# Patient Record
Sex: FEMALE | Race: WHITE | HISPANIC OR LATINO | Employment: OTHER | URBAN - METROPOLITAN AREA
[De-identification: names, ages, dates, MRNs, and addresses within clinical notes are randomized per-mention and may not be internally consistent; named-entity substitution may affect disease eponyms.]

---

## 2017-02-04 ENCOUNTER — HOSPITAL ENCOUNTER (INPATIENT)
Facility: HOSPITAL | Age: 62
LOS: 2 days | Discharge: HOME | DRG: 291 | End: 2017-02-06
Attending: EMERGENCY MEDICINE | Admitting: INTERNAL MEDICINE
Payer: MEDICAID

## 2017-02-04 DIAGNOSIS — N19 RENAL FAILURE: ICD-10-CM

## 2017-02-04 DIAGNOSIS — E43 SEVERE MALNUTRITION: ICD-10-CM

## 2017-02-04 DIAGNOSIS — N18.6 ESRD (END STAGE RENAL DISEASE): ICD-10-CM

## 2017-02-04 DIAGNOSIS — R09.02 HYPOXIA: ICD-10-CM

## 2017-02-04 DIAGNOSIS — E87.6 HYPOKALEMIA: ICD-10-CM

## 2017-02-04 DIAGNOSIS — E87.20 METABOLIC ACIDOSIS: ICD-10-CM

## 2017-02-04 DIAGNOSIS — I50.30 DIASTOLIC CONGESTIVE HEART FAILURE: ICD-10-CM

## 2017-02-04 DIAGNOSIS — R06.02 SHORTNESS OF BREATH: ICD-10-CM

## 2017-02-04 DIAGNOSIS — J96.01 ACUTE HYPOXEMIC RESPIRATORY FAILURE: ICD-10-CM

## 2017-02-04 DIAGNOSIS — I10 ESSENTIAL HYPERTENSION: ICD-10-CM

## 2017-02-04 DIAGNOSIS — I10 MALIGNANT HYPERTENSION: ICD-10-CM

## 2017-02-04 DIAGNOSIS — R73.9 HYPERGLYCEMIA: ICD-10-CM

## 2017-02-04 DIAGNOSIS — Z91.199 NON-COMPLIANCE: ICD-10-CM

## 2017-02-04 DIAGNOSIS — J81.0 ACUTE PULMONARY EDEMA: Primary | ICD-10-CM

## 2017-02-04 DIAGNOSIS — Z72.0 TOBACCO ABUSE: ICD-10-CM

## 2017-02-04 PROBLEM — J81.1 PULMONARY EDEMA: Status: ACTIVE | Noted: 2017-02-04

## 2017-02-04 LAB
ALBUMIN SERPL BCP-MCNC: 2.1 G/DL
ALP SERPL-CCNC: 179 U/L
ALT SERPL W/O P-5'-P-CCNC: 12 U/L
ANION GAP SERPL CALC-SCNC: 20 MMOL/L
AST SERPL-CCNC: 21 U/L
BASOPHILS # BLD AUTO: 0.02 K/UL
BASOPHILS NFR BLD: 0.2 %
BILIRUB SERPL-MCNC: 0.2 MG/DL
BNP SERPL-MCNC: 2762 PG/ML
BUN SERPL-MCNC: 29 MG/DL
CALCIUM SERPL-MCNC: 8.2 MG/DL
CHLORIDE SERPL-SCNC: 97 MMOL/L
CO2 SERPL-SCNC: 20 MMOL/L
CREAT SERPL-MCNC: 4.3 MG/DL
DIFFERENTIAL METHOD: ABNORMAL
EOSINOPHIL # BLD AUTO: 0.1 K/UL
EOSINOPHIL NFR BLD: 0.4 %
ERYTHROCYTE [DISTWIDTH] IN BLOOD BY AUTOMATED COUNT: 15.4 %
EST. GFR  (AFRICAN AMERICAN): 12 ML/MIN/1.73 M^2
EST. GFR  (NON AFRICAN AMERICAN): 11 ML/MIN/1.73 M^2
GLUCOSE SERPL-MCNC: 165 MG/DL
HCT VFR BLD AUTO: 43.7 %
HGB BLD-MCNC: 13.8 G/DL
LYMPHOCYTES # BLD AUTO: 2.2 K/UL
LYMPHOCYTES NFR BLD: 18 %
MCH RBC QN AUTO: 28.2 PG
MCHC RBC AUTO-ENTMCNC: 31.6 %
MCV RBC AUTO: 89 FL
MONOCYTES # BLD AUTO: 0.4 K/UL
MONOCYTES NFR BLD: 3.5 %
NEUTROPHILS # BLD AUTO: 9.3 K/UL
NEUTROPHILS NFR BLD: 77.9 %
PLATELET # BLD AUTO: 260 K/UL
PMV BLD AUTO: 11.6 FL
POTASSIUM SERPL-SCNC: 2.8 MMOL/L
PROT SERPL-MCNC: 6.5 G/DL
RBC # BLD AUTO: 4.89 M/UL
SODIUM SERPL-SCNC: 137 MMOL/L
WBC # BLD AUTO: 11.97 K/UL

## 2017-02-04 PROCEDURE — 96374 THER/PROPH/DIAG INJ IV PUSH: CPT

## 2017-02-04 PROCEDURE — 80053 COMPREHEN METABOLIC PANEL: CPT

## 2017-02-04 PROCEDURE — 87340 HEPATITIS B SURFACE AG IA: CPT

## 2017-02-04 PROCEDURE — 96375 TX/PRO/DX INJ NEW DRUG ADDON: CPT

## 2017-02-04 PROCEDURE — 94660 CPAP INITIATION&MGMT: CPT

## 2017-02-04 PROCEDURE — 20000000 HC ICU ROOM

## 2017-02-04 PROCEDURE — 25000003 PHARM REV CODE 250: Performed by: INTERNAL MEDICINE

## 2017-02-04 PROCEDURE — 27000190 HC CPAP FULL FACE MASK W/VALVE

## 2017-02-04 PROCEDURE — 25000003 PHARM REV CODE 250: Performed by: EMERGENCY MEDICINE

## 2017-02-04 PROCEDURE — 85025 COMPLETE CBC W/AUTO DIFF WBC: CPT

## 2017-02-04 PROCEDURE — 86706 HEP B SURFACE ANTIBODY: CPT

## 2017-02-04 PROCEDURE — 90935 HEMODIALYSIS ONE EVALUATION: CPT

## 2017-02-04 PROCEDURE — 99291 CRITICAL CARE FIRST HOUR: CPT | Mod: 25

## 2017-02-04 PROCEDURE — 83880 ASSAY OF NATRIURETIC PEPTIDE: CPT

## 2017-02-04 PROCEDURE — 63600175 PHARM REV CODE 636 W HCPCS: Performed by: EMERGENCY MEDICINE

## 2017-02-04 RX ORDER — SODIUM CHLORIDE 9 MG/ML
INJECTION, SOLUTION INTRAVENOUS
Status: DISCONTINUED | OUTPATIENT
Start: 2017-02-04 | End: 2017-02-06 | Stop reason: HOSPADM

## 2017-02-04 RX ORDER — ACETAMINOPHEN 325 MG/1
650 TABLET ORAL EVERY 8 HOURS PRN
Status: DISCONTINUED | OUTPATIENT
Start: 2017-02-04 | End: 2017-02-06 | Stop reason: HOSPADM

## 2017-02-04 RX ORDER — MORPHINE SULFATE 10 MG/ML
2 INJECTION INTRAMUSCULAR; INTRAVENOUS; SUBCUTANEOUS EVERY 4 HOURS PRN
Status: DISCONTINUED | OUTPATIENT
Start: 2017-02-04 | End: 2017-02-05

## 2017-02-04 RX ORDER — FUROSEMIDE 10 MG/ML
60 INJECTION INTRAMUSCULAR; INTRAVENOUS
Status: COMPLETED | OUTPATIENT
Start: 2017-02-04 | End: 2017-02-04

## 2017-02-04 RX ORDER — AMLODIPINE BESYLATE 5 MG/1
10 TABLET ORAL DAILY
Status: DISCONTINUED | OUTPATIENT
Start: 2017-02-04 | End: 2017-02-06 | Stop reason: HOSPADM

## 2017-02-04 RX ORDER — ONDANSETRON 2 MG/ML
4 INJECTION INTRAMUSCULAR; INTRAVENOUS EVERY 12 HOURS PRN
Status: DISCONTINUED | OUTPATIENT
Start: 2017-02-04 | End: 2017-02-06 | Stop reason: HOSPADM

## 2017-02-04 RX ORDER — ENALAPRILAT 1.25 MG/ML
1.25 INJECTION INTRAVENOUS ONCE
Status: COMPLETED | OUTPATIENT
Start: 2017-02-04 | End: 2017-02-04

## 2017-02-04 RX ORDER — SODIUM CHLORIDE 9 MG/ML
INJECTION, SOLUTION INTRAVENOUS ONCE
Status: DISCONTINUED | OUTPATIENT
Start: 2017-02-04 | End: 2017-02-06 | Stop reason: HOSPADM

## 2017-02-04 RX ORDER — METOPROLOL TARTRATE 50 MG/1
50 TABLET ORAL 2 TIMES DAILY
Status: DISCONTINUED | OUTPATIENT
Start: 2017-02-04 | End: 2017-02-06 | Stop reason: HOSPADM

## 2017-02-04 RX ORDER — HYDRALAZINE HYDROCHLORIDE 25 MG/1
50 TABLET, FILM COATED ORAL EVERY 8 HOURS
Status: DISCONTINUED | OUTPATIENT
Start: 2017-02-04 | End: 2017-02-06 | Stop reason: HOSPADM

## 2017-02-04 RX ORDER — HEPARIN SODIUM 5000 [USP'U]/ML
5000 INJECTION, SOLUTION INTRAVENOUS; SUBCUTANEOUS
Status: DISCONTINUED | OUTPATIENT
Start: 2017-02-04 | End: 2017-02-06 | Stop reason: HOSPADM

## 2017-02-04 RX ADMIN — AMLODIPINE BESYLATE 10 MG: 5 TABLET ORAL at 03:02

## 2017-02-04 RX ADMIN — HEPARIN SODIUM 5000 UNITS: 5000 INJECTION, SOLUTION INTRAVENOUS; SUBCUTANEOUS at 08:02

## 2017-02-04 RX ADMIN — HYDRALAZINE HYDROCHLORIDE 50 MG: 25 TABLET ORAL at 09:02

## 2017-02-04 RX ADMIN — FUROSEMIDE 60 MG: 10 INJECTION, SOLUTION INTRAMUSCULAR; INTRAVENOUS at 11:02

## 2017-02-04 RX ADMIN — ENALAPRILAT 1.25 MG: 1.25 INJECTION, SOLUTION INTRAVENOUS at 11:02

## 2017-02-04 RX ADMIN — HYDRALAZINE HYDROCHLORIDE 50 MG: 25 TABLET ORAL at 03:02

## 2017-02-04 RX ADMIN — METOPROLOL TARTRATE 50 MG: 50 TABLET ORAL at 08:02

## 2017-02-04 NOTE — H&P
[facility] Hospital Medicine  History & Physical    Patient Name: April Vasquez  MRN: [id number]  Admission Date: 2/4/2017  Attending Physician: Brandee Irene MD   Primary Care Provider: Nasra Iraheta MD         Patient information was obtained from patient and ER records.     Subjective:     Principal Problem:Acute pulmonary edema    Chief Complaint:   Chief Complaint   Patient presents with    Shortness of Breath     Pt reports increased shortness of breath since this morning with nonproductive cough. No dialysis this morning.        HPI: Mrs. Vasquez is a 62 yo F who has a history of hypertension with recent initiation of dialysis this past November on a T, TH, Sat schedule, presents to the ED with a CC of SOB. The patient notes that she ate boiled crawfish last night and woke up this am SOB.  EMS was called and started the patient on Bipap.  Because of the ER visit, the patient missed dialysis today. She was found on X-ray to have pulmonary edema with supporting BNp of 2762. She notes a cough but non-productive. No fever, no wbc count. No known sick contacts.  The patient is resting comfortably in the ED on Bi-pap without any increased work of breathing. She lives at home with her . She continues to smoke.     Past Medical History   Diagnosis Date    Diabetes mellitus     Gallstones     Hypertension     Renal disorder      dialysis       Past Surgical History   Procedure Laterality Date    Hysterectomy      Dialyis port       right subclavian       Review of patient's allergies indicates:  No Known Allergies    No current facility-administered medications on file prior to encounter.      Current Outpatient Prescriptions on File Prior to Encounter   Medication Sig    amlodipine (NORVASC) 10 MG tablet Take 1 tablet (10 mg total) by mouth once daily.    hydrALAZINE (APRESOLINE) 50 MG tablet Take 1 tablet (50 mg total) by mouth every 8 (eight) hours.    metoprolol tartrate  (LOPRESSOR) 50 MG tablet Take 1 tablet (50 mg total) by mouth 2 (two) times daily.    isosorbide mononitrate (IMDUR) 30 MG 24 hr tablet Take 1 tablet (30 mg total) by mouth once daily.     Family History     None        Social History Main Topics    Smoking status: Current Every Day Smoker     Packs/day: 0.50    Smokeless tobacco: Not on file    Alcohol use No    Drug use: No    Sexual activity: Yes     Partners: Male     Review of Systems   Constitutional: Negative for activity change, chills, diaphoresis, fatigue and fever.   HENT: Negative for congestion.    Respiratory: Positive for cough and shortness of breath. Negative for choking, chest tightness, wheezing and stridor.    Cardiovascular: Negative for chest pain, palpitations and leg swelling.   Gastrointestinal: Negative for abdominal distention and abdominal pain.   Genitourinary: Negative for dysuria.   Musculoskeletal: Negative for arthralgias.     Objective:     Vital Signs (Most Recent):  Pulse: 88 (02/04/17 1225)  Resp: (!) 32 (02/04/17 1116)  BP: (!) 168/88 (02/04/17 1225)  SpO2: 98 % (02/04/17 1225) Vital Signs (24h Range):  Pulse:  [] 88  Resp:  [32] 32  SpO2:  [89 %-99 %] 98 %  BP: (168-211)/() 168/88     Weight: 54.4 kg (120 lb)  Body mass index is 20.6 kg/(m^2).    Physical Exam   Constitutional: She is oriented to person, place, and time. She appears well-developed and well-nourished.   Cardiovascular: Normal rate.  Exam reveals no gallop and no friction rub.    No murmur heard.  Pulmonary/Chest: Effort normal. No respiratory distress. She has no wheezes. She has rales. She exhibits no tenderness.   Abdominal: Soft. Bowel sounds are normal. She exhibits no distension.   Neurological: She is alert and oriented to person, place, and time.   Skin: Skin is warm and dry.   Vitals reviewed.       Significant Labs:   BMP:   Recent Labs  Lab 02/04/17  1131   *      K 2.8*   CL 97   CO2 20*   BUN 29*   CREATININE 4.3*    CALCIUM 8.2*     CBC:   Recent Labs  Lab 02/04/17  1131   WBC 11.97   HGB 13.8   HCT 43.7          Significant Imaging: CXR with pulmonary edema.   BNP 2762    All labs reviewed and interpreted by myself.     Assessment/Plan:     * Acute pulmonary edema  Likely from dietary non-compliance.  Patient had crawfish yesterday. Now presenting with pulmonary edema. - will undergo dialysis shortly.  No history of heart failure. Will check echo.       Hypokalemia  Will replace.       Severe malnutrition  Will discuss with patient       Metabolic acidosis  From renal failure      Tobacco abuse  Smoking cessation education       Hyperglycemia  Has diabetes listed as problem- but she is not on any meds.  Will check an A1c.       Acute hypoxemic respiratory failure  Patient presenting with respiratory distress as well. Now comfortable on Bi-pap. Will undergo dialysis. Stable.       Non-compliance  Discussed with patient       ESRD (end stage renal disease)  Recent initiation of dialysis this past November. Nephrology consulted.  T, TH, Sat. Will undergo acute dialysis       Malignant hypertension  Patient presenting with a SBP of 200+.- likely cause of patient's ESRD.  Being treated. Improved. Dialysis will help further.        VTE Risk Mitigation     None      Will place on Heparin for DVT prophylaxis.     Critical care time spent  50 minutes     Nma Cuadra MD  Department of Hospital Medicine   [facility]

## 2017-02-04 NOTE — SUBJECTIVE & OBJECTIVE
Past Medical History   Diagnosis Date    Diabetes mellitus     Gallstones     Hypertension     Renal disorder      dialysis       Past Surgical History   Procedure Laterality Date    Hysterectomy      Dialyis port       right subclavian       Review of patient's allergies indicates:  No Known Allergies    No current facility-administered medications on file prior to encounter.      Current Outpatient Prescriptions on File Prior to Encounter   Medication Sig    amlodipine (NORVASC) 10 MG tablet Take 1 tablet (10 mg total) by mouth once daily.    hydrALAZINE (APRESOLINE) 50 MG tablet Take 1 tablet (50 mg total) by mouth every 8 (eight) hours.    metoprolol tartrate (LOPRESSOR) 50 MG tablet Take 1 tablet (50 mg total) by mouth 2 (two) times daily.    isosorbide mononitrate (IMDUR) 30 MG 24 hr tablet Take 1 tablet (30 mg total) by mouth once daily.     Family History     None        Social History Main Topics    Smoking status: Current Every Day Smoker     Packs/day: 0.50    Smokeless tobacco: Not on file    Alcohol use No    Drug use: No    Sexual activity: Yes     Partners: Male     Review of Systems   Constitutional: Negative for activity change, chills, diaphoresis, fatigue and fever.   HENT: Negative for congestion.    Respiratory: Positive for cough and shortness of breath. Negative for choking, chest tightness, wheezing and stridor.    Cardiovascular: Negative for chest pain, palpitations and leg swelling.   Gastrointestinal: Negative for abdominal distention and abdominal pain.   Genitourinary: Negative for dysuria.   Musculoskeletal: Negative for arthralgias.     Objective:     Vital Signs (Most Recent):  Pulse: 88 (02/04/17 1225)  Resp: (!) 32 (02/04/17 1116)  BP: (!) 168/88 (02/04/17 1225)  SpO2: 98 % (02/04/17 1225) Vital Signs (24h Range):  Pulse:  [] 88  Resp:  [32] 32  SpO2:  [89 %-99 %] 98 %  BP: (168-211)/() 168/88     Weight: 54.4 kg (120 lb)  Body mass index is 20.6  kg/(m^2).    Physical Exam   Constitutional: She is oriented to person, place, and time. She appears well-developed and well-nourished.   Cardiovascular: Normal rate.  Exam reveals no gallop and no friction rub.    No murmur heard.  Pulmonary/Chest: Effort normal. No respiratory distress. She has no wheezes. She has rales. She exhibits no tenderness.   Abdominal: Soft. Bowel sounds are normal. She exhibits no distension.   Neurological: She is alert and oriented to person, place, and time.   Skin: Skin is warm and dry.   Vitals reviewed.       Significant Labs:   BMP:   Recent Labs  Lab 02/04/17  1131   *      K 2.8*   CL 97   CO2 20*   BUN 29*   CREATININE 4.3*   CALCIUM 8.2*     CBC:   Recent Labs  Lab 02/04/17  1131   WBC 11.97   HGB 13.8   HCT 43.7          Significant Imaging: CXR with pulmonary edema.   BNP 2762    All labs reviewed and interpreted by myself.

## 2017-02-04 NOTE — IP AVS SNAPSHOT
[place] [address] [address]  Phone: [phone]           Patient Discharge Instructions     Our goal is to set you up for success. This packet includes information on your condition, medications, and your home care. It will help you to care for yourself so you don't get sicker and need to go back to the hospital.     Please ask your nurse if you have any questions.        There are many details to remember when preparing to leave the hospital. Here is what you will need to do:    1. Take your medicine. If you are prescribed medications, review your Medication List in the following pages. You may have new medications to  at the pharmacy and others that you'll need to stop taking. Review the instructions for how and when to take your medications. Talk with your doctor or nurses if you are unsure of what to do.     2. Go to your follow-up appointments. Specific follow-up information is listed in the following pages. Your may be contacted by a transition nurse or clinical provider about future appointments. Be sure we have all of the phone numbers to reach you, if needed. Please contact your provider's office if you are unable to make an appointment.     3. Watch for warning signs. Your doctor or nurse will give you detailed warning signs to watch for and when to call for assistance. These instructions may also include educational information about your condition. If you experience any of warning signs to your health, call your doctor.               ** Verify the list of medication(s) below is accurate and up to date. Carry this with you in case of emergency. If your medications have changed, please notify your healthcare provider.             Medication List      CHANGE how you take these medications        Additional Info                      hydrALAZINE 50 MG tablet   Commonly known as:  APRESOLINE   Quantity:  90 tablet   Refills:  0   Dose:  75 mg   What  changed:  how much to take    Last time this was given:  50 mg on 2/6/2017  6:11 AM   Instructions:  Take 1.5 tablets (75 mg total) by mouth every 8 (eight) hours.     Begin Date    AM    Noon    PM    Bedtime         CONTINUE taking these medications        Additional Info                      amlodipine 10 MG tablet   Commonly known as:  NORVASC   Quantity:  30 tablet   Refills:  0   Dose:  10 mg    Last time this was given:  10 mg on 2/6/2017  8:30 AM   Instructions:  Take 1 tablet (10 mg total) by mouth once daily.     Begin Date    AM    Noon    PM    Bedtime       isosorbide mononitrate 30 MG 24 hr tablet   Commonly known as:  IMDUR   Quantity:  30 tablet   Refills:  0   Dose:  30 mg    Instructions:  Take 1 tablet (30 mg total) by mouth once daily.     Begin Date    AM    Noon    PM    Bedtime       metoprolol tartrate 50 MG tablet   Commonly known as:  LOPRESSOR   Quantity:  60 tablet   Refills:  0   Dose:  50 mg    Last time this was given:  50 mg on 2/6/2017  8:30 AM   Instructions:  Take 1 tablet (50 mg total) by mouth 2 (two) times daily.     Begin Date    AM    Noon    PM    Bedtime            Where to Get Your Medications      You can get these medications from any pharmacy     Bring a paper prescription for each of these medications     hydrALAZINE 50 MG tablet                  Please bring to all follow up appointments:    1. A copy of your discharge instructions.  2. All medicines you are currently taking in their original bottles.  3. Identification and insurance card.    Please arrive 15 minutes ahead of scheduled appointment time.    Please call 24 hours in advance if you must reschedule your appointment and/or time.        Follow-up Information     Follow up with Nasra Iraheta MD In 1 week.    Specialty:  Family Medicine    Contact information:    [address]  [place] [address] [phone]          Discharge Instructions     Future Orders    Activity as tolerated     Diet general     Questions:     "Total calories:      Fat restriction, if any:      Protein restriction, if any:      Na restriction, if any:  2gNa    Fluid restriction:      Additional restrictions:        Discharge References/Attachments     BLOOD PRESSURE, DISCHARGE INSTRUCTIONS: TAKING YOUR (ENGLISH)    BLOOD PRESSURE, TAKING YOUR (ENGLISH)    CHRONIC KIDNEY DISEASE (CKD) (ENGLISH)    CHRONIC KIDNEY DISEASE, DIET FOR (ENGLISH)    EATING HEART-HEALTHY FOODS (ENGLISH)    HIGH BLOOD PRESSURE, CONTROLLING (ENGLISH)    HIGH BLOOD PRESSURE, WHAT IS?  (ENGLISH)    KIDNEY DISEASE, HIGH BLOOD PRESSURE AND (ENGLISH)    KIDNEY DISEASE: AVOIDING HIGH-SODIUM FOODS (ENGLISH)    KIDNEY DISEASE: EATING LESS SODIUM (ENGLISH)    SMOKING CESSATION (ENGLISH)        Primary Diagnosis     Your primary diagnosis was:  Acute Lung Edema      Admission Information     Date & Time Provider Department CSN    2/4/2017 11:14 AM Nam Ford MD [facility] [id number]      Care Providers     Provider Role Specialty Primary office phone    Nam Ford MD Attending Provider Hospitalist [phone]    Melania Ely MD Consulting Physician  Nephrology [phone]      Your Vitals Were     BP Pulse Temp Resp Height Weight    160/71 88 98.2 °F (36.8 °C) (Oral) 16 5' 4" (1.626 m) 55.2 kg (121 lb 11.1 oz)    SpO2 BMI             96% 20.89 kg/m2         Recent Lab Values        9/19/2016                           2:40 PM           A1C 7.1 (H)           Comment for A1C at  2:40 PM on 9/19/2016:  According to ADA guidelines, hemoglobin A1C <7.0% represents  optimal control in non-pregnant diabetic patients.  Different  metrics may apply to specific populations.   Standards of Medical Care in Diabetes - 2016.  For the purpose of screening for the presence of diabetes:  <5.7%     Consistent with the absence of diabetes  5.7-6.4%  Consistent with increasing risk for diabetes   (prediabetes)  >or=6.5%  Consistent with diabetes  Currently no consensus exists " for use of hemoglobin A1C  for diagnosis of diabetes for children.        Pending Labs     Order Current Status    Hemoglobin A1c In process    Hepatitis B Surface Antibody, Qual/Quant In process    Hepatitis B surface antigen In process      Allergies as of 2/6/2017     No Known Allergies      [facility]     [facility] Nurse Care Line - 24/7 Assistance  Unless otherwise directed by your provider, please contact [facility], our nurse care line that is available for 24/7 assistance.     Registered nurses in the [facility] provide clinical advisement, health education, appointment booking, and other advisory services.  Call for this free service at [phone].        Advance Directives     An advance directive is a document which, in the event you are no longer able to make decisions for yourself, tells your healthcare team what kind of treatment you do or do not want to receive, or who you would like to make those decisions for you.  If you do not currently have an advance directive, [facility] encourages you to create one.  For more information call:  [phone]), [phone] ([phone]),  or log on to [email or web address].        Smoking Cessation     If you would like to quit smoking:   You may be eligible for free services if you are a [place] resident and started smoking cigarettes before September 1, 1988.  Call the Smoking Cessation Trust (SCT) toll free at (893) 288-6096 or (234) 489-4954.   Call 5-600-QUIT-NOW if you do not meet the above criteria.            Language Assistance Services     ATTENTION: Language assistance services are available, free of charge. Please call [phone].      ATENCIÓN: Si habla español, tiene a del real disposición servicios gratuitos de asistencia lingüística. Llame al [phone].     CHÚ Ý: N?u b?n nói Ti?ng Vi?t, có các d?ch v? h? tr? ngôn ng? mi?n phí dành cho b?n. G?i s? [phone].        Chronic Kindey Disease  Education             Diabetes Discharge Instructions                                   [facility] Sign-Up     Activating your [facility] account is as easy as 1-2-3!     1) Visit [email or web address], select Sign Up Now, enter this activation code and your date of birth, then select Next.  [email or web address]  Expires: 3/23/2017  8:50 AM      2) Create a username and password to use when you visit [facility] in the future and select a security question in case you lose your password and select Next.    3) Enter your e-mail address and click Sign Up!    Additional Information  If you have questions, please e-mail [email or web address] or call [phone] to talk to our [facility] staff. Remember, [facility] is NOT to be used for urgent needs. For medical emergencies, dial 911.          [facility] complies with applicable Federal civil rights laws and does not discriminate on the basis of race, color, national origin, age, disability, or sex.

## 2017-02-04 NOTE — PLAN OF CARE
Problem: Patient Care Overview  Goal: Plan of Care Review  Outcome: Ongoing (interventions implemented as appropriate)  To hospital with respiratory distress.  Now comfortable on BIPAP with 50% FIO2.  Awaiting Hemodialysis treatment.

## 2017-02-04 NOTE — PLAN OF CARE
Problem: Hemodialysis (Adult)  Goal: Signs and Symptoms of Listed Potential Problems Will be Absent, Minimized or Managed (Hemodialysis)  Signs and symptoms of listed potential problems will be absent, minimized or managed by discharge/transition of care (reference Hemodialysis (Adult) CPG).   Outcome: Ongoing (interventions implemented as appropriate)    02/04/17 1629   Hemodialysis   Problems Assessed (Hemodialysis) all   Problems Present (Hemodialysis) cardiovascular complications;electrolyte imbalance;fluid imbalance;hematologic complications;infection;situational response;other (see comments)   4 hour hd tx in progress as ordered.

## 2017-02-04 NOTE — ASSESSMENT & PLAN NOTE
Patient presenting with respiratory distress as well. Now comfortable on Bi-pap. Will undergo dialysis. Stable.

## 2017-02-04 NOTE — ASSESSMENT & PLAN NOTE
Recent initiation of dialysis this past November. Nephrology consulted.  T, TH, Sat. Will undergo acute dialysis

## 2017-02-04 NOTE — ED TRIAGE NOTES
Pt comes in from home via EMS for c/o of sob. Pt is a dialysis pt, began dialysis on December. Pt has dialysis T/TH/Sat. Pt reports she woke up very short of breath and gasping for air. Pt comes in wearing cpap and saturation in the upper 80s //low 90s. Resp team is called to bedside. PT is also hypertensive and is wearing a nitro patch given by EMS. Pt denies any pain but states she cannot breath w/o oxygen. Room Air stats mid 80s.

## 2017-02-04 NOTE — ASSESSMENT & PLAN NOTE
Likely from dietary non-compliance.  Patient had crawfish yesterday. Now presenting with pulmonary edema. - will undergo dialysis shortly.  No history of heart failure. Will check echo.

## 2017-02-04 NOTE — ASSESSMENT & PLAN NOTE
Patient presenting with a SBP of 200+.- likely cause of patient's ESRD.  Being treated. Improved. Dialysis will help further.

## 2017-02-04 NOTE — ED PROVIDER NOTES
Encounter Date: 2/4/2017    SCRIBE #1 NOTE: I, Elsa Mcleod, am scribing for, and in the presence of,  Brandee Irene MD. I have scribed the following portions of the note - Other sections scribed: HPI/ROS/PE.       History     Chief Complaint   Patient presents with    Shortness of Breath     Pt reports increased shortness of breath since this morning with nonproductive cough. No dialysis this morning.     Review of patient's allergies indicates:  No Known Allergies  HPI Comments: CC: SOB    HPI: This 62 yo F smoker who has history of DM, HTN, and renal disorder presents to the ED via EMS for evaluation of severe SOB that began this morning. The pt reports associated nonproductive cough and rhinorrhea due to a cold. The pt burleson been receiving dialysis T-Th-Sat since December. She did not go to dialysis this morning. Per EMS the pt was hypertensive and was given a nitro patch in route. No at home BiPap machine. The pt denies fever, chills, N/V/D, chest pain, and any other associated symptoms.     The history is provided by the patient. No  was used.     Past Medical History   Diagnosis Date    Diabetes mellitus     Gallstones     Hypertension      No past medical history pertinent negatives.  Past Surgical History   Procedure Laterality Date    Hysterectomy       No family history on file.  Social History   Substance Use Topics    Smoking status: Current Every Day Smoker     Packs/day: 0.50    Smokeless tobacco: Not on file    Alcohol use No     Review of Systems   Constitutional: Negative for chills, diaphoresis and fever.   HENT: Positive for rhinorrhea. Negative for congestion.    Eyes: Negative for visual disturbance.   Respiratory: Positive for cough (nonproductive ) and shortness of breath.    Cardiovascular: Negative for chest pain and leg swelling.   Gastrointestinal: Negative for abdominal pain, diarrhea, nausea and vomiting.   Genitourinary: Negative for dysuria and  frequency.   Musculoskeletal: Negative for myalgias.   Skin: Negative for rash.   Neurological: Negative for headaches.       Physical Exam   Initial Vitals   BP Pulse Resp Temp SpO2   02/04/17 1116 02/04/17 1116 02/04/17 1116 -- 02/04/17 1116   180/100 107 32  90 %     Physical Exam    Nursing note and vitals reviewed.  Constitutional: She appears well-developed and well-nourished. She is cooperative.  Non-toxic appearance. She appears distressed.   HENT:   Head: Normocephalic and atraumatic.   Eyes: Conjunctivae and EOM are normal. Pupils are equal, round, and reactive to light.   Neck: Normal range of motion and full passive range of motion without pain. Neck supple. No thyromegaly present. No JVD present.   Cardiovascular: Regular rhythm, normal heart sounds and normal pulses. Tachycardia present.    Pulmonary/Chest: Tachypnea noted. She is in respiratory distress (severe). She has rales (diffusely).   Tripod breathing    Abdominal: Soft. Normal appearance and bowel sounds are normal. She exhibits no distension and no mass. There is no tenderness.   Musculoskeletal: Normal range of motion.   No lower extremity edema   Neurological: She is alert and oriented to person, place, and time. She has normal strength. No cranial nerve deficit or sensory deficit.   Skin: Skin is warm, dry and intact. No rash noted.   Psychiatric: She has a normal mood and affect. Her speech is normal and behavior is normal. Judgment and thought content normal.         ED Course   Critical Care  Date/Time: 2/4/2017 12:00 PM  Performed by: ADONIS KC  Authorized by: RODRIGO FERRO   Direct patient critical care time: 30 minutes  Ordering / reviewing critical care time: 5 minutes  Documentation critical care time: 5 minutes  Consulting other physicians critical care time: 5 minutes  Total critical care time (exclusive of procedural time) : 45 minutes  Critical care was necessary to treat or prevent imminent or life-threatening  deterioration of the following conditions: respiratory failure and renal failure.  Critical care was time spent personally by me on the following activities: evaluation of patient's response to treatment, examination of patient, obtaining history from patient or surrogate, ordering and performing treatments and interventions, pulse oximetry, re-evaluation of patient's condition, review of old charts and ordering and review of radiographic studies.        Labs Reviewed   CBC W/ AUTO DIFFERENTIAL   COMPREHENSIVE METABOLIC PANEL   B-TYPE NATRIURETIC PEPTIDE     EKG Readings: (Independently Interpreted)   Sinus tachycardia 101, normal axis, normal intervals, good R-wave progression       X-Rays:   Independently Interpreted Readings:   Other Readings:  Pulmonary edema, right subclavian port in place    Medical Decision Making:   Initial Assessment:   Urgent evaluation of 61-year-old female with history of hypertension, end-stage renal disease, on dialysis who is brought in by EMS with shortness of breath.  Patient has had 3 days of cough, and was anticipating going to dialysis today but given shortness of breath required EMS.  Patient was reportedly hypertensive and tachypnea, requiring CPAP and received one sublingual nitrogen, with 1 inch Nitropaste prior to arrival in the ER.  On exam patient was in respiratory distress, with increased work of breathing, crackles throughout all lung fields, with tachypnea, no obvious JVD, no lower extremity edema, right subclavian hemodialysis port in place and well-appearing.  I suspect flash pulmonary edema, fluid overload, less likely COPD, pneumonia, pneumothorax.  Clinical Tests:   Lab Tests: Ordered and Reviewed  Radiological Study: Ordered and Reviewed  Medical Tests: Ordered and Reviewed  ED Management:  Patient was given IV Lasix, IV enalapril, and placed on BiPAP.  Chest x-ray consistent with pulmonary edema.  I discussed the case with nephrologist Dr. Iraheta, and agrees to  prepare for emergent dialysis given her respiratory distress.  Will admit patient to the ICU, with Dr. Ford aware.            Scribe Attestation:   Scribe #1: I performed the above scribed service and the documentation accurately describes the services I performed. I attest to the accuracy of the note.    Attending Attestation:           Physician Attestation for Scribe:  Physician Attestation Statement for Scribe #1: I, Brandee Irene MD, reviewed documentation, as scribed by Elsa Mcleod in my presence, and it is both accurate and complete.                 ED Course     Clinical Impression:     1. Acute pulmonary edema    2. Shortness of breath    3. Renal failure    4. Essential hypertension    5. Hypoxia        Disposition:   Disposition: Admitted  Condition: Serious       Brandee Irene MD  02/04/17 1239       Brandee Irene MD  03/03/17 6483

## 2017-02-04 NOTE — PROGRESS NOTES
Pt presents as ER admit for acute SOB, per report. Hx of noncompliance also per report. Noted as hypokalemic, hypertensive and with a BNP >2700. Radiology films reveal mild cardiomegaly and a small right pleural effusion. Pt admitted to the ICU for R/O Pneumonia vs Atelectasis. Orders , labs/consents reveiwed and confirmed, 3.5 hour hd tx initiated, will continue to monitor for changes and trends. Pt currently is on Bipap  For positive pressure ventilation / setting noted @ 10/5,  FIO2 @50%. Showing SR on monitor. SaO2 remains wnl while bipap in use. Right subclavian permacath accessed according to P&P. 3.5 hour hd tx in progress.  .

## 2017-02-05 PROBLEM — J96.01 ACUTE HYPOXEMIC RESPIRATORY FAILURE: Status: RESOLVED | Noted: 2017-02-04 | Resolved: 2017-02-05

## 2017-02-05 PROBLEM — J81.0 ACUTE PULMONARY EDEMA: Status: ACTIVE | Noted: 2017-02-05

## 2017-02-05 PROBLEM — J81.0 ACUTE PULMONARY EDEMA: Status: RESOLVED | Noted: 2017-02-04 | Resolved: 2017-02-05

## 2017-02-05 LAB
ANION GAP SERPL CALC-SCNC: 11 MMOL/L
BUN SERPL-MCNC: 23 MG/DL
CALCIUM SERPL-MCNC: 7.8 MG/DL
CHLORIDE SERPL-SCNC: 104 MMOL/L
CO2 SERPL-SCNC: 20 MMOL/L
CREAT SERPL-MCNC: 3.5 MG/DL
EST. GFR  (AFRICAN AMERICAN): 16 ML/MIN/1.73 M^2
EST. GFR  (NON AFRICAN AMERICAN): 14 ML/MIN/1.73 M^2
GLUCOSE SERPL-MCNC: 84 MG/DL
POTASSIUM SERPL-SCNC: 3.4 MMOL/L
SODIUM SERPL-SCNC: 135 MMOL/L

## 2017-02-05 PROCEDURE — 36415 COLL VENOUS BLD VENIPUNCTURE: CPT

## 2017-02-05 PROCEDURE — 93306 TTE W/DOPPLER COMPLETE: CPT | Mod: 26,,, | Performed by: INTERNAL MEDICINE

## 2017-02-05 PROCEDURE — 80048 BASIC METABOLIC PNL TOTAL CA: CPT

## 2017-02-05 PROCEDURE — 25000003 PHARM REV CODE 250: Performed by: EMERGENCY MEDICINE

## 2017-02-05 PROCEDURE — 20000000 HC ICU ROOM

## 2017-02-05 PROCEDURE — 27000221 HC OXYGEN, UP TO 24 HOURS

## 2017-02-05 PROCEDURE — 93306 TTE W/DOPPLER COMPLETE: CPT

## 2017-02-05 PROCEDURE — 25000003 PHARM REV CODE 250: Performed by: INTERNAL MEDICINE

## 2017-02-05 RX ORDER — FUROSEMIDE 40 MG/1
80 TABLET ORAL DAILY
Status: DISCONTINUED | OUTPATIENT
Start: 2017-02-05 | End: 2017-02-06 | Stop reason: HOSPADM

## 2017-02-05 RX ORDER — OXYCODONE AND ACETAMINOPHEN 5; 325 MG/1; MG/1
1 TABLET ORAL EVERY 4 HOURS PRN
Status: DISCONTINUED | OUTPATIENT
Start: 2017-02-05 | End: 2017-02-06 | Stop reason: HOSPADM

## 2017-02-05 RX ADMIN — ACETAMINOPHEN 650 MG: 325 TABLET ORAL at 07:02

## 2017-02-05 RX ADMIN — FUROSEMIDE 80 MG: 40 TABLET ORAL at 08:02

## 2017-02-05 RX ADMIN — OXYCODONE HYDROCHLORIDE AND ACETAMINOPHEN 1 TABLET: 5; 325 TABLET ORAL at 03:02

## 2017-02-05 RX ADMIN — HYDRALAZINE HYDROCHLORIDE 50 MG: 25 TABLET ORAL at 06:02

## 2017-02-05 RX ADMIN — AMLODIPINE BESYLATE 10 MG: 5 TABLET ORAL at 08:02

## 2017-02-05 RX ADMIN — METOPROLOL TARTRATE 50 MG: 50 TABLET ORAL at 08:02

## 2017-02-05 RX ADMIN — HYDRALAZINE HYDROCHLORIDE 50 MG: 25 TABLET ORAL at 01:02

## 2017-02-05 RX ADMIN — METOPROLOL TARTRATE 50 MG: 50 TABLET ORAL at 09:02

## 2017-02-05 RX ADMIN — HYDRALAZINE HYDROCHLORIDE 50 MG: 25 TABLET ORAL at 09:02

## 2017-02-05 RX ADMIN — OXYCODONE HYDROCHLORIDE AND ACETAMINOPHEN 1 TABLET: 5; 325 TABLET ORAL at 09:02

## 2017-02-05 NOTE — SUBJECTIVE & OBJECTIVE
Interval History: Doing well. SOB has resolved.     Review of Systems   Constitutional: Negative for activity change.   Respiratory: Negative for chest tightness and shortness of breath.    Cardiovascular: Negative for chest pain.   Gastrointestinal: Negative for abdominal pain.     Objective:     Vital Signs (Most Recent):  Temp: 100.2 °F (37.9 °C) (02/05/17 0701)  Pulse: 76 (02/05/17 0900)  Resp: 18 (02/05/17 0900)  BP: (!) 143/68 (02/05/17 0850)  SpO2: 95 % (02/05/17 0900) Vital Signs (24h Range):  Temp:  [98.3 °F (36.8 °C)-100.2 °F (37.9 °C)] 100.2 °F (37.9 °C)  Pulse:  [] 76  Resp:  [14-35] 18  SpO2:  [89 %-100 %] 95 %  BP: ()/() 143/68     Weight: 59.3 kg (130 lb 11.7 oz)  Body mass index is 22.44 kg/(m^2).    Intake/Output Summary (Last 24 hours) at 02/05/17 0939  Last data filed at 02/05/17 0601   Gross per 24 hour   Intake              850 ml   Output             3500 ml   Net            -2650 ml      Physical Exam   Constitutional: She is oriented to person, place, and time. She appears well-developed and well-nourished.   Pulmonary/Chest: Effort normal and breath sounds normal. No respiratory distress. She has no wheezes.   Abdominal: Soft.   Neurological: She is alert and oriented to person, place, and time.   Vitals reviewed.      Significant Labs:   BMP:   Recent Labs  Lab 02/04/17  1131   *      K 2.8*   CL 97   CO2 20*   BUN 29*   CREATININE 4.3*   CALCIUM 8.2*     CBC:   Recent Labs  Lab 02/04/17  1131   WBC 11.97   HGB 13.8   HCT 43.7          Significant Imaging:

## 2017-02-05 NOTE — PLAN OF CARE
02/05/17 1552   Discharge Assessment   Assessment Type Discharge Planning Assessment   Confirmed/corrected address and phone number on facesheet? Yes   Assessment information obtained from? Patient   Expected Length of Stay (days) (undetermined at this time)   Communicated expected length of stay with patient/caregiver yes   If Healthcare Directive is received, is it scanned into Epic? no (comment)   Prior to hospitilization cognitive status: Alert/Oriented   Prior to hospitalization functional status: (Independent with ADL's unable to cook or clean house)   Current cognitive status: Alert/Oriented   Current Functional Status: Needs Assistance   Arrived From home or self-care   Lives With child(jaylyn), adult;spouse   Able to Return to Prior Arrangements yes   Is patient able to care for self after discharge? Yes   How many people do you have in your home that can help with your care after discharge? 2   Who are your caregiver(s) and their phone number(s)? Son and  [phone]   Patient's perception of discharge disposition home or selfcare   Readmission Within The Last 30 Days no previous admission in last 30 days   Patient currently being followed by outpatient case management? No   Patient currently receives home health services? No   Does the patient currently use HME? No   Patient currently receives private duty nursing? N/A   Patient currently receives any other outside agency services? No   Equipment Currently Used at Home none   Do you have any problems affording any of your prescribed medications? No   Is the patient taking medications as prescribed? yes   Do you have any financial concerns preventing you from receiving the healthcare you need? No   Does the patient have transportation to healthcare appointments? Yes   Transportation Available car   On Dialysis? Yes   If yes, what is the name of the dialysis unit? [facility]   Does the patient receive outpatient dialysis? Yes   Does the patient  receive services at the [facility]? No   Are there any open cases? No   Discharge Plan A Home   Discharge Plan B (tbd)   Patient/Family In Agreement With Plan yes     [facility] [place] - [address] [facility]  [address] [place] [address]  Phone: [phone] Fax: [phone]      TN/TREVER roll explained to pt.  TN explained that Flory PERERA will follow while in ICU.

## 2017-02-05 NOTE — PROGRESS NOTES
[facility] Hospital Medicine  Progress Note    Patient Name: April Vasquez  MRN: [id number]  Patient Class: IP- Inpatient   Admission Date: 2/4/2017  Length of Stay: 1 days  Attending Physician: Nam Ford MD  Primary Care Provider: Nasra Iraheta MD        Subjective:     Principal Problem:Acute pulmonary edema    HPI:  Mrs. Vasquez is a 62 yo F who has a history of hypertension with recent initiation of dialysis this past November on a T, TH, Sat schedule, presents to the ED with a CC of SOB. The patient notes that she ate boiled crawfish last night and woke up this am SOB.  EMS was called and started the patient on Bipap.  Because of the ER visit, the patient missed dialysis today. She was found on X-ray to have pulmonary edema with supporting BNp of 2762. She notes a cough but non-productive. No fever, no wbc count. No known sick contacts.  The patient is resting comfortably in the ED on Bi-pap without any increased work of breathing. She lives at home with her . She continues to smoke.     Hospital Course:  The patient was admitted for treatment of pulmonary edema from non-compliance with diet. She underwent emergent dialysis with near complete resolution of her symptoms. The patient is a T, TH, Sat dialysis patient. Nephrology was consulted.  The patient's respiratory distress/failure has resolved and she will be transferred out of the ICU on 2/5.  Nephrology noted that she may possibly undergo one more round of dialysis on 2/6 and started the patient on po lasix.  Mrs. Vasquez lives at home with her  and is fully independent.  The patient presented with hyperglycemia but notes she is not diabetic. An A1c was ordered on 2/4. The patient has an echo of heart from 2/4 that is pending. She will likely be discharged to home on 2/6.     Interval History: Doing well. SOB has resolved.     Review of Systems   Constitutional: Negative for activity change.   Respiratory: Negative  for chest tightness and shortness of breath.    Cardiovascular: Negative for chest pain.   Gastrointestinal: Negative for abdominal pain.     Objective:     Vital Signs (Most Recent):  Temp: 100.2 °F (37.9 °C) (02/05/17 0701)  Pulse: 76 (02/05/17 0900)  Resp: 18 (02/05/17 0900)  BP: (!) 143/68 (02/05/17 0850)  SpO2: 95 % (02/05/17 0900) Vital Signs (24h Range):  Temp:  [98.3 °F (36.8 °C)-100.2 °F (37.9 °C)] 100.2 °F (37.9 °C)  Pulse:  [] 76  Resp:  [14-35] 18  SpO2:  [89 %-100 %] 95 %  BP: ()/() 143/68     Weight: 59.3 kg (130 lb 11.7 oz)  Body mass index is 22.44 kg/(m^2).    Intake/Output Summary (Last 24 hours) at 02/05/17 0939  Last data filed at 02/05/17 0601   Gross per 24 hour   Intake              850 ml   Output             3500 ml   Net            -2650 ml      Physical Exam   Constitutional: She is oriented to person, place, and time. She appears well-developed and well-nourished.   Pulmonary/Chest: Effort normal and breath sounds normal. No respiratory distress. She has no wheezes.   Abdominal: Soft.   Neurological: She is alert and oriented to person, place, and time.   Vitals reviewed.      Significant Labs:   BMP:   Recent Labs  Lab 02/04/17  1131   *      K 2.8*   CL 97   CO2 20*   BUN 29*   CREATININE 4.3*   CALCIUM 8.2*     CBC:   Recent Labs  Lab 02/04/17  1131   WBC 11.97   HGB 13.8   HCT 43.7          Significant Imaging:    Assessment/Plan:      * Acute pulmonary edema  Likely from dietary non-compliance.  Patient had crawfish yesterday. Now presenting with pulmonary edema. -   No history of heart failure. Will check echo.  SOB has resolved. Underwent dialysis. Consideration for another round of dialysis on 2/6.       Hypokalemia  Will replace.  Will check BMP now.        Severe malnutrition  Will discuss with patient       Metabolic acidosis  From renal failure      Tobacco abuse  Smoking cessation education       Hyperglycemia  Has diabetes listed as  problem- but she is not on any meds.  Will check an A1c.       Acute hypoxemic respiratory failure  Patient presenting with respiratory distress as well.  Resolved after dialysis.     Non-compliance  Discussed with patient       ESRD (end stage renal disease)  Recent initiation of dialysis this past November. Nephrology consulted.  T, TH, Sat. Will undergo acute dialysis       Malignant hypertension  Patient presenting with a SBP of 200+.- likely cause of patient's ESRD.  Being treated. Improved. Dialysis will help further.        VTE Risk Mitigation         Ordered     heparin (porcine) injection 5,000 Units  As needed (PRN)     Route:  Intravenous        02/04/17 1627     Medium Risk of VTE  Once      02/04/17 1439     Place sequential compression device  Until discontinued      02/04/17 1439     Place KAM hose  Until discontinued      02/04/17 1439      Will send to the floor today.   Possible dialysis on 2/6.  Home likely on 2/6.     Critical care time spent- 40 minutes.      Nam Cuadra MD  Department of Hospital Medicine   [facility]

## 2017-02-05 NOTE — ASSESSMENT & PLAN NOTE
Likely from dietary non-compliance.  Patient had crawfish yesterday. Now presenting with pulmonary edema. -   No history of heart failure. Will check echo.  SOB has resolved. Underwent dialysis. Consideration for another round of dialysis on 2/6.

## 2017-02-05 NOTE — PROGRESS NOTES
Dr. Ford notified of back pain and lab results. New orders received. Pt instructed on new orders and verb understanding.

## 2017-02-05 NOTE — NURSING
Patient complained of SOB after returning from bedside commode. Attempted to place bipap on patient for low O2 sats. Patient refused bipap. Patient states the mask blows to much oxygen. Placed nasal cannula at 4L back on patient. Instructed patient to take slow deep breaths in thru her nose and out thru her mouth. Patient verbalizes ok. O2 Sats increased to 93%. Patient states feeling better.

## 2017-02-05 NOTE — CONSULTS
Consult Note    Consults for ESRD, dialysis    SUBJECTIVE:     History of Present Illness:  Patient is a 61 y.o. female with h/o HTN, DM type 2, ESRD on HD every TTS presented to the ER with c/o having worsening SOB, work up reveal pulmonary edema.  Patient was admitted to ICU and we was consulted for ESRD, acute dialysis.  Patient received urgent dialysis with fluid removal, her condition improving.  Patient reportedly ate crawfish the day prior admission.  At this time SOB has resolved.    Review of patient's allergies indicates:  No Known Allergies  Past Medical History   Diagnosis Date    Diabetes mellitus     Gallstones     Hypertension     Renal disorder      dialysis     Past Surgical History   Procedure Laterality Date    Hysterectomy      Dialyis port       right subclavian     Scheduled Meds:   sodium chloride 0.9%   Intravenous Once    amlodipine  10 mg Oral Daily    hydrALAZINE  50 mg Oral Q8H    metoprolol tartrate  50 mg Oral BID     Continuous Infusions:   PRN Meds:.sodium chloride 0.9%, acetaminophen, acetaminophen, heparin (porcine), flu vac [id number] 36mos up(PF), morphine, ondansetron, pneumoc 13-dorie conj-dip cr(PF)  History reviewed. No pertinent family history.  Social History   Substance Use Topics    Smoking status: Current Every Day Smoker     Packs/day: 0.50    Smokeless tobacco: None    Alcohol use No     Review of Systems   Constitutional: Negative for chills and fever.   HENT: Negative for congestion, nosebleeds and sore throat.    Eyes: Negative for blurred vision, double vision and pain.   Respiratory: Positive for cough and shortness of breath.    Cardiovascular: Negative for chest pain, palpitations and leg swelling.   Gastrointestinal: Negative for constipation, heartburn, nausea and vomiting.   Genitourinary: Negative for dysuria and frequency.   Skin: Negative for itching and rash.   Neurological: Negative for dizziness, tingling, tremors, focal weakness, weakness and  headaches.   Psychiatric/Behavioral: Negative for depression, substance abuse and suicidal ideas.     OBJECTIVE:     Vital Signs:  Temp:  [98.4 °F (36.9 °C)-99.6 °F (37.6 °C)]   Pulse:  []   Resp:  [15-35]   BP: ()/()   SpO2:  [92 %-100 %]     Physical Exam   Constitutional: She is oriented to person, place, and time. She appears well-developed and well-nourished.   HENT:   Head: Normocephalic and atraumatic.   Eyes: EOM are normal.   Neck: Neck supple.   Cardiovascular: Normal rate, regular rhythm and normal heart sounds.    Pulmonary/Chest: Effort normal and breath sounds normal. No respiratory distress.   Abdominal: Soft. Bowel sounds are normal.   Musculoskeletal: She exhibits no edema.   Neurological: She is alert and oriented to person, place, and time.   Skin: Skin is warm.   Psychiatric: She has a normal mood and affect.     Laboratory:  CBC:   Recent Labs  Lab 02/04/17  1131   WBC 11.97   RBC 4.89   HGB 13.8   HCT 43.7      MCV 89   MCH 28.2   MCHC 31.6*     CMP:   Recent Labs  Lab 02/04/17  1131   *   CALCIUM 8.2*   ALBUMIN 2.1*   PROT 6.5      K 2.8*   CO2 20*   CL 97   BUN 29*   CREATININE 4.3*   ALKPHOS 179*   ALT 12   AST 21   BILITOT 0.2       Diagnostic Results:  X-Ray: Reviewed    ASSESSMENT/PLAN:     ESRD - received urgent dialysis yesterday, stable  Acute diastolic CHF - improving with UF on dialysis  HTN  DM type 2  Hypokalemia - received HD on 4K bath    Plan:     Add Lasix   Continue present Rx  If needed, will dialyze again tomorrow morning, otherwise continue dialysis qTTS  We'll follow for dialysis    Thanks for the consultation

## 2017-02-05 NOTE — PLAN OF CARE
Problem: Patient Care Overview  Goal: Plan of Care Review  Outcome: Ongoing (interventions implemented as appropriate)  Patient AAOx3. No acute distress. VS remain wnl. 2.8L removed in dialysis. Patient weaned off of BiPap tp 3L nasal cannula. Plan of care is to monitor respiratory status. Plan of care discussed with patient. Patient verbalizes understanding.

## 2017-02-06 VITALS
DIASTOLIC BLOOD PRESSURE: 73 MMHG | HEIGHT: 64 IN | OXYGEN SATURATION: 93 % | HEART RATE: 66 BPM | BODY MASS INDEX: 20.78 KG/M2 | SYSTOLIC BLOOD PRESSURE: 153 MMHG | RESPIRATION RATE: 18 BRPM | TEMPERATURE: 98 F | WEIGHT: 121.69 LBS

## 2017-02-06 PROBLEM — J81.0 ACUTE PULMONARY EDEMA: Status: RESOLVED | Noted: 2017-02-05 | Resolved: 2017-02-06

## 2017-02-06 LAB
ANION GAP SERPL CALC-SCNC: 13 MMOL/L
BUN SERPL-MCNC: 31 MG/DL
CALCIUM SERPL-MCNC: 7.8 MG/DL
CHLORIDE SERPL-SCNC: 105 MMOL/L
CO2 SERPL-SCNC: 18 MMOL/L
CREAT SERPL-MCNC: 4.2 MG/DL
DIASTOLIC DYSFUNCTION: YES
EST. GFR  (AFRICAN AMERICAN): 13 ML/MIN/1.73 M^2
EST. GFR  (NON AFRICAN AMERICAN): 11 ML/MIN/1.73 M^2
ESTIMATED PA SYSTOLIC PRESSURE: 29.34
GLOBAL PERICARDIAL EFFUSION: ABNORMAL
GLUCOSE SERPL-MCNC: 69 MG/DL
HBV SURFACE AG SERPL QL IA: NEGATIVE
MITRAL VALVE MOBILITY: NORMAL
MITRAL VALVE REGURGITATION: ABNORMAL
POTASSIUM SERPL-SCNC: 3.3 MMOL/L
RETIRED EF AND QEF - SEE NOTES: 45 (ref 55–65)
SODIUM SERPL-SCNC: 136 MMOL/L
TRICUSPID VALVE REGURGITATION: ABNORMAL

## 2017-02-06 PROCEDURE — 25000003 PHARM REV CODE 250: Performed by: INTERNAL MEDICINE

## 2017-02-06 PROCEDURE — 25000003 PHARM REV CODE 250: Performed by: EMERGENCY MEDICINE

## 2017-02-06 PROCEDURE — 80048 BASIC METABOLIC PNL TOTAL CA: CPT

## 2017-02-06 PROCEDURE — 36415 COLL VENOUS BLD VENIPUNCTURE: CPT

## 2017-02-06 RX ORDER — HYDRALAZINE HYDROCHLORIDE 50 MG/1
75 TABLET, FILM COATED ORAL EVERY 8 HOURS
Qty: 90 TABLET | Refills: 0 | Status: SHIPPED | OUTPATIENT
Start: 2017-02-06 | End: 2017-06-07 | Stop reason: ALTCHOICE

## 2017-02-06 RX ADMIN — HYDRALAZINE HYDROCHLORIDE 50 MG: 25 TABLET ORAL at 06:02

## 2017-02-06 RX ADMIN — METOPROLOL TARTRATE 50 MG: 50 TABLET ORAL at 08:02

## 2017-02-06 RX ADMIN — OXYCODONE HYDROCHLORIDE AND ACETAMINOPHEN 1 TABLET: 5; 325 TABLET ORAL at 06:02

## 2017-02-06 RX ADMIN — AMLODIPINE BESYLATE 10 MG: 5 TABLET ORAL at 08:02

## 2017-02-06 RX ADMIN — FUROSEMIDE 80 MG: 40 TABLET ORAL at 08:02

## 2017-02-06 NOTE — PROGRESS NOTES
[facility] Hospital Medicine  Progress Note    Patient Name: April Vasquez  MRN: [id number]  Patient Class: IP- Inpatient   Admission Date: 2/4/2017  Length of Stay: 2 days  Attending Physician: Nam Ford MD  Primary Care Provider: Nasra Iraheta MD        Subjective:     Principal Problem:Acute pulmonary edema    HPI:  Mrs. Vasquez is a 60 yo F who has a history of hypertension with recent initiation of dialysis this past November on a T, TH, Sat schedule, presents to the ED with a CC of SOB. The patient notes that she ate boiled crawfish last night and woke up this am SOB.  EMS was called and started the patient on Bipap.  Because of the ER visit, the patient missed dialysis today. She was found on X-ray to have pulmonary edema with supporting BNp of 2762. She notes a cough but non-productive. No fever, no wbc count. No known sick contacts.  The patient is resting comfortably in the ED on Bi-pap without any increased work of breathing. She lives at home with her . She continues to smoke.     Hospital Course:  The patient was admitted for treatment of pulmonary edema from non-compliance with diet. She underwent emergent dialysis with near complete resolution of her symptoms. The patient is a T, TH, Sat dialysis patient. Nephrology was consulted.  The patient's respiratory distress/failure has resolved with dialysis.  The patient is 95% on RA today and states that her CC has completely resolved and she was requesting discharge to home.  The patient has dialysis on 2/7.     Interval History: No new issues. Would like to go home.     Review of Systems   Constitutional: Positive for activity change.   Respiratory: Negative for chest tightness and shortness of breath.    Cardiovascular: Negative for chest pain.   Gastrointestinal: Negative for abdominal pain.     Objective:     Vital Signs (Most Recent):  Temp: 98.3 °F (36.8 °C) (02/06/17 0301)  Pulse: 66 (02/06/17 0605)  Resp: (!) 59  (02/06/17 0605)  BP: (!) 141/69 (02/06/17 0602)  SpO2: 98 % (02/06/17 0605) Vital Signs (24h Range):  Temp:  [98.3 °F (36.8 °C)-98.6 °F (37 °C)] 98.3 °F (36.8 °C)  Pulse:  [] 66  Resp:  [13-59] 59  SpO2:  [91 %-98 %] 98 %  BP: (101-158)/(53-73) 141/69     Weight: 55.2 kg (121 lb 11.1 oz)  Body mass index is 20.89 kg/(m^2).    Intake/Output Summary (Last 24 hours) at 02/06/17 0822  Last data filed at 02/05/17 2115   Gross per 24 hour   Intake              290 ml   Output                0 ml   Net              290 ml      Physical Exam   Constitutional: She appears well-developed and well-nourished.   HENT:   Head: Normocephalic.   Cardiovascular: Normal rate.    Pulmonary/Chest: Effort normal.   Abdominal: Soft.   Neurological: She is alert.   Vitals reviewed.      Significant Labs:   BMP:   Recent Labs  Lab 02/06/17  0301   GLU 69*      K 3.3*      CO2 18*   BUN 31*   CREATININE 4.2*   CALCIUM 7.8*     CBC:   Recent Labs  Lab 02/04/17  1131   WBC 11.97   HGB 13.8   HCT 43.7          Significant Imaging:     Assessment/Plan:      Severe malnutrition  Will discuss with patient       Metabolic acidosis  From renal failure      Tobacco abuse  Smoking cessation education       Hyperglycemia  Has diabetes listed as problem- but she is not on any meds.  Will check an A1c.       Non-compliance  Discussed with patient       ESRD (end stage renal disease)  Recent initiation of dialysis this past November. Nephrology consulted.  T, TH, Sat. Will undergo acute dialysis-       Malignant hypertension  Patient presenting with a SBP of 200+.- likely cause of patient's ESRD.  Being treated. Improved. Dialysis will help further.   She states that her BP is high at dialysis- 180's systolic. Will send home on new BP med.       Acute pulmonary edema  Resolved. Likely secondary to non-compliance with diet.  Echo is pending- can follow up with PCP.       VTE Risk Mitigation         Ordered     heparin (porcine)  injection 5,000 Units  As needed (PRN)     Route:  Intravenous        02/04/17 1627     Medium Risk of VTE  Once      02/04/17 1439        Will d/c to home.  Critical care time spent- 45 minutes.   Nam Cuadra MD  Department of Hospital Medicine   [facility]

## 2017-02-06 NOTE — PLAN OF CARE
Problem: Patient Care Overview  Goal: Plan of Care Review  Outcome: Ongoing (interventions implemented as appropriate)  Patient AAOx3. No acute distress. VS remain wnl. Patient weaned off of 4L nasal cannula to 2L. Plan of care is to monitor respiratory status. Possible dialysis treatment today. Plan of care discussed with patient. Patient verbalizes understanding.

## 2017-02-06 NOTE — ASSESSMENT & PLAN NOTE
Patient presenting with a SBP of 200+.- likely cause of patient's ESRD.  Being treated. Improved. Dialysis will help further.   She states that her BP is high at dialysis- 180's systolic. Will send home on new BP med.

## 2017-02-06 NOTE — ASSESSMENT & PLAN NOTE
Recent initiation of dialysis this past November. Nephrology consulted.  T, TH, Sat. Will undergo acute dialysis-

## 2017-02-06 NOTE — SUBJECTIVE & OBJECTIVE
Interval History: No new issues. Would like to go home.     Review of Systems   Constitutional: Positive for activity change.   Respiratory: Negative for chest tightness and shortness of breath.    Cardiovascular: Negative for chest pain.   Gastrointestinal: Negative for abdominal pain.     Objective:     Vital Signs (Most Recent):  Temp: 98.3 °F (36.8 °C) (02/06/17 0301)  Pulse: 66 (02/06/17 0605)  Resp: (!) 59 (02/06/17 0605)  BP: (!) 141/69 (02/06/17 0602)  SpO2: 98 % (02/06/17 0605) Vital Signs (24h Range):  Temp:  [98.3 °F (36.8 °C)-98.6 °F (37 °C)] 98.3 °F (36.8 °C)  Pulse:  [] 66  Resp:  [13-59] 59  SpO2:  [91 %-98 %] 98 %  BP: (101-158)/(53-73) 141/69     Weight: 55.2 kg (121 lb 11.1 oz)  Body mass index is 20.89 kg/(m^2).    Intake/Output Summary (Last 24 hours) at 02/06/17 0822  Last data filed at 02/05/17 2115   Gross per 24 hour   Intake              290 ml   Output                0 ml   Net              290 ml      Physical Exam   Constitutional: She appears well-developed and well-nourished.   HENT:   Head: Normocephalic.   Cardiovascular: Normal rate.    Pulmonary/Chest: Effort normal.   Abdominal: Soft.   Neurological: She is alert.   Vitals reviewed.      Significant Labs:   BMP:   Recent Labs  Lab 02/06/17  0301   GLU 69*      K 3.3*      CO2 18*   BUN 31*   CREATININE 4.2*   CALCIUM 7.8*     CBC:   Recent Labs  Lab 02/04/17  1131   WBC 11.97   HGB 13.8   HCT 43.7          Significant Imaging:

## 2017-02-06 NOTE — NURSING
Reviewed discharge instructions with pt, verbalized understanding.  Prescription given.  Stable without complaints. Discharged home accompanied by .

## 2017-02-06 NOTE — PLAN OF CARE
Problem: Patient Care Overview  Goal: Plan of Care Review  Outcome: Ongoing (interventions implemented as appropriate)  Pt has remained safe and free of injury today. Pt remains with no c/o CP or SOB. Pt has been SR- SB at times. Pt is awaiting bed on Med-surg. Pt has slept most of day and has very poor appetite.

## 2017-02-06 NOTE — PLAN OF CARE
17 1012   Final Note   Assessment Type Final Discharge Note   Discharge Disposition Home   Discharge planning education complete? Yes   What phone number can be called within the next 1-3 days to see how you are doing after discharge? [phone]   Hospital Follow Up  Appt(s) scheduled? No   Discharge plans and expectations educations in teach back method with documentation complete? No   Offered [facility] Bedside [facility]? n/a   Discharge/Hospital Encounter Summary to (non-[facility]) PCP n/a   Referral to Outpatient Case Management complete? n/a   Referral to / orders for Home Health Complete? n/a   30 day supply of medicines given at discharge, if documented non-compliance / non-adherence? n/a   Any social issues identified prior to discharge? n/a   Right Care Referral Info   Post Acute Recommendation No Care   TREVER met very briefly with patient as she had already contacted her  to provide transportation home. She prefers afternoon medical appointments, non HD days. TREVER called Dr Iraheta number [phone], left voice mail regarding patient discharging the hospital today, provided patient name,  and phone number to contact patient for follow up appointment. Spoke with patient, reminded her she should also call to arrange follow up.Awaiting confirmation for fax number to send discharge summary.   TREVER contacted RUBIN Castillo [phone], obtained the fax number [phone]. Faxed the dialysis report and the discharge summary. Patient verbalized understanding to return to her regular schedule on 17.   No other needs identified at this time.

## 2017-02-06 NOTE — DISCHARGE SUMMARY
[facility] Hospital Medicine  Discharge Summary      Patient Name: April Vasquez  MRN: [id number]  Admission Date: 2/4/2017  Hospital Length of Stay: 2 days  Discharge Date and Time: 2/6/2017 8:28 AM  Attending Physician: Nam Ford MD   Discharging Provider: Nam Ford MD  Primary Care Provider: Nasra Iraheta MD      HPI:   Mrs. Vasquez is a 60 yo F who has a history of hypertension with recent initiation of dialysis this past November on a T, TH, Sat schedule, presents to the ED with a CC of SOB. The patient notes that she ate boiled crawfish last night and woke up this am SOB.  EMS was called and started the patient on Bipap.  Because of the ER visit, the patient missed dialysis today. She was found on X-ray to have pulmonary edema with supporting BNp of 2762. She notes a cough but non-productive. No fever, no wbc count. No known sick contacts.  The patient is resting comfortably in the ED on Bi-pap without any increased work of breathing. She lives at home with her . She continues to smoke.     * No surgery found *      Indwelling Lines/Drains at time of discharge:   Lines/Drains/Airways     Central Venous Catheter Line                 Hemodialysis Catheter right subclavian -- days         Hemodialysis Catheter 11/18/16 0933 right internal jugular 79 days              Hospital Course:   The patient was admitted for treatment of pulmonary edema from non-compliance with diet. She underwent emergent dialysis with near complete resolution of her symptoms. The patient is a T, TH, Sat dialysis patient. Nephrology was consulted.  The patient's respiratory distress/failure has resolved with dialysis.  The patient is 95% on RA today and states that her CC has completely resolved and she was requesting discharge to home.  The patient has dialysis on 2/7. She has an A1c pending as well as Echo results. She can follow up both with her PCP.  The rest of the hospital stay was  unremarkable. Hydralazine increased to 75 tid.  Activity as tolerated. Diet- low NA. Follow up with PCP in one week.      Consults:   Consults         Status Ordering Provider     Inpatient consult to Nephrology  Once     Provider:  MD Shruthi Jones DANA          Significant Diagnostic Studies: Labs:    Pending Diagnostic Studies:     Procedure Component Value Units Date/Time    Hemoglobin A1c [[id number]] Collected:  02/04/17 1131    Order Status:  Sent Lab Status:  In process Updated:  02/04/17 1257    Specimen:  Blood from Blood     Hepatitis B Surface Antibody, Qual/Quant [[id number]] Collected:  02/04/17 1420    Order Status:  Sent Lab Status:  In process Updated:  02/04/17 1436    Specimen:  Blood from Blood     Hepatitis B surface antigen [[id number]] Collected:  02/04/17 1420    Order Status:  Sent Lab Status:  In process Updated:  02/05/17 1319    Specimen:  Blood from Blood         Final Active Diagnoses:    Diagnosis Date Noted POA    Hyperglycemia [R73.9] 02/04/2017 Yes    Non-compliance [Z91.19] 02/04/2017 Not Applicable    ESRD (end stage renal disease) [N18.6] 02/04/2017 Yes    Malignant hypertension [I10] 02/04/2017 Yes    Metabolic acidosis [E87.2] 09/19/2016 Yes    Severe malnutrition [E43] 09/19/2016 Yes    Tobacco abuse [Z72.0] 09/19/2016 Yes      Problems Resolved During this Admission:    Diagnosis Date Noted Date Resolved POA    PRINCIPAL PROBLEM:  Acute pulmonary edema [J81.0] 02/04/2017 02/05/2017 Yes    Acute pulmonary edema [J81.0] 02/05/2017 02/06/2017 Yes    Acute hypoxemic respiratory failure [J96.01] 02/04/2017 02/05/2017 Yes    Hypokalemia [E87.6] 09/19/2016 02/05/2017 Yes      No new Assessment & Plan notes have been filed under this hospital service since the last note was generated.  Service: Hospital Medicine      Discharged Condition: good    Disposition: Home or Self Care    Follow Up:  Follow-up Information     Follow up with Nasra Iraheta MD In  1 week.    Specialty:  Family Medicine    Contact information:    [address]  [place] [address]  [phone]          Patient Instructions:     Diet general   Order Specific Question Answer Comments   Na restriction, if any: 2gNa      Activity as tolerated       Medications:  Reconciled Home Medications:   Current Discharge Medication List      CONTINUE these medications which have CHANGED    Details   hydrALAZINE (APRESOLINE) 50 MG tablet Take 1.5 tablets (75 mg total) by mouth every 8 (eight) hours.  Qty: 90 tablet, Refills: 0         CONTINUE these medications which have NOT CHANGED    Details   amlodipine (NORVASC) 10 MG tablet Take 1 tablet (10 mg total) by mouth once daily.  Qty: 30 tablet, Refills: 0      metoprolol tartrate (LOPRESSOR) 50 MG tablet Take 1 tablet (50 mg total) by mouth 2 (two) times daily.  Qty: 60 tablet, Refills: 0      isosorbide mononitrate (IMDUR) 30 MG 24 hr tablet Take 1 tablet (30 mg total) by mouth once daily.  Qty: 30 tablet, Refills: 0           Time spent on the discharge of patient: < 30 minutes    Nam Cuadra MD  Department of Hospital Medicine  [facility]

## 2017-02-06 NOTE — ASSESSMENT & PLAN NOTE
Resolved. Likely secondary to non-compliance with diet.  Echo is pending- can follow up with PCP.

## 2017-02-07 LAB
HEP. B SURF AB, QUAL: NEGATIVE
HEP. B SURF AB, QUANT.: <3 MIU/ML

## 2017-02-08 ENCOUNTER — PATIENT OUTREACH (OUTPATIENT)
Dept: ADMINISTRATIVE | Facility: CLINIC | Age: 62
End: 2017-02-08
Payer: MEDICAID

## 2017-02-08 NOTE — PATIENT INSTRUCTIONS
Pulmonary Edema  Your healthcare provider has told you that you have pulmonary edema. Read on to learn more about pulmonary edema and how it can be treated.  What Is Pulmonary Edema?  Pulmonary edema occurs when the air sacs (alveoli) in your lungs fill with fluid. The fluid buildup makes it hard for the lungs to do their job, including getting oxygen from the air you breathe. This can make it difficult to breathe. The most common cause of pulmonary edema is heart failure. When the heart doesnt work properly, it can cause pressure to rise in the veins (blood vessels) of the lungs. As pressure builds, fluid fills the alveoli. The extra fluid prevents oxygen from moving through the lungs properly. But heart failure isnt the only cause of pulmonary edema. Damage to the lungs or kidney failure can also cause fluid to fill the lungs. And in some cases, living or exercising at high altitudes can lead to fluid buildup in the lungs.  How Is Pulmonary Edema Diagnosed?  Your healthcare provider examines you and asks about your health history. You may also have one or more of the following:  Blood tests to take samples of blood.  Imaging tests to take detailed pictures of inside the body. These may include a chest x-ray and ultrasound.  Electrocardiography (ECG or EKG) to test how well the heart is functioning.  How Is Pulmonary Edema Treated?  Treatment usually depends on whats causing the edema. For instance, if its due to heart failure, treating the heart condition will treat the edema. Treatment can also ease symptoms. Therapy often includes the following:  Oxygen. This may be given through a mask that goes over the nose. It may be given through a small tube that sits under the nose. Or it may be given through a tube thats placed into the windpipe (trachea). A ventilator--often called a breathing machine--may also be used.  Medications. These may include diuretics (water pills) to help relieve the body of extra  fluid. The fluid passes out of the body as urine. Medications to treat the heart may also be given. These can help improve how the heart functions, which helps reduce fluid buildup in the lungs.  What Are the Long-term Concerns?  If treated right away, pulmonary edema can be improved. It may even be cured. But, in some cases, ongoing treatment is needed to help control the problem. This may require having procedures or taking medications for months or years. In some cases, long-term use of oxygen or breathing equipment is needed. This can lead to complications such as damage to lung tissue. Your healthcare provider can tell you more if needed.    Call the healthcare provider right away if you have any of the following:  Chest pain (call 911)  Severe trouble breathing (call 911)  Coughing up blood (call 911)  Skin turns blue (call 911)  Unusual or irregular heartbeat  Unable to speak full sentences before running out of breath  Sweating more than usual    © 0531-7177 [facility], [address] [place] [address], [place] [address]. All rights reserved. This information is not intended as a substitute for professional medical care. Always follow your healthcare professional's instructions.

## 2017-02-15 NOTE — PHYSICIAN QUERY
PT Name: April Vasquez  MR #: [id number]    Physician Query Form - Consultant Diagnosis Clarification     Reviewer  Ext  - Tosha [email or web address]    This form is a permanent document in the medical record.     Query Date: February 15, 2017    Dear Provider,   By submitting this query, we are merely seeking further clarification of documentation.  Please utilize your independent clinical judgment when addressing the question(s) below.      The Medical record reflects the following:   Diagnosis Supporting Clinical Information Location in Medical Record     Acute diastolic CHF   Acute diastolic CHF - improving with UF on dialysis      ecause of the ER visit, the patient missed dialysis today. She was found on X-ray to have pulmonary edema with supporting BNp of 2762      Acute Pulmonary Edema   Consults by Melania Ely MD  2/5/2017          Progress Notes by Nam Ford MD , H&P, Discharge Summary        ED             Do you agree with the Consultants diagnosis of   Acute diastolic CHF                    [  x ]   Yes               [   ]   Yes, but it resolved prior to my assessment of the patient               [   ]   No                [   ]   Clinically insignificant               [   ]   Clinically undetermined               [   ]   Other/Clarification of findings: ___________________________________________

## 2017-04-13 ENCOUNTER — TELEPHONE (OUTPATIENT)
Dept: TRANSPLANT | Facility: CLINIC | Age: 62
End: 2017-04-13

## 2017-05-08 DIAGNOSIS — Z76.82 ORGAN TRANSPLANT CANDIDATE: Primary | ICD-10-CM

## 2017-06-07 ENCOUNTER — HOSPITAL ENCOUNTER (OUTPATIENT)
Dept: RADIOLOGY | Facility: HOSPITAL | Age: 62
Discharge: HOME | End: 2017-06-07
Attending: NURSE PRACTITIONER
Payer: MEDICARE

## 2017-06-07 ENCOUNTER — OFFICE VISIT (OUTPATIENT)
Dept: TRANSPLANT | Facility: CLINIC | Age: 62
End: 2017-06-07
Payer: MEDICARE

## 2017-06-07 ENCOUNTER — HOSPITAL ENCOUNTER (OUTPATIENT)
Dept: CARDIOLOGY | Facility: CLINIC | Age: 62
Discharge: HOME | End: 2017-06-07
Payer: MEDICARE

## 2017-06-07 ENCOUNTER — TELEPHONE (OUTPATIENT)
Dept: TRANSPLANT | Facility: CLINIC | Age: 62
End: 2017-06-07

## 2017-06-07 VITALS
WEIGHT: 115.06 LBS | DIASTOLIC BLOOD PRESSURE: 66 MMHG | HEIGHT: 65 IN | HEART RATE: 63 BPM | SYSTOLIC BLOOD PRESSURE: 154 MMHG | RESPIRATION RATE: 20 BRPM | TEMPERATURE: 98 F | OXYGEN SATURATION: 98 % | BODY MASS INDEX: 19.17 KG/M2

## 2017-06-07 DIAGNOSIS — Z76.82 ORGAN TRANSPLANT CANDIDATE: Primary | ICD-10-CM

## 2017-06-07 DIAGNOSIS — I36.9 NONRHEUMATIC TRICUSPID VALVE DISORDER: ICD-10-CM

## 2017-06-07 DIAGNOSIS — Z72.0 TOBACCO ABUSE: ICD-10-CM

## 2017-06-07 DIAGNOSIS — Z01.818 PRE-TRANSPLANT EVALUATION FOR CHRONIC KIDNEY DISEASE: ICD-10-CM

## 2017-06-07 DIAGNOSIS — Z76.82 ORGAN TRANSPLANT CANDIDATE: ICD-10-CM

## 2017-06-07 DIAGNOSIS — E87.20 METABOLIC ACIDOSIS: ICD-10-CM

## 2017-06-07 DIAGNOSIS — N18.4 CKD (CHRONIC KIDNEY DISEASE), STAGE IV: ICD-10-CM

## 2017-06-07 DIAGNOSIS — I10 ESSENTIAL HYPERTENSION: ICD-10-CM

## 2017-06-07 DIAGNOSIS — N18.6 ESRD (END STAGE RENAL DISEASE): ICD-10-CM

## 2017-06-07 PROBLEM — J81.1 PULMONARY EDEMA: Status: RESOLVED | Noted: 2017-02-04 | Resolved: 2017-06-07

## 2017-06-07 PROBLEM — R73.9 HYPERGLYCEMIA: Status: RESOLVED | Noted: 2017-02-04 | Resolved: 2017-06-07

## 2017-06-07 LAB
ESTIMATED PA SYSTOLIC PRESSURE: 28.4
RETIRED EF AND QEF - SEE NOTES: 55 (ref 55–65)
TRICUSPID VALVE REGURGITATION: NORMAL

## 2017-06-07 PROCEDURE — 71020 XR CHEST PA AND LATERAL: CPT | Mod: TC,TXP

## 2017-06-07 PROCEDURE — 71020 XR CHEST PA AND LATERAL: CPT | Mod: 26,TXP,, | Performed by: RADIOLOGY

## 2017-06-07 PROCEDURE — 99204 OFFICE O/P NEW MOD 45 MIN: CPT | Mod: S$PBB,TXP,, | Performed by: PHYSICIAN ASSISTANT

## 2017-06-07 PROCEDURE — 76770 US EXAM ABDO BACK WALL COMP: CPT | Mod: 26,GC,TXP, | Performed by: RADIOLOGY

## 2017-06-07 PROCEDURE — 99205 OFFICE O/P NEW HI 60 MIN: CPT | Mod: S$PBB,TXP,, | Performed by: INTERNAL MEDICINE

## 2017-06-07 PROCEDURE — 99999 PR PBB SHADOW E&M-EST. PATIENT-LVL III: CPT | Mod: PBBFAC,TXP,, | Performed by: INTERNAL MEDICINE

## 2017-06-07 PROCEDURE — 76770 US EXAM ABDO BACK WALL COMP: CPT | Mod: TC,TXP

## 2017-06-07 PROCEDURE — 99204 OFFICE O/P NEW MOD 45 MIN: CPT | Mod: S$PBB,TXP,, | Performed by: TRANSPLANT SURGERY

## 2017-06-07 PROCEDURE — 93306 TTE W/DOPPLER COMPLETE: CPT | Mod: PBBFAC,TXP | Performed by: INTERNAL MEDICINE

## 2017-06-07 RX ORDER — HYDRALAZINE HYDROCHLORIDE 50 MG/1
75 TABLET, FILM COATED ORAL 3 TIMES DAILY
Status: ON HOLD | COMMUNITY
End: 2019-10-15 | Stop reason: SDUPTHER

## 2017-06-07 RX ORDER — AMLODIPINE BESYLATE 10 MG/1
10 TABLET ORAL DAILY
COMMUNITY
End: 2020-02-13

## 2017-06-07 RX ORDER — METOPROLOL TARTRATE 50 MG/1
50 TABLET ORAL 2 TIMES DAILY
COMMUNITY
End: 2017-10-24

## 2017-06-07 RX ORDER — ISOSORBIDE MONONITRATE 30 MG/1
30 TABLET, EXTENDED RELEASE ORAL DAILY
COMMUNITY
End: 2017-10-24

## 2017-06-07 NOTE — PROGRESS NOTES
INITIAL PATIENT EDUCATION NOTE    Ms. April Vasquez was seen in pre-kidney transplant clinic for evaluation for kidney, kidney/pancreas or pancreas only transplant.  The patient attended a group education session that discussed/reviewed the following aspects of transplantation: evaluation and selection committee process, UNOS waitlist management/multiple listings, types of organs offered (KDPI < 85%, KDPI > 85%, PHS increased risk, DCD), financial aspects, surgical procedures, dietary instruction pre- and post-transplant, health maintenance pre- and post-transplant, post-transplant hospitalization and outpatient follow-up, potential to participate in a research protocol, and medication management and side effects.  A question and answer session was provided after the presentation.    The patient was seen by all members of the multi-disciplinary team to include: Nephrologist/PA, Surgeon, , Transplant Coordinator, , Pharmacist and Dietician (if applicable).    The patient reviewed and signed all consents for evaluation which were witnessed and sent to scanning into the EPIC chart.    The patient was given an education book and plan for further evaluation based on her individual assessment.      The patient was encouraged to call with any questions or concerns.

## 2017-06-07 NOTE — PROGRESS NOTES
PHARM.D. PRE-TRANSPLANT NOTE:    This patient's medication therapy was evaluated as part of her pre-transplant evaluation.    The following pharmacologic concerns were noted: none      Current Outpatient Prescriptions   Medication Sig Dispense Refill    amlodipine (NORVASC) 10 MG tablet Take 10 mg by mouth once daily.      hydrALAZINE (APRESOLINE) 50 MG tablet Take 75 mg by mouth 3 (three) times daily.      isosorbide mononitrate (IMDUR) 30 MG 24 hr tablet Take 30 mg by mouth once daily.      metoprolol tartrate (LOPRESSOR) 50 MG tablet Take 50 mg by mouth 2 (two) times daily.       No current facility-administered medications for this visit.          Currently she is responsible for preparing / administering this patient's medications on a daily basis.  I am available for consultation and can be contacted, as needed by the other members of the Kidney Transplant team.

## 2017-06-07 NOTE — PROGRESS NOTES
Pre Transplant Infectious Diseases Consult  Kidney Transplant Recipient Evaluation    Reason for Visit:  Kidney transplant evaluation    History of Present Illness  April Vasquez is a 61 y.o. year old White female with advanced Kidney disease currently being evaluated for Kidney transplant.  Patient has been on HD since January. She is currently using a right chest catheter; she had a LUE AV fistula placed but not yet using. Patient denies any recent fever, chills, or infective illnesses.      1) Do you have a history of:   YES NO   Diabetes      [] [x]     Diabetic Foot Infection/Bone Infection  []        [x]     Surgical Removal of Spleen   []  [x]                  2) Have you had recurrent infections involving:             YES NO  Sinus infections  []         [x]   Sore Throat   []         [x]                 Prostate Infections  []         [x]              Bladder Infections  []         [x]                     Kidney Infections  []         [x]                               Intestinal Infections  []         [x]      Skin Infections   []         [x]       Reproductive Infections          []  [x]   Periodontal Disease  []         [x]        3)Have you ever had: YES     NO UNKNOWN      Chicken Pox   [x]         []  []   Shingles   []         [x]  []   Orolabial Herpes             []  [x]  []   Genital Herpes  []         [x]  []   Cytomegalovirus  []         [x]  []   Derick-Barr Virus  []         [x]  []   Hepatitis A   []         [x]  []   Hepatitis B   []         [x]  []   Hepatitis C   []         [x]  []   Syphilis   []         [x]  []   Intestinal parasites   or worms   []         [x]  []   Fungal Infections  []         [x]  []   Blood Infections  []         [x]  []         4) Have you ever been exposed   YES NO  To someone with tuberculosis?  []   [x]   If yes, what treatment did you receive:     5) What states have you lived in? [place], [place], [place]    6) What countries have you visited for more than 2 weeks?  [place]                        YES NO  7) Did you have any associated infections?  []  [x]       8) Are you planning to travel outside the    []  [x]   [place] after your transplant?    9) Household                   YES NO  Do you have pets living in your house?    [x]         []   If yes, describe: dog    Do you spend time or live on a farm or     []         [x]   have livestock or other farm animals?  If yes, which ones:    Do you have a fish tank?          []  [x]       Do you have a litter box?      []         [x]     Do you fish or hunt?       []         [x]     Do you clean or skin fish or animals?    []         [x]     Do you consume raw or undercooked    []         [x]   meat, fish, or shellfish?      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          12)Do you garden or otherwise  YES NO   work in the soil?    []         [x]   13)Do you hike, camp, or spend     time in wooded areas?   []         [x]        14) The patient's immunization history was reviewed.    Have you ever received:  YES NO UNKNOWN DATES   Routine Childhood vaccines  [x]         []  []      Influenza vaccine   [x]  []  []    Pneumovax    [x]  []  []     Tetanus-diptheria   []         [x]  []    Hepatitis A vaccine series       []  [x]  []    Hepatitis B vaccine series         [x]  []  []    Meningitis vaccine   []         [x]  []    Varicella vaccine   []         [x]  []        Based on the patients immunization history and serologies, immunizations were ordered:         Ordered  Not Ordered  Influenza Vaccine     []    [x]   Hepatitis A series at 0,  6 months   [x]    []   Hepatitis B seriesat 0, 1, and 6 months  []    [x]   Hepatitis B High Dose 0,1, and 6 months  []    [x]   Prevnar      [x]    []   Pneumovax      []    [x]    TDap       [x]    []    Zoster       []    [x]   Menactra      []    [x]            The patient was encouraged to contact us about any problems that may  develop after immunization and possible side effects were reviewed.      Previous Transplant: no    Etiology of Kidney Disease: HTN    Allergies: Review of patient's allergies indicates no known allergies.  Immunization History   Administered Date(s) Administered    PPD Test 11/22/2016     Past Medical History:   Diagnosis Date    Diabetes mellitus     Gallstones     Hypertension     Renal disorder     dialysis     Past Surgical History:   Procedure Laterality Date    dialyis port      right subclavian    HYSTERECTOMY        Social History     Social History    Marital status:      Spouse name: N/A    Number of children: N/A    Years of education: N/A     Occupational History    Not on file.     Social History Main Topics    Smoking status: Current Every Day Smoker     Packs/day: 0.50    Smokeless tobacco: Not on file    Alcohol use No    Drug use: No    Sexual activity: Yes     Partners: Male     Other Topics Concern    Not on file     Social History Narrative    No narrative on file       Review of Systems   Constitution: Positive for decreased appetite and malaise/fatigue. Negative for chills, fever, weakness, night sweats, weight gain and weight loss.   HENT: Negative for congestion, ear pain, headaches, hearing loss, hoarse voice, sore throat and tinnitus.    Eyes: Negative for blurred vision, redness and visual disturbance.   Cardiovascular: Negative for chest pain, leg swelling and palpitations.   Respiratory: Positive for cough (chronic). Negative for hemoptysis, shortness of breath, sputum production and wheezing.    Endocrine: Negative for cold intolerance and heat intolerance.   Hematologic/Lymphatic: Negative for adenopathy. Does not bruise/bleed easily.   Skin: Negative for dry skin, itching, rash and suspicious lesions.   Musculoskeletal: Positive for back pain. Negative for joint pain, myalgias and neck pain.   Gastrointestinal: Negative for abdominal pain, constipation,  diarrhea, heartburn, nausea and vomiting.   Genitourinary: Negative for dysuria, flank pain, frequency, hematuria, hesitancy and urgency.   Neurological: Negative for dizziness, numbness and paresthesias.   Psychiatric/Behavioral: Negative for depression and memory loss. The patient does not have insomnia and is not nervous/anxious.    Allergic/Immunologic: Negative for environmental allergies, HIV exposure, hives and persistent infections.     Physical Exam   Constitutional: She is oriented to person, place, and time. She appears well-developed and well-nourished. No distress.   HENT:   Head: Normocephalic and atraumatic.   Mouth/Throat: Uvula is midline, oropharynx is clear and moist and mucous membranes are normal. She has dentures (upper dentures). No oral lesions.   Missing lower teeth   Eyes: EOM are normal. Pupils are equal, round, and reactive to light. No scleral icterus.   Cardiovascular: Normal rate, regular rhythm and normal heart sounds.  Exam reveals no gallop and no friction rub.    No murmur heard.  Pulmonary/Chest: Effort normal. No respiratory distress. She has wheezes (inspiratory). She has no rales.   Abdominal: Soft. Bowel sounds are normal. She exhibits no distension and no mass. There is no hepatosplenomegaly. There is no tenderness. There is no rebound and no guarding.   Musculoskeletal: She exhibits no edema.   Neurological: She is alert and oriented to person, place, and time.   Skin: Skin is warm, dry and intact. No rash noted.   Psychiatric: She has a normal mood and affect. Her behavior is normal.     Diagnostics: No results found for: RPR  No results found for: CMVANTIBODIE  No results found for: HIV1X2  No results found for: HTLVIIIANTIB  Hepatitis B Surface Ag   Date Value Ref Range Status   02/04/2017 Negative  Final     Hep B Core Total Ab   Date Value Ref Range Status   11/19/2016 Negative  Final     Hepatitis C Ab   Date Value Ref Range Status   09/19/2016 Negative  Final     No  results found for: TOXOIGG  No components found for: TOXOIGGINTER  No results found for: DSZ1FSO  No results found for: ZHN3EED  No results found for: VARICELLAZOS  No results found for: VARICELLAINT  No results found for: STRONGANTIGG  No results found for: EPSTEINBARRV  Hep B S Ab   Date Value Ref Range Status   11/16/2016 Negative  Final     No results found for: QUANTIFERON  Hep A IgM   Date Value Ref Range Status   09/19/2016 Negative  Final     No results found for: PPD  No results found for this or any previous visit.         Transplant Infectious Diseases - Candidacy    Assessment/Plan:     Transplant Candidacy: Based on available information, there are no identified significant barriers to transplantation from an infectious disease standpoint pending acceptable serologies.      Quantiferon gold, HIV, strongyloides and RPR pending. If positive, please refer to ID clinic.    Vaccines:  Hepatitis A series  Prevnar  TDAP    Final determination of transplant candidacy will be made once evaluation is complete and reviewed by the Transplant Selection Committee.    APRIL Ritter         Counseling:I discussed with April the risk for increased susceptibility to infections following transplantation including increased risk for infection right after transplant and if rejection should occur.  The patients has been counseled on the importance of vaccinations including but not limited to a yearly flu vaccine.  Specific guidance has been provided to the patient regarding the patients occupation, hobbies and activities to avoid future infectious complications including but not limited to avoiding undercooked meats and seafood, proper hygiene, and contact with animals.

## 2017-06-07 NOTE — PROGRESS NOTES
TRANSPLANT RECIPIENT EVALUATION    Requesting Physician: Dr. Jose RANGEL       CC:   Initial evaluation of kidney transplant candidacy.    HPI:    Ms. Vasquez is a 61 y.o. year old White female who has presented to be evaluated as a potential kidney transplant recipient.  She has ESRD secondary to HTN (not biopsy proven). She was diagnosed with kidney disease in 2016. Patient is currently on hemodialysis started on 1/10/17. Patient is dialyzing on TTS schedule.  Patient reports that she is tolerating dialysis well.. She has a dialysis catheter for dialysis access. She dialyzes for 3 hours and 30 minutes. She urinates ~5-6 times a day. Patient denies any history of coronary artery disease, stroke, seizure disorder, chronic obstructive pulmonary disease, liver disease, kidney stones, deep venous thrombosis, pulmonary embolism, recurrent urinary tract infections or malignancies.  she denies any CP, SOB, nausea, vomiting, diarrhea, abdominal pain, melena, cough, fever, chills, weight loss or night sweats. Urinates regularly and denies any frequency, urgency or hematuria.    Functional Status: She does exercise for 15 minutes daily. Walks for 3-4 blocks without any SOB, claudication  Previous Transplant: no  Previous Blood Transfusion: no  Previous Pregnancy: 3 pregnencies  Anticoagulation therapy: no  Potential Donor: yes      Past Medical History:   Diagnosis Date    ESRD (end stage renal disease) on dialysis 2017    Gallstones     Hypertension          Past Surgical History:   Procedure Laterality Date    AVF  2017    CHOLECYSTECTOMY  2016    Permacath Right 2017    TUBAL LIGATION       Family History   Problem Relation Age of Onset    Kidney disease Mother     Hypertension Mother     Diabetes Father     Drug abuse Paternal Grandmother      Social History     Social History    Marital status:      Spouse name: N/A    Number of children: 4    Years of education: N/A     Occupational History     disable      Social History Main Topics    Smoking status: Current Every Day Smoker     Packs/day: 0.50     Years: 20.00    Smokeless tobacco: Never Used    Alcohol use No    Drug use: No    Sexual activity: Yes     Partners: Male     Other Topics Concern    Not on file     Social History Narrative    No narrative on file           Current Medication  Current Outpatient Prescriptions   Medication Sig Dispense Refill    amlodipine (NORVASC) 10 MG tablet Take 10 mg by mouth once daily.      hydrALAZINE (APRESOLINE) 50 MG tablet Take 75 mg by mouth 3 (three) times daily.      isosorbide mononitrate (IMDUR) 30 MG 24 hr tablet Take 30 mg by mouth once daily.      metoprolol tartrate (LOPRESSOR) 50 MG tablet Take 50 mg by mouth 2 (two) times daily.       No current facility-administered medications for this visit.        Review of Systems    Constitution: Positive for decreased appetite, weight loss and malaise/fatigue. Negative for chills, fever, weakness, night sweats, weight gain and weight loss.   HENT: Negative for congestion, ear pain, headaches, hearing loss, hoarse voice, sore throat and tinnitus.    Eyes: Negative for blurred vision, redness and visual disturbance.   Cardiovascular: Negative for chest pain, leg swelling and palpitations.   Respiratory: Positive for chronic cough. Negative for hemoptysis, shortness of breath, sputum production and wheezing.    Endocrine: Negative for cold intolerance and heat intolerance.   Hematologic/Lymphatic: Negative for adenopathy. Does not bruise/bleed easily.   Skin: Negative for dry skin, itching, rash and suspicious lesions.   Musculoskeletal: Positive for back pain. Negative for joint pain, myalgias and neck pain.   Gastrointestinal: Negative for abdominal pain, constipation, diarrhea, heartburn, nausea and vomiting.   Genitourinary: Negative for dysuria, flank pain, frequency, hematuria, hesitancy and urgency.   Neurological: Negative for dizziness,  numbness and paresthesias.   Psychiatric/Behavioral: Negative for depression and memory loss. The patient does not have insomnia and is not nervous/anxious.    Allergic/Immunologic: Negative for environmental allergies, HIV exposure, hives and persistent infections.       OBJECTIVE       Body mass index is 19.29 kg/m².    Vitals:    06/07/17 0724   BP: (!) 154/66   Pulse: 63   Resp: 20   Temp: 98.3 °F (36.8 °C)       Physical Exam    Constitutional: She is oriented to person, place, and time. She appears well-developed and well-nourished. No distress.   HENT:   Head: Normocephalic and atraumatic.   Mouth/Throat: Uvula is midline, oropharynx is clear and moist and mucous membranes are normal. + dentures (upper dentures). Missing lower teeth   Eyes: EOM are normal. Pupils are equal, round, and reactive to light. No scleral icterus.   Cardiovascular: Normal rate, regular rhythm and normal heart sounds.  Exam reveals no gallop and no friction rub.  No murmur heard.  Pulmonary/Chest: Effort normal. No respiratory distress. She has wheezes. She has no rales.   Abdominal: Soft. Bowel sounds are normal. She exhibits no distension and no mass. There is no hepatosplenomegaly.   Musculoskeletal: She exhibits no edema.   Neurological: She is alert and oriented to person, place, and time.   Skin: Skin is warm, dry and intact. No rash noted.   Psychiatric: She has a normal mood and affect. Her behavior is normal.         Labs: Reviewed with the patient    ASSESSMENT     1. Organ transplant candidate  Hepatitis A vaccine adult IM    Pneumococcal Conjugate Vaccine (13 Valent) (IM)    Tdap Vaccine   2. ESRD (end stage renal disease)     3. Metabolic acidosis     4. Essential hypertension     5. Tobacco abuse           PLAN     Transplant Candidacy:   After obtaining history and performing physical exam as well as reviewing available diagnostic studies, Ms. Vasquez is an acceptable  kidney transplant candidate.  Final determination of  transplant candidacy will be made once workup is complete and reviewed by the selection committee.    Prior to Listing, will need the following items to be completed:  1. Standard serologies, cardiac and imaging studies  2. Smoking cessation  3. CT of Chest ( history of smoking and abnormal finding in the previous CT of abdomen:  bilateral pleural effusions and a mosaic perfusion pattern of the pulmonary parenchyma at the lung bases)

## 2017-06-07 NOTE — TELEPHONE ENCOUNTER
Reviewed pt transplant labs.  Notified dialysis unit dietitian of the following abnormal labs via fax.       Albumin     3.3g/dl         Brandee Valencia MS RD LDN

## 2017-06-07 NOTE — LETTER
June 8, 2017        Quinn Glasgow  [address] [place] [address] [address]  [place] [address]  Phone: [phone]  Fax: [phone]             [facility]  [address]  [place] [address]  Phone: [phone]   Patient: April Vasquez   MR Number: [id number]   YOB: 1955   Date of Visit: 6/7/2017       Dear Dr. Quinn Glasgow    Thank you for referring April Vasquez to me for evaluation. Attached you will find relevant portions of my assessment and plan of care.    If you have questions, please do not hesitate to call me. I look forward to following April Vasquez along with you.    Sincerely,    Regina Dooley MD    Enclosure    If you would like to receive this communication electronically, please contact [email or web address] or [phone] to request [employer] access.    [employer] is a tool which provides read-only access to select patient information with whom you have a relationship. Its easy to use and provides real time access to review your patients record including encounter summaries, notes, results, and demographic information.    If you feel you have received this communication in error or would no longer like to receive these types of communications, please e-mail [email or web address]

## 2017-06-07 NOTE — PROGRESS NOTES
Transplant Surgery  Kidney Transplant Recipient Evaluation    Referring Physician: Quinn Glasgow  Current Nephrologist: Quinn Glasgow    Subjective:     Reason for Visit: evaluate transplant candidacy    History of Present Illness: April Vasquez is a 61 y.o. year old female undergoing transplant evaluation.    Dialysis History: April is on hemodialysis.      Transplant History: N/A    Etiology of Renal Disease: Diabetes Mellitus - Type II (based on medical records from referral).    Review of Systems   Constitutional: Negative for activity change, appetite change, chills and fever.   Respiratory: Negative for cough and shortness of breath.    Gastrointestinal: Negative for abdominal distention, constipation, diarrhea, nausea and vomiting.   Genitourinary: Negative for difficulty urinating and dyspareunia.   Musculoskeletal: Negative.    Skin: Negative for color change and rash.   Allergic/Immunologic: Negative for immunocompromised state.   Neurological: Negative for dizziness and headaches.   Psychiatric/Behavioral: Negative.        Objective:     Physical Exam:  Constitutional:   Vitals reviewed: yes   Well-nourished and well-groomed: yes  Eyes:   Sclerae icteric: no   Extraocular movements intact: yes  GI:    Bowel sounds normal: yes   Tenderness: no    If yes, quadrant/location: not applicable   Palpable masses: no    If yes, quadrant/location: not applicable   Hepatosplenomegaly: no   Ascites: no   Hernia: no    If yes, type/location: not applicable   Surgical scars: yes    If yes, type/location: laparoscopic port sites from cholecystectomy - subcostal and periumbilical                               [place]  not applicable  Resp:   Effort normal: yes   Breath sounds normal: yes    CV:   Regular rate and rhythm: yes   Heart sounds normal: yes   Femoral pulses normal: yes   Extremities edematous: no  Skin:   Rashes or lesions present: no    If yes, describe:not applicable   Jaundice::  no    Musculoskeletal:   Gait normal: yes   Strength normal: yes  Psych:   Oriented to person, place, and time: yes   Affect and mood normal: yes    Additional comments: not applicable    Counseling: We provided April Vasquez with a group education session today.  We discussed kidney transplantation at length with her, including risks, potential complications, and alternatives in the management of her renal failure.  The discussion included complications related to anesthesia, bleeding, infection, primary nonfunction, and ATN.  I discussed the typical postoperative course, length of hospitalization, the need for long-term immunosuppression, and the need for long-term routine follow-up.  I discussed living-donor and -donor transplantation and the relative advantages and disadvantages of each.  I also discussed average waiting times for both living donation and  donation.  I discussed national and center-specific survival rates.  I also mentioned the potential benefit of multicenter listing to candidates listed with centers within more than one organ procurement organization.  All questions were answered.    Final determination of transplant candidacy will be made once evaluation is complete and reviewed by the Kidney & Kidney/Pancreas Selection Committee.         Transplant Surgery - Candidacy   Assessment/Plan:   April Vasquez has end stage renal disease (ESRD) on dialysis. I see no surgical contraindication to placing a kidney transplant. Based on available information, April Vasquez is a suitable kidney transplant candidate.     Poncho Rg MD

## 2017-06-14 ENCOUNTER — TELEPHONE (OUTPATIENT)
Dept: TRANSPLANT | Facility: CLINIC | Age: 62
End: 2017-06-14

## 2017-06-14 NOTE — TELEPHONE ENCOUNTER
Nutritional assessment from dialysis unit received and reviewed--no nutritional changes to plan needed at this time.  Pt to continue to follow-up with renal dietitians recommendations.      Brandee Valencia MS RD LDN

## 2017-06-16 ENCOUNTER — TELEPHONE (OUTPATIENT)
Dept: RADIOLOGY | Facility: HOSPITAL | Age: 62
End: 2017-06-16

## 2017-06-26 NOTE — PROGRESS NOTES
"Transplant Recipient Adult Psychosocial Assessment    April Christina  [address] [place] [address]    Telephone Information:   Mobile [phone]   Home  [phone] (home)  Work  There is no work phone number on file.  E-mail  [email or web address]    Sex: female  YOB: 1955  Age: 61 y.o.    Encounter Date: 6/7/2017  U.S. Citizen: no Pt reports that she has applied for citizenship, is waiting for papers, and has been here since 1978  Primary Language: English   Needed: no    Emergency Contact:  Name: Livier Vasquez  Relationship: daughter  Address: [address] [place] [address] [place], [place] [address]  Phone Numbers:  [phone] (home), [phone] (mobile)    Family/Social Support:   Number of dependents/: Pt reports no dependents.  Marital history: Pt reports 2 marriages with the first marriage ending in divorce. Pt reports current marriage of 34 years to Shelton Vasquez Miles.  Other family dynamics: Pt reports 3 adult children: Michele, Livier, and Shelton .; 5: Mala, Christel, Anne, Gini, and Meghana    Household Composition:  Name: April Vasquez  Age: 61  Relationship: patient  Does person drive? yes    Name: Shelton Salazariz  Age: 60  Relationship:   Does person drive? yes but hates to drive    Name: Shelton Vasquez Miles  Age: 34  Relationship: son  Does person drive? yes     Name: Michele Christina  Age: 29  Relationship: son  Does person drive? yes    Do you and your caregivers have access to reliable transportation? yes  PRIMARY CAREGIVER: Shelton Salazariz Miles () will be primary caregiver, phone number [phone]. Pt reports that  is trying to receive disability for having a "mental breakdown". Pt reports that he is under the care of a psychiatrist and "stable". Pt reports that he will still be able to assist as  Caregiver.    Caregiver was not present during the initial assessment, however pt gave permission to contact pt's caregiver via phone. SW contacted pt's caregiver " to provide education and information. Pt's caregiver expressed no questions or concerns at this time, reports understanding of the caregiver role, and reports knowing how to contact the transplant team. SW remains available at [phone].       provided in-depth information to patient and caregiver regarding pre- and post-transplant caregiver role.   strongly encourages patient and caregiver to have concrete plan regarding post-transplant care giving, including back-up caregiver(s) to ensure care giving needs are met as needed.    Patient and Caregiver states understanding all aspects of caregiver role/commitment and is able/willing/committed to being caregiver to the fullest extent necessary.    Patient and Caregiver verbalizes understanding of the education provided today and caregiver responsibilities.         remains available. Patient and Caregiver agree to contact  in a timely manner if concerns arise.      Able to take time off work without financial concerns: yes.     Additional Significant Others who will Assist with Transplant:  Name: Livier Vasquez ([phone])  Age: 29  City: [place] State: [place]  Relationship: daughter  Does person drive? yes    Name: Michele Vasquez  ([phone])  Age: 29  City: [place] State: [place]  Relationship: son  Does person drive? yes     Living Will: no Education and information provided to pt.  Healthcare Power of : no Education and information provided to pt.  Advance Directives on file: <<no information> per medical record.  Verbally reviewed LW/HCPA information.   provided patient with copy of LW/HCPA documents and provided education on completion of forms.    Living Donors: Yes.  Name: Michele Vasquez (son). Education and resource information given to patient. SW explained to Patient and Caregiver that both recipients and donors require separate caregivers. Patient and Caregiver verbalized understanding  "and agreement.     Highest Education Level: High School (9-12) or GED  Reading Ability: college  Reports difficulty with: Pt reports no difficulties  Learns Best By:  Pt reports pt learns best by a combination of verbal, written, and tactile instructions.     Status: no  VA Benefits: no     Working for Income: No  If no, reason not working: Disability    Patient is disabled.  Prior to disability, patient  was employed as a  for an insurance company..    Spouse/Significant Other Employment: Pt reports that  is not working and trying to obtain disability for having a mental breakdown. Pt reports that he is stable.     Disabled: no. Pt reports that she has applied for disability and is not currently working    Monthly Income:  Other: $1000 (inheritance from 's brother)     Able to afford all costs now and if transplanted, including medications: no. Pt reports that it will be "a while" before the inheritance runs out. Pt reports being denied from receiving disability, however reports that she will be working with a  to reapply. Pt reports that pt's  is also trying to receive disability, apply for SSI at turning 64 this year, and reports that  may be able to draw on his pension this year. Pt further reports that 's brother left  property that they are in the process of trying to sell. SW explained that pt will need a suitable financial plan in place prior to listing. Pt reports that she will need a few months to see how her financial plan progresses. SW explained that it will be pt's responsibility to contact the kidney social workers to update her financial plan. Patient verbalized understanding and agreement.     Patient and Caregiver verbalizes understanding of personal responsibilities related to transplant costs and the importance of having a financial plan to ensure that patients transplant costs are fully covered.       provided fundraising " "information/education. Patient and Caregiver verbalizes understanding.   remains available.    Insurance:   Payor/Plan Subscr  Sex Relation Sub. Ins. ID Effective Group Num   1. MEDICARE - [place]* YANI LIEBERMAN* 1955 Female  [id number]                                    [address]   2. [place] Y[place] LIEBERMAN* 1955 Female  [id number]* 17                                    [address] [address]     Primary Insurance (for UNOS reporting): Public Insurance - Medicare FFS (Fee For Service)  Secondary Insurance (for UNOS reporting): Public Insurance - Medicaid  Patient and Caregiver verbalizes clear understanding that patient may experience difficulty obtaining and/or be denied insurance coverage post-surgery. This includes and is not limited to disability insurance, life insurance, health insurance, burial insurance, long term care insurance, and other insurances.      Patient and Caregiver also reports understanding that future health concerns related to or unrelated to transplantation may not be covered by patient's insurance.  Resources and information provided and reviewed.     Patient and Caregiver provides verbal permission to release any necessary information to outside resources for patient care and discharge planning.  Resources and information provided are reviewed.      Dialysis Adherence: Patient reports being on hemodialysis in center. Before SW could address pt's history of non-adherence, pt reports that she is doing much better with her adherence. Pt reports that she was in the hospital  In February or march because she could not breathe. Pt reports that she was full of fluid and needed dialysis immediately. Pt reports that she had eaten "a bunch of crawfish" and was fluid overloaded. Pt reports that they took off a lot of fluid and pt dropped down to 98 pounds. Pt reports that she has learned her lesson about adherence to dialysis and her diet.    TREVER Hdez at pt's " dialysis center reports over last three months that the patient has had 1 AMA of 30 minutes on 6/3/17, has not had any no-shows, and reports that pt has not had any issues with labs, fluid gains, or other psychosocial issues.    Infusion Service: patient utilizing? no  Home Health: patient utilizing? yes Pt reports having Home Health through [facility] after her March admit  DME: no Pt reports that she needs to get a blood pressure cuff  Pulmonary/Cardiac Rehab: Pt denies   ADLS:  Pt reports no difficulties with driving, walking, bathing, cooking, housekeeping, eating, shopping, and taking medication.    Adherence:   Pt now reports good adherence with medications, dialysis, and health regimen.  Adherence education and counseling provided.     Per History Section:Past Medical History:   Diagnosis Date    ESRD (end stage renal disease) on dialysis 2017    Gallstones     Hypertension     Renal disorder     dialysis     Social History   Substance Use Topics    Smoking status: Current Every Day Smoker     Packs/day: 0.50     Years: 20.00    Smokeless tobacco: Never Used    Alcohol use No     History   Drug Use No     History   Sexual Activity    Sexual activity: Yes    Partners: Male       Per Today's Psychosocial:  Tobacco: Pt reports cutting down to 6-7 cigarettes per day after her admission. Pt reports prior to that smoking about 1ppd since age [age]. Pt reports son: Michele smokes about 1ppd. Pt and son report they both plan to quit. Smoking cessation resources were offered, but were declined..  Alcohol: none, patient denies any use.  Illicit Drugs/Non-prescribed Medications: none, patient denies any use.    Patient and Caregiver states clear understanding of the potential impact of substance use as it relates to transplant candidacy and is aware of possible random substance screening.  Substance abstinence/cessation counseling, education and resources provided and reviewed.     Arrests/DWI/Treatment/Rehab:  patient denies    Psychiatric History:    Mental Health: Pt reports no history of or current mental health issues or concerns.   Psychiatrist/Counselor: Pt denies seeing a mental health professional and reports being open to seeing the psych department for talk therapy if necessary.  Medications:  Pt denies taking medications for mental health reasons.  Suicide/Homicide Issues: Pt denies any history of or current suicidal or homicidal ideations.    Safety at home: Pt reports no current or history of safety concerns in household; including mental, physical, verbal, or sexual abuse.    Knowledge: Patient and Caregiver states having clear understanding and realistic expectations regarding the potential risks and potential benefits of organ transplantation and organ donation and agrees to discuss with health care team members and support system members, as well as to utilize available resources and express questions and/or concerns in order to further facilitate the pt informed decision-making.  Resources and information provided and reviewed.    Patient and Caregiver is aware of [facility] affiliation and/or partnership with agencies in home health care, LTAC, SNF, [facility], and other hospitals and clinics.    Understanding: Patient and Caregiver reports having a clear understanding of the many lifetime commitments involved with being a transplant recipient, including costs, compliance, medications, lab work, procedures, appointments, concrete and financial planning, preparedness, timely and appropriate communication of concerns, abstinence (ETOH, tobacco, illicit non-prescribed drugs), adherence to all health care team recommendations, support system and caregiver involvement, appropriate and timely resource utilization and follow-through, mental health counseling as needed/recommended, and patient and caregiver responsibilities.  Social Service Handbook, resources and detailed educational information provided and reviewed.  " Educational information provided.    Patient and Caregiver also reports current and expected compliance with health care regime and states having a clear understanding of the importance of compliance.      Patient and Caregiver reports a clear understanding that risks and benefits may be involved with organ transplantation and with organ donation.       Patient and Caregiver also reports clear understanding that psychosocial risk factors may affect patient, and include but are not limited to feelings of depression, generalized anxiety, anxiety regarding dependence on others, post traumatic stress disorder, feelings of guilt and other emotional and/or mental concerns, and/or exacerbation of existing mental health concerns.  Detailed resources provided and discussed.      Patient and Caregiver agrees to access appropriate resources in a timely manner as needed and/or as recommended, and to communicate concerns appropriately.  Patient and Caregiver also reports a clear understanding of treatment options available.     Patient and Caregiver received education in a group setting.   reviewed education, provided additional information, and answered questions.    Feelings or Concerns: Patient and Caregiver did not express any concerns at this time. SW had concerns about pt's low income. Pt reports that she will need a few months to comprise pt's financial plan.    Coping: Pt reports coping well with the transplant process at this time and reports going to the [employer], watching television, and talking to sisters and family as ways to cope. Pt [facility] [place].     Goals: Pt reports "survive" and "live a full life like I used to" as goals for after transplant.  Patient referred to Vocational Rehabilitation.    Interview Behavior: Patient and Caregiver present as alert and oriented x 4, pleasant, good eye contact, well groomed, recall good, concentration/judgement " good, average intelligence, calm, communicative, cooperative and asking and answering questions appropriately. Pt presents with son: Michele Vasquez at pt's request.          Transplant Social Work - Candidacy  Assessment/Plan:     Psychosocial Suitability: Patient presents as a marginal candidate for kidney transplant at this time. Based on psychosocial risk factors, patient presents as high risk, due to low income and unstable finnacial plan at this present time. Pt reports that she will need a few months to establish her financial plan with obtaining her own disability,  possible receiving disability or social security longterm, selling 's brother's property, and  drawing on his pension. Pt does have a caregiver plan in place and adequate dialysis adherence.    Recommendations/Additional Comments: SW recommends that pt reapply for disability and contact the transplant team with ANY changes in pt's financial situation. Patient and Caregiver verbalized understanding and agreement. SW also recommends that pt conduct fundraising to assist pt with pay for cost of medications, food, gas, and other transplant related needs. SW recommends that pt remain aware of potential mental health concerns and contact the team if any concerns arise. SW recommends that pt remain abstinent from tobacco, ETOH, and drug use. SW supports pt's continued dialysis adherence. SW remains available to answer any questions or concerns that arise as the pt moves through the transplant process.     OF NOTE: PT REPORTS THAT HER MOTHER HAD A TRANSPLANT AT [facility] AND  OF KIDNEY FAILURE BECAUSE THE TRANSPLANT DID NOT TAKE.    Flora Delacruz LCSW

## 2017-06-26 NOTE — ADDENDUM NOTE
Encounter addended by: Flora Delacruz LCSW on: 6/26/2017  2:12 PM<BR>    Actions taken: Pend clinical note

## 2017-06-30 ENCOUNTER — TELEPHONE (OUTPATIENT)
Dept: TRANSPLANT | Facility: CLINIC | Age: 62
End: 2017-06-30

## 2017-06-30 DIAGNOSIS — Z76.82 ORGAN TRANSPLANT CANDIDATE: Primary | ICD-10-CM

## 2017-06-30 DIAGNOSIS — N18.6 ESRD (END STAGE RENAL DISEASE): Primary | ICD-10-CM

## 2017-06-30 DIAGNOSIS — Z76.82 ORGAN TRANSPLANT CANDIDATE: ICD-10-CM

## 2017-06-30 NOTE — TELEPHONE ENCOUNTER
----- Message from David Barksdale RN sent at 6/30/2017  9:32 AM CDT -----  Brandee,     Can you put in order in for the patient to have a colonoscopy done here at [facility]?    Thanks,   David

## 2017-07-03 DIAGNOSIS — Z76.82 ORGAN TRANSPLANT CANDIDATE: Primary | ICD-10-CM

## 2017-08-03 ENCOUNTER — CLINICAL SUPPORT (OUTPATIENT)
Dept: CARDIOLOGY | Facility: CLINIC | Age: 62
End: 2017-08-03
Payer: MEDICARE

## 2017-08-03 DIAGNOSIS — Z76.82 ORGAN TRANSPLANT CANDIDATE: ICD-10-CM

## 2017-08-03 PROCEDURE — A9502 TC99M TETROFOSMIN: HCPCS | Mod: PBBFAC,TXP | Performed by: INTERNAL MEDICINE

## 2017-08-03 PROCEDURE — 93016 CV STRESS TEST SUPVJ ONLY: CPT | Mod: S$PBB,TXP,, | Performed by: INTERNAL MEDICINE

## 2017-08-03 PROCEDURE — 78452 HT MUSCLE IMAGE SPECT MULT: CPT | Mod: 26,S$PBB,TXP, | Performed by: INTERNAL MEDICINE

## 2017-08-03 PROCEDURE — 93018 CV STRESS TEST I&R ONLY: CPT | Mod: S$PBB,TXP,, | Performed by: INTERNAL MEDICINE

## 2017-10-24 ENCOUNTER — OFFICE VISIT (OUTPATIENT)
Dept: OBSTETRICS AND GYNECOLOGY | Facility: CLINIC | Age: 62
End: 2017-10-24
Payer: MEDICARE

## 2017-10-24 ENCOUNTER — HOSPITAL ENCOUNTER (OUTPATIENT)
Dept: RADIOLOGY | Facility: HOSPITAL | Age: 62
Discharge: HOME | End: 2017-10-24
Attending: NURSE PRACTITIONER
Payer: MEDICARE

## 2017-10-24 VITALS
HEIGHT: 65 IN | SYSTOLIC BLOOD PRESSURE: 118 MMHG | BODY MASS INDEX: 20.99 KG/M2 | WEIGHT: 126 LBS | DIASTOLIC BLOOD PRESSURE: 78 MMHG

## 2017-10-24 DIAGNOSIS — Z01.419 VISIT FOR GYNECOLOGIC EXAMINATION: Primary | ICD-10-CM

## 2017-10-24 DIAGNOSIS — Z76.82 ORGAN TRANSPLANT CANDIDATE: ICD-10-CM

## 2017-10-24 DIAGNOSIS — Z78.0 POSTMENOPAUSE: ICD-10-CM

## 2017-10-24 PROCEDURE — 71250 CT THORAX DX C-: CPT | Mod: 26,GC,TXP, | Performed by: RADIOLOGY

## 2017-10-24 PROCEDURE — 99213 OFFICE O/P EST LOW 20 MIN: CPT | Mod: PBBFAC,25,TXP | Performed by: NURSE PRACTITIONER

## 2017-10-24 PROCEDURE — 99999 PR PBB SHADOW E&M-EST. PATIENT-LVL III: CPT | Mod: PBBFAC,,, | Performed by: NURSE PRACTITIONER

## 2017-10-24 PROCEDURE — G0101 CA SCREEN;PELVIC/BREAST EXAM: HCPCS | Mod: PBBFAC,TXP | Performed by: NURSE PRACTITIONER

## 2017-10-24 PROCEDURE — 71250 CT THORAX DX C-: CPT | Mod: TC,TXP

## 2017-10-24 PROCEDURE — G0101 CA SCREEN;PELVIC/BREAST EXAM: HCPCS | Mod: S$PBB,,, | Performed by: NURSE PRACTITIONER

## 2017-10-24 PROCEDURE — 88175 CYTOPATH C/V AUTO FLUID REDO: CPT

## 2017-10-24 NOTE — PROGRESS NOTES
"HISTORY OF PRESENT ILLNESS:    April Vasquez is a 62 y.o. female (twins), presents for a pre-kidney transplant routine exam and has no gyn complaints.    -Missed her mammogram appointment and would like this rescheduled.    Past Medical History:   Diagnosis Date    ESRD (end stage renal disease) on dialysis 2017    Gallstones     Hypertension     Renal disorder     dialysis    Tobacco abuse        Past Surgical History:   Procedure Laterality Date    AVF  2017    CHOLECYSTECTOMY  2016    Permacath Right 2017    TUBAL LIGATION          MEDICATIONS AND ALLERGIES:      Current Outpatient Prescriptions:     amlodipine (NORVASC) 10 MG tablet, Take 10 mg by mouth once daily., Disp: , Rfl:     hydrALAZINE (APRESOLINE) 50 MG tablet, Take 75 mg by mouth 3 (three) times daily., Disp: , Rfl:     Review of patient's allergies indicates:  No Known Allergies    Family History   Problem Relation Age of Onset    Kidney disease Mother     Hypertension Mother     Cervical cancer Mother     Diabetes Father     Drug abuse Paternal Grandmother     Breast cancer Neg Hx     Colon cancer Neg Hx     Ovarian cancer Neg Hx        Social History     Social History    Marital status:      Spouse name: N/A    Number of children: 4    Years of education: N/A     Occupational History    disable      Social History Main Topics    Smoking status: Current Every Day Smoker     Packs/day: 0.50     Years: 20.00    Smokeless tobacco: Never Used    Alcohol use No    Drug use: No    Sexual activity: Yes     Partners: Male     Birth control/ protection: Post-menopausal     Other Topics Concern    Not on file     Social History Narrative    No narrative on file       OB HISTORY: Number of C/S:2 Number of vaginal deliveries:1 Number of twins: one set.    COMPREHENSIVE GYN HISTORY:  PAP History:  Denies abnormal Paps. UNKNOWN DATE OF LAST PAP "NEG".  Infection History: Denies STDs. Denies PID.  Benign History: " "Denies uterine fibroids. Denies ovarian cysts. Denies endometriosis. Denies other conditions.  Cancer History: Denies cervical cancer. Denies uterine cancer or hyperplasia. Denies ovarian cancer. Denies vulvar cancer or pre-cancer. Denies vaginal cancer or pre-cancer. Denies breast cancer. Denies colon cancer.  Sexual Activity History: Reports currently being sexually active  Menstrual History: Denies menses. Pt is  not on HRT.     ROS:  GENERAL: No weight changes. No swelling. No fatigue. No fever.  CARDIOVASCULAR: No chest pain. No shortness of breath. No leg cramps.   NEUROLOGICAL: No headaches. No vision changes.  BREASTS: No pain. No lumps. No discharge.  ABDOMEN: No pain. No nausea. No vomiting. No diarrhea. No constipation.  REPRODUCTIVE: No abnormal bleeding.   VULVA: No pain. No lesions. No itching.  VAGINA: No relaxation. No itching. No odor. No discharge. No lesions.  URINARY: No incontinence. No nocturia. No frequency. No dysuria.    /78   Ht 5' 5" (1.651 m)   Wt 57.2 kg (126 lb)   BMI 20.97 kg/m²     PE:  APPEARANCE: Well nourished, well developed, in no acute distress.  AFFECT: WNL, alert and oriented x 3.  SKIN: No hirsutism or acne.  NECK: Neck symmetric without masses or thyromegaly.  NODES: No inguinal, cervical, axillary or femoral lymph node enlargement.  CHEST: Good respiratory effort.   ABDOMEN: Soft. No tenderness or masses.   BREASTS: Symmetrical, no skin changes or visible lesions. No palpable masses, nipple discharge bilaterally.  PELVIC: ATROPHIC EXTERNAL FEMALE GENITALIA without lesions. Normal hair distribution. Adequate perineal body, normal urethral meatus. VAGINA DRY / ATROPHIC without lesions or discharge. CERVIX STENOTIC without lesions, discharge or tenderness. No significant cystocele or rectocele. Bimanual exam shows uterus to be normal size, regular, mobile and nontender. Adnexa without masses or tenderness.  RECTAL: Rectovaginal exam confirms above with normal " sphincter tone, no masses.  EXTREMITIES: No edema.    DIAGNOSIS:  1. Visit for gynecologic examination    2. Postmenopause        PLAN:    Orders Placed This Encounter    Liquid-based pap smear, screening   Has mammogram scheduled  States has had nl colon screening and BMD test outside [facility].     COUNSELING:  The patient was counseled today on:  -osteoporosis prevention, (to ask her transplant team about calcium supplementation), regular weight bearing exercise;  -A.C.S. Pap and pelvic exam guidelines (pap every 3 years, no pap after age 65) and recommendations for yearly mammogram;  -to see her PCP for other health maintenance.    FOLLOW-UP with me in two years.

## 2017-10-24 NOTE — LETTER
October 24, 2017      Brandee Morton, GLENN  [address] [address]           [facility] - OB/GYN 5th Floor  [address]  [place] [address]  Phone: [phone]          Patient: April Vasquez   MR Number: [id number]   YOB: 1955   Date of Visit: 10/24/2017       Dear Brandee Morton:    Thank you for referring April Vasquez to me for evaluation. Attached you will find relevant portions of my assessment and plan of care.    If you have questions, please do not hesitate to call me. I look forward to following April Vasquez along with you.    Sincerely,    TRACIE Chakraborty NP    Enclosure  CC:  No Recipients    If you would like to receive this communication electronically, please contact [email or web address] or [phone] to request more information on [employer] Link access.    For providers and/or their staff who would like to refer a patient to [facility], please contact us through our one-stop-shop provider referral line, [facility], at [phone].    If you feel you have received this communication in error or would no longer like to receive these types of communications, please e-mail [email or web address]

## 2017-11-14 PROBLEM — Z01.810 PRE-OPERATIVE CARDIOVASCULAR EXAMINATION: Status: ACTIVE | Noted: 2017-11-14

## 2019-08-04 ENCOUNTER — HOSPITAL ENCOUNTER (INPATIENT)
Facility: HOSPITAL | Age: 64
LOS: 2 days | Discharge: HOME | DRG: 193 | End: 2019-08-06
Attending: EMERGENCY MEDICINE | Admitting: EMERGENCY MEDICINE
Payer: MEDICARE

## 2019-08-04 DIAGNOSIS — R06.02 SHORTNESS OF BREATH: ICD-10-CM

## 2019-08-04 DIAGNOSIS — R00.1 BRADYCARDIA: ICD-10-CM

## 2019-08-04 DIAGNOSIS — R09.02 HYPOXIA: ICD-10-CM

## 2019-08-04 DIAGNOSIS — Z91.199 NON-COMPLIANCE: ICD-10-CM

## 2019-08-04 DIAGNOSIS — Z72.0 TOBACCO ABUSE: ICD-10-CM

## 2019-08-04 DIAGNOSIS — I50.9 CHF (CONGESTIVE HEART FAILURE): ICD-10-CM

## 2019-08-04 DIAGNOSIS — I10 ESSENTIAL HYPERTENSION: ICD-10-CM

## 2019-08-04 DIAGNOSIS — I16.0 HYPERTENSIVE URGENCY: ICD-10-CM

## 2019-08-04 DIAGNOSIS — J18.9 PNEUMONIA OF BOTH LUNGS DUE TO INFECTIOUS ORGANISM, UNSPECIFIED PART OF LUNG: Primary | ICD-10-CM

## 2019-08-04 DIAGNOSIS — N18.6 ESRD (END STAGE RENAL DISEASE) ON DIALYSIS: ICD-10-CM

## 2019-08-04 DIAGNOSIS — Z99.2 ESRD (END STAGE RENAL DISEASE) ON DIALYSIS: ICD-10-CM

## 2019-08-04 DIAGNOSIS — N18.6 ESRD (END STAGE RENAL DISEASE): ICD-10-CM

## 2019-08-04 DIAGNOSIS — I16.1 HYPERTENSIVE EMERGENCY: ICD-10-CM

## 2019-08-04 DIAGNOSIS — J18.9 HEALTHCARE-ASSOCIATED PNEUMONIA: ICD-10-CM

## 2019-08-04 LAB
ALBUMIN SERPL BCP-MCNC: 3.7 G/DL (ref 3.5–5.2)
ALLENS TEST: NORMAL
ALP SERPL-CCNC: 134 U/L (ref 55–135)
ALT SERPL W/O P-5'-P-CCNC: 16 U/L (ref 10–44)
ANION GAP SERPL CALC-SCNC: 17 MMOL/L (ref 8–16)
AST SERPL-CCNC: 16 U/L (ref 10–40)
BASOPHILS # BLD AUTO: 0.04 K/UL (ref 0–0.2)
BASOPHILS NFR BLD: 0.2 % (ref 0–1.9)
BILIRUB SERPL-MCNC: 0.4 MG/DL (ref 0.1–1)
BUN SERPL-MCNC: 48 MG/DL (ref 8–23)
CALCIUM SERPL-MCNC: 9.7 MG/DL (ref 8.7–10.5)
CHLORIDE SERPL-SCNC: 97 MMOL/L (ref 95–110)
CO2 SERPL-SCNC: 25 MMOL/L (ref 23–29)
CREAT SERPL-MCNC: 10.2 MG/DL (ref 0.5–1.4)
DELSYS: NORMAL
DIFFERENTIAL METHOD: ABNORMAL
EOSINOPHIL # BLD AUTO: 0.4 K/UL (ref 0–0.5)
EOSINOPHIL NFR BLD: 2.1 % (ref 0–8)
ERYTHROCYTE [DISTWIDTH] IN BLOOD BY AUTOMATED COUNT: 16.1 % (ref 11.5–14.5)
EST. GFR  (AFRICAN AMERICAN): 4 ML/MIN/1.73 M^2
EST. GFR  (NON AFRICAN AMERICAN): 4 ML/MIN/1.73 M^2
FLOW: 15
GLUCOSE SERPL-MCNC: 114 MG/DL (ref 70–110)
HCO3 UR-SCNC: 25.5 MMOL/L (ref 24–28)
HCT VFR BLD AUTO: 30.1 % (ref 37–48.5)
HGB BLD-MCNC: 9.5 G/DL (ref 12–16)
LACTATE SERPL-SCNC: 0.7 MMOL/L (ref 0.5–2.2)
LYMPHOCYTES # BLD AUTO: 2.1 K/UL (ref 1–4.8)
LYMPHOCYTES NFR BLD: 12.8 % (ref 18–48)
MCH RBC QN AUTO: 28.7 PG (ref 27–31)
MCHC RBC AUTO-ENTMCNC: 31.6 G/DL (ref 32–36)
MCV RBC AUTO: 91 FL (ref 82–98)
MODE: NORMAL
MONOCYTES # BLD AUTO: 0.6 K/UL (ref 0.3–1)
MONOCYTES NFR BLD: 3.4 % (ref 4–15)
NEUTROPHILS # BLD AUTO: 13.3 K/UL (ref 1.8–7.7)
NEUTROPHILS NFR BLD: 81.9 % (ref 38–73)
PCO2 BLDA: 39.8 MMHG (ref 35–45)
PH SMN: 7.41 [PH] (ref 7.35–7.45)
PLATELET # BLD AUTO: 287 K/UL (ref 150–350)
PMV BLD AUTO: 11.1 FL (ref 9.2–12.9)
PO2 BLDA: 87 MMHG (ref 80–100)
POC BE: 1 MMOL/L
POC SATURATED O2: 97 % (ref 95–100)
POC TCO2: 27 MMOL/L (ref 23–27)
POTASSIUM SERPL-SCNC: 4.4 MMOL/L (ref 3.5–5.1)
PROT SERPL-MCNC: 7.6 G/DL (ref 6–8.4)
RBC # BLD AUTO: 3.31 M/UL (ref 4–5.4)
SAMPLE: NORMAL
SITE: NORMAL
SODIUM SERPL-SCNC: 139 MMOL/L (ref 136–145)
SP02: 98
TROPONIN I SERPL DL<=0.01 NG/ML-MCNC: 0.01 NG/ML (ref 0–0.03)
WBC # BLD AUTO: 16.35 K/UL (ref 3.9–12.7)

## 2019-08-04 PROCEDURE — 20000000 HC ICU ROOM

## 2019-08-04 PROCEDURE — 63600175 PHARM REV CODE 636 W HCPCS: Performed by: EMERGENCY MEDICINE

## 2019-08-04 PROCEDURE — 85025 COMPLETE CBC W/AUTO DIFF WBC: CPT

## 2019-08-04 PROCEDURE — 96374 THER/PROPH/DIAG INJ IV PUSH: CPT

## 2019-08-04 PROCEDURE — 93005 ELECTROCARDIOGRAM TRACING: CPT

## 2019-08-04 PROCEDURE — 84484 ASSAY OF TROPONIN QUANT: CPT

## 2019-08-04 PROCEDURE — 36600 WITHDRAWAL OF ARTERIAL BLOOD: CPT

## 2019-08-04 PROCEDURE — 87040 BLOOD CULTURE FOR BACTERIA: CPT

## 2019-08-04 PROCEDURE — 80053 COMPREHEN METABOLIC PANEL: CPT

## 2019-08-04 PROCEDURE — 12000002 HC ACUTE/MED SURGE SEMI-PRIVATE ROOM

## 2019-08-04 PROCEDURE — 93010 EKG 12-LEAD: ICD-10-PCS | Mod: ,,, | Performed by: INTERNAL MEDICINE

## 2019-08-04 PROCEDURE — 83605 ASSAY OF LACTIC ACID: CPT

## 2019-08-04 PROCEDURE — 99291 CRITICAL CARE FIRST HOUR: CPT | Mod: 25

## 2019-08-04 PROCEDURE — 93010 ELECTROCARDIOGRAM REPORT: CPT | Mod: ,,, | Performed by: INTERNAL MEDICINE

## 2019-08-04 PROCEDURE — 82803 BLOOD GASES ANY COMBINATION: CPT

## 2019-08-04 PROCEDURE — 99900035 HC TECH TIME PER 15 MIN (STAT)

## 2019-08-04 RX ORDER — METOPROLOL SUCCINATE 100 MG/1
100 TABLET, EXTENDED RELEASE ORAL
COMMUNITY
Start: 2019-07-09 | End: 2020-02-13

## 2019-08-04 RX ORDER — SODIUM CHLORIDE 9 MG/ML
INJECTION, SOLUTION INTRAVENOUS
Status: DISCONTINUED | OUTPATIENT
Start: 2019-08-04 | End: 2019-08-06 | Stop reason: HOSPADM

## 2019-08-04 RX ORDER — NITROGLYCERIN 0.4 MG/1
0.4 TABLET SUBLINGUAL
Status: COMPLETED | OUTPATIENT
Start: 2019-08-04 | End: 2019-08-04

## 2019-08-04 RX ADMIN — PIPERACILLIN AND TAZOBACTAM 4.5 G: 4; .5 INJECTION, POWDER, LYOPHILIZED, FOR SOLUTION INTRAVENOUS; PARENTERAL at 10:08

## 2019-08-04 RX ADMIN — NITROGLYCERIN 0.4 MG: 0.4 TABLET SUBLINGUAL at 09:08

## 2019-08-04 RX ADMIN — VANCOMYCIN HYDROCHLORIDE 1250 MG: 1.25 INJECTION, POWDER, LYOPHILIZED, FOR SOLUTION INTRAVENOUS at 10:08

## 2019-08-05 PROBLEM — I10 ESSENTIAL HYPERTENSION: Status: ACTIVE | Noted: 2019-08-05

## 2019-08-05 PROBLEM — I16.0 HYPERTENSIVE URGENCY: Status: ACTIVE | Noted: 2019-08-05

## 2019-08-05 PROBLEM — J18.9 BILATERAL PNEUMONIA: Status: ACTIVE | Noted: 2019-08-05

## 2019-08-05 PROBLEM — Z99.2 ESRD (END STAGE RENAL DISEASE) ON DIALYSIS: Status: ACTIVE | Noted: 2019-08-05

## 2019-08-05 PROBLEM — N18.6 ESRD (END STAGE RENAL DISEASE) ON DIALYSIS: Status: ACTIVE | Noted: 2019-08-05

## 2019-08-05 PROBLEM — R00.1 BRADYCARDIA: Status: ACTIVE | Noted: 2019-08-05

## 2019-08-05 LAB
ALBUMIN SERPL BCP-MCNC: 3.6 G/DL (ref 3.5–5.2)
ALP SERPL-CCNC: 131 U/L (ref 55–135)
ALT SERPL W/O P-5'-P-CCNC: 12 U/L (ref 10–44)
ANION GAP SERPL CALC-SCNC: 13 MMOL/L (ref 8–16)
AORTIC ROOT ANNULUS: 2.85 CM
AORTIC VALVE CUSP SEPERATION: 1.87 CM
ASCENDING AORTA: 2.85 CM
AST SERPL-CCNC: 16 U/L (ref 10–40)
AV INDEX (PROSTH): 0.5
AV MEAN GRADIENT: 9 MMHG
AV PEAK GRADIENT: 22 MMHG
AV VALVE AREA: 1.78 CM2
AV VELOCITY RATIO: 0.52
BASOPHILS # BLD AUTO: 0.04 K/UL (ref 0–0.2)
BASOPHILS NFR BLD: 0.3 % (ref 0–1.9)
BILIRUB SERPL-MCNC: 0.5 MG/DL (ref 0.1–1)
BSA FOR ECHO PROCEDURE: 1.61 M2
BUN SERPL-MCNC: 23 MG/DL (ref 8–23)
CALCIUM SERPL-MCNC: 8.9 MG/DL (ref 8.7–10.5)
CHLORIDE SERPL-SCNC: 98 MMOL/L (ref 95–110)
CO2 SERPL-SCNC: 31 MMOL/L (ref 23–29)
CREAT SERPL-MCNC: 5 MG/DL (ref 0.5–1.4)
CV ECHO LV RWT: 0.44 CM
DIFFERENTIAL METHOD: ABNORMAL
DOP CALC AO PEAK VEL: 2.32 M/S
DOP CALC AO VTI: 55.57 CM
DOP CALC LVOT AREA: 3.5 CM2
DOP CALC LVOT DIAMETER: 2.12 CM
DOP CALC LVOT PEAK VEL: 1.21 M/S
DOP CALC LVOT STROKE VOLUME: 98.79 CM3
DOP CALCLVOT PEAK VEL VTI: 28 CM
E WAVE DECELERATION TIME: 170.49 MSEC
E/A RATIO: 1.85
E/E' RATIO: 31.4 M/S
ECHO LV POSTERIOR WALL: 1.19 CM (ref 0.6–1.1)
EOSINOPHIL # BLD AUTO: 0.1 K/UL (ref 0–0.5)
EOSINOPHIL NFR BLD: 1 % (ref 0–8)
ERYTHROCYTE [DISTWIDTH] IN BLOOD BY AUTOMATED COUNT: 15.9 % (ref 11.5–14.5)
EST. GFR  (AFRICAN AMERICAN): 10 ML/MIN/1.73 M^2
EST. GFR  (NON AFRICAN AMERICAN): 9 ML/MIN/1.73 M^2
ESTIMATED AVG GLUCOSE: 85 MG/DL (ref 68–131)
FRACTIONAL SHORTENING: 56 % (ref 28–44)
GLUCOSE SERPL-MCNC: 84 MG/DL (ref 70–110)
HAV IGM SERPL QL IA: NEGATIVE
HBA1C MFR BLD HPLC: 4.6 % (ref 4–5.6)
HBV CORE IGM SERPL QL IA: NEGATIVE
HBV SURFACE AG SERPL QL IA: NEGATIVE
HCT VFR BLD AUTO: 28.3 % (ref 37–48.5)
HCV AB SERPL QL IA: NEGATIVE
HGB BLD-MCNC: 8.9 G/DL (ref 12–16)
INTERVENTRICULAR SEPTUM: 1.19 CM (ref 0.6–1.1)
IVRT: 0.1 MSEC
LA MAJOR: 5.84 CM
LA MINOR: 5.67 CM
LA WIDTH: 4.54 CM
LEFT ATRIUM SIZE: 4.53 CM
LEFT ATRIUM VOLUME INDEX: 62.5 ML/M2
LEFT ATRIUM VOLUME: 100.58 CM3
LEFT INTERNAL DIMENSION IN SYSTOLE: 2.37 CM (ref 2.1–4)
LEFT VENTRICLE DIASTOLIC VOLUME INDEX: 87.88 ML/M2
LEFT VENTRICLE DIASTOLIC VOLUME: 141.4 ML
LEFT VENTRICLE MASS INDEX: 162 G/M2
LEFT VENTRICLE SYSTOLIC VOLUME INDEX: 12.2 ML/M2
LEFT VENTRICLE SYSTOLIC VOLUME: 19.63 ML
LEFT VENTRICULAR INTERNAL DIMENSION IN DIASTOLE: 5.4 CM (ref 3.5–6)
LEFT VENTRICULAR MASS: 261.39 G
LV LATERAL E/E' RATIO: 22.43 M/S
LV SEPTAL E/E' RATIO: 52.33 M/S
LYMPHOCYTES # BLD AUTO: 1.7 K/UL (ref 1–4.8)
LYMPHOCYTES NFR BLD: 13.6 % (ref 18–48)
MCH RBC QN AUTO: 28.8 PG (ref 27–31)
MCHC RBC AUTO-ENTMCNC: 31.4 G/DL (ref 32–36)
MCV RBC AUTO: 92 FL (ref 82–98)
MONOCYTES # BLD AUTO: 0.6 K/UL (ref 0.3–1)
MONOCYTES NFR BLD: 4.6 % (ref 4–15)
MV PEAK A VEL: 0.85 M/S
MV PEAK E VEL: 1.57 M/S
NEUTROPHILS # BLD AUTO: 10 K/UL (ref 1.8–7.7)
NEUTROPHILS NFR BLD: 80.5 % (ref 38–73)
PISA TR MAX VEL: 2.28 M/S
PLATELET # BLD AUTO: 237 K/UL (ref 150–350)
PMV BLD AUTO: 10.9 FL (ref 9.2–12.9)
POTASSIUM SERPL-SCNC: 4 MMOL/L (ref 3.5–5.1)
PROCALCITONIN SERPL IA-MCNC: 0.48 NG/ML
PROT SERPL-MCNC: 6.7 G/DL (ref 6–8.4)
PULM VEIN S/D RATIO: 1.36
PV PEAK D VEL: 0.39 M/S
PV PEAK S VEL: 0.53 M/S
PV PEAK VELOCITY: 1.2 CM/S
RA MAJOR: 5.25 CM
RA PRESSURE: 8 MMHG
RA WIDTH: 3.61 CM
RBC # BLD AUTO: 3.09 M/UL (ref 4–5.4)
RIGHT VENTRICULAR END-DIASTOLIC DIMENSION: 3.37 CM
RV TISSUE DOPPLER FREE WALL SYSTOLIC VELOCITY 1 (APICAL 4 CHAMBER VIEW): 10.98 CM/S
SINUS: 2.84 CM
SODIUM SERPL-SCNC: 142 MMOL/L (ref 136–145)
STJ: 2.19 CM
T3FREE SERPL-MCNC: 2.2 PG/ML (ref 2.3–4.2)
T4 FREE SERPL-MCNC: 0.94 NG/DL (ref 0.71–1.51)
TDI LATERAL: 0.07 M/S
TDI SEPTAL: 0.03 M/S
TDI: 0.05 M/S
TR MAX PG: 21 MMHG
TRICUSPID ANNULAR PLANE SYSTOLIC EXCURSION: 2.88 CM
TROPONIN I SERPL DL<=0.01 NG/ML-MCNC: 0.01 NG/ML (ref 0–0.03)
TROPONIN I SERPL DL<=0.01 NG/ML-MCNC: 0.01 NG/ML (ref 0–0.03)
TSH SERPL DL<=0.005 MIU/L-ACNC: 1.84 UIU/ML (ref 0.4–4)
TV REST PULMONARY ARTERY PRESSURE: 29 MMHG
VANCOMYCIN SERPL-MCNC: 17.4 UG/ML
WBC # BLD AUTO: 12.42 K/UL (ref 3.9–12.7)

## 2019-08-05 PROCEDURE — 25000003 PHARM REV CODE 250: Performed by: INTERNAL MEDICINE

## 2019-08-05 PROCEDURE — 25000003 PHARM REV CODE 250: Performed by: HOSPITALIST

## 2019-08-05 PROCEDURE — 94640 AIRWAY INHALATION TREATMENT: CPT

## 2019-08-05 PROCEDURE — 63600175 PHARM REV CODE 636 W HCPCS: Performed by: INTERNAL MEDICINE

## 2019-08-05 PROCEDURE — 99900035 HC TECH TIME PER 15 MIN (STAT)

## 2019-08-05 PROCEDURE — 25000242 PHARM REV CODE 250 ALT 637 W/ HCPCS: Performed by: INTERNAL MEDICINE

## 2019-08-05 PROCEDURE — 84484 ASSAY OF TROPONIN QUANT: CPT

## 2019-08-05 PROCEDURE — 80053 COMPREHEN METABOLIC PANEL: CPT

## 2019-08-05 PROCEDURE — 85025 COMPLETE CBC W/AUTO DIFF WBC: CPT

## 2019-08-05 PROCEDURE — 99222 1ST HOSP IP/OBS MODERATE 55: CPT | Mod: 25,,, | Performed by: INTERNAL MEDICINE

## 2019-08-05 PROCEDURE — 80100014 HC HEMODIALYSIS 1:1

## 2019-08-05 PROCEDURE — 25000003 PHARM REV CODE 250: Performed by: EMERGENCY MEDICINE

## 2019-08-05 PROCEDURE — 84443 ASSAY THYROID STIM HORMONE: CPT

## 2019-08-05 PROCEDURE — 84481 FREE ASSAY (FT-3): CPT

## 2019-08-05 PROCEDURE — 94799 UNLISTED PULMONARY SVC/PX: CPT

## 2019-08-05 PROCEDURE — 86706 HEP B SURFACE ANTIBODY: CPT

## 2019-08-05 PROCEDURE — 80202 ASSAY OF VANCOMYCIN: CPT

## 2019-08-05 PROCEDURE — 25000242 PHARM REV CODE 250 ALT 637 W/ HCPCS: Performed by: HOSPITALIST

## 2019-08-05 PROCEDURE — 27000221 HC OXYGEN, UP TO 24 HOURS

## 2019-08-05 PROCEDURE — 63600175 PHARM REV CODE 636 W HCPCS: Performed by: HOSPITALIST

## 2019-08-05 PROCEDURE — 94761 N-INVAS EAR/PLS OXIMETRY MLT: CPT

## 2019-08-05 PROCEDURE — 84439 ASSAY OF FREE THYROXINE: CPT

## 2019-08-05 PROCEDURE — 36415 COLL VENOUS BLD VENIPUNCTURE: CPT

## 2019-08-05 PROCEDURE — 84145 PROCALCITONIN (PCT): CPT

## 2019-08-05 PROCEDURE — 80074 ACUTE HEPATITIS PANEL: CPT

## 2019-08-05 PROCEDURE — S4991 NICOTINE PATCH NONLEGEND: HCPCS | Performed by: HOSPITALIST

## 2019-08-05 PROCEDURE — 21400001 HC TELEMETRY ROOM

## 2019-08-05 PROCEDURE — 83036 HEMOGLOBIN GLYCOSYLATED A1C: CPT

## 2019-08-05 PROCEDURE — 99222 PR INITIAL HOSPITAL CARE,LEVL II: ICD-10-PCS | Mod: 25,,, | Performed by: INTERNAL MEDICINE

## 2019-08-05 RX ORDER — ACETAMINOPHEN 325 MG/1
650 TABLET ORAL EVERY 8 HOURS PRN
Status: DISCONTINUED | OUTPATIENT
Start: 2019-08-05 | End: 2019-08-06 | Stop reason: HOSPADM

## 2019-08-05 RX ORDER — HYDRALAZINE HYDROCHLORIDE 20 MG/ML
10 INJECTION INTRAMUSCULAR; INTRAVENOUS EVERY 8 HOURS PRN
Status: DISCONTINUED | OUTPATIENT
Start: 2019-08-05 | End: 2019-08-06 | Stop reason: HOSPADM

## 2019-08-05 RX ORDER — METOPROLOL SUCCINATE 50 MG/1
100 TABLET, EXTENDED RELEASE ORAL DAILY
Status: DISCONTINUED | OUTPATIENT
Start: 2019-08-05 | End: 2019-08-05

## 2019-08-05 RX ORDER — FAMOTIDINE 20 MG/1
20 TABLET, FILM COATED ORAL EVERY OTHER DAY
Status: DISCONTINUED | OUTPATIENT
Start: 2019-08-05 | End: 2019-08-06 | Stop reason: HOSPADM

## 2019-08-05 RX ORDER — BENZONATATE 100 MG/1
200 CAPSULE ORAL 3 TIMES DAILY
Status: DISCONTINUED | OUTPATIENT
Start: 2019-08-05 | End: 2019-08-06 | Stop reason: HOSPADM

## 2019-08-05 RX ORDER — HYDRALAZINE HYDROCHLORIDE 25 MG/1
75 TABLET, FILM COATED ORAL 3 TIMES DAILY
Status: DISCONTINUED | OUTPATIENT
Start: 2019-08-05 | End: 2019-08-06 | Stop reason: HOSPADM

## 2019-08-05 RX ORDER — GUAIFENESIN/DEXTROMETHORPHAN 100-10MG/5
10 SYRUP ORAL EVERY 6 HOURS
Status: DISCONTINUED | OUTPATIENT
Start: 2019-08-05 | End: 2019-08-06 | Stop reason: HOSPADM

## 2019-08-05 RX ORDER — FAMOTIDINE 20 MG/1
20 TABLET, FILM COATED ORAL 2 TIMES DAILY
Status: DISCONTINUED | OUTPATIENT
Start: 2019-08-05 | End: 2019-08-05 | Stop reason: DRUGHIGH

## 2019-08-05 RX ORDER — AMLODIPINE BESYLATE 5 MG/1
10 TABLET ORAL DAILY
Status: DISCONTINUED | OUTPATIENT
Start: 2019-08-05 | End: 2019-08-05

## 2019-08-05 RX ORDER — IBUPROFEN 200 MG
1 TABLET ORAL DAILY
Status: DISCONTINUED | OUTPATIENT
Start: 2019-08-05 | End: 2019-08-06 | Stop reason: HOSPADM

## 2019-08-05 RX ORDER — METOPROLOL SUCCINATE 50 MG/1
100 TABLET, EXTENDED RELEASE ORAL DAILY
Status: DISCONTINUED | OUTPATIENT
Start: 2019-08-05 | End: 2019-08-06 | Stop reason: HOSPADM

## 2019-08-05 RX ORDER — SODIUM CHLORIDE 0.9 % (FLUSH) 0.9 %
10 SYRINGE (ML) INJECTION
Status: DISCONTINUED | OUTPATIENT
Start: 2019-08-05 | End: 2019-08-06 | Stop reason: HOSPADM

## 2019-08-05 RX ORDER — AMOXICILLIN 250 MG
1 CAPSULE ORAL 2 TIMES DAILY
Status: DISCONTINUED | OUTPATIENT
Start: 2019-08-05 | End: 2019-08-06 | Stop reason: HOSPADM

## 2019-08-05 RX ORDER — ONDANSETRON 2 MG/ML
4 INJECTION INTRAMUSCULAR; INTRAVENOUS EVERY 8 HOURS PRN
Status: DISCONTINUED | OUTPATIENT
Start: 2019-08-05 | End: 2019-08-05

## 2019-08-05 RX ORDER — HEPARIN SODIUM 5000 [USP'U]/ML
5000 INJECTION, SOLUTION INTRAVENOUS; SUBCUTANEOUS EVERY 8 HOURS
Status: DISCONTINUED | OUTPATIENT
Start: 2019-08-05 | End: 2019-08-06 | Stop reason: HOSPADM

## 2019-08-05 RX ORDER — HYDROXYZINE HYDROCHLORIDE 25 MG/1
25 TABLET, FILM COATED ORAL ONCE
Status: COMPLETED | OUTPATIENT
Start: 2019-08-05 | End: 2019-08-05

## 2019-08-05 RX ORDER — ONDANSETRON 2 MG/ML
8 INJECTION INTRAMUSCULAR; INTRAVENOUS EVERY 8 HOURS PRN
Status: DISCONTINUED | OUTPATIENT
Start: 2019-08-05 | End: 2019-08-06 | Stop reason: HOSPADM

## 2019-08-05 RX ORDER — IPRATROPIUM BROMIDE AND ALBUTEROL SULFATE 2.5; .5 MG/3ML; MG/3ML
3 SOLUTION RESPIRATORY (INHALATION) EVERY 6 HOURS
Status: DISCONTINUED | OUTPATIENT
Start: 2019-08-05 | End: 2019-08-06 | Stop reason: HOSPADM

## 2019-08-05 RX ORDER — HYDROXYZINE PAMOATE 25 MG/1
25 CAPSULE ORAL EVERY 6 HOURS PRN
Status: DISCONTINUED | OUTPATIENT
Start: 2019-08-05 | End: 2019-08-06 | Stop reason: HOSPADM

## 2019-08-05 RX ORDER — AMLODIPINE BESYLATE 5 MG/1
10 TABLET ORAL DAILY
Status: DISCONTINUED | OUTPATIENT
Start: 2019-08-05 | End: 2019-08-06 | Stop reason: HOSPADM

## 2019-08-05 RX ORDER — METOPROLOL TARTRATE 1 MG/ML
5 INJECTION, SOLUTION INTRAVENOUS EVERY 5 MIN PRN
Status: DISCONTINUED | OUTPATIENT
Start: 2019-08-05 | End: 2019-08-06 | Stop reason: HOSPADM

## 2019-08-05 RX ADMIN — GUAIFENESIN AND DEXTROMETHORPHAN 10 ML: 100; 10 SYRUP ORAL at 06:08

## 2019-08-05 RX ADMIN — IPRATROPIUM BROMIDE AND ALBUTEROL SULFATE 3 ML: .5; 3 SOLUTION RESPIRATORY (INHALATION) at 08:08

## 2019-08-05 RX ADMIN — HYDROXYZINE HYDROCHLORIDE 25 MG: 25 TABLET, FILM COATED ORAL at 11:08

## 2019-08-05 RX ADMIN — FAMOTIDINE 20 MG: 20 TABLET ORAL at 08:08

## 2019-08-05 RX ADMIN — HYDRALAZINE HYDROCHLORIDE 75 MG: 25 TABLET ORAL at 08:08

## 2019-08-05 RX ADMIN — HYDRALAZINE HYDROCHLORIDE 10 MG: 20 INJECTION INTRAMUSCULAR; INTRAVENOUS at 10:08

## 2019-08-05 RX ADMIN — HEPARIN SODIUM 5000 UNITS: 5000 INJECTION, SOLUTION INTRAVENOUS; SUBCUTANEOUS at 08:08

## 2019-08-05 RX ADMIN — BENZONATATE 200 MG: 100 CAPSULE ORAL at 08:08

## 2019-08-05 RX ADMIN — GUAIFENESIN AND DEXTROMETHORPHAN 10 ML: 100; 10 SYRUP ORAL at 11:08

## 2019-08-05 RX ADMIN — BENZONATATE 200 MG: 100 CAPSULE ORAL at 06:08

## 2019-08-05 RX ADMIN — PIPERACILLIN AND TAZOBACTAM 4.5 G: 4; .5 INJECTION, POWDER, LYOPHILIZED, FOR SOLUTION INTRAVENOUS; PARENTERAL at 08:08

## 2019-08-05 RX ADMIN — SENNOSIDES, DOCUSATE SODIUM 1 TABLET: 50; 8.6 TABLET, FILM COATED ORAL at 12:08

## 2019-08-05 RX ADMIN — METOPROLOL TARTRATE 5 MG: 5 INJECTION INTRAVENOUS at 06:08

## 2019-08-05 RX ADMIN — GUAIFENESIN AND DEXTROMETHORPHAN 10 ML: 100; 10 SYRUP ORAL at 05:08

## 2019-08-05 RX ADMIN — PIPERACILLIN AND TAZOBACTAM 4.5 G: 4; .5 INJECTION, POWDER, LYOPHILIZED, FOR SOLUTION INTRAVENOUS; PARENTERAL at 09:08

## 2019-08-05 RX ADMIN — HYDRALAZINE HYDROCHLORIDE 10 MG: 20 INJECTION INTRAMUSCULAR; INTRAVENOUS at 11:08

## 2019-08-05 RX ADMIN — SENNOSIDES, DOCUSATE SODIUM 1 TABLET: 50; 8.6 TABLET, FILM COATED ORAL at 08:08

## 2019-08-05 RX ADMIN — ACETAMINOPHEN 650 MG: 325 TABLET, FILM COATED ORAL at 11:08

## 2019-08-05 RX ADMIN — HEPARIN SODIUM 5000 UNITS: 5000 INJECTION, SOLUTION INTRAVENOUS; SUBCUTANEOUS at 03:08

## 2019-08-05 RX ADMIN — HYDROXYZINE PAMOATE 25 MG: 25 CAPSULE ORAL at 05:08

## 2019-08-05 RX ADMIN — BENZONATATE 200 MG: 100 CAPSULE ORAL at 03:08

## 2019-08-05 RX ADMIN — METOPROLOL SUCCINATE 100 MG: 50 TABLET, EXTENDED RELEASE ORAL at 08:08

## 2019-08-05 RX ADMIN — HYDRALAZINE HYDROCHLORIDE 75 MG: 25 TABLET ORAL at 03:08

## 2019-08-05 RX ADMIN — NICOTINE 1 PATCH: 21 PATCH, EXTENDED RELEASE TRANSDERMAL at 08:08

## 2019-08-05 RX ADMIN — AMLODIPINE BESYLATE 10 MG: 5 TABLET ORAL at 08:08

## 2019-08-05 RX ADMIN — HEPARIN SODIUM 5000 UNITS: 5000 INJECTION, SOLUTION INTRAVENOUS; SUBCUTANEOUS at 06:08

## 2019-08-05 RX ADMIN — EPOETIN ALFA-EPBX 10000 UNITS: 10000 INJECTION, SOLUTION INTRAVENOUS; SUBCUTANEOUS at 03:08

## 2019-08-05 NOTE — HPI
April Vasquez is a 63 y.o. female that (in part)  has a past medical history of ESRD (end stage renal disease) on dialysis, Gallstones, Hypertension, Renal disorder, and Tobacco abuse.  has a past surgical history that includes Cholecystectomy (2016); AVF (2017); Permacath (Right, 2017); and Tubal ligation. Presents to [facility] Emergency Department complaining of shortness of breath head worsened with exertion and ambulation.  Denies hemoptysis.  Associated fever, chills, cough, and chest tightness.  Minimal relief given with supplemental oxygen in the ED.  Patient noted to be markedly hypertensive with systolic blood pressure greater than 200.  She missed dialysis.  Denies recent travel or sick contacts.    In the emergency department chest x-ray and routine laboratory studies obtain.  Chest x-ray demonstrated evidence of pneumonia versus pulmonary edema.  CT of the chest performed which demonstrated diffuse ground-glass attenuation and intralobar septal thickening seen throughout the lungs suggestive for pulmonary edema with small bilateral pleural effusions.  Also multifocal areas of patchy consolidation or atelectasis seen within the lung bases as well as the right middle lobe and lingula.  Cultures were obtained broad-spectrum antibiotics were initiated.  Nephrology was consulted for urgent dialysis given the volume overload and pulmonary edema in the setting of hypertensive urgency.    Hospital medicine has been asked to admit for further evaluation and treatment.

## 2019-08-05 NOTE — H&P
[facility] Hospital Medicine  History & Physical    Patient Name: April Vasquez  MRN: [id number]  Admission Date: 08/05/2019  Attending Physician: Jamie Deleon MD, MPH      PCP:     Nasra Iraheta MD    CC:     Chief Complaint   Patient presents with    Shortness of Breath     Pt reports she is a dialysis pt and has had increasing WOB today. Pt last had dialysis on Friday.        HISTORY OF PRESENT ILLNESS:     April Vasquez is a 63 y.o. female that (in part)  has a past medical history of ESRD (end stage renal disease) on dialysis, Gallstones, Hypertension, Renal disorder, and Tobacco abuse.  has a past surgical history that includes Cholecystectomy (2016); AVF (2017); Permacath (Right, 2017); and Tubal ligation. Presents to [facility] Emergency Department complaining of shortness of breath head worsened with exertion and ambulation.  Denies hemoptysis.  Associated fever, chills, cough, and chest tightness.  Minimal relief given with supplemental oxygen in the ED.  Patient noted to be markedly hypertensive with systolic blood pressure greater than 200.  She missed dialysis.  Denies recent travel or sick contacts.    In the emergency department chest x-ray and routine laboratory studies obtain.  Chest x-ray demonstrated evidence of pneumonia versus pulmonary edema.  CT of the chest performed which demonstrated diffuse ground-glass attenuation and intralobar septal thickening seen throughout the lungs suggestive for pulmonary edema with small bilateral pleural effusions.  Also multifocal areas of patchy consolidation or atelectasis seen within the lung bases as well as the right middle lobe and lingula.  Cultures were obtained broad-spectrum antibiotics were initiated.  Nephrology was consulted for urgent dialysis given the volume overload and pulmonary edema in the setting of hypertensive urgency.    Hospital medicine has been asked to admit for further  evaluation and treatment.       REVIEW OF SYSTEMS:     -- Constitutional: No fever or chills.  -- Eyes: No visual changes, diplopia, pain, tearing, blind spots, or discharge.   -- Ears, nose, mouth, throat, and face: No congestion, sore throat, epistaxis, d/c, bleeding gums, neck stiffness masses, or dental issues.  -- Respiratory:  As above in HPI.  -- Cardiovascular:  As above in HPI.   -- Gastrointestinal: No vomiting, abdominal pain, hematemesis, melena, dyspepsia, or change in bowel habits.  -- Genitourinary:  No vaginal discharge. ESRD patient on dialysis.  -- Integument/breast: No rash, pruritis, pigmentation changes, dryness, or changes in hair  -- Hematologic/lymphatic: No easy bruising or lymphadenopathy.   -- Musculoskeletal:  Chronic arthralgias.  No acute arthralgias, acute myalgias, joint swelling, acute limitations of ROM, or acute muscular weakness.  -- Neurological: No seizures, headaches, incoordination, paraesthesias, ataxia, vertigo, or tremors.  -- Behavioral/Psych: No auditory or visual hallucinations, depression, or suicidal/homicidal ideations.  -- Endocrine: No heat or cold intolerance, polydipsia, or unintentional weight gain / loss.  -- Allergy/Immunologic: No recurrent infections or adverse reaction to food, insects, or difficulty breathing.      PAST MEDICAL / SURGICAL HISTORY:     Past Medical History:   Diagnosis Date    ESRD (end stage renal disease) on dialysis 2017    Gallstones     Hypertension     Renal disorder     dialysis    Tobacco abuse      Past Surgical History:   Procedure Laterality Date    AVF  2017    CHOLECYSTECTOMY  2016    CHOLECYSTECTOMY-LAPAROSCOPIC poss open N/A 11/22/2016    [facility] OR    ERCP N/A 11/11/2016    Per[facility] Vic Dahl MD at [facility] ENDO    Permacath Right 2017    TUBAL LIGATION           FAMILY HISTORY:     Family History   Problem Relation Age of Onset    Kidney disease Mother     Hypertension  Mother     Cervical cancer Mother     Diabetes Father     Drug abuse Paternal Grandmother     Breast cancer Neg Hx     Colon cancer Neg Hx     Ovarian cancer Neg Hx          SOCIAL HISTORY:     Social History     Socioeconomic History    Marital status:      Spouse name: Not on file    Number of children: 4    Years of education: Not on file    Highest education level: Not on file   Occupational History    Occupation: disable   Social Needs    Financial resource strain: Not on file    Food insecurity:     Worry: Not on file     Inability: Not on file    Transportation needs:     Medical: Not on file     Non-medical: Not on file   Tobacco Use    Smoking status: Current Every Day Smoker     Packs/day: 0.50     Years: 20.00     Pack years: 10.00    Smokeless tobacco: Never Used   Substance and Sexual Activity    Alcohol use: No    Drug use: No    Sexual activity: Yes     Partners: Male     Birth control/protection: Post-menopausal   Lifestyle    Physical activity:     Days per week: Not on file     Minutes per session: Not on file    Stress: Not on file   Relationships    Social connections:     Talks on phone: Not on file     Gets together: Not on file     Attends Gnosticist service: Not on file     Active member of club or organization: Not on file     Attends meetings of clubs or organizations: Not on file     Relationship status: Not on file   Other Topics Concern    Not on file   Social History Narrative    Not on file         ALLERGIES:       Review of patient's allergies indicates:  No Known Allergies        HOME MEDICATIONS:     Prior to Admission medications    Medication Sig Start Date End Date Taking? Authorizing Provider   amlodipine (NORVASC) 10 MG tablet Take 10 mg by mouth once daily.   Yes Historical Provider, MD   hydrALAZINE (APRESOLINE) 50 MG tablet Take 75 mg by mouth 3 (three) times daily.   Yes Historical Provider, MD   metoprolol succinate (TOPROL-XL) 100 MG 24 hr  tablet Take 100 mg by mouth. 7/9/19  Yes Historical Provider, MD          [facility] MEDICATIONS:     Scheduled Meds:    albuterol-ipratropium  3 mL Nebulization Q6H    amLODIPine  10 mg Oral Daily    benzonatate  200 mg Oral TID    dextromethorphan-guaifenesin  mg/5 ml  10 mL Oral Q6H    famotidine  20 mg Oral Every other day    heparin (porcine)  5,000 Units Subcutaneous Q8H    hydrALAZINE  75 mg Oral TID    metoprolol succinate  100 mg Oral Daily    nicotine  1 patch Transdermal Daily    piperacillin-tazobactam (ZOSYN) IVPB  4.5 g Intravenous Q12H    senna-docusate 8.6-50 mg  1 tablet Oral BID     Continuous Infusions:   PRN Meds: sodium chloride 0.9%, acetaminophen, acetaminophen, hydrALAZINE, metoprolol, ondansetron, sodium chloride 0.9%      PHYSICAL EXAM:     Wt Readings from Last 1 Encounters:   08/05/19 0130 59.2 kg (130 lb 8.2 oz)   08/05/19 0000 57.6 kg (126 lb 15.8 oz)   08/04/19 2032 58.5 kg (129 lb)     Body mass index is 22.4 kg/m².  Vitals:    08/05/19 0430 08/05/19 0445 08/05/19 0500 08/05/19 0515   BP: (!) 137/56 136/65 133/69 (!) 150/77   BP Location:       Patient Position:       Pulse: 61 64 (!) 57 (!) 56   Resp: 17 19 12 12   Temp:       TempSrc:       SpO2: 100% 100% 100% 100%   Weight:       Height:              -- General appearance: well developed. appears stated age   -- Head: normocephalic, atraumatic   -- Eyes: conjunctivae clear. Extraocular muscles intact  -- Nose: Nares normal. Septum midline.   -- Mouth/Throat: lips, mucosa, and tongue normal. no throat erythema.   -- Neck: supple, symmetrical, trachea midline, no JVD and thyroid not grossly enlarged, appears symmetric  -- Lungs:  Rales, rhonchi, and crackles to auscultation bilaterally. normal respiratory effort. No use of accessory muscles.   -- Chest wall: no tenderness. equal bilateral chest rise   -- Heart: regular rate and regular rhythm. S1, S2 normal.  no click, rub or gallop   -- Abdomen: soft, non-tender,  non-distended, non-tympanic; bowel sounds normal; no masses  -- Extremities: no cyanosis, clubbing or edema.   -- Pulses:  Bounding 2+ and symmetric   -- Skin: color normal, texture normal, turgor normal. No rashes or lesions.   -- Neurologic: Normal strength and tone. No focal numbness or weakness. CNII-XII intact. [place] coma scale: eyes open spontaneously-4, oriented & converses-5, obeys commands-6.      LABORATORY STUDIES:     Recent Results (from the past 36 hour(s))   CBC auto differential    Collection Time: 08/04/19  9:05 PM   Result Value Ref Range    WBC 16.35 (H) 3.90 - 12.70 K/uL    RBC 3.31 (L) 4.00 - 5.40 M/uL    Hemoglobin 9.5 (L) 12.0 - 16.0 g/dL    Hematocrit 30.1 (L) 37.0 - 48.5 %    Mean Corpuscular Volume 91 82 - 98 fL    Mean Corpuscular Hemoglobin 28.7 27.0 - 31.0 pg    Mean Corpuscular Hemoglobin Conc 31.6 (L) 32.0 - 36.0 g/dL    RDW 16.1 (H) 11.5 - 14.5 %    Platelets 287 150 - 350 K/uL    MPV 11.1 9.2 - 12.9 fL    Gran # (ANC) 13.3 (H) 1.8 - 7.7 K/uL    Lymph # 2.1 1.0 - 4.8 K/uL    Mono # 0.6 0.3 - 1.0 K/uL    Eos # 0.4 0.0 - 0.5 K/uL    Baso # 0.04 0.00 - 0.20 K/uL    Gran% 81.9 (H) 38.0 - 73.0 %    Lymph% 12.8 (L) 18.0 - 48.0 %    Mono% 3.4 (L) 4.0 - 15.0 %    Eosinophil% 2.1 0.0 - 8.0 %    Basophil% 0.2 0.0 - 1.9 %    Differential Method Automated    Comprehensive metabolic panel    Collection Time: 08/04/19  9:05 PM   Result Value Ref Range    Sodium 139 136 - 145 mmol/L    Potassium 4.4 3.5 - 5.1 mmol/L    Chloride 97 95 - 110 mmol/L    CO2 25 23 - 29 mmol/L    Glucose 114 (H) 70 - 110 mg/dL    BUN, Bld 48 (H) 8 - 23 mg/dL    Creatinine 10.2 (H) 0.5 - 1.4 mg/dL    Calcium 9.7 8.7 - 10.5 mg/dL    Total Protein 7.6 6.0 - 8.4 g/dL    Albumin 3.7 3.5 - 5.2 g/dL    Total Bilirubin 0.4 0.1 - 1.0 mg/dL    Alkaline Phosphatase 134 55 - 135 U/L    AST 16 10 - 40 U/L    ALT 16 10 - 44 U/L    Anion Gap 17 (H) 8 - 16 mmol/L    eGFR if African American 4 (A) >60 mL/min/1.73 m^2    eGFR if non   4 (A) >60 mL/min/1.73 m^2   Troponin I    Collection Time: 08/04/19  9:05 PM   Result Value Ref Range    Troponin I 0.015 0.000 - 0.026 ng/mL   ISTAT PROCEDURE    Collection Time: 08/04/19  9:23 PM   Result Value Ref Range    POC PH 7.414 7.35 - 7.45    POC PCO2 39.8 35 - 45 mmHg    POC PO2 87 80 - 100 mmHg    POC HCO3 25.5 24 - 28 mmol/L    POC BE 1 -2 to 2 mmol/L    POC SATURATED O2 97 95 - 100 %    POC TCO2 27 23 - 27 mmol/L    Sample ARTERIAL     Site RR     Allens Test Pass     DelSys NRB     Mode SPONT     Flow 15     Sp02 98    Lactic acid, plasma    Collection Time: 08/04/19  9:52 PM   Result Value Ref Range    Lactate (Lactic Acid) 0.7 0.5 - 2.2 mmol/L   Blood Culture #1 **CANNOT BE ORDERED STAT**    Collection Time: 08/04/19  9:52 PM   Result Value Ref Range    Blood Culture, Routine No Growth to date    Blood culture    Collection Time: 08/04/19 10:05 PM   Result Value Ref Range    Blood Culture, Routine No Growth to date    Troponin I    Collection Time: 08/05/19  3:15 AM   Result Value Ref Range    Troponin I 0.008 0.000 - 0.026 ng/mL   CBC auto differential    Collection Time: 08/05/19  3:15 AM   Result Value Ref Range    WBC 12.42 3.90 - 12.70 K/uL    RBC 3.09 (L) 4.00 - 5.40 M/uL    Hemoglobin 8.9 (L) 12.0 - 16.0 g/dL    Hematocrit 28.3 (L) 37.0 - 48.5 %    Mean Corpuscular Volume 92 82 - 98 fL    Mean Corpuscular Hemoglobin 28.8 27.0 - 31.0 pg    Mean Corpuscular Hemoglobin Conc 31.4 (L) 32.0 - 36.0 g/dL    RDW 15.9 (H) 11.5 - 14.5 %    Platelets 237 150 - 350 K/uL    MPV 10.9 9.2 - 12.9 fL    Gran # (ANC) 10.0 (H) 1.8 - 7.7 K/uL    Lymph # 1.7 1.0 - 4.8 K/uL    Mono # 0.6 0.3 - 1.0 K/uL    Eos # 0.1 0.0 - 0.5 K/uL    Baso # 0.04 0.00 - 0.20 K/uL    Gran% 80.5 (H) 38.0 - 73.0 %    Lymph% 13.6 (L) 18.0 - 48.0 %    Mono% 4.6 4.0 - 15.0 %    Eosinophil% 1.0 0.0 - 8.0 %    Basophil% 0.3 0.0 - 1.9 %    Differential Method Automated    Comprehensive metabolic panel    Collection Time:  08/05/19  3:15 AM   Result Value Ref Range    Sodium 142 136 - 145 mmol/L    Potassium 4.0 3.5 - 5.1 mmol/L    Chloride 98 95 - 110 mmol/L    CO2 31 (H) 23 - 29 mmol/L    Glucose 84 70 - 110 mg/dL    BUN, Bld 23 8 - 23 mg/dL    Creatinine 5.0 (H) 0.5 - 1.4 mg/dL    Calcium 8.9 8.7 - 10.5 mg/dL    Total Protein 6.7 6.0 - 8.4 g/dL    Albumin 3.6 3.5 - 5.2 g/dL    Total Bilirubin 0.5 0.1 - 1.0 mg/dL    Alkaline Phosphatase 131 55 - 135 U/L    AST 16 10 - 40 U/L    ALT 12 10 - 44 U/L    Anion Gap 13 8 - 16 mmol/L    eGFR if African American 10 (A) >60 mL/min/1.73 m^2    eGFR if non African American 9 (A) >60 mL/min/1.73 m^2       Lab Results   Component Value Date    INR 1.0 06/07/2017    INR 1.2 11/17/2016    INR 0.9 11/09/2016     Lab Results   Component Value Date    HGBA1C 5.2 06/07/2017     No results for input(s): POCTGLUCOSE in the last 72 hours.        MICROBIOLOGY DATA:     Urine Culture, Routine   Date Value Ref Range Status   11/10/2016 MARIAM GLABRATA  50,000 - 99,999 cfu/ml    Final   09/18/2016 MARIAM GLABRATA  > 100,000 cfu/ml    Final       Microbiology x 7d:   Microbiology Results (last 7 days)     Procedure Component Value Units Date/Time    Blood Culture #1 **CANNOT BE ORDERED STAT** [[id number]] Collected:  08/04/19 2152    Order Status:  Completed Specimen:  Blood from Peripheral, Antecubital, Right Updated:  08/05/19 0512     Blood Culture, Routine No Growth to date    Blood culture [[id number]] Collected:  08/04/19 2205    Order Status:  Completed Specimen:  Blood from Peripheral, Hand, Right Updated:  08/05/19 0512     Blood Culture, Routine No Growth to date    Culture, Respiratory with Gram Stain [[id number]]     Order Status:  No result Specimen:  Respiratory from Sputum, Expectorated             IMAGING:     Imaging Results          CT Chest Without Contrast (Final result)  Result time 08/04/19 22:43:48    Final result by Jimmy Shabazz MD (08/04/19 22:43:48)                 Impression:       1. Diffuse ground-glass attenuation and interlobular septal thickening seen throughout the lungs suggestive for pulmonary edema with small bilateral pleural effusions.  2. Multifocal areas of patchy consolidation or atelectasis seen within the lung bases as well as the right middle lobe and lingula.  3. Stable left perifissural 7 mm nodule likely reflecting benign etiology.  4. Mild mediastinal lymphadenopathy of uncertain etiology or significance, possibly reactive in nature.      Electronically signed by: Jimmy Shabazz MD  Date:    08/04/2019  Time:    22:43             Narrative:    EXAMINATION:  CT CHEST WITHOUT CONTRAST    CLINICAL HISTORY:  Shortness of breath;    TECHNIQUE:  Low dose axial images, sagittal and coronal reformations were obtained from the thoracic inlet to the lung bases. Contrast was not administered.    COMPARISON:  CT chest from October 2017.    FINDINGS:  Structures at the base of the neck are unremarkable.  Aorta is non-aneurysmal.  The heart is normal in size without pericardial effusion.  Multiple prominent mediastinal lymph nodes are seen within large pretracheal lymph node visualized measuring 1.6 cm.  The esophagus is unremarkable along its course.    The trachea and bronchi are patent.  The lungs are symmetrically expanded.  There is diffuse ground-glass attenuation and interlobular septal thickening seen throughout the lungs.  Small bilateral pleural effusions are seen.  There is a stable 7 mm nodule seen along left major fissure.  Mild compressive atelectasis seen within the lung bases.  Additional small focal area of suspected atelectasis is seen within the lingula and right middle lobe.    The visualized abdominal structures are unremarkable.  Gallbladder has been removed.  Osseous structures demonstrate mild age-appropriate degenerative change.  Extrathoracic soft tissues are unremarkable.                               X-Ray Chest AP Portable (Final result)  Result time  08/04/19 21:03:29    Final result by Jimmy Shabazz MD (08/04/19 21:03:29)                 Impression:      Diffuse increased interstitial attenuation which may represent interstitial edema.    Focal opacity at the lateral aspect of the left mid lung zone.  Finding could represent aspiration or developing pneumonia in the right clinical setting.  Future radiographic follow-up is recommended to ensure resolution.      Electronically signed by: Jimmy Shabazz MD  Date:    08/04/2019  Time:    21:03             Narrative:    EXAMINATION:  XR CHEST AP PORTABLE    CLINICAL HISTORY:  CHF;    TECHNIQUE:  Single frontal view of the chest was performed.    COMPARISON:  None    FINDINGS:  Cardiac silhouette is normal in size.  Lungs are symmetrically expanded.  Diffuse increased interstitial attenuation is seen within the lungs.  Focal opacity is seen at the lateral aspect of the left mid lung zone.  No evidence of focal consolidative process, pneumothorax, or significant effusion.  No acute osseous abnormality identified.                                  CONSULTS:     IP CONSULT TO NEPHROLOGY  PHARMACY TO DOSE VANCOMYCIN CONSULT       ASSESSMENT & PLAN:     Primary Diagnosis:  Bilateral pneumonia    Active Hospital Problems    Diagnosis  POA    *Bilateral pneumonia [J18.9]  Yes     Priority: 1 - High     1. Diffuse ground-glass attenuation and interlobular septal thickening seen throughout the lungs suggestive for pulmonary edema with small bilateral pleural effusions.  2. Multifocal areas of patchy consolidation or atelectasis seen within the lung bases as well as the right middle lobe and lingula.  3. Stable left perifissural 7 mm nodule likely reflecting benign etiology.  4. Mild mediastinal lymphadenopathy of uncertain etiology or significance, possibly reactive in nature.      Essential hypertension [I10]  Yes    ESRD (end stage renal disease) on dialysis [N18.6, Z99.2]  Not Applicable    Hypertensive urgency  [I16.0]  Yes    Hypoxia [R09.02]  Yes    Tobacco abuse [Z72.0]  Yes      Resolved Hospital Problems   No resolved problems to display.       Respiratory distress secondary to bilateral pneumonia versus CHF with pulmonary edema  As evidence by history, physical exam, and CT imaging  Treating empirically for pneumonia as well as urgent dialysis  Blood cultures pending  Supplemental oxygen p.r.n.    End-stage renal disease on dialysis  · acute issues with significant volume overload and hypertensive urgency.  No electrolyte abnormality or acid-base abnormality at this time  · Will need urgent dialysis while inpatient  · Nephrology consulted    Hypertensive urgency with history of difficult to control hypertension   · Presents with a systolic blood pressure of approximately 225 - 240 mmHg.    · In order to decrease the risk of acute stroke, we will initiate blood pressure medications with a goal of a decrease of 30% in the next 24 hours.  · Thereafter, the goal while inpatient is a systolic blood pressure less than 160mmHg  · BP not in acceptable range at this time  · Initiate oral regimen as well as providing PRN IV medications  · Urgent dialysis needed as noted above  · May need to escalate to Cardene drip for tighter blood pressure control in the interim  · Continue current regimen    Tobacco abuse   · Chronic tobacco use  · Tobacco cessation counseling  · Nicotine patch offered; consider Wellbutrin or Chantix through PCP as an outpatient (will require closer monitoring)  · I discussed with the patient regarding the hazardous effects of smoking on increasing risk of heart attack and stroke, worsening lung functions, and increasing cancer risk.   Patient was urged to stop smoking now.  I also offered nicotine taper (such as nicotine patch and gum) to help ease the craving to smoke.            VTE Risk Mitigation (From admission, onward)        Ordered     heparin (porcine) injection 5,000 Units  Every 8 hours       08/05/19 0339     IP VTE LOW RISK PATIENT  Once      08/05/19 0013     Place KAM hose  Until discontinued      08/05/19 0013     Place sequential compression device  Until discontinued      08/05/19 0013            Adult PRN medications available   DVT prophylaxis given       DISPOSITION:     Will admit to the Hospital Medicine service for further evaluation and treatment.    Chart reviewed and updated where applicable.    High Risk Conditions:  Patient has a condition that poses threat to life and bodily function:  Acute Respiratory distress secondary to volume overload versus pneumonia      ===============================================================    Jamie Deleon MD, MPH  Department of Hospital Medicine   [facility]  [phone]  (7pm - 6am)          This note is dictated using [employer] voice recognition software.  There are word recognition mistakes that are occasionally missed on review.

## 2019-08-05 NOTE — NURSING
C/o severe LE cramping, , BFR decreased to 250, minimal UF, 500cc of NACL given as fluid challenge then after pain abolished , UFR adjusted. And BFR returned to prescription rate. Continue with plan, will monitor for changes and trends.

## 2019-08-05 NOTE — NURSING
Call out for STAT dialysis, Per report, pt has hx of ESRD , dialysis dependent, MWF with fresenius Castillo, States her EDW is 58kg, and that she is compliant with her dialysis treatments. ER Admit to ICU for hypertensive emergency and SOB, Left UE AVF easily cannulated with 15 ga dialysis angios x 2. Good blood return and easily flushed. 3.5 hour hd tx, for Dr Bush, Planned UF =3L net.

## 2019-08-05 NOTE — NURSING
Report called to [facility] TABARES, prior to transport patient was hypertensive and stated she was feeling very anxious.  Dr. Sergio Delacruz notified and patient given PRN hydralazine and a one time dose of hydroxyzine.  Patient states she is feeling much better now and request for transport put in.

## 2019-08-05 NOTE — PROGRESS NOTES
eICU Brief Admission Note       Briefly, 63 y.o. female with history of ESRD on HD, HTN, laparoscopic cholecystectomy, who presents with complaint of shortness of breath since this morning.  Associated signs and symptoms include intermittent fever and chills, cough, chest tightness/pressure that worsens with inhalation. Patient denies abdominal pain.  She denies history of cardiac issues.  Patient states having a similar episode about 3 weeks when she went [facility], but is unsure of what they told her.        Video assessment :    Vitals reviewed   Afebrile, stable vitals     LABs reviewed       Radiology reviewed   CT chest w/o contrast   1. Diffuse ground-glass attenuation and interlobular septal thickening seen throughout the lungs suggestive for pulmonary edema with small bilateral pleural effusions.  2. Multifocal areas of patchy consolidation or atelectasis seen within the lung bases as well as the right middle lobe and lingula.  3. Stable left perifissural 7 mm nodule likely reflecting benign etiology.  4. Mild mediastinal lymphadenopathy of uncertain etiology or significance, possibly reactive in nature.    Assessment / Plan :  # Acute hypoxic respiratory distress -- Fluid overload/CHF vs Pneumonia  # HTN urgency/emergency  # anemia   # ESRD on HD   - ABX : Zosyn + Vanco ; f/u cultures   - getting HD   - BP control         DVT Px : Heparin SQ   GI Px : PPI       Patient seen over video : Yes  Chart reviewed :Yes  Spoke with RN :Yes

## 2019-08-05 NOTE — ED PROVIDER NOTES
Encounter Date: 8/4/2019    SCRIBE #1 NOTE: I, Rolly Munoz, am scribing for, and in the presence of,  Jaren Berumen MD. I have scribed the following portions of the note - Other sections scribed: HPI, ROS, PE.       History     Chief Complaint   Patient presents with    Shortness of Breath     Pt reports she is a dialysis pt and has had increasing WOB today. Pt last had dialysis on Friday.      Time seen by provider: 9:00 PM    This is a 63 y.o. female with history of ESRD on HD, HTN, laparoscopic cholecystectomy, who presents with complaint of shortness of breath since this morning.  Associated signs and symptoms include intermittent fever and chills, cough, chest tightness/pressure that worsens with inhalation. Patient denies abdominal pain.  She denies history of cardiac issues.  Patient states having a similar episode about 3 weeks when she went [facility].        Review of patient's allergies indicates:  No Known Allergies  Past Medical History:   Diagnosis Date    ESRD (end stage renal disease) on dialysis 2017    Gallstones     Hypertension     Renal disorder     dialysis    Tobacco abuse      Past Surgical History:   Procedure Laterality Date    AVF  2017    CHOLECYSTECTOMY  2016    CHOLECYSTECTOMY-LAPAROSCOPIC poss open N/A 11/22/2016    [facility]formed [facility] at [facility] OR    ERCP N/A 11/11/2016    Performed by Vic Dahl MD at [facility] ENDO    Permacath Right 2017    TUBAL LIGATION       Family History   Problem Relation Age of Onset    Kidney disease Mother     Hypertension Mother     Cervical cancer Mother     Diabetes Father     Drug abuse Paternal Grandmother     Breast cancer Neg Hx     Colon cancer Neg Hx     Ovarian cancer Neg Hx      Social History     Tobacco Use    Smoking status: Current Every Day Smoker     Packs/day: 0.50     Years: 20.00     Pack years: 10.00    Smokeless tobacco: Never Used   Substance Use Topics    Alcohol use: No    Drug use: No      Review of Systems   Constitutional: Positive for chills and fever.   HENT: Negative for congestion and sore throat.    Eyes: Negative for photophobia.   Respiratory: Positive for cough, chest tightness and shortness of breath.    Cardiovascular: Negative for chest pain.   Gastrointestinal: Negative for abdominal pain and nausea.   Endocrine: Negative for polyuria.   Genitourinary: Negative for dysuria.   Musculoskeletal: Negative for back pain.   Skin: Negative for rash.   Allergic/Immunologic: Negative for food allergies.   Neurological: Negative for weakness.   Hematological: Does not bruise/bleed easily.   Psychiatric/Behavioral: Negative for confusion.       Physical Exam     Initial Vitals   BP Pulse Resp Temp SpO2   08/04/19 2032 08/04/19 2032 08/04/19 2032 08/04/19 2050 08/04/19 2032   (!) 221/94 94 20 98.1 °F (36.7 °C) (!) 86 %      MAP       --                Physical Exam    Nursing note and vitals reviewed.  Constitutional: She appears well-developed and well-nourished. She is not diaphoretic. She appears distressed.   Sitting upright.    HENT:   Head: Normocephalic and atraumatic.   Mouth/Throat: Oropharynx is clear and moist.   Eyes: EOM are normal. Pupils are equal, round, and reactive to light. No scleral icterus.   Neck: Normal range of motion. Neck supple.   Cardiovascular: Normal rate, regular rhythm, normal heart sounds and intact distal pulses. Exam reveals no friction rub.    No murmur heard.  Pulmonary/Chest: No stridor. She is in respiratory distress (mild respiratory distress).   Crackles present to bilateral bases, higher up on the left compared to the right.    Abdominal: Soft. Bowel sounds are normal. She exhibits no distension. There is no tenderness. There is no rebound and no guarding.   Musculoskeletal: Normal range of motion. She exhibits no edema or tenderness.   Dialysis catheter to the LUE. Left arm has a good palpable thrill.     No pitting edema to bilateral lower  extremities.    Neurological: She is alert and oriented to person, place, and time. She has normal strength. No cranial nerve deficit. GCS score is 15. GCS eye subscore is 4. GCS verbal subscore is 5. GCS motor subscore is 6.   Skin: Skin is warm and dry. No rash noted.   Psychiatric: She has a normal mood and affect. Her behavior is normal.         ED Course   Critical Care  Date/Time: 8/5/2019 3:27 AM  Performed by: Jaren Berumen MD  Authorized by: Angie Copeland MD   Direct patient critical care time: 30 minutes  Total critical care time (exclusive of procedural time) : 30 minutes  Critical care was necessary to treat or prevent imminent or life-threatening deterioration of the following conditions: respiratory failure.        Labs Reviewed   CBC W/ AUTO DIFFERENTIAL - Abnormal; Notable for the following components:       Result Value    WBC 16.35 (*)     RBC 3.31 (*)     Hemoglobin 9.5 (*)     Hematocrit 30.1 (*)     Mean Corpuscular Hemoglobin Conc 31.6 (*)     RDW 16.1 (*)     Gran # (ANC) 13.3 (*)     Gran% 81.9 (*)     Lymph% 12.8 (*)     Mono% 3.4 (*)     All other components within normal limits   COMPREHENSIVE METABOLIC PANEL - Abnormal; Notable for the following components:    Glucose 114 (*)     BUN, Bld 48 (*)     Creatinine 10.2 (*)     Anion Gap 17 (*)     eGFR if  4 (*)     eGFR if non  4 (*)     All other components within normal limits   CULTURE, BLOOD   CULTURE, BLOOD   TROPONIN I   LACTIC ACID, PLASMA   URINALYSIS, REFLEX TO URINE CULTURE   ISTAT PROCEDURE     EKG Readings: (Independently Interpreted)   Initial Reading: No STEMI. Rhythm: Normal Sinus Rhythm. Heart Rate: 85.       Imaging Results          CT Chest Without Contrast (Final result)  Result time 08/04/19 22:43:48    Final result by Jimmy Shabazz MD (08/04/19 22:43:48)                 Impression:      1. Diffuse ground-glass attenuation and interlobular septal thickening seen throughout  the lungs suggestive for pulmonary edema with small bilateral pleural effusions.  2. Multifocal areas of patchy consolidation or atelectasis seen within the lung bases as well as the right middle lobe and lingula.  3. Stable left perifissural 7 mm nodule likely reflecting benign etiology.  4. Mild mediastinal lymphadenopathy of uncertain etiology or significance, possibly reactive in nature.      Electronically signed by: Jimmy Shabazz MD  Date:    08/04/2019  Time:    22:43             Narrative:    EXAMINATION:  CT CHEST WITHOUT CONTRAST    CLINICAL HISTORY:  Shortness of breath;    TECHNIQUE:  Low dose axial images, sagittal and coronal reformations were obtained from the thoracic inlet to the lung bases. Contrast was not administered.    COMPARISON:  CT chest from October 2017.    FINDINGS:  Structures at the base of the neck are unremarkable.  Aorta is non-aneurysmal.  The heart is normal in size without pericardial effusion.  Multiple prominent mediastinal lymph nodes are seen within large pretracheal lymph node visualized measuring 1.6 cm.  The esophagus is unremarkable along its course.    The trachea and bronchi are patent.  The lungs are symmetrically expanded.  There is diffuse ground-glass attenuation and interlobular septal thickening seen throughout the lungs.  Small bilateral pleural effusions are seen.  There is a stable 7 mm nodule seen along left major fissure.  Mild compressive atelectasis seen within the lung bases.  Additional small focal area of suspected atelectasis is seen within the lingula and right middle lobe.    The visualized abdominal structures are unremarkable.  Gallbladder has been removed.  Osseous structures demonstrate mild age-appropriate degenerative change.  Extrathoracic soft tissues are unremarkable.                               X-Ray Chest AP Portable (Final result)  Result time 08/04/19 21:03:29    Final result by Jimmy Shabazz MD (08/04/19 21:03:29)                  Impression:      Diffuse increased interstitial attenuation which may represent interstitial edema.    Focal opacity at the lateral aspect of the left mid lung zone.  Finding could represent aspiration or developing pneumonia in the right clinical setting.  Future radiographic follow-up is recommended to ensure resolution.      Electronically signed by: Jimmy Shabazz MD  Date:    08/04/2019  Time:    21:03             Narrative:    EXAMINATION:  XR CHEST AP PORTABLE    CLINICAL HISTORY:  CHF;    TECHNIQUE:  Single frontal view of the chest was performed.    COMPARISON:  None    FINDINGS:  Cardiac silhouette is normal in size.  Lungs are symmetrically expanded.  Diffuse increased interstitial attenuation is seen within the lungs.  Focal opacity is seen at the lateral aspect of the left mid lung zone.  No evidence of focal consolidative process, pneumothorax, or significant effusion.  No acute osseous abnormality identified.                                 Medical Decision Making:   History:   Old Medical Records: I decided to obtain old medical records.  Initial Assessment:   62 yo female p/w CP, SOB, cough, chills. On exam, patient tachypnic. Satting 80% on room air. Speaking in full sentences. BP elevated, 220/100s. Lungs with bilateral crackles. DDx includes hypertensive emergency, volume overload, pneumonia. CXR with new consolidation as well as edema. Could be combination of PNA with volume overload. Symptoms improved with nitroglycerine. Discussed case with nephrology. Due for dilaysis tomorrow. Will emergently dialize tonight to help with respiratory status. Started on broad spectrum ABx, cultures obtained to treat PNA. Improved after medication, sleeping comfortably on admission to ICU.  Independently Interpreted Test(s):   I have ordered and independently interpreted EKG Reading(s) - see prior notes  Clinical Tests:   Lab Tests: Ordered and Reviewed  Radiological Study: Ordered and Reviewed  Medical Tests:  Ordered and Reviewed            Scribe Attestation:   Scribe #1: I performed the above scribed service and the documentation accurately describes the services I performed. I attest to the accuracy of the note.               Clinical Impression:       ICD-10-CM ICD-9-CM   1. Hypoxia R09.02 799.02   2. Shortness of breath R06.02 786.05   3. Healthcare-associated pneumonia J18.9 486   4. Hypertensive emergency I16.1 401.9         Disposition:   Disposition: Admitted  Condition: Fair          I, Jaren Berumen  , personally performed the services described in this documentation. All medical record entries made by the scribe were at my direction and in my presence.  I have reviewed the chart and agree that the record reflects my personal performance and is accurate and complete.                 Jaren Berumen MD  08/05/19 1304

## 2019-08-05 NOTE — NURSING
Patient arrived to floor in stable condition. Placed patient on the monitor. Visualized patient and assessed patient's overall condition and appearance. No complaints. NAD noted. Will continue to monitor.

## 2019-08-05 NOTE — CONSULTS
[facility]  Cardiology  Consult Note    Patient Name: April Vasquez  MRN: [id number]  Admission Date: 8/4/2019  Hospital Length of Stay: 1 days  Code Status: Full Code   Attending Provider: Angie Copeland MD   Consulting Provider: Jamie Hughse MD  Primary Care Physician: Nasra Iraheta MD  Principal Problem:Bilateral pneumonia    Patient information was obtained from patient and ER records.     Inpatient consult to Cardiology  Consult performed by: Jamie Hughes MD  Consult ordered by: Angie Copeland MD  Reason for consult: bradycardia        Subjective:     Chief Complaint:  SOB     HPI:   63 y.o. female that (in part)  has a past medical history of ESRD (end stage renal disease) on dialysis, Gallstones, Hypertension, Renal disorder, and Tobacco abuse.  has a past surgical history that includes Cholecystectomy (2016); AVF (2017); Permacath (Right, 2017); and Tubal ligation. Presents to [facility] Emergency Department complaining of shortness of breath head worsened with exertion and ambulation.  Denies hemoptysis.  Associated fever, chills, cough, and chest tightness.  Minimal relief given with supplemental oxygen in the ED.  Patient noted to be markedly hypertensive with systolic blood pressure greater than 200.  She missed dialysis.  Denies recent travel or sick contacts.  In the emergency department chest x-ray and routine laboratory studies obtain.  Chest x-ray demonstrated evidence of pneumonia versus pulmonary edema.  CT of the chest performed which demonstrated diffuse ground-glass attenuation and intralobar septal thickening seen throughout the lungs suggestive for pulmonary edema with small bilateral pleural effusions.  Also multifocal areas of patchy consolidation or atelectasis seen within the lung bases as well as the right middle lobe and lingula.  Cultures were obtained broad-spectrum antibiotics were initiated.  Nephrology was consulted  for urgent dialysis given the volume overload and pulmonary edema in the setting of hypertensive urgency.    Cardiology is being consulted for pauses noted on telemetry.  Available telemetry on the monitor was reviewed noting no evidence of any pauses and only sinus bradycardia.  There is no high-degree AV block.  Available telemetry strips were also reviewed also noting sinus bradycardia and sinus arrhythmia again without any evidence of high-degree AV block.  The patient denies any lightheadedness, dizziness, or syncope.  She was admitted with hypertensive emergency and chest pressure which has since resolved after dialysis.  She did have an echocardiogram this admission noting normal LV function.  Her troponins are negative and her EKG is essentially normal.    Past Medical History:   Diagnosis Date    ESRD (end stage renal disease) on dialysis 2017    Gallstones     Hypertension     Renal disorder     dialysis    Tobacco abuse        Past Surgical History:   Procedure Laterality Date    AVF  2017    CHOLECYSTECTOMY  2016    CHOLECYSTECTOMY-LAPAROSCOPIC poss open N/A 11/22/2016    [facility]    ERCP N/A 11/11/2016    Performed by Vic Dahl MD at [facility] ENDO    Permacath Right 2017    TUBAL LIGATION         Review of patient's allergies indicates:  No Known Allergies    No current facility-administered medications on file prior to encounter.      Current Outpatient Medications on File Prior to Encounter   Medication Sig    amlodipine (NORVASC) 10 MG tablet Take 10 mg by mouth once daily.    hydrALAZINE (APRESOLINE) 50 MG tablet Take 75 mg by mouth 3 (three) times daily.    metoprolol succinate (TOPROL-XL) 100 MG 24 hr tablet Take 100 mg by mouth.     Family History     Problem Relation (Age of Onset)    Cervical cancer Mother    Diabetes Father    Drug abuse Paternal Grandmother    Hypertension Mother    Kidney disease Mother        Tobacco Use    Smoking  status: Current Every Day Smoker     Packs/day: 0.50     Years: 20.00     Pack years: 10.00    Smokeless tobacco: Never Used   Substance and Sexual Activity    Alcohol use: No    Drug use: No    Sexual activity: Yes     Partners: Male     Birth control/protection: Post-menopausal     Review of Systems   Constitution: Negative for chills, diaphoresis, fever and malaise/fatigue.   HENT: Negative for nosebleeds.    Eyes: Negative for blurred vision and double vision.   Cardiovascular: Negative for chest pain, claudication, cyanosis, dyspnea on exertion, leg swelling, orthopnea, palpitations, paroxysmal nocturnal dyspnea and syncope.   Respiratory: Negative for cough, shortness of breath and wheezing.    Skin: Negative for dry skin and poor wound healing.   Musculoskeletal: Negative for back pain, joint swelling and myalgias.   Gastrointestinal: Negative for abdominal pain, nausea and vomiting.   Genitourinary: Negative for hematuria.   Neurological: Negative for dizziness, headaches, numbness, seizures and weakness.   Psychiatric/Behavioral: Negative for altered mental status and depression.     Objective:     Vital Signs (Most Recent):  Temp: 98.8 °F (37.1 °C) (08/05/19 1530)  Pulse: (!) 46 (08/05/19 1530)  Resp: 18 (08/05/19 1530)  BP: (!) 172/62 (08/05/19 1534)  SpO2: 97 % (08/05/19 1530) Vital Signs (24h Range):  Temp:  [97.6 °F (36.4 °C)-98.8 °F (37.1 °C)] 98.8 °F (37.1 °C)  Pulse:  [] 46  Resp:  [11-58] 18  SpO2:  [81 %-100 %] 97 %  BP: (103-239)/() 172/62     Weight: 57.3 kg (126 lb 4.9 oz)(post hd tx.)  Body mass index is 21.68 kg/m².    SpO2: 97 %  O2 Device (Oxygen Therapy): nasal cannula      Intake/Output Summary (Last 24 hours) at 8/5/2019 1706  Last data filed at 8/5/2019 1250  Gross per 24 hour   Intake 520 ml   Output --   Net 520 ml       Lines/Drains/Airways     Drain                 Hemodialysis AV Fistula 08/04/19 2249 Left upper arm less than 1 day          Peripheral Intravenous  Line                 Peripheral IV - Single Lumen 08/04/19 2108 18 G Right Forearm less than 1 day         Peripheral IV - Single Lumen 08/04/19 2210 20 G Right Hand less than 1 day                Physical Exam   Constitutional: She is oriented to person, place, and time. She appears well-developed and well-nourished. No distress.   HENT:   Head: Normocephalic and atraumatic.   Mouth/Throat: No oropharyngeal exudate.   Eyes: Pupils are equal, round, and reactive to light. Conjunctivae and EOM are normal. No scleral icterus.   Neck: Normal range of motion. Neck supple. No JVD present. No tracheal deviation present. No thyromegaly present.   Cardiovascular: Normal rate, regular rhythm, S1 normal and S2 normal. Exam reveals no gallop and no friction rub.   No murmur heard.  Pulmonary/Chest: Effort normal and breath sounds normal. No respiratory distress. She has no wheezes. She has no rales. She exhibits no tenderness.   Abdominal: Soft. She exhibits no distension.   Musculoskeletal: Normal range of motion. She exhibits no edema.   Neurological: She is alert and oriented to person, place, and time. No cranial nerve deficit.   Skin: Skin is warm and dry. She is not diaphoretic.   Psychiatric: She has a normal mood and affect. Her behavior is normal. Judgment normal.       Current Medications:   albuterol-ipratropium  3 mL Nebulization Q6H    amLODIPine  10 mg Oral Daily    benzonatate  200 mg Oral TID    dextromethorphan-guaifenesin  mg/5 ml  10 mL Oral Q6H    epoetin deepthi-ebpx (RETACRIT) injection  10,000 Units Subcutaneous Every Mon, Wed, Fri    famotidine  20 mg Oral Every other day    heparin (porcine)  5,000 Units Subcutaneous Q8H    hydrALAZINE  75 mg Oral TID    metoprolol succinate  100 mg Oral Daily    nicotine  1 patch Transdermal Daily    piperacillin-tazobactam (ZOSYN) IVPB  4.5 g Intravenous Q12H    senna-docusate 8.6-50 mg  1 tablet Oral BID       sodium chloride 0.9%, acetaminophen,  acetaminophen, hydrALAZINE, hydrOXYzine pamoate, metoprolol, ondansetron, sodium chloride 0.9%    Laboratory (all labs reviewed):  CBC:  Recent Labs   Lab 11/28/16  0432 02/04/17  1131 06/07/17  0710 08/04/19  2105 08/05/19  0315   WBC 13.51 H 11.97 13.28 H 16.35 H 12.42   Hemoglobin 7.7 L 13.8 12.2 9.5 L 8.9 L   Hematocrit 23.2 L 43.7 38.9 30.1 L 28.3 L   Platelets 349 260 367 H 287 237       CHEMISTRIES:  Recent Labs   Lab 11/11/16  0446  11/14/16  0514  11/26/16  0413 11/27/16  0541 11/28/16  0432  02/05/17  1054 02/06/17  0301 06/07/17  0710 08/04/19  2105 08/05/19  0315   Glucose 72   < > 86   < > 97 88 86   < > 84 69 L 80 114 H 84   Sodium 142   < > 136   < > 141 139 140   < > 135 L 136 139 139 142   Potassium 2.9 L   < > 3.0 L   < > 3.1 L 3.5 3.4 L   < > 3.4 L 3.3 L 3.8 4.4 4.0   BUN, Bld 54 H   < > 63 H   < > 12 17 23 H   < > 23 H 31 H 43 H 48 H 23   Creatinine 7.2 H   < > 7.6 H   < > 3.4 H 4.4 H 5.2 H   < > 3.5 H 4.2 H 5.8 H 10.2 H 5.0 H   eGFR if  7 A   < > 6 A   < > 16 A 12 A 10 A   < > 16 A 13 A 8.5 A 4 A 10 A   eGFR if non African American 6 A   < > 5 A   < > 14 A 10 A 8 A   < > 14 A 11 A 7.4 A 4 A 9 A   Calcium 8.4 L   < > 7.7 L   < > 8.3 L 7.9 L 7.5 L   < > 7.8 L 7.8 L 9.8 9.7 8.9   Magnesium 1.7  --  1.9  --  1.4 L 1.8  1.8 1.7  --   --   --   --   --   --     < > = values in this interval not displayed.       CARDIAC BIOMARKERS:  Recent Labs   Lab 09/18/16  1939 11/09/16  2149 08/04/19  2105 08/05/19  0315 08/05/19  0859   Troponin I <0.006 0.020 0.015 0.008 0.011       COAGS:  Recent Labs   Lab 09/20/16  1118 11/09/16  2149 11/17/16  0534 06/07/17  0710   INR 1.1 0.9 1.2 1.0       LIPIDS/LFTS:  Recent Labs   Lab 11/28/16  0432 02/04/17  1131 06/07/17  0710 08/04/19  2105 08/05/19  0315   Cholesterol  --   --  142  --   --    Triglycerides  --   --  128  --   --    HDL  --   --  42  --   --    LDL Cholesterol  --   --  74.4  --   --    Non-HDL Cholesterol  --   --  100  --   --     AST 32 21 21 16 16   ALT 27 12 21 16 12       BNP:  Recent Labs   Lab 11/09/16  2149 02/04/17  1131   BNP 11 2,762 H       TSH:  Recent Labs   Lab 08/05/19  0315   TSH 1.838       Free T4:  Recent Labs   Lab 08/05/19  0315   Free T4 0.94       Diagnostic Results:  ECG (personally reviewed and interpreted tracing(s)):  8/4/19 2105 SR 85    Chest X-Ray (personally reviewed and interpreted image(s)): 8/4/19 ?ILD vs CHF    Echo: 8/5/19 (images personally reviewed and interpreted)  · Normal left ventricular systolic function. The estimated ejection fraction is 55%  · No wall motion abnormalities.  · Mild concentric left ventricular hypertrophy.  · Grade II (moderate) left ventricular diastolic dysfunction consistent with pseudonormalization.  · Normal right ventricular systolic function.  · Moderate mitral regurgitation.  · The estimated PA systolic pressure is 29 mm Hg    Stress Test: 8/3/17 L MPI  Nuclear Quantitative Functional Analysis:   LVEF: >= 70 % (normal is >= 51%)  LVED Volume: 89 ml (normal is <=171)  LVES Volume: 21 ml (normal is <=70)  Impression: NORMAL MYOCARDIAL PERFUSION  1. The perfusion scan is free of evidence for myocardial ischemia or injury.   2. Resting wall motion is physiologic.   3. Resting LV function is normal.  (normal is >= 51%)  4. The ventricular volumes are normal at rest and stress.   5. The extracardiac distribution of radioactivity is normal.   6. There was no previous study available to compare.        Assessment and Plan:     * Bilateral pneumonia  Mgmt per IM    Hypertensive urgency  Mgmt per IM/Neph  Cont med rx HTN and titrate as BP will allow    ESRD (end stage renal disease) on dialysis  Per IM/neph    Bradycardia  No evidence of pauses or high deg AVB on available tele/strips.  Seems to be likely sinus arrhythmia and sinus bradycardia.  No PPM indicated.  No further inpat cardiac testing planned.        VTE Risk Mitigation (From admission, onward)        Ordered     heparin  (porcine) injection 5,000 Units  Every 8 hours      08/05/19 0339     IP VTE LOW RISK PATIENT  Once      08/05/19 0013     Place KAM hose  Until discontinued      08/05/19 0013     Place sequential compression device  Until discontinued      08/05/19 0013          Thank you for your consult. I will sign off. Please contact us if you have any additional questions.    Jamie Hughes MD  Cardiology   [facility]

## 2019-08-05 NOTE — PLAN OF CARE
Problem: Adult Inpatient Plan of Care  Goal: Patient-Specific Goal (Individualization)  Outcome: Ongoing (interventions implemented as appropriate)  Deep breathing

## 2019-08-05 NOTE — CARE UPDATE
[facility]  ICU Multidisciplinary Bedside Rounds     UPDATE     Date: 8/5/2019      Plan of care reviewed with the following, Nurse, Charge Nurse, Physician, Resp. Therapist and .       Needs/ Goals for the day: Transfer to floor, BP control, anxiety control      Level of Care: OK to Transfer

## 2019-08-05 NOTE — CONSULTS
Renal Consult    Date of Admission:  8/4/2019  8:34 PM        Chief Complaint:   Chief Complaint   Patient presents with    Shortness of Breath     Pt reports she is a dialysis pt and has had increasing WOB today. Pt last had dialysis on Friday.        HPI: 63 y.o. female known to me and with a a past medical history significant for:  ESRD on dialysis (MWF) last HD 8/2/19 Gallstones, Hypertension, and Tobacco abuse.    Pte. presented to [facility] Emergency Department last evening complaining of shortness of breath worsened with exertion and ambulation; pte. Denied associated fever, chills, cough or chest tightness.    In the ED Pte. noted to be markedly hypertensive with systolic blood pressure greater than 200; since there was minimal relief in symptoms with supplemental oxygen I was Consulted for urgent Dialysis.         PMH:  Past Medical History:   Diagnosis Date    ESRD (end stage renal disease) on dialysis 2017    Gallstones     Hypertension     Renal disorder     dialysis    Tobacco abuse        PSH:  Past Surgical History:   Procedure Laterality Date    AVF  2017    CHOLECYSTECTOMY  2016    CHOLECYSTECTOMY-LAPAROSCOPIC poss open N/A 11/22/2016    [facility] by [facility] at [facility] OR    ERCP N/A 11/11/2016    Performed by Vic Dahl MD at [facility] ENDO    Permacath Right 2017    TUBAL LIGATION         Allergies:  Review of patient's allergies indicates:  No Known Allergies    No current facility-administered medications on file prior to encounter.      Current Outpatient Medications on File Prior to Encounter   Medication Sig Dispense Refill    amlodipine (NORVASC) 10 MG tablet Take 10 mg by mouth once daily.      hydrALAZINE (APRESOLINE) 50 MG tablet Take 75 mg by mouth 3 (three) times daily.      metoprolol succinate (TOPROL-XL) 100 MG 24 hr tablet Take 100 mg by mouth.         Medications:  Current Facility-Administered  Medications   Medication Dose Route Frequency Provider Last Rate Last Dose    0.9%  NaCl infusion   Intravenous PRN Jamie Deleon MD        acetaminophen tablet 650 mg  650 mg Oral Q8H PRN Jamie Deleon MD   650 mg at 08/05/19 1113    acetaminophen tablet 650 mg  650 mg Oral Q8H PRN Jamie Deleon MD        albuterol-ipratropium 2.5 mg-0.5 mg/3 mL nebulizer solution 3 mL  3 mL Nebulization Q6H Jamie Deleon MD   3 mL at 08/05/19 0811    amLODIPine tablet 10 mg  10 mg Oral Daily Jamie Deleon MD   10 mg at 08/05/19 0857    benzonatate capsule 200 mg  200 mg Oral TID Jamie Deleon MD   200 mg at 08/05/19 0858    dextromethorphan-guaifenesin  mg/5 ml liquid 10 mL  10 mL Oral Q6H Jamie Deleon MD   10 mL at 08/05/19 1113    epoetin deepthi-epbx injection 10,000 Units  10,000 Units Subcutaneous Every Mon, Wed, Fri Tristan Oliverio Bush MD        famotidine tablet 20 mg  20 mg Oral Every other day Jamie Deleon MD   20 mg at 08/05/19 0858    heparin (porcine) injection 5,000 Units  5,000 Units Subcutaneous Q8H Jamie Deleon MD   5,000 Units at 08/05/19 0640    hydrALAZINE injection 10 mg  10 mg Intravenous Q8H PRN Jamie Deleon MD   10 mg at 08/05/19 1059    hydrALAZINE tablet 75 mg  75 mg Oral TID Jamie Deleon MD   75 mg at 08/05/19 0857    metoprolol injection 5 mg  5 mg Intravenous Q5 Min PRN Jamie Deleon MD   5 mg at 08/05/19 0638    metoprolol succinate (TOPROL-XL) 24 hr tablet 100 mg  100 mg Oral Daily Jamie Deleon MD   100 mg at 08/05/19 0858    nicotine 21 mg/24 hr 1 patch  1 patch Transdermal Daily Jamie Deleon MD   1 patch at 08/05/19 0857    ondansetron injection 8 mg  8 mg Intravenous Q8H PRN Jamie Deleon MD        piperacillin-tazobactam 4.5 g in sodium chloride 0.9% 100 mL IVPB (ready to mix system)  4.5 g Intravenous Q12H Jamie Deleon MD 25 mL/hr at 08/05/19 0904 4.5 g at 08/05/19 0904     senna-docusate 8.6-50 mg per tablet 1 tablet  1 tablet Oral BID Jamie Deleon MD   1 tablet at 08/05/19 0858    sodium chloride 0.9% flush 10 mL  10 mL Intravenous PRN Jamie Deleon MD           FamHx:  Family History   Problem Relation Age of Onset    Kidney disease Mother     Hypertension Mother     Cervical cancer Mother     Diabetes Father     Drug abuse Paternal Grandmother     Breast cancer Neg Hx     Colon cancer Neg Hx     Ovarian cancer Neg Hx        SocHx:  Social History     Socioeconomic History    Marital status:      Spouse name: Not on file    Number of children: 4    Years of education: Not on file    Highest education level: Not on file   Occupational History    Occupation: disable   Social Needs    Financial resource strain: Not on file    Food insecurity:     Worry: Not on file     Inability: Not on file    Transportation needs:     Medical: Not on file     Non-medical: Not on file   Tobacco Use    Smoking status: Current Every Day Smoker     Packs/day: 0.50     Years: 20.00     Pack years: 10.00    Smokeless tobacco: Never Used   Substance and Sexual Activity    Alcohol use: No    Drug use: No    Sexual activity: Yes     Partners: Male     Birth control/protection: Post-menopausal   Lifestyle    Physical activity:     Days per week: Not on file     Minutes per session: Not on file    Stress: Not on file   Relationships    Social connections:     Talks on phone: Not on file     Gets together: Not on file     Attends Judaism service: Not on file     Active member of club or organization: Not on file     Attends meetings of clubs or organizations: Not on file     Relationship status: Not on file   Other Topics Concern    Not on file   Social History Narrative    Not on file           Review of Systems:   No further dyspnea        Physical Exam:  Vitals:   Vitals:    08/05/19 1215   BP:    Pulse: 64   Resp: (!) 21   Temp:        I/O last 3 completed  shifts:  In: 100 [IV Piggyback:100]  Out: -   I/O this shift:  In: 300 [P.O.:200; IV Piggyback:100]  Out: -     General: A&Ox3. No apparent distress.   Neck: supple no JVD  Lungs: CTA bilateral  Heart: RRR, S1-S2  Abdomen: Soft. NT.  : n/a  Ext: No edema  Skin: The skin is warm and dry. No jaundice.  Neurologic: Awake and alert, oriented x 3, no focal deficits      Laboratories:    Recent Labs   Lab 08/05/19  0315   WBC 12.42   RBC 3.09*   HGB 8.9*   HCT 28.3*      MCV 92   MCH 28.8   MCHC 31.4*       Recent Labs   Lab 08/05/19  0315   CALCIUM 8.9   PROT 6.7      K 4.0   CO2 31*   CL 98   BUN 23   CREATININE 5.0*   ALKPHOS 131   ALT 12   AST 16   BILITOT 0.5       No results for input(s): COLORU, CLARITYU, SPECGRAV, PHUR, PROTEINUA, GLUCOSEU, BLOODU, WBCU, RBCU, BACTERIA, MUCUS in the last 24 hours.    Invalid input(s):  BILIRUBINCON    Microbiology Results (last 7 days)     Procedure Component Value Units Date/Time    Blood Culture #1 **CANNOT BE ORDERED STAT** [[id number]] Collected:  08/04/19 2152    Order Status:  Completed Specimen:  Blood from Peripheral, Antecubital, Right Updated:  08/05/19 0512     Blood Culture, Routine No Growth to date    Blood culture [[id number]] Collected:  08/04/19 2205    Order Status:  Completed Specimen:  Blood from Peripheral, Hand, Right Updated:  08/05/19 0512     Blood Culture, Routine No Growth to date    Culture, Respiratory with Gram Stain [[id number]]     Order Status:  No result Specimen:  Respiratory from Sputum, Expectorated             Diagnostic Tests:  EXAMINATION:  XR CHEST AP PORTABLE    FINDINGS:  Cardiac silhouette is normal in size.  Lungs are symmetrically expanded.  Diffuse increased interstitial attenuation is seen within the lungs.  Focal opacity is seen at the lateral aspect of the left mid lung zone.  No evidence of focal consolidative process, pneumothorax, or significant effusion.  No acute osseous abnormality identified.   Impression:        Diffuse increased interstitial attenuation which may represent interstitial edema.    Focal opacity at the lateral aspect of the left mid lung zone.  Finding could represent aspiration or developing pneumonia in the right clinical setting.  Future radiographic follow-up is recommended to ensure resolution.      Electronically signed by: Jimmy Shabazz MD  Date: 08/04/2019             Assessment:  64 y/o female with ESRD on HD admitted with:    - s/p HTN urgency  - Fluid overload with hypoxia improved following HD  - Abnormal CXR r/o PNA  - Tobacco use        Plan:    - Pte. Dialyzed last p.m. In ICU  - Resume dialysis on Wed.  - Empiric antibiotics started in E.R.  - Epogen SQ 3 x week once BP controlled  - BP control  - Renal diet  - Smoking cessation a must  - Other problems per admitting

## 2019-08-05 NOTE — ASSESSMENT & PLAN NOTE
No evidence of pauses or high deg AVB on available tele/strips.  Seems to be likely sinus arrhythmia and sinus bradycardia.  No PPM indicated.  No further inpat cardiac testing planned.

## 2019-08-05 NOTE — PLAN OF CARE
"TN met with patient at bedside to complete discharge needs assessment. TN explained duties of case management to patient.  TN reviewed "[facility] Packet", "Discharge Planning Begins on Admission"  and discussed "Help at Home". Patient stated she lives with her sons who will assist her at home as needed. Patient will discharge home when ready for discharge. TN also discussed her responsibilities to manage her health at home. Patient was informed to leave folder at bedside during hospital stay. Contact information added to white board.    Dialysis on MWF at 6:00am- [facility]    Patient Preferred Pharmacy-     [facility] - [place] - [address]  [address]  [place] [address]  Phone: [phone] Fax: [phone]        Appointment Time Preference: afternoon        08/05/19 1529   Discharge Assessment   Assessment Type Discharge Planning Assessment   Confirmed/corrected address and phone number on facesheet? Yes   Assessment information obtained from? Patient   Prior to hospitilization cognitive status: Alert/Oriented   Prior to hospitalization functional status: Independent   Current cognitive status: Alert/Oriented   Current Functional Status: Independent   Lives With child(jaylyn), adult   Able to Return to Prior Arrangements yes   Is patient able to care for self after discharge? Yes   Who are your caregiver(s) and their phone number(s)? Karl phan @ [phone]   Patient's perception of discharge disposition home or selfcare   Readmission Within the Last 30 Days previous discharge plan unsuccessful   Patient currently being followed by outpatient case management? No   Patient currently receives any other outside agency services? No   Equipment Currently Used at Home none   Do you have any problems affording any of your prescribed medications? No   Is the patient taking medications as prescribed? yes   Does the patient have transportation home? Yes "   Transportation Anticipated family or friend will provide   Dialysis Name and Scheduled days   ([facility] - MWF  @ 6:00am )   Does the patient receive services at the [facility]? No   Discharge Plan A Home;Home with family   Discharge Plan B Home;Home with family   DME Needed Upon Discharge  none   Patient/Family in Agreement with Plan yes   Readmission Questionnaire   At the time of your discharge, did someone talk to you about what your health problems were? Yes   At the time of discharge, did someone talk to you about what to watch out for regarding worsening of your health problem? Yes   At the time of discharge, did someone talk to you about what to do if you experienced worsening of your health problem? Yes   At the time of discharge, did someone talk to you about which medication to take when you left the hospital and which ones to stop taking? Yes   At the time of discharge, did someone talk to you about when and where to follow up with a doctor after you left the hospital? Yes

## 2019-08-05 NOTE — NURSING
Notified MD Sergio Delacruz that pt's HR drops to 30-40s, with a pause on telemetry. Pt asymptomatic. Orders noted to consult cardiology.

## 2019-08-05 NOTE — HPI
63 y.o. female that (in part)  has a past medical history of ESRD (end stage renal disease) on dialysis, Gallstones, Hypertension, Renal disorder, and Tobacco abuse.  has a past surgical history that includes Cholecystectomy (2016); AVF (2017); Permacath (Right, 2017); and Tubal ligation. Presents to [facility] Emergency Department complaining of shortness of breath head worsened with exertion and ambulation.  Denies hemoptysis.  Associated fever, chills, cough, and chest tightness.  Minimal relief given with supplemental oxygen in the ED.  Patient noted to be markedly hypertensive with systolic blood pressure greater than 200.  She missed dialysis.  Denies recent travel or sick contacts.  In the emergency department chest x-ray and routine laboratory studies obtain.  Chest x-ray demonstrated evidence of pneumonia versus pulmonary edema.  CT of the chest performed which demonstrated diffuse ground-glass attenuation and intralobar septal thickening seen throughout the lungs suggestive for pulmonary edema with small bilateral pleural effusions.  Also multifocal areas of patchy consolidation or atelectasis seen within the lung bases as well as the right middle lobe and lingula.  Cultures were obtained broad-spectrum antibiotics were initiated.  Nephrology was consulted for urgent dialysis given the volume overload and pulmonary edema in the setting of hypertensive urgency.    Cardiology is being consulted for pauses noted on telemetry.  Available telemetry on the monitor was reviewed noting no evidence of any pauses and only sinus bradycardia.  There is no high-degree AV block.  Available telemetry strips were also reviewed also noting sinus bradycardia and sinus arrhythmia again without any evidence of high-degree AV block.  The patient denies any lightheadedness, dizziness, or syncope.  She was admitted with hypertensive emergency and chest pressure which has since resolved after dialysis.   She did have an echocardiogram this admission noting normal LV function.  Her troponins are negative and her EKG is essentially normal.

## 2019-08-05 NOTE — SUBJECTIVE & OBJECTIVE
Past Medical History:   Diagnosis Date    ESRD (end stage renal disease) on dialysis 2017    Gallstones     Hypertension     Renal disorder     dialysis    Tobacco abuse        Past Surgical History:   Procedure Laterality Date    AVF  2017    CHOLECYSTECTOMY  2016    CHOLECYSTECTOMY-LAPAROSCOPIC poss open N/A 11/22/2016    Performed by [facility]son [facility] MD at [facility] OR    ERCP N/A 11/11/2016    Performed by Vic Dahl MD at [facility] ENDO    Permacath Right 2017    TUBAL LIGATION         Review of patient's allergies indicates:  No Known Allergies    No current facility-administered medications on file prior to encounter.      Current Outpatient Medications on File Prior to Encounter   Medication Sig    amlodipine (NORVASC) 10 MG tablet Take 10 mg by mouth once daily.    hydrALAZINE (APRESOLINE) 50 MG tablet Take 75 mg by mouth 3 (three) times daily.    metoprolol succinate (TOPROL-XL) 100 MG 24 hr tablet Take 100 mg by mouth.     Family History     Problem Relation (Age of Onset)    Cervical cancer Mother    Diabetes Father    Drug abuse Paternal Grandmother    Hypertension Mother    Kidney disease Mother        Tobacco Use    Smoking status: Current Every Day Smoker     Packs/day: 0.50     Years: 20.00     Pack years: 10.00    Smokeless tobacco: Never Used   Substance and Sexual Activity    Alcohol use: No    Drug use: No    Sexual activity: Yes     Partners: Male     Birth control/protection: Post-menopausal     Review of Systems   Constitution: Negative for chills, diaphoresis, fever and malaise/fatigue.   HENT: Negative for nosebleeds.    Eyes: Negative for blurred vision and double vision.   Cardiovascular: Negative for chest pain, claudication, cyanosis, dyspnea on exertion, leg swelling, orthopnea, palpitations, paroxysmal nocturnal dyspnea and syncope.   Respiratory: Negative for cough, shortness of breath and wheezing.    Skin: Negative for dry skin and poor wound healing.    Musculoskeletal: Negative for back pain, joint swelling and myalgias.   Gastrointestinal: Negative for abdominal pain, nausea and vomiting.   Genitourinary: Negative for hematuria.   Neurological: Negative for dizziness, headaches, numbness, seizures and weakness.   Psychiatric/Behavioral: Negative for altered mental status and depression.     Objective:     Vital Signs (Most Recent):  Temp: 98.8 °F (37.1 °C) (08/05/19 1530)  Pulse: (!) 46 (08/05/19 1530)  Resp: 18 (08/05/19 1530)  BP: (!) 172/62 (08/05/19 1534)  SpO2: 97 % (08/05/19 1530) Vital Signs (24h Range):  Temp:  [97.6 °F (36.4 °C)-98.8 °F (37.1 °C)] 98.8 °F (37.1 °C)  Pulse:  [] 46  Resp:  [11-58] 18  SpO2:  [81 %-100 %] 97 %  BP: (103-239)/() 172/62     Weight: 57.3 kg (126 lb 4.9 oz)(post hd tx.)  Body mass index is 21.68 kg/m².    SpO2: 97 %  O2 Device (Oxygen Therapy): nasal cannula      Intake/Output Summary (Last 24 hours) at 8/5/2019 1706  Last data filed at 8/5/2019 1250  Gross per 24 hour   Intake 520 ml   Output --   Net 520 ml       Lines/Drains/Airways     Drain                 Hemodialysis AV Fistula 08/04/19 2249 Left upper arm less than 1 day          Peripheral Intravenous Line                 Peripheral IV - Single Lumen 08/04/19 2108 18 G Right Forearm less than 1 day         Peripheral IV - Single Lumen 08/04/19 2210 20 G Right Hand less than 1 day                Physical Exam   Constitutional: She is oriented to person, place, and time. She appears well-developed and well-nourished. No distress.   HENT:   Head: Normocephalic and atraumatic.   Mouth/Throat: No oropharyngeal exudate.   Eyes: Pupils are equal, round, and reactive to light. Conjunctivae and EOM are normal. No scleral icterus.   Neck: Normal range of motion. Neck supple. No JVD present. No tracheal deviation present. No thyromegaly present.   Cardiovascular: Normal rate, regular rhythm, S1 normal and S2 normal. Exam reveals no gallop and no friction rub.   No  murmur heard.  Pulmonary/Chest: Effort normal and breath sounds normal. No respiratory distress. She has no wheezes. She has no rales. She exhibits no tenderness.   Abdominal: Soft. She exhibits no distension.   Musculoskeletal: Normal range of motion. She exhibits no edema.   Neurological: She is alert and oriented to person, place, and time. No cranial nerve deficit.   Skin: Skin is warm and dry. She is not diaphoretic.   Psychiatric: She has a normal mood and affect. Her behavior is normal. Judgment normal.       Current Medications:   albuterol-ipratropium  3 mL Nebulization Q6H    amLODIPine  10 mg Oral Daily    benzonatate  200 mg Oral TID    dextromethorphan-guaifenesin  mg/5 ml  10 mL Oral Q6H    epoetin deepthi-ebpx (RETACRIT) injection  10,000 Units Subcutaneous Every Mon, Wed, Fri    famotidine  20 mg Oral Every other day    heparin (porcine)  5,000 Units Subcutaneous Q8H    hydrALAZINE  75 mg Oral TID    metoprolol succinate  100 mg Oral Daily    nicotine  1 patch Transdermal Daily    piperacillin-tazobactam (ZOSYN) IVPB  4.5 g Intravenous Q12H    senna-docusate 8.6-50 mg  1 tablet Oral BID       sodium chloride 0.9%, acetaminophen, acetaminophen, hydrALAZINE, hydrOXYzine pamoate, metoprolol, ondansetron, sodium chloride 0.9%    Laboratory (all labs reviewed):  CBC:  Recent Labs   Lab 11/28/16  0432 02/04/17  1131 06/07/17  0710 08/04/19  2105 08/05/19  0315   WBC 13.51 H 11.97 13.28 H 16.35 H 12.42   Hemoglobin 7.7 L 13.8 12.2 9.5 L 8.9 L   Hematocrit 23.2 L 43.7 38.9 30.1 L 28.3 L   Platelets 349 260 367 H 287 237       CHEMISTRIES:  Recent Labs   Lab 11/11/16  0446  11/14/16  0514  11/26/16  0413 11/27/16  0541 11/28/16  0432  02/05/17  1054 02/06/17  0301 06/07/17  0710 08/04/19  2105 08/05/19  0315   Glucose 72   < > 86   < > 97 88 86   < > 84 69 L 80 114 H 84   Sodium 142   < > 136   < > 141 139 140   < > 135 L 136 139 139 142   Potassium 2.9 L   < > 3.0 L   < > 3.1 L 3.5 3.4 L   < >  3.4 L 3.3 L 3.8 4.4 4.0   BUN, Bld 54 H   < > 63 H   < > 12 17 23 H   < > 23 H 31 H 43 H 48 H 23   Creatinine 7.2 H   < > 7.6 H   < > 3.4 H 4.4 H 5.2 H   < > 3.5 H 4.2 H 5.8 H 10.2 H 5.0 H   eGFR if  7 A   < > 6 A   < > 16 A 12 A 10 A   < > 16 A 13 A 8.5 A 4 A 10 A   eGFR if non African American 6 A   < > 5 A   < > 14 A 10 A 8 A   < > 14 A 11 A 7.4 A 4 A 9 A   Calcium 8.4 L   < > 7.7 L   < > 8.3 L 7.9 L 7.5 L   < > 7.8 L 7.8 L 9.8 9.7 8.9   Magnesium 1.7  --  1.9  --  1.4 L 1.8  1.8 1.7  --   --   --   --   --   --     < > = values in this interval not displayed.       CARDIAC BIOMARKERS:  Recent Labs   Lab 09/18/16  1939 11/09/16 2149 08/04/19 2105 08/05/19  0315 08/05/19  0859   Troponin I <0.006 0.020 0.015 0.008 0.011       COAGS:  Recent Labs   Lab 09/20/16  1118 11/09/16 2149 11/17/16  0534 06/07/17  0710   INR 1.1 0.9 1.2 1.0       LIPIDS/LFTS:  Recent Labs   Lab 11/28/16  0432 02/04/17  1131 06/07/17  0710 08/04/19  2105 08/05/19  0315   Cholesterol  --   --  142  --   --    Triglycerides  --   --  128  --   --    HDL  --   --  42  --   --    LDL Cholesterol  --   --  74.4  --   --    Non-HDL Cholesterol  --   --  100  --   --    AST 32 21 21 16 16   ALT 27 12 21 16 12       BNP:  Recent Labs   Lab 11/09/16  2149 02/04/17  1131   BNP 11 2,762 H       TSH:  Recent Labs   Lab 08/05/19  0315   TSH 1.838       Free T4:  Recent Labs   Lab 08/05/19  0315   Free T4 0.94       Diagnostic Results:  ECG (personally reviewed and interpreted tracing(s)):  8/4/19 2105 SR 85    Chest X-Ray (personally reviewed and interpreted image(s)): 8/4/19 ?ILD vs CHF    Echo: 8/5/19 (images personally reviewed and interpreted)  · Normal left ventricular systolic function. The estimated ejection fraction is 55%  · No wall motion abnormalities.  · Mild concentric left ventricular hypertrophy.  · Grade II (moderate) left ventricular diastolic dysfunction consistent with pseudonormalization.  · Normal right  ventricular systolic function.  · Moderate mitral regurgitation.  · The estimated PA systolic pressure is 29 mm Hg    Stress Test: 8/3/17 L MPI  Nuclear Quantitative Functional Analysis:   LVEF: >= 70 % (normal is >= 51%)  LVED Volume: 89 ml (normal is <=171)  LVES Volume: 21 ml (normal is <=70)  Impression: NORMAL MYOCARDIAL PERFUSION  1. The perfusion scan is free of evidence for myocardial ischemia or injury.   2. Resting wall motion is physiologic.   3. Resting LV function is normal.  (normal is >= 51%)  4. The ventricular volumes are normal at rest and stress.   5. The extracardiac distribution of radioactivity is normal.   6. There was no previous study available to compare.

## 2019-08-05 NOTE — CARE UPDATE
Ms. Vasquez is a 64 yo woman with ESRD, HTN, and tobacco abuse who presented for shortness of breath. In the ER CXR showed pneumonia +/- pulmonary edema. She was started on empiric antibiotics after cultures obtained (no sputum production for sputum sample). Nephrology was consulted for HD to remove fluid given hypoxia and volume overload. She improved on NC. She was admitted to the ICU initially but was doing well the next morning and stable for the floor.

## 2019-08-05 NOTE — PLAN OF CARE
Problem: Fall Injury Risk  Goal: Absence of Fall and Fall-Related Injury    Intervention: Promote Injury-Free Environment     08/05/19 1840   Optimize Balance and Safe Activity   Safety Promotion/Fall Prevention bed alarm set;commode/urinal/bedpan at bedside;Fall Risk reviewed with patient/family;high risk medications identified;lighting adjusted;medications reviewed;nonskid shoes/socks when out of bed         Problem: Adult Inpatient Plan of Care  Goal: Plan of Care Review  Outcome: Ongoing (interventions implemented as appropriate)     08/05/19 1845   Plan of Care Review   Plan of Care Reviewed With patient     Goal: Absence of Hospital-Acquired Illness or Injury    Intervention: Prevent VTE (venous thromboembolism)     08/05/19 1845   Prevent or Manage Embolism   VTE Prevention/Management bleeding precautions maintained;bleeding risk assessed;ROM (active) performed

## 2019-08-05 NOTE — ED TRIAGE NOTES
Pt presents to ED for shortness of breath and weakness that began this morning. Dr Berumen at bedside

## 2019-08-05 NOTE — PLAN OF CARE
Problem: Device-Related Complication Risk (Hemodialysis)  Goal: Safe, Effective Therapy Delivery  Outcome: Ongoing (interventions implemented as appropriate)  Intervention: Optimize Device Care and Function     08/05/19 0236   Optimize Blood Flow   Circuit Management air detection alarms on;circuit line warming device in use;tubing repositioned;tubing/circuit/filter adjusted   Manage Acute Allergic Reaction   Medication Review/Management medications reviewed;high risk medications identified         Comments: 4 hour Emergent hdtx now in progress as ordered. Pt is an in patient in the ICU room 265, refused application of Bipap earlier, remains on a NRB mask @ 15 LPM, SaO2 remains wnl. Planned UF= 3 L net.

## 2019-08-06 VITALS
TEMPERATURE: 98 F | HEIGHT: 64 IN | DIASTOLIC BLOOD PRESSURE: 93 MMHG | SYSTOLIC BLOOD PRESSURE: 188 MMHG | BODY MASS INDEX: 22.1 KG/M2 | HEART RATE: 76 BPM | RESPIRATION RATE: 18 BRPM | OXYGEN SATURATION: 97 % | WEIGHT: 129.44 LBS

## 2019-08-06 PROBLEM — R09.02 HYPOXIA: Status: RESOLVED | Noted: 2019-08-04 | Resolved: 2019-08-06

## 2019-08-06 PROBLEM — R00.1 BRADYCARDIA: Status: RESOLVED | Noted: 2019-08-05 | Resolved: 2019-08-06

## 2019-08-06 PROBLEM — I16.0 HYPERTENSIVE URGENCY: Status: RESOLVED | Noted: 2019-08-05 | Resolved: 2019-08-06

## 2019-08-06 LAB
ALBUMIN SERPL BCP-MCNC: 3.3 G/DL (ref 3.5–5.2)
ALBUMIN SERPL BCP-MCNC: 3.3 G/DL (ref 3.5–5.2)
ALP SERPL-CCNC: 111 U/L (ref 55–135)
ALP SERPL-CCNC: 111 U/L (ref 55–135)
ALT SERPL W/O P-5'-P-CCNC: 9 U/L (ref 10–44)
ALT SERPL W/O P-5'-P-CCNC: 9 U/L (ref 10–44)
ANION GAP SERPL CALC-SCNC: 13 MMOL/L (ref 8–16)
ANION GAP SERPL CALC-SCNC: 13 MMOL/L (ref 8–16)
AST SERPL-CCNC: 13 U/L (ref 10–40)
AST SERPL-CCNC: 13 U/L (ref 10–40)
BASOPHILS # BLD AUTO: 0.04 K/UL (ref 0–0.2)
BASOPHILS NFR BLD: 0.4 % (ref 0–1.9)
BILIRUB SERPL-MCNC: 0.4 MG/DL (ref 0.1–1)
BILIRUB SERPL-MCNC: 0.4 MG/DL (ref 0.1–1)
BUN SERPL-MCNC: 29 MG/DL (ref 8–23)
BUN SERPL-MCNC: 29 MG/DL (ref 8–23)
CALCIUM SERPL-MCNC: 9.7 MG/DL (ref 8.7–10.5)
CALCIUM SERPL-MCNC: 9.7 MG/DL (ref 8.7–10.5)
CHLORIDE SERPL-SCNC: 100 MMOL/L (ref 95–110)
CHLORIDE SERPL-SCNC: 100 MMOL/L (ref 95–110)
CO2 SERPL-SCNC: 28 MMOL/L (ref 23–29)
CO2 SERPL-SCNC: 28 MMOL/L (ref 23–29)
CREAT SERPL-MCNC: 6.7 MG/DL (ref 0.5–1.4)
CREAT SERPL-MCNC: 6.7 MG/DL (ref 0.5–1.4)
DIFFERENTIAL METHOD: ABNORMAL
EOSINOPHIL # BLD AUTO: 0.2 K/UL (ref 0–0.5)
EOSINOPHIL NFR BLD: 1.9 % (ref 0–8)
ERYTHROCYTE [DISTWIDTH] IN BLOOD BY AUTOMATED COUNT: 16.1 % (ref 11.5–14.5)
EST. GFR  (AFRICAN AMERICAN): 7 ML/MIN/1.73 M^2
EST. GFR  (AFRICAN AMERICAN): 7 ML/MIN/1.73 M^2
EST. GFR  (NON AFRICAN AMERICAN): 6 ML/MIN/1.73 M^2
EST. GFR  (NON AFRICAN AMERICAN): 6 ML/MIN/1.73 M^2
GLUCOSE SERPL-MCNC: 97 MG/DL (ref 70–110)
GLUCOSE SERPL-MCNC: 97 MG/DL (ref 70–110)
HBV SURFACE AB SER QL IA: POSITIVE
HBV SURFACE AB SERPL IA-ACNC: 12 MIU/ML
HCT VFR BLD AUTO: 28.5 % (ref 37–48.5)
HGB BLD-MCNC: 8.7 G/DL (ref 12–16)
LYMPHOCYTES # BLD AUTO: 1.9 K/UL (ref 1–4.8)
LYMPHOCYTES NFR BLD: 17.8 % (ref 18–48)
MAGNESIUM SERPL-MCNC: 2.3 MG/DL (ref 1.6–2.6)
MCH RBC QN AUTO: 28.6 PG (ref 27–31)
MCHC RBC AUTO-ENTMCNC: 30.5 G/DL (ref 32–36)
MCV RBC AUTO: 94 FL (ref 82–98)
MONOCYTES # BLD AUTO: 0.7 K/UL (ref 0.3–1)
MONOCYTES NFR BLD: 6.2 % (ref 4–15)
NEUTROPHILS # BLD AUTO: 7.9 K/UL (ref 1.8–7.7)
NEUTROPHILS NFR BLD: 73.7 % (ref 38–73)
PHOSPHATE SERPL-MCNC: 3.7 MG/DL (ref 2.7–4.5)
PLATELET # BLD AUTO: 225 K/UL (ref 150–350)
PMV BLD AUTO: 11.1 FL (ref 9.2–12.9)
POCT GLUCOSE: 96 MG/DL (ref 70–110)
POTASSIUM SERPL-SCNC: 4 MMOL/L (ref 3.5–5.1)
POTASSIUM SERPL-SCNC: 4 MMOL/L (ref 3.5–5.1)
PROT SERPL-MCNC: 6.8 G/DL (ref 6–8.4)
PROT SERPL-MCNC: 6.8 G/DL (ref 6–8.4)
RBC # BLD AUTO: 3.04 M/UL (ref 4–5.4)
SODIUM SERPL-SCNC: 141 MMOL/L (ref 136–145)
SODIUM SERPL-SCNC: 141 MMOL/L (ref 136–145)
VANCOMYCIN SERPL-MCNC: 16 UG/ML
WBC # BLD AUTO: 10.7 K/UL (ref 3.9–12.7)

## 2019-08-06 PROCEDURE — 85025 COMPLETE CBC W/AUTO DIFF WBC: CPT

## 2019-08-06 PROCEDURE — 94761 N-INVAS EAR/PLS OXIMETRY MLT: CPT

## 2019-08-06 PROCEDURE — 63600175 PHARM REV CODE 636 W HCPCS: Performed by: INTERNAL MEDICINE

## 2019-08-06 PROCEDURE — 84100 ASSAY OF PHOSPHORUS: CPT

## 2019-08-06 PROCEDURE — 80202 ASSAY OF VANCOMYCIN: CPT

## 2019-08-06 PROCEDURE — 80053 COMPREHEN METABOLIC PANEL: CPT

## 2019-08-06 PROCEDURE — 83735 ASSAY OF MAGNESIUM: CPT

## 2019-08-06 PROCEDURE — 25000003 PHARM REV CODE 250: Performed by: INTERNAL MEDICINE

## 2019-08-06 PROCEDURE — 25000003 PHARM REV CODE 250: Performed by: HOSPITALIST

## 2019-08-06 PROCEDURE — 25000242 PHARM REV CODE 250 ALT 637 W/ HCPCS: Performed by: INTERNAL MEDICINE

## 2019-08-06 PROCEDURE — 99900035 HC TECH TIME PER 15 MIN (STAT)

## 2019-08-06 PROCEDURE — 94640 AIRWAY INHALATION TREATMENT: CPT

## 2019-08-06 PROCEDURE — 94799 UNLISTED PULMONARY SVC/PX: CPT

## 2019-08-06 RX ORDER — LEVOFLOXACIN 500 MG/1
500 TABLET, FILM COATED ORAL EVERY OTHER DAY
Status: DISCONTINUED | OUTPATIENT
Start: 2019-08-07 | End: 2019-08-06

## 2019-08-06 RX ORDER — LEVOFLOXACIN 500 MG/1
500 TABLET, FILM COATED ORAL ONCE
Status: COMPLETED | OUTPATIENT
Start: 2019-08-06 | End: 2019-08-06

## 2019-08-06 RX ORDER — LEVOFLOXACIN 250 MG/1
250 TABLET ORAL DAILY
Qty: 7 TABLET | Refills: 0 | Status: SHIPPED | OUTPATIENT
Start: 2019-08-06 | End: 2019-08-13

## 2019-08-06 RX ADMIN — SENNOSIDES, DOCUSATE SODIUM 1 TABLET: 50; 8.6 TABLET, FILM COATED ORAL at 09:08

## 2019-08-06 RX ADMIN — ACETAMINOPHEN 650 MG: 325 TABLET, FILM COATED ORAL at 01:08

## 2019-08-06 RX ADMIN — HYDRALAZINE HYDROCHLORIDE 75 MG: 25 TABLET ORAL at 09:08

## 2019-08-06 RX ADMIN — BENZONATATE 200 MG: 100 CAPSULE ORAL at 09:08

## 2019-08-06 RX ADMIN — HEPARIN SODIUM 5000 UNITS: 5000 INJECTION, SOLUTION INTRAVENOUS; SUBCUTANEOUS at 05:08

## 2019-08-06 RX ADMIN — HYDROXYZINE PAMOATE 25 MG: 25 CAPSULE ORAL at 01:08

## 2019-08-06 RX ADMIN — METOPROLOL TARTRATE 5 MG: 5 INJECTION INTRAVENOUS at 05:08

## 2019-08-06 RX ADMIN — IPRATROPIUM BROMIDE AND ALBUTEROL SULFATE 3 ML: .5; 3 SOLUTION RESPIRATORY (INHALATION) at 12:08

## 2019-08-06 RX ADMIN — AMLODIPINE BESYLATE 10 MG: 5 TABLET ORAL at 09:08

## 2019-08-06 RX ADMIN — LEVOFLOXACIN 500 MG: 500 TABLET, FILM COATED ORAL at 10:08

## 2019-08-06 RX ADMIN — METOPROLOL SUCCINATE 100 MG: 50 TABLET, EXTENDED RELEASE ORAL at 09:08

## 2019-08-06 RX ADMIN — GUAIFENESIN AND DEXTROMETHORPHAN 10 ML: 100; 10 SYRUP ORAL at 05:08

## 2019-08-06 NOTE — PROGRESS NOTES
9884 TN contacted PCP, Dr. Nasra Iraheta's office at [phone] to schedule a f/u appt; unable to schedule an appt. TN will convey to pt to schedule an appt in 1 week.    [place] contacted [facility] at [phone]; spoke with Yanet; informed pt is discharged and will fax clinicals to [phone].    6190 TN faxed clinicals to [facility] at [phone].

## 2019-08-06 NOTE — PLAN OF CARE
"   08/06/19 1123   Final Note   Assessment Type Final Discharge Note   Anticipated Discharge Disposition Home   What phone number can be called within the next 1-3 days to see how you are doing after discharge?   (Listed in chart)   Hospital Follow Up  Appt(s) scheduled? Yes  (Pt will schedule due to could not remember name of PCP. PCP listed was incorrect.)   Discharge plans and expectations educations in teach back method with documentation complete? Yes   Right Care Referral Info   Post Acute Recommendation No Care     EDUCATION:  TN provided with educational information on Pneumonia.  Information reviewed and placed in :My Healthcare Packet" to be brought home for pt to use as resource after discharge.  Information included:  signs and symptoms to look for and call the doctor if experiencing, and symptoms that may indicate a medical emergency: CALL 911.  All questions answered.  Teach back method used. Patient stated, "I will contact my PCP and schedule an appt in 1 week".    TN informed floor nurse, Bhavana, care management is complete and can proceed with discharge teaching.        "

## 2019-08-06 NOTE — PLAN OF CARE
Problem: Adult Inpatient Plan of Care  Goal: Plan of Care Review  Patient received on room air. sp02 96% aerosol treatment given via mouthpiece. BBS EQUAL. Slight SOB noted.states she has panic attacks. Patient refuse INCENTIVE SPIROMETER at this time. Will like to be instructed in AM or later.

## 2019-08-06 NOTE — PROGRESS NOTES
Follow-up Information     Nasra Iraheta MD In 1 week.    Specialty:  Family Medicine  Why:  Outpatient Services PCP Follow-Up patient will contact and schedule an appointment in 1 week.  Contact information:  [place] [address] [phone]             [facility].    Why:  Outpatient Services Resume Ddialysis MWF  Contact information:  [address]  [facility] [address]             PLEASE BRING TO ALL FOLLOW UP APPOINTMENTS:   1) A COPY YOUR DISCHARGE INSTRUCTIONS   2) ALL MEDICINES YOU ARE CURRENTLY TAKING IN THEIR ORIGINAL BOTTLES   3) IDENTIFICATION CARD   4) INSURANCE CARD    **PLEASE ARRIVE 15 MINUTES AHEAD OF SCHEDULED APPOINTMENT TIME   ++PLEASE CALL 24 HOURS IN ADVANCE IF YOU MUST RESCHEDULE YOUR APPOINTMENT DAY AND/OR TIME     [facility] CARE MANAGEMENT WRITTEN DISCHARGE INFORMATION    APPOINTMENTS AND RESOURCES TO HELP YOU MANAGE YOUR CARE AT HOME BASED ON YOUR PREFERENCES:  (If an appointment is not scheduled for you when you leave the hospital, call your doctor to schedule a follow up visit within a week)        Healthy Living Instructions to HELP MANAGE YOUR CARE AT HOME:  Things You are responsible for:  1.    Getting your prescriptions filled   2.    Taking your medications as directed, DO NOT MISS ANY DOSES!  3.    Following the diet and exercise recommended by your doctor  4.    Going to your follow-up doctor appointment. This is important because it allows the doctor to monitor your progress and determine if any changes need to made to your treatment plan.  5. If you have any questions about MANAGING YOUR CARE AT HOME Call the Nurse Care Line for 24/7 Assistance [phone]       Please answer any calls you may receive from [facility]. We want to continue to support you as you manage your healthcare needs. [facility] is happy to have the opportunity to serve you.      Thank you for choosing [facility] for your healthcare  needs!  Your [facility] Case Management Team,    Annamaria Bedoya, RN [place]  Registered Nurse Transition Navigator  [phone]

## 2019-08-06 NOTE — PROGRESS NOTES
AAOX3  Denies any needs and no distress noted.  VSS.  IV and tele dc'd.  Dc and Rx instructions reviewed written and oral.  Pt verbalized understanding.  Waiting for family to arrive to Dc pt home.

## 2019-08-06 NOTE — PLAN OF CARE
08/06/19 1123   Post-Acute Status   Post-Acute Authorization Placement  (Home)   Post-Acute Placement Status Set-up Complete

## 2019-08-07 NOTE — PHYSICIAN QUERY
"PT Name: April Vasquez  MR #: [id number]    Physician Query Form - Heart  Condition Clarification     CDS/: John Bill RN               Contact information:   [email or web address]      This form is a permanent document in the medical record.     Query Date: August 7, 2019    By submitting this query, we are merely seeking further clarification of documentation. Please utilize your independent clinical judgment when addressing the question(s) below.    The medical record contains the following   Indicators     Supporting Clinical Findings Location in Medical Record    BNP      EF     x Radiology findings FINDINGS:   Cardiac silhouette is normal in size    Diffuse ground-glass attenuation and interlobular septal thickening seen throughout the lungs suggestive for pulmonary edema with small bilateral pleural effusions.   8/4  CXR    8/4 CT chest    x Echo Results · Normal left ventricular systolic function. The estimated ejection fraction is 55%  · Grade II (moderate) left ventricular diastolic dysfunction consistent with pseudonormalization. 8/5 TTE    "Ascites" documented     x "SOB" or "NIELSON" documented Shortness of Breath------- Pt reports she is a dialysis pt and has had increasing WOB today. Pt last had dialysis on Friday.      8/5  H&P    "Hypoxia" documented     x Heart Failure documented CLINICAL HISTORY:   CHF; 8/5  H&P- CXR with the note    "Edema" documented     x Diuretics/Meds metoprolol , IV: given on 8/5@0638, 8/6@0510  Metoprolol, oral; given on 8/5-6    Home meds: apresoline, metoprolol succinate   MAR      8/5 H&P      Treatment:     x Other:  Dialysis:     Planned UF =3L net.    8/5 Nursing- [facility]SHAWN Acosta     Heart failure (HF) can be acute, chronic or both. It is generally further specificed as systolic, diastolic, or combined. Lastly, it is important to identify an underlying etiology if known or suspected.     Common clues to acute exacerbation:  Rapidly progressive symptoms " (w/in 2 weeks of presentation), using IV diuretics to treat, using supplemental O2, pulmonary edema on Xray, MI w/in 4 weeks, and/or BNP >500    Systolic Heart Failure: is defined as chart documentation of a left ventricular ejection fraction (LVEF) less than 40%     Diastolic Heart Failure: is defined as a left ventricular ejection fraction (LVEF) greater than 40%   +      Evidence of diastolic dysfunction on echocardiography OR    Right heart catheterization wedge pressure above 12 mm Hg OR    Left heart catheterization left ventricular end diastolic pressure 18 mm Hg or above.    References: *American Heart Association    The clinical guidelines noted below are only system guidelines, and do not replace the providers clinical judgment.     Provider, please specify the diagnosis CHF associated with above clinical findings    [ x  ] Acute Diastolic Heart Failure - New diagnosis.  EF > 40%  and acute HF symptoms documented  [   ] Other Type of Heart Failure (please specify type): _________________________  [   ] Heart Failure Ruled Out  [   ] Other (please specify): ___________________________________  [  ] Clinically Undetermined                          Please document in your progress notes daily for the duration of treatment until resolved and include in your discharge summary.

## 2019-08-07 NOTE — DISCHARGE SUMMARY
[facility] Hospital Medicine  Discharge Summary      Patient Name: April Vasquez  MRN: [id number]  Admission Date: 8/4/2019  Hospital Length of Stay: 2 days  Discharge Date and Time: 8/6/2019  1:02 PM  Attending Physician:  Betsy Liz MD  Discharging Provider: Betsy Liz MD  Primary Care Provider: Nasra Iraheta MD      HPI:   April Vasquez is a 63 y.o. female that (in part)  has a past medical history of ESRD (end stage renal disease) on dialysis, Gallstones, Hypertension, Renal disorder, and Tobacco abuse.  has a past surgical history that includes Cholecystectomy (2016); AVF (2017); Permacath (Right, 2017); and Tubal ligation. Presents to [facility] Emergency Department complaining of shortness of breath head worsened with exertion and ambulation.  Denies hemoptysis.  Associated fever, chills, cough, and chest tightness.  Minimal relief given with supplemental oxygen in the ED.  Patient noted to be markedly hypertensive with systolic blood pressure greater than 200.  She missed dialysis.  Denies recent travel or sick contacts.    In the emergency department chest x-ray and routine laboratory studies obtain.  Chest x-ray demonstrated evidence of pneumonia versus pulmonary edema.  CT of the chest performed which demonstrated diffuse ground-glass attenuation and intralobar septal thickening seen throughout the lungs suggestive for pulmonary edema with small bilateral pleural effusions.  Also multifocal areas of patchy consolidation or atelectasis seen within the lung bases as well as the right middle lobe and lingula.  Cultures were obtained broad-spectrum antibiotics were initiated.  Nephrology was consulted for urgent dialysis given the volume overload and pulmonary edema in the setting of hypertensive urgency.    Hospital medicine has been asked to admit for further evaluation and treatment.       * No surgery found *      Hospital Course:   Ms. Fisher  presented with shortness of breath and was noted to have hypertensive urgency.  She was admitted to ICU for close monitoring.  Also of note, she was bradycardic which was evaluated by Cardiology which was resolved after emergent dialysis and treatment of her underlying condition.  She was able to be stepped down the next morning to regular floor.  Workup with chest x-ray demonstrated evidence of pneumonia versus pulmonary edema.  CT of the chest demonstrated diffuse ground-glass attenuation and intralobar septal thickening seen throughout the lungs suggestive for pulmonary edema with small bilateral pleural effusions.  Also multifocal areas of patchy consolidation or atelectasis seen within the lung bases as well as the right middle lobe and lingula. Cultures were obtained and broad-spectrum antibiotics were initiated with the Zosyn and vancomycin.  Nephrology consulted and she underwent emergent dialysis to treat her pulmonary edema as discusssed. She had elevated white cell count of 16.35 which normalized by the next hospital day.  Blood cultures were no growth to date.  Her respiratory status improved with treatment of pneumonia and with correction of volume status.  She initially required 15 L of oxygen to maintain adequate oxygen saturation, but was able to be weaned to room air prior to discharge. She was afebrile and was back to her normal state of health.  Patient was transitioned to p.o. Levaquin to finish full course of antibiotic treatment as outpatient, and to resume on her scheduled dialysis of Monday Wednesday and Fridays.     Consults:   Consults (From admission, onward)        Status Ordering Provider     Inpatient consult to Cardiology  Once     Provider:  Jamie Hughes MD    Completed MOISÉS MATT        Service: Hospital Medicine    Final Active Diagnoses:    Diagnosis Date Noted POA    PRINCIPAL PROBLEM:  Bilateral pneumonia [J18.9] 08/05/2019 Yes    Essential hypertension [I10]  08/05/2019 Yes    ESRD (end stage renal disease) on dialysis [N18.6, Z99.2] 08/05/2019 Not Applicable    Tobacco abuse [Z72.0] 09/19/2016 Yes      Problems Resolved During this Admission:    Diagnosis Date Noted Date Resolved POA    Hypertensive urgency [I16.0] 08/05/2019 08/06/2019 Yes    Bradycardia [R00.1] 08/05/2019 08/06/2019 No    Hypoxia [R09.02] 08/04/2019 08/06/2019 Yes       Discharged Condition: good    Disposition: Home or Self Care    Follow Up:  Follow-up Information     Nasra Iraheta MD In 1 week.    Specialty:  Family Medicine  Why:  Outpatient Services PCP Follow-Up patient will contact and schedule an appointment in 1 week.  Contact information:  [address]  [place] [address] [phone]             [facility].    Why:  Outpatient Services Resume Ddialysis MWF  Contact information:  [address]  [facility] [address]               Patient Instructions:      Diet renal     Activity as tolerated       Significant Diagnostic Studies:     Pending Diagnostic Studies:     None         Medications:  Reconciled Home Medications:      Medication List      START taking these medications    levoFLOXacin 250 MG tablet  Commonly known as:  LEVAQUIN  Take 1 tablet (250 mg total) by mouth once daily. for 7 days        CONTINUE taking these medications    amLODIPine 10 MG tablet  Commonly known as:  NORVASC  Take 10 mg by mouth once daily.     hydrALAZINE 50 MG tablet  Commonly known as:  APRESOLINE  Take 75 mg by mouth 3 (three) times daily.     metoprolol succinate 100 MG 24 hr tablet  Commonly known as:  TOPROL-XL  Take 100 mg by mouth.            Indwelling Lines/Drains at time of discharge:   Lines/Drains/Airways     Drain                 Hemodialysis AV Fistula 08/04/19 7800 Left upper arm 2 days                Time spent on the discharge of patient: 35 minutes  Patient was seen and examined on the date of discharge and determined to be suitable for  discharge.         Betsy Liz MD  Department of Hospital Medicine  [facility]

## 2019-08-07 NOTE — HOSPITAL COURSE
Ms. Fisher presented with shortness of breath and was noted to have hypertensive urgency.  She was admitted to ICU for close monitoring.  Also of note, she was bradycardic which was evaluated by Cardiology which was resolved after emergent dialysis and treatment of her underlying condition.  She was able to be stepped down the next morning to regular floor.  Workup with chest x-ray demonstrated evidence of pneumonia versus pulmonary edema.  CT of the chest demonstrated diffuse ground-glass attenuation and intralobar septal thickening seen throughout the lungs suggestive for pulmonary edema with small bilateral pleural effusions.  Also multifocal areas of patchy consolidation or atelectasis seen within the lung bases as well as the right middle lobe and lingula. Cultures were obtained and broad-spectrum antibiotics were initiated with the Zosyn and vancomycin.  Nephrology consulted and she underwent emergent dialysis to treat her pulmonary edema as discusssed. She had elevated white cell count of 16.35 which normalized by the next hospital day.  Blood cultures were no growth to date.  Her respiratory status improved with treatment of pneumonia and with correction of volume status.  She initially required 15 L of oxygen to maintain adequate oxygen saturation, but was able to be weaned to room air prior to discharge. She was afebrile and was back to her normal state of health.  Patient was transitioned to .o. Levaquin to finish full course of antibiotic treatment as outpatient, and to resume on her scheduled dialysis of Monday Wednesday and Fridays.

## 2019-08-09 LAB
BACTERIA BLD CULT: NORMAL
BACTERIA BLD CULT: NORMAL

## 2019-08-19 ENCOUNTER — HOSPITAL ENCOUNTER (EMERGENCY)
Facility: HOSPITAL | Age: 64
Discharge: HOME | End: 2019-08-19
Attending: EMERGENCY MEDICINE
Payer: MEDICARE

## 2019-08-19 VITALS
SYSTOLIC BLOOD PRESSURE: 183 MMHG | OXYGEN SATURATION: 98 % | HEART RATE: 64 BPM | TEMPERATURE: 98 F | HEIGHT: 64 IN | RESPIRATION RATE: 14 BRPM | DIASTOLIC BLOOD PRESSURE: 84 MMHG | BODY MASS INDEX: 21.85 KG/M2 | WEIGHT: 128 LBS

## 2019-08-19 DIAGNOSIS — M25.511 ACUTE PAIN OF RIGHT SHOULDER DUE TO TRAUMA: ICD-10-CM
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
## 2019-08-19 DIAGNOSIS — S16.1XXA CERVICAL STRAIN, ACUTE, INITIAL ENCOUNTER: ICD-10-CM

## 2019-08-19 DIAGNOSIS — G89.11 ACUTE PAIN OF RIGHT SHOULDER DUE TO TRAUMA: ICD-10-CM

## 2019-08-19 DIAGNOSIS — I10 ACCELERATED HYPERTENSION: ICD-10-CM

## 2019-08-19 DIAGNOSIS — S40.011A CONTUSION OF RIGHT SHOULDER, INITIAL ENCOUNTER: ICD-10-CM

## 2019-08-19 LAB
ALBUMIN SERPL BCP-MCNC: 3.5 G/DL (ref 3.5–5.2)
ALP SERPL-CCNC: 116 U/L (ref 55–135)
ALT SERPL W/O P-5'-P-CCNC: 11 U/L (ref 10–44)
ANION GAP SERPL CALC-SCNC: 17 MMOL/L (ref 8–16)
AST SERPL-CCNC: 11 U/L (ref 10–40)
BASOPHILS # BLD AUTO: 0.03 K/UL (ref 0–0.2)
BASOPHILS NFR BLD: 0.4 % (ref 0–1.9)
BILIRUB SERPL-MCNC: 0.3 MG/DL (ref 0.1–1)
BUN SERPL-MCNC: 58 MG/DL (ref 8–23)
CALCIUM SERPL-MCNC: 9.5 MG/DL (ref 8.7–10.5)
CHLORIDE SERPL-SCNC: 94 MMOL/L (ref 95–110)
CO2 SERPL-SCNC: 27 MMOL/L (ref 23–29)
CREAT SERPL-MCNC: 12.5 MG/DL (ref 0.5–1.4)
DIFFERENTIAL METHOD: ABNORMAL
EOSINOPHIL # BLD AUTO: 0.4 K/UL (ref 0–0.5)
EOSINOPHIL NFR BLD: 4.4 % (ref 0–8)
ERYTHROCYTE [DISTWIDTH] IN BLOOD BY AUTOMATED COUNT: 17.3 % (ref 11.5–14.5)
EST. GFR  (AFRICAN AMERICAN): 3 ML/MIN/1.73 M^2
EST. GFR  (NON AFRICAN AMERICAN): 3 ML/MIN/1.73 M^2
GLUCOSE SERPL-MCNC: 98 MG/DL (ref 70–110)
HCT VFR BLD AUTO: 30.1 % (ref 37–48.5)
HGB BLD-MCNC: 9.7 G/DL (ref 12–16)
LYMPHOCYTES # BLD AUTO: 1.7 K/UL (ref 1–4.8)
LYMPHOCYTES NFR BLD: 20.7 % (ref 18–48)
MAGNESIUM SERPL-MCNC: 2.9 MG/DL (ref 1.6–2.6)
MCH RBC QN AUTO: 29.7 PG (ref 27–31)
MCHC RBC AUTO-ENTMCNC: 32.2 G/DL (ref 32–36)
MCV RBC AUTO: 92 FL (ref 82–98)
MONOCYTES # BLD AUTO: 0.7 K/UL (ref 0.3–1)
MONOCYTES NFR BLD: 8.7 % (ref 4–15)
NEUTROPHILS # BLD AUTO: 5.2 K/UL (ref 1.8–7.7)
NEUTROPHILS NFR BLD: 66.1 % (ref 38–73)
PHOSPHATE SERPL-MCNC: 6.8 MG/DL (ref 2.7–4.5)
PLATELET # BLD AUTO: 286 K/UL (ref 150–350)
PMV BLD AUTO: 10.1 FL (ref 9.2–12.9)
POTASSIUM SERPL-SCNC: 4.5 MMOL/L (ref 3.5–5.1)
PROT SERPL-MCNC: 7 G/DL (ref 6–8.4)
RBC # BLD AUTO: 3.27 M/UL (ref 4–5.4)
SODIUM SERPL-SCNC: 138 MMOL/L (ref 136–145)
WBC # BLD AUTO: 7.96 K/UL (ref 3.9–12.7)

## 2019-08-19 PROCEDURE — 80053 COMPREHEN METABOLIC PANEL: CPT

## 2019-08-19 PROCEDURE — 25000003 PHARM REV CODE 250: Performed by: EMERGENCY MEDICINE

## 2019-08-19 PROCEDURE — 93010 EKG 12-LEAD: ICD-10-PCS | Mod: ,,, | Performed by: INTERNAL MEDICINE

## 2019-08-19 PROCEDURE — 93010 ELECTROCARDIOGRAM REPORT: CPT | Mod: ,,, | Performed by: INTERNAL MEDICINE

## 2019-08-19 PROCEDURE — 84100 ASSAY OF PHOSPHORUS: CPT

## 2019-08-19 PROCEDURE — 83735 ASSAY OF MAGNESIUM: CPT

## 2019-08-19 PROCEDURE — 85025 COMPLETE CBC W/AUTO DIFF WBC: CPT

## 2019-08-19 PROCEDURE — 99285 EMERGENCY DEPT VISIT HI MDM: CPT | Mod: 25

## 2019-08-19 PROCEDURE — 93005 ELECTROCARDIOGRAM TRACING: CPT

## 2019-08-19 RX ORDER — METOPROLOL SUCCINATE 50 MG/1
100 TABLET, EXTENDED RELEASE ORAL
Status: COMPLETED | OUTPATIENT
Start: 2019-08-19 | End: 2019-08-19

## 2019-08-19 RX ORDER — HYDROCODONE BITARTRATE AND ACETAMINOPHEN 7.5; 325 MG/1; MG/1
1 TABLET ORAL EVERY 4 HOURS PRN
Qty: 18 TABLET | Refills: 0 | Status: SHIPPED | OUTPATIENT
Start: 2019-08-19 | End: 2019-08-22

## 2019-08-19 RX ORDER — AMLODIPINE BESYLATE 5 MG/1
10 TABLET ORAL
Status: COMPLETED | OUTPATIENT
Start: 2019-08-19 | End: 2019-08-19

## 2019-08-19 RX ORDER — HYDROCODONE BITARTRATE AND ACETAMINOPHEN 5; 325 MG/1; MG/1
2 TABLET ORAL
Status: COMPLETED | OUTPATIENT
Start: 2019-08-19 | End: 2019-08-19

## 2019-08-19 RX ADMIN — METOPROLOL SUCCINATE 100 MG: 50 TABLET, FILM COATED, EXTENDED RELEASE ORAL at 02:08

## 2019-08-19 RX ADMIN — HYDROCODONE BITARTRATE AND ACETAMINOPHEN 2 TABLET: 5; 325 TABLET ORAL at 02:08

## 2019-08-19 RX ADMIN — AMLODIPINE BESYLATE 10 MG: 5 TABLET ORAL at 02:08
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
## 2019-08-19 NOTE — DISCHARGE INSTRUCTIONS
You will be called today for a chair time for tomorrow for dialysis.  Ice your right shoulder for 20 minutes every 2 hours while awake for one to two days.     Although you can take the pain medication every 4 hr I recommend you start taking the medication every 8 hr as needed and only take more frequently if absolutely necessary and there is no evidence of sedation.
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
## 2019-08-19 NOTE — CONSULTS
TREVER contacted Velia at [facility] [phone], she stated the pt can come tomorrow if she can't come on today before 7:00pm. TREVER will follow up with Velia.

## 2019-10-12 ENCOUNTER — HOSPITAL ENCOUNTER (INPATIENT)
Facility: HOSPITAL | Age: 64
LOS: 3 days | Discharge: HOME | DRG: 189 | End: 2019-10-15
Attending: EMERGENCY MEDICINE | Admitting: EMERGENCY MEDICINE
Payer: MEDICARE

## 2019-10-12 DIAGNOSIS — R06.02 SOB (SHORTNESS OF BREATH): ICD-10-CM

## 2019-10-12 DIAGNOSIS — J81.0 ACUTE PULMONARY EDEMA: Primary | ICD-10-CM

## 2019-10-12 DIAGNOSIS — J96.01 ACUTE HYPOXEMIC RESPIRATORY FAILURE: ICD-10-CM

## 2019-10-12 DIAGNOSIS — R09.02 HYPOXIA: ICD-10-CM

## 2019-10-12 LAB
ALBUMIN SERPL BCP-MCNC: 3.8 G/DL (ref 3.5–5.2)
ALP SERPL-CCNC: 108 U/L (ref 55–135)
ALT SERPL W/O P-5'-P-CCNC: 11 U/L (ref 10–44)
ANION GAP SERPL CALC-SCNC: 16 MMOL/L (ref 8–16)
APTT BLDCRRT: 29.6 SEC (ref 21–32)
AST SERPL-CCNC: 13 U/L (ref 10–40)
BASOPHILS # BLD AUTO: 0.05 K/UL (ref 0–0.2)
BASOPHILS NFR BLD: 0.5 % (ref 0–1.9)
BILIRUB SERPL-MCNC: 0.5 MG/DL (ref 0.1–1)
BNP SERPL-MCNC: 2438 PG/ML (ref 0–99)
BUN SERPL-MCNC: 30 MG/DL (ref 8–23)
CALCIUM SERPL-MCNC: 10.1 MG/DL (ref 8.7–10.5)
CHLORIDE SERPL-SCNC: 96 MMOL/L (ref 95–110)
CO2 SERPL-SCNC: 30 MMOL/L (ref 23–29)
CREAT SERPL-MCNC: 6.8 MG/DL (ref 0.5–1.4)
DIFFERENTIAL METHOD: ABNORMAL
EOSINOPHIL # BLD AUTO: 0.3 K/UL (ref 0–0.5)
EOSINOPHIL NFR BLD: 2.6 % (ref 0–8)
ERYTHROCYTE [DISTWIDTH] IN BLOOD BY AUTOMATED COUNT: 16 % (ref 11.5–14.5)
EST. GFR  (AFRICAN AMERICAN): 7 ML/MIN/1.73 M^2
EST. GFR  (NON AFRICAN AMERICAN): 6 ML/MIN/1.73 M^2
GLUCOSE SERPL-MCNC: 105 MG/DL (ref 70–110)
HCT VFR BLD AUTO: 34.6 % (ref 37–48.5)
HGB BLD-MCNC: 11 G/DL (ref 12–16)
IMM GRANULOCYTES # BLD AUTO: 0.03 K/UL (ref 0–0.04)
IMM GRANULOCYTES NFR BLD AUTO: 0.3 % (ref 0–0.5)
INR PPP: 1 (ref 0.8–1.2)
LACTATE SERPL-SCNC: 0.9 MMOL/L (ref 0.5–2.2)
LYMPHOCYTES # BLD AUTO: 1.5 K/UL (ref 1–4.8)
LYMPHOCYTES NFR BLD: 13.6 % (ref 18–48)
MAGNESIUM SERPL-MCNC: 2.4 MG/DL (ref 1.6–2.6)
MCH RBC QN AUTO: 28.5 PG (ref 27–31)
MCHC RBC AUTO-ENTMCNC: 31.8 G/DL (ref 32–36)
MCV RBC AUTO: 90 FL (ref 82–98)
MONOCYTES # BLD AUTO: 0.6 K/UL (ref 0.3–1)
MONOCYTES NFR BLD: 6 % (ref 4–15)
NEUTROPHILS # BLD AUTO: 8.3 K/UL (ref 1.8–7.7)
NEUTROPHILS NFR BLD: 77 % (ref 38–73)
NRBC BLD-RTO: 0 /100 WBC
PLATELET # BLD AUTO: 219 K/UL (ref 150–350)
PMV BLD AUTO: 11.3 FL (ref 9.2–12.9)
POTASSIUM SERPL-SCNC: 4 MMOL/L (ref 3.5–5.1)
PROT SERPL-MCNC: 7.5 G/DL (ref 6–8.4)
PROTHROMBIN TIME: 10.5 SEC (ref 9–12.5)
RBC # BLD AUTO: 3.86 M/UL (ref 4–5.4)
SODIUM SERPL-SCNC: 142 MMOL/L (ref 136–145)
TROPONIN I SERPL DL<=0.01 NG/ML-MCNC: 0.01 NG/ML (ref 0–0.03)
TROPONIN I SERPL DL<=0.01 NG/ML-MCNC: 0.01 NG/ML (ref 0–0.03)
WBC # BLD AUTO: 10.71 K/UL (ref 3.9–12.7)

## 2019-10-12 PROCEDURE — 80100014 HC HEMODIALYSIS 1:1: Mod: NTX

## 2019-10-12 PROCEDURE — 63600175 PHARM REV CODE 636 W HCPCS: Mod: NTX | Performed by: EMERGENCY MEDICINE

## 2019-10-12 PROCEDURE — 85610 PROTHROMBIN TIME: CPT | Mod: NTX

## 2019-10-12 PROCEDURE — 83605 ASSAY OF LACTIC ACID: CPT | Mod: NTX

## 2019-10-12 PROCEDURE — 25000242 PHARM REV CODE 250 ALT 637 W/ HCPCS: Mod: NTX | Performed by: EMERGENCY MEDICINE

## 2019-10-12 PROCEDURE — 85730 THROMBOPLASTIN TIME PARTIAL: CPT | Mod: NTX

## 2019-10-12 PROCEDURE — 99285 EMERGENCY DEPT VISIT HI MDM: CPT | Mod: 25,NTX

## 2019-10-12 PROCEDURE — 85025 COMPLETE CBC W/AUTO DIFF WBC: CPT | Mod: NTX

## 2019-10-12 PROCEDURE — 63600175 PHARM REV CODE 636 W HCPCS: Mod: NTX | Performed by: HOSPITALIST

## 2019-10-12 PROCEDURE — 25000003 PHARM REV CODE 250: Mod: NTX | Performed by: HOSPITALIST

## 2019-10-12 PROCEDURE — 94640 AIRWAY INHALATION TREATMENT: CPT | Mod: NTX

## 2019-10-12 PROCEDURE — 83880 ASSAY OF NATRIURETIC PEPTIDE: CPT | Mod: NTX

## 2019-10-12 PROCEDURE — 36415 COLL VENOUS BLD VENIPUNCTURE: CPT | Mod: NTX

## 2019-10-12 PROCEDURE — 20000000 HC ICU ROOM: Mod: NTX

## 2019-10-12 PROCEDURE — 96374 THER/PROPH/DIAG INJ IV PUSH: CPT | Mod: NTX

## 2019-10-12 PROCEDURE — 93005 ELECTROCARDIOGRAM TRACING: CPT | Mod: NTX

## 2019-10-12 PROCEDURE — 83735 ASSAY OF MAGNESIUM: CPT | Mod: NTX

## 2019-10-12 PROCEDURE — 87040 BLOOD CULTURE FOR BACTERIA: CPT | Mod: 59,NTX

## 2019-10-12 PROCEDURE — 27100171 HC OXYGEN HIGH FLOW UP TO 24 HOURS: Mod: NTX

## 2019-10-12 PROCEDURE — 84484 ASSAY OF TROPONIN QUANT: CPT | Mod: 91,NTX

## 2019-10-12 PROCEDURE — 25000003 PHARM REV CODE 250: Mod: NTX | Performed by: EMERGENCY MEDICINE

## 2019-10-12 PROCEDURE — 94644 CONT INHLJ TX 1ST HOUR: CPT | Mod: NTX

## 2019-10-12 PROCEDURE — 93010 EKG 12-LEAD: ICD-10-PCS | Mod: NTX,,, | Performed by: INTERNAL MEDICINE

## 2019-10-12 PROCEDURE — 84484 ASSAY OF TROPONIN QUANT: CPT | Mod: NTX

## 2019-10-12 PROCEDURE — 93010 ELECTROCARDIOGRAM REPORT: CPT | Mod: NTX,,, | Performed by: INTERNAL MEDICINE

## 2019-10-12 PROCEDURE — 80053 COMPREHEN METABOLIC PANEL: CPT | Mod: NTX

## 2019-10-12 RX ORDER — SODIUM CHLORIDE 0.9 % (FLUSH) 0.9 %
10 SYRINGE (ML) INJECTION
Status: DISCONTINUED | OUTPATIENT
Start: 2019-10-12 | End: 2019-10-15 | Stop reason: HOSPADM

## 2019-10-12 RX ORDER — METOPROLOL SUCCINATE 50 MG/1
100 TABLET, EXTENDED RELEASE ORAL
Status: ACTIVE | OUTPATIENT
Start: 2019-10-12 | End: 2019-10-13

## 2019-10-12 RX ORDER — METOPROLOL SUCCINATE 50 MG/1
100 TABLET, EXTENDED RELEASE ORAL DAILY
Status: DISCONTINUED | OUTPATIENT
Start: 2019-10-13 | End: 2019-10-15 | Stop reason: HOSPADM

## 2019-10-12 RX ORDER — HYDRALAZINE HYDROCHLORIDE 25 MG/1
50 TABLET, FILM COATED ORAL
Status: COMPLETED | OUTPATIENT
Start: 2019-10-12 | End: 2019-10-12

## 2019-10-12 RX ORDER — HYDRALAZINE HYDROCHLORIDE 20 MG/ML
10 INJECTION INTRAMUSCULAR; INTRAVENOUS
Status: ACTIVE | OUTPATIENT
Start: 2019-10-12 | End: 2019-10-13

## 2019-10-12 RX ORDER — AMLODIPINE BESYLATE 5 MG/1
10 TABLET ORAL NIGHTLY
Status: DISCONTINUED | OUTPATIENT
Start: 2019-10-12 | End: 2019-10-15 | Stop reason: HOSPADM

## 2019-10-12 RX ORDER — IPRATROPIUM BROMIDE 0.5 MG/2.5ML
1 SOLUTION RESPIRATORY (INHALATION)
Status: COMPLETED | OUTPATIENT
Start: 2019-10-12 | End: 2019-10-12

## 2019-10-12 RX ORDER — ALBUTEROL SULFATE 2.5 MG/.5ML
10 SOLUTION RESPIRATORY (INHALATION)
Status: COMPLETED | OUTPATIENT
Start: 2019-10-12 | End: 2019-10-12

## 2019-10-12 RX ORDER — HYDRALAZINE HYDROCHLORIDE 20 MG/ML
10 INJECTION INTRAMUSCULAR; INTRAVENOUS EVERY 8 HOURS PRN
Status: DISCONTINUED | OUTPATIENT
Start: 2019-10-12 | End: 2019-10-15 | Stop reason: HOSPADM

## 2019-10-12 RX ORDER — CEFEPIME HYDROCHLORIDE 1 G/50ML
2 INJECTION, SOLUTION INTRAVENOUS
Status: COMPLETED | OUTPATIENT
Start: 2019-10-12 | End: 2019-10-12

## 2019-10-12 RX ORDER — SEVELAMER CARBONATE 800 MG/1
1600 TABLET, FILM COATED ORAL
Status: DISCONTINUED | OUTPATIENT
Start: 2019-10-13 | End: 2019-10-15 | Stop reason: HOSPADM

## 2019-10-12 RX ORDER — HYDRALAZINE HYDROCHLORIDE 25 MG/1
50 TABLET, FILM COATED ORAL EVERY 8 HOURS
Status: DISCONTINUED | OUTPATIENT
Start: 2019-10-12 | End: 2019-10-13

## 2019-10-12 RX ORDER — HEPARIN SODIUM 5000 [USP'U]/ML
5000 INJECTION, SOLUTION INTRAVENOUS; SUBCUTANEOUS EVERY 12 HOURS
Status: DISCONTINUED | OUTPATIENT
Start: 2019-10-12 | End: 2019-10-15 | Stop reason: HOSPADM

## 2019-10-12 RX ORDER — AMLODIPINE BESYLATE 5 MG/1
10 TABLET ORAL
Status: ACTIVE | OUTPATIENT
Start: 2019-10-12 | End: 2019-10-13

## 2019-10-12 RX ORDER — ACETAMINOPHEN 325 MG/1
650 TABLET ORAL EVERY 6 HOURS PRN
Status: DISCONTINUED | OUTPATIENT
Start: 2019-10-12 | End: 2019-10-15 | Stop reason: HOSPADM

## 2019-10-12 RX ORDER — SEVELAMER HYDROCHLORIDE 400 MG/1
1600 TABLET, FILM COATED ORAL
Status: ON HOLD | COMMUNITY
End: 2019-10-15 | Stop reason: SDUPTHER

## 2019-10-12 RX ADMIN — HEPARIN SODIUM 5000 UNITS: 5000 INJECTION INTRAVENOUS; SUBCUTANEOUS at 09:10

## 2019-10-12 RX ADMIN — CEFEPIME HYDROCHLORIDE 2 G: 2 INJECTION, SOLUTION INTRAVENOUS at 03:10

## 2019-10-12 RX ADMIN — VANCOMYCIN HYDROCHLORIDE 2000 MG: 1 INJECTION, POWDER, LYOPHILIZED, FOR SOLUTION INTRAVENOUS at 04:10

## 2019-10-12 RX ADMIN — AMLODIPINE BESYLATE 10 MG: 5 TABLET ORAL at 09:10

## 2019-10-12 RX ADMIN — ALBUTEROL SULFATE 10 MG: 2.5 SOLUTION RESPIRATORY (INHALATION) at 02:10

## 2019-10-12 RX ADMIN — HYDRALAZINE HYDROCHLORIDE 50 MG: 25 TABLET, FILM COATED ORAL at 02:10

## 2019-10-12 RX ADMIN — HYDRALAZINE HYDROCHLORIDE 50 MG: 25 TABLET ORAL at 10:10

## 2019-10-12 RX ADMIN — HYDRALAZINE HYDROCHLORIDE 10 MG: 20 INJECTION INTRAMUSCULAR; INTRAVENOUS at 06:10

## 2019-10-12 RX ADMIN — IPRATROPIUM BROMIDE 1 MG: 0.5 SOLUTION RESPIRATORY (INHALATION) at 02:10

## 2019-10-12 NOTE — HPI
63 y.o. Female with a PMHx of Gallstones, HTN, and Tobacco abuse presents to the ED with a cc of shortness of breath since last night and worsened today. She also c/o weakness with exertion, chills, and productive cough with green sputum. She did not take bp medication this morning and states she is not on O2 at home. She reports a full dialysis session yesterday. She is a smoker. She denies recent travel or IV drug-use. She denies leg pain, leg swelling, abdominal pain, rhinorrhea or NVD. No prior tx.       Patient's O2 sat on room air was 83%.  Patient states that she is not on home oxygen.  Patient went to HD yesterday and symptoms progressed.  Admit to ICU, resume home meds and emergent HD. May need cardene.

## 2019-10-12 NOTE — H&P
[facility] Hospital Medicine  History & Physical    Patient Name: April Vasquez  MRN: [id number]  Admission Date: 10/12/2019  Attending Physician: Tanya Bassett MD   Primary Care Provider: Nasra Iraheta MD         Patient information was obtained from patient and ER records.     Subjective:     Principal Problem:Acute pulmonary edema    Chief Complaint:   Chief Complaint   Patient presents with    Shortness of Breath     PT reports feels SOB since last night worsened throughout the day. RA O2 sat: 83%. Pt reports had dialysis yesterday.     Chest Pain     Pt reports mid sternal Chest tightness        HPI: 63 y.o. Female with a PMHx of Gallstones, HTN, and Tobacco abuse presents to the ED with a cc of shortness of breath since last night and worsened today. She also c/o weakness with exertion, chills, and productive cough with green sputum. She did not take bp medication this morning and states she is not on O2 at home. She reports a full dialysis session yesterday. She is a smoker. She denies recent travel or IV drug-use. She denies leg pain, leg swelling, abdominal pain, rhinorrhea or NVD. No prior tx.       Patient's O2 sat on room air was 83%.  Patient states that she is not on home oxygen.  Patient went to HD yesterday and symptoms progressed.  Admit to ICU, resume home meds and emergent HD. May need cardene.      Past Medical History:   Diagnosis Date    ESRD (end stage renal disease) on dialysis 2017    Gallstones     Hypertension     Renal disorder     dialysis MIRANDA fistula    Tobacco abuse        Past Surgical History:   Procedure Laterality Date    AVF  2017    CHOLECYSTECTOMY  2016    Permacath Right 2017    TUBAL LIGATION         Review of patient's allergies indicates:  No Known Allergies    No current facility-administered medications on file prior to encounter.      Current Outpatient Medications on File Prior to Encounter   Medication Sig    amlodipine (NORVASC) 10  MG tablet Take 10 mg by mouth once daily.    hydrALAZINE (APRESOLINE) 50 MG tablet Take 75 mg by mouth 3 (three) times daily.    metoprolol succinate (TOPROL-XL) 100 MG 24 hr tablet Take 100 mg by mouth.    sevelamer (RENAGEL) 400 MG Tab Take 1,600 mg by mouth.     Family History     Problem Relation (Age of Onset)    Cervical cancer Mother    Diabetes Father    Drug abuse Paternal Grandmother    Hypertension Mother    Kidney disease Mother        Tobacco Use    Smoking status: Current Every Day Smoker     Packs/day: 0.50     Years: 20.00     Pack years: 10.00    Smokeless tobacco: Never Used   Substance and Sexual Activity    Alcohol use: No    Drug use: No    Sexual activity: Yes     Partners: Male     Birth control/protection: Post-menopausal     Review of Systems   Constitutional: Positive for chills and fatigue.   HENT: Positive for congestion.    Eyes: Negative.    Respiratory: Positive for cough and shortness of breath.    Cardiovascular: Negative.    Gastrointestinal: Negative.    Endocrine: Positive for cold intolerance.   Genitourinary: Negative.    Musculoskeletal: Positive for arthralgias, back pain and gait problem.   Skin: Positive for pallor.   Neurological: Positive for weakness.   Psychiatric/Behavioral: Positive for dysphoric mood.     Objective:     Vital Signs (Most Recent):  Temp: 98.5 °F (36.9 °C) (10/12/19 1701)  Pulse: 82 (10/12/19 1715)  Resp: (!) 31 (10/12/19 1715)  BP: (!) 211/99 (10/12/19 1715)  SpO2: 100 % (10/12/19 1715) Vital Signs (24h Range):  Temp:  [98.3 °F (36.8 °C)-98.7 °F (37.1 °C)] 98.5 °F (36.9 °C)  Pulse:  [79-90] 82  Resp:  [17-31] 31  SpO2:  [83 %-100 %] 100 %  BP: (211-220)/() 211/99     Weight: 58.2 kg (128 lb 4.9 oz)  Body mass index is 22.02 kg/m².    Physical Exam   Constitutional: She is oriented to person, place, and time.   Thin , ill appearing female    HENT:   Head: Normocephalic and atraumatic.   Mouth/Throat: Oropharynx is clear and moist.   Eyes:  Pupils are equal, round, and reactive to light. EOM are normal. No scleral icterus.   Neck: Normal range of motion. Neck supple.   Cardiovascular: Normal rate, regular rhythm, normal heart sounds and intact distal pulses.   Pulmonary/Chest: Effort normal. She has no wheezes. She has rales.   Abdominal: Soft. Bowel sounds are normal. She exhibits no distension.   + bruit   Musculoskeletal: Normal range of motion. She exhibits no edema.   Neurological: She is alert and oriented to person, place, and time.   Skin: Skin is warm and dry. Capillary refill takes 2 to 3 seconds. There is pallor.   Psychiatric: Her behavior is normal.   Flat affect          CRANIAL NERVES     CN III, IV, VI   Pupils are equal, round, and reactive to light.  Extraocular motions are normal.        Significant Labs:   CBC:   Recent Labs   Lab 10/12/19  1407   WBC 10.71   HGB 11.0*   HCT 34.6*        CMP:   Recent Labs   Lab 10/12/19  1407      K 4.0   CL 96   CO2 30*      BUN 30*   CREATININE 6.8*   CALCIUM 10.1   PROT 7.5   ALBUMIN 3.8   BILITOT 0.5   ALKPHOS 108   AST 13   ALT 11   ANIONGAP 16   EGFRNONAA 6*     Cardiac Markers:   Recent Labs   Lab 10/12/19  1407   BNP 2,438*     Coagulation:   Recent Labs   Lab 10/12/19  1407   INR 1.0   APTT 29.6     Lipid Panel: No results for input(s): CHOL, HDL, LDLCALC, TRIG, CHOLHDL in the last 48 hours.  Magnesium:   Recent Labs   Lab 10/12/19  1407   MG 2.4     POCT Glucose: No results for input(s): POCTGLUCOSE in the last 48 hours.  Troponin:   Recent Labs   Lab 10/12/19  1407   TROPONINI 0.008     Urine Studies: No results for input(s): COLORU, APPEARANCEUA, PHUR, SPECGRAV, PROTEINUA, GLUCUA, KETONESU, BILIRUBINUA, OCCULTUA, NITRITE, UROBILINOGEN, LEUKOCYTESUR, RBCUA, WBCUA, BACTERIA, SQUAMEPITHEL, HYALINECASTS in the last 48 hours.    Invalid input(s): WRIGHTSUR    Significant Imaging: CXR:  Impression       Findings suggesting worsening pulmonary edema or interstitial  pneumonia.  No focal consolidation.         Assessment/Plan:     * Acute pulmonary edema  Emergent HD for volume control  Bp control       Malignant hypertension  See above      Hypertension  Uncontrolled with persistent SBP>200  Resume home meds  IV hydralazine  Cardene if not improved with oral meds and HD      ESRD (end stage renal disease)  Nephrology consulted       Non-compliance  Appears depressed and likely contributing   PT is full code      Tobacco abuse  Nicotine patch       Severe malnutrition  Pt reports poor appetite         VTE Risk Mitigation (From admission, onward)         Ordered     IP VTE HIGH RISK PATIENT  Once      10/12/19 1659     Place sequential compression device  Until discontinued      10/12/19 1659     Place KAM hose  Until discontinued      10/12/19 1659              Critical care time spent on the evaluation and treatment of severe organ dysfunction, review of pertinent labs and imaging studies, discussions with consulting providers and discussions with patient/family: 40 minutes.     Tanya Bassett MD  Department of Hospital Medicine   [facility]

## 2019-10-12 NOTE — SUBJECTIVE & OBJECTIVE
Past Medical History:   Diagnosis Date    ESRD (end stage renal disease) on dialysis 2017    Gallstones     Hypertension     Renal disorder     dialysis MIRANDA fistula    Tobacco abuse        Past Surgical History:   Procedure Laterality Date    AVF  2017    CHOLECYSTECTOMY  2016    Permacath Right 2017    TUBAL LIGATION         Review of patient's allergies indicates:  No Known Allergies    No current facility-administered medications on file prior to encounter.      Current Outpatient Medications on File Prior to Encounter   Medication Sig    amlodipine (NORVASC) 10 MG tablet Take 10 mg by mouth once daily.    hydrALAZINE (APRESOLINE) 50 MG tablet Take 75 mg by mouth 3 (three) times daily.    metoprolol succinate (TOPROL-XL) 100 MG 24 hr tablet Take 100 mg by mouth.    sevelamer (RENAGEL) 400 MG Tab Take 1,600 mg by mouth.     Family History     Problem Relation (Age of Onset)    Cervical cancer Mother    Diabetes Father    Drug abuse Paternal Grandmother    Hypertension Mother    Kidney disease Mother        Tobacco Use    Smoking status: Current Every Day Smoker     Packs/day: 0.50     Years: 20.00     Pack years: 10.00    Smokeless tobacco: Never Used   Substance and Sexual Activity    Alcohol use: No    Drug use: No    Sexual activity: Yes     Partners: Male     Birth control/protection: Post-menopausal     Review of Systems   Constitutional: Positive for chills and fatigue.   HENT: Positive for congestion.    Eyes: Negative.    Respiratory: Positive for cough and shortness of breath.    Cardiovascular: Negative.    Gastrointestinal: Negative.    Endocrine: Positive for cold intolerance.   Genitourinary: Negative.    Musculoskeletal: Positive for arthralgias, back pain and gait problem.   Skin: Positive for pallor.   Neurological: Positive for weakness.   Psychiatric/Behavioral: Positive for dysphoric mood.     Objective:     Vital Signs (Most Recent):  Temp: 98.5 °F (36.9 °C) (10/12/19  1701)  Pulse: 82 (10/12/19 1715)  Resp: (!) 31 (10/12/19 1715)  BP: (!) 211/99 (10/12/19 1715)  SpO2: 100 % (10/12/19 1715) Vital Signs (24h Range):  Temp:  [98.3 °F (36.8 °C)-98.7 °F (37.1 °C)] 98.5 °F (36.9 °C)  Pulse:  [79-90] 82  Resp:  [17-31] 31  SpO2:  [83 %-100 %] 100 %  BP: (211-220)/() 211/99     Weight: 58.2 kg (128 lb 4.9 oz)  Body mass index is 22.02 kg/m².    Physical Exam   Constitutional: She is oriented to person, place, and time.   Thin , ill appearing female    HENT:   Head: Normocephalic and atraumatic.   Mouth/Throat: Oropharynx is clear and moist.   Eyes: Pupils are equal, round, and reactive to light. EOM are normal. No scleral icterus.   Neck: Normal range of motion. Neck supple.   Cardiovascular: Normal rate, regular rhythm, normal heart sounds and intact distal pulses.   Pulmonary/Chest: Effort normal. She has no wheezes. She has rales.   Abdominal: Soft. Bowel sounds are normal. She exhibits no distension.   + bruit   Musculoskeletal: Normal range of motion. She exhibits no edema.   Neurological: She is alert and oriented to person, place, and time.   Skin: Skin is warm and dry. Capillary refill takes 2 to 3 seconds. There is pallor.   Psychiatric: Her behavior is normal.   Flat affect          CRANIAL NERVES     CN III, IV, VI   Pupils are equal, round, and reactive to light.  Extraocular motions are normal.        Significant Labs:   CBC:   Recent Labs   Lab 10/12/19  1407   WBC 10.71   HGB 11.0*   HCT 34.6*        CMP:   Recent Labs   Lab 10/12/19  1407      K 4.0   CL 96   CO2 30*      BUN 30*   CREATININE 6.8*   CALCIUM 10.1   PROT 7.5   ALBUMIN 3.8   BILITOT 0.5   ALKPHOS 108   AST 13   ALT 11   ANIONGAP 16   EGFRNONAA 6*     Cardiac Markers:   Recent Labs   Lab 10/12/19  1407   BNP 2,438*     Coagulation:   Recent Labs   Lab 10/12/19  1407   INR 1.0   APTT 29.6     Lipid Panel: No results for input(s): CHOL, HDL, LDLCALC, TRIG, CHOLHDL in the last 48  hours.  Magnesium:   Recent Labs   Lab 10/12/19  1407   MG 2.4     POCT Glucose: No results for input(s): POCTGLUCOSE in the last 48 hours.  Troponin:   Recent Labs   Lab 10/12/19  1407   TROPONINI 0.008     Urine Studies: No results for input(s): COLORU, APPEARANCEUA, PHUR, SPECGRAV, PROTEINUA, GLUCUA, KETONESU, BILIRUBINUA, OCCULTUA, NITRITE, UROBILINOGEN, LEUKOCYTESUR, RBCUA, WBCUA, BACTERIA, SQUAMEPITHEL, HYALINECASTS in the last 48 hours.    Invalid input(s): WRIGHTSUR    Significant Imaging: CXR:  Impression       Findings suggesting worsening pulmonary edema or interstitial pneumonia.  No focal consolidation.

## 2019-10-12 NOTE — ED PROVIDER NOTES
Encounter Date: 10/12/2019    SCRIBE #1 NOTE: I, Betzaida Barnes, am scribing for, and in the presence of,  Sid Ruiz MD. I have scribed the following portions of the note - Other sections scribed: HPI, ROS, PE.       History     Chief Complaint   Patient presents with    Shortness of Breath     PT reports feels SOB since last night worsened throughout the day. RA O2 sat: 83%. Pt reports had dialysis yesterday.     Chest Pain     Pt reports mid sternal Chest tightness     CC: Shortness of Breath    HPI: This 63 y.o. Female with a PMHx of Gallstones, HTN, and Tobacco abuse presents to the ED with a cc of shortness of breath since last night and worsened today. She also c/o weakness with exertion, chills, and productive cough with green sputum. She did not take bp medication this morning and states she is not on O2 at home. She reports a full dialysis session yesterday. She is a smoker. She denies recent travel or IV drug-use. She denies leg pain, leg swelling, abdominal pain, rhinorrhea or NVD. No prior tx.      Patient's O2 sat on room air was 83%.  Patient states that she is not on home oxygen.    The history is provided by the patient. No  was used.     Review of patient's allergies indicates:  No Known Allergies  Past Medical History:   Diagnosis Date    ESRD (end stage renal disease) on dialysis 2017    Gallstones     Hypertension     Renal disorder     dialysis MIRANDA fistula    Tobacco abuse      Past Surgical History:   Procedure Laterality Date    AVF  2017    CHOLECYSTECTOMY  2016    Permacath Right 2017    TUBAL LIGATION       Family History   Problem Relation Age of Onset    Kidney disease Mother     Hypertension Mother     Cervical cancer Mother     Diabetes Father     Drug abuse Paternal Grandmother     Breast cancer Neg Hx     Colon cancer Neg Hx     Ovarian cancer Neg Hx      Social History     Tobacco Use    Smoking status: Current Every Day Smoker      Packs/day: 0.50     Years: 20.00     Pack years: 10.00    Smokeless tobacco: Never Used   Substance Use Topics    Alcohol use: No    Drug use: No     Review of Systems   Constitutional: Positive for chills. Negative for fever.   HENT: Negative for congestion and sore throat.    Eyes: Negative for pain and visual disturbance.   Respiratory: Positive for cough (productive) and shortness of breath. Negative for chest tightness.    Cardiovascular: Negative for chest pain.   Gastrointestinal: Negative for nausea.   Endocrine: Negative for polydipsia and polyuria.   Genitourinary: Negative for dysuria and flank pain.   Musculoskeletal: Negative for back pain, neck pain and neck stiffness.   Skin: Negative for rash.   Allergic/Immunologic: Negative for immunocompromised state.   Neurological: Positive for weakness (w extertion).       Physical Exam     Initial Vitals [10/12/19 1348]   BP Pulse Resp Temp SpO2   (!) 215/97 81 (!) 30 98.3 °F (36.8 °C) (!) 83 %      MAP       --         Physical Exam    Nursing note and vitals reviewed.  Constitutional: She appears well-developed and well-nourished.   HENT:   Head: Normocephalic.   Right Ear: External ear normal.   Left Ear: External ear normal.   Eyes: EOM are normal. Pupils are equal, round, and reactive to light.   Neck: Normal range of motion. Neck supple.   Cardiovascular: Normal rate, regular rhythm and normal heart sounds.   There is a thrill to L AV fistula.    Pulmonary/Chest: Effort normal. No respiratory distress. She has decreased breath sounds.   She is a weak elderly female in bed with diminished breath sounds and prolonged exhalatory phases. There is minimal air movement.    Abdominal: Soft. Normal appearance. There is no tenderness.   Musculoskeletal: Normal range of motion. She exhibits no edema.   Neurological: She is alert and oriented to person, place, and time.   Skin: Skin is warm and dry.   Psychiatric: She has a normal mood and affect. Her behavior  is normal.         ED Course   Procedures  Labs Reviewed   B-TYPE NATRIURETIC PEPTIDE - Abnormal; Notable for the following components:       Result Value    BNP 2,438 (*)     All other components within normal limits   CBC W/ AUTO DIFFERENTIAL - Abnormal; Notable for the following components:    RBC 3.86 (*)     Hemoglobin 11.0 (*)     Hematocrit 34.6 (*)     Mean Corpuscular Hemoglobin Conc 31.8 (*)     RDW 16.0 (*)     Gran # (ANC) 8.3 (*)     Gran% 77.0 (*)     Lymph% 13.6 (*)     All other components within normal limits   COMPREHENSIVE METABOLIC PANEL - Abnormal; Notable for the following components:    CO2 30 (*)     BUN, Bld 30 (*)     Creatinine 6.8 (*)     eGFR if  7 (*)     eGFR if non  6 (*)     All other components within normal limits   CULTURE, BLOOD   CULTURE, BLOOD   MAGNESIUM   TROPONIN I   APTT   PROTIME-INR   LACTIC ACID, PLASMA     EKG Readings: (Independently Interpreted)   EKG done 1351 showed normal sinus rhythm rate of 78.  No ST elevation major T-wave abnormality.  Normal axis QRS.  Compared to previous EKG and no major changes.  Nonspecific EKG.       Imaging Results           X-Ray Chest AP Portable (Final result)  Result time 10/12/19 14:22:39    Final result by Theo Mondragon MD (10/12/19 14:22:39)                 Impression:      Findings suggesting worsening pulmonary edema or interstitial pneumonia.  No focal consolidation.    This report was flagged in Epic as abnormal.      Electronically signed by: Theo Mondragon MD  Date:    10/12/2019  Time:    14:22             Narrative:    EXAMINATION:  XR CHEST AP PORTABLE    CLINICAL HISTORY:  sob;    TECHNIQUE:  Single frontal view of the chest was performed.    COMPARISON:  Chest radiograph 08/04/2019    FINDINGS:  Monitoring leads overlie the chest.  Cardiomediastinal silhouette is midline and similar to prior.  Bilateral diffuse nonspecific interstitial coarsening increased from prior with continued  central peribronchial cuffing suggesting worsening pulmonary interstitial pneumonia.  Left mid to lower lung zone platelike scarring versus atelectasis similar to prior.  The lungs are otherwise symmetrically well expanded without large consolidation, pleural effusion or pneumothorax.  Calcific atherosclerosis of the arch.  No acute osseous process seen.  PA and lateral views can be obtained.                                 Medical Decision Making:   Initial Assessment:   63-year-old female presenting secondary to shortness of breath.  COPD versus CHF versus pulmonary edema versus pneumonia considered the highest on the differential.  Less likely pneumothorax or esophageal rupture or dissection.  No unilateral leg swelling and lungs less consistent with PE or DVT.  No signs of cellulitis or abscess or trauma.  Breathing treatments are for the patient along with oxygen.  Patient is not on oxygen at home.  O2 sat was 83% on room air.  Lactic acid ordered on the patient.    Chest x-ray leading more towards pulmonary edema but also interstitial pneumonia is considered.  Patient states that this feels like when she had her pneumonia.  Patient started on broad-spectrum antibiotics.  Patient did get a 30 milliliter/kilogram bolus secondary to end-stage renal disease.    Labs are notable for are reassuring lactic acid.  No white count.  BNP is elevated.    Chemistry showed labs consistent with end-stage renal disease.  I spoke with Dr. Mcclure and then nephrology.  They will try to dialyze patient this afternoon.  Patient agreeable with admission.  Home medications given to patient.  IV hydralazine.  Discussed with Dr. Mcclure nicardipine drip will hold off to see a p.o. medications and p.r.n. hydralazine is will help.    Please put in 35 minutes of critical care due to patient having a high risk of respiratory failure.   Separate from teaching and exclusive of procedure and ekg time  Includes:  Time at bedside  Time  reviewing test results  Time discussing case with staff  Time documenting the medical record  Time spent with family members  Time spent with consults  Management            Clinical Tests:   Lab Tests: Ordered and Reviewed  Radiological Study: Reviewed and Ordered  Medical Tests: Ordered and Reviewed                      Clinical Impression:       ICD-10-CM ICD-9-CM   1. Acute pulmonary edema J81.0 518.4   2. SOB (shortness of breath) R06.02 786.05   3. Hypoxia R09.02 799.02     I, Sid Ruiz, personally performed the services described in this documentation. All medical record entries made by the scribe were at my direction and in my presence.  I have reviewed the chart and agree that the record reflects my personal performance and is accurate and complete.                           Sid Ruiz MD  10/12/19 6542       Sid Ruiz MD  10/12/19 1615

## 2019-10-12 NOTE — ED NOTES
64y/o F to ED for increasing SOB over the last 24 hrs. Pt is on dialysis MWF. Pt report compliance with meds and dialysis. Fistula noted. + thrill and bruit noted.   Patient identifiers verified and correct for April Vasquez.    LOC: The patient is awake, alert and aware of environment with an appropriate affect, the patient is oriented x 3 and speaking appropriately.  APPEARANCE: Patient resting comfortably and in no acute distress, patient is clean and well groomed, patient's clothing is properly fastened.  SKIN: The skin is warm and dry, color consistent with ethnicity, patient has normal skin turgor and moist mucus membranes, skin intact, no breakdown or bruising noted.  MUSCULOSKELETAL: Patient moving all extremities spontaneously, no obvious swelling or deformities noted.  RESPIRATORY: Airway is open and patent, respirations are spontaneous, patient has a increased effort and rate, no accessory muscle use noted, bilateral breath sounds diminished and tight.  CARDIAC: Patient has a normal rate and regular rhythm, no periphreal edema noted, capillary refill < 3 seconds.  ABDOMEN: Soft and non tender to palpation, no distention noted, normoactive bowel sounds present in all four quadrants.  NEUROLOGIC:eyes open spontaneously, behavior appropriate to situation, follows commands, facial expression symmetrical, bilateral hand grasp equal and even, purposeful motor response noted, normal sensation in all extremities when touched with a finger.   safety measures in place. Plan of care discussed.

## 2019-10-12 NOTE — ASSESSMENT & PLAN NOTE
Uncontrolled with persistent SBP>200  Resume home meds  IV hydralazine  Cardene if not improved with oral meds and HD

## 2019-10-12 NOTE — PLAN OF CARE
Admitted to hospital with c/o shortness of breath.  Found to be hypertensive and is to be ultrafiltrated this PM by hemodialysis RN.  Pulse ox 100% on 4L nasal cannula

## 2019-10-13 LAB
ALBUMIN SERPL BCP-MCNC: 3.3 G/DL (ref 3.5–5.2)
ALBUMIN SERPL BCP-MCNC: 3.8 G/DL (ref 3.5–5.2)
ALP SERPL-CCNC: 54 U/L (ref 55–135)
ALP SERPL-CCNC: 93 U/L (ref 55–135)
ALT SERPL W/O P-5'-P-CCNC: 10 U/L (ref 10–44)
ALT SERPL W/O P-5'-P-CCNC: 16 U/L (ref 10–44)
ANION GAP SERPL CALC-SCNC: 18 MMOL/L (ref 8–16)
ANION GAP SERPL CALC-SCNC: 8 MMOL/L (ref 8–16)
AST SERPL-CCNC: 13 U/L (ref 10–40)
AST SERPL-CCNC: 14 U/L (ref 10–40)
BASOPHILS # BLD AUTO: 0.05 K/UL (ref 0–0.2)
BASOPHILS NFR BLD: 0.5 % (ref 0–1.9)
BILIRUB SERPL-MCNC: 0.6 MG/DL (ref 0.1–1)
BILIRUB SERPL-MCNC: 0.6 MG/DL (ref 0.1–1)
BUN SERPL-MCNC: 41 MG/DL (ref 8–23)
BUN SERPL-MCNC: 7 MG/DL (ref 8–23)
CALCIUM SERPL-MCNC: 10.8 MG/DL (ref 8.7–10.5)
CALCIUM SERPL-MCNC: 8.8 MG/DL (ref 8.7–10.5)
CHLORIDE SERPL-SCNC: 108 MMOL/L (ref 95–110)
CHLORIDE SERPL-SCNC: 94 MMOL/L (ref 95–110)
CO2 SERPL-SCNC: 23 MMOL/L (ref 23–29)
CO2 SERPL-SCNC: 25 MMOL/L (ref 23–29)
CREAT SERPL-MCNC: 0.7 MG/DL (ref 0.5–1.4)
CREAT SERPL-MCNC: 8.6 MG/DL (ref 0.5–1.4)
DIFFERENTIAL METHOD: ABNORMAL
EOSINOPHIL # BLD AUTO: 0 K/UL (ref 0–0.5)
EOSINOPHIL NFR BLD: 0.4 % (ref 0–8)
ERYTHROCYTE [DISTWIDTH] IN BLOOD BY AUTOMATED COUNT: 15.9 % (ref 11.5–14.5)
EST. GFR  (AFRICAN AMERICAN): 5 ML/MIN/1.73 M^2
EST. GFR  (AFRICAN AMERICAN): >60 ML/MIN/1.73 M^2
EST. GFR  (NON AFRICAN AMERICAN): 5 ML/MIN/1.73 M^2
EST. GFR  (NON AFRICAN AMERICAN): >60 ML/MIN/1.73 M^2
GLUCOSE SERPL-MCNC: 102 MG/DL (ref 70–110)
GLUCOSE SERPL-MCNC: 149 MG/DL (ref 70–110)
HCT VFR BLD AUTO: 32 % (ref 37–48.5)
HGB BLD-MCNC: 10.3 G/DL (ref 12–16)
IMM GRANULOCYTES # BLD AUTO: 0.02 K/UL (ref 0–0.04)
IMM GRANULOCYTES NFR BLD AUTO: 0.2 % (ref 0–0.5)
LYMPHOCYTES # BLD AUTO: 1.4 K/UL (ref 1–4.8)
LYMPHOCYTES NFR BLD: 13.2 % (ref 18–48)
MCH RBC QN AUTO: 28.2 PG (ref 27–31)
MCHC RBC AUTO-ENTMCNC: 32.2 G/DL (ref 32–36)
MCV RBC AUTO: 88 FL (ref 82–98)
MONOCYTES # BLD AUTO: 0.7 K/UL (ref 0.3–1)
MONOCYTES NFR BLD: 6.7 % (ref 4–15)
NEUTROPHILS # BLD AUTO: 8.7 K/UL (ref 1.8–7.7)
NEUTROPHILS NFR BLD: 79 % (ref 38–73)
NRBC BLD-RTO: 0 /100 WBC
PLATELET # BLD AUTO: 204 K/UL (ref 150–350)
PMV BLD AUTO: 11.7 FL (ref 9.2–12.9)
POTASSIUM SERPL-SCNC: 4 MMOL/L (ref 3.5–5.1)
POTASSIUM SERPL-SCNC: 4 MMOL/L (ref 3.5–5.1)
PROT SERPL-MCNC: 6.6 G/DL (ref 6–8.4)
PROT SERPL-MCNC: 7.8 G/DL (ref 6–8.4)
RBC # BLD AUTO: 3.65 M/UL (ref 4–5.4)
SODIUM SERPL-SCNC: 137 MMOL/L (ref 136–145)
SODIUM SERPL-SCNC: 139 MMOL/L (ref 136–145)
TROPONIN I SERPL DL<=0.01 NG/ML-MCNC: 0.01 NG/ML (ref 0–0.03)
WBC # BLD AUTO: 10.94 K/UL (ref 3.9–12.7)

## 2019-10-13 PROCEDURE — 99222 PR INITIAL HOSPITAL CARE,LEVL II: ICD-10-PCS | Mod: NTX,,, | Performed by: INTERNAL MEDICINE

## 2019-10-13 PROCEDURE — 94761 N-INVAS EAR/PLS OXIMETRY MLT: CPT | Mod: NTX

## 2019-10-13 PROCEDURE — 84484 ASSAY OF TROPONIN QUANT: CPT | Mod: NTX

## 2019-10-13 PROCEDURE — 80053 COMPREHEN METABOLIC PANEL: CPT | Mod: 91,NTX

## 2019-10-13 PROCEDURE — 63600175 PHARM REV CODE 636 W HCPCS: Mod: NTX | Performed by: HOSPITALIST

## 2019-10-13 PROCEDURE — 36415 COLL VENOUS BLD VENIPUNCTURE: CPT | Mod: NTX

## 2019-10-13 PROCEDURE — 25000003 PHARM REV CODE 250: Mod: NTX | Performed by: INTERNAL MEDICINE

## 2019-10-13 PROCEDURE — 85025 COMPLETE CBC W/AUTO DIFF WBC: CPT | Mod: NTX

## 2019-10-13 PROCEDURE — 99222 1ST HOSP IP/OBS MODERATE 55: CPT | Mod: NTX,,, | Performed by: INTERNAL MEDICINE

## 2019-10-13 PROCEDURE — 25000003 PHARM REV CODE 250: Mod: NTX | Performed by: HOSPITALIST

## 2019-10-13 PROCEDURE — 27000221 HC OXYGEN, UP TO 24 HOURS: Mod: NTX

## 2019-10-13 PROCEDURE — 21400001 HC TELEMETRY ROOM: Mod: NTX

## 2019-10-13 RX ORDER — HYDRALAZINE HYDROCHLORIDE 25 MG/1
100 TABLET, FILM COATED ORAL EVERY 8 HOURS
Status: DISCONTINUED | OUTPATIENT
Start: 2019-10-13 | End: 2019-10-15 | Stop reason: HOSPADM

## 2019-10-13 RX ORDER — LISINOPRIL 5 MG/1
5 TABLET ORAL DAILY
Status: DISCONTINUED | OUTPATIENT
Start: 2019-10-13 | End: 2019-10-13

## 2019-10-13 RX ORDER — OLMESARTAN MEDOXOMIL 20 MG/1
20 TABLET ORAL DAILY
Status: DISCONTINUED | OUTPATIENT
Start: 2019-10-13 | End: 2019-10-14

## 2019-10-13 RX ORDER — HYDRALAZINE HYDROCHLORIDE 25 MG/1
75 TABLET, FILM COATED ORAL 3 TIMES DAILY
Status: DISCONTINUED | OUTPATIENT
Start: 2019-10-13 | End: 2019-10-13

## 2019-10-13 RX ADMIN — ACETAMINOPHEN 650 MG: 325 TABLET, FILM COATED ORAL at 09:10

## 2019-10-13 RX ADMIN — LISINOPRIL 5 MG: 5 TABLET ORAL at 11:10

## 2019-10-13 RX ADMIN — ACETAMINOPHEN 650 MG: 325 TABLET, FILM COATED ORAL at 01:10

## 2019-10-13 RX ADMIN — HEPARIN SODIUM 5000 UNITS: 5000 INJECTION INTRAVENOUS; SUBCUTANEOUS at 09:10

## 2019-10-13 RX ADMIN — OLMESARTAN MEDOXOMIL 20 MG: 20 TABLET, FILM COATED ORAL at 01:10

## 2019-10-13 RX ADMIN — HYDRALAZINE HYDROCHLORIDE 10 MG: 20 INJECTION INTRAMUSCULAR; INTRAVENOUS at 12:10

## 2019-10-13 RX ADMIN — SEVELAMER CARBONATE 1600 MG: 800 TABLET, FILM COATED ORAL at 05:10

## 2019-10-13 RX ADMIN — SEVELAMER CARBONATE 1600 MG: 800 TABLET, FILM COATED ORAL at 11:10

## 2019-10-13 RX ADMIN — HYDRALAZINE HYDROCHLORIDE 50 MG: 25 TABLET ORAL at 05:10

## 2019-10-13 RX ADMIN — HYDRALAZINE HYDROCHLORIDE 100 MG: 25 TABLET ORAL at 01:10

## 2019-10-13 RX ADMIN — HEPARIN SODIUM 5000 UNITS: 5000 INJECTION INTRAVENOUS; SUBCUTANEOUS at 08:10

## 2019-10-13 RX ADMIN — HYDRALAZINE HYDROCHLORIDE 100 MG: 25 TABLET ORAL at 09:10

## 2019-10-13 RX ADMIN — SEVELAMER CARBONATE 1600 MG: 800 TABLET, FILM COATED ORAL at 08:10

## 2019-10-13 RX ADMIN — HYDRALAZINE HYDROCHLORIDE 10 MG: 20 INJECTION INTRAMUSCULAR; INTRAVENOUS at 04:10

## 2019-10-13 RX ADMIN — AMLODIPINE BESYLATE 10 MG: 5 TABLET ORAL at 09:10

## 2019-10-13 RX ADMIN — ACETAMINOPHEN 650 MG: 325 TABLET, FILM COATED ORAL at 04:10

## 2019-10-13 RX ADMIN — METOPROLOL SUCCINATE 100 MG: 50 TABLET, EXTENDED RELEASE ORAL at 08:10

## 2019-10-13 NOTE — PROGRESS NOTES
[facility]  Progress Note    Patient Name: April Vasquez  MRN: [id number]  Patient Class: IP- Inpatient   Admission Date: 10/12/2019  Length of Stay: 1 days  Attending Physician: Tanya Bassett MD  Primary Care Provider: Nsara Iraheta MD        Subjective:     Principal Problem:Acute pulmonary edema        HPI:  63 y.o. Female with a PMHx of Gallstones, HTN, and Tobacco abuse presents to the ED with a cc of shortness of breath since last night and worsened today. She also c/o weakness with exertion, chills, and productive cough with green sputum. She did not take bp medication this morning and states she is not on O2 at home. She reports a full dialysis session yesterday. She is a smoker. She denies recent travel or IV drug-use. She denies leg pain, leg swelling, abdominal pain, rhinorrhea or NVD. No prior tx.       Patient's O2 sat on room air was 83%.  Patient states that she is not on home oxygen.  Patient went to HD yesterday and symptoms progressed.  Admit to ICU, resume home meds and emergent HD. May need cardene.      Overview/Hospital Course:  Pt admitted with HTN emergency and pulmonary edema. Required emergent HD yesterday. Resumed home meds for BP control and still requiring titration of meds. Ok for transfer to floor and if stable can dc tomorrow. Pt reports compliancy but previous documentation states otherwise.  Dc'd antibiotics - does not apppear to be infection.     Interval History: pt reports SOB improved but still feels weak    Review of Systems   Constitutional: Positive for chills and fatigue.   HENT: Positive for congestion.    Eyes: Negative.    Respiratory: Positive for cough and shortness of breath.    Cardiovascular: Negative.    Gastrointestinal: Negative.    Endocrine: Positive for cold intolerance.   Genitourinary: Negative.    Musculoskeletal: Positive for arthralgias, back pain and gait problem.   Skin: Positive for pallor.   Neurological:  Positive for weakness.   Psychiatric/Behavioral: Positive for dysphoric mood.     Objective:     Vital Signs (Most Recent):  Temp: 98.3 °F (36.8 °C) (10/13/19 1327)  Pulse: 80 (10/13/19 1330)  Resp: (!) 40 (10/13/19 1330)  BP: (!) 193/77 (10/13/19 1315)  SpO2: 99 % (10/13/19 1330) Vital Signs (24h Range):  Temp:  [98 °F (36.7 °C)-98.6 °F (37 °C)] 98.3 °F (36.8 °C)  Pulse:  [] 80  Resp:  [11-49] 40  SpO2:  [93 %-100 %] 99 %  BP: (113-212)/() 193/77     Weight: 58.2 kg (128 lb 4.9 oz)  Body mass index is 22.02 kg/m².    Intake/Output Summary (Last 24 hours) at 10/13/2019 1703  Last data filed at 10/12/2019 2008  Gross per 24 hour   Intake 500 ml   Output 3200 ml   Net -2700 ml      Physical Exam   Constitutional: She is oriented to person, place, and time.   Thin , ill appearing female    HENT:   Head: Normocephalic and atraumatic.   Mouth/Throat: Oropharynx is clear and moist.   Eyes: Pupils are equal, round, and reactive to light. EOM are normal. No scleral icterus.   Neck: Normal range of motion. Neck supple.   Cardiovascular: Normal rate, regular rhythm, normal heart sounds and intact distal pulses.   Pulmonary/Chest: Effort normal. She has no wheezes. She has rales.   Abdominal: Soft. Bowel sounds are normal. She exhibits no distension.   + bruit   Musculoskeletal: Normal range of motion. She exhibits no edema.   Neurological: She is alert and oriented to person, place, and time.   Skin: Skin is warm and dry. Capillary refill takes 2 to 3 seconds. There is pallor.   Psychiatric: Her behavior is normal.   Flat affect        Significant Labs: All pertinent labs within the past 24 hours have been reviewed.    Significant Imaging: I have reviewed and interpreted all pertinent imaging results/findings within the past 24 hours.      Assessment/Plan:      * Acute pulmonary edema  Emergent HD for volume control 10/12  Bp control       Malignant hypertension  See above      Hypertension  Uncontrolled with  persistent SBP>200  Resume home meds  IV hydralazine  Add lisinopril to regimen     Step down to floor and monitor - hope for improvement in 24 hours with addition and increase dosage   Pt symptomatic      ESRD (end stage renal disease)  Nephrology consulted       Non-compliance  Appears depressed and likely contributing   PT is full code      Tobacco abuse  Nicotine patch       Severe malnutrition  Pt reports poor appetite         VTE Risk Mitigation (From admission, onward)         Ordered     heparin (porcine) injection 5,000 Units  Every 12 hours      10/12/19 1840     IP VTE HIGH RISK PATIENT  Once      10/12/19 1659     Place sequential compression device  Until discontinued      10/12/19 1659     Place KAM hose  Until discontinued      10/12/19 1659                      Tanya Bassett MD  Department of Hospital Medicine   [facility]

## 2019-10-13 NOTE — HOSPITAL COURSE
Pt admitted with HTN emergency and pulmonary edema requiring emergent HD. Resumed home meds for BP control (hydralazine, amlodipine, metoprolol). BP still uncontrolled. Increased hydralazine to 100mg TID and added olmesartan and clonidine for BP control. BP has been labile- low during dialysis and high outside of dialysis. Patient has a BP cuff at home which she will use. She is stable on room air. She will follow up with PCP and continue to work on BP control. Discharged to home in stable condition.

## 2019-10-13 NOTE — PLAN OF CARE
Pt overnight in ICU emergent dialysis completed at beginning of shift. PT weaned to room air sats 97%. BP medication given at beginning of shift. PRN hydralazine and tylenol given once. NS on monitor. MIRANDA Fistula Limb alert band in place. No falls, injuries, or skin breakdown. Pt updated on plan of care.

## 2019-10-13 NOTE — NURSING
Report received and patient arrived to unit.  Pt alert and oriented x4.  NAD. Denies pain at this time.  Ambulated to restroom with steady gait.  Pt oriented to room and call light.  Bed locked and low.  Call light in reach.  Room near nurses station.

## 2019-10-13 NOTE — CONSULTS
Renal ICU Consult    Date of Admission:  10/12/2019  1:46 PM        Chief Complaint:   Chief Complaint   Patient presents with    Shortness of Breath     PT reports feels SOB since last night worsened throughout the day. RA O2 sat: 83%. Pt reports had dialysis yesterday.     Chest Pain     Pt reports mid sternal Chest tightness       HPI: 63 y.o. Female with a PMHx of ESRD on chronic Dialysis (MWF) Gallstones, HTN, and prior Tobacco abuse who presented  to the ED with a c.c.  of worsening shortness of breath since last night and also c/o dyspnea  with exertion, chills, and productive cough with green sputum. She did not take bp medication the morning of admission and presented to ED with high BP and clinical evidence of fluid overload (O2 sat on room air was 83%)   I was consulted by the ED MD and after discussion with him it was decided to proceed with emergent Ultrafiltration which resulted in symptom improvement. Pte. Admitted to ICU for BP control.      PMH:  Past Medical History:   Diagnosis Date    ESRD (end stage renal disease) on dialysis 2017    Gallstones     Hypertension     Renal disorder     dialysis MIRANDA fistula    Tobacco abuse        PSH:  Past Surgical History:   Procedure Laterality Date    AVF  2017    CHOLECYSTECTOMY  2016    Permacath Right 2017    TUBAL LIGATION         Allergies:  Review of patient's allergies indicates:  No Known Allergies    No current facility-administered medications on file prior to encounter.      Current Outpatient Medications on File Prior to Encounter   Medication Sig Dispense Refill    amlodipine (NORVASC) 10 MG tablet Take 10 mg by mouth once daily.      hydrALAZINE (APRESOLINE) 50 MG tablet Take 75 mg by mouth 3 (three) times daily.      metoprolol succinate (TOPROL-XL) 100 MG 24 hr tablet Take 100 mg by mouth.      sevelamer (RENAGEL) 400 MG Tab Take 1,600 mg by mouth.         Medications:  Current  Facility-Administered Medications   Medication Dose Route Frequency Provider Last Rate Last Dose    acetaminophen tablet 650 mg  650 mg Oral Q6H PRN Tanya Bassett MD   650 mg at 10/13/19 0410    amLODIPine tablet 10 mg  10 mg Oral QHS Tanya Bassett MD   10 mg at 10/12/19 2151    heparin (porcine) injection 5,000 Units  5,000 Units Subcutaneous Q12H Tanya Bassett MD   5,000 Units at 10/13/19 0834    hydrALAZINE injection 10 mg  10 mg Intravenous Q8H PRN Tanya Bassett MD   10 mg at 10/13/19 1228    hydrALAZINE tablet 50 mg  50 mg Oral Q8H Tanya Bassett MD   50 mg at 10/13/19 0534    lisinopril tablet 5 mg  5 mg Oral Daily Tanya Bassett MD   5 mg at 10/13/19 1154    metoprolol succinate (TOPROL-XL) 24 hr tablet 100 mg  100 mg Oral Daily Tanya Bassett MD   100 mg at 10/13/19 0834    sevelamer carbonate tablet 1,600 mg  1,600 mg Oral TID WM Tanya Bassett MD   1,600 mg at 10/13/19 1154    sodium chloride 0.9% flush 10 mL  10 mL Intravenous PRN Tanya Bassett MD        sodium chloride 0.9% flush 10 mL  10 mL Intravenous PRN Tanya Bassett MD           FamHx:  Family History   Problem Relation Age of Onset    Kidney disease Mother     Hypertension Mother     Cervical cancer Mother     Diabetes Father     Drug abuse Paternal Grandmother     Breast cancer Neg Hx     Colon cancer Neg Hx     Ovarian cancer Neg Hx        SocHx:  Social History     Socioeconomic History    Marital status:      Spouse name: Not on file    Number of children: 4    Years of education: Not on file    Highest education level: Not on file   Occupational History    Occupation: disable   Social Needs    Financial resource strain: Not on file    Food insecurity:     Worry: Not on file     Inability: Not on file    Transportation needs:     Medical: Not on file     Non-medical: Not on file   Tobacco Use    Smoking status: Current Every Day Smoker     Packs/day: 0.50     Years: 20.00      Pack years: 10.00    Smokeless tobacco: Never Used   Substance and Sexual Activity    Alcohol use: No    Drug use: No    Sexual activity: Yes     Partners: Male     Birth control/protection: Post-menopausal   Lifestyle    Physical activity:     Days per week: Not on file     Minutes per session: Not on file    Stress: Not on file   Relationships    Social connections:     Talks on phone: Not on file     Gets together: Not on file     Attends Confucianist service: Not on file     Active member of club or organization: Not on file     Attends meetings of clubs or organizations: Not on file     Relationship status: Not on file   Other Topics Concern    Not on file   Social History Narrative    Not on file           Review of Systems: see H&P       Physical Exam:  Vitals:   Vitals:    10/13/19 1230   BP: (!) 202/95   Pulse: 72   Resp: (!) 43   Temp:        I/O last 3 completed shifts:  In: 1000 [Other:500; IV Piggyback:500]  Out: 3200 [Other:3200]  No intake/output data recorded.    General: A&Ox3. No apparent distress.   Neck: supple no JVD  Lungs: CTA bilateral  Heart: RRR, S1-S2  Abdomen: Soft. NT. + bruit  : n/a  Ext: No edema  Skin: The skin is warm and dry. No jaundice.  Neurologic: Awake and alert, oriented x 3, no focal deficits      Laboratories:    Recent Labs   Lab 10/13/19  0155   WBC 10.94   RBC 3.65*   HGB 10.3*   HCT 32.0*      MCV 88   MCH 28.2   MCHC 32.2       Recent Labs   Lab 10/13/19  0821   CALCIUM 10.8*   PROT 7.8      K 4.0   CO2 25   CL 94*   BUN 41*   CREATININE 8.6*   ALKPHOS 93   ALT 10   AST 13   BILITOT 0.6       No results for input(s): COLORU, CLARITYU, SPECGRAV, PHUR, PROTEINUA, GLUCOSEU, BLOODU, WBCU, RBCU, BACTERIA, MUCUS in the last 24 hours.    Invalid input(s):  BILIRUBINCON    Microbiology Results (last 7 days)     Procedure Component Value Units Date/Time    Blood culture #1 **CANNOT BE ORDERED STAT** [[id number]] Collected:  10/12/19 1503    Order Status:   "Completed Specimen:  Blood from Peripheral, Hand, Right Updated:  10/13/19 0312     Blood Culture, Routine No Growth to date    Blood culture #2 **CANNOT BE ORDERED STAT** [[id number]] Collected:  10/12/19 1508    Order Status:  Completed Specimen:  Blood from Peripheral, Forearm, Right Updated:  10/13/19 0312     Blood Culture, Routine No Growth to date            Diagnostic Tests:    XR CHEST AP PORTABLE    CLINICAL HISTORY:  sob;    TECHNIQUE:  Single frontal view of the chest was performed.    COMPARISON:  Chest radiograph 08/04/2019    FINDINGS:  Monitoring leads overlie the chest.  Cardiomediastinal silhouette is midline and similar to prior.  Bilateral diffuse nonspecific interstitial coarsening increased from prior with continued central peribronchial cuffing suggesting worsening pulmonary interstitial pneumonia.  Left mid to lower lung zone platelike scarring versus atelectasis similar to prior.  The lungs are otherwise symmetrically well expanded without large consolidation, pleural effusion or pneumothorax.  Calcific atherosclerosis of the arch.  No acute osseous process seen.  PA and lateral views can be obtained.   Impression:       Findings suggesting worsening pulmonary edema or interstitial pneumonia.  No focal consolidation.         Assessment:  62 y/o female known to me and with Hx. ESRD on HD admitted with:    - HTN urgency  - Fluid overload-pulmonary edema  - Hx. Of tobacco abuse in past (smokes at least 1 cigarette a day)        Plan:    - Dialysis in a.m.  - "challenge " estimated dry weight  - BP control: add Olmesartan 20mg/day and increase hydralazine to 100mg TID  - Cont. toprol and amlodipine  - Pte. May benefit from cardiac w/u  - Renal diet  - Total smoking cessation discussed w/ Pte.  - Check abdominal aorta US    "

## 2019-10-13 NOTE — PLAN OF CARE
10/13/19 1229   Discharge Assessment   Assessment Type Discharge Planning Assessment   Confirmed/corrected address and phone number on facesheet? Yes   Assessment information obtained from? Patient   Communicated expected length of stay with patient/caregiver no   Prior to hospitilization cognitive status: Alert/Oriented   Prior to hospitalization functional status: Independent   Current cognitive status: Alert/Oriented   Current Functional Status: Independent   Facility Arrived From: Home   Lives With child(jaylyn), adult   Able to Return to Prior Arrangements yes   Is patient able to care for self after discharge? Yes   Who are your caregiver(s) and their phone number(s)? Shelton-son: [phone]   Patient's perception of discharge disposition home or selfcare   Readmission Within the Last 30 Days no previous admission in last 30 days   Patient currently being followed by outpatient case management? No   Patient currently receives any other outside agency services? No   Equipment Currently Used at Home none   Do you have any problems affording any of your prescribed medications? No   Is the patient taking medications as prescribed? yes   Does the patient have transportation home? Yes   Transportation Anticipated car, drives self;family or friend will provide   Does the patient receive services at the [facility]? No   Discharge Plan A Home with family   Discharge Plan B Other  (TBD)   DME Needed Upon Discharge  other (see comments)  (TBD)   Patient/Family in Agreement with Plan yes   SW Role explained to patient; two patient identifiers recognized; SW contact information placed on Communication board. Discussed patient managing health care at home; determined who would be helping patient at home with recovery: Shelton and other son will help at home    PCP: Nasra Iraheta MD Prefers afternoon appointments    Extended Emergency Contact Information  Primary Emergency Contact: shelton zavala  Mobile Phone:  [phone]  Relation: Son     [facility] [place] - [address]  [address]  [place] [address]  Phone: [phone] Fax: [phone]    Payor: MEDICARE / Plan: MEDICARE PART A & B / Product Type: Government /

## 2019-10-13 NOTE — ASSESSMENT & PLAN NOTE
Uncontrolled with persistent SBP>200  Resume home meds  IV hydralazine  Add lisinopril to regimen     Step down to floor and monitor - hope for improvement in 24 hours with addition and increase dosage   Pt symptomatic

## 2019-10-13 NOTE — NURSING TRANSFER
Nursing Transfer Note      10/13/2019     Transfer ICU to [address]    Transfer via wheelchair    Transfer with cardiac monitoring    Transported by transport personnel  and nurse    Medicines sent: none    Chart send with patient: yes    Notified: patient will notified son on her own of her new room assignment    Patient reassessed at: 1300    Upon arrival to floor:

## 2019-10-13 NOTE — NURSING
Dr. Hughes at bedside educated patient on controlling blood pressure with medication and starting a new medication (ace inhibitor) to regimen. No other test needed at this time. Patient verbalized understanding. Patient blood pressure currently 176/81 heart rate 82 and Sats 100% RA. Patient denies any pain or discomfort.

## 2019-10-13 NOTE — SUBJECTIVE & OBJECTIVE
Interval History: pt reports SOB improved but still feels weak    Review of Systems   Constitutional: Positive for chills and fatigue.   HENT: Positive for congestion.    Eyes: Negative.    Respiratory: Positive for cough and shortness of breath.    Cardiovascular: Negative.    Gastrointestinal: Negative.    Endocrine: Positive for cold intolerance.   Genitourinary: Negative.    Musculoskeletal: Positive for arthralgias, back pain and gait problem.   Skin: Positive for pallor.   Neurological: Positive for weakness.   Psychiatric/Behavioral: Positive for dysphoric mood.     Objective:     Vital Signs (Most Recent):  Temp: 98.3 °F (36.8 °C) (10/13/19 1327)  Pulse: 80 (10/13/19 1330)  Resp: (!) 40 (10/13/19 1330)  BP: (!) 193/77 (10/13/19 1315)  SpO2: 99 % (10/13/19 1330) Vital Signs (24h Range):  Temp:  [98 °F (36.7 °C)-98.6 °F (37 °C)] 98.3 °F (36.8 °C)  Pulse:  [] 80  Resp:  [11-49] 40  SpO2:  [93 %-100 %] 99 %  BP: (113-212)/() 193/77     Weight: 58.2 kg (128 lb 4.9 oz)  Body mass index is 22.02 kg/m².    Intake/Output Summary (Last 24 hours) at 10/13/2019 1703  Last data filed at 10/12/2019 2008  Gross per 24 hour   Intake 500 ml   Output 3200 ml   Net -2700 ml      Physical Exam   Constitutional: She is oriented to person, place, and time.   Thin , ill appearing female    HENT:   Head: Normocephalic and atraumatic.   Mouth/Throat: Oropharynx is clear and moist.   Eyes: Pupils are equal, round, and reactive to light. EOM are normal. No scleral icterus.   Neck: Normal range of motion. Neck supple.   Cardiovascular: Normal rate, regular rhythm, normal heart sounds and intact distal pulses.   Pulmonary/Chest: Effort normal. She has no wheezes. She has rales.   Abdominal: Soft. Bowel sounds are normal. She exhibits no distension.   + bruit   Musculoskeletal: Normal range of motion. She exhibits no edema.   Neurological: She is alert and oriented to person, place, and time.   Skin: Skin is warm and dry.  Capillary refill takes 2 to 3 seconds. There is pallor.   Psychiatric: Her behavior is normal.   Flat affect        Significant Labs: All pertinent labs within the past 24 hours have been reviewed.    Significant Imaging: I have reviewed and interpreted all pertinent imaging results/findings within the past 24 hours.

## 2019-10-13 NOTE — ASSESSMENT & PLAN NOTE
Resolved after better control of BP and UF.  Recent echo normal.  No evdience of ACS  Agree with initiation of ACEi  Next drug: imdur

## 2019-10-13 NOTE — SUBJECTIVE & OBJECTIVE
Past Medical History:   Diagnosis Date    ESRD (end stage renal disease) on dialysis 2017    Gallstones     Hypertension     Renal disorder     dialysis MIRANDA fistula    Tobacco abuse        Past Surgical History:   Procedure Laterality Date    AVF  2017    CHOLECYSTECTOMY  2016    Permacath Right 2017    TUBAL LIGATION         Review of patient's allergies indicates:  No Known Allergies    No current facility-administered medications on file prior to encounter.      Current Outpatient Medications on File Prior to Encounter   Medication Sig    amlodipine (NORVASC) 10 MG tablet Take 10 mg by mouth once daily.    hydrALAZINE (APRESOLINE) 50 MG tablet Take 75 mg by mouth 3 (three) times daily.    metoprolol succinate (TOPROL-XL) 100 MG 24 hr tablet Take 100 mg by mouth.    sevelamer (RENAGEL) 400 MG Tab Take 1,600 mg by mouth.     Family History     Problem Relation (Age of Onset)    Cervical cancer Mother    Diabetes Father    Drug abuse Paternal Grandmother    Hypertension Mother    Kidney disease Mother        Tobacco Use    Smoking status: Current Every Day Smoker     Packs/day: 0.50     Years: 20.00     Pack years: 10.00    Smokeless tobacco: Never Used   Substance and Sexual Activity    Alcohol use: No    Drug use: No    Sexual activity: Yes     Partners: Male     Birth control/protection: Post-menopausal     Review of Systems   Constitution: Negative for chills, diaphoresis, fever and malaise/fatigue.   HENT: Negative for nosebleeds.    Eyes: Negative for blurred vision and double vision.   Cardiovascular: Negative for chest pain, claudication, cyanosis, dyspnea on exertion, leg swelling, orthopnea, palpitations, paroxysmal nocturnal dyspnea and syncope.   Respiratory: Positive for shortness of breath. Negative for cough and wheezing.    Skin: Negative for dry skin and poor wound healing.   Musculoskeletal: Negative for back pain, joint swelling and myalgias.   Gastrointestinal: Negative for  abdominal pain, nausea and vomiting.   Genitourinary: Negative for hematuria.   Neurological: Negative for dizziness, headaches, numbness, seizures and weakness.   Psychiatric/Behavioral: Negative for altered mental status and depression.     Objective:     Vital Signs (Most Recent):  Temp: 98.2 °F (36.8 °C) (10/13/19 0305)  Pulse: 72 (10/13/19 1230)  Resp: (!) 43 (10/13/19 1230)  BP: (!) 202/95 (10/13/19 1230)  SpO2: 98 % (10/13/19 1230) Vital Signs (24h Range):  Temp:  [98 °F (36.7 °C)-98.7 °F (37.1 °C)] 98.2 °F (36.8 °C)  Pulse:  [] 72  Resp:  [11-49] 43  SpO2:  [83 %-100 %] 98 %  BP: (113-220)/() 202/95     Weight: 58.2 kg (128 lb 4.9 oz)  Body mass index is 22.02 kg/m².    SpO2: 98 %  O2 Device (Oxygen Therapy): nasal cannula      Intake/Output Summary (Last 24 hours) at 10/13/2019 1301  Last data filed at 10/12/2019 2008  Gross per 24 hour   Intake 1000 ml   Output 3200 ml   Net -2200 ml       Lines/Drains/Airways     Drain                 Hemodialysis AV Fistula 08/04/19 2249 Left upper arm 69 days          Peripheral Intravenous Line                 Peripheral IV - Single Lumen 10/12/19 1410 20 G Anterior;Right Antecubital less than 1 day                Physical Exam   Constitutional: She is oriented to person, place, and time. She appears well-developed and well-nourished. No distress.   HENT:   Head: Normocephalic and atraumatic.   Mouth/Throat: No oropharyngeal exudate.   Eyes: Pupils are equal, round, and reactive to light. Conjunctivae and EOM are normal. No scleral icterus.   Neck: Normal range of motion. Neck supple. No JVD present. No tracheal deviation present. No thyromegaly present.   Cardiovascular: Normal rate, regular rhythm, S1 normal and S2 normal. Exam reveals no gallop and no friction rub.   No murmur heard.  Pulmonary/Chest: Effort normal and breath sounds normal. No respiratory distress. She has no wheezes. She has no rales. She exhibits no tenderness.   Abdominal: Soft. She  exhibits no distension.   Musculoskeletal: Normal range of motion. She exhibits no edema.   Neurological: She is alert and oriented to person, place, and time. No cranial nerve deficit.   Skin: Skin is warm and dry. She is not diaphoretic.   Psychiatric: She has a normal mood and affect. Her behavior is normal. Judgment normal.       Current Medications:   amLODIPine  10 mg Oral QHS    heparin (porcine)  5,000 Units Subcutaneous Q12H    hydrALAZINE  100 mg Oral Q8H    metoprolol succinate  100 mg Oral Daily    olmesartan  20 mg Oral Daily    sevelamer carbonate  1,600 mg Oral TID WM       acetaminophen, hydrALAZINE, sodium chloride 0.9%, sodium chloride 0.9%    Laboratory (all labs reviewed):  CBC:  Recent Labs   Lab 08/05/19  0315 08/06/19  0350 08/19/19  1500 10/12/19  1407 10/13/19  0155   WBC 12.42 10.70 7.96 10.71 10.94   Hemoglobin 8.9 L 8.7 L 9.7 L 11.0 L 10.3 L   Hematocrit 28.3 L 28.5 L 30.1 L 34.6 L 32.0 L   Platelets 237 225 286 219 204       CHEMISTRIES:  Recent Labs   Lab 11/27/16  0541 11/28/16  0432  08/06/19  0350 08/19/19  1500 10/12/19  1407 10/13/19  0155 10/13/19  0821   Glucose 88 86   < > 97  97 98 105 102 149 H   Sodium 139 140   < > 141  141 138 142 139 137   Potassium 3.5 3.4 L   < > 4.0  4.0 4.5 4.0 4.0 4.0   BUN, Bld 17 23 H   < > 29 H  29 H 58 H 30 H 7 L 41 H   Creatinine 4.4 H 5.2 H   < > 6.7 H  6.7 H 12.5 H 6.8 H 0.7 8.6 H   eGFR if  12 A 10 A   < > 7 A  7 A 3 A 7 A >60 5 A   eGFR if non  10 A 8 A   < > 6 A  6 A 3 A 6 A >60 5 A   Calcium 7.9 L 7.5 L   < > 9.7  9.7 9.5 10.1 8.8 10.8 H   Magnesium 1.8  1.8 1.7  --  2.3 2.9 H 2.4  --   --     < > = values in this interval not displayed.       CARDIAC BIOMARKERS:  Recent Labs   Lab 08/05/19  0315 08/05/19  0859 10/12/19  1407 10/12/19  2002 10/13/19  0155   Troponin I 0.008 0.011 0.008 0.010 0.011       COAGS:  Recent Labs   Lab 11/09/16  2149 11/17/16  0534 06/07/17  0710 10/12/19  1407   INR  0.9 1.2 1.0 1.0       LIPIDS/LFTS:  Recent Labs   Lab 06/07/17  0710  08/06/19  0350 08/19/19  1500 10/12/19  1407 10/13/19  0155 10/13/19  0821   Cholesterol 142  --   --   --   --   --   --    Triglycerides 128  --   --   --   --   --   --    HDL 42  --   --   --   --   --   --    LDL Cholesterol 74.4  --   --   --   --   --   --    Non-HDL Cholesterol 100  --   --   --   --   --   --    AST 21   < > 13  13 11 13 14 13   ALT 21   < > 9 L  9 L 11 11 16 10    < > = values in this interval not displayed.       BNP:  Recent Labs   Lab 11/09/16  2149 02/04/17  1131 10/12/19  1407   BNP 11 2,762 H 2,438 H       TSH:  Recent Labs   Lab 08/05/19  0315   TSH 1.838       Free T4:  Recent Labs   Lab 08/05/19  0315   Free T4 0.94       Diagnostic Results:  ECG (personally reviewed and interpreted tracing(s)):  10/12/19 SR 78    Chest X-Ray (personally reviewed and interpreted image(s)): 10/12/19 CHF    Echo: 8/5/19  · Normal left ventricular systolic function. The estimated ejection fraction is 55%  · No wall motion abnormalities.  · Mild concentric left ventricular hypertrophy.  · Grade II (moderate) left ventricular diastolic dysfunction consistent with pseudonormalization.  · Normal right ventricular systolic function.  · Moderate mitral regurgitation.  · The estimated PA systolic pressure is 29 mm Hg     Stress Test: 8/3/17 L MPI  Nuclear Quantitative Functional Analysis:   LVEF: >= 70 % (normal is >= 51%)  LVED Volume: 89 ml (normal is <=171)  LVES Volume: 21 ml (normal is <=70)  Impression: NORMAL MYOCARDIAL PERFUSION  1. The perfusion scan is free of evidence for myocardial ischemia or injury.   2. Resting wall motion is physiologic.   3. Resting LV function is normal.  (normal is >= 51%)  4. The ventricular volumes are normal at rest and stress.   5. The extracardiac distribution of radioactivity is normal.   6. There was no previous study available to compare.

## 2019-10-13 NOTE — CARE UPDATE
[facility]  ICU Multidisciplinary Bedside Rounds   SUMMARY     Date: 10/13/2019    Prehospitalization: Home  Admit Date / LOS : 10/12/2019/ 1 days    Diagnosis: Acute pulmonary edema    Consults:        Active: Nephro       Needed: N/A     Code Status: Full Code   Advanced Directive: <no information>    LDA: PIV       Central Lines/Site/Justification:Patient Does Not Have Central Line       Urinary Cath/Order/Justification:Patient Does Not Have Urinary Catheter    Vasopressors/Infusions:        GOALS: Volume/ Hemodynamic: N/A                     RASS: 0  alert and calm    CAM ICU: Negative  Pain Management: none       Pain Controlled: yes     Rhythm: NSR    Respiratory Device: Room Air                  Most Recent SBT/ SAT: N/A       MOVE Screen: PASS    VTE Prophylaxis: Pharm  Mobility: Bedrest  Stress Ulcer Prophylaxis: No    Dietary: PO  Tolerance: yes  /  Advancement: no    Isolation: No active isolations    Restraints: No    Significant Dates:  Post Op Date: N/A  Rescue Date: N/A  Imaging/ Diagnostics: 10/12 Chest X-ray     Noteworthy Labs:  none    CBC/Anemia Labs: Coags:    Recent Labs   Lab 10/12/19  1407 10/13/19  0155   WBC 10.71 10.94   HGB 11.0* 10.3*   HCT 34.6* 32.0*    204   MCV 90 88   RDW 16.0* 15.9*    Recent Labs   Lab 10/12/19  1407   INR 1.0   APTT 29.6        Chemistries:   Recent Labs   Lab 10/12/19  1407 10/13/19  0155    139   K 4.0 4.0   CL 96 108   CO2 30* 23   BUN 30* 7*   CREATININE 6.8* 0.7   CALCIUM 10.1 8.8   PROT 7.5 6.6   BILITOT 0.5 0.6   ALKPHOS 108 54*   ALT 11 16   AST 13 14   MG 2.4  --         Cardiac Enzymes: Ejection Fractions:    Recent Labs     10/12/19  1407 10/12/19  2002 10/13/19  0155   TROPONINI 0.008 0.010 0.011    No results found for: EF     POCT Glucose: HbA1c:    No results for input(s): POCTGLUCOSE in the last 168 hours. Hemoglobin A1C   Date Value Ref Range Status   08/05/2019 4.6 4.0 - 5.6 % Final     Comment:     ADA Screening  Guidelines:  5.7-6.4%  Consistent with prediabetes  >or=6.5%  Consistent with diabetes  High levels of fetal hemoglobin interfere with the HbA1C  assay. Heterozygous hemoglobin variants (HbS, HgC, etc)do  not significantly interfere with this assay.   However, presence of multiple variants may affect accuracy.     06/07/2017 5.2 4.5 - 6.2 % Final     Comment:     According to ADA guidelines, hemoglobin A1C <7.0% represents  optimal control in non-pregnant diabetic patients.  Different  metrics may apply to specific populations.   Standards of Medical Care in Diabetes - 2016.  For the purpose of screening for the presence of diabetes:  <5.7%     Consistent with the absence of diabetes  5.7-6.4%  Consistent with increasing risk for diabetes   (prediabetes)  >or=6.5%  Consistent with diabetes  Currently no consensus exists for use of hemoglobin A1C  for diagnosis of diabetes for children.     09/19/2016 7.1 (H) 4.5 - 6.2 % Final     Comment:     According to ADA guidelines, hemoglobin A1C <7.0% represents  optimal control in non-pregnant diabetic patients.  Different  metrics may apply to specific populations.   Standards of Medical Care in Diabetes - 2016.  For the purpose of screening for the presence of diabetes:  <5.7%     Consistent with the absence of diabetes  5.7-6.4%  Consistent with increasing risk for diabetes   (prediabetes)  >or=6.5%  Consistent with diabetes  Currently no consensus exists for use of hemoglobin A1C  for diagnosis of diabetes for children.          Needs from Care Team: none     ICU LOS 12h  Level of Care: OK to Transfer

## 2019-10-13 NOTE — CONSULTS
[facility]  Cardiology  Consult Note    Patient Name: April Vasquez  MRN: [id number]  Admission Date: 10/12/2019  Hospital Length of Stay: 1 days  Code Status: Full Code   Attending Provider: Tanya Bassett MD   Consulting Provider: Jamie Hughes MD  Primary Care Physician: Nasra Iraheta MD  Principal Problem:Acute pulmonary edema    Patient information was obtained from patient and ER records.     Inpatient consult to Cardiology  Consult performed by: Jamie Hughes MD  Consult ordered by: Tristan Bush MD  Reason for consult: HTN        Subjective:     Chief Complaint:  SOB     HPI:   63 y.o. Female with a PMHx of ESRD on chronic Dialysis (MWF) Gallstones, HTN, and prior Tobacco abuse who presented  to the ED with a c.c.  of worsening shortness of breath since last night and also c/o dyspnea  with exertion, chills, and productive cough with green sputum. She did not take bp medication the morning of admission and presented to ED with high BP and clinical evidence of fluid overload (O2 sat on room air was 83%)   I was consulted by the ED MD and after discussion with him it was decided to proceed with emergent Ultrafiltration which resulted in symptom improvement. Pt. Admitted to ICU for BP control.    Seen by me as inpat 8/5/19 for asymptomatic bradycardia.  Was admitted for htn emerg at that time as well.    The patient is admitted with shortness of breath and hypertensive emergency.  She underwent ultrafiltration with resolution of her symptoms.  She denies any missed medications or dialysis.  She does report uncontrolled hypertension at home.  She has had no angina, palpitations, or syncope.  Her EKG is essentially normal and her cardiac enzymes are negative x3.  At this point, I suggested we move forward with the initiation of it and ACE-inhibitor as nephrology has ordered.  Next drug to add would be imdur.    Past Medical History:   Diagnosis Date    ESRD (end stage  renal disease) on dialysis 2017    Gallstones     Hypertension     Renal disorder     dialysis MIRANDA fistula    Tobacco abuse        Past Surgical History:   Procedure Laterality Date    AVF  2017    CHOLECYSTECTOMY  2016    Permacath Right 2017    TUBAL LIGATION         Review of patient's allergies indicates:  No Known Allergies    No current facility-administered medications on file prior to encounter.      Current Outpatient Medications on File Prior to Encounter   Medication Sig    amlodipine (NORVASC) 10 MG tablet Take 10 mg by mouth once daily.    hydrALAZINE (APRESOLINE) 50 MG tablet Take 75 mg by mouth 3 (three) times daily.    metoprolol succinate (TOPROL-XL) 100 MG 24 hr tablet Take 100 mg by mouth.    sevelamer (RENAGEL) 400 MG Tab Take 1,600 mg by mouth.     Family History     Problem Relation (Age of Onset)    Cervical cancer Mother    Diabetes Father    Drug abuse Paternal Grandmother    Hypertension Mother    Kidney disease Mother        Tobacco Use    Smoking status: Current Every Day Smoker     Packs/day: 0.50     Years: 20.00     Pack years: 10.00    Smokeless tobacco: Never Used   Substance and Sexual Activity    Alcohol use: No    Drug use: No    Sexual activity: Yes     Partners: Male     Birth control/protection: Post-menopausal     Review of Systems   Constitution: Negative for chills, diaphoresis, fever and malaise/fatigue.   HENT: Negative for nosebleeds.    Eyes: Negative for blurred vision and double vision.   Cardiovascular: Negative for chest pain, claudication, cyanosis, dyspnea on exertion, leg swelling, orthopnea, palpitations, paroxysmal nocturnal dyspnea and syncope.   Respiratory: Positive for shortness of breath. Negative for cough and wheezing.    Skin: Negative for dry skin and poor wound healing.   Musculoskeletal: Negative for back pain, joint swelling and myalgias.   Gastrointestinal: Negative for abdominal pain, nausea and vomiting.   Genitourinary: Negative  for hematuria.   Neurological: Negative for dizziness, headaches, numbness, seizures and weakness.   Psychiatric/Behavioral: Negative for altered mental status and depression.     Objective:     Vital Signs (Most Recent):  Temp: 98.2 °F (36.8 °C) (10/13/19 0305)  Pulse: 72 (10/13/19 1230)  Resp: (!) 43 (10/13/19 1230)  BP: (!) 202/95 (10/13/19 1230)  SpO2: 98 % (10/13/19 1230) Vital Signs (24h Range):  Temp:  [98 °F (36.7 °C)-98.7 °F (37.1 °C)] 98.2 °F (36.8 °C)  Pulse:  [] 72  Resp:  [11-49] 43  SpO2:  [83 %-100 %] 98 %  BP: (113-220)/() 202/95     Weight: 58.2 kg (128 lb 4.9 oz)  Body mass index is 22.02 kg/m².    SpO2: 98 %  O2 Device (Oxygen Therapy): nasal cannula      Intake/Output Summary (Last 24 hours) at 10/13/2019 1301  Last data filed at 10/12/2019 2008  Gross per 24 hour   Intake 1000 ml   Output 3200 ml   Net -2200 ml       Lines/Drains/Airways     Drain                 Hemodialysis AV Fistula 08/04/19 2249 Left upper arm 69 days          Peripheral Intravenous Line                 Peripheral IV - Single Lumen 10/12/19 1410 20 G Anterior;Right Antecubital less than 1 day                Physical Exam   Constitutional: She is oriented to person, place, and time. She appears well-developed and well-nourished. No distress.   HENT:   Head: Normocephalic and atraumatic.   Mouth/Throat: No oropharyngeal exudate.   Eyes: Pupils are equal, round, and reactive to light. Conjunctivae and EOM are normal. No scleral icterus.   Neck: Normal range of motion. Neck supple. No JVD present. No tracheal deviation present. No thyromegaly present.   Cardiovascular: Normal rate, regular rhythm, S1 normal and S2 normal. Exam reveals no gallop and no friction rub.   No murmur heard.  Pulmonary/Chest: Effort normal and breath sounds normal. No respiratory distress. She has no wheezes. She has no rales. She exhibits no tenderness.   Abdominal: Soft. She exhibits no distension.   Musculoskeletal: Normal range of  motion. She exhibits no edema.   Neurological: She is alert and oriented to person, place, and time. No cranial nerve deficit.   Skin: Skin is warm and dry. She is not diaphoretic.   Psychiatric: She has a normal mood and affect. Her behavior is normal. Judgment normal.       Current Medications:   amLODIPine  10 mg Oral QHS    heparin (porcine)  5,000 Units Subcutaneous Q12H    hydrALAZINE  100 mg Oral Q8H    metoprolol succinate  100 mg Oral Daily    olmesartan  20 mg Oral Daily    sevelamer carbonate  1,600 mg Oral TID WM       acetaminophen, hydrALAZINE, sodium chloride 0.9%, sodium chloride 0.9%    Laboratory (all labs reviewed):  CBC:  Recent Labs   Lab 08/05/19  0315 08/06/19  0350 08/19/19  1500 10/12/19  1407 10/13/19  0155   WBC 12.42 10.70 7.96 10.71 10.94   Hemoglobin 8.9 L 8.7 L 9.7 L 11.0 L 10.3 L   Hematocrit 28.3 L 28.5 L 30.1 L 34.6 L 32.0 L   Platelets 237 225 286 219 204       CHEMISTRIES:  Recent Labs   Lab 11/27/16  0541 11/28/16  0432  08/06/19  0350 08/19/19  1500 10/12/19  1407 10/13/19  0155 10/13/19  0821   Glucose 88 86   < > 97  97 98 105 102 149 H   Sodium 139 140   < > 141  141 138 142 139 137   Potassium 3.5 3.4 L   < > 4.0  4.0 4.5 4.0 4.0 4.0   BUN, Bld 17 23 H   < > 29 H  29 H 58 H 30 H 7 L 41 H   Creatinine 4.4 H 5.2 H   < > 6.7 H  6.7 H 12.5 H 6.8 H 0.7 8.6 H   eGFR if  12 A 10 A   < > 7 A  7 A 3 A 7 A >60 5 A   eGFR if non  10 A 8 A   < > 6 A  6 A 3 A 6 A >60 5 A   Calcium 7.9 L 7.5 L   < > 9.7  9.7 9.5 10.1 8.8 10.8 H   Magnesium 1.8  1.8 1.7  --  2.3 2.9 H 2.4  --   --     < > = values in this interval not displayed.       CARDIAC BIOMARKERS:  Recent Labs   Lab 08/05/19  0315 08/05/19  0859 10/12/19  1407 10/12/19  2002 10/13/19  0155   Troponin I 0.008 0.011 0.008 0.010 0.011       COAGS:  Recent Labs   Lab 11/09/16  2149 11/17/16  0534 06/07/17  0710 10/12/19  1407   INR 0.9 1.2 1.0 1.0       LIPIDS/LFTS:  Recent Labs   Lab  06/07/17  0710  08/06/19  0350 08/19/19  1500 10/12/19  1407 10/13/19  0155 10/13/19  0821   Cholesterol 142  --   --   --   --   --   --    Triglycerides 128  --   --   --   --   --   --    HDL 42  --   --   --   --   --   --    LDL Cholesterol 74.4  --   --   --   --   --   --    Non-HDL Cholesterol 100  --   --   --   --   --   --    AST 21   < > 13  13 11 13 14 13   ALT 21   < > 9 L  9 L 11 11 16 10    < > = values in this interval not displayed.       BNP:  Recent Labs   Lab 11/09/16  2149 02/04/17  1131 10/12/19  1407   BNP 11 2,762 H 2,438 H       TSH:  Recent Labs   Lab 08/05/19  0315   TSH 1.838       Free T4:  Recent Labs   Lab 08/05/19  0315   Free T4 0.94       Diagnostic Results:  ECG (personally reviewed and interpreted tracing(s)):  10/12/19 SR 78    Chest X-Ray (personally reviewed and interpreted image(s)): 10/12/19 CHF    Echo: 8/5/19  · Normal left ventricular systolic function. The estimated ejection fraction is 55%  · No wall motion abnormalities.  · Mild concentric left ventricular hypertrophy.  · Grade II (moderate) left ventricular diastolic dysfunction consistent with pseudonormalization.  · Normal right ventricular systolic function.  · Moderate mitral regurgitation.  · The estimated PA systolic pressure is 29 mm Hg     Stress Test: 8/3/17 L MPI  Nuclear Quantitative Functional Analysis:   LVEF: >= 70 % (normal is >= 51%)  LVED Volume: 89 ml (normal is <=171)  LVES Volume: 21 ml (normal is <=70)  Impression: NORMAL MYOCARDIAL PERFUSION  1. The perfusion scan is free of evidence for myocardial ischemia or injury.   2. Resting wall motion is physiologic.   3. Resting LV function is normal.  (normal is >= 51%)  4. The ventricular volumes are normal at rest and stress.   5. The extracardiac distribution of radioactivity is normal.   6. There was no previous study available to compare.    Assessment and Plan:     * Acute pulmonary edema  Resolved after better control of BP and UF.  Recent echo  normal.  No evdience of ACS  Agree with initiation of ACEi  Next drug: imdur    Malignant hypertension  As above    ESRD (end stage renal disease)  Per Neph        VTE Risk Mitigation (From admission, onward)         Ordered     heparin (porcine) injection 5,000 Units  Every 12 hours      10/12/19 1840     IP VTE HIGH RISK PATIENT  Once      10/12/19 1659     Place sequential compression device  Until discontinued      10/12/19 1659     Place KAM hose  Until discontinued      10/12/19 1659              Pt seen in ICU, case d/w RN.    Thank you for your consult. I will sign off. Please contact us if you have any additional questions.    Jamie Hughes MD  Cardiology   [facility]

## 2019-10-13 NOTE — PROGRESS NOTES
Ultrafiltration x 2 hours and 15 minutes. Tx. Terminated when pt c/o stomach cramping. 2700 ml volume removal.

## 2019-10-13 NOTE — HPI
63 y.o. Female with a PMHx of ESRD on chronic Dialysis (MWF) Gallstones, HTN, and prior Tobacco abuse who presented  to the ED with a c.c.  of worsening shortness of breath since last night and also c/o dyspnea  with exertion, chills, and productive cough with green sputum. She did not take bp medication the morning of admission and presented to ED with high BP and clinical evidence of fluid overload (O2 sat on room air was 83%)   I was consulted by the ED MD and after discussion with him it was decided to proceed with emergent Ultrafiltration which resulted in symptom improvement. Pt. Admitted to ICU for BP control.    Seen by me as inpat 8/5/19 for asymptomatic bradycardia.  Was admitted for htn emerg at that time as well.    The patient is admitted with shortness of breath and hypertensive emergency.  She underwent ultrafiltration with resolution of her symptoms.  She denies any missed medications or dialysis.  She does report uncontrolled hypertension at home.  She has had no angina, palpitations, or syncope.  Her EKG is essentially normal and her cardiac enzymes are negative x3.  At this point, I suggested we move forward with the initiation of it and ACE-inhibitor as nephrology has ordered.  Next drug to add would be imdur.

## 2019-10-14 LAB
ALBUMIN SERPL BCP-MCNC: 3.5 G/DL (ref 3.5–5.2)
ALP SERPL-CCNC: 81 U/L (ref 55–135)
ALT SERPL W/O P-5'-P-CCNC: 9 U/L (ref 10–44)
ANION GAP SERPL CALC-SCNC: 19 MMOL/L (ref 8–16)
AST SERPL-CCNC: 12 U/L (ref 10–40)
BASOPHILS # BLD AUTO: 0.05 K/UL (ref 0–0.2)
BASOPHILS NFR BLD: 0.7 % (ref 0–1.9)
BILIRUB SERPL-MCNC: 0.5 MG/DL (ref 0.1–1)
BUN SERPL-MCNC: 55 MG/DL (ref 8–23)
CALCIUM SERPL-MCNC: 10.5 MG/DL (ref 8.7–10.5)
CHLORIDE SERPL-SCNC: 94 MMOL/L (ref 95–110)
CO2 SERPL-SCNC: 24 MMOL/L (ref 23–29)
CREAT SERPL-MCNC: 10.9 MG/DL (ref 0.5–1.4)
DIFFERENTIAL METHOD: ABNORMAL
EOSINOPHIL # BLD AUTO: 0.3 K/UL (ref 0–0.5)
EOSINOPHIL NFR BLD: 4.3 % (ref 0–8)
ERYTHROCYTE [DISTWIDTH] IN BLOOD BY AUTOMATED COUNT: 16.1 % (ref 11.5–14.5)
EST. GFR  (AFRICAN AMERICAN): 4 ML/MIN/1.73 M^2
EST. GFR  (NON AFRICAN AMERICAN): 3 ML/MIN/1.73 M^2
GLUCOSE SERPL-MCNC: 84 MG/DL (ref 70–110)
HCT VFR BLD AUTO: 29 % (ref 37–48.5)
HGB BLD-MCNC: 9.4 G/DL (ref 12–16)
IMM GRANULOCYTES # BLD AUTO: 0.03 K/UL (ref 0–0.04)
IMM GRANULOCYTES NFR BLD AUTO: 0.4 % (ref 0–0.5)
LYMPHOCYTES # BLD AUTO: 1.5 K/UL (ref 1–4.8)
LYMPHOCYTES NFR BLD: 20.5 % (ref 18–48)
MCH RBC QN AUTO: 28.4 PG (ref 27–31)
MCHC RBC AUTO-ENTMCNC: 32.4 G/DL (ref 32–36)
MCV RBC AUTO: 88 FL (ref 82–98)
MONOCYTES # BLD AUTO: 0.7 K/UL (ref 0.3–1)
MONOCYTES NFR BLD: 9.8 % (ref 4–15)
NEUTROPHILS # BLD AUTO: 4.7 K/UL (ref 1.8–7.7)
NEUTROPHILS NFR BLD: 64.3 % (ref 38–73)
NRBC BLD-RTO: 0 /100 WBC
PLATELET # BLD AUTO: 183 K/UL (ref 150–350)
PMV BLD AUTO: 11.4 FL (ref 9.2–12.9)
POTASSIUM SERPL-SCNC: 4.2 MMOL/L (ref 3.5–5.1)
PROT SERPL-MCNC: 7 G/DL (ref 6–8.4)
RBC # BLD AUTO: 3.31 M/UL (ref 4–5.4)
SODIUM SERPL-SCNC: 137 MMOL/L (ref 136–145)
WBC # BLD AUTO: 7.23 K/UL (ref 3.9–12.7)

## 2019-10-14 PROCEDURE — 94761 N-INVAS EAR/PLS OXIMETRY MLT: CPT | Mod: NTX

## 2019-10-14 PROCEDURE — 63600175 PHARM REV CODE 636 W HCPCS: Mod: NTX | Performed by: HOSPITALIST

## 2019-10-14 PROCEDURE — 90935 HEMODIALYSIS ONE EVALUATION: CPT | Mod: NTX

## 2019-10-14 PROCEDURE — 25000003 PHARM REV CODE 250: Mod: NTX | Performed by: INTERNAL MEDICINE

## 2019-10-14 PROCEDURE — 85025 COMPLETE CBC W/AUTO DIFF WBC: CPT | Mod: NTX

## 2019-10-14 PROCEDURE — 80053 COMPREHEN METABOLIC PANEL: CPT | Mod: NTX

## 2019-10-14 PROCEDURE — 21400001 HC TELEMETRY ROOM: Mod: NTX

## 2019-10-14 PROCEDURE — 25000003 PHARM REV CODE 250: Mod: NTX | Performed by: HOSPITALIST

## 2019-10-14 PROCEDURE — 36415 COLL VENOUS BLD VENIPUNCTURE: CPT | Mod: NTX

## 2019-10-14 RX ORDER — OLMESARTAN MEDOXOMIL 20 MG/1
40 TABLET ORAL DAILY
Status: DISCONTINUED | OUTPATIENT
Start: 2019-10-15 | End: 2019-10-15 | Stop reason: HOSPADM

## 2019-10-14 RX ORDER — CLONIDINE HYDROCHLORIDE 0.1 MG/1
0.3 TABLET ORAL DAILY
Status: DISCONTINUED | OUTPATIENT
Start: 2019-10-14 | End: 2019-10-15 | Stop reason: HOSPADM

## 2019-10-14 RX ADMIN — METOPROLOL SUCCINATE 100 MG: 50 TABLET, EXTENDED RELEASE ORAL at 01:10

## 2019-10-14 RX ADMIN — ACETAMINOPHEN 650 MG: 325 TABLET, FILM COATED ORAL at 10:10

## 2019-10-14 RX ADMIN — SEVELAMER CARBONATE 1600 MG: 800 TABLET, FILM COATED ORAL at 04:10

## 2019-10-14 RX ADMIN — SEVELAMER CARBONATE 1600 MG: 800 TABLET, FILM COATED ORAL at 01:10

## 2019-10-14 RX ADMIN — CLONIDINE HYDROCHLORIDE 0.3 MG: 0.1 TABLET ORAL at 01:10

## 2019-10-14 RX ADMIN — HEPARIN SODIUM 5000 UNITS: 5000 INJECTION INTRAVENOUS; SUBCUTANEOUS at 01:10

## 2019-10-14 RX ADMIN — HEPARIN SODIUM 5000 UNITS: 5000 INJECTION INTRAVENOUS; SUBCUTANEOUS at 10:10

## 2019-10-14 RX ADMIN — ACETAMINOPHEN 650 MG: 325 TABLET, FILM COATED ORAL at 03:10

## 2019-10-14 RX ADMIN — HYDRALAZINE HYDROCHLORIDE 100 MG: 25 TABLET ORAL at 01:10

## 2019-10-14 RX ADMIN — HYDRALAZINE HYDROCHLORIDE 100 MG: 25 TABLET ORAL at 06:10

## 2019-10-14 NOTE — PLAN OF CARE
Problem: Adult Inpatient Plan of Care  Goal: Plan of Care Review  Outcome: Ongoing, Progressing  Flowsheets (Taken 10/14/2019 1824)  Plan of Care Reviewed With: patient     Problem: Fall Injury Risk  Goal: Absence of Fall and Fall-Related Injury  Outcome: Ongoing, Progressing  Intervention: Identify and Manage Contributors to Fall Injury Risk  Flowsheets (Taken 10/14/2019 1824)  Self-Care Promotion: independence encouraged  Medication Review/Management: medications reviewed  Intervention: Promote Injury-Free Environment  Flowsheets (Taken 10/14/2019 1824)  Safety Promotion/Fall Prevention: assistive device/personal item within reach; bed alarm set; side rails raised x 2; diversional activities provided; medications reviewed     Problem: Skin Injury Risk Increased  Goal: Skin Health and Integrity  Outcome: Ongoing, Progressing  Intervention: Optimize Skin Protection  Flowsheets (Taken 10/14/2019 1824)  Pressure Reduction Techniques: frequent weight shift encouraged  Skin Protection: adhesive use limited  Head of Bed (HOB): HOB at 30-45 degrees  Intervention: Promote and Optimize Oral Intake  Flowsheets (Taken 10/14/2019 1824)  Oral Nutrition Promotion: calorie dense foods provided    No falls or any new areas of skin breakdown noted during shift. Patient tolerated dialysis treatment well today and has no further complaints of shortness of breath.

## 2019-10-14 NOTE — PROGRESS NOTES
[facility]  Progress Note    Patient Name: April Vasquez  MRN: [id number]  Patient Class: IP- Inpatient   Admission Date: 10/12/2019  Length of Stay: 2 days  Attending Physician: Nam Ford MD  Primary Care Provider: aNsra rIaheta MD        Subjective:     Principal Problem:Acute pulmonary edema        HPI:  63 y.o. Female with a PMHx of Gallstones, HTN, and Tobacco abuse presents to the ED with a cc of shortness of breath since last night and worsened today. She also c/o weakness with exertion, chills, and productive cough with green sputum. She did not take bp medication this morning and states she is not on O2 at home. She reports a full dialysis session yesterday. She is a smoker. She denies recent travel or IV drug-use. She denies leg pain, leg swelling, abdominal pain, rhinorrhea or NVD. No prior tx.       Patient's O2 sat on room air was 83%.  Patient states that she is not on home oxygen.  Patient went to HD yesterday and symptoms progressed.  Admit to ICU, resume home meds and emergent HD. May need cardene.      Overview/Hospital Course:  Pt admitted with HTN emergency and pulmonary edema. Required emergent HD yesterday. Resumed home meds for BP control and still requiring titration of meds. Ok for transfer to floor and if stable can dc tomorrow. Pt reports compliancy but previous documentation states otherwise.  Dc'd antibiotics - does not apppear to be infection. Patient transferred to the floor on 10/13. Patient underwent dialysis again on 10/14.  Blood pressure meds were added on 10/14.     Interval History: No new issues. Looks great.     Review of Systems   Constitutional: Negative for activity change.   HENT: Negative for congestion.    Respiratory: Negative for chest tightness and shortness of breath.    Cardiovascular: Negative for chest pain.   Gastrointestinal: Negative for abdominal pain.   Genitourinary: Negative for difficulty urinating.      Objective:     Vital Signs (Most Recent):  Temp: 98.3 °F (36.8 °C) (10/14/19 0728)  Pulse: 71 (10/14/19 0728)  Resp: 19 (10/14/19 0728)  BP: (!) 194/79 (10/14/19 0728)  SpO2: 97 % (10/14/19 0800) Vital Signs (24h Range):  Temp:  [98 °F (36.7 °C)-98.6 °F (37 °C)] 98.3 °F (36.8 °C)  Pulse:  [60-89] 71  Resp:  [17-43] 19  SpO2:  [94 %-100 %] 97 %  BP: (174-205)/(72-95) 194/79     Weight: 54.2 kg (119 lb 7.8 oz)  Body mass index is 20.51 kg/m².  No intake or output data in the 24 hours ending 10/14/19 1003   Physical Exam   Constitutional: She is oriented to person, place, and time. She appears well-developed and well-nourished.   Neurological: She is alert and oriented to person, place, and time.   Skin: Skin is warm and dry.   Psychiatric: She has a normal mood and affect. Her behavior is normal.   Nursing note and vitals reviewed.      Significant Labs:   BMP:   Recent Labs   Lab 10/12/19  1407  10/14/19  0411      < > 84      < > 137   K 4.0   < > 4.2   CL 96   < > 94*   CO2 30*   < > 24   BUN 30*   < > 55*   CREATININE 6.8*   < > 10.9*   CALCIUM 10.1   < > 10.5   MG 2.4  --   --     < > = values in this interval not displayed.     CBC:   Recent Labs   Lab 10/12/19  1407 10/13/19  0155 10/14/19  0411   WBC 10.71 10.94 7.23   HGB 11.0* 10.3* 9.4*   HCT 34.6* 32.0* 29.0*    204 183       Significant Imaging:      Assessment/Plan:      * Acute pulmonary edema  Emergent HD for volume control 10/12    Dialysis again on 10/14.  Added BP meds on same day.      Hypertension  Uncontrolled with persistent SBP>200  Resume home meds  IV hydralazine  Add lisinopril to regimen     Step down to floor and monitor - hope for improvement in 24 hours with addition and increase dosage   Pt symptomatic      Malignant hypertension  Added BP meds on 10/14.  SBP today 190+    Monitor BP over next 24 hours. If improved- home on 10/15.       ESRD (end stage renal disease)  Nephrology consulted        Non-compliance  Appears depressed and likely contributing   PT is full code      Tobacco abuse  Nicotine patch       Severe malnutrition  Pt reports poor appetite         VTE Risk Mitigation (From admission, onward)         Ordered     heparin (porcine) injection 5,000 Units  Every 12 hours      10/12/19 1840     IP VTE HIGH RISK PATIENT  Once      10/12/19 1659     Place sequential compression device  Until discontinued      10/12/19 1659     Place KAM hose  Until discontinued      10/12/19 1659              Adjusted BP meds. Dialysis. Monitor BP. IF improved- home in am.           Nam Cuadra MD  Department of Hospital Medicine   [facility]

## 2019-10-14 NOTE — ASSESSMENT & PLAN NOTE
Added BP meds on 10/14.  SBP today 190+    Monitor BP over next 24 hours. If improved- home on 10/15.

## 2019-10-14 NOTE — PROGRESS NOTES
Awake alert oriented NAD    Denies CNS ENT CP GI  RHEUM OR DERM SX  Past Medical History:   Diagnosis Date    ESRD (end stage renal disease) on dialysis 2017    Gallstones     Hypertension     Renal disorder     dialysis MIRANDA fistula    Tobacco abuse      Review of patient's allergies indicates:  No Known Allergies    Current Facility-Administered Medications   Medication    acetaminophen tablet 650 mg    amLODIPine tablet 10 mg    cloNIDine tablet 0.3 mg    heparin (porcine) injection 5,000 Units    hydrALAZINE injection 10 mg    hydrALAZINE tablet 100 mg    metoprolol succinate (TOPROL-XL) 24 hr tablet 100 mg    [START ON 10/15/2019] olmesartan tablet 40 mg    sevelamer carbonate tablet 1,600 mg    sodium chloride 0.9% flush 10 mL    sodium chloride 0.9% flush 10 mL       LABS    Recent Results (from the past 24 hour(s))   Comprehensive metabolic panel    Collection Time: 10/14/19  4:11 AM   Result Value Ref Range    Sodium 137 136 - 145 mmol/L    Potassium 4.2 3.5 - 5.1 mmol/L    Chloride 94 (L) 95 - 110 mmol/L    CO2 24 23 - 29 mmol/L    Glucose 84 70 - 110 mg/dL    BUN, Bld 55 (H) 8 - 23 mg/dL    Creatinine 10.9 (H) 0.5 - 1.4 mg/dL    Calcium 10.5 8.7 - 10.5 mg/dL    Total Protein 7.0 6.0 - 8.4 g/dL    Albumin 3.5 3.5 - 5.2 g/dL    Total Bilirubin 0.5 0.1 - 1.0 mg/dL    Alkaline Phosphatase 81 55 - 135 U/L    AST 12 10 - 40 U/L    ALT 9 (L) 10 - 44 U/L    Anion Gap 19 (H) 8 - 16 mmol/L    eGFR if African American 4 (A) >60 mL/min/1.73 m^2    eGFR if non African American 3 (A) >60 mL/min/1.73 m^2   CBC auto differential    Collection Time: 10/14/19  4:11 AM   Result Value Ref Range    WBC 7.23 3.90 - 12.70 K/uL    RBC 3.31 (L) 4.00 - 5.40 M/uL    Hemoglobin 9.4 (L) 12.0 - 16.0 g/dL    Hematocrit 29.0 (L) 37.0 - 48.5 %    Mean Corpuscular Volume 88 82 - 98 fL    Mean Corpuscular Hemoglobin 28.4 27.0 - 31.0 pg    Mean Corpuscular Hemoglobin Conc 32.4 32.0 - 36.0 g/dL    RDW 16.1 (H) 11.5 - 14.5 %     Platelets 183 150 - 350 K/uL    MPV 11.4 9.2 - 12.9 fL    Immature Granulocytes 0.4 0.0 - 0.5 %    Gran # (ANC) 4.7 1.8 - 7.7 K/uL    Immature Grans (Abs) 0.03 0.00 - 0.04 K/uL    Lymph # 1.5 1.0 - 4.8 K/uL    Mono # 0.7 0.3 - 1.0 K/uL    Eos # 0.3 0.0 - 0.5 K/uL    Baso # 0.05 0.00 - 0.20 K/uL    nRBC 0 0 /100 WBC    Gran% 64.3 38.0 - 73.0 %    Lymph% 20.5 18.0 - 48.0 %    Mono% 9.8 4.0 - 15.0 %    Eosinophil% 4.3 0.0 - 8.0 %    Basophil% 0.7 0.0 - 1.9 %    Differential Method Automated    ]    I/O last 3 completed shifts:  In: 500 [Other:500]  Out: 3200 [Other:3200]    Vitals:    10/14/19 1130 10/14/19 1200 10/14/19 1245 10/14/19 1328   BP: (!) 143/71 (!) 161/67 (!) 164/88 (!) 165/79   Pulse: 62 60 60 61   Resp:   18 18   Temp:   98 °F (36.7 °C) 98 °F (36.7 °C)   TempSrc:   Oral Oral   SpO2:       Weight:       Height:           No Jvd, Thyromegaly or Lymphadenopathy  Lungs: Fairly clear anteriorly and laterally  Cor: RRR no G or rubs  Abd: Soft benign good bowel sounds non tender  Ext: No E C C    A)  HTN urgency  ESRD  Pulm edema 2nd to #1  Anemia of ckd  2nd hyperpth  Smoker    P)    Renal Diet  Home meds  Protect access  HD  EPO   Binders  Adjust all meds to the degree of renal fx  Close follow up I/O and weights  Maintain Hydration

## 2019-10-14 NOTE — PROGRESS NOTES
Patient returned to room from scheduled dialysis on room air. Patient awake, alert, oriented without c/o discomfort. Tele monitoring in progress. No apparent distress noted at this time.

## 2019-10-14 NOTE — PROGRESS NOTES
Patient to scheduled dialysis as ordered on room air. Patient awake, alert, oriented without c/o discomfort at this time. No apparent distress noted.

## 2019-10-14 NOTE — SUBJECTIVE & OBJECTIVE
Interval History: No new issues. Looks great.     Review of Systems   Constitutional: Negative for activity change.   HENT: Negative for congestion.    Respiratory: Negative for chest tightness and shortness of breath.    Cardiovascular: Negative for chest pain.   Gastrointestinal: Negative for abdominal pain.   Genitourinary: Negative for difficulty urinating.     Objective:     Vital Signs (Most Recent):  Temp: 98.3 °F (36.8 °C) (10/14/19 0728)  Pulse: 71 (10/14/19 0728)  Resp: 19 (10/14/19 0728)  BP: (!) 194/79 (10/14/19 0728)  SpO2: 97 % (10/14/19 0800) Vital Signs (24h Range):  Temp:  [98 °F (36.7 °C)-98.6 °F (37 °C)] 98.3 °F (36.8 °C)  Pulse:  [60-89] 71  Resp:  [17-43] 19  SpO2:  [94 %-100 %] 97 %  BP: (174-205)/(72-95) 194/79     Weight: 54.2 kg (119 lb 7.8 oz)  Body mass index is 20.51 kg/m².  No intake or output data in the 24 hours ending 10/14/19 1003   Physical Exam   Constitutional: She is oriented to person, place, and time. She appears well-developed and well-nourished.   Neurological: She is alert and oriented to person, place, and time.   Skin: Skin is warm and dry.   Psychiatric: She has a normal mood and affect. Her behavior is normal.   Nursing note and vitals reviewed.      Significant Labs:   BMP:   Recent Labs   Lab 10/12/19  1407  10/14/19  0411      < > 84      < > 137   K 4.0   < > 4.2   CL 96   < > 94*   CO2 30*   < > 24   BUN 30*   < > 55*   CREATININE 6.8*   < > 10.9*   CALCIUM 10.1   < > 10.5   MG 2.4  --   --     < > = values in this interval not displayed.     CBC:   Recent Labs   Lab 10/12/19  1407 10/13/19  0155 10/14/19  0411   WBC 10.71 10.94 7.23   HGB 11.0* 10.3* 9.4*   HCT 34.6* 32.0* 29.0*    204 183       Significant Imaging:

## 2019-10-15 VITALS
HEART RATE: 54 BPM | HEIGHT: 64 IN | DIASTOLIC BLOOD PRESSURE: 71 MMHG | OXYGEN SATURATION: 96 % | SYSTOLIC BLOOD PRESSURE: 160 MMHG | BODY MASS INDEX: 19.54 KG/M2 | TEMPERATURE: 98 F | WEIGHT: 114.44 LBS | RESPIRATION RATE: 18 BRPM

## 2019-10-15 PROBLEM — J18.9 BILATERAL PNEUMONIA: Status: RESOLVED | Noted: 2019-08-05 | Resolved: 2019-10-15

## 2019-10-15 LAB
ALBUMIN SERPL BCP-MCNC: 3.5 G/DL (ref 3.5–5.2)
ALP SERPL-CCNC: 91 U/L (ref 55–135)
ALT SERPL W/O P-5'-P-CCNC: 14 U/L (ref 10–44)
ANION GAP SERPL CALC-SCNC: 13 MMOL/L (ref 8–16)
AST SERPL-CCNC: 17 U/L (ref 10–40)
BASOPHILS # BLD AUTO: 0.08 K/UL (ref 0–0.2)
BASOPHILS NFR BLD: 1.5 % (ref 0–1.9)
BILIRUB SERPL-MCNC: 0.4 MG/DL (ref 0.1–1)
BUN SERPL-MCNC: 25 MG/DL (ref 8–23)
CALCIUM SERPL-MCNC: 10.4 MG/DL (ref 8.7–10.5)
CHLORIDE SERPL-SCNC: 103 MMOL/L (ref 95–110)
CO2 SERPL-SCNC: 23 MMOL/L (ref 23–29)
CREAT SERPL-MCNC: 6.3 MG/DL (ref 0.5–1.4)
DIFFERENTIAL METHOD: ABNORMAL
EOSINOPHIL # BLD AUTO: 0.2 K/UL (ref 0–0.5)
EOSINOPHIL NFR BLD: 3.7 % (ref 0–8)
ERYTHROCYTE [DISTWIDTH] IN BLOOD BY AUTOMATED COUNT: 15.9 % (ref 11.5–14.5)
EST. GFR  (AFRICAN AMERICAN): 8 ML/MIN/1.73 M^2
EST. GFR  (NON AFRICAN AMERICAN): 7 ML/MIN/1.73 M^2
GLUCOSE SERPL-MCNC: 81 MG/DL (ref 70–110)
HCT VFR BLD AUTO: 31.3 % (ref 37–48.5)
HGB BLD-MCNC: 9.8 G/DL (ref 12–16)
IMM GRANULOCYTES # BLD AUTO: 0.02 K/UL (ref 0–0.04)
IMM GRANULOCYTES NFR BLD AUTO: 0.4 % (ref 0–0.5)
LYMPHOCYTES # BLD AUTO: 1.9 K/UL (ref 1–4.8)
LYMPHOCYTES NFR BLD: 34.6 % (ref 18–48)
MCH RBC QN AUTO: 28.2 PG (ref 27–31)
MCHC RBC AUTO-ENTMCNC: 31.3 G/DL (ref 32–36)
MCV RBC AUTO: 90 FL (ref 82–98)
MONOCYTES # BLD AUTO: 0.7 K/UL (ref 0.3–1)
MONOCYTES NFR BLD: 12.2 % (ref 4–15)
NEUTROPHILS # BLD AUTO: 2.6 K/UL (ref 1.8–7.7)
NEUTROPHILS NFR BLD: 47.6 % (ref 38–73)
NRBC BLD-RTO: 0 /100 WBC
PLATELET # BLD AUTO: 198 K/UL (ref 150–350)
PMV BLD AUTO: 11 FL (ref 9.2–12.9)
POTASSIUM SERPL-SCNC: 4.7 MMOL/L (ref 3.5–5.1)
PROT SERPL-MCNC: 7.3 G/DL (ref 6–8.4)
RBC # BLD AUTO: 3.48 M/UL (ref 4–5.4)
SODIUM SERPL-SCNC: 139 MMOL/L (ref 136–145)
WBC # BLD AUTO: 5.43 K/UL (ref 3.9–12.7)

## 2019-10-15 PROCEDURE — 25000003 PHARM REV CODE 250: Mod: NTX | Performed by: HOSPITALIST

## 2019-10-15 PROCEDURE — 85025 COMPLETE CBC W/AUTO DIFF WBC: CPT | Mod: NTX

## 2019-10-15 PROCEDURE — 80053 COMPREHEN METABOLIC PANEL: CPT | Mod: NTX

## 2019-10-15 PROCEDURE — 63600175 PHARM REV CODE 636 W HCPCS: Mod: NTX | Performed by: HOSPITALIST

## 2019-10-15 PROCEDURE — 25000003 PHARM REV CODE 250: Mod: NTX | Performed by: INTERNAL MEDICINE

## 2019-10-15 PROCEDURE — 36415 COLL VENOUS BLD VENIPUNCTURE: CPT | Mod: NTX

## 2019-10-15 RX ORDER — OLMESARTAN MEDOXOMIL 40 MG/1
40 TABLET ORAL DAILY
Qty: 90 TABLET | Refills: 3 | Status: SHIPPED | OUTPATIENT
Start: 2019-10-16 | End: 2021-05-07 | Stop reason: SDUPTHER

## 2019-10-15 RX ORDER — SEVELAMER HYDROCHLORIDE 400 MG/1
1600 TABLET, FILM COATED ORAL
Qty: 360 TABLET | Refills: 11 | Status: SHIPPED | OUTPATIENT
Start: 2019-10-15 | End: 2020-02-13

## 2019-10-15 RX ORDER — CLONIDINE HYDROCHLORIDE 0.3 MG/1
0.3 TABLET ORAL DAILY
Qty: 30 TABLET | Refills: 11 | Status: SHIPPED | OUTPATIENT
Start: 2019-10-16 | End: 2021-04-16 | Stop reason: SDUPTHER

## 2019-10-15 RX ORDER — HYDRALAZINE HYDROCHLORIDE 100 MG/1
100 TABLET, FILM COATED ORAL 3 TIMES DAILY
Qty: 90 TABLET | Refills: 11 | Status: SHIPPED | OUTPATIENT
Start: 2019-10-15 | End: 2021-04-16 | Stop reason: SDUPTHER

## 2019-10-15 RX ADMIN — CLONIDINE HYDROCHLORIDE 0.3 MG: 0.1 TABLET ORAL at 08:10

## 2019-10-15 RX ADMIN — HEPARIN SODIUM 5000 UNITS: 5000 INJECTION INTRAVENOUS; SUBCUTANEOUS at 08:10

## 2019-10-15 RX ADMIN — HYDRALAZINE HYDROCHLORIDE 100 MG: 25 TABLET ORAL at 06:10

## 2019-10-15 RX ADMIN — OLMESARTAN MEDOXOMIL 40 MG: 20 TABLET, FILM COATED ORAL at 08:10

## 2019-10-15 RX ADMIN — SEVELAMER CARBONATE 1600 MG: 800 TABLET, FILM COATED ORAL at 08:10

## 2019-10-15 NOTE — PROGRESS NOTES
WRITTEN HEALTHCARE DISCHARGE INFORMATION     Things that YOU are RESPONSIBLE for to Manage Your Care At Home:    1. Getting your prescriptions filled.  2. Taking you medications as directed. DO NOT MISS ANY DOSES!  3. Going to your follow-up doctor appointments. This is important because it allows the doctor to monitor your progress and to determine if any changes need to be made to your treatment plan.    If you are unable to make your follow up appointments, please call the number listed and reschedule this appointment.     ____________HELP AT HOME____________________    Experiencing any SIGNS or SYMPTOMS: YOU CAN    Schedule a same day appopintment with your Primary Care Doctor or  you can call [facility] On Call Nurse Care Line for 24/7 assistance at [phone]    If you are experience any signs or symptoms that have become severe, Call 911 and come to your nearest Emergency Room.    Thank you for choosing [facility] and allowing us to care for you.   From your care management team: Shirley HODGES MSRAMONE [phone]    You should receive a call from [facility] Discharge Department within 48-72 hours to help manage your care after discharge. Please try to make sure that you answer your phone for this important phone call.  Follow-up Information     Brandan Salas MD On 10/28/2019.    Specialty:  Internal Medicine  Why:  Outpatient Services, PCP, follow-up appointment @ 10:20AM.   Contact information:  [place] [id number]  [phone]

## 2019-10-15 NOTE — PLAN OF CARE
10/15/19 1058   Final Note   Assessment Type Final Discharge Note   Anticipated Discharge Disposition Home   What phone number can be called within the next 1-3 days to see how you are doing after discharge? [phone]   Hospital Follow Up  Appt(s) scheduled? Yes   Discharge plans and expectations educations in teach back method with documentation complete? Yes   Right Care Referral Info   Post Acute Recommendation No Care

## 2019-10-15 NOTE — PLAN OF CARE
10/15/19 1053   Medicare Message   Important Message from Medicare regarding Discharge Appeal Rights Given to patient/caregiver;Explained to patient/caregiver;Signed/date by patient/caregiver   Date IMM was signed 10/15/19   Time IMM was signed 1047

## 2019-10-15 NOTE — PLAN OF CARE
10/15/19 1029   Post-Acute Status   Post-Acute Authorization Other  (Home )   Other Status No Post-Acute Service Needs   Discharge Delays None known at this time     TREVER contacted pt's PCP listed in chart to schedule follow-up appointment. According to Dr. Iraheta, she no longer taking pt's at this time. SW spoke with pt, she is prefers a [facility] doctor and does have a preference in male or female. However, pt prefers afternoon appointments. Appointment scheduled and added to follow-up tab.     EDUCATION:  Completed by pt's nurse, Britney.  Reminded pt things she will be responsible for to manage her healthcare at home: getting Rx filled, attending follow up appointments, and taking medication as prescribed were discussed.   Teach back method used.  All questions answered.  Patient verbalized understanding of all information.      TREVER provided pt with a copy of follow-up appointments. TREVER explained/highlighted date, time, and location of each appointment. TREVER provided pt with a blue folder and instructed pt to place all medical documents in blue folder. TREVER explained to pt the nurse will provide an AVS with diagnosis and instructed pt to place in blue folder and bring to follow-up appointment. TREVER notified pt's nurse, all CM needs have been met.

## 2019-10-15 NOTE — NURSING
Report received from RAYSHAWN Ruelas. Patient awake and alert. NAD noted. Safety maintained. Will continue to monitor.

## 2019-10-15 NOTE — PLAN OF CARE
Problem: Fall Injury Risk  Goal: Absence of Fall and Fall-Related Injury  Outcome: Ongoing, Progressing  Intervention: Identify and Manage Contributors to Fall Injury Risk  Flowsheets (Taken 10/15/2019 0454)  Self-Care Promotion: independence encouraged; BADL personal objects within reach; BADL personal routines maintained  Medication Review/Management: medications reviewed  Intervention: Promote Injury-Free Environment  Flowsheets (Taken 10/15/2019 0454)  Safety Promotion/Fall Prevention: assistive device/personal item within reach; bed alarm set; instructed to call staff for mobility; side rails raised x 2; medications reviewed; nonskid shoes/socks when out of bed  Environmental Safety Modification: assistive device/personal items within reach; clutter free environment maintained; room near unit station; room organization consistent

## 2019-10-15 NOTE — DISCHARGE SUMMARY
[facility] Hospital Medicine  Discharge Summary      Patient Name: April Vasquez  MRN: [id number]  Admission Date: 10/12/2019  Hospital Length of Stay: 3 days  Discharge Date and Time:  10/15/2019 10:10 AM  Attending Physician: Karley Armas MD   Discharging Provider: Karley Armas MD  Primary Care Provider: Nasra Iraheta MD      HPI:   63 y.o. Female with a PMHx of Gallstones, HTN, and Tobacco abuse presents to the ED with a cc of shortness of breath since last night and worsened today. She also c/o weakness with exertion, chills, and productive cough with green sputum. She did not take bp medication this morning and states she is not on O2 at home. She reports a full dialysis session yesterday. She is a smoker. She denies recent travel or IV drug-use. She denies leg pain, leg swelling, abdominal pain, rhinorrhea or NVD. No prior tx.       Patient's O2 sat on room air was 83%.  Patient states that she is not on home oxygen.  Patient went to HD yesterday and symptoms progressed.  Admit to ICU, resume home meds and emergent HD. May need cardene.      * No surgery found *      Hospital Course:   Pt admitted with HTN emergency and pulmonary edema requiring emergent HD. Resumed home meds for BP control (hydralazine, amlodipine, metoprolol). BP still uncontrolled. Increased hydralazine to 100mg TID and added olmesartan and clonidine for BP control. BP has been labile- low during dialysis and high outside of dialysis. Patient has a BP cuff at home which she will use. She is stable on room air. She will follow up with PCP and continue to work on BP control. Discharged to home in stable condition.      Consults:   Consults (From admission, onward)        Status Ordering Provider     Inpatient consult to Cardiology  Once     Provider:  Jaime Garcia MD    Completed TRISTAN BUSH     Inpatient consult to Nephrology  Once     Provider:  Tristan Bush MD    Acknowledged  JM LOPEZ          No new Assessment & Plan notes have been filed under this hospital service since the last note was generated.  Service: Hospital Medicine    Final Active Diagnoses:    Diagnosis Date Noted POA    PRINCIPAL PROBLEM:  Acute pulmonary edema [J81.0] 10/12/2019 Yes    Hypertension [I10] 06/07/2017 Yes    ESRD (end stage renal disease) [N18.6] 02/04/2017 Yes    Malignant hypertension [I10] 02/04/2017 Yes    Non-compliance [Z91.19] 02/04/2017 Not Applicable    Severe malnutrition [E43] 09/19/2016 Yes    Tobacco abuse [Z72.0] 09/19/2016 Yes      Problems Resolved During this Admission:    Diagnosis Date Noted Date Resolved POA    Acute hypoxemic respiratory failure [J96.01] 02/04/2017 02/05/2017 Yes    Acute pulmonary edema [J81.0] 02/04/2017 02/05/2017 Yes       Discharged Condition: good    Disposition: Home or Self Care    Follow Up: with PCP and with dialysis    Patient Instructions:      Diet renal     Notify your health care provider if you experience any of the following:  temperature >100.4     Notify your health care provider if you experience any of the following:  persistent nausea and vomiting or diarrhea     Notify your health care provider if you experience any of the following:  severe uncontrolled pain     Notify your health care provider if you experience any of the following:  redness, tenderness, or signs of infection (pain, swelling, redness, odor or green/yellow discharge around incision site)     Notify your health care provider if you experience any of the following:  difficulty breathing or increased cough     Notify your health care provider if you experience any of the following:  severe persistent headache     Notify your health care provider if you experience any of the following:  worsening rash     Notify your health care provider if you experience any of the following:  persistent dizziness, light-headedness, or visual disturbances     Notify your health  care provider if you experience any of the following:  increased confusion or weakness     Activity as tolerated       Significant Diagnostic Studies: Labs: All labs within the past 24 hours have been reviewed    Pending Diagnostic Studies:     None         Medications:  Reconciled Home Medications:      Medication List      START taking these medications    cloNIDine 0.3 MG tablet  Commonly known as:  CATAPRES  Take 1 tablet (0.3 mg total) by mouth once daily.  Start taking on:  October 16, 2019     olmesartan 40 MG tablet  Commonly known as:  BENICAR  Take 1 tablet (40 mg total) by mouth once daily.  Start taking on:  October 16, 2019        CHANGE how you take these medications    hydrALAZINE 100 MG tablet  Commonly known as:  APRESOLINE  Take 1 tablet (100 mg total) by mouth 3 (three) times daily.  What changed:    · medication strength  · how much to take     sevelamer 400 MG Tab  Commonly known as:  RENAGEL  Take 4 tablets (1,600 mg total) by mouth 3 (three) times daily with meals.  What changed:  when to take this        CONTINUE taking these medications    amLODIPine 10 MG tablet  Commonly known as:  NORVASC  Take 10 mg by mouth once daily.     metoprolol succinate 100 MG 24 hr tablet  Commonly known as:  TOPROL-XL  Take 100 mg by mouth.            Indwelling Lines/Drains at time of discharge:   Lines/Drains/Airways     Drain                 Hemodialysis AV Fistula 08/04/19 2249 Left upper arm 71 days                Time spent on the discharge of patient: 35 minutes  Patient was seen and examined on the date of discharge and determined to be suitable for discharge.         Karley Armas MD  Department of Hospital Medicine  [facility]

## 2019-10-17 LAB
BACTERIA BLD CULT: NORMAL
BACTERIA BLD CULT: NORMAL

## 2019-11-07 DIAGNOSIS — Z76.82 ORGAN TRANSPLANT CANDIDATE: Primary | ICD-10-CM

## 2019-11-12 ENCOUNTER — TELEPHONE (OUTPATIENT)
Dept: TRANSPLANT | Facility: CLINIC | Age: 64
End: 2019-11-12

## 2019-11-22 ENCOUNTER — OFFICE VISIT (OUTPATIENT)
Dept: FAMILY MEDICINE | Facility: CLINIC | Age: 64
End: 2019-11-22
Payer: MEDICARE

## 2019-11-22 VITALS
TEMPERATURE: 98 F | SYSTOLIC BLOOD PRESSURE: 152 MMHG | HEIGHT: 64 IN | BODY MASS INDEX: 20.79 KG/M2 | OXYGEN SATURATION: 96 % | HEART RATE: 75 BPM | WEIGHT: 121.81 LBS | DIASTOLIC BLOOD PRESSURE: 66 MMHG

## 2019-11-22 DIAGNOSIS — K11.20 PAROTIDITIS: Primary | ICD-10-CM

## 2019-11-22 PROCEDURE — 99999 PR PBB SHADOW E&M-EST. PATIENT-LVL III: ICD-10-PCS | Mod: PBBFAC,,, | Performed by: FAMILY MEDICINE

## 2019-11-22 PROCEDURE — 99213 OFFICE O/P EST LOW 20 MIN: CPT | Mod: PBBFAC,PO | Performed by: FAMILY MEDICINE

## 2019-11-22 PROCEDURE — 99202 PR OFFICE/OUTPT VISIT, NEW, LEVL II, 15-29 MIN: ICD-10-PCS | Mod: S$PBB,,, | Performed by: FAMILY MEDICINE

## 2019-11-22 PROCEDURE — 99999 PR PBB SHADOW E&M-EST. PATIENT-LVL III: CPT | Mod: PBBFAC,,, | Performed by: FAMILY MEDICINE

## 2019-11-22 PROCEDURE — 99202 OFFICE O/P NEW SF 15 MIN: CPT | Mod: S$PBB,,, | Performed by: FAMILY MEDICINE

## 2019-11-22 NOTE — PROGRESS NOTES
[facility]  Progress Note    SUBJECTIVE:     Chief Complaint   Patient presents with    Adenopathy       HPI   April Vasquez  is a 64 y.o. female here for c/o left cheek swelling on/off for the past couple days. Onset was sudden. Whenever she eats, it gets even more swollen. Then it gets better as time goes on. No fevers, chills. Right side is normal. No recent sickness, illness. Hasn't tried anything for it. Slightly tender to touch whenever it gets big.     Review of patient's allergies indicates:  No Known Allergies    Past Medical History:   Diagnosis Date    ESRD (end stage renal disease) on dialysis 2017    Gallstones     Hypertension     Renal disorder     dialysis MIRANDA fistula    Tobacco abuse      Past Surgical History:   Procedure Laterality Date    AVF  2017    CHOLECYSTECTOMY  2016    Permacath Right 2017    TUBAL LIGATION       Family History   Problem Relation Age of Onset    Kidney disease Mother     Hypertension Mother     Cervical cancer Mother     Diabetes Father     Drug abuse Paternal Grandmother     Breast cancer Neg Hx     Colon cancer Neg Hx     Ovarian cancer Neg Hx      Social History     Tobacco Use    Smoking status: Current Every Day Smoker     Packs/day: 0.50     Years: 20.00     Pack years: 10.00    Smokeless tobacco: Never Used   Substance Use Topics    Alcohol use: No    Drug use: No        Review of Systems   Constitutional: Negative for chills, fever and malaise/fatigue.   HENT:        + L cheek swelling   Respiratory: Negative.  Negative for cough and shortness of breath.    Cardiovascular: Negative.  Negative for chest pain.   Gastrointestinal: Negative.  Negative for abdominal pain, nausea and vomiting.   Genitourinary: Negative.    Neurological: Negative for weakness and headaches.   All other systems reviewed and are negative.    OBJECTIVE:     Vitals:    11/22/19 1316   BP: (!) 152/66   Pulse:    Temp:      Body mass index is 20.91  kg/m².    Physical Exam   HENT:   Right Ear: External ear normal. Tympanic membrane is not perforated, not erythematous, not retracted and not bulging. No hemotympanum.   Left Ear: External ear normal. Tympanic membrane is not perforated, not erythematous, not retracted and not bulging. No hemotympanum.   Mouth/Throat: Oropharynx is clear and moist. No oropharyngeal exudate.   + mild swollen L parotid gland. No LAD. Non-tender. No erythema. No other swollen glands.        Old records were reviewed. Labs and/or images were independently reviewed.    ASSESSMENT     1. Parotiditis        PLAN:     Parotiditis   -      Discussed conservative measures. Warm compresses with warm salt water rinses. If it doesn't improve in 2-3 weeks, will order imaging. No further testing necessary at this time.     RTC PRN  More than 50% of the encounter was spent counseling patient about parotitis, in an outpatient setting. Total time spent counseling patient: 20 .      Luis A Mckeon MD  11/22/2019 1:23 PM

## 2019-12-27 DIAGNOSIS — Z76.82 ORGAN TRANSPLANT CANDIDATE: Primary | ICD-10-CM

## 2020-01-13 PROBLEM — J81.0 ACUTE PULMONARY EDEMA: Status: RESOLVED | Noted: 2019-10-12 | Resolved: 2020-01-13

## 2020-01-26 ENCOUNTER — HOSPITAL ENCOUNTER (INPATIENT)
Facility: HOSPITAL | Age: 65
LOS: 1 days | Discharge: LEFT BEFORE DISCHARGE | DRG: 682 | End: 2020-01-28
Attending: EMERGENCY MEDICINE | Admitting: FAMILY MEDICINE
Payer: MEDICARE

## 2020-01-26 DIAGNOSIS — R09.02 HYPOXIA: Primary | ICD-10-CM

## 2020-01-26 DIAGNOSIS — E87.5 HYPERKALEMIA: ICD-10-CM

## 2020-01-26 DIAGNOSIS — Z99.2 ESRD (END STAGE RENAL DISEASE) ON DIALYSIS: ICD-10-CM

## 2020-01-26 DIAGNOSIS — E87.70 HYPERVOLEMIA, UNSPECIFIED HYPERVOLEMIA TYPE: ICD-10-CM

## 2020-01-26 DIAGNOSIS — E87.70 FLUID OVERLOAD: ICD-10-CM

## 2020-01-26 DIAGNOSIS — R06.02 SHORTNESS OF BREATH: ICD-10-CM

## 2020-01-26 DIAGNOSIS — N18.6 END STAGE CHRONIC KIDNEY DISEASE: ICD-10-CM

## 2020-01-26 DIAGNOSIS — N18.6 ESRD (END STAGE RENAL DISEASE) ON DIALYSIS: ICD-10-CM

## 2020-01-26 LAB
ALBUMIN SERPL BCP-MCNC: 4 G/DL (ref 3.5–5.2)
ALP SERPL-CCNC: 108 U/L (ref 55–135)
ALT SERPL W/O P-5'-P-CCNC: 9 U/L (ref 10–44)
ANION GAP SERPL CALC-SCNC: 19 MMOL/L (ref 8–16)
AST SERPL-CCNC: 15 U/L (ref 10–40)
BASOPHILS # BLD AUTO: 0.06 K/UL (ref 0–0.2)
BASOPHILS NFR BLD: 0.4 % (ref 0–1.9)
BILIRUB SERPL-MCNC: 0.5 MG/DL (ref 0.1–1)
BNP SERPL-MCNC: 1527 PG/ML (ref 0–99)
BUN SERPL-MCNC: 54 MG/DL (ref 8–23)
CALCIUM SERPL-MCNC: 10.4 MG/DL (ref 8.7–10.5)
CHLORIDE SERPL-SCNC: 94 MMOL/L (ref 95–110)
CO2 SERPL-SCNC: 25 MMOL/L (ref 23–29)
CREAT SERPL-MCNC: 9.7 MG/DL (ref 0.5–1.4)
DIFFERENTIAL METHOD: ABNORMAL
EOSINOPHIL # BLD AUTO: 0.3 K/UL (ref 0–0.5)
EOSINOPHIL NFR BLD: 2.3 % (ref 0–8)
ERYTHROCYTE [DISTWIDTH] IN BLOOD BY AUTOMATED COUNT: 18.2 % (ref 11.5–14.5)
EST. GFR  (AFRICAN AMERICAN): 4 ML/MIN/1.73 M^2
EST. GFR  (NON AFRICAN AMERICAN): 4 ML/MIN/1.73 M^2
GLUCOSE SERPL-MCNC: 114 MG/DL (ref 70–110)
HCT VFR BLD AUTO: 36.7 % (ref 37–48.5)
HGB BLD-MCNC: 12 G/DL (ref 12–16)
IMM GRANULOCYTES # BLD AUTO: 0.04 K/UL (ref 0–0.04)
IMM GRANULOCYTES NFR BLD AUTO: 0.3 % (ref 0–0.5)
LYMPHOCYTES # BLD AUTO: 1.6 K/UL (ref 1–4.8)
LYMPHOCYTES NFR BLD: 11.5 % (ref 18–48)
MAGNESIUM SERPL-MCNC: 2.7 MG/DL (ref 1.6–2.6)
MCH RBC QN AUTO: 29.3 PG (ref 27–31)
MCHC RBC AUTO-ENTMCNC: 32.7 G/DL (ref 32–36)
MCV RBC AUTO: 90 FL (ref 82–98)
MONOCYTES # BLD AUTO: 0.8 K/UL (ref 0.3–1)
MONOCYTES NFR BLD: 5.9 % (ref 4–15)
NEUTROPHILS # BLD AUTO: 11.3 K/UL (ref 1.8–7.7)
NEUTROPHILS NFR BLD: 79.6 % (ref 38–73)
NRBC BLD-RTO: 0 /100 WBC
PLATELET # BLD AUTO: 228 K/UL (ref 150–350)
PMV BLD AUTO: 11.7 FL (ref 9.2–12.9)
POTASSIUM SERPL-SCNC: 5 MMOL/L (ref 3.5–5.1)
PROT SERPL-MCNC: 7.4 G/DL (ref 6–8.4)
RBC # BLD AUTO: 4.1 M/UL (ref 4–5.4)
SODIUM SERPL-SCNC: 138 MMOL/L (ref 136–145)
TROPONIN I SERPL DL<=0.01 NG/ML-MCNC: 0.09 NG/ML (ref 0–0.03)
WBC # BLD AUTO: 14.21 K/UL (ref 3.9–12.7)

## 2020-01-26 PROCEDURE — 93005 ELECTROCARDIOGRAM TRACING: CPT | Mod: NTX

## 2020-01-26 PROCEDURE — 99285 EMERGENCY DEPT VISIT HI MDM: CPT | Mod: 25,NTX

## 2020-01-26 PROCEDURE — 83880 ASSAY OF NATRIURETIC PEPTIDE: CPT | Mod: NTX

## 2020-01-26 PROCEDURE — 82803 BLOOD GASES ANY COMBINATION: CPT | Mod: NTX

## 2020-01-26 PROCEDURE — 93010 ELECTROCARDIOGRAM REPORT: CPT | Mod: NTX,,, | Performed by: INTERNAL MEDICINE

## 2020-01-26 PROCEDURE — 80053 COMPREHEN METABOLIC PANEL: CPT | Mod: NTX

## 2020-01-26 PROCEDURE — 93010 EKG 12-LEAD: ICD-10-PCS | Mod: NTX,,, | Performed by: INTERNAL MEDICINE

## 2020-01-26 PROCEDURE — 85025 COMPLETE CBC W/AUTO DIFF WBC: CPT | Mod: NTX

## 2020-01-26 PROCEDURE — 84484 ASSAY OF TROPONIN QUANT: CPT | Mod: NTX

## 2020-01-26 PROCEDURE — 99900035 HC TECH TIME PER 15 MIN (STAT): Mod: NTX

## 2020-01-26 PROCEDURE — 25000003 PHARM REV CODE 250: Mod: NTX | Performed by: EMERGENCY MEDICINE

## 2020-01-26 PROCEDURE — 36600 WITHDRAWAL OF ARTERIAL BLOOD: CPT | Mod: NTX

## 2020-01-26 PROCEDURE — 83735 ASSAY OF MAGNESIUM: CPT | Mod: NTX

## 2020-01-26 RX ORDER — CLONIDINE HYDROCHLORIDE 0.1 MG/1
0.3 TABLET ORAL
Status: COMPLETED | OUTPATIENT
Start: 2020-01-26 | End: 2020-01-26

## 2020-01-26 RX ORDER — NITROGLYCERIN 20 MG/100ML
20 INJECTION INTRAVENOUS CONTINUOUS
Status: DISCONTINUED | OUTPATIENT
Start: 2020-01-27 | End: 2020-01-27

## 2020-01-26 RX ADMIN — CLONIDINE HYDROCHLORIDE 0.3 MG: 0.1 TABLET ORAL at 11:01

## 2020-01-27 PROBLEM — J81.1 PULMONARY EDEMA: Status: ACTIVE | Noted: 2020-01-27

## 2020-01-27 PROBLEM — E87.70 VOLUME OVERLOAD: Status: ACTIVE | Noted: 2020-01-27

## 2020-01-27 PROBLEM — I16.0 HYPERTENSIVE URGENCY: Status: ACTIVE | Noted: 2020-01-27

## 2020-01-27 PROBLEM — E87.70 FLUID OVERLOAD: Status: ACTIVE | Noted: 2020-01-27

## 2020-01-27 PROBLEM — R06.00 DYSPNEA: Status: ACTIVE | Noted: 2020-01-27

## 2020-01-27 PROBLEM — D72.829 LEUKOCYTOSIS: Status: ACTIVE | Noted: 2020-01-27

## 2020-01-27 LAB
ALLENS TEST: ABNORMAL
ANION GAP SERPL CALC-SCNC: 16 MMOL/L (ref 8–16)
BASOPHILS # BLD AUTO: 0.05 K/UL (ref 0–0.2)
BASOPHILS NFR BLD: 0.4 % (ref 0–1.9)
BUN SERPL-MCNC: 58 MG/DL (ref 8–23)
CALCIUM SERPL-MCNC: 9.7 MG/DL (ref 8.7–10.5)
CHLORIDE SERPL-SCNC: 94 MMOL/L (ref 95–110)
CO2 SERPL-SCNC: 26 MMOL/L (ref 23–29)
CREAT SERPL-MCNC: 10.8 MG/DL (ref 0.5–1.4)
CRP SERPL-MCNC: 10.4 MG/L (ref 0–8.2)
DELSYS: ABNORMAL
DIFFERENTIAL METHOD: ABNORMAL
EOSINOPHIL # BLD AUTO: 0.2 K/UL (ref 0–0.5)
EOSINOPHIL NFR BLD: 1.4 % (ref 0–8)
ERYTHROCYTE [DISTWIDTH] IN BLOOD BY AUTOMATED COUNT: 17.7 % (ref 11.5–14.5)
ERYTHROCYTE [SEDIMENTATION RATE] IN BLOOD BY WESTERGREN METHOD: 25 MM/H
EST. GFR  (AFRICAN AMERICAN): 4 ML/MIN/1.73 M^2
EST. GFR  (NON AFRICAN AMERICAN): 3 ML/MIN/1.73 M^2
FLOW: 4
GLUCOSE SERPL-MCNC: 90 MG/DL (ref 70–110)
HCO3 UR-SCNC: 27.5 MMOL/L (ref 24–28)
HCT VFR BLD AUTO: 31.7 % (ref 37–48.5)
HGB BLD-MCNC: 10.4 G/DL (ref 12–16)
IMM GRANULOCYTES # BLD AUTO: 0.03 K/UL (ref 0–0.04)
IMM GRANULOCYTES NFR BLD AUTO: 0.3 % (ref 0–0.5)
LYMPHOCYTES # BLD AUTO: 1.7 K/UL (ref 1–4.8)
LYMPHOCYTES NFR BLD: 14 % (ref 18–48)
MCH RBC QN AUTO: 29.1 PG (ref 27–31)
MCHC RBC AUTO-ENTMCNC: 32.8 G/DL (ref 32–36)
MCV RBC AUTO: 89 FL (ref 82–98)
MODE: ABNORMAL
MONOCYTES # BLD AUTO: 0.8 K/UL (ref 0.3–1)
MONOCYTES NFR BLD: 7 % (ref 4–15)
NEUTROPHILS # BLD AUTO: 9.1 K/UL (ref 1.8–7.7)
NEUTROPHILS NFR BLD: 76.9 % (ref 38–73)
NRBC BLD-RTO: 0 /100 WBC
PCO2 BLDA: 41.8 MMHG (ref 35–45)
PH SMN: 7.43 [PH] (ref 7.35–7.45)
PLATELET # BLD AUTO: 195 K/UL (ref 150–350)
PMV BLD AUTO: 12.2 FL (ref 9.2–12.9)
PO2 BLDA: 68 MMHG (ref 80–100)
POC BE: 3 MMOL/L
POC SATURATED O2: 94 % (ref 95–100)
POC TCO2: 29 MMOL/L (ref 23–27)
POTASSIUM SERPL-SCNC: 6.3 MMOL/L (ref 3.5–5.1)
PROCALCITONIN SERPL IA-MCNC: 0.28 NG/ML
RBC # BLD AUTO: 3.57 M/UL (ref 4–5.4)
SAMPLE: ABNORMAL
SITE: ABNORMAL
SODIUM SERPL-SCNC: 136 MMOL/L (ref 136–145)
SP02: 97
WBC # BLD AUTO: 11.88 K/UL (ref 3.9–12.7)

## 2020-01-27 PROCEDURE — 63600175 PHARM REV CODE 636 W HCPCS: Mod: NTX | Performed by: FAMILY MEDICINE

## 2020-01-27 PROCEDURE — 25000003 PHARM REV CODE 250: Mod: NTX | Performed by: INTERNAL MEDICINE

## 2020-01-27 PROCEDURE — 84145 PROCALCITONIN (PCT): CPT | Mod: NTX

## 2020-01-27 PROCEDURE — 21400001 HC TELEMETRY ROOM: Mod: NTX

## 2020-01-27 PROCEDURE — 86140 C-REACTIVE PROTEIN: CPT | Mod: NTX

## 2020-01-27 PROCEDURE — 80048 BASIC METABOLIC PNL TOTAL CA: CPT | Mod: NTX

## 2020-01-27 PROCEDURE — 36415 COLL VENOUS BLD VENIPUNCTURE: CPT | Mod: NTX

## 2020-01-27 PROCEDURE — 25000003 PHARM REV CODE 250: Mod: NTX | Performed by: FAMILY MEDICINE

## 2020-01-27 PROCEDURE — 85025 COMPLETE CBC W/AUTO DIFF WBC: CPT | Mod: NTX

## 2020-01-27 RX ORDER — ACETAMINOPHEN 325 MG/1
650 TABLET ORAL EVERY 6 HOURS PRN
Status: DISCONTINUED | OUTPATIENT
Start: 2020-01-27 | End: 2020-01-28 | Stop reason: HOSPADM

## 2020-01-27 RX ORDER — POLYETHYLENE GLYCOL 3350 17 G/17G
17 POWDER, FOR SOLUTION ORAL 2 TIMES DAILY PRN
Status: DISCONTINUED | OUTPATIENT
Start: 2020-01-27 | End: 2020-01-28 | Stop reason: HOSPADM

## 2020-01-27 RX ORDER — OLMESARTAN MEDOXOMIL 20 MG/1
20 TABLET ORAL DAILY
Status: DISCONTINUED | OUTPATIENT
Start: 2020-01-27 | End: 2020-01-28 | Stop reason: HOSPADM

## 2020-01-27 RX ORDER — HYDRALAZINE HYDROCHLORIDE 25 MG/1
100 TABLET, FILM COATED ORAL 3 TIMES DAILY
Status: DISCONTINUED | OUTPATIENT
Start: 2020-01-27 | End: 2020-01-28 | Stop reason: HOSPADM

## 2020-01-27 RX ORDER — METOPROLOL SUCCINATE 50 MG/1
100 TABLET, EXTENDED RELEASE ORAL DAILY
Status: DISCONTINUED | OUTPATIENT
Start: 2020-01-27 | End: 2020-01-28 | Stop reason: HOSPADM

## 2020-01-27 RX ORDER — LABETALOL HYDROCHLORIDE 5 MG/ML
10 INJECTION, SOLUTION INTRAVENOUS EVERY 4 HOURS PRN
Status: DISCONTINUED | OUTPATIENT
Start: 2020-01-27 | End: 2020-01-28 | Stop reason: HOSPADM

## 2020-01-27 RX ORDER — ONDANSETRON 2 MG/ML
4 INJECTION INTRAMUSCULAR; INTRAVENOUS EVERY 8 HOURS PRN
Status: DISCONTINUED | OUTPATIENT
Start: 2020-01-27 | End: 2020-01-28 | Stop reason: HOSPADM

## 2020-01-27 RX ORDER — FUROSEMIDE 10 MG/ML
40 INJECTION INTRAMUSCULAR; INTRAVENOUS
Status: COMPLETED | OUTPATIENT
Start: 2020-01-27 | End: 2020-01-27

## 2020-01-27 RX ORDER — SODIUM CHLORIDE 0.9 % (FLUSH) 0.9 %
10 SYRINGE (ML) INJECTION
Status: DISCONTINUED | OUTPATIENT
Start: 2020-01-27 | End: 2020-01-28 | Stop reason: HOSPADM

## 2020-01-27 RX ORDER — SODIUM CHLORIDE 9 MG/ML
INJECTION, SOLUTION INTRAVENOUS ONCE
Status: DISCONTINUED | OUTPATIENT
Start: 2020-01-27 | End: 2020-01-28 | Stop reason: HOSPADM

## 2020-01-27 RX ORDER — HEPARIN SODIUM 5000 [USP'U]/ML
5000 INJECTION, SOLUTION INTRAVENOUS; SUBCUTANEOUS EVERY 8 HOURS
Status: DISCONTINUED | OUTPATIENT
Start: 2020-01-27 | End: 2020-01-28 | Stop reason: HOSPADM

## 2020-01-27 RX ORDER — MUPIROCIN 20 MG/G
OINTMENT TOPICAL 2 TIMES DAILY
Status: DISCONTINUED | OUTPATIENT
Start: 2020-01-27 | End: 2020-01-28 | Stop reason: HOSPADM

## 2020-01-27 RX ORDER — CLONIDINE HYDROCHLORIDE 0.1 MG/1
0.3 TABLET ORAL DAILY
Status: DISCONTINUED | OUTPATIENT
Start: 2020-01-27 | End: 2020-01-28 | Stop reason: HOSPADM

## 2020-01-27 RX ORDER — SODIUM CHLORIDE 9 MG/ML
INJECTION, SOLUTION INTRAVENOUS
Status: DISCONTINUED | OUTPATIENT
Start: 2020-01-27 | End: 2020-01-28 | Stop reason: HOSPADM

## 2020-01-27 RX ORDER — AMLODIPINE BESYLATE 5 MG/1
10 TABLET ORAL DAILY
Status: DISCONTINUED | OUTPATIENT
Start: 2020-01-27 | End: 2020-01-28 | Stop reason: HOSPADM

## 2020-01-27 RX ADMIN — HEPARIN SODIUM 5000 UNITS: 5000 INJECTION, SOLUTION INTRAVENOUS; SUBCUTANEOUS at 05:01

## 2020-01-27 RX ADMIN — OLMESARTAN MEDOXOMIL 20 MG: 20 TABLET, FILM COATED ORAL at 08:01

## 2020-01-27 RX ADMIN — HYDRALAZINE HYDROCHLORIDE 100 MG: 25 TABLET, FILM COATED ORAL at 08:01

## 2020-01-27 RX ADMIN — FUROSEMIDE 40 MG: 10 INJECTION, SOLUTION INTRAVENOUS at 01:01

## 2020-01-27 RX ADMIN — HYDRALAZINE HYDROCHLORIDE 100 MG: 25 TABLET, FILM COATED ORAL at 04:01

## 2020-01-27 RX ADMIN — AMLODIPINE BESYLATE 10 MG: 5 TABLET ORAL at 08:01

## 2020-01-27 RX ADMIN — ACETAMINOPHEN 650 MG: 325 TABLET ORAL at 09:01

## 2020-01-27 RX ADMIN — MUPIROCIN: 20 OINTMENT TOPICAL at 09:01

## 2020-01-27 RX ADMIN — CLONIDINE HYDROCHLORIDE 0.3 MG: 0.1 TABLET ORAL at 08:01

## 2020-01-27 RX ADMIN — ACETAMINOPHEN 650 MG: 325 TABLET ORAL at 02:01

## 2020-01-27 RX ADMIN — HEPARIN SODIUM 5000 UNITS: 5000 INJECTION, SOLUTION INTRAVENOUS; SUBCUTANEOUS at 04:01

## 2020-01-27 RX ADMIN — METOPROLOL SUCCINATE 100 MG: 50 TABLET, FILM COATED, EXTENDED RELEASE ORAL at 08:01

## 2020-01-27 RX ADMIN — HYDRALAZINE HYDROCHLORIDE 100 MG: 25 TABLET, FILM COATED ORAL at 09:01

## 2020-01-27 RX ADMIN — HEPARIN SODIUM 5000 UNITS: 5000 INJECTION, SOLUTION INTRAVENOUS; SUBCUTANEOUS at 09:01

## 2020-01-27 NOTE — NURSING
Patient  Returned from HD in bed awake and alert no complaints of discomfort.. Bed low call light in reach no changes from AM assessment.

## 2020-01-27 NOTE — PROGRESS NOTES
April Vasquez is a 64 y.o. female patient.    Follow for ESRD, dialysis    Patient seen while on dialysis  No new c/o, comfortable    Scheduled Meds:   sodium chloride 0.9%   Intravenous Once    amLODIPine  10 mg Oral Daily    cloNIDine  0.3 mg Oral Daily    heparin (porcine)  5,000 Units Subcutaneous Q8H    hydrALAZINE  100 mg Oral TID    metoprolol succinate  100 mg Oral Daily    mupirocin   Nasal BID    olmesartan  20 mg Oral Daily       Review of patient's allergies indicates:  No Known Allergies      Vital Signs Range (Last 24H):  Temp:  [97.6 °F (36.4 °C)-98.7 °F (37.1 °C)]   Pulse:  []   Resp:  [14-26]   BP: (131-199)/(60-81)   SpO2:  [89 %-100 %]     I & O (Last 24H):No intake or output data in the 24 hours ending 01/27/20 1058        Physical Exam:  General appearance: well developed, well nourished, mild distress  Lungs:  rales bilaterally  Heart: regular rate and rhythm  Abdomen: soft, non-tender non-distented; bowel sounds normal; no masses,  no organomegaly  Extremities: edema (+)    Laboratory:  CBC:   Recent Labs   Lab 01/27/20  0524   WBC 11.88   RBC 3.57*   HGB 10.4*   HCT 31.7*      MCV 89   MCH 29.1   MCHC 32.8     CMP:   Recent Labs   Lab 01/26/20  2315 01/27/20  0524   * 90   CALCIUM 10.4 9.7   ALBUMIN 4.0  --    PROT 7.4  --     136   K 5.0 6.3*   CO2 25 26   CL 94* 94*   BUN 54* 58*   CREATININE 9.7* 10.8*   ALKPHOS 108  --    ALT 9*  --    AST 15  --    BILITOT 0.5  --      Imp/Plan    ESRD - on dialysis, stable, tolerated well  Fluid overload - UF as tolerated  Hyperkalemia - being dialyzed  HTN  Anemia of CKD    Continue present Rx  HD q MWF  If necessary will dialyze patient again in am    Trac T Le  1/27/2020

## 2020-01-27 NOTE — PLAN OF CARE
01/27/20 0926   Discharge Assessment   Assessment Type Discharge Planning Assessment   Assessment information obtained from? Medical Record   Prior to hospitilization cognitive status: Alert/Oriented   Prior to hospitalization functional status: Independent   Current cognitive status: Alert/Oriented   Current Functional Status: Independent   Facility Arrived From: home   Lives With child(jaylyn), adult   Able to Return to Prior Arrangements yes   Is patient able to care for self after discharge? Yes   Who are your caregiver(s) and their phone number(s)? robert- [phone]   Patient's perception of discharge disposition home or selfcare   Readmission Within the Last 30 Days no previous admission in last 30 days   Patient currently being followed by outpatient case management? No   Patient currently receives any other outside agency services? No   Equipment Currently Used at Home none   Do you have any problems affording any of your prescribed medications? No   Is the patient taking medications as prescribed? yes   Does the patient have transportation home? Yes   Transportation Anticipated family or friend will provide;car, drives self   Does the patient receive services at the [facility]? No   Discharge Plan A Home with family   DME Needed Upon Discharge  none   Patient/Family in Agreement with Plan yes     [facility] - [place] - [address]  [address]  [place] [address]  Phone: [phone] Fax: [phone]

## 2020-01-27 NOTE — H&P
HISTORY & PHYSICAL      SUBJECTIVE:       History of Present Illness:  64 y.o. Female with a PMHx of ESRD, Gallstones, HTN, and Tobacco abuse presents to the ED for SOB.  Pt states she is certain it is due to her fluids in the lugns as she has had this problem before and usually it gets better with HD.  She gets HD on MWF and did get HD this past Friday.  In the ER she was noted to have sats in the 80% on RA, and was tachypneic.  She improved after lasix and oxygen support. She does endorse cough with phlegm production.  Of note her BP was 240 systolic and it improved after getting her home clonidine dose.  Nephrology was consulted to setup HD.        ROS  Positives per HPI, otherwise unremarkable  HISTORY:     Past Medical History:   Diagnosis Date    ESRD (end stage renal disease) on dialysis 2017    Gallstones     Hypertension     Renal disorder     dialysis MIRANDA fistula    Tobacco abuse        Past Surgical History:   Procedure Laterality Date    AVF  2017    CHOLECYSTECTOMY  2016    Permacath Right 2017    TUBAL LIGATION         Family History   Problem Relation Age of Onset    Kidney disease Mother     Hypertension Mother     Cervical cancer Mother     Diabetes Father     Drug abuse Paternal Grandmother     Breast cancer Neg Hx     Colon cancer Neg Hx     Ovarian cancer Neg Hx        Social History     Socioeconomic History    Marital status:      Spouse name: Not on file    Number of children: 4    Years of education: Not on file    Highest education level: Not on file   Occupational History    Occupation: disable   Social Needs    Financial resource strain: Not on file    Food insecurity:     Worry: Not on file     Inability: Not on file    Transportation needs:     Medical: Not on file     Non-medical: Not on file   Tobacco Use    Smoking status: Current Every Day Smoker     Packs/day: 0.50     Years: 20.00     Pack years: 10.00    Smokeless tobacco: Never Used   Substance  and Sexual Activity    Alcohol use: No    Drug use: No    Sexual activity: Yes     Partners: Male     Birth control/protection: Post-menopausal   Lifestyle    Physical activity:     Days per week: Not on file     Minutes per session: Not on file    Stress: Not on file   Relationships    Social connections:     Talks on phone: Not on file     Gets together: Not on file     Attends Buddhist service: Not on file     Active member of club or organization: Not on file     Attends meetings of clubs or organizations: Not on file     Relationship status: Not on file   Other Topics Concern    Not on file   Social History Narrative    Not on file       MEDICATIONS & ALLERGIES:       (Not in a hospital admission)    Review of patient's allergies indicates:  No Known Allergies    OBJECTIVE:     Vital Signs:  Temp:  [98.7 °F (37.1 °C)]   Pulse:  []   Resp:  [14-26]   BP: (146)/(64)   SpO2:  [89 %-98 %] Body mass index is 20.6 kg/m².    EXAM:  General: Alert, calm, well-developed. No acute distress.  HEENT: Pupils equal, round, reactive to light and accommodation. Extraocular movements intact. Moist mucous membranes in oropharynx.  Neck: Supple, without lymphadenopathy or thyromegaly. No carotid bruits.  Lymph: No lymphadenopathy  Cardiovascular: Regular rate and rhythm, with normal S1 and S2.   Lungs: Symmetric air movement, BL crackles  Abdomen:Normoactive bowel sounds. Soft, flat, non-tender, and non-distended.  Skin: Warm, dry, well-perfused.   Extremities: No lower extremity pain or edema; legs are symmetric in appearance.  Neuro: Alert and oriented to person, place, and time. Able to communicate well. Cranial nerves 2-12 grossly intact. 5/5 strength in all extremities bilaterally. Sensation intact in all extremities.   Psych: Appropriate affect.     Recent Labs   Lab 01/26/20  2315   WBC 14.21*   HGB 12.0   HCT 36.7*      MONO 5.9  0.8     Recent Labs   Lab 01/26/20  2315      K 5.0   CL 94*    CO2 25   BUN 54*   CREATININE 9.7*   CALCIUM 10.4   PROT 7.4   BILITOT 0.5   ALKPHOS 108   ALT 9*   AST 15       ASSESSMENT -- PLAN:     Active Hospital Problems    Diagnosis  POA    Fluid overload [E87.70]  Yes    Pulmonary edema [J81.1]  Unknown    Dyspnea [R06.00]  Unknown    Volume overload [E87.70]  Unknown    Hypertensive urgency [I16.0]  Unknown    Leukocytosis [D72.829]  Unknown      Resolved Hospital Problems   No resolved problems to display.       Assessment/Plan:    1. Dyspnea / pulmonary edema  -resume HD  -IV lasix given in ED x 1, pt improved    2. Volume overload  -2/2 ESRD  -nephrology consulted for HD    3. Hypertensive urgency  -takes norvasc, clonidine, hydralazine, metoprolol, benicar as outpatient  -resume meds    4. Leukocytosis:   -no source.  Pct/crp, monitor for fevers.  Avoid abx as it coult be viral as pt has cough with phlegm  -mucinex  -albuterol PRN if has wheezing    5. Global  -Heparin SC  -renal diet  -Dispo home after HD and nephro clearance

## 2020-01-27 NOTE — CONSULTS
Dictation #1  MRN:[id number]  CSN:[id number]    Consult dictated [id number]    HD today and q MWF  We'll follow for dialysis  Thanks

## 2020-01-27 NOTE — NURSING
Patient off unit to dialysis in bed awake and alert.  No complaints of pain.  Oxygen in place at 2 liters.  No distress.

## 2020-01-27 NOTE — CONSULTS
REASON FOR CONSULTATION:  End-stage renal disease, fluid overload and   hyperkalemia, needs hemodialysis.    HISTORY OF PRESENT ILLNESS:  The patient is a 64-year-old lady with past medical   history significant for hypertension, end-stage renal disease, currently on   maintenance hemodialysis who presented to the hospital with complaint of having   shortness of breath and the patient was evaluated in the Emergency Room and the   patient was admitted to the hospital for fluid overload and the patient was also   found with elevation of serum potassium and we were consulted to see the   patient for end-stage renal disease, fluid overload and hyperkalemia, requiring   urgent hemodialysis.  At this time, the patient reported feeling better with   nasal cannula oxygen.  Denied any chest pain, fever, chills, nausea or vomiting.    PAST MEDICAL HISTORY:  As above.    MEDICATIONS:  The patient is currently on amlodipine 10 mg p.o. daily, clonidine   0.3 mg p.o. daily, heparin 5000 units subQ every 8 hours, hydralazine 100 mg   p.o. t.i.d., Toprol- mg p.o. daily and Benicar 20 mg p.o. daily.    FAMILY HISTORY:  Noncontributory.    SOCIAL HISTORY:  The patient has a history of tobacco use.  Denied any history   of alcohol abuse or IV drug use.    PHYSICAL EXAMINATION:  GENERAL:  The patient is awake, alert and in mild respiratory distress.  VITAL SIGNS:  Temperature is 97.6, blood pressure is 160/80 with a pulse of 60   and respirations of 18.  HEENT:  Pupils are equally round and reactive to light.  EOMI.  Oral mucosa is   moist.  Oropharynx is clear.  HEART:  Regular rhythm and rate.  LUNGS:  Decreased breath sounds bilaterally with basilar crackle.  ABDOMEN:  Soft, positive bowel sounds, nondistended and nontender.  EXTREMITIES:  Positive for edema.    LABORATORY DATA:  WBC is 11.88, hemoglobin is 10.4, hematocrit is 31.7 and   platelet count is 195.  Sodium is 136, potassium 6.3, chloride 94, CO2 26, BUN   58,  creatinine is 10.8 and glucose is 90.    IMPRESSION:  1.  End-stage renal disease.  2.  Fluid overload.  3.  Hyperkalemia.  4.  Hypertension.  5.  Anemia of chronic kidney disease.    DISCUSSION AND RECOMMENDATIONS:  We will dialyze the patient today and we will   ultrafiltrate as tolerated and the patient's blood pressure will be   improving with ultrafiltration on dialysis, we will continue to dialyze the   patient every Monday, Wednesday and Friday at her outpatient dialysis schedule   and we will follow the patient for maintenance hemodialysis.    Thank you for the courtesy of the consultation and allowing me to participate in   the patient's care.      TTL/IN  dd: 01/27/2020 08:41:07 (CST)  td: 01/27/2020 08:54:49 (CST)  Doc ID   #[id number]  Job ID #[id number]    CC:

## 2020-01-27 NOTE — PLAN OF CARE
Problem: Fall Injury Risk  Goal: Absence of Fall and Fall-Related Injury  Outcome: Ongoing, Progressing  Intervention: Identify and Manage Contributors to Fall Injury Risk  Flowsheets (Taken 1/27/2020 0448)  Self-Care Promotion: BADL personal objects within reach; BADL personal routines maintained  Medication Review/Management: medications reviewed  Intervention: Promote Injury-Free Environment  Flowsheets (Taken 1/27/2020 0448)  Safety Promotion/Fall Prevention: assistive device/personal item within reach; bed alarm set; instructed to call staff for mobility; side rails raised x 2; nonskid shoes/socks when out of bed  Environmental Safety Modification: assistive device/personal items within reach; clutter free environment maintained; lighting adjusted; mobility aid in reach; room near unit station; room organization consistent

## 2020-01-27 NOTE — ED PROVIDER NOTES
Encounter Date: 1/26/2020    SCRIBE #1 NOTE: I, Lili Anne, am scribing for, and in the presence of,  Rk Celestin MD. I have scribed the entire note.       History     Chief Complaint   Patient presents with    Shortness of Breath     pt reports SOB starting earlier today; pt goes to dialysis Mon, Wed, Fri and last went Friday; pt reports that when she feels like this she needs dialysis; pt denies home oxygen use; pt moaning     CC: Shortness of breath    HPI: This is a 64 y.o.female patient, with a PMHx of Hypertension and Renal disorder, presenting to the ED with a complaint of constant, worsening shortness of breath, that began 3 hours ago. Patient reports she is scheduled for dialysis tomorrow, last went 2 days ago. She states this feels like a fluid buildup. She reports similar symptoms in the past. Denies oxygen use at home. Patient states the shortness of breath is worse with laying down or ambulation. Patient reports an associated productive cough, chest pain, lightheadedness, and generalized weakness. She denies eating anything today. She states her blood pressure is elevated a lot. Patient reports she felt fine yesterday. Patient states being compliant with her medication today. Patient denies any fever, abdominal pain, numbness, tingling, or any other associated symptoms. No prior Tx. No known drug allergies.      The history is provided by the patient.     Review of patient's allergies indicates:  No Known Allergies  Past Medical History:   Diagnosis Date    ESRD (end stage renal disease) on dialysis 2017    Gallstones     Hypertension     Renal disorder     dialysis MIRANDA fistula    Tobacco abuse      Past Surgical History:   Procedure Laterality Date    AVF  2017    CHOLECYSTECTOMY  2016    Permacath Right 2017    TUBAL LIGATION       Family History   Problem Relation Age of Onset    Kidney disease Mother     Hypertension Mother     Cervical cancer Mother     Diabetes Father      Drug abuse Paternal Grandmother     Breast cancer Neg Hx     Colon cancer Neg Hx     Ovarian cancer Neg Hx      Social History     Tobacco Use    Smoking status: Current Every Day Smoker     Packs/day: 0.50     Years: 20.00     Pack years: 10.00    Smokeless tobacco: Never Used   Substance Use Topics    Alcohol use: No    Drug use: No     Review of Systems   Constitutional: Positive for fatigue (generalized weakness). Negative for chills, diaphoresis and fever.   HENT: Negative for congestion, sinus pain and sore throat.    Respiratory: Positive for chest tightness and shortness of breath. Negative for cough, wheezing and stridor.    Cardiovascular: Positive for chest pain. Negative for palpitations and leg swelling.   Gastrointestinal: Negative for abdominal pain, diarrhea, nausea and vomiting.   Genitourinary: Negative for dysuria, flank pain and frequency.   Musculoskeletal: Negative for back pain and joint swelling.   Neurological: Positive for light-headedness. Negative for dizziness, syncope, facial asymmetry, speech difficulty, weakness, numbness and headaches.   Psychiatric/Behavioral: Negative for confusion.       Physical Exam     Initial Vitals   BP Pulse Resp Temp SpO2   01/27/20 0017 01/26/20 2239 01/26/20 2239 01/26/20 2239 01/26/20 2239   (!) 146/64 106 (!) 26 98.7 °F (37.1 °C) (!) 89 %      MAP       --                Physical Exam    Nursing note and vitals reviewed.  Constitutional: She appears well-developed and well-nourished. She is not diaphoretic. No distress.   Patient appears uncomfortable.   HENT:   Head: Normocephalic and atraumatic.   Nose: Nose normal.   Mouth/Throat: Oropharynx is clear and moist.   Eyes: Conjunctivae and EOM are normal. Pupils are equal, round, and reactive to light. Right eye exhibits no discharge. Left eye exhibits no discharge. No scleral icterus.   Neck: Normal range of motion. Neck supple. No thyromegaly present. No tracheal deviation present.    Cardiovascular: Regular rhythm and normal heart sounds. Tachycardia present.    No murmur heard.  Pulmonary/Chest: No stridor. She is in respiratory distress. She has no wheezes. She has no rhonchi. She has rales.   Hoarse crackles bilaterally.    Abdominal: Soft. Bowel sounds are normal. She exhibits no distension. There is no tenderness. There is no rebound and no guarding.   Musculoskeletal: Normal range of motion. She exhibits no edema or tenderness.   Neurological: She is alert and oriented to person, place, and time. She has normal strength. No cranial nerve deficit. GCS score is 15. GCS eye subscore is 4. GCS verbal subscore is 5. GCS motor subscore is 6.   Skin: Skin is warm and dry. No rash noted. No erythema.   Psychiatric: Her behavior is normal. Judgment and thought content normal. Her mood appears anxious.         ED Course   Procedures  Labs Reviewed   CBC W/ AUTO DIFFERENTIAL - Abnormal; Notable for the following components:       Result Value    WBC 14.21 (*)     Hematocrit 36.7 (*)     RDW 18.2 (*)     Gran # (ANC) 11.3 (*)     Gran% 79.6 (*)     Lymph% 11.5 (*)     All other components within normal limits   COMPREHENSIVE METABOLIC PANEL - Abnormal; Notable for the following components:    Chloride 94 (*)     Glucose 114 (*)     BUN, Bld 54 (*)     Creatinine 9.7 (*)     ALT 9 (*)     Anion Gap 19 (*)     eGFR if  4 (*)     eGFR if non  4 (*)     All other components within normal limits   B-TYPE NATRIURETIC PEPTIDE - Abnormal; Notable for the following components:    BNP 1,527 (*)     All other components within normal limits   TROPONIN I - Abnormal; Notable for the following components:    Troponin I 0.085 (*)     All other components within normal limits   MAGNESIUM - Abnormal; Notable for the following components:    Magnesium 2.7 (*)     All other components within normal limits   ISTAT PROCEDURE - Abnormal; Notable for the following components:    POC PO2 68  (*)     POC SATURATED O2 94 (*)     POC TCO2 29 (*)     All other components within normal limits     EKG Readings: (Independently Interpreted)   Initial Reading: No STEMI. Previous EKG: Compared with most recent EKG Rhythm: Sinus Tachycardia. Heart Rate: 117. Ectopy: No Ectopy. Conduction: Normal. T Waves: Normal. Clinical Impression: Sinus Tachycardia   LVH. Possible left atrial enlargement. Nonspecific ST wave abnormality.        Imaging Results          X-Ray Chest AP Portable (Final result)  Result time 01/26/20 23:36:10    Final result by Natalia Hansen MD (01/26/20 23:36:10)                 Impression:      Prominent diffuse interstitial lung markings.  Findings may suggest interstitial edema or interstitial infiltrates.  Recommend clinical correlation.      Electronically signed by: Natalia Hansen  Date:    01/26/2020  Time:    23:36             Narrative:    EXAMINATION:  AP PORTABLE CHEST    CLINICAL HISTORY:  sob;    TECHNIQUE:  AP portable chest radiograph was submitted.    COMPARISON:  10/12/2019    FINDINGS:  AP portable chest radiograph demonstrates a cardiac silhouette within normal limits.  There are prominent diffuse interstitial lung markings present.  There is no focal consolidation, pneumothorax, or pleural effusion. The bones are diffusely osteopenic.  There is a nonacute fracture involving the right humeral head.                                 Medical Decision Making:   History:   Old Medical Records: I decided to obtain old medical records.  Initial Assessment:   April Vasquez is a 64 y.o. female presenting for an emergent evaluation of shortness of breath. Exam revealed mild Respiratory distress. Hoarse crackles bilaterally. Tachycardic. Anxious.  Suspect fluid overload.  Patient is due for dialysis tomorrow.  Will obtain labs and imaging. I will treat the pt's symptoms appropriately and reassess. Patient refuses BiPAP.  Differential Diagnosis:   Differential Diagnosis includes,  but is not limited to:  PE, MI/ACS, pneumothorax, pericardial effusion/tamonade, pneumonia, lung abscess, pericarditis/myocarditis, pleural effusion, lung mass, CHF exacerbation, asthma exacerbation, COPD exacerbation, aspirated/ingested foreign body, airway obstruction, CO poisoning, anemia, metabolic derangement, allergy/atopy, influenza, viral URI, viral syndrome.    Independently Interpreted Test(s):   I have ordered and independently interpreted X-rays - see prior notes.  I have ordered and independently interpreted EKG Reading(s) - see prior notes  Clinical Tests:   Lab Tests: Ordered and Reviewed  Radiological Study: Ordered and Reviewed  Medical Tests: Ordered and Reviewed            Scribe Attestation:   Scribe #1: I performed the above scribed service and the documentation accurately describes the services I performed. I attest to the accuracy of the note.    Attending Attestation:         Attending Critical Care:   Critical Care Times:   Direct Patient Care (initial evaluation, reassessments, and time considering the case)................................................................10 minutes.   Additional History from reviewing old medical records or taking additional history from the family, EMS, PCP, etc.......................10 minutes.   Ordering, Reviewing, and Interpreting Diagnostic Studies...............................................................................................................10 minutes.   Documentation..................................................................................................................................................................................10 minutes.   Consultation with other Physicians. .................................................................................................................................................5 minutes.   ==============================================================  · Total Critical Care Time -  exclusive of procedural time: 45 minutes.  ==============================================================  Critical care was necessary to treat or prevent imminent or life-threatening deterioration of the following conditions: congestive heart failure and renal failure.   The following critical care procedures were done by me (see procedure notes): pulse oximetry.   Critical care was time spent personally by me on the following activities: obtaining history from patient or relative, examination of patient, review of x-rays / CT sent with the patient, review of old charts, ordering lab, x-rays, and/or EKG, evaluation of patient's response to treatment, re-evaluation of patient's conition and discussion with consultants.   Critical Care Condition: potentially life-threatening                             Clinical Impression:       ICD-10-CM ICD-9-CM   1. Hypoxia R09.02 799.02   2. Shortness of breath R06.02 786.05   3. Hypervolemia, unspecified hypervolemia type E87.70 276.69   4. End stage chronic kidney disease N18.6 585.6     V45.11         Disposition:   Disposition: Admitted  Condition: Fair    Scribe attestation: I, Rk Celestin MD, personally performed the services described in this documentation. All medical record entries made by the scribe were at my direction and in my presence.  I have reviewed the chart and agree that the record reflects my personal performance and is accurate and complete.                   Rk Celestin MD  01/27/20 0036     No Residual Tumor Seen Histology Text: There were no malignant cells seen in the sections examined.

## 2020-01-27 NOTE — NURSING
1:47 Received report from RAYSHAWN Peña. Patient in ED.      2:20 Patient arrived in the unit via stretcher - slided from stretcher to bed independently. Complaints of leg pain 8/10 - patient stated in only started when she woke up. PRN tylenol given. No sign of distress. Cardiac monitor started. On oxygen via venturi mask at 40% - chaged to nasal cannula per patient request - O2 sat maintaining 96 -97 % with RR 18. Call bell given on her reach. Instructed to call for assistance all the time. Bed on lowest position. Bed alarm on.  Safety maintained.

## 2020-01-28 VITALS
BODY MASS INDEX: 20.51 KG/M2 | WEIGHT: 120.13 LBS | HEART RATE: 60 BPM | HEIGHT: 64 IN | OXYGEN SATURATION: 95 % | DIASTOLIC BLOOD PRESSURE: 70 MMHG | TEMPERATURE: 98 F | RESPIRATION RATE: 18 BRPM | SYSTOLIC BLOOD PRESSURE: 176 MMHG

## 2020-01-28 PROBLEM — E87.5 HYPERKALEMIA: Status: ACTIVE | Noted: 2020-01-28

## 2020-01-28 LAB
ANION GAP SERPL CALC-SCNC: 11 MMOL/L (ref 8–16)
BUN SERPL-MCNC: 48 MG/DL (ref 8–23)
CALCIUM SERPL-MCNC: 9.7 MG/DL (ref 8.7–10.5)
CHLORIDE SERPL-SCNC: 96 MMOL/L (ref 95–110)
CO2 SERPL-SCNC: 25 MMOL/L (ref 23–29)
CREAT SERPL-MCNC: 7.9 MG/DL (ref 0.5–1.4)
EST. GFR  (AFRICAN AMERICAN): 6 ML/MIN/1.73 M^2
EST. GFR  (NON AFRICAN AMERICAN): 5 ML/MIN/1.73 M^2
GLUCOSE SERPL-MCNC: 91 MG/DL (ref 70–110)
POTASSIUM SERPL-SCNC: 5.4 MMOL/L (ref 3.5–5.1)
POTASSIUM SERPL-SCNC: 5.5 MMOL/L (ref 3.5–5.1)
SODIUM SERPL-SCNC: 132 MMOL/L (ref 136–145)

## 2020-01-28 PROCEDURE — 80048 BASIC METABOLIC PNL TOTAL CA: CPT | Mod: NTX

## 2020-01-28 PROCEDURE — 36415 COLL VENOUS BLD VENIPUNCTURE: CPT | Mod: NTX

## 2020-01-28 PROCEDURE — 25000003 PHARM REV CODE 250: Mod: NTX | Performed by: INTERNAL MEDICINE

## 2020-01-28 PROCEDURE — 25000003 PHARM REV CODE 250: Mod: NTX | Performed by: FAMILY MEDICINE

## 2020-01-28 PROCEDURE — 63600175 PHARM REV CODE 636 W HCPCS: Mod: NTX | Performed by: FAMILY MEDICINE

## 2020-01-28 PROCEDURE — 84132 ASSAY OF SERUM POTASSIUM: CPT | Mod: NTX

## 2020-01-28 PROCEDURE — 80100016 HC MAINTENANCE HEMODIALYSIS: Mod: NTX

## 2020-01-28 RX ORDER — ZOLPIDEM TARTRATE 5 MG/1
5 TABLET ORAL NIGHTLY PRN
Status: DISCONTINUED | OUTPATIENT
Start: 2020-01-28 | End: 2020-01-28 | Stop reason: HOSPADM

## 2020-01-28 RX ADMIN — HEPARIN SODIUM 5000 UNITS: 5000 INJECTION, SOLUTION INTRAVENOUS; SUBCUTANEOUS at 02:01

## 2020-01-28 RX ADMIN — OLMESARTAN MEDOXOMIL 20 MG: 20 TABLET, FILM COATED ORAL at 08:01

## 2020-01-28 RX ADMIN — LABETALOL HYDROCHLORIDE 10 MG: 5 INJECTION INTRAVENOUS at 04:01

## 2020-01-28 RX ADMIN — CLONIDINE HYDROCHLORIDE 0.3 MG: 0.1 TABLET ORAL at 08:01

## 2020-01-28 RX ADMIN — METOPROLOL SUCCINATE 100 MG: 50 TABLET, FILM COATED, EXTENDED RELEASE ORAL at 08:01

## 2020-01-28 RX ADMIN — HYDRALAZINE HYDROCHLORIDE 100 MG: 25 TABLET, FILM COATED ORAL at 02:01

## 2020-01-28 RX ADMIN — AMLODIPINE BESYLATE 10 MG: 5 TABLET ORAL at 08:01

## 2020-01-28 RX ADMIN — HEPARIN SODIUM 5000 UNITS: 5000 INJECTION, SOLUTION INTRAVENOUS; SUBCUTANEOUS at 05:01

## 2020-01-28 RX ADMIN — HYDRALAZINE HYDROCHLORIDE 100 MG: 25 TABLET, FILM COATED ORAL at 08:01

## 2020-01-28 RX ADMIN — MUPIROCIN: 20 OINTMENT TOPICAL at 08:01

## 2020-01-28 RX ADMIN — ZOLPIDEM TARTRATE 5 MG: 5 TABLET ORAL at 02:01

## 2020-01-28 RX ADMIN — SODIUM POLYSTYRENE SULFONATE 30 G: 15 SUSPENSION ORAL; RECTAL at 03:01

## 2020-01-28 NOTE — PROGRESS NOTES
April Vasquez is a 64 y.o. female patient.    Follow for ESRD, dialysis    Received HD yesterday  Tolerated well  No new /o, comfortable    Scheduled Meds:   sodium chloride 0.9%   Intravenous Once    amLODIPine  10 mg Oral Daily    cloNIDine  0.3 mg Oral Daily    heparin (porcine)  5,000 Units Subcutaneous Q8H    hydrALAZINE  100 mg Oral TID    metoprolol succinate  100 mg Oral Daily    mupirocin   Nasal BID    olmesartan  20 mg Oral Daily       Review of patient's allergies indicates:  No Known Allergies      Vital Signs Range (Last 24H):  Temp:  [97.6 °F (36.4 °C)-98.5 °F (36.9 °C)]   Pulse:  [51-60]   Resp:  [16-18]   BP: (100-179)/(52-74)   SpO2:  [96 %-97 %]     I & O (Last 24H):    Intake/Output Summary (Last 24 hours) at 1/28/2020 1033  Last data filed at 1/27/2020 2100  Gross per 24 hour   Intake 740 ml   Output 2760 ml   Net -2020 ml           Physical Exam:  General appearance: well developed, no distress  Lungs:  clear to auscultation bilaterally and normal respiratory effort  Heart: regular rate and rhythm  Abdomen: soft, non-tender non-distented; bowel sounds normal; no masses,  no organomegaly  Extremities: no cyanosis or edema, or clubbing    Laboratory:  CBC:   Recent Labs   Lab 01/27/20  0524   WBC 11.88   RBC 3.57*   HGB 10.4*   HCT 31.7*      MCV 89   MCH 29.1   MCHC 32.8     CMP:   Recent Labs   Lab 01/26/20  2315 01/27/20  0524   * 90   CALCIUM 10.4 9.7   ALBUMIN 4.0  --    PROT 7.4  --     136   K 5.0 6.3*   CO2 25 26   CL 94* 94*   BUN 54* 58*   CREATININE 9.7* 10.8*   ALKPHOS 108  --    ALT 9*  --    AST 15  --    BILITOT 0.5  --        Imp/Plan    ESRD - continue HD q MWF  Fluid overload - resolved  Hyperkalemia - received HD yesterday  HTN  Anemia of CKD    BMP today  If K Ok, patient can be d/c on renal standpoint      Addendum     K 5.5  Kayexalate 30 g po x 1  Ok for d/c on renal standpoint  Continue outpatient dialysis q MWF    Trac T  Le  1/28/2020

## 2020-01-28 NOTE — PLAN OF CARE
I have seen and evaluated patient today.  She is much improved from admit.  Hopeful to go home on tomorrow if Nephrology clears her for discharge.  She denies shortness of breath or chest pain.

## 2020-01-28 NOTE — NURSING
Report received from RAYSHAWN Sanchez. Pt awake and alert. Safety maintained NAD noted. Will continue to monitor.

## 2020-01-28 NOTE — NURSING
Patient requesting to leave AMA. Patient educated on the risk associated with leaving with against medical advice  Patient and son verbalized understanding of all instructions.Tele removed sl removed patient ambulated off unit  Gait steady no distress.

## 2020-01-29 NOTE — DISCHARGE SUMMARY
[facility] Hospital Medicine  Discharge Summary      Patient Name: April Vasquez  MRN: [id number]  Admission Date: 1/26/2020  Hospital Length of Stay: 1 days  Discharge Date and Time: 1/28/2020  5:44 PM  Attending Physician: No att. providers found   Discharging Provider: Carlos Hicks MD  Primary Care Provider: Nasra Iraheta MD        HPI: 64 y.o. Female with a PMHx of ESRD, Gallstones, HTN, and Tobacco abuse presents to the ED for SOB.  Pt stated she was certain it is due to her fluids in the lugns as she had this problem before and usually it gets better with HD.  She gets HD on MWF and did get HD this past Friday.  In the ER she was noted to have sats in the 80% on RA, and was tachypneic.  She improved after lasix and oxygen support. She does endorse cough with phlegm production.  Of note her BP was 240 systolic and it improved after getting her home clonidine dose.  Nephrology was consulted to setup HD.         * No surgery found *      Hospital Course:The patient had hyperkalemia and respiratory failure, suspected due to volume overload and ESRD. The pt underwent HD session with improvement in potassium and other clinical parameters. However, the potassium still remained elevated. The plan was to undergo another session tomorrow and recheck the potassium. However the patient left against medical advice. The patient was asked by the staff to wait for the physician to arrive at bed side to educate about the clinical status. However the patient and their family preferred to leave against medical advice.     Consults:   Consults (From admission, onward)        Status Ordering Provider     Inpatient consult to Nephrology  Once     Provider:  Melania Ely MD    Completed AMINA LUO          Final Active Diagnoses:    Diagnosis Date Noted POA    PRINCIPAL PROBLEM:  Hyperkalemia [E87.5] 01/28/2020 Unknown    Fluid overload [E87.70] 01/27/2020 Yes    Pulmonary edema [J81.1] 01/27/2020  Unknown    Dyspnea [R06.00] 01/27/2020 Unknown    Volume overload [E87.70] 01/27/2020 Unknown    Hypertensive urgency [I16.0] 01/27/2020 Unknown    Leukocytosis [D72.829] 01/27/2020 Unknown    ESRD (end stage renal disease) on dialysis [N18.6, Z99.2] 08/05/2019 Not Applicable      Problems Resolved During this Admission:      Discharged Condition: Fair with poor labs     Disposition: Home or Self Care    Follow Up:  Follow-up Information     Nasra Iraheta MD.    Specialty:  Family Medicine  Why:  please call at time of discharge to schedule follow up appointment as needed  Contact information:  [address]  [place] [address]  [phone]             Nam Gonzalez MD On 2/14/2020.    Specialty:  Cardiology  Why:  Appointment scheduled for February 14th at 2:30pm  Contact information:  [address]  [facility] [address]  [phone]                 Patient Instructions:   No discharge procedures on file.  Medications:   Pt left against medical advice without wait for medication reconciliation   Significant Diagnostic Studies: Labs:   CMP   Recent Labs   Lab 01/26/20 2315 01/27/20  0524 01/28/20  1050 01/28/20  1452    136 132*  --    K 5.0 6.3* 5.5* 5.4*   CL 94* 94* 96  --    CO2 25 26 25  --    * 90 91  --    BUN 54* 58* 48*  --    CREATININE 9.7* 10.8* 7.9*  --    CALCIUM 10.4 9.7 9.7  --    PROT 7.4  --   --   --    ALBUMIN 4.0  --   --   --    BILITOT 0.5  --   --   --    ALKPHOS 108  --   --   --    AST 15  --   --   --    ALT 9*  --   --   --    ANIONGAP 19* 16 11  --    ESTGFRAFRICA 4* 4* 6*  --    EGFRNONAA 4* 3* 5*  --     and CBC   Recent Labs   Lab 01/26/20 2315 01/27/20  0524   WBC 14.21* 11.88   HGB 12.0 10.4*   HCT 36.7* 31.7*    195       Pending Diagnostic Studies:     None        Indwelling Lines/Drains at time of discharge:   Lines/Drains/Airways     Drain                 Hemodialysis AV Fistula 08/04/19 2429 Left upper arm 176 days                Time spent on the  discharge of patient: 20 minutes  Patient was seen and examined on the date of discharge and determined to be suitable for discharge.         Carlos Hicks MD  Department of Hospital Medicine  [facility]

## 2020-01-30 NOTE — PHYSICIAN QUERY
PT Name: April Vasquez  MR #: [id number]    Physician Query Form - Respiratory Condition Clarification      CDS/: Armida Storm               Contact information: [email or web address]      This form is a permanent document in the medical record.    Query Date: January 30, 2020    By submitting this query, we are merely seeking further clarification of documentation. Please utilize your independent clinical judgment when addressing the question(s) below.    The Medical record contains the following   Indicators   Supporting Clinical Findings Location in Medical Record   x   SOB, NIELSON, Wheezing, Productive Cough, Use of Accessory Muscles, etc. Pulmonary/Chest: No stridor. She is in respiratory distress. She has no wheezes. She has no rhonchi. She has rales.   Hoarse crackles bilaterally ED provider note       Acute/Chronic Illness        Radiology Findings     x   Respiratory Distress or Failure April Vasquez is a 64 y.o. female presenting for an emergent evaluation of shortness of breath. Exam revealed mild Respiratory distress. Hoarse crackles bilaterally. Tachycardic. Anxious.  Suspect fluid overload.  Patient is due for dialysis tomorrow.  Will obtain labs and imaging. I will treat the pt's symptoms appropriately and reassess. Patient refuses BiPAP    The patient had hyperkalemia and respiratory failure, suspected due to volume overload and ESRD ED provider note             Discharge summary       Hypoxia or Hypercapnia     x   RR         ABGs         O2 sat Initial Vitals  BP Pulse Resp Temp SpO2  01/27/20 0017 01/26/20 2239 01/26/20 2239 01/26/20 2239 01/26/20 2239  (!) 146/64 106 (!) 26 98.7 °F (37.1 °C) (!) 89 %      POC PO2 68 (*)       POC SATURATED O2 94 (*)       POC TCO2 29 (*)           ED provider note           ED provider note       BiPAP/Intubation        Supplemental O2        Home O2, Oxygen Dependence        Treatment        Other       Respiratory failure can be acute, chronic or  both. It is generally further specified as hypoxic, hypercapnic or both. Lastly, it is important to identify an etiology, if known or suspected.   References::  https://www.acphospitalist.org/archives/2013/10/coding.htm http://[employer]/acute-respiratory-failure-know     The clinical guidelines noted below are only system guidelines, and do not replace the providers clinical judgment.    Provider, please specify diagnosis or diagnoses associated with above clinical findings.   [   ] Acute Respiratory Failure with Hypoxia - ABG pO2 < 60 mmHg or O2 sat of 88% on RA and respiratory symptoms documented   [   ] Acute Respiratory Failure with Hypercapnia - pCO2 > 50 mmHg with pH < 7.35 and respiratory symptoms documented   [   ] Acute Respiratory Failure with Hypoxia and Hypercapnia - Hypoxia: ABG pO2 < 60 mmHg or O2 sat of 88% on RA and Hypercapnia: pCO2 > 50 mmHg with pH < 7.35 and respiratory symptoms documented   [ x  ] Other Acute Respiratory Failure     [   ] Acute Respiratory Distress - Generally describes less severe respiratory symptoms (tachypnea, in respiratory distress, increased work of breathing, unable to speak in complete sentences, labored breathing, use of accessory muscles, RR> 24, cyanosis, dyspnea, wheezing, stridor, lethargy) without sufficient measurements (pO2, SpO2, pH, and pCO2) to meet criteria for respiratory failure    [   ] Other Respiratory Diagnosis (please specify): _________________________________   [   ]  Clinically Undetermined       Please document in your progress notes daily for the duration of treatment until resolved and include in your discharge summary.

## 2020-02-13 ENCOUNTER — HOSPITAL ENCOUNTER (OUTPATIENT)
Dept: RADIOLOGY | Facility: HOSPITAL | Age: 65
Discharge: HOME | End: 2020-02-13
Attending: NURSE PRACTITIONER
Payer: MEDICARE

## 2020-02-13 ENCOUNTER — OFFICE VISIT (OUTPATIENT)
Dept: TRANSPLANT | Facility: CLINIC | Age: 65
End: 2020-02-13
Payer: MEDICARE

## 2020-02-13 ENCOUNTER — TELEPHONE (OUTPATIENT)
Dept: TRANSPLANT | Facility: CLINIC | Age: 65
End: 2020-02-13

## 2020-02-13 VITALS
HEART RATE: 76 BPM | OXYGEN SATURATION: 98 % | DIASTOLIC BLOOD PRESSURE: 87 MMHG | TEMPERATURE: 96 F | HEIGHT: 64 IN | WEIGHT: 118.19 LBS | RESPIRATION RATE: 16 BRPM | BODY MASS INDEX: 20.18 KG/M2 | SYSTOLIC BLOOD PRESSURE: 200 MMHG

## 2020-02-13 DIAGNOSIS — J81.0 ACUTE PULMONARY EDEMA: ICD-10-CM

## 2020-02-13 DIAGNOSIS — Z76.82 ORGAN TRANSPLANT CANDIDATE: ICD-10-CM

## 2020-02-13 DIAGNOSIS — Z86.39 HISTORY OF TYPE 2 DIABETES MELLITUS: Chronic | ICD-10-CM

## 2020-02-13 DIAGNOSIS — R79.89 ELEVATED BRAIN NATRIURETIC PEPTIDE (BNP) LEVEL: ICD-10-CM

## 2020-02-13 DIAGNOSIS — Z76.82 ORGAN TRANSPLANT CANDIDATE: Primary | ICD-10-CM

## 2020-02-13 DIAGNOSIS — Z72.0 TOBACCO ABUSE: ICD-10-CM

## 2020-02-13 DIAGNOSIS — I15.0 RENOVASCULAR HYPERTENSION: ICD-10-CM

## 2020-02-13 DIAGNOSIS — N18.6 ESRD (END STAGE RENAL DISEASE) ON DIALYSIS: ICD-10-CM

## 2020-02-13 DIAGNOSIS — Z99.2 ESRD (END STAGE RENAL DISEASE) ON DIALYSIS: ICD-10-CM

## 2020-02-13 PROBLEM — I10 ESSENTIAL HYPERTENSION: Status: RESOLVED | Noted: 2019-08-05 | Resolved: 2020-02-13

## 2020-02-13 PROCEDURE — 72170 X-RAY EXAM OF PELVIS: CPT | Mod: 26,TXP,, | Performed by: RADIOLOGY

## 2020-02-13 PROCEDURE — 99205 PR OFFICE/OUTPT VISIT, NEW, LEVL V, 60-74 MIN: ICD-10-PCS | Mod: S$PBB,TXP,, | Performed by: NURSE PRACTITIONER

## 2020-02-13 PROCEDURE — 99214 OFFICE O/P EST MOD 30 MIN: CPT | Mod: PBBFAC,25,TXP | Performed by: NURSE PRACTITIONER

## 2020-02-13 PROCEDURE — 99999 PR PBB SHADOW E&M-EST. PATIENT-LVL IV: ICD-10-PCS | Mod: PBBFAC,TXP,, | Performed by: NURSE PRACTITIONER

## 2020-02-13 PROCEDURE — 99244 PR OFFICE CONSULTATION,LEVEL IV: ICD-10-PCS | Mod: S$PBB,TXP,, | Performed by: SURGERY

## 2020-02-13 PROCEDURE — 71046 XR CHEST PA AND LATERAL: ICD-10-PCS | Mod: 26,TXP,, | Performed by: RADIOLOGY

## 2020-02-13 PROCEDURE — 99999 PR PBB SHADOW E&M-EST. PATIENT-LVL IV: CPT | Mod: PBBFAC,TXP,, | Performed by: NURSE PRACTITIONER

## 2020-02-13 PROCEDURE — 72170 XR PELVIS ROUTINE AP: ICD-10-PCS | Mod: 26,TXP,, | Performed by: RADIOLOGY

## 2020-02-13 PROCEDURE — 71046 X-RAY EXAM CHEST 2 VIEWS: CPT | Mod: 26,TXP,, | Performed by: RADIOLOGY

## 2020-02-13 PROCEDURE — 76770 US EXAM ABDO BACK WALL COMP: CPT | Mod: TC,TXP

## 2020-02-13 PROCEDURE — 99205 OFFICE O/P NEW HI 60 MIN: CPT | Mod: S$PBB,TXP,, | Performed by: NURSE PRACTITIONER

## 2020-02-13 PROCEDURE — 76770 US EXAM ABDO BACK WALL COMP: CPT | Mod: 26,TXP,, | Performed by: RADIOLOGY

## 2020-02-13 PROCEDURE — 71046 X-RAY EXAM CHEST 2 VIEWS: CPT | Mod: TC,TXP

## 2020-02-13 PROCEDURE — 76770 US RETROPERITONEAL COMPLETE: ICD-10-PCS | Mod: 26,TXP,, | Performed by: RADIOLOGY

## 2020-02-13 PROCEDURE — 99244 OFF/OP CNSLTJ NEW/EST MOD 40: CPT | Mod: S$PBB,TXP,, | Performed by: SURGERY

## 2020-02-13 PROCEDURE — 72170 X-RAY EXAM OF PELVIS: CPT | Mod: TC,TXP

## 2020-02-13 RX ORDER — CHOLECALCIFEROL (VITAMIN D3) 25 MCG
1000 TABLET ORAL DAILY
COMMUNITY
End: 2023-10-04 | Stop reason: ALTCHOICE

## 2020-02-13 RX ORDER — SEVELAMER CARBONATE 800 MG/1
1600 TABLET, FILM COATED ORAL
COMMUNITY
Start: 2019-11-23

## 2020-02-13 NOTE — PROGRESS NOTES
Transplant Recipient Adult Psychosocial Assessment    April Vasquez  [address] [place] [address]  Telephone Information:   Mobile [phone]   Home  [phone] (home)  Work  There is no work phone number on file.  E-mail  [email or web address]    Sex: female  YOB: 1955  Age: 64 y.o.    Encounter Date: 2/13/2020  [place] Citizen: yes Patient reports she previously had issues with her citizenship. Pt reports that there was a clerical error that has now been resolved. She is a [place] citizen.   Primary Language: English or Sammarinese   Needed: no    Emergency Contact:  Name: Shelton Vasquez  Relationship: son  Address: same as patient  Phone Numbers:  [phone] (mobile)    Family/Social Support:   Number of dependents/: Pt reports no dependents.  Marital history: Pt reports 2 marriages. Her first marriage ended in divorce and her second  passed away in 2018. She reports they were  for almost 40 years.   Other family dynamics: Pt reports 4 adult children: Michele, Livier, Shelton Gagnon, and Yeimi Vasquez. Michele is here with patient today.     Household Composition:  Name: April Vasquez  Age: 61  Relationship: patient  Does person drive? yes    Name: Shelton Vasquez Jr.-[phone]  Age: 37  Relationship: son  Does person drive? yes     Name: Michele Vasquez-[phone]  Age: 30  Relationship: son  Does person drive? yes    Do you and your caregivers have access to reliable transportation? yes  PRIMARY CAREGIVER: Shelton Vasquez Jr will be primary caregiver, phone number [phone]       provided in-depth information to patient and caregiver regarding pre- and post-transplant caregiver role.   strongly encourages patient and caregiver to have concrete plan regarding post-transplant care giving, including back-up caregiver(s) to ensure care giving needs are met as needed.    Patient and Caregiver states understanding all aspects of caregiver  role/commitment and is able/willing/committed to being caregiver to the fullest extent necessary.    Patient and Caregiver verbalizes understanding of the education provided today and caregiver responsibilities.         remains available. Patient and Caregiver agree to contact  in a timely manner if concerns arise.      Able to take time off work without financial concerns: yes.     Additional Significant Others who will Assist with Transplant:  Name: Livier Vasquez ([phone])  Age: 30  City: [place] State: [place]  Relationship: daughter  Does person drive? yes    Name: Michele Vasquez(him and Erika are twins)  ([phone])  Age: 30  City: [place] State: [place]  Relationship: son  Does person drive? yes     Living Will: no Education and information provided to pt.  Healthcare Power of : no Education and information provided to pt.  Advance Directives on file: <<no information> per medical record.  Verbally reviewed LW/HCPA information.   provided patient with copy of LW/HCPA documents and provided education on completion of forms.    Living Donors: Yes.  Name: Michele Vasquez (son). Education and resource information given to patient. SW explained to Patient and Caregiver that both recipients and donors require separate caregivers. Patient and Caregiver verbalized understanding and agreement. Michele is here today and has a lot of questions for SW. Patient and mother were fighting through this conversation. Mom is very resistant to Michele wanting to donate, but is okay with him getting tested. SW stressed to both that it has to be a group decision. If the patient refusing to receive her son's kidney that is her right as the patient.     Highest Education Level: High School (9-12) or GED  Reading Ability: college  Reports difficulty with: Pt reports no difficulties  Learns Best By:  Pt reports pt learns best by a combination of verbal, written, and tactile  instructions.     Status: no  VA Benefits: no     Working for Income: No  If no, reason not working: Disability    Patient is disabled.  Prior to disability, patient  was employed as a  for an insurance company..    Spouse/Significant Other Employment: Pt reports that  is not working and trying to obtain disability for having a mental breakdown. Pt reports that he is stable.     Disabled: no. Pt reports that she has applied for disability and is not currently working    Monthly Income:  Other: $widoes benefits $920     Able to afford all costs now and if transplanted, including medications: yes.  Patient reports now that she has Medicare and Medicaid she can afford all of her medications. If needed Shelton Gagnon and all of her other children would assist her.     Patient and Caregiver verbalizes understanding of personal responsibilities related to transplant costs and the importance of having a financial plan to ensure that patients transplant costs are fully covered.       provided fundraising information/education. Patient and Caregiver verbalizes understanding.   remains available.    Insurance:   Payor/Plan Subscr  Sex Relation Sub. Ins. ID Effective Group Num   1. [place]ARE - [place]* YANI LIEBERMAN* 1955 Female  [id number]                                    [address]   2. [place] YANI LIEBERMAN THERESA* 1955 Female  [id number]* 17                                    [address] [address]     Primary Insurance (for UNOS reporting): Public Insurance - Medicare FFS (Fee For Service)  Secondary Insurance (for UNOS reporting): Public Insurance - Medicaid  Patient and Caregiver verbalizes clear understanding that patient may experience difficulty obtaining and/or be denied insurance coverage post-surgery. This includes and is not limited to disability insurance, life insurance, health insurance, burial insurance, long term care insurance, and other  "insurances.      Patient and Caregiver also reports understanding that future health concerns related to or unrelated to transplantation may not be covered by patient's insurance.  Resources and information provided and reviewed.     Patient and Caregiver provides verbal permission to release any necessary information to outside resources for patient care and discharge planning.  Resources and information provided are reviewed.      Dialysis Adherence: Patient reports being on hemodialysis in center. SW asked patient directly about history of non-compliance and her most [facility]. Pt reports "I started to feel better and I really felt like I didn't need to stay another night". SW stressed the importance of remaining compliant, especially after transplant, and informed her if she is non-compliant at dialysis that is a major concerns. SW educated patient on always being compliant with all aspects of care and that if she continue to leave AMA or is non-compliant with dialysis it will put her at risk for not receiving a kidney transplant. Patient and son confirmed understanding.     Infusion Service: patient utilizing? no  Home Health: patient utilizing? no  DME: no Pt reports that she needs to get a blood pressure cuff  Pulmonary/Cardiac Rehab: Pt denies   ADLS:  Pt reports no difficulties with driving, walking, bathing, cooking, housekeeping, eating, shopping, and taking medication.    Adherence:   Pt now reports good adherence with medications, dialysis, and health regimen.  Adherence education and counseling provided.     Per History Section:  Past Medical History:   Diagnosis Date    Anemia     Diabetes mellitus, type 2     ESRD (end stage renal disease) on dialysis 2017    Gallstones     Hypertension     Pulmonary edema     Renal disorder     dialysis MIRANDA fistula    Tobacco abuse      Social History     Tobacco Use    Smoking status: Current Every Day Smoker     " "Packs/day: 0.50     Years: 20.00     Pack years: 10.00     Types: Cigarettes    Smokeless tobacco: Never Used   Substance Use Topics    Alcohol use: No     Social History     Substance and Sexual Activity   Drug Use No     Social History     Substance and Sexual Activity   Sexual Activity Yes    Partners: Male    Birth control/protection: Post-menopausal       Per Today's Psychosocial:  Tobacco: Pt reports cutting down to 6-7 cigarettes per day after her admission. Pt reports prior to that smoking about 1ppd since age [age]. Pt reports son: Michele smokes about 1ppd. Pt and son report they both plan to quit. .  Alcohol: none, patient denies any use.  Illicit Drugs/Non-prescribed Medications: none, patient denies any use.    Patient and Caregiver states clear understanding of the potential impact of substance use as it relates to transplant candidacy and is aware of possible random substance screening.  Substance abstinence/cessation counseling, education and resources provided and reviewed.     Arrests/DWI/Treatment/Rehab: patient denies    Psychiatric History:    Mental Health: Pt reports she has been going through "normal" grief. She feels that even though she lost her  she remains positive, but still gets sad sometimes. SW does not feel patient is experiencing any abnormal reactions to the passing of her . Patient is nervous about receiving a kidney transplant because her mother had one at [facility] in  and  shortly after due "infection". Patient does not exactly remember what happened, but she is okay with having one herself, but she is anxious.    Psychiatrist/Counselor: Pt denies seeing a mental health professional and reports being open to seeing the psych department for talk therapy if necessary.  Medications:  Pt denies taking medications for mental health reasons.  Suicide/Homicide Issues: Pt denies any history of or current suicidal or homicidal ideations.    Safety at home: Pt " reports no current or history of safety concerns in household; including mental, physical, verbal, or sexual abuse.    Knowledge: Patient and Caregiver states having clear understanding and realistic expectations regarding the potential risks and potential benefits of organ transplantation and organ donation and agrees to discuss with health care team members and support system members, as well as to utilize available resources and express questions and/or concerns in order to further facilitate the pt informed decision-making.  Resources and information provided and reviewed.    Patient and Caregiver is aware of [facility]'s affiliation and/or partnership with agencies in home health care, LTAC, SNF, [facility], and other hospitals and clinics.    Understanding: Patient and Caregiver reports having a clear understanding of the many lifetime commitments involved with being a transplant recipient, including costs, compliance, medications, lab work, procedures, appointments, concrete and financial planning, preparedness, timely and appropriate communication of concerns, abstinence (ETOH, tobacco, illicit non-prescribed drugs), adherence to all health care team recommendations, support system and caregiver involvement, appropriate and timely resource utilization and follow-through, mental health counseling as needed/recommended, and patient and caregiver responsibilities.  Social Service Handbook, resources and detailed educational information provided and reviewed.  Educational information provided.    Patient and Caregiver also reports current and expected compliance with health care regime and states having a clear understanding of the importance of compliance.      Patient and Caregiver reports a clear understanding that risks and benefits may be involved with organ transplantation and with organ donation.       Patient and Caregiver also reports clear understanding that psychosocial risk factors may affect patient, and  "include but are not limited to feelings of depression, generalized anxiety, anxiety regarding dependence on others, post traumatic stress disorder, feelings of guilt and other emotional and/or mental concerns, and/or exacerbation of existing mental health concerns.  Detailed resources provided and discussed.      Patient and Caregiver agrees to access appropriate resources in a timely manner as needed and/or as recommended, and to communicate concerns appropriately.  Patient and Caregiver also reports a clear understanding of treatment options available.     Patient and Caregiver received education in a group setting.   reviewed education, provided additional information, and answered questions.    Feelings or Concerns: Patient and Caregiver did not express any concerns at this time. SW had concerns about pt's low income. Pt reports that she will need a few months to comprise pt's financial plan.    Coping: Pt reports coping well with the transplant process at this time and reports going to the casino, watching television, and talking to sisters and family as ways to cope. Pt reports [facility] as [facility] in [place], [place].     Goals: Pt reports "survive" and "live a full life like I used to" as goals for after transplant.  Patient referred to Vocational Rehabilitation.    Interview Behavior: Patient and Caregiver present as alert and oriented x 4, pleasant, good eye contact, well groomed, recall good, concentration/judgement good, average intelligence, calm, communicative, cooperative and asking and answering questions appropriately. Pt presents with son: Michele Vasquez at pt's request.          Transplant Social Work - Candidacy  Assessment/Plan:     Psychosocial Suitability: Patient presents as a suitable candidate for kidney transplant at this time. Based on psychosocial risk factors, patient presents as medium risk, due to history of non compliance and recent AMA. If patient is " compliant with dialysis, patient will remains suitable, medium risk. Patient does have a caregiver plan, stable financial plan and lack of psychosocial concerns at this time.     Recommendations/Additional Comments: SW needs to complete adherence check. Unless other wise detailed in a separate note, patient is suitable medium risk. Patient had a recent AMA on 1/28/2020. Please see documentation.  Pt is still pending a compliance check, but until more information is gathered it does not change the current suitabile. TREVER recommends that pt conduct fundraising to assist pt with pay for cost of medication, food,gas, and other transplant related expenses. TREVER recommends that pt remain free of all tobacco,ETOH, and substance use. SW remains available to assist with any concerns that may arise as pt navigates through the transplant process.      Marilee Mcpherson LMSW

## 2020-02-13 NOTE — PROGRESS NOTES
PHARM.D. PRE-TRANSPLANT NOTE:    This patient's medication therapy was evaluated as part of her pre-transplant evaluation.      The following general pharmacologic concerns were noted: N/A    The following pharmacologic concerns related to HCV therapy were noted: N/A      This patient's medication profile was reviewed for considerations for DAA Hepatitis C therapy:    [x]  No current inducers of CYP 3A4 or PGP  [x]  No amiodarone on this patient's EMR profile in the last 24 months  [x]  No past or current atrial fibrillation on this patient's EMR profile       Current Outpatient Medications   Medication Sig Dispense Refill    cloNIDine (CATAPRES) 0.3 MG tablet Take 1 tablet (0.3 mg total) by mouth once daily. 30 tablet 11    hydrALAZINE (APRESOLINE) 100 MG tablet Take 1 tablet (100 mg total) by mouth 3 (three) times daily. 90 tablet 11    olmesartan (BENICAR) 40 MG tablet Take 1 tablet (40 mg total) by mouth once daily. 90 tablet 3    sevelamer carbonate (RENVELA) 800 mg Tab Take 1,600 mg by mouth 3 (three) times daily with meals.      vitamin D (VITAMIN D3) 1000 units Tab Take 1,000 Units by mouth once daily.       No current facility-administered medications for this visit.          Currently Ms. Vasquez is responsible for preparing / administering this patient's medications on a daily basis.  I am available for consultation and can be contacted, as needed by the other members of the Kidney Transplant team.

## 2020-02-13 NOTE — PROGRESS NOTES
Transplant Surgery  Kidney Transplant Recipient Evaluation    Referring Physician: Quinn Glasgow  Current Nephrologist: Quinn Glasgow    Subjective:     Reason for Visit: evaluate transplant candidacy    History of Present Illness: April Vasquez is a 64 y.o. year old female undergoing transplant evaluation.    Dialysis History: April is on hemodialysis.      Transplant History: N/A    Etiology of Renal Disease: Diabetes Mellitus - Type II (based on medical records from referral).    Review of Systems   Constitutional: Positive for fatigue.   HENT: Negative for drooling, postnasal drip and sore throat.    Eyes: Negative for discharge and itching.   Respiratory: Negative for choking and stridor.    Gastrointestinal: Negative for rectal pain.   Endocrine: Negative for polydipsia.   Genitourinary: Negative for enuresis and genital sores.   Musculoskeletal: Negative for back pain, neck pain and neck stiffness.   Allergic/Immunologic: Negative for immunocompromised state.   Neurological: Negative for facial asymmetry and numbness.   Hematological: Negative for adenopathy.   Psychiatric/Behavioral: Negative for behavioral problems, self-injury and suicidal ideas.       Objective:     Physical Exam:  Constitutional:   Vitals reviewed: yes   Well-nourished and well-groomed: yes  Eyes:   Sclerae icteric: no   Extraocular movements intact: yes  GI:    Bowel sounds normal: yes   Tenderness: no    If yes, quadrant/location: not applicable   Palpable masses: no    If yes, quadrant/location: not applicable   Hepatosplenomegaly: no   Ascites: no   Hernia: no    If yes, type/location: not applicable   Surgical scars: yes    If yes, type/location: Pfannenstiel  laparoscopic port sites  Resp:   Effort normal: yes   Breath sounds normal: yes    CV:   Regular rate and rhythm: yes   Heart sounds normal: yes   Femoral pulses normal: yes   Extremities edematous: no  Skin:   Rashes or lesions present: no    If yes,  describe:not applicable   Jaundice:: no    Musculoskeletal:   Gait normal: yes   Strength normal: yes  Psych:   Oriented to person, place, and time: yes   Affect and mood normal: yes    Additional comments: not applicable    Counseling: We provided April Vasquez with a group education session today.  We discussed kidney transplantation at length with her, including risks, potential complications, and alternatives in the management of her renal failure.  The discussion included complications related to anesthesia, bleeding, infection, primary nonfunction, and ATN.  I discussed the typical postoperative course, length of hospitalization, the need for long-term immunosuppression, and the need for long-term routine follow-up.  I discussed living-donor and -donor transplantation and the relative advantages and disadvantages of each.  I also discussed average waiting times for both living donation and  donation.  I discussed national and center-specific survival rates.  I also mentioned the potential benefit of multicenter listing to candidates listed with centers within more than one organ procurement organization.  All questions were answered.    Final determination of transplant candidacy will be made once evaluation is complete and reviewed by the Kidney & Kidney/Pancreas Selection Committee.         Transplant Surgery - Candidacy   Assessment/Plan:   April Vasquez has end stage renal disease (ESRD) on dialysis. I see no surgical contraindication to placing a kidney transplant. Based on available information, April Vasquez is a suitable kidney transplant candidate.     Rocael Vázquez MD

## 2020-02-13 NOTE — TELEPHONE ENCOUNTER
Reviewed pt transplant labs.  Notified dialysis unit dietitian of the following abnormal labs via fax and requested their most recent nutrition note on this pt.  Once this note is received it will be scanned into pt's chart.    Phos 5.LessThan3
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
## 2020-02-13 NOTE — LETTER
February 13, 2020        Quinn Glasgow  [address] [place] [address] [address]  [place] [address]  Phone: [phone]  Fax: [phone]             [address]  [address]  [facility] [address]  Phone: [phone]   Patient: April Vasquez   MR Number: [id number]   YOB: 1955   Date of Visit: 2/13/2020       Dear Dr. Quinn Glasgow    Thank you for referring April Vasquez to me for evaluation. Attached you will find relevant portions of my assessment and plan of care.    If you have questions, please do not hesitate to call me. I look forward to following April Vasquez along with you.    Sincerely,    Brandee Morton, NP    Enclosure    If you would like to receive this communication electronically, please contact [email or web address] or [phone] to request [employer] access.    [employer] is a tool which provides read-only access to select patient information with whom you have a relationship. Its easy to use and provides real time access to review your patients record including encounter summaries, notes, results, and demographic information.    If you feel you have received this communication in error or would no longer like to receive these types of communications, please e-mail [email or web address]

## 2020-02-14 NOTE — PROGRESS NOTES
INITIAL PATIENT EDUCATION NOTE    Ms. April Vasquez was seen in pre-kidney transplant clinic for evaluation for kidney, kidney/pancreas or pancreas only transplant.  The patient attended a group education session that discussed/reviewed the following aspects of transplantation: evaluation and selection committee process, [facility] waitlist management/multiple listings, types of organs offered (KDPI < 85%, KDPI > 85%, PHS increased risk, DCD, HCV+, HIV+ for HIV+ recipients and enbloc/dual), financial aspects, surgical procedures, dietary instruction pre- and post-transplant, health maintenance pre- and post-transplant, post-transplant hospitalization and outpatient follow-up, potential to participate in a research protocol, and medication management and side effects.  A question and answer session was provided after the presentation.    The patient was seen by all members of the multi-disciplinary team to include: Nephrologist/PA, Surgeon, , Transplant Coordinator, , Pharmacist and Dietician (if applicable).    The patient reviewed and signed all consents for evaluation which were witnessed and sent to scanning into the [facility] chart.    The patient was given an education book and plan for further evaluation based on her individual assessment.      The patient was encouraged to call with any questions or concerns.

## 2020-02-18 DIAGNOSIS — Z76.82 ORGAN TRANSPLANT CANDIDATE: Primary | ICD-10-CM

## 2020-02-28 ENCOUNTER — TELEPHONE (OUTPATIENT)
Dept: CARDIOLOGY | Facility: CLINIC | Age: 65
End: 2020-02-28

## 2020-03-02 ENCOUNTER — TELEPHONE (OUTPATIENT)
Dept: SMOKING CESSATION | Facility: CLINIC | Age: 65
End: 2020-03-02

## 2020-03-02 NOTE — TELEPHONE ENCOUNTER
Called patient in regards to missed appointment for smoking cessation. Patient stated she called  to cancel, informed patient appointment was not cancelled. She would like to reschedule for next week, reports she had dialysis today and was tired. Rescheduled appointment for 3/12/2020 at 1pm.

## 2020-03-03 ENCOUNTER — TELEPHONE (OUTPATIENT)
Dept: TRANSPLANT | Facility: CLINIC | Age: 65
End: 2020-03-03

## 2020-03-03 ENCOUNTER — CLINICAL SUPPORT (OUTPATIENT)
Dept: CARDIOLOGY | Facility: CLINIC | Age: 65
End: 2020-03-03
Attending: NURSE PRACTITIONER
Payer: MEDICARE

## 2020-03-03 VITALS — HEIGHT: 64 IN | BODY MASS INDEX: 20.14 KG/M2 | WEIGHT: 118 LBS

## 2020-03-03 DIAGNOSIS — Z76.82 ORGAN TRANSPLANT CANDIDATE: ICD-10-CM

## 2020-03-03 LAB
CV PHARM DOSE: 0.4 MG
CV STRESS BASE HR: 83 BPM
DIASTOLIC BLOOD PRESSURE: 90 MMHG
END DIASTOLIC INDEX-HIGH: 170 ML/M2
END SYSTOLIC INDEX-HIGH: 70 ML/M2
NUC REST DIASTOLIC VOLUME INDEX: 147
NUC REST EJECTION FRACTION: 48
NUC REST SYSTOLIC VOLUME INDEX: 76
NUC STRESS DIASTOLIC VOLUME INDEX: 167
NUC STRESS EJECTION FRACTION: 52 %
NUC STRESS SYSTOLIC VOLUME INDEX: 81
OHS CV CPX 85 PERCENT MAX PREDICTED HEART RATE MALE: 129
OHS CV CPX MAX PREDICTED HEART RATE: 151
OHS CV CPX PATIENT IS FEMALE: 1
OHS CV CPX PATIENT IS MALE: 0
OHS CV CPX PEAK DIASTOLIC BLOOD PRESSURE: 90 MMHG
OHS CV CPX PEAK HEAR RATE: 92 BPM
OHS CV CPX PEAK RATE PRESSURE PRODUCT: NORMAL
OHS CV CPX PEAK SYSTOLIC BLOOD PRESSURE: 181 MMHG
OHS CV CPX PERCENT MAX PREDICTED HEART RATE ACHIEVED: 61
OHS CV CPX RATE PRESSURE PRODUCT PRESENTING: NORMAL
RETIRED EF AND QEF - SEE NOTES: 51 %
STRESS ECHO TARGET HR: 134 BPM
SYSTOLIC BLOOD PRESSURE: 181 MMHG

## 2020-03-03 PROCEDURE — 93018 CV STRESS TEST I&R ONLY: CPT | Mod: S$PBB,TXP,, | Performed by: INTERNAL MEDICINE

## 2020-03-03 PROCEDURE — 99999 PR PBB SHADOW E&M-EST. PATIENT-LVL I: ICD-10-PCS | Mod: PBBFAC,TXP,,

## 2020-03-03 PROCEDURE — 99999 PR PBB SHADOW E&M-EST. PATIENT-LVL I: CPT | Mod: PBBFAC,TXP,,

## 2020-03-03 PROCEDURE — 99211 OFF/OP EST MAY X REQ PHY/QHP: CPT | Mod: PBBFAC,TXP,25

## 2020-03-03 PROCEDURE — 93016 CV STRESS TEST SUPVJ ONLY: CPT | Mod: S$PBB,TXP,, | Performed by: INTERNAL MEDICINE

## 2020-03-03 PROCEDURE — 93016 STRESS TEST WITH MYOCARDIAL PERFUSION (CUPID ONLY): ICD-10-PCS | Mod: S$PBB,TXP,, | Performed by: INTERNAL MEDICINE

## 2020-03-03 PROCEDURE — 93018 STRESS TEST WITH MYOCARDIAL PERFUSION (CUPID ONLY): ICD-10-PCS | Mod: S$PBB,TXP,, | Performed by: INTERNAL MEDICINE

## 2020-03-03 PROCEDURE — 78452 HT MUSCLE IMAGE SPECT MULT: CPT | Mod: PBBFAC,TXP | Performed by: INTERNAL MEDICINE

## 2020-03-03 PROCEDURE — 78452 STRESS TEST WITH MYOCARDIAL PERFUSION (CUPID ONLY): ICD-10-PCS | Mod: 26,S$PBB,TXP, | Performed by: INTERNAL MEDICINE

## 2020-03-03 RX ORDER — AMINOPHYLLINE 25 MG/ML
75 INJECTION, SOLUTION INTRAVENOUS
Status: COMPLETED | OUTPATIENT
Start: 2020-03-03 | End: 2020-03-03

## 2020-03-03 RX ORDER — REGADENOSON 0.08 MG/ML
0.4 INJECTION, SOLUTION INTRAVENOUS
Status: COMPLETED | OUTPATIENT
Start: 2020-03-03 | End: 2020-03-03

## 2020-03-03 RX ADMIN — REGADENOSON 0.4 MG: 0.08 INJECTION, SOLUTION INTRAVENOUS at 11:03

## 2020-03-03 RX ADMIN — AMINOPHYLLINE 75 MG: 25 INJECTION, SOLUTION INTRAVENOUS at 11:03

## 2020-03-09 PROBLEM — I51.89 DIASTOLIC DYSFUNCTION: Status: ACTIVE | Noted: 2020-03-09

## 2020-03-09 PROBLEM — I34.0 NONRHEUMATIC MITRAL VALVE REGURGITATION: Status: ACTIVE | Noted: 2020-03-09

## 2020-03-12 ENCOUNTER — CLINICAL SUPPORT (OUTPATIENT)
Dept: SMOKING CESSATION | Facility: CLINIC | Age: 65
End: 2020-03-12
Payer: COMMERCIAL

## 2020-03-12 DIAGNOSIS — F17.200 NICOTINE DEPENDENCE: Primary | ICD-10-CM

## 2020-03-12 PROCEDURE — 99404 PR PREVENT COUNSEL,INDIV,60 MIN: ICD-10-PCS | Mod: S$GLB,TXP,,

## 2020-03-12 PROCEDURE — 99999 PR PBB SHADOW E&M-EST. PATIENT-LVL I: ICD-10-PCS | Mod: PBBFAC,TXP,,

## 2020-03-12 PROCEDURE — 99404 PREV MED CNSL INDIV APPRX 60: CPT | Mod: S$GLB,TXP,,

## 2020-03-12 PROCEDURE — 99999 PR PBB SHADOW E&M-EST. PATIENT-LVL I: CPT | Mod: PBBFAC,TXP,,

## 2020-03-12 RX ORDER — IBUPROFEN 200 MG
1 TABLET ORAL DAILY
Qty: 14 PATCH | Refills: 0 | Status: SHIPPED | OUTPATIENT
Start: 2020-03-12 | End: 2020-03-15 | Stop reason: ALTCHOICE

## 2020-03-12 NOTE — Clinical Note
Patient presents for intake #1 smoking 7-15 cigarettes per day. After assessment and discussion recommend the 21mg nicotine patches daily,  recommend an abrupt quit, discussed strategies to help cut back and to help break the routine and habit associated with smoking, intake handout provided to the patient and discussed, all prescribed NRT discussed in depth. Patient is to follow up in 2 weeks.

## 2020-03-15 ENCOUNTER — HOSPITAL ENCOUNTER (INPATIENT)
Facility: HOSPITAL | Age: 65
LOS: 1 days | Discharge: HOME | DRG: 291 | End: 2020-03-16
Attending: EMERGENCY MEDICINE | Admitting: HOSPITALIST
Payer: MEDICARE

## 2020-03-15 DIAGNOSIS — Z99.2 ESRD (END STAGE RENAL DISEASE) ON DIALYSIS: ICD-10-CM

## 2020-03-15 DIAGNOSIS — D72.829 LEUKOCYTOSIS: ICD-10-CM

## 2020-03-15 DIAGNOSIS — I50.43 ACUTE ON CHRONIC COMBINED SYSTOLIC AND DIASTOLIC HEART FAILURE: ICD-10-CM

## 2020-03-15 DIAGNOSIS — N18.6 ESRD (END STAGE RENAL DISEASE) ON DIALYSIS: ICD-10-CM

## 2020-03-15 DIAGNOSIS — I10 MALIGNANT HYPERTENSION: ICD-10-CM

## 2020-03-15 DIAGNOSIS — I10 HYPERTENSION: ICD-10-CM

## 2020-03-15 DIAGNOSIS — E87.5 HYPERKALEMIA: ICD-10-CM

## 2020-03-15 DIAGNOSIS — Z72.0 TOBACCO ABUSE: ICD-10-CM

## 2020-03-15 DIAGNOSIS — R07.9 ACUTE CHEST PAIN: ICD-10-CM

## 2020-03-15 DIAGNOSIS — R79.89 ELEVATED TROPONIN: ICD-10-CM

## 2020-03-15 DIAGNOSIS — I34.0 NONRHEUMATIC MITRAL VALVE REGURGITATION: ICD-10-CM

## 2020-03-15 DIAGNOSIS — D72.829 LEUKOCYTOSIS, UNSPECIFIED TYPE: ICD-10-CM

## 2020-03-15 DIAGNOSIS — I16.0 HYPERTENSIVE URGENCY: ICD-10-CM

## 2020-03-15 DIAGNOSIS — J81.0 ACUTE PULMONARY EDEMA: Primary | ICD-10-CM

## 2020-03-15 LAB
ALBUMIN SERPL BCP-MCNC: 3.9 G/DL (ref 3.5–5.2)
ALP SERPL-CCNC: 95 U/L (ref 55–135)
ALT SERPL W/O P-5'-P-CCNC: 13 U/L (ref 10–44)
ANION GAP SERPL CALC-SCNC: 23 MMOL/L (ref 8–16)
APTT BLDCRRT: 26.8 SEC (ref 21–32)
AST SERPL-CCNC: 29 U/L (ref 10–40)
BASOPHILS # BLD AUTO: 0.11 K/UL (ref 0–0.2)
BASOPHILS NFR BLD: 0.6 % (ref 0–1.9)
BILIRUB SERPL-MCNC: 0.4 MG/DL (ref 0.1–1)
BNP SERPL-MCNC: 1332 PG/ML (ref 0–99)
BUN SERPL-MCNC: 44 MG/DL (ref 8–23)
CALCIUM SERPL-MCNC: 10.2 MG/DL (ref 8.7–10.5)
CHLORIDE SERPL-SCNC: 99 MMOL/L (ref 95–110)
CO2 SERPL-SCNC: 22 MMOL/L (ref 23–29)
CREAT SERPL-MCNC: 8.1 MG/DL (ref 0.5–1.4)
CRP SERPL-MCNC: 14.7 MG/L (ref 0–8.2)
DIFFERENTIAL METHOD: ABNORMAL
EOSINOPHIL # BLD AUTO: 0.2 K/UL (ref 0–0.5)
EOSINOPHIL NFR BLD: 0.9 % (ref 0–8)
ERYTHROCYTE [DISTWIDTH] IN BLOOD BY AUTOMATED COUNT: 18.9 % (ref 11.5–14.5)
EST. GFR  (AFRICAN AMERICAN): 6 ML/MIN/1.73 M^2
EST. GFR  (NON AFRICAN AMERICAN): 5 ML/MIN/1.73 M^2
GLUCOSE SERPL-MCNC: 106 MG/DL (ref 70–110)
HCT VFR BLD AUTO: 37.5 % (ref 37–48.5)
HGB BLD-MCNC: 12.1 G/DL (ref 12–16)
IMM GRANULOCYTES # BLD AUTO: 0.11 K/UL (ref 0–0.04)
IMM GRANULOCYTES NFR BLD AUTO: 0.6 % (ref 0–0.5)
INR PPP: 1 (ref 0.8–1.2)
LYMPHOCYTES # BLD AUTO: 1.6 K/UL (ref 1–4.8)
LYMPHOCYTES NFR BLD: 8.2 % (ref 18–48)
MAGNESIUM SERPL-MCNC: 3 MG/DL (ref 1.6–2.6)
MCH RBC QN AUTO: 29.2 PG (ref 27–31)
MCHC RBC AUTO-ENTMCNC: 32.3 G/DL (ref 32–36)
MCV RBC AUTO: 91 FL (ref 82–98)
MONOCYTES # BLD AUTO: 1 K/UL (ref 0.3–1)
MONOCYTES NFR BLD: 5.3 % (ref 4–15)
NEUTROPHILS # BLD AUTO: 16.1 K/UL (ref 1.8–7.7)
NEUTROPHILS NFR BLD: 84.4 % (ref 38–73)
NRBC BLD-RTO: 0 /100 WBC
PLATELET # BLD AUTO: 305 K/UL (ref 150–350)
PMV BLD AUTO: 11.7 FL (ref 9.2–12.9)
POTASSIUM SERPL-SCNC: 6.1 MMOL/L (ref 3.5–5.1)
PROCALCITONIN SERPL IA-MCNC: 0.25 NG/ML
PROT SERPL-MCNC: 7.6 G/DL (ref 6–8.4)
PROTHROMBIN TIME: 10.9 SEC (ref 9–12.5)
RBC # BLD AUTO: 4.14 M/UL (ref 4–5.4)
SODIUM SERPL-SCNC: 144 MMOL/L (ref 136–145)
TROPONIN I SERPL DL<=0.01 NG/ML-MCNC: 0.1 NG/ML (ref 0–0.03)
TROPONIN I SERPL DL<=0.01 NG/ML-MCNC: 0.1 NG/ML (ref 0–0.03)
WBC # BLD AUTO: 18.99 K/UL (ref 3.9–12.7)

## 2020-03-15 PROCEDURE — 86140 C-REACTIVE PROTEIN: CPT | Mod: NTX

## 2020-03-15 PROCEDURE — 93010 ELECTROCARDIOGRAM REPORT: CPT | Mod: NTX,,, | Performed by: INTERNAL MEDICINE

## 2020-03-15 PROCEDURE — 96365 THER/PROPH/DIAG IV INF INIT: CPT | Mod: NTX

## 2020-03-15 PROCEDURE — 84145 PROCALCITONIN (PCT): CPT | Mod: NTX

## 2020-03-15 PROCEDURE — 21400001 HC TELEMETRY ROOM: Mod: NTX

## 2020-03-15 PROCEDURE — 93005 ELECTROCARDIOGRAM TRACING: CPT | Mod: NTX

## 2020-03-15 PROCEDURE — 63600175 PHARM REV CODE 636 W HCPCS: Mod: NTX | Performed by: HOSPITALIST

## 2020-03-15 PROCEDURE — 96375 TX/PRO/DX INJ NEW DRUG ADDON: CPT | Mod: NTX

## 2020-03-15 PROCEDURE — 99285 EMERGENCY DEPT VISIT HI MDM: CPT | Mod: 25,NTX

## 2020-03-15 PROCEDURE — 83880 ASSAY OF NATRIURETIC PEPTIDE: CPT | Mod: NTX

## 2020-03-15 PROCEDURE — 87040 BLOOD CULTURE FOR BACTERIA: CPT | Mod: 59,NTX

## 2020-03-15 PROCEDURE — 83735 ASSAY OF MAGNESIUM: CPT | Mod: NTX

## 2020-03-15 PROCEDURE — 25000003 PHARM REV CODE 250: Mod: NTX | Performed by: EMERGENCY MEDICINE

## 2020-03-15 PROCEDURE — 85610 PROTHROMBIN TIME: CPT | Mod: NTX

## 2020-03-15 PROCEDURE — 25000003 PHARM REV CODE 250: Mod: NTX | Performed by: INTERNAL MEDICINE

## 2020-03-15 PROCEDURE — 85025 COMPLETE CBC W/AUTO DIFF WBC: CPT | Mod: NTX

## 2020-03-15 PROCEDURE — 96374 THER/PROPH/DIAG INJ IV PUSH: CPT | Mod: NTX

## 2020-03-15 PROCEDURE — 85730 THROMBOPLASTIN TIME PARTIAL: CPT | Mod: NTX

## 2020-03-15 PROCEDURE — 96366 THER/PROPH/DIAG IV INF ADDON: CPT | Mod: NTX

## 2020-03-15 PROCEDURE — 84484 ASSAY OF TROPONIN QUANT: CPT | Mod: 91,NTX

## 2020-03-15 PROCEDURE — 93010 EKG 12-LEAD: ICD-10-PCS | Mod: NTX,,, | Performed by: INTERNAL MEDICINE

## 2020-03-15 PROCEDURE — 90935 HEMODIALYSIS ONE EVALUATION: CPT | Mod: NTX

## 2020-03-15 PROCEDURE — 63600175 PHARM REV CODE 636 W HCPCS: Mod: NTX | Performed by: EMERGENCY MEDICINE

## 2020-03-15 PROCEDURE — 80053 COMPREHEN METABOLIC PANEL: CPT | Mod: NTX

## 2020-03-15 RX ORDER — HYDRALAZINE HYDROCHLORIDE 20 MG/ML
10 INJECTION INTRAMUSCULAR; INTRAVENOUS
Status: COMPLETED | OUTPATIENT
Start: 2020-03-15 | End: 2020-03-15

## 2020-03-15 RX ORDER — OLMESARTAN MEDOXOMIL 20 MG/1
40 TABLET ORAL DAILY
Status: DISCONTINUED | OUTPATIENT
Start: 2020-03-15 | End: 2020-03-16 | Stop reason: HOSPADM

## 2020-03-15 RX ORDER — LORAZEPAM 2 MG/ML
1 INJECTION INTRAMUSCULAR
Status: COMPLETED | OUTPATIENT
Start: 2020-03-15 | End: 2020-03-15

## 2020-03-15 RX ORDER — SODIUM CHLORIDE 0.9 % (FLUSH) 0.9 %
10 SYRINGE (ML) INJECTION
Status: DISCONTINUED | OUTPATIENT
Start: 2020-03-15 | End: 2020-03-16 | Stop reason: HOSPADM

## 2020-03-15 RX ORDER — SEVELAMER CARBONATE 800 MG/1
1600 TABLET, FILM COATED ORAL
Status: DISCONTINUED | OUTPATIENT
Start: 2020-03-15 | End: 2020-03-16 | Stop reason: HOSPADM

## 2020-03-15 RX ORDER — CLONIDINE HYDROCHLORIDE 0.1 MG/1
0.3 TABLET ORAL DAILY
Status: DISCONTINUED | OUTPATIENT
Start: 2020-03-15 | End: 2020-03-16 | Stop reason: HOSPADM

## 2020-03-15 RX ORDER — MUPIROCIN 20 MG/G
OINTMENT TOPICAL 2 TIMES DAILY
Status: DISCONTINUED | OUTPATIENT
Start: 2020-03-15 | End: 2020-03-16 | Stop reason: HOSPADM

## 2020-03-15 RX ORDER — VANCOMYCIN HCL IN 5 % DEXTROSE 1G/250ML
20 PLASTIC BAG, INJECTION (ML) INTRAVENOUS
Status: COMPLETED | OUTPATIENT
Start: 2020-03-15 | End: 2020-03-15

## 2020-03-15 RX ORDER — ONDANSETRON 2 MG/ML
4 INJECTION INTRAMUSCULAR; INTRAVENOUS EVERY 6 HOURS PRN
Status: DISCONTINUED | OUTPATIENT
Start: 2020-03-15 | End: 2020-03-16 | Stop reason: HOSPADM

## 2020-03-15 RX ORDER — SODIUM CHLORIDE 9 MG/ML
INJECTION, SOLUTION INTRAVENOUS
Status: DISCONTINUED | OUTPATIENT
Start: 2020-03-15 | End: 2020-03-16 | Stop reason: HOSPADM

## 2020-03-15 RX ORDER — HYDRALAZINE HYDROCHLORIDE 25 MG/1
100 TABLET, FILM COATED ORAL 3 TIMES DAILY
Status: DISCONTINUED | OUTPATIENT
Start: 2020-03-15 | End: 2020-03-16 | Stop reason: HOSPADM

## 2020-03-15 RX ADMIN — HYDRALAZINE HYDROCHLORIDE 10 MG: 20 INJECTION INTRAMUSCULAR; INTRAVENOUS at 09:03

## 2020-03-15 RX ADMIN — LORAZEPAM 1 MG: 2 INJECTION INTRAMUSCULAR; INTRAVENOUS at 09:03

## 2020-03-15 RX ADMIN — PIPERACILLIN AND TAZOBACTAM 4.5 G: 4; .5 INJECTION, POWDER, LYOPHILIZED, FOR SOLUTION INTRAVENOUS; PARENTERAL at 02:03

## 2020-03-15 RX ADMIN — NITROGLYCERIN 3 INCH: 20 OINTMENT TOPICAL at 09:03

## 2020-03-15 RX ADMIN — ONDANSETRON HYDROCHLORIDE 4 MG: 2 SOLUTION INTRAMUSCULAR; INTRAVENOUS at 03:03

## 2020-03-15 RX ADMIN — MUPIROCIN: 20 OINTMENT TOPICAL at 02:03

## 2020-03-15 RX ADMIN — VANCOMYCIN HYDROCHLORIDE 1000 MG: 1 INJECTION, POWDER, LYOPHILIZED, FOR SOLUTION INTRAVENOUS at 11:03

## 2020-03-15 NOTE — PLAN OF CARE
Problem: Adult Inpatient Plan of Care  Goal: Plan of Care Review  Outcome: Ongoing, Progressing     Problem: Adult Inpatient Plan of Care  Goal: Optimal Comfort and Wellbeing  Outcome: Ongoing, Progressing  Intervention: Provide Person-Centered Care  Flowsheets (Taken 3/15/2020 1634)  Trust Relationship/Rapport: care explained; choices provided; emotional support provided; empathic listening provided; questions answered; questions encouraged; reassurance provided     Problem: Adult Inpatient Plan of Care  Goal: Optimal Comfort and Wellbeing  Intervention: Provide Person-Centered Care  Flowsheets (Taken 3/15/2020 1634)  Trust Relationship/Rapport: care explained; choices provided; emotional support provided; empathic listening provided; questions answered; questions encouraged; reassurance provided

## 2020-03-15 NOTE — ED NOTES
Placed call to give report unit nurse states give her 10 mins before getting report will call back in 10 min

## 2020-03-15 NOTE — NURSING
Admit to Telemetry from ED:  Note patient awake, alert and fully oriented c/o nausea;  On 2L NC with use of accessory muscles when breathing;  BP elevated at 196/92;   Reports that she dialyzes on M,W,F at [facility];   Reports last dialysis Friday 03/13/2020;      Charge Priscilla contacted Sylvia on-call HD RN;  Applied Telemetry monitor 8571;  Limb Alert bracelet applied to left AVF arm;  Given PRN Zofran;   Antihypertensives on hold pending HD per Dr. Liz;     Dr. Liz/GLENN Hewitt at bedside;     Awaiting contact from HD RN;

## 2020-03-15 NOTE — ASSESSMENT & PLAN NOTE
No fever or current evidence of infectious process, CRP only mildly elevated and leukocytosis noted in the past without infection, procalcitonin pending.  Cultures pending, continue abx, CBC in am.

## 2020-03-15 NOTE — ASSESSMENT & PLAN NOTE
Acute pulmonary edema with elevated BNP, markedly hypertensive, HD for fluid control and afterload reduction as tolerated, I/O's, daily weights.  Echo Aug 2019 showed preserved EF and grade II diastolic dysfunction.  Repeat echo.

## 2020-03-15 NOTE — HPI
64 y.o. female with ESRD on HD (MWF), diastolic dysfunction, tobacco abuse, HTN, and MVR presents with a complaint of SOB.  States feels like a panic attack, she describes a feeling of air hunger which induces anxiety.  Reports adherence to HD and watching her fluid intake.  Associated with cough, nausea, and difficulty sleeping.  Duration roughly 3 days.  Denies fever, chills, chest pain, dizziness, syncope, recent travel or sick contacts, emesis, diarrhea, abdominal pain, bloody or black stools.  Last HD was Friday.  In the ED, BNP 3332 with evidence of pulmonary edema and small bilat pleural effusions on chest xray.  Troponin mildly elevated at 0.105.  Hyperkalemia noted, K+ 6.1.  Leukocytosis and mildly elevated CRP.  She was markedly hypertensive.  Received antihypertensive medications and anxiolytic.  Blood cultures obtained and started on IV abx.  Admitted to Hospital Medicine for further evaluation and treatment.  Nephrology consulted and plan for urgent hemodialysis.

## 2020-03-15 NOTE — CONSULTS
"                                         Renal ED Consult    Date of Admission:  3/15/2020  7:37 AM        Chief Complaint:   Chief Complaint   Patient presents with    Shortness of Breath     "I feel SOB and like I'm having a panic attack". Symptoms started lastnight and progressively worsened. Hx of ESRD with HD on MWF. HD for 3yrs. [facility]DA HD fistula.     Anxiety    Hypertension       HPI: 64 y.o. female well known to me with a PMHx of ESRD on HD Q MWF, Anemia, DM2 and HTN who presents to the Emergency Department with a cc of constant SOB x1 day. The pt believes that she is having a panic attack since she has experienced these symptoms in the past. Pt reports associated constant CP, weakness, nausea, anxiety and leg pain (bilateral restless legs) denies fever, chills, has non-productive cough.    PMH:  Past Medical History:   Diagnosis Date    Anemia     Diabetes mellitus, type 2     ESRD (end stage renal disease) on dialysis 2017    Gallstones     Hypertension     Pulmonary edema     Renal disorder     dialysis [facility]DA fistula    Tobacco abuse        PSH:  Past Surgical History:   Procedure Laterality Date    AVF  2017    CHOLECYSTECTOMY  2016    Permacath Right 2017    TUBAL LIGATION         Allergies:  Review of patient's allergies indicates:  No Known Allergies    No current facility-administered medications on file prior to encounter.      Current Outpatient Medications on File Prior to Encounter   Medication Sig Dispense Refill    cloNIDine (CATAPRES) 0.3 MG tablet Take 1 tablet (0.3 mg total) by mouth once daily. 30 tablet 11    hydrALAZINE (APRESOLINE) 100 MG tablet Take 1 tablet (100 mg total) by mouth 3 (three) times daily. 90 tablet 11    olmesartan (BENICAR) 40 MG tablet Take 1 tablet (40 mg total) by mouth once daily. 90 tablet 3    sevelamer carbonate (RENVELA) 800 mg Tab Take 1,600 mg by mouth 3 (three) times daily with meals.      vitamin D (VITAMIN D3) 1000 units Tab Take 1,000 Units " by mouth once daily.      [DISCONTINUED] nicotine (NICODERM CQ) 21 mg/24 hr Place 1 patch onto the skin once daily. 14 patch 0       Medications:  Current Facility-Administered Medications   Medication Dose Route Frequency Provider Last Rate Last Dose    0.9%  NaCl infusion   Intravenous PRN Tristan Bush MD        mupirocin 2 % ointment   Nasal BID Tristan Bush MD        piperacillin-tazobactam 4.5 g in sodium chloride 0.9% 100 mL IVPB (ready to mix system)  4.5 g Intravenous Once Donovan Bose MD        vancomycin in dextrose 5 % 1 gram/250 mL IVPB 1,000 mg  20 mg/kg Intravenous ED 1 Time Donovan Bose MD         Current Outpatient Medications   Medication Sig Dispense Refill    cloNIDine (CATAPRES) 0.3 MG tablet Take 1 tablet (0.3 mg total) by mouth once daily. 30 tablet 11    hydrALAZINE (APRESOLINE) 100 MG tablet Take 1 tablet (100 mg total) by mouth 3 (three) times daily. 90 tablet 11    olmesartan (BENICAR) 40 MG tablet Take 1 tablet (40 mg total) by mouth once daily. 90 tablet 3    sevelamer carbonate (RENVELA) 800 mg Tab Take 1,600 mg by mouth 3 (three) times daily with meals.      vitamin D (VITAMIN D3) 1000 units Tab Take 1,000 Units by mouth once daily.         FamHx:  Family History   Problem Relation Age of Onset    Kidney disease Mother     Hypertension Mother     Cervical cancer Mother     Cancer Mother     Ovarian cancer Mother     Diabetes Father     Drug abuse Paternal Grandmother     Diabetes Sister     Breast cancer Neg Hx     Colon cancer Neg Hx        SocHx:  Social History     Socioeconomic History    Marital status:      Spouse name: Not on file    Number of children: 4    Years of education: Not on file    Highest education level: Not on file   Occupational History    Occupation: disable   Social Needs    Financial resource strain: Not on file    Food insecurity:     Worry: Not on file     Inability: Not on file     Transportation needs:     Medical: Not on file     Non-medical: Not on file   Tobacco Use    Smoking status: Current Every Day Smoker     Packs/day: 0.50     Years: 20.00     Pack years: 10.00     Types: Cigarettes    Smokeless tobacco: Never Used   Substance and Sexual Activity    Alcohol use: No    Drug use: No    Sexual activity: Yes     Partners: Male     Birth control/protection: Post-menopausal   Lifestyle    Physical activity:     Days per week: Not on file     Minutes per session: Not on file    Stress: Not on file   Relationships    Social connections:     Talks on phone: Not on file     Gets together: Not on file     Attends Mandaen service: Not on file     Active member of club or organization: Not on file     Attends meetings of clubs or organizations: Not on file     Relationship status: Not on file   Other Topics Concern    Not on file   Social History Narrative    Not on file           Review of Systems: see H&P       Physical Exam:  Vitals:   Vitals:    03/15/20 1030   BP: (!) 158/74   Pulse: 83   Resp: 15   Temp:        No intake/output data recorded.  No intake/output data recorded.    General: A&Ox3. No apparent distress.   Neck: supple no JVD  Lungs: CTA bilateral  Heart: RRR, S1-S2  Abdomen: Soft. NT.  : n/a  Ext: No edema  Skin: The skin is warm and dry. No jaundice.  Neurologic: Awake and alert, oriented x 3, no focal deficits      Laboratories:    Recent Labs   Lab 03/15/20  0849   WBC 18.99*   RBC 4.14   HGB 12.1   HCT 37.5      MCV 91   MCH 29.2   MCHC 32.3       Recent Labs   Lab 03/15/20  0849   CALCIUM 10.2   PROT 7.6   K 6.1*   CO2 22*   CL 99   BUN 44*   CREATININE 8.1*   ALKPHOS 95   ALT 13   AST 29   BILITOT 0.4       No results for input(s): COLORU, CLARITYU, SPECGRAV, PHUR, PROTEINUA, GLUCOSEU, BLOODU, WBCU, RBCU, BACTERIA, MUCUS in the last 24 hours.    Invalid input(s):  BILIRUBINCON    Microbiology Results (last 7 days)     Procedure Component Value Units  Date/Time    Blood culture [[id number]] Collected:  03/15/20 1043    Order Status:  Sent Specimen:  Blood from Peripheral, Antecubital, Right Updated:  03/15/20 1055    Blood culture [[id number]] Collected:  03/15/20 1047    Order Status:  Sent Specimen:  Blood from Peripheral, Forearm, Right Updated:  03/15/20 1055            Diagnostic Tests:    XR CHEST AP PORTABLE    CLINICAL HISTORY:  chest pain;    TECHNIQUE:  Single frontal view of the chest was performed.    COMPARISON:  02/13/2020    FINDINGS:  There are diffuse mixed opacities throughout both lungs associated with congestion of the pulmonary vasculature.  The heart is mildly enlarged.  Small bilateral pleural effusions may be present.   Impression:       Interval development of mixed opacities throughout both lungs with probable small bilateral pleural effusions..  While these findings are most likely related to pulmonary edema, underlying inflammatory/infectious process can not be excluded.         Assessment:  63 y/o female with ESRD on HD admitted with:    - HTN urgency  - Fluid overload  - HyperK+  - Anxiety  - Leukocytosis et?      Plan:    - Dialysis ASAP w/ low K+ dialyzate  - Empiric antibiotics  - BC obtained  - Empiric antibiotic loading doses  - No need for Epogen at present  - Renal diet  - Other problems per admitting

## 2020-03-15 NOTE — SUBJECTIVE & OBJECTIVE
Past Medical History:   Diagnosis Date    Anemia     Diabetes mellitus, type 2     ESRD (end stage renal disease) on dialysis 2017    Gallstones     Hypertension     Pulmonary edema     Renal disorder     dialysis [facility]DA fistula    Tobacco abuse        Past Surgical History:   Procedure Laterality Date    AVF  2017    CHOLECYSTECTOMY  2016    Permacath Right 2017    TUBAL LIGATION         Review of patient's allergies indicates:  No Known Allergies    No current facility-administered medications on file prior to encounter.      Current Outpatient Medications on File Prior to Encounter   Medication Sig    cloNIDine (CATAPRES) 0.3 MG tablet Take 1 tablet (0.3 mg total) by mouth once daily.    hydrALAZINE (APRESOLINE) 100 MG tablet Take 1 tablet (100 mg total) by mouth 3 (three) times daily.    olmesartan (BENICAR) 40 MG tablet Take 1 tablet (40 mg total) by mouth once daily.    sevelamer carbonate (RENVELA) 800 mg Tab Take 1,600 mg by mouth 3 (three) times daily with meals.    vitamin D (VITAMIN D3) 1000 units Tab Take 1,000 Units by mouth once daily.    [DISCONTINUED] nicotine (NICODERM CQ) 21 mg/24 hr Place 1 patch onto the skin once daily.     Family History     Problem Relation (Age of Onset)    Cancer Mother    Cervical cancer Mother    Diabetes Father, Sister    Drug abuse Paternal Grandmother    Hypertension Mother    Kidney disease Mother    Ovarian cancer Mother        Tobacco Use    Smoking status: Current Every Day Smoker     Packs/day: 0.50     Years: 20.00     Pack years: 10.00     Types: Cigarettes    Smokeless tobacco: Never Used   Substance and Sexual Activity    Alcohol use: No    Drug use: No    Sexual activity: Yes     Partners: Male     Birth control/protection: Post-menopausal     Review of Systems   Constitutional: Negative for chills, fatigue and fever.   Eyes: Negative for photophobia and visual disturbance.   Respiratory: Positive for cough and shortness of breath.     Cardiovascular: Negative for chest pain, palpitations and leg swelling.   Gastrointestinal: Positive for nausea. Negative for abdominal pain, diarrhea and vomiting.   Genitourinary: Negative for dysuria, frequency and urgency.   Skin: Negative for pallor, rash and wound.   Neurological: Negative for light-headedness and headaches.   Psychiatric/Behavioral: Positive for sleep disturbance. Negative for confusion and decreased concentration. The patient is nervous/anxious.      Objective:     Vital Signs (Most Recent):  Temp: 99.6 °F (37.6 °C) (03/15/20 1504)  Pulse: 87 (03/15/20 1504)  Resp: 18 (03/15/20 1504)  BP: (!) 196/92 (03/15/20 1504)  SpO2: 98 % (03/15/20 1504) Vital Signs (24h Range):  Temp:  [98.2 °F (36.8 °C)-99.6 °F (37.6 °C)] 99.6 °F (37.6 °C)  Pulse:  [] 87  Resp:  [13-28] 18  SpO2:  [89 %-98 %] 98 %  BP: (158-224)/() 196/92     Weight: 52.3 kg (115 lb 4.8 oz)  Body mass index is 19.79 kg/m².    Physical Exam   Constitutional: She is oriented to person, place, and time. She appears well-developed and well-nourished. She appears lethargic. She appears ill. No distress.   HENT:   Head: Normocephalic and atraumatic.   Right Ear: External ear normal.   Left Ear: External ear normal.   Nose: Nose normal.   Mouth/Throat: Oropharynx is clear and moist.   Eyes: Pupils are equal, round, and reactive to light. Conjunctivae and EOM are normal.   Neck: Normal range of motion. Neck supple.   Cardiovascular: Normal rate, regular rhythm and intact distal pulses.   Pulmonary/Chest: Effort normal. No respiratory distress. She has decreased breath sounds in the right lower field and the left lower field. She has no wheezes.   Abdominal: Soft. Bowel sounds are normal. She exhibits no distension. There is no tenderness.   No palpable hepatomegaly or splenomegaly    Musculoskeletal: Normal range of motion. She exhibits no edema or tenderness.   Neurological: She is oriented to person, place, and time. She  appears lethargic.   Skin: Skin is warm and dry.   Psychiatric: She has a normal mood and affect. Thought content normal.   Nursing note and vitals reviewed.        CRANIAL NERVES     CN III, IV, VI   Pupils are equal, round, and reactive to light.  Extraocular motions are normal.        Significant Labs: All pertinent labs within the past 24 hours have been reviewed.    Significant Imaging: I have reviewed all pertinent imaging results/findings within the past 24 hours.

## 2020-03-15 NOTE — ED PROVIDER NOTES
"Encounter Date: 3/15/2020    SCRIBE #1 NOTE: I, Nelda Jones, am scribing for, and in the presence of,  Donovan Bose MD. I have scribed the following portions of the note - Other sections scribed: HPI, ROS.       History     Chief Complaint   Patient presents with    Shortness of Breath     "I feel SOB and like I'm having a panic attack". Symptoms started lastnight and progressively worsened. Hx of ESRD with HD on MWF. HD for 3yrs. [facility] HD fistula.     Anxiety    Hypertension     This is a 64 y.o. female with a PMHx of ESRD, Anemia, DM, HTN, and Pulmonary Edema who presents to the Emergency Department with a cc of constant SOB x1 day. The pt believes that she is having a panic attack. She states that she has experienced these symptoms in the past. Pt reports associated constant CP, weakness, nausea, anxiety, and leg pain (bilateral restless legs). Pt denies any cough, sore throat, or ear pain. The pt is currently on dialysis and attends her treatment every Monday, Wednesday, and Friday.        The history is provided by the patient. No  was used.     Review of patient's allergies indicates:  No Known Allergies  Past Medical History:   Diagnosis Date    Anemia     Diabetes mellitus, type 2     ESRD (end stage renal disease) on dialysis 2017    Gallstones     Hypertension     Pulmonary edema     Renal disorder     dialysis M[facility]ANDA fistula    Tobacco abuse      Past Surgical History:   Procedure Laterality Date    AVF  2017    CHOLECYSTECTOMY  2016    Permacath Right 2017    TUBAL LIGATION       Family History   Problem Relation Age of Onset    Kidney disease Mother     Hypertension Mother     Cervical cancer Mother     Cancer Mother     Ovarian cancer Mother     Diabetes Father     Drug abuse Paternal Grandmother     Diabetes Sister     Breast cancer Neg Hx     Colon cancer Neg Hx      Social History     Tobacco Use    Smoking status: Current Every Day Smoker     Packs/day: " 0.50     Years: 20.00     Pack years: 10.00     Types: Cigarettes    Smokeless tobacco: Never Used   Substance Use Topics    Alcohol use: No    Drug use: No     Review of Systems   Constitutional: Negative for chills, diaphoresis and fever.   HENT: Negative for ear pain and sore throat.    Eyes: Negative for photophobia, pain, discharge, redness and itching.   Respiratory: Positive for shortness of breath. Negative for cough.    Cardiovascular: Positive for chest pain.   Gastrointestinal: Positive for nausea. Negative for diarrhea and vomiting.   Genitourinary: Negative for dysuria.   Musculoskeletal: Positive for myalgias (leg pain). Negative for arthralgias.   Skin: Negative for rash.   Neurological: Positive for weakness. Negative for headaches.   Psychiatric/Behavioral: The patient is nervous/anxious.        Physical Exam     Initial Vitals [03/15/20 0701]   BP Pulse Resp Temp SpO2   (!) 223/107 97 (!) 28 98.2 °F (36.8 °C) (!) 89 %      MAP       --         Physical Exam  The patient was examined specifically for the following:   General:No significant distress, Good color, Warm and dry. Head and neck:Scalp atraumatic, Neck supple. Neurological:Appropriate conversation, Gross motor deficits. Eyes:Conjugate gaze, Clear corneas. ENT: No epistaxis. Cardiac: Regular rate and rhythm, Grossly normal heart tones. Pulmonary: Wheezing, Rales. Gastrointestinal: Abdominal tenderness, Abdominal distention. Musculoskeletal: Extremity deformity, Apparent pain with range of motion of the joints. Skin: Rash.   The findings on examination were normal except for the following:  The patient is hypertensive tachypneic.  Her respiratory rate is 28 she looks anxious.  Patient is tachycardic with a heart rate of 120 during the physical examination.  The patient is afebrile.  Oxygen saturations are 89% on arrival.  ED Course   Procedures  Labs Reviewed   COMPREHENSIVE METABOLIC PANEL - Abnormal; Notable for the following components:        Result Value    Potassium 6.1 (*)     CO2 22 (*)     BUN, Bld 44 (*)     Creatinine 8.1 (*)     Anion Gap 23 (*)     eGFR if  6 (*)     eGFR if non  5 (*)     All other components within normal limits   CBC W/ AUTO DIFFERENTIAL - Abnormal; Notable for the following components:    WBC 18.99 (*)     RDW 18.9 (*)     Immature Granulocytes 0.6 (*)     Gran # (ANC) 16.1 (*)     Immature Grans (Abs) 0.11 (*)     Gran% 84.4 (*)     Lymph% 8.2 (*)     All other components within normal limits   MAGNESIUM - Abnormal; Notable for the following components:    Magnesium 3.0 (*)     All other components within normal limits   B-TYPE NATRIURETIC PEPTIDE - Abnormal; Notable for the following components:    BNP 1,332 (*)     All other components within normal limits   TROPONIN I - Abnormal; Notable for the following components:    Troponin I 0.105 (*)     All other components within normal limits   CULTURE, BLOOD   CULTURE, BLOOD   APTT   PROTIME-INR   TROPONIN I     EKG Readings: (Independently Interpreted)   This patient is in a normal sinus rhythm with a sinus arrhythmia.  The heart rate is 87.  There are no significant ST segment or T-wave changes.  There is poor R-wave progression across precordium.  There is no definite evidence of acute myocardial infarction or malignant arrhythmia.  This patient may have left ventricular hypertrophy.     ECG Results          EKG 12-lead (Final result)  Result time 03/15/20 11:45:51    Final result by Interface, Lab In [facility] (03/15/20 11:45:51)                 Narrative:    Test Reason : R07.9,    Vent. Rate : 087 BPM     Atrial Rate : 087 BPM     P-R Int : 150 ms          QRS Dur : 070 ms      QT Int : 368 ms       P-R-T Axes : 066 072 075 degrees     QTc Int : 442 ms    Normal sinus rhythm with sinus arrhythmia  Minimal voltage criteria for LVH, may be normal variant  Borderline Abnormal ECG  When compared with ECG of 03-MAR-2020 11:19,  No  significant change was found  Confirmed by Yadiel Lee MD ([id number]) on 3/15/2020 11:45:40 AM    Referred By: [email or web address]   SELF           Confirmed By:Ydaiel Lee MD                            Imaging Results          X-Ray Chest AP Portable (Final result)  Result time 03/15/20 09:39:24    Final result by Shabana Novoa MD (03/15/20 09:39:24)                 Impression:      Interval development of mixed opacities throughout both lungs with probable small bilateral pleural effusions..  While these findings are most likely related to pulmonary edema, underlying inflammatory/infectious process can not be excluded.      Electronically signed by: Shabana Novoa MD  Date:    03/15/2020  Time:    09:39             Narrative:    EXAMINATION:  XR CHEST AP PORTABLE    CLINICAL HISTORY:  chest pain;    TECHNIQUE:  Single frontal view of the chest was performed.    COMPARISON:  02/13/2020    FINDINGS:  There are diffuse mixed opacities throughout both lungs associated with congestion of the pulmonary vasculature.  The heart is mildly enlarged.  Small bilateral pleural effusions may be present.                             Medical decision making:  Given the above it seems obvious that this patient has uncontrolled hypertension and pulmonary edema.  There is some concern about the patient's elevated [place] [address] with a chest x-ray picture that could be pneumonia.  The patient has no fever.  The leukocytosis might be demargination.  With an abundance of caution, we will send blood cultures and treat with IV antibiotics until it is clear that this patient does not have a bacterial pneumonia.  The patient has uncontrolled hypertension was treated.  She will be dialyzed.  I discussed this case with Dr. Bush and  Dr. Liz.                 Scribe Attestation:   Scribe #1: I performed the above scribed service and the documentation accurately describes the services I performed. I attest to the accuracy of the  note.                          Clinical Impression:       ICD-10-CM ICD-9-CM   1. Acute pulmonary edema J81.0 518.4   2. Acute chest pain R07.9 786.50   3. Elevated troponin R79.89 790.6   4. Leukocytosis, unspecified type D72.829 288.60             ED Disposition Condition    Admit        I personally performed the services described in this documentation.  All medical record  entries made by the scribe are at my direction and in my presence.  Signed, Dr. Juice Bose MD  03/15/20 0860

## 2020-03-15 NOTE — ASSESSMENT & PLAN NOTE
SOB with elevated BNP and pulmonary edema bilat pleural effusions on chest xray, reports adherence to HD and fluid intake, plan is for urgent HD today.

## 2020-03-15 NOTE — ASSESSMENT & PLAN NOTE
5 minutes spent counseling the patient on smoking cessation and she is not currently ready to stop smoking. She will be offereded a nicotine transdermal patch while hospitalized and monitored for withdrawal.  Will provide additional smoking cessation counseling prior to discharge.

## 2020-03-15 NOTE — NURSING
Spoke with RAYSHAWN Denny at dialysis and reported order written for dialysis at 1108 for today. RAYSHAWN Denny reports she will be in to administer treatment.

## 2020-03-15 NOTE — H&P
"[facility] Hospital Medicine  History & Physical    Patient Name: April Vasquez  MRN: [id number]  Admission Date: 3/15/2020  Attending Physician: Betsy Liz MD   Primary Care Provider: Nasra Iraheta MD         Patient information was obtained from patient, past medical records and ER records.     Subjective:     Principal Problem:Acute pulmonary edema    Chief Complaint:   Chief Complaint   Patient presents with    Shortness of Breath     "I feel SOB and like I'm having a panic attack". Symptoms started lastnight and progressively worsened. Hx of ESRD with HD on MWF. HD for 3yrs. MIRANDA HD fistula.     Anxiety    Hypertension        HPI: 64 y.o. female with ESRD on HD (MWF), diastolic dysfunction, tobacco abuse, HTN, and MVR presents with a complaint of SOB.  States feels like a panic attack, she describes a feeling of air hunger which induces anxiety.  Reports adherence to HD and watching her fluid intake.  Associated with cough, nausea, and difficulty sleeping.  Duration roughly 3 days.  Denies fever, chills, chest pain, dizziness, syncope, recent travel or sick contacts, emesis, diarrhea, abdominal pain, bloody or black stools.  Last HD was Friday.  In the ED, BNP 3332 with evidence of pulmonary edema and small bilat pleural effusions on chest xray.  Troponin mildly elevated at 0.105.  Hyperkalemia noted, K+ 6.1.  Leukocytosis and mildly elevated CRP.  She was markedly hypertensive.  Received antihypertensive medications and anxiolytic.  Blood cultures obtained and started on IV abx.  Admitted to Hospital Medicine for further evaluation and treatment.  Nephrology consulted and plan for urgent hemodialysis.    Past Medical History:   Diagnosis Date    Anemia     Diabetes mellitus, type 2     ESRD (end stage renal disease) on dialysis 2017    Gallstones     Hypertension     Pulmonary edema     Renal disorder     dialysis MIRANDA fistula    Tobacco abuse        Past Surgical History: "   Procedure Laterality Date    AVF  2017    CHOLECYSTECTOMY  2016    Permacath Right 2017    TUBAL LIGATION         Review of patient's allergies indicates:  No Known Allergies    No current facility-administered medications on file prior to encounter.      Current Outpatient Medications on File Prior to Encounter   Medication Sig    cloNIDine (CATAPRES) 0.3 MG tablet Take 1 tablet (0.3 mg total) by mouth once daily.    hydrALAZINE (APRESOLINE) 100 MG tablet Take 1 tablet (100 mg total) by mouth 3 (three) times daily.    olmesartan (BENICAR) 40 MG tablet Take 1 tablet (40 mg total) by mouth once daily.    sevelamer carbonate (RENVELA) 800 mg Tab Take 1,600 mg by mouth 3 (three) times daily with meals.    vitamin D (VITAMIN D3) 1000 units Tab Take 1,000 Units by mouth once daily.    [DISCONTINUED] nicotine (NICODERM CQ) 21 mg/24 hr Place 1 patch onto the skin once daily.     Family History     Problem Relation (Age of Onset)    Cancer Mother    Cervical cancer Mother    Diabetes Father, Sister    Drug abuse Paternal Grandmother    Hypertension Mother    Kidney disease Mother    Ovarian cancer Mother        Tobacco Use    Smoking status: Current Every Day Smoker     Packs/day: 0.50     Years: 20.00     Pack years: 10.00     Types: Cigarettes    Smokeless tobacco: Never Used   Substance and Sexual Activity    Alcohol use: No    Drug use: No    Sexual activity: Yes     Partners: Male     Birth control/protection: Post-menopausal     Review of Systems   Constitutional: Negative for chills, fatigue and fever.   Eyes: Negative for photophobia and visual disturbance.   Respiratory: Positive for cough and shortness of breath.    Cardiovascular: Negative for chest pain, palpitations and leg swelling.   Gastrointestinal: Positive for nausea. Negative for abdominal pain, diarrhea and vomiting.   Genitourinary: Negative for dysuria, frequency and urgency.   Skin: Negative for pallor, rash and wound.    Neurological: Negative for light-headedness and headaches.   Psychiatric/Behavioral: Positive for sleep disturbance. Negative for confusion and decreased concentration. The patient is nervous/anxious.      Objective:     Vital Signs (Most Recent):  Temp: 99.6 °F (37.6 °C) (03/15/20 1504)  Pulse: 87 (03/15/20 1504)  Resp: 18 (03/15/20 1504)  BP: (!) 196/92 (03/15/20 1504)  SpO2: 98 % (03/15/20 1504) Vital Signs (24h Range):  Temp:  [98.2 °F (36.8 °C)-99.6 °F (37.6 °C)] 99.6 °F (37.6 °C)  Pulse:  [] 87  Resp:  [13-28] 18  SpO2:  [89 %-98 %] 98 %  BP: (158-224)/() 196/92     Weight: 52.3 kg (115 lb 4.8 oz)  Body mass index is 19.79 kg/m².    Physical Exam   Constitutional: She is oriented to person, place, and time. She appears well-developed and well-nourished. She appears lethargic. She appears ill. No distress.   HENT:   Head: Normocephalic and atraumatic.   Right Ear: External ear normal.   Left Ear: External ear normal.   Nose: Nose normal.   Mouth/Throat: Oropharynx is clear and moist.   Eyes: Pupils are equal, round, and reactive to light. Conjunctivae and EOM are normal.   Neck: Normal range of motion. Neck supple.   Cardiovascular: Normal rate, regular rhythm and intact distal pulses.   Pulmonary/Chest: Effort normal. No respiratory distress. She has decreased breath sounds in the right lower field and the left lower field. She has no wheezes.   Abdominal: Soft. Bowel sounds are normal. She exhibits no distension. There is no tenderness.   No palpable hepatomegaly or splenomegaly    Musculoskeletal: Normal range of motion. She exhibits no edema or tenderness.   Neurological: She is oriented to person, place, and time. She appears lethargic.   Skin: Skin is warm and dry.   Psychiatric: She has a normal mood and affect. Thought content normal.   Nursing note and vitals reviewed.        CRANIAL NERVES     CN III, IV, VI   Pupils are equal, round, and reactive to light.  Extraocular motions are  normal.        Significant Labs: All pertinent labs within the past 24 hours have been reviewed.    Significant Imaging: I have reviewed all pertinent imaging results/findings within the past 24 hours.    Assessment/Plan:     * Acute pulmonary edema  SOB with elevated BNP and pulmonary edema bilat pleural effusions on chest xray, reports adherence to HD and fluid intake, plan is for urgent HD today.    Acute on chronic combined systolic and diastolic heart failure  Acute pulmonary edema with elevated BNP, markedly hypertensive, HD for fluid control and afterload reduction as tolerated, I/O's, daily weights.  Echo Aug 2019 showed preserved EF and grade II diastolic dysfunction.  Repeat echo.    ESRD (end stage renal disease) on dialysis  Nephrology consult, plan for HD today.    Hyperkalemia  No EKG change, awaiting urgent HD, monitor on tele, BMP in am    Leukocytosis  No fever or current evidence of infectious process, CRP only mildly elevated and leukocytosis noted in the past without infection, procalcitonin pending.  Cultures pending, continue abx, CBC in am.    Nonrheumatic mitral valve regurgitation  Check echo.    Hypertension  Poorly controlled, continue home medications and monitor blood pressure, adjust as needed.     Tobacco abuse  5 minutes spent counseling the patient on smoking cessation and she is not currently ready to stop smoking. She will be offereded a nicotine transdermal patch while hospitalized and monitored for withdrawal.  Will provide additional smoking cessation counseling prior to discharge.       VTE Risk Mitigation (From admission, onward)         Ordered     IP VTE LOW RISK PATIENT  Once      03/15/20 1503     Place KAM hose  Until discontinued      03/15/20 1503              Robert Hewitt Jr., APRN, AGACNP-BC  Hospitalist - Department of Hospital Medicine  [facility] [address] [place] [address]  Office #: [phone]; Pager #: [phone]

## 2020-03-15 NOTE — ED TRIAGE NOTES
"Pt presents with c/c of SOB , chest pain and reports   " panic attack" onset last night , unknown trigger. Pt presents with increase work of breathing  on assessmesnt and appear anxious.   "

## 2020-03-15 NOTE — PROGRESS NOTES
Pharmacokinetic Initial Assessment: IV Vancomycin    Assessment/Plan:    Initiate intravenous vancomycin with loading dose of 1000 mg once with subsequent doses when random concentrations are less than 20 mcg/mL, the dossage based on Random Vanco level  Desired empiric serum trough concentration is 10 to 20 mcg/mL  Draw vancomycin random level on 3/16/ at 0400.  Pharmacy will continue to follow and monitor vancomycin.      Please contact pharmacy at extension [phone] with any questions regarding this assessment.     Thank you for the consult,   Griffin Interiano       Patient brief summary:  April Vasquez is a 64 y.o. female initiated on antimicrobial therapy with IV Vancomycin for treatment of suspected Pneumonia     Drug Allergies:   Review of patient's allergies indicates:  No Known Allergies    Actual Body Weight:   52.3kg    Renal Function:   Estimated Creatinine Clearance: 5.9 mL/min (A) (based on SCr of 8.1 mg/dL (H)).,     Dialysis Method (if applicable):  intermittent HD    CBC (last 72 hours):  Recent Labs   Lab Result Units 03/15/20  0849   WBC K/uL 18.99*   Hemoglobin g/dL 12.1   Hematocrit % 37.5   Platelets K/uL 305   Gran% % 84.4*   Lymph% % 8.2*   Mono% % 5.3   Eosinophil% % 0.9   Basophil% % 0.6   Differential Method  Automated       Metabolic Panel (last 72 hours):  Recent Labs   Lab Result Units 03/15/20  0849   Sodium mmol/L 144   Potassium mmol/L 6.1*   Chloride mmol/L 99   CO2 mmol/L 22*   Glucose mg/dL 106   BUN, Bld mg/dL 44*   Creatinine mg/dL 8.1*   Albumin g/dL 3.9   Total Bilirubin mg/dL 0.4   Alkaline Phosphatase U/L 95   AST U/L 29   ALT U/L 13   Magnesium mg/dL 3.0*       Drug levels (last 3 results):  No results for input(s): VANCOMYCINRA, VANCOMYCINPE, VANCOMYCINTR in the last 72 hours.    Microbiologic Results:  Microbiology Results (last 7 days)       Procedure Component Value Units Date/Time    Blood culture [[id number]] Collected:  03/15/20 1043    Order Status:  Sent Specimen:   Blood from Peripheral, Antecubital, Right Updated:  03/15/20 1053    Blood culture [[id number]] Collected:  03/15/20 1047    Order Status:  Sent Specimen:  Blood from Peripheral, Forearm, Right Updated:  03/15/20 1053

## 2020-03-16 LAB
ANION GAP SERPL CALC-SCNC: 14 MMOL/L (ref 8–16)
BASOPHILS # BLD AUTO: 0.08 K/UL (ref 0–0.2)
BASOPHILS NFR BLD: 0.5 % (ref 0–1.9)
BUN SERPL-MCNC: 21 MG/DL (ref 8–23)
CALCIUM SERPL-MCNC: 9.2 MG/DL (ref 8.7–10.5)
CHLORIDE SERPL-SCNC: 94 MMOL/L (ref 95–110)
CO2 SERPL-SCNC: 34 MMOL/L (ref 23–29)
CREAT SERPL-MCNC: 4.6 MG/DL (ref 0.5–1.4)
DIFFERENTIAL METHOD: ABNORMAL
EOSINOPHIL # BLD AUTO: 0.3 K/UL (ref 0–0.5)
EOSINOPHIL NFR BLD: 1.8 % (ref 0–8)
ERYTHROCYTE [DISTWIDTH] IN BLOOD BY AUTOMATED COUNT: 18.6 % (ref 11.5–14.5)
EST. GFR  (AFRICAN AMERICAN): 11 ML/MIN/1.73 M^2
EST. GFR  (NON AFRICAN AMERICAN): 10 ML/MIN/1.73 M^2
GLUCOSE SERPL-MCNC: 73 MG/DL (ref 70–110)
HCT VFR BLD AUTO: 34.7 % (ref 37–48.5)
HGB BLD-MCNC: 11 G/DL (ref 12–16)
IMM GRANULOCYTES # BLD AUTO: 0.06 K/UL (ref 0–0.04)
IMM GRANULOCYTES NFR BLD AUTO: 0.4 % (ref 0–0.5)
LYMPHOCYTES # BLD AUTO: 1.5 K/UL (ref 1–4.8)
LYMPHOCYTES NFR BLD: 9.9 % (ref 18–48)
MCH RBC QN AUTO: 29.2 PG (ref 27–31)
MCHC RBC AUTO-ENTMCNC: 31.7 G/DL (ref 32–36)
MCV RBC AUTO: 92 FL (ref 82–98)
MONOCYTES # BLD AUTO: 0.8 K/UL (ref 0.3–1)
MONOCYTES NFR BLD: 5 % (ref 4–15)
NEUTROPHILS # BLD AUTO: 12.8 K/UL (ref 1.8–7.7)
NEUTROPHILS NFR BLD: 82.4 % (ref 38–73)
NRBC BLD-RTO: 0 /100 WBC
PLATELET # BLD AUTO: 280 K/UL (ref 150–350)
PMV BLD AUTO: 11 FL (ref 9.2–12.9)
POTASSIUM SERPL-SCNC: 4.5 MMOL/L (ref 3.5–5.1)
RBC # BLD AUTO: 3.77 M/UL (ref 4–5.4)
SODIUM SERPL-SCNC: 142 MMOL/L (ref 136–145)
VANCOMYCIN SERPL-MCNC: 13.4 UG/ML
WBC # BLD AUTO: 15.5 K/UL (ref 3.9–12.7)

## 2020-03-16 PROCEDURE — 36415 COLL VENOUS BLD VENIPUNCTURE: CPT | Mod: NTX

## 2020-03-16 PROCEDURE — 80202 ASSAY OF VANCOMYCIN: CPT | Mod: NTX

## 2020-03-16 PROCEDURE — 63600175 PHARM REV CODE 636 W HCPCS: Mod: NTX | Performed by: EMERGENCY MEDICINE

## 2020-03-16 PROCEDURE — 25000003 PHARM REV CODE 250: Mod: NTX | Performed by: EMERGENCY MEDICINE

## 2020-03-16 PROCEDURE — 85025 COMPLETE CBC W/AUTO DIFF WBC: CPT | Mod: NTX

## 2020-03-16 PROCEDURE — 80100016 HC MAINTENANCE HEMODIALYSIS: Mod: NTX

## 2020-03-16 PROCEDURE — 80048 BASIC METABOLIC PNL TOTAL CA: CPT | Mod: NTX

## 2020-03-16 PROCEDURE — 25000003 PHARM REV CODE 250: Mod: NTX | Performed by: INTERNAL MEDICINE

## 2020-03-16 RX ORDER — SODIUM CHLORIDE 9 MG/ML
INJECTION, SOLUTION INTRAVENOUS ONCE
Status: DISCONTINUED | OUTPATIENT
Start: 2020-03-16 | End: 2020-03-16 | Stop reason: HOSPADM

## 2020-03-16 RX ORDER — SODIUM CHLORIDE 9 MG/ML
INJECTION, SOLUTION INTRAVENOUS
Status: DISCONTINUED | OUTPATIENT
Start: 2020-03-16 | End: 2020-03-16 | Stop reason: HOSPADM

## 2020-03-16 RX ADMIN — PIPERACILLIN AND TAZOBACTAM 4.5 G: 4; .5 INJECTION, POWDER, FOR SOLUTION INTRAVENOUS at 01:03

## 2020-03-16 RX ADMIN — SEVELAMER CARBONATE 1600 MG: 800 TABLET, FILM COATED ORAL at 12:03

## 2020-03-16 RX ADMIN — HYDRALAZINE HYDROCHLORIDE 100 MG: 25 TABLET, FILM COATED ORAL at 01:03

## 2020-03-16 RX ADMIN — MUPIROCIN: 20 OINTMENT TOPICAL at 01:03

## 2020-03-16 RX ADMIN — OLMESARTAN MEDOXOMIL 40 MG: 20 TABLET, FILM COATED ORAL at 09:03

## 2020-03-16 RX ADMIN — MUPIROCIN: 20 OINTMENT TOPICAL at 09:03

## 2020-03-16 RX ADMIN — HYDRALAZINE HYDROCHLORIDE 100 MG: 25 TABLET, FILM COATED ORAL at 09:03

## 2020-03-16 RX ADMIN — SEVELAMER CARBONATE 1600 MG: 800 TABLET, FILM COATED ORAL at 09:03

## 2020-03-16 RX ADMIN — CLONIDINE HYDROCHLORIDE 0.3 MG: 0.1 TABLET ORAL at 09:03

## 2020-03-16 NOTE — NURSING
Received report from RAYSHAWN Solorio. Patient lying in bed resting, NAD noted. Safety precautions maintained, will monitor.

## 2020-03-16 NOTE — PROGRESS NOTES
Pharmacokinetic Assessment Follow Up: IV Vancomycin    Vancomycin serum concentration assessment(s):    The random level was drawn correctly and can be used to guide therapy at this time. The measurement is within the desired definitive target range of 10 to 20 mcg/mL.    Vancomycin Regimen Plan:    Give 500 mg today after dialysis if dialysis is ordered.  No dose needed if dialysis not ordered today.  Re-dose when the random level is less than 20 mcg/mL, next level to be drawn at 0600 on 3/17/2020     Drug levels (last 3 results):  Recent Labs   Lab Result Units 03/16/20  0650   Vancomycin, Random ug/mL 13.4       Pharmacy will continue to follow and monitor vancomycin.    Please contact pharmacy at extension [phone] for questions regarding this assessment.    Thank you for the consult,   Lupillo Carias Jr       Patient brief summary:  April Vasquez is a 64 y.o. female initiated on antimicrobial therapy with IV Vancomycin for treatment of lower respiratory infection    Drug Allergies:   Review of patient's allergies indicates:  No Known Allergies    Actual Body Weight:   52.3 kg    Renal Function:   Estimated Creatinine Clearance: 10.5 mL/min (A) (based on SCr of 4.6 mg/dL (H)).,     Dialysis Method (if applicable):  intermittent HD    CBC (last 72 hours):  Recent Labs   Lab Result Units 03/15/20  0849 03/16/20  0650   WBC K/uL 18.99* 15.50*   Hemoglobin g/dL 12.1 11.0*   Hematocrit % 37.5 34.7*   Platelets K/uL 305 280   Gran% % 84.4* 82.4*   Lymph% % 8.2* 9.9*   Mono% % 5.3 5.0   Eosinophil% % 0.9 1.8   Basophil% % 0.6 0.5   Differential Method  Automated Automated       Metabolic Panel (last 72 hours):  Recent Labs   Lab Result Units 03/15/20  0849 03/16/20  0650   Sodium mmol/L 144 142   Potassium mmol/L 6.1* 4.5   Chloride mmol/L 99 94*   CO2 mmol/L 22* 34*   Glucose mg/dL 106 73   BUN, Bld mg/dL 44* 21   Creatinine mg/dL 8.1* 4.6*   Albumin g/dL 3.9  --    Total Bilirubin mg/dL 0.4  --    Alkaline  Phosphatase U/L 95  --    AST U/L 29  --    ALT U/L 13  --    Magnesium mg/dL 3.0*  --        Vancomycin Administrations:  vancomycin given in the last 96 hours                     vancomycin in dextrose 5 % 1 gram/250 mL IVPB 1,000 mg (mg) 1,000 mg New Bag 03/15/20 1110                      Microbiologic Results:  Microbiology Results (last 7 days)       Procedure Component Value Units Date/Time    Blood culture [[id number]] Collected:  03/15/20 1043    Order Status:  Completed Specimen:  Blood from Peripheral, Antecubital, Right Updated:  03/15/20 1912     Blood Culture, Routine No Growth to date    Blood culture [[id number]] Collected:  03/15/20 1047    Order Status:  Completed Specimen:  Blood from Peripheral, Forearm, Right Updated:  03/15/20 1912     Blood Culture, Routine No Growth to date

## 2020-03-16 NOTE — PROGRESS NOTES
Renal Progress Note    Date of Admission:  3/15/2020  7:37 AM    Length of Stay: 1  Days    Subjective: no SOB    Objective:    Current Facility-Administered Medications   Medication    0.9%  NaCl infusion    cloNIDine tablet 0.3 mg    hydrALAZINE tablet 100 mg    mupirocin 2 % ointment    olmesartan tablet 40 mg    ondansetron injection 4 mg    piperacillin-tazobactam 4.5 g in sodium chloride 0.9% 100 mL IVPB (ready to mix system)    promethazine (PHENERGAN) 6.25 mg in dextrose 5 % 50 mL IVPB    sevelamer carbonate tablet 1,600 mg    sodium chloride 0.9% flush 10 mL    vancomycin - pharmacy to dose       Vitals:    03/15/20 2201 03/16/20 0049 03/16/20 0540 03/16/20 0739   BP:  (!) 161/87 (!) 150/69 (!) 142/67   BP Location:       Patient Position:  Lying Lying    Pulse:  104 96 104   Resp:  18 19 18   Temp: 98.1 °F (36.7 °C) 99.4 °F (37.4 °C) 99 °F (37.2 °C) 98.4 °F (36.9 °C)   TempSrc: Oral Oral Oral    SpO2:  97% 96% (!) 92%   Weight:       Height:           I/O last 3 completed shifts:  In: 740 [P.O.:240; Other:500]  Out: 1500 [Other:1500]  I/O this shift:  In: 240 [P.O.:240]  Out: -       Physical Exam:    NAD  Neck: supple  Heart: RRR   Lungs: Unlabored breathing  Abdomen: n/a  : n/a  Extremities:  n/a  Neurologic: Awake, alert, oriented x 3      Laboratories:    Recent Labs   Lab 03/16/20  0650   WBC 15.50*   RBC 3.77*   HGB 11.0*   HCT 34.7*      MCV 92   MCH 29.2   MCHC 31.7*       Recent Labs   Lab 03/16/20  0650   CALCIUM 9.2      K 4.5   CO2 34*   CL 94*   BUN 21   CREATININE 4.6*       No results for input(s): COLORU, CLARITYU, SPECGRAV, PHUR, PROTEINUA, GLUCOSEU, BLOODU, WBCU, RBCU, BACTERIA, MUCUS in the last 24 hours.    Invalid input(s):  BILIRUBINCON    Microbiology Results (last 7 days)     Procedure Component Value Units Date/Time    Blood culture [[id number]] Collected:  03/15/20 1043    Order Status:  Completed Specimen:  Blood from Peripheral,  Antecubital, Right Updated:  03/15/20 1912     Blood Culture, Routine No Growth to date    Blood culture [[id number]] Collected:  03/15/20 1047    Order Status:  Completed Specimen:  Blood from Peripheral, Forearm, Right Updated:  03/15/20 1912     Blood Culture, Routine No Growth to date            Diagnostic Tests: n/a      Assessment:    63 y/o female with ESRD on HD admitted with:     - s/p HTN urgency  - Fluid overload  - s/p HyperK+  - Anxiety  - Leukocytosis et?        Plan:     - Dialysis Q MWF  - Empiric antibiotics  - BC pending  - No need for Epogen at present  - Renal diet  - o.k. To discharge home from Renal standpoint

## 2020-03-16 NOTE — PLAN OF CARE
SW supervisor spoke with pt. To pt. To discuss Help at Home, Preferences, and Maintaining Your Health.  SW supervisor verified that pt. Was AAOx3, independent in function without aides, and she drives to dialysis.  Pt. Stated she resides with her sons: Michele ([phone]) and Shelton ([phone]), and she has two other children who are available to assist with her care.  Pt. stated she attends [facility] Mon/Wed/Fri, and she is compliant with her care there.  Pt. is .  Pt. Has been evaluated on 2/13/20 by [facility], and two pf her children are being considered as transplant donors.  Pt. Prefers her medical appts. In the afternoon hours, and she obtains her home meds from [facility] [address] [place] [address] ([place], [place] - [address]  [address]  [place] ([place] [address]  Phone: [phone] Fax: [phone]       03/16/20 1126   Discharge Assessment   Assessment Type Discharge Planning Assessment   Confirmed/corrected address and phone number on facesheet? Yes   Assessment information obtained from? Patient   Prior to hospitilization cognitive status: Alert/Oriented   Prior to hospitalization functional status: Independent   Current cognitive status: Alert/Oriented   Current Functional Status: Independent   Lives With child(jaylyn), adult   Able to Return to Prior Arrangements yes   Is patient able to care for self after discharge? Yes   Who are your caregiver(s) and their phone number(s)? Son: Shelton ([phone]), Has 4 adult children who provide support.   Patient's perception of discharge disposition home or selfcare   Readmission Within the Last 30 Days no previous admission in last 30 days   Patient currently being followed by outpatient case management? No   Patient currently receives any other outside agency services? No   Equipment Currently Used at Home none   Do you have any problems affording any of your prescribed medications? No   Is the patient taking  medications as prescribed? yes   Does the patient have transportation home? Yes   Transportation Anticipated family or friend will provide   Dialysis Name and Scheduled days Mon/Wed/Fri - [facility]   Does the patient receive services at the [facility]mad[facility] Clinic? No   Discharge Plan A Home with family   Discharge Plan B Home with family   DME Needed Upon Discharge  none   Patient/Family in Agreement with Plan yes

## 2020-03-16 NOTE — PROGRESS NOTES
TN informed telemetry nurse Madelin and charge Danny. That pt is cleared for [facility]..Mariana Bal RN, BSN, [facility]  3/16/2020

## 2020-03-16 NOTE — PROGRESS NOTES
[facility] WRITTEN HEALTHCARE AND DISCHARGE INFORMATION.  Follow-up Information     Nasra Iraheta MD. Schedule an appointment as soon as possible for a visit in 1 week.    Specialty:  Family Medicine  Why:  out patient services: TN called Dr. Iraheta's number, no answer however a message was left requesting an appointment within a week or less as follow from the hospital  Contact information:  [address]  [place] [address]  [phone]             [facility] On 3/18/2020.    Specialty:  Dialysis Center  Why:  out patient services: [facility]  Contact information:  [address] [place] [address]  [place] [address]  [phone]                   Help at Home           [phone]  After discharge for assistance [facility] 24/7 Assistance.   Things You are responsible For To Manage Your Care At Home:  1.    Getting your prescriptions filled   2.    Taking your medications as directed, DO NOT MISS ANY DOSES!  3.    Going to your follow-up doctor appointment. This is important because it  allow the doctor to monitor your progress and determine if  any changes need to made to your treatment plan.   Thank you for choosing [facility] for your care. [facility] happy [facility] have the opportunity to serve you.    Sincerely,  Your [facility],  Mariana Bal RN, BSN, [facility]  [phone]  3/16/2020

## 2020-03-16 NOTE — PLAN OF CARE
Problem: Adult Inpatient Plan of Care  Goal: Plan of Care Review  Outcome: Ongoing, Progressing  Goal: Absence of Hospital-Acquired Illness or Injury  Outcome: Ongoing, Progressing  Goal: Optimal Comfort and Wellbeing  Outcome: Ongoing, Progressing     Problem: Fall Injury Risk  Goal: Absence of Fall and Fall-Related Injury  Outcome: Ongoing, Progressing   Plan of care reviewed with pt. Pt voiced understanding. Pt AAO X 4. Pt slept most of the shift since returning from dialysis. Pt states she hasn't really slept much due to her being SOB. Pt states she is feeling much better and able to breathe. Pt's O2 96% on RA this am. Pt denies any c/o during the shift. No apparent distress noted. Fall precautions maintained. Pt remains free of fall or injury. Bed in lowest position, locked, call light within reach, and bed alarm on. Side rails up x's 2 with slip resistant socks on.

## 2020-03-17 VITALS
SYSTOLIC BLOOD PRESSURE: 131 MMHG | DIASTOLIC BLOOD PRESSURE: 76 MMHG | OXYGEN SATURATION: 96 % | HEART RATE: 87 BPM | HEIGHT: 64 IN | TEMPERATURE: 99 F | WEIGHT: 115.31 LBS | BODY MASS INDEX: 19.68 KG/M2 | RESPIRATION RATE: 18 BRPM

## 2020-03-18 NOTE — DISCHARGE SUMMARY
[facility] Hospital Medicine  Discharge Summary      Patient Name: April Vasquez  MRN: [id number]  Admission Date: 3/15/2020  Hospital Length of Stay: 1 days  Discharge Date and Time: 3/16/2020  6:34 PM  Attending Physician: Betsy Liz MD  Discharging Provider: Betsy Liz MD  Primary Care Provider: Nasra Iraheta MD      HPI:   64 y.o. female with ESRD on HD (MWF), diastolic dysfunction, tobacco abuse, HTN, and MVR presents with a complaint of SOB.  States feels like a panic attack, she describes a feeling of air hunger which induces anxiety.  Reports adherence to HD and watching her fluid intake.  Associated with cough, nausea, and difficulty sleeping.  Duration roughly 3 days.  Denies fever, chills, chest pain, dizziness, syncope, recent travel or sick contacts, emesis, diarrhea, abdominal pain, bloody or black stools.  Last HD was Friday.  In the ED, BNP 3332 with evidence of pulmonary edema and small bilat pleural effusions on chest xray.  Troponin mildly elevated at 0.105.  Hyperkalemia noted, K+ 6.1.  Leukocytosis and mildly elevated CRP.  She was markedly hypertensive.  Received antihypertensive medications and anxiolytic.  Blood cultures obtained and started on IV abx.  Admitted to Hospital Medicine for further evaluation and treatment.  Nephrology consulted and plan for urgent hemodialysis.    * No surgery found *      Hospital Course:   Ms. Vasquez presented with shortness of breath and anxiety.  She was admitted for acute pulmonary edema with hypertensive urgency along with hyperkalemia.  Chest x-ray was consistent with a volume overload state.  Nephrology consulted and patient underwent emergent dialysis with immediate improvement in her symptoms.  By the next day, she had dialysis again and had full resolution of her shortness of breath and was not hypoxic consistent with pulmonary edema.  This is also consistent with her previous multiple admissions in the past with similar  presentation, but this time with more of an anxiety component given current COVID 19 pandemic. Patient was calmed and reassured.  Patient improved much faster than anticipated, with full resolution of shortness of breath after 2nd session of dialysis and her blood pressure was controlled. She was stable for discharge with follow up arranged with PCP and continued outpatient HD.     Consults: Nephrology    Service: Hospital Medicine    Final Active Diagnoses:    Diagnosis Date Noted POA    PRINCIPAL PROBLEM:  Acute pulmonary edema [J81.0] 02/05/2017 Yes    Acute on chronic combined systolic and diastolic heart failure [I50.43] 03/15/2020 Yes    Nonrheumatic mitral valve regurgitation [I34.0] 03/09/2020 Yes    Hyperkalemia [E87.5] 01/28/2020 Yes    Leukocytosis [D72.829] 01/27/2020 Yes    ESRD (end stage renal disease) on dialysis [N18.6, Z99.2] 08/05/2019 Not Applicable    Hypertension [I10] 06/07/2017 Yes    Tobacco abuse [Z72.0] 09/19/2016 Yes      Problems Resolved During this Admission:       Discharged Condition: good    Disposition: Home or Self Care    Follow Up:  Follow-up Information     Nasra Iraheta MD. Schedule an appointment as soon as possible for a visit in 1 week.    Specialty:  Family Medicine  Why:  out patient services: [place] called Dr. Iraheta's number, no answer however a message was left requesting an appointment within a week or less as follow from the hospital  Contact information:   [address]  [place] [address]  [phone]             [facility] Mary Castillo On 3/18/2020.    Specialty:  Dialysis Center  Why:  out patient services: [facility]  Contact information:  [address] [place] [address]  Jonathan DE SANTIAGO [id number] [phone]                 Patient Instructions:      Diet renal     Diet diabetic     Activity as tolerated       Significant Diagnostic Studies:     Pending Diagnostic Studies:     None         Medications:  Reconciled Home Medications:      Medication List      CONTINUE taking these medications     cloNIDine 0.3 MG tablet  Commonly known as:  CATAPRES  Take 1 tablet (0.3 mg total) by mouth once daily.     hydrALAZINE 100 MG tablet  Commonly known as:  APRESOLINE  Take 1 tablet (100 mg total) by mouth 3 (three) times daily.     olmesartan 40 MG tablet  Commonly known as:  BENICAR  Take 1 tablet (40 mg total) by mouth once daily.     sevelamer carbonate 800 mg Tab  Commonly known as:  RENVELA  Take 1,600 mg by mouth 3 (three) times daily with meals.     vitamin D 1000 units Tab  Commonly known as:  VITAMIN D3  Take 1,000 Units by mouth once daily.            Indwelling Lines/Drains at time of discharge:   Lines/Drains/Airways     Drain                 Hemodialysis AV Fistula 08/04/19 2249 Left upper arm 226 days                Time spent on the discharge of patient: 35 minutes  Patient was seen and examined on the date of discharge and determined to be suitable for discharge.         Betsy Liz MD  Department of Hospital Medicine  [facility]

## 2020-03-18 NOTE — HOSPITAL COURSE
Ms. Vasquez presented with shortness of breath and anxiety.  She was admitted for acute pulmonary edema with hypertensive urgency along with hyperkalemia.  Chest x-ray was consistent with a volume overload state.  Nephrology consulted and patient underwent emergent dialysis with immediate improvement in her symptoms.  By the next day, she had dialysis again and had full resolution of her shortness of breath and was not hypoxic consistent with pulmonary edema.  This is also consistent with her previous multiple admissions in the past with similar presentation, but this time with more of an anxiety component given current COVID 19 pandemic. Patient was calmed and reassured.  Patient improved much faster than anticipated, with full resolution of shortness of breath after 2nd session of dialysis and her blood pressure was controlled. She was stable for discharge with follow up arranged with PCP and continued outpatient HD.

## 2020-03-20 LAB
BACTERIA BLD CULT: NORMAL
BACTERIA BLD CULT: NORMAL

## 2020-04-01 ENCOUNTER — TELEPHONE (OUTPATIENT)
Dept: RADIOLOGY | Facility: HOSPITAL | Age: 65
End: 2020-04-01

## 2020-04-05 ENCOUNTER — HOSPITAL ENCOUNTER (INPATIENT)
Facility: HOSPITAL | Age: 65
LOS: 1 days | Discharge: HOME | DRG: 640 | End: 2020-04-06
Attending: EMERGENCY MEDICINE | Admitting: FAMILY MEDICINE
Payer: MEDICARE

## 2020-04-05 DIAGNOSIS — Z99.2 ESRD (END STAGE RENAL DISEASE) ON DIALYSIS: ICD-10-CM

## 2020-04-05 DIAGNOSIS — R79.89 ELEVATED TROPONIN: ICD-10-CM

## 2020-04-05 DIAGNOSIS — E87.5 HYPERKALEMIA: ICD-10-CM

## 2020-04-05 DIAGNOSIS — Z20.822 SUSPECTED COVID-19 VIRUS INFECTION: ICD-10-CM

## 2020-04-05 DIAGNOSIS — R06.02 SOB (SHORTNESS OF BREATH): ICD-10-CM

## 2020-04-05 DIAGNOSIS — R07.9 CHEST PAIN: ICD-10-CM

## 2020-04-05 DIAGNOSIS — N18.6 ESRD (END STAGE RENAL DISEASE) ON DIALYSIS: ICD-10-CM

## 2020-04-05 DIAGNOSIS — I16.1 HYPERTENSIVE EMERGENCY: Primary | ICD-10-CM

## 2020-04-05 DIAGNOSIS — I50.9 CONGESTIVE HEART FAILURE, UNSPECIFIED HF CHRONICITY, UNSPECIFIED HEART FAILURE TYPE: ICD-10-CM

## 2020-04-05 LAB
ALBUMIN SERPL BCP-MCNC: 3.9 G/DL (ref 3.5–5.2)
ALLENS TEST: ABNORMAL
ALP SERPL-CCNC: 105 U/L (ref 55–135)
ALT SERPL W/O P-5'-P-CCNC: 20 U/L (ref 10–44)
ANION GAP SERPL CALC-SCNC: 19 MMOL/L (ref 8–16)
AST SERPL-CCNC: 20 U/L (ref 10–40)
BASOPHILS # BLD AUTO: 0.08 K/UL (ref 0–0.2)
BASOPHILS NFR BLD: 0.5 % (ref 0–1.9)
BILIRUB SERPL-MCNC: 0.4 MG/DL (ref 0.1–1)
BNP SERPL-MCNC: 1582 PG/ML (ref 0–99)
BUN SERPL-MCNC: 56 MG/DL (ref 8–23)
CALCIUM SERPL-MCNC: 9.6 MG/DL (ref 8.7–10.5)
CHLORIDE SERPL-SCNC: 94 MMOL/L (ref 95–110)
CK SERPL-CCNC: 37 U/L (ref 20–180)
CO2 SERPL-SCNC: 26 MMOL/L (ref 23–29)
CREAT SERPL-MCNC: 8.9 MG/DL (ref 0.5–1.4)
CRP SERPL-MCNC: 5.9 MG/L (ref 0–8.2)
DELSYS: ABNORMAL
DIFFERENTIAL METHOD: ABNORMAL
EOSINOPHIL # BLD AUTO: 0.4 K/UL (ref 0–0.5)
EOSINOPHIL NFR BLD: 2.4 % (ref 0–8)
ERYTHROCYTE [DISTWIDTH] IN BLOOD BY AUTOMATED COUNT: 16.1 % (ref 11.5–14.5)
EST. GFR  (AFRICAN AMERICAN): 5 ML/MIN/1.73 M^2
EST. GFR  (NON AFRICAN AMERICAN): 4 ML/MIN/1.73 M^2
FERRITIN SERPL-MCNC: 1468 NG/ML (ref 20–300)
FLOW: 2
GLUCOSE SERPL-MCNC: 112 MG/DL (ref 70–110)
GLUCOSE SERPL-MCNC: 207 MG/DL (ref 70–110)
HCO3 UR-SCNC: 29.4 MMOL/L (ref 24–28)
HCT VFR BLD AUTO: 33.1 % (ref 37–48.5)
HGB BLD-MCNC: 10.7 G/DL (ref 12–16)
IMM GRANULOCYTES # BLD AUTO: 0.06 K/UL (ref 0–0.04)
IMM GRANULOCYTES NFR BLD AUTO: 0.4 % (ref 0–0.5)
LACTATE SERPL-SCNC: 0.9 MMOL/L (ref 0.5–2.2)
LDH SERPL L TO P-CCNC: 225 U/L (ref 110–260)
LYMPHOCYTES # BLD AUTO: 2 K/UL (ref 1–4.8)
LYMPHOCYTES NFR BLD: 11.9 % (ref 18–48)
MCH RBC QN AUTO: 29.1 PG (ref 27–31)
MCHC RBC AUTO-ENTMCNC: 32.3 G/DL (ref 32–36)
MCV RBC AUTO: 90 FL (ref 82–98)
MODE: ABNORMAL
MONOCYTES # BLD AUTO: 0.9 K/UL (ref 0.3–1)
MONOCYTES NFR BLD: 5.3 % (ref 4–15)
NEUTROPHILS # BLD AUTO: 13.4 K/UL (ref 1.8–7.7)
NEUTROPHILS NFR BLD: 79.5 % (ref 38–73)
NRBC BLD-RTO: 0 /100 WBC
PCO2 BLDA: 40.4 MMHG (ref 35–45)
PH SMN: 7.47 [PH] (ref 7.35–7.45)
PLATELET # BLD AUTO: 311 K/UL (ref 150–350)
PMV BLD AUTO: 11.9 FL (ref 9.2–12.9)
PO2 BLDA: 91 MMHG (ref 80–100)
POC BE: 5 MMOL/L
POC SATURATED O2: 98 % (ref 95–100)
POC TCO2: 31 MMOL/L (ref 23–27)
POCT GLUCOSE: 124 MG/DL (ref 70–110)
POCT GLUCOSE: 207 MG/DL (ref 70–110)
POTASSIUM SERPL-SCNC: 5.7 MMOL/L (ref 3.5–5.1)
PROCALCITONIN SERPL IA-MCNC: 0.21 NG/ML
PROT SERPL-MCNC: 8 G/DL (ref 6–8.4)
RBC # BLD AUTO: 3.68 M/UL (ref 4–5.4)
SAMPLE: ABNORMAL
SARS-COV-2 RNA AMPLIFICATION, QUAL: NEGATIVE
SITE: ABNORMAL
SODIUM SERPL-SCNC: 139 MMOL/L (ref 136–145)
SP02: 98
TROPONIN I SERPL DL<=0.01 NG/ML-MCNC: 0.04 NG/ML (ref 0–0.03)
WBC # BLD AUTO: 16.85 K/UL (ref 3.9–12.7)

## 2020-04-05 PROCEDURE — 82728 ASSAY OF FERRITIN: CPT | Mod: NTX

## 2020-04-05 PROCEDURE — 84484 ASSAY OF TROPONIN QUANT: CPT | Mod: NTX

## 2020-04-05 PROCEDURE — 83880 ASSAY OF NATRIURETIC PEPTIDE: CPT | Mod: NTX

## 2020-04-05 PROCEDURE — 80053 COMPREHEN METABOLIC PANEL: CPT | Mod: NTX

## 2020-04-05 PROCEDURE — 83615 LACTATE (LD) (LDH) ENZYME: CPT | Mod: NTX

## 2020-04-05 PROCEDURE — 86140 C-REACTIVE PROTEIN: CPT | Mod: NTX

## 2020-04-05 PROCEDURE — 85025 COMPLETE CBC W/AUTO DIFF WBC: CPT | Mod: NTX

## 2020-04-05 PROCEDURE — 93010 ELECTROCARDIOGRAM REPORT: CPT | Mod: NTX,,, | Performed by: INTERNAL MEDICINE

## 2020-04-05 PROCEDURE — U0002 COVID-19 LAB TEST NON-CDC: HCPCS | Mod: NTX

## 2020-04-05 PROCEDURE — 84145 PROCALCITONIN (PCT): CPT | Mod: NTX

## 2020-04-05 PROCEDURE — 82550 ASSAY OF CK (CPK): CPT | Mod: NTX

## 2020-04-05 PROCEDURE — 93005 ELECTROCARDIOGRAM TRACING: CPT | Mod: NTX

## 2020-04-05 PROCEDURE — 82803 BLOOD GASES ANY COMBINATION: CPT | Mod: NTX

## 2020-04-05 PROCEDURE — 83605 ASSAY OF LACTIC ACID: CPT | Mod: NTX

## 2020-04-05 PROCEDURE — 99291 CRITICAL CARE FIRST HOUR: CPT | Mod: 25,NTX

## 2020-04-05 PROCEDURE — 63600175 PHARM REV CODE 636 W HCPCS: Mod: NTX | Performed by: EMERGENCY MEDICINE

## 2020-04-05 PROCEDURE — 25000003 PHARM REV CODE 250: Mod: NTX | Performed by: EMERGENCY MEDICINE

## 2020-04-05 PROCEDURE — 36600 WITHDRAWAL OF ARTERIAL BLOOD: CPT | Mod: NTX

## 2020-04-05 PROCEDURE — 96374 THER/PROPH/DIAG INJ IV PUSH: CPT | Mod: NTX

## 2020-04-05 PROCEDURE — 12000002 HC ACUTE/MED SURGE SEMI-PRIVATE ROOM: Mod: NTX

## 2020-04-05 PROCEDURE — 93010 EKG 12-LEAD: ICD-10-PCS | Mod: NTX,,, | Performed by: INTERNAL MEDICINE

## 2020-04-05 PROCEDURE — 99900035 HC TECH TIME PER 15 MIN (STAT): Mod: NTX

## 2020-04-05 RX ORDER — ASPIRIN 325 MG
325 TABLET ORAL
Status: COMPLETED | OUTPATIENT
Start: 2020-04-05 | End: 2020-04-05

## 2020-04-05 RX ORDER — CLONIDINE HYDROCHLORIDE 0.1 MG/1
0.3 TABLET ORAL
Status: COMPLETED | OUTPATIENT
Start: 2020-04-05 | End: 2020-04-05

## 2020-04-05 RX ORDER — DEXTROSE 50 % IN WATER (D50W) INTRAVENOUS SYRINGE
25
Status: COMPLETED | OUTPATIENT
Start: 2020-04-05 | End: 2020-04-05

## 2020-04-05 RX ORDER — NITROGLYCERIN 20 MG/100ML
10 INJECTION INTRAVENOUS CONTINUOUS
Status: DISCONTINUED | OUTPATIENT
Start: 2020-04-05 | End: 2020-04-05 | Stop reason: DRUGHIGH

## 2020-04-05 RX ORDER — SEVELAMER CARBONATE 800 MG/1
1600 TABLET, FILM COATED ORAL
Status: DISCONTINUED | OUTPATIENT
Start: 2020-04-06 | End: 2020-04-06 | Stop reason: HOSPADM

## 2020-04-05 RX ORDER — HYDRALAZINE HYDROCHLORIDE 25 MG/1
100 TABLET, FILM COATED ORAL
Status: COMPLETED | OUTPATIENT
Start: 2020-04-05 | End: 2020-04-05

## 2020-04-05 RX ORDER — NITROGLYCERIN 20 MG/100ML
10 INJECTION INTRAVENOUS CONTINUOUS
Status: DISCONTINUED | OUTPATIENT
Start: 2020-04-05 | End: 2020-04-06

## 2020-04-05 RX ORDER — CLONIDINE HYDROCHLORIDE 0.1 MG/1
0.3 TABLET ORAL DAILY
Status: DISCONTINUED | OUTPATIENT
Start: 2020-04-06 | End: 2020-04-06 | Stop reason: HOSPADM

## 2020-04-05 RX ORDER — FUROSEMIDE 10 MG/ML
80 INJECTION INTRAMUSCULAR; INTRAVENOUS
Status: COMPLETED | OUTPATIENT
Start: 2020-04-05 | End: 2020-04-05

## 2020-04-05 RX ORDER — OLMESARTAN MEDOXOMIL 20 MG/1
40 TABLET ORAL
Status: COMPLETED | OUTPATIENT
Start: 2020-04-05 | End: 2020-04-05

## 2020-04-05 RX ORDER — LABETALOL HYDROCHLORIDE 5 MG/ML
10 INJECTION, SOLUTION INTRAVENOUS
Status: COMPLETED | OUTPATIENT
Start: 2020-04-05 | End: 2020-04-05

## 2020-04-05 RX ORDER — SODIUM CHLORIDE 0.9 % (FLUSH) 0.9 %
10 SYRINGE (ML) INJECTION
Status: DISCONTINUED | OUTPATIENT
Start: 2020-04-05 | End: 2020-04-06 | Stop reason: HOSPADM

## 2020-04-05 RX ORDER — OLMESARTAN MEDOXOMIL 20 MG/1
40 TABLET ORAL DAILY
Status: DISCONTINUED | OUTPATIENT
Start: 2020-04-06 | End: 2020-04-06

## 2020-04-05 RX ORDER — HYDRALAZINE HYDROCHLORIDE 25 MG/1
100 TABLET, FILM COATED ORAL 3 TIMES DAILY
Status: DISCONTINUED | OUTPATIENT
Start: 2020-04-06 | End: 2020-04-06 | Stop reason: HOSPADM

## 2020-04-05 RX ORDER — LABETALOL HYDROCHLORIDE 5 MG/ML
10 INJECTION, SOLUTION INTRAVENOUS
Status: DISCONTINUED | OUTPATIENT
Start: 2020-04-05 | End: 2020-04-05

## 2020-04-05 RX ADMIN — CALCIUM GLUCONATE 1 G: 98 INJECTION, SOLUTION INTRAVENOUS at 09:04

## 2020-04-05 RX ADMIN — FUROSEMIDE 80 MG: 10 INJECTION, SOLUTION INTRAMUSCULAR; INTRAVENOUS at 08:04

## 2020-04-05 RX ADMIN — NITROGLYCERIN 1 INCH: 20 OINTMENT TOPICAL at 08:04

## 2020-04-05 RX ADMIN — LABETALOL HYDROCHLORIDE 10 MG: 5 INJECTION INTRAVENOUS at 09:04

## 2020-04-05 RX ADMIN — ASPIRIN 325 MG ORAL TABLET 325 MG: 325 PILL ORAL at 07:04

## 2020-04-05 RX ADMIN — OLMESARTAN MEDOXOMIL 40 MG: 20 TABLET, FILM COATED ORAL at 08:04

## 2020-04-05 RX ADMIN — CLONIDINE HYDROCHLORIDE 0.3 MG: 0.1 TABLET ORAL at 07:04

## 2020-04-05 RX ADMIN — HYDRALAZINE HYDROCHLORIDE 100 MG: 25 TABLET ORAL at 07:04

## 2020-04-05 RX ADMIN — Medication 25 G: at 09:04

## 2020-04-05 RX ADMIN — INSULIN HUMAN 5 UNITS: 100 INJECTION, SOLUTION PARENTERAL at 09:04

## 2020-04-05 NOTE — ED PROVIDER NOTES
"Encounter Date: 4/5/2020    SCRIBE #1 NOTE: I, Kee Mensah, am scribing for, and in the presence of, Mala Rosario MD.       History     Chief Complaint   Patient presents with    Shortness of Breath     x 1 day. pt states "I know I need to be dialyzed, I am over loaded" Pt schedule MWF, Fistula to [facility].      .  Time seen by provider: 6:59 PM on 04/05/2020    April Vasquez is a 64 y.o. female who presents to the ED with an onset of shortness of breath and feeling of fluid overload since this morning. She has a history of end-stage renal disease and goes to dialysis at [facility] on Monday, Wednesday, and Friday with Dr. Iraheta. The patient received a full dialysis treatment two days ago. She reports an episode extreme dyspnea on exertion and orthopnea occurring 19 hours ago. The patient also reports chest heaviness and cough. She endorses only taking her Hydralazine this morning, although she typically also takes clomnidine and olemsartam. The patient denies any fever, sore throat, nausea, vomiting, or diarrhea.Past medical history also includes hypertension. Denies alcohol, tobacco, or other drug use. Denies recent sick contact. No known drug allergies. She states "I feel like when I get fluid overloaded"    The history is provided by the patient.     Review of patient's allergies indicates:  No Known Allergies  Past Medical History:   Diagnosis Date    Anemia     Diabetes mellitus, type 2     ESRD (end stage renal disease) on dialysis 2017    Gallstones     Hypertension     Pulmonary edema     Renal disorder     dialysis [facility] fistula    Tobacco abuse      Past Surgical History:   Procedure Laterality Date    AVF  2017    CHOLECYSTECTOMY  2016    Permacath Right 2017    TUBAL LIGATION       Family History   Problem Relation Age of Onset    Kidney disease Mother     Hypertension Mother     Cervical cancer Mother     Cancer Mother     Ovarian cancer Mother     Diabetes Father     Drug abuse " Paternal Grandmother     Diabetes Sister     Breast cancer Neg Hx     Colon cancer Neg Hx      Social History     Tobacco Use    Smoking status: Current Every Day Smoker     Packs/day: 0.50     Years: 20.00     Pack years: 10.00     Types: Cigarettes    Smokeless tobacco: Never Used   Substance Use Topics    Alcohol use: No    Drug use: No     Review of Systems   Constitutional: Negative for chills, diaphoresis and fever.   Eyes: Negative for photophobia and visual disturbance.   Respiratory: Positive for cough, chest tightness and shortness of breath.    Cardiovascular: Positive for leg swelling. Negative for chest pain.   Gastrointestinal: Negative for abdominal pain, blood in stool, constipation, diarrhea, nausea and vomiting.   Genitourinary: Negative for dysuria, flank pain, frequency, hematuria and urgency.   Musculoskeletal: Negative for neck pain and neck stiffness.   Skin: Negative for rash and wound.   Neurological: Negative for weakness, light-headedness, numbness and headaches.   Psychiatric/Behavioral: Negative for confusion and suicidal ideas.   All other systems reviewed and are negative.      Physical Exam     Initial Vitals [04/05/20 1806]   BP Pulse Resp Temp SpO2   (S) (!) 230/100 89 (!) 36 98.3 °F (36.8 °C) (!) 91 %      MAP       --         Physical Exam    Nursing note and vitals reviewed.  Constitutional: She appears well-developed and well-nourished. She is not diaphoretic. No distress.   HENT:   Head: Normocephalic and atraumatic.   Mouth/Throat: Uvula is midline, oropharynx is clear and moist and mucous membranes are normal. No oral lesions. No dental abscesses. No oropharyngeal exudate, posterior oropharyngeal edema or posterior oropharyngeal erythema.   Eyes: Conjunctivae and EOM are normal. Pupils are equal, round, and reactive to light. Right eye exhibits no discharge. Left eye exhibits no discharge.   Neck: Normal range of motion. Neck supple. JVD present.   Mild JVD.    Cardiovascular: Normal rate, regular rhythm, normal heart sounds and intact distal pulses. Exam reveals no gallop and no friction rub.    No murmur heard.  Regular rate and rhythm. Heart sounds are normal with no rubs, gallops, or murmurs. Fistula to the left upper extremity with good thrill.   Pulmonary/Chest: Breath sounds normal. No respiratory distress. She has no wheezes. She has no rhonchi. She has no rales.   Tachypneic and using accessory muscles. Oxygen saturation at 94-95% on 2 LPM via nasal canula (91% on RA). Mild rales present to auscultation of the left lower lobe.   Abdominal: Soft. Bowel sounds are normal. She exhibits no distension. There is no tenderness. There is no rebound and no guarding.   Musculoskeletal: Normal range of motion. She exhibits no edema or tenderness.   Legs symmetric and non-tender with no pitting edema.   Lymphadenopathy:     She has no cervical adenopathy.   Neurological: She is alert and oriented to person, place, and time. She has normal strength. No cranial nerve deficit or sensory deficit. GCS score is 15. GCS eye subscore is 4. GCS verbal subscore is 5. GCS motor subscore is 6.   Skin: Skin is warm and dry. Capillary refill takes less than 2 seconds.   Psychiatric: She has a normal mood and affect. Her behavior is normal. Judgment and thought content normal.         ED Course   Critical Care  Date/Time: 4/5/2020 9:03 PM  Performed by: Mala Rosario MD  Authorized by: Mala Rosario MD   Direct patient critical care time: 7 minutes  Additional history critical care time: 9 minutes  Ordering / reviewing critical care time: 6 minutes  Documentation critical care time: 7 minutes  Consulting other physicians critical care time: 5 minutes  Other critical care time: 6 minutes  Total critical care time (exclusive of procedural time) : 40 minutes  Critical care time was exclusive of separately billable procedures and treating other patients and teaching time.  Critical care  was necessary to treat or prevent imminent or life-threatening deterioration of the following conditions: cardiac failure.  Critical care was time spent personally by me on the following activities: blood draw for specimens, development of treatment plan with patient or surrogate, discussions with consultants, interpretation of cardiac output measurements, evaluation of patient's response to treatment, examination of patient, obtaining history from patient or surrogate, ordering and performing treatments and interventions, ordering and review of laboratory studies, ordering and review of radiographic studies, pulse oximetry, re-evaluation of patient's condition and review of old charts.  Subsequent provider of critical care: I assumed direction of critical care for this patient from another provider of my specialty.        Labs Reviewed   CBC W/ AUTO DIFFERENTIAL - Abnormal; Notable for the following components:       Result Value    WBC 16.85 (*)     RBC 3.68 (*)     Hemoglobin 10.7 (*)     Hematocrit 33.1 (*)     RDW 16.1 (*)     Gran # (ANC) 13.4 (*)     Immature Grans (Abs) 0.06 (*)     Gran% 79.5 (*)     Lymph% 11.9 (*)     All other components within normal limits   COMPREHENSIVE METABOLIC PANEL - Abnormal; Notable for the following components:    Potassium 5.7 (*)     Chloride 94 (*)     Glucose 112 (*)     BUN, Bld 56 (*)     Creatinine 8.9 (*)     Anion Gap 19 (*)     eGFR if  5 (*)     eGFR if non  4 (*)     All other components within normal limits   FERRITIN - Abnormal; Notable for the following components:    Ferritin 1,468 (*)     All other components within normal limits   TROPONIN I - Abnormal; Notable for the following components:    Troponin I 0.036 (*)     All other components within normal limits   B-TYPE NATRIURETIC PEPTIDE - Abnormal; Notable for the following components:    BNP 1,582 (*)     All other components within normal limits   POCT GLUCOSE - Abnormal;  Notable for the following components:    POCT Glucose 124 (*)     All other components within normal limits   ISTAT PROCEDURE - Abnormal; Notable for the following components:    POC PH 7.470 (*)     POC HCO3 29.4 (*)     POC TCO2 31 (*)     All other components within normal limits   C-REACTIVE PROTEIN   LACTATE DEHYDROGENASE   CK   LACTIC ACID, PLASMA   SARS-COV-2 RNA AMPLIFICATION, QUAL   PROCALCITONIN     EKG Readings: (Independently Interpreted)   Normal sinus rhythm at 89 with no ST elevation or depression.  No peaked T-waves.  T-wave inversions in aVL and V1.  Normal access.  Normal intervals.  Q-waves anteriorly similar to previous.       Imaging Results          X-Ray Chest AP Portable (Final result)  Result time 04/05/20 19:41:50    Final result by Lester Chavez MD (04/05/20 19:41:50)                 Impression:      Decreased diffuse interstitial opacities.      Electronically signed by: Lester Chavez MD  Date:    04/05/2020  Time:    19:41             Narrative:    EXAMINATION:  XR CHEST AP PORTABLE    CLINICAL HISTORY:  Suspected Covid-19 Virus Infection;    TECHNIQUE:  Single frontal view of the chest was performed.    COMPARISON:  03/15/2020.    FINDINGS:  Monitoring EKG leads are present.  The trachea is unremarkable.  The cardiomediastinal silhouette is enlarged.  There is stable prominence of the hilum.  The hemidiaphragms are within normal limits.  There is no evidence of free air beneath the hemidiaphragms.  There are no pleural effusions.  There is no evidence of a pneumothorax.  There is no evidence of pneumomediastinum.  There is decrease in the diffuse interstitial opacities.  There are degenerative changes in the osseous structures.                                 Medical Decision Making:   History:   Old Medical Records: I decided to obtain old medical records.  Initial Assessment:   64 year-old female with a history of HTN and ESRD presents with increasing shortness of breath and  orthopnea.  Independently Interpreted Test(s):   I have ordered and independently interpreted EKG Reading(s) - see prior notes  Clinical Tests:   Lab Tests: Ordered and Reviewed  Radiological Study: Ordered and Reviewed  Medical Tests: Ordered and Reviewed     MDM  Pt with PMHx of HTN, ESRD presenting 2/2 rales in fluid overload and shortness of breath consistent with CHF exacerbation/ hypertensive emergency.  Patient is given IV Lasix, although reports only making urine once per day. Nitroglycerin given and ordered nitroglycerin gtt, home meds. After home meds blood pressure still 200/90, discussed with Dr. Diego with nephrology and do not think patient needs emergent dialysis, recommends instead aggressive blood pressure control. Discussed potassium of 5.7 as well. Will ordered calcium, insulin, D50, lasix for elevated potassium and recheck BMP in 4-6 hours.  If worsening respiratory status will consider BiPAP and/or intubation.    Also considered but less likely:  Acs: ekg doesn't show stemi. Troponin ordered and mildly elevated but improved from previous  Pna: symptoms bilaterally and no fevers. COVID negative.  Bronchitis: considered but hpi most c/w CHF  COPD:  Considered but less likely  Pneumothorax: bilateral breath sounds    Discussed with Dr. Ramesh who agrees to admit the patient to ICU for hyperkalemia monitoring and HTN emergency management.     Patient blood pressure improved to 150/90, nitro gtt held, however then increased to 200/90 again, labetalol 10 mg given with improvement of blood pressure to 150/95. Discussed with Dr. Ramesh and will keep in ICU at this time for labile blood pressures as patient may still need nitro gtt.    Patient in agreement with plan, breathing more comfortable after blood pressure control.            Scribe Attestation:   Scribe #1: I performed the above scribed service and the documentation accurately describes the services I performed. I attest to the accuracy of the  note.    Mala Rosario                      Clinical Impression:       ICD-10-CM ICD-9-CM   1. Hypertensive emergency I16.1 401.9   2. Suspected Covid-19 Virus Infection R68.89    3. Congestive heart failure, unspecified HF chronicity, unspecified heart failure type I50.9 428.0   4. SOB (shortness of breath) R06.02 786.05   5. Elevated troponin R79.89 790.6   6. Hyperkalemia E87.5 276.7         Disposition:   Disposition: Admitted     ED Disposition Condition    Admit            I, Mala Rosario, personally performed the services described in this documentation. All medical record entries made by the scribe were at my direction and in my presence. I have reviewed the chart and agree that the record reflects my personal performance and is accurate and complete.               Mala Rosario MD  04/05/20 5934

## 2020-04-06 VITALS
TEMPERATURE: 98 F | OXYGEN SATURATION: 94 % | DIASTOLIC BLOOD PRESSURE: 69 MMHG | SYSTOLIC BLOOD PRESSURE: 120 MMHG | HEART RATE: 60 BPM | RESPIRATION RATE: 18 BRPM | WEIGHT: 114.63 LBS | HEIGHT: 64 IN | BODY MASS INDEX: 19.57 KG/M2

## 2020-04-06 PROBLEM — I50.32 CHRONIC DIASTOLIC HEART FAILURE: Status: ACTIVE | Noted: 2020-04-06

## 2020-04-06 PROBLEM — R06.00 DYSPNEA: Status: RESOLVED | Noted: 2020-01-27 | Resolved: 2020-04-06

## 2020-04-06 PROBLEM — E11.9 DM2 (DIABETES MELLITUS, TYPE 2): Status: ACTIVE | Noted: 2020-04-06

## 2020-04-06 PROBLEM — E87.70 VOLUME OVERLOAD: Status: RESOLVED | Noted: 2020-01-27 | Resolved: 2020-04-06

## 2020-04-06 PROBLEM — I10 MALIGNANT HYPERTENSION: Status: RESOLVED | Noted: 2017-02-04 | Resolved: 2020-04-06

## 2020-04-06 PROBLEM — I50.43 ACUTE ON CHRONIC COMBINED SYSTOLIC AND DIASTOLIC HEART FAILURE: Status: RESOLVED | Noted: 2020-03-15 | Resolved: 2020-04-06

## 2020-04-06 PROBLEM — Z01.810 PRE-OPERATIVE CARDIOVASCULAR EXAMINATION: Status: RESOLVED | Noted: 2017-11-14 | Resolved: 2020-04-06

## 2020-04-06 PROBLEM — I34.0 NONRHEUMATIC MITRAL VALVE REGURGITATION: Status: RESOLVED | Noted: 2020-03-09 | Resolved: 2020-04-06

## 2020-04-06 PROBLEM — I10 HYPERTENSION: Status: RESOLVED | Noted: 2017-06-07 | Resolved: 2020-04-06

## 2020-04-06 PROBLEM — I16.0 HYPERTENSIVE URGENCY: Status: RESOLVED | Noted: 2020-01-27 | Resolved: 2020-04-06

## 2020-04-06 PROBLEM — Z86.39 HISTORY OF TYPE 2 DIABETES MELLITUS: Chronic | Status: RESOLVED | Noted: 2020-02-13 | Resolved: 2020-04-06

## 2020-04-06 PROBLEM — R79.89 ELEVATED BRAIN NATRIURETIC PEPTIDE (BNP) LEVEL: Status: RESOLVED | Noted: 2020-02-13 | Resolved: 2020-04-06

## 2020-04-06 PROBLEM — Z91.199 NON-COMPLIANCE: Status: RESOLVED | Noted: 2017-02-04 | Resolved: 2020-04-06

## 2020-04-06 PROBLEM — J81.0 ACUTE PULMONARY EDEMA: Status: RESOLVED | Noted: 2017-02-05 | Resolved: 2020-04-06

## 2020-04-06 PROBLEM — I50.32 CHRONIC DIASTOLIC HEART FAILURE: Status: RESOLVED | Noted: 2020-04-06 | Resolved: 2020-04-06

## 2020-04-06 LAB
ANION GAP SERPL CALC-SCNC: 13 MMOL/L (ref 8–16)
BASOPHILS # BLD AUTO: 0.06 K/UL (ref 0–0.2)
BASOPHILS NFR BLD: 0.6 % (ref 0–1.9)
BUN SERPL-MCNC: 62 MG/DL (ref 8–23)
CALCIUM SERPL-MCNC: 8.8 MG/DL (ref 8.7–10.5)
CHLORIDE SERPL-SCNC: 96 MMOL/L (ref 95–110)
CO2 SERPL-SCNC: 29 MMOL/L (ref 23–29)
CREAT SERPL-MCNC: 9.8 MG/DL (ref 0.5–1.4)
DIFFERENTIAL METHOD: ABNORMAL
EOSINOPHIL # BLD AUTO: 0.4 K/UL (ref 0–0.5)
EOSINOPHIL NFR BLD: 4.2 % (ref 0–8)
ERYTHROCYTE [DISTWIDTH] IN BLOOD BY AUTOMATED COUNT: 16.1 % (ref 11.5–14.5)
EST. GFR  (AFRICAN AMERICAN): 4 ML/MIN/1.73 M^2
EST. GFR  (NON AFRICAN AMERICAN): 4 ML/MIN/1.73 M^2
GLUCOSE SERPL-MCNC: 100 MG/DL (ref 70–110)
GLUCOSE SERPL-MCNC: 70 MG/DL (ref 70–110)
HCT VFR BLD AUTO: 25.7 % (ref 37–48.5)
HGB BLD-MCNC: 8.2 G/DL (ref 12–16)
IMM GRANULOCYTES # BLD AUTO: 0.03 K/UL (ref 0–0.04)
IMM GRANULOCYTES NFR BLD AUTO: 0.3 % (ref 0–0.5)
LACTATE SERPL-SCNC: 0.8 MMOL/L (ref 0.5–2.2)
LYMPHOCYTES # BLD AUTO: 1.5 K/UL (ref 1–4.8)
LYMPHOCYTES NFR BLD: 15.3 % (ref 18–48)
MCH RBC QN AUTO: 29 PG (ref 27–31)
MCHC RBC AUTO-ENTMCNC: 31.9 G/DL (ref 32–36)
MCV RBC AUTO: 91 FL (ref 82–98)
MONOCYTES # BLD AUTO: 0.7 K/UL (ref 0.3–1)
MONOCYTES NFR BLD: 6.6 % (ref 4–15)
NEUTROPHILS # BLD AUTO: 7.3 K/UL (ref 1.8–7.7)
NEUTROPHILS NFR BLD: 73 % (ref 38–73)
NRBC BLD-RTO: 0 /100 WBC
PLATELET # BLD AUTO: 231 K/UL (ref 150–350)
PMV BLD AUTO: 11.9 FL (ref 9.2–12.9)
POCT GLUCOSE: 100 MG/DL (ref 70–110)
POTASSIUM SERPL-SCNC: 5.7 MMOL/L (ref 3.5–5.1)
RBC # BLD AUTO: 2.83 M/UL (ref 4–5.4)
SODIUM SERPL-SCNC: 138 MMOL/L (ref 136–145)
TROPONIN I SERPL DL<=0.01 NG/ML-MCNC: 0.03 NG/ML (ref 0–0.03)
TROPONIN I SERPL DL<=0.01 NG/ML-MCNC: 0.06 NG/ML (ref 0–0.03)
WBC # BLD AUTO: 10.04 K/UL (ref 3.9–12.7)

## 2020-04-06 PROCEDURE — 25000003 PHARM REV CODE 250: Mod: NTX | Performed by: INTERNAL MEDICINE

## 2020-04-06 PROCEDURE — 84484 ASSAY OF TROPONIN QUANT: CPT | Mod: 91,NTX

## 2020-04-06 PROCEDURE — 25000003 PHARM REV CODE 250: Mod: NTX | Performed by: FAMILY MEDICINE

## 2020-04-06 PROCEDURE — 84484 ASSAY OF TROPONIN QUANT: CPT | Mod: NTX

## 2020-04-06 PROCEDURE — 80100014 HC HEMODIALYSIS 1:1: Mod: NTX

## 2020-04-06 PROCEDURE — 25000003 PHARM REV CODE 250: Mod: NTX | Performed by: EMERGENCY MEDICINE

## 2020-04-06 PROCEDURE — 83605 ASSAY OF LACTIC ACID: CPT | Mod: NTX

## 2020-04-06 PROCEDURE — 85025 COMPLETE CBC W/AUTO DIFF WBC: CPT | Mod: NTX

## 2020-04-06 PROCEDURE — 80048 BASIC METABOLIC PNL TOTAL CA: CPT | Mod: NTX

## 2020-04-06 PROCEDURE — 63600175 PHARM REV CODE 636 W HCPCS: Mod: NTX | Performed by: FAMILY MEDICINE

## 2020-04-06 RX ORDER — IBUPROFEN 200 MG
24 TABLET ORAL
Status: DISCONTINUED | OUTPATIENT
Start: 2020-04-06 | End: 2020-04-06

## 2020-04-06 RX ORDER — GLUCAGON 1 MG
1 KIT INJECTION
Status: DISCONTINUED | OUTPATIENT
Start: 2020-04-06 | End: 2020-04-06

## 2020-04-06 RX ORDER — INSULIN ASPART 100 [IU]/ML
1-10 INJECTION, SOLUTION INTRAVENOUS; SUBCUTANEOUS
Status: DISCONTINUED | OUTPATIENT
Start: 2020-04-06 | End: 2020-04-06

## 2020-04-06 RX ORDER — MUPIROCIN 20 MG/G
OINTMENT TOPICAL 2 TIMES DAILY
Status: DISCONTINUED | OUTPATIENT
Start: 2020-04-06 | End: 2020-04-06 | Stop reason: HOSPADM

## 2020-04-06 RX ORDER — SODIUM CHLORIDE 9 MG/ML
INJECTION, SOLUTION INTRAVENOUS
Status: DISCONTINUED | OUTPATIENT
Start: 2020-04-06 | End: 2020-04-06 | Stop reason: HOSPADM

## 2020-04-06 RX ORDER — FUROSEMIDE 10 MG/ML
40 INJECTION INTRAMUSCULAR; INTRAVENOUS 2 TIMES DAILY
Status: DISCONTINUED | OUTPATIENT
Start: 2020-04-06 | End: 2020-04-06

## 2020-04-06 RX ORDER — LABETALOL HYDROCHLORIDE 5 MG/ML
10 INJECTION, SOLUTION INTRAVENOUS EVERY 6 HOURS PRN
Status: DISCONTINUED | OUTPATIENT
Start: 2020-04-06 | End: 2020-04-06 | Stop reason: HOSPADM

## 2020-04-06 RX ORDER — ACETAMINOPHEN 325 MG/1
650 TABLET ORAL EVERY 6 HOURS PRN
Status: DISCONTINUED | OUTPATIENT
Start: 2020-04-06 | End: 2020-04-06 | Stop reason: HOSPADM

## 2020-04-06 RX ORDER — HEPARIN SODIUM 5000 [USP'U]/ML
5000 INJECTION, SOLUTION INTRAVENOUS; SUBCUTANEOUS EVERY 8 HOURS
Status: DISCONTINUED | OUTPATIENT
Start: 2020-04-06 | End: 2020-04-06 | Stop reason: HOSPADM

## 2020-04-06 RX ORDER — SODIUM CHLORIDE 9 MG/ML
INJECTION, SOLUTION INTRAVENOUS ONCE
Status: DISCONTINUED | OUTPATIENT
Start: 2020-04-06 | End: 2020-04-06 | Stop reason: HOSPADM

## 2020-04-06 RX ORDER — IBUPROFEN 200 MG
16 TABLET ORAL
Status: DISCONTINUED | OUTPATIENT
Start: 2020-04-06 | End: 2020-04-06

## 2020-04-06 RX ORDER — ONDANSETRON 2 MG/ML
4 INJECTION INTRAMUSCULAR; INTRAVENOUS EVERY 6 HOURS PRN
Status: DISCONTINUED | OUTPATIENT
Start: 2020-04-06 | End: 2020-04-06 | Stop reason: HOSPADM

## 2020-04-06 RX ORDER — GUAIFENESIN 600 MG/1
600 TABLET, EXTENDED RELEASE ORAL 2 TIMES DAILY
Status: DISCONTINUED | OUTPATIENT
Start: 2020-04-06 | End: 2020-04-06 | Stop reason: HOSPADM

## 2020-04-06 RX ADMIN — SEVELAMER CARBONATE 1600 MG: 800 TABLET, FILM COATED ORAL at 12:04

## 2020-04-06 RX ADMIN — HEPARIN SODIUM 5000 UNITS: 5000 INJECTION, SOLUTION INTRAVENOUS; SUBCUTANEOUS at 06:04

## 2020-04-06 RX ADMIN — GUAIFENESIN 600 MG: 600 TABLET, EXTENDED RELEASE ORAL at 08:04

## 2020-04-06 RX ADMIN — GUAIFENESIN 600 MG: 600 TABLET, EXTENDED RELEASE ORAL at 12:04

## 2020-04-06 RX ADMIN — HYDRALAZINE HYDROCHLORIDE 100 MG: 25 TABLET, FILM COATED ORAL at 08:04

## 2020-04-06 RX ADMIN — CLONIDINE HYDROCHLORIDE 0.3 MG: 0.1 TABLET ORAL at 08:04

## 2020-04-06 RX ADMIN — SEVELAMER CARBONATE 1600 MG: 800 TABLET, FILM COATED ORAL at 09:04

## 2020-04-06 RX ADMIN — MUPIROCIN: 20 OINTMENT TOPICAL at 12:04

## 2020-04-06 NOTE — PLAN OF CARE
04/06/20 1013   Discharge Assessment   Assessment Type Discharge Planning Assessment   Assessment information obtained from? Medical Record   Communicated expected length of stay with patient/caregiver no   Prior to hospitilization cognitive status: Alert/Oriented   Prior to hospitalization functional status: Independent   Current cognitive status: Alert/Oriented   Current Functional Status: Independent   Facility Arrived From: home   Lives With child(jaylyn), adult   Able to Return to Prior Arrangements yes   Is patient able to care for self after discharge? Yes   Who are your caregiver(s) and their phone number(s)? margo Peoples [phone]   Patient's perception of discharge disposition home or selfcare   Readmission Within the Last 30 Days other (see comments)  (pt was here on last month with same dx. )   Patient currently being followed by outpatient case management? No   Patient currently receives any other outside agency services? No   Equipment Currently Used at Home none   Do you have any problems affording any of your prescribed medications? No   Is the patient taking medications as prescribed? yes   Does the patient have transportation home? Yes   Transportation Anticipated family or friend will provide   Dialysis Name and Scheduled days M/W/F- [facility]   Does the patient receive services at the Coumadin Clinic? No   Discharge Plan A Home with family   Discharge Plan B Home with family   DME Needed Upon Discharge  none   Patient/Family in Agreement with Plan yes     [facility] - [place], [place] - [address]  [address]  [place] [address]  Phone: [phone] Fax: [phone]

## 2020-04-06 NOTE — PROGRESS NOTES
Patient is discharged. Telemetry removed. IV removed, tip intact. Discharge teaching given and understood. No questions asked. calling son for ride

## 2020-04-06 NOTE — SUBJECTIVE & OBJECTIVE
Past Medical History:   Diagnosis Date    Anemia     Diabetes mellitus, type 2     ESRD (end stage renal disease) on dialysis 2017    Gallstones     Hypertension     Pulmonary edema     Renal disorder     dialysis MIRANDA fistula    Tobacco abuse        Past Surgical History:   Procedure Laterality Date    AVF  2017    CHOLECYSTECTOMY  2016    Permacath Right 2017    TUBAL LIGATION         Review of patient's allergies indicates:  No Known Allergies    No current facility-administered medications on file prior to encounter.      Current Outpatient Medications on File Prior to Encounter   Medication Sig    cloNIDine (CATAPRES) 0.3 MG tablet Take 1 tablet (0.3 mg total) by mouth once daily.    hydrALAZINE (APRESOLINE) 100 MG tablet Take 1 tablet (100 mg total) by mouth 3 (three) times daily.    olmesartan (BENICAR) 40 MG tablet Take 1 tablet (40 mg total) by mouth once daily.    sevelamer carbonate (RENVELA) 800 mg Tab Take 1,600 mg by mouth 3 (three) times daily with meals.    vitamin D (VITAMIN D3) 1000 units Tab Take 1,000 Units by mouth once daily.     Family History     Problem Relation (Age of Onset)    Cancer Mother    Cervical cancer Mother    Diabetes Father, Sister    Drug abuse Paternal Grandmother    Hypertension Mother    Kidney disease Mother    Ovarian cancer Mother        Tobacco Use    Smoking status: Current Every Day Smoker     Packs/day: 0.50     Years: 20.00     Pack years: 10.00     Types: Cigarettes    Smokeless tobacco: Never Used   Substance and Sexual Activity    Alcohol use: No    Drug use: No    Sexual activity: Yes     Partners: Male     Birth control/protection: Post-menopausal     Review of Systems   Constitutional: Negative for diaphoresis and fever.   HENT: Negative for facial swelling and nosebleeds.    Eyes: Negative for pain and visual disturbance.   Respiratory: Positive for cough and shortness of breath. Negative for chest tightness.    Cardiovascular: Positive  for leg swelling. Negative for chest pain and palpitations.   Gastrointestinal: Negative for diarrhea and nausea.   Endocrine: Negative for cold intolerance and heat intolerance.   Genitourinary: Negative for hematuria and urgency.   Musculoskeletal: Negative for arthralgias and back pain.   Skin: Negative for pallor and rash.   Neurological: Negative for syncope and speech difficulty.   Hematological: Negative for adenopathy. Does not bruise/bleed easily.   Psychiatric/Behavioral: Negative for confusion and hallucinations.     Objective:     Vital Signs (Most Recent):  Temp: 98.2 °F (36.8 °C) (04/05/20 2231)  Pulse: 64 (04/06/20 0001)  Resp: 17 (04/06/20 0001)  BP: (!) 115/52 (04/06/20 0001)  SpO2: 99 % (04/06/20 0001) Vital Signs (24h Range):  Temp:  [98.2 °F (36.8 °C)-98.6 °F (37 °C)] 98.2 °F (36.8 °C)  Pulse:  [] 64  Resp:  [14-37] 17  SpO2:  [91 %-99 %] 99 %  BP: (113-239)/() 115/52     Weight: 52 kg (114 lb 10.2 oz)  Body mass index is 19.68 kg/m².    Physical Exam   Constitutional: No distress.   HENT:   Head: Normocephalic and atraumatic.   Eyes: Pupils are equal, round, and reactive to light. EOM are normal.   Neck: Normal range of motion. No tracheal deviation present.   Cardiovascular: Normal rate and regular rhythm.   Pulmonary/Chest: She has rales.   tachypnea   Abdominal: Soft. Bowel sounds are normal.   Musculoskeletal: Normal range of motion. She exhibits edema. She exhibits no deformity.   Neurological: She is alert. She exhibits normal muscle tone.   Skin: Skin is warm. Capillary refill takes 2 to 3 seconds. She is not diaphoretic. No pallor.   Psychiatric: She has a normal mood and affect. Her behavior is normal.   Vitals reviewed.

## 2020-04-06 NOTE — PROGRESS NOTES
WRITTEN HEALTHCARE DISCHARGE INFORMATION      Things that YOU are RESPONSIBLE for to Manage Your Care At Home:     1. Getting your prescriptions filled.  2. Taking you medications as directed. DO NOT MISS ANY DOSES!  3. Going to your follow-up doctor appointments. This is important because it allows the doctor to monitor your progress and to determine if any changes need to be made to your treatment plan.     If you are unable to make your follow up appointments, please call the number listed and reschedule this appointment.      ____________HELP AT HOME____________________     Experiencing any SIGNS or SYMPTOMS: YOU CAN     Schedule a same day appopintment with your Primary Care Doctor or  you can call [facility] On Call Nurse Care Line for 24/7 assistance at [phone]     If you are experience any signs or symptoms that have become severe, Call 911 and come to your nearest Emergency Room.     Thank you for choosing [facility] and allowing us to care for you.   From your care management team:      You should receive a call from [facility]charge [facility] within 48-72 hours to help manage your care after discharge. Please try to make sure that you answer your phone for this important phone call.    Follow-up Information     Nasra Iraheta MD.    Specialty:  Family Medicine  Why:  please call on Tuesday to schedule follow up with PCP as needed  Contact information:  [address]  [place] [address] [phone]

## 2020-04-06 NOTE — ED TRIAGE NOTES
"Pt. Presents to ED with SOB x 1 day. Pt. Reports receiving dialysis treatments on schedule (MWF). Pt. States, "I feel overloaded". Fistula to M[facility]DA. Denies N/V/D, cough, chills, fevers. AAO x 4.   "

## 2020-04-06 NOTE — ASSESSMENT & PLAN NOTE
C/o pulmonary edema on CXR.  Continue IV lasix.  Pt does have ESRD and underlying diastolic dysfunction per chart review.     BNP is elevated.  CXR shows diffuse interstitial opacities. COVID negative.  And pt has no myalgias, fevers, sore throat.

## 2020-04-06 NOTE — ED PROVIDER NOTES
Patient observed in the ED for the rest of the night, home medications given, IV labetalol push given earlier in the night, with blood pressure stabilizing, pulse ox remaining normal on 2 L via nasal cannula.  With vital stable, discussed with nocturnist and will transfer to hospital floor, on telemetry.  Orders updated.     Jakub Matthews MD  04/06/20 0455

## 2020-04-06 NOTE — ED NOTES
Assumed care. Report given to floor by night shift nurse Eunice. Floor states ok to send pt up in 20 min.

## 2020-04-06 NOTE — PLAN OF CARE
Problem: Device-Related Complication Risk (Hemodialysis)  Goal: Safe, Effective Therapy Delivery  Intervention: Optimize Device Care and Function  Flowsheets (Taken 4/6/2020 5145)  Medication Review/Management: medications reviewed

## 2020-04-06 NOTE — NURSING TRANSFER
Nursing Transfer Note      4/6/2020     Transfer From: ED    Transfer via stretcher    Transfer with O2, cardiac monitoring    Transported by transport    Medicines sent: no    Chart send with patient: No    Notified: family per patient    Patient reassessed at: 4/6/2018  Upon arrival to floor: cardiac monitor applied, patient oriented to room, call bell in reach and bed in lowest position

## 2020-04-06 NOTE — PLAN OF CARE
04/06/20 1629   Final Note   Assessment Type Final Discharge Note   Anticipated Discharge Disposition Home   Hospital Follow Up  Appt(s) scheduled? Yes   Discharge plans and expectations educations in teach back method with documentation complete? Yes   Right Care Referral Info   Post Acute Recommendation No Care   Post-Acute Status   Post-Acute Authorization Other   Other Status No Post-Acute Service Needs   pts nurse Khadijah notified that the pt can d/c from CM standpoint.

## 2020-04-06 NOTE — ASSESSMENT & PLAN NOTE
No resolution with oral meds. Patient started on nitro drip in ED.  Continue.  Monitor in ICU.

## 2020-04-06 NOTE — H&P
"[facility] Hospital Medicine  History & Physical    Patient Name: April Vasquez  MRN: [id number]  Admission Date: 4/5/2020  Attending Physician: Mala Rosario MD   Primary Care Provider: Nasra Iraheta MD         Patient information was obtained from ER records.     Subjective:     Principal Problem:<principal problem not specified>    Chief Complaint:   Chief Complaint   Patient presents with    Shortness of Breath     x 1 day. pt states "I know I need to be dialyzed, I am over loaded" Pt schedule MWF, Fistula to MIRANDA.         HPI: Pt is a 64 y.o. female with ESRD on HD (MWF), diastolic dysfunction, tobacco abuse, HTN, and MVR presents with a complaint of SOB. On arrival pt was dyspneic and states taht she needed to be dialyzed and felt she was volume overloaded.  She was also noted to have an elevated BP at 230/100.  She did receive her HD on Friday.  Pt states her SOB is worse with lying flat and on exertion.  She only took one of her BP meds this AM.  Pt denies any fevers, sore throat, productive cough, nausea, vomiting, diarrhea, vision changes, or palpitations.  In the ER she was given her home oral meds but BP stayed elevated.  Pt was started on nitro drip, given 80 mg IV lasix, and nephrology was consulted.    Past Medical History:   Diagnosis Date    Anemia     Diabetes mellitus, type 2     ESRD (end stage renal disease) on dialysis 2017    Gallstones     Hypertension     Pulmonary edema     Renal disorder     dialysis MIRANDA fistula    Tobacco abuse        Past Surgical History:   Procedure Laterality Date    AVF  2017    CHOLECYSTECTOMY  2016    Permacath Right 2017    TUBAL LIGATION         Review of patient's allergies indicates:  No Known Allergies    No current facility-administered medications on file prior to encounter.      Current Outpatient Medications on File Prior to Encounter   Medication Sig    cloNIDine (CATAPRES) 0.3 MG tablet Take 1 tablet (0.3 mg total) by " mouth once daily.    hydrALAZINE (APRESOLINE) 100 MG tablet Take 1 tablet (100 mg total) by mouth 3 (three) times daily.    olmesartan (BENICAR) 40 MG tablet Take 1 tablet (40 mg total) by mouth once daily.    sevelamer carbonate (RENVELA) 800 mg Tab Take 1,600 mg by mouth 3 (three) times daily with meals.    vitamin D (VITAMIN D3) 1000 units Tab Take 1,000 Units by mouth once daily.     Family History     Problem Relation (Age of Onset)    Cancer Mother    Cervical cancer Mother    Diabetes Father, Sister    Drug abuse Paternal Grandmother    Hypertension Mother    Kidney disease Mother    Ovarian cancer Mother        Tobacco Use    Smoking status: Current Every Day Smoker     Packs/day: 0.50     Years: 20.00     Pack years: 10.00     Types: Cigarettes    Smokeless tobacco: Never Used   Substance and Sexual Activity    Alcohol use: No    Drug use: No    Sexual activity: Yes     Partners: Male     Birth control/protection: Post-menopausal     Review of Systems   Constitutional: Negative for diaphoresis and fever.   HENT: Negative for facial swelling and nosebleeds.    Eyes: Negative for pain and visual disturbance.   Respiratory: Positive for cough and shortness of breath. Negative for chest tightness.    Cardiovascular: Positive for leg swelling. Negative for chest pain and palpitations.   Gastrointestinal: Negative for diarrhea and nausea.   Endocrine: Negative for cold intolerance and heat intolerance.   Genitourinary: Negative for hematuria and urgency.   Musculoskeletal: Negative for arthralgias and back pain.   Skin: Negative for pallor and rash.   Neurological: Negative for syncope and speech difficulty.   Hematological: Negative for adenopathy. Does not bruise/bleed easily.   Psychiatric/Behavioral: Negative for confusion and hallucinations.     Objective:     Vital Signs (Most Recent):  Temp: 98.2 °F (36.8 °C) (04/05/20 2231)  Pulse: 64 (04/06/20 0001)  Resp: 17 (04/06/20 0001)  BP: (!) 115/52  (04/06/20 0001)  SpO2: 99 % (04/06/20 0001) Vital Signs (24h Range):  Temp:  [98.2 °F (36.8 °C)-98.6 °F (37 °C)] 98.2 °F (36.8 °C)  Pulse:  [] 64  Resp:  [14-37] 17  SpO2:  [91 %-99 %] 99 %  BP: (113-239)/() 115/52     Weight: 52 kg (114 lb 10.2 oz)  Body mass index is 19.68 kg/m².    Physical Exam   Constitutional: No distress.   HENT:   Head: Normocephalic and atraumatic.   Eyes: Pupils are equal, round, and reactive to light. EOM are normal.   Neck: Normal range of motion. No tracheal deviation present.   Cardiovascular: Normal rate and regular rhythm.   Pulmonary/Chest: She has rales.   tachypnea   Abdominal: Soft. Bowel sounds are normal.   Musculoskeletal: Normal range of motion. She exhibits edema. She exhibits no deformity.   Neurological: She is alert. She exhibits normal muscle tone.   Skin: Skin is warm. Capillary refill takes 2 to 3 seconds. She is not diaphoretic. No pallor.   Psychiatric: She has a normal mood and affect. Her behavior is normal.   Vitals reviewed.        Assessment/Plan:     Hypertensive emergency  No resolution with oral meds. Patient started on nitro drip in ED.  Continue.  Monitor in ICU.        SOB (shortness of breath)  C/o pulmonary edema on CXR.  Continue IV lasix.  Pt does have ESRD and underlying diastolic dysfunction per chart review.     BNP is elevated.  CXR shows diffuse interstitial opacities. COVID negative.  And pt has no myalgias, fevers, sore throat.    Fluid overload  Continue IV lasix.  HD due MWF      Diastolic dysfunction  Per chart review.  IV lasix.  Monitor fluid status.      DM2 (diabetes mellitus, type 2)  Accuchecks, SSI      Hyperkalemia  Mild elevation. Monitor, trend.  Should get better after lasix and HD in AM      Leukocytosis  Procalcitonin negative.  Monitor.  Avoid abx for now      ESRD (end stage renal disease) on dialysis  Gets HD MWF.  Compliant.  Pt's nephrologist consulted.         VTE Risk Mitigation (From admission, onward)          Ordered     heparin (porcine) injection 5,000 Units  Every 8 hours      04/06/20 0004     IP VTE HIGH RISK PATIENT  Once      04/05/20 2334     Place sequential compression device  Until discontinued      04/05/20 2334                   Oscar Ramesh MD  Department of Hospital Medicine   [facility]

## 2020-04-06 NOTE — PROGRESS NOTES
Date of Admission:4/5/2020    SUBJECTIVE:resting comfortably in bed    Current Facility-Administered Medications   Medication    0.9%  NaCl infusion    0.9%  NaCl infusion    acetaminophen tablet 650 mg    cloNIDine tablet 0.3 mg    guaiFENesin 12 hr tablet 600 mg    heparin (porcine) injection 5,000 Units    hydrALAZINE tablet 100 mg    labetaloL injection 10 mg    mupirocin 2 % ointment    ondansetron injection 4 mg    sevelamer carbonate tablet 1,600 mg    sodium chloride 0.9% flush 10 mL       Wt Readings from Last 3 Encounters:   04/05/20 52 kg (114 lb 10.2 oz)   03/15/20 52.3 kg (115 lb 4.8 oz)   03/03/20 53.5 kg (118 lb)     Temp Readings from Last 3 Encounters:   04/06/20 98.1 °F (36.7 °C) (Oral)   03/16/20 98.8 °F (37.1 °C) (Oral)   02/13/20 96.1 °F (35.6 °C) (Oral)     BP Readings from Last 3 Encounters:   04/06/20 133/67   03/16/20 131/76   02/13/20 (!) 200/87     Pulse Readings from Last 3 Encounters:   04/06/20 72   03/16/20 87   02/13/20 76       Intake/Output Summary (Last 24 hours) at 4/6/2020 1259  Last data filed at 4/5/2020 2230  Gross per 24 hour   Intake 100 ml   Output --   Net 100 ml       PE:  GEN:wd female in nad  HEENT:ncat,eomi,mm  CVS:s1s2 regular  PULM:ctab  ABD:+bs,soft,nd  EXT:avf arm, no leg edema  NEURO:awake    Recent Labs   Lab 04/06/20  0559   GLU 70      K 5.7*   CL 96   CO2 29   BUN 62*   CREATININE 9.8*   CALCIUM 8.8       Lab Results   Component Value Date    .0 (H) 02/13/2020    CALCIUM 8.8 04/06/2020    PHOS 5.0 (H) 02/13/2020       Recent Labs   Lab 04/06/20  0559   WBC 10.04   RBC 2.83*   HGB 8.2*   HCT 25.7*      MCV 91   MCH 29.0   MCHC 31.9*         A/P:  1.esrd. Hd. Resume hd at her unit MWF.   2.hyperkalemia. 2k bath. Needs to follow diet.  3.htn. Needs to follow diet. No stew.  4.anemia 2nd to esrd. yessenia at her unit.  Would dc after hd today. 2nd time in past 3 weeks for the same problem. She needs to follow her diet.

## 2020-04-06 NOTE — HPI
Pt is a 64 y.o. female with ESRD on HD (MWF), diastolic dysfunction, tobacco abuse, HTN, and MVR presents with a complaint of SOB. On arrival pt was dyspneic and states taht she needed to be dialyzed and felt she was volume overloaded.  She was also noted to have an elevated BP at 230/100.  She did receive her HD on Friday.  Pt states her SOB is worse with lying flat and on exertion.  She only took one of her BP meds this AM.  Pt denies any fevers, sore throat, productive cough, nausea, vomiting, diarrhea, vision changes, or palpitations.  In the ER she was given her home oral meds but BP stayed elevated.  Pt was started on nitro drip, given 80 mg IV lasix, and nephrology was consulted.

## 2020-04-08 NOTE — DISCHARGE SUMMARY
[facility] Hospital Medicine  Discharge Summary      Patient Name: April Vasquez  MRN: [id number]  Admission Date: 4/5/2020  Hospital Length of Stay: 1 days  Discharge Date and Time: 4/6/2020  Attending Physician: No att. providers found   Discharging Provider: Yaneth Mckeon NP  Primary Care Provider: Nasra Iraheta MD      HPI:   Pt is a 64 y.o. female with ESRD on HD (MWF), diastolic dysfunction, tobacco abuse, HTN, and MVR presents with a complaint of SOB. On arrival pt was dyspneic and states taht she needed to be dialyzed and felt she was volume overloaded.  She was also noted to have an elevated BP at 230/100.  She did receive her HD on Friday.  Pt states her SOB is worse with lying flat and on exertion.  She only took one of her BP meds this AM.  Pt denies any fevers, sore throat, productive cough, nausea, vomiting, diarrhea, vision changes, or palpitations.  In the ER she was given her home oral meds but BP stayed elevated.  Pt was started on nitro drip, given 80 mg IV lasix, and nephrology was consulted.    * No surgery found *      Hospital Course:   Ms. García is a 62-year-old female who admitted to hospital for volume overload and hyperkalemia.  COVID negative.  Patient underwent hemodialysis with neg volume removal 2 L. Patient stable discharge after hemodialysis if O2 sat >92% RA after after hemodialysis. Discussed with patient adherence to low salt diet, renal diet, and 1L/dfluid intake. Follow up outpatient HD TTS.      Consults:     No new Assessment & Plan notes have been filed under this hospital service since the last note was generated.  Service: Hospital Medicine    Final Active Diagnoses:    Diagnosis Date Noted POA    PRINCIPAL PROBLEM:  ESRD (end stage renal disease) on dialysis [N18.6, Z99.2] 08/05/2019 Not Applicable    DM2 (diabetes mellitus, type 2) [E11.9] 04/06/2020 Unknown    Hypertensive emergency [I16.1] 04/05/2020 Yes    Diastolic dysfunction [I51.89]  03/09/2020 Yes    Hyperkalemia [E87.5] 01/28/2020 Yes    Fluid overload [E87.70] 01/27/2020 Yes    Leukocytosis [D72.829] 01/27/2020 Yes    SOB (shortness of breath) [R06.02] 10/12/2019 Yes      Problems Resolved During this Admission:    Diagnosis Date Noted Date Resolved POA    Hypertension [I10] 06/07/2017 04/06/2020 Yes       Discharged Condition: good    Disposition: Home or Self Care    Follow Up:  Follow-up Information     Nasra Iraheta MD.    Specialty:  Family Medicine  Why:  please call on Tuesday to schedule follow up with PCP as needed  Contact information:  [address]  [place] [address] [phone]                 Patient Instructions:      Diet renal     Diet Cardiac     Notify your health care provider if you experience any of the following:  temperature >100.4     Notify your health care provider if you experience any of the following:  difficulty breathing or increased cough     Activity as tolerated       Significant Diagnostic Studies: Labs: All labs within the past 24 hours have been reviewed    Pending Diagnostic Studies:     None         Medications:  Reconciled Home Medications:      Medication List      CONTINUE taking these medications    cloNIDine 0.3 MG tablet  Commonly known as:  CATAPRES  Take 1 tablet (0.3 mg total) by mouth once daily.     hydrALAZINE 100 MG tablet  Commonly known as:  APRESOLINE  Take 1 tablet (100 mg total) by mouth 3 (three) times daily.     olmesartan 40 MG tablet  Commonly known as:  BENICAR  Take 1 tablet (40 mg total) by mouth once daily.     sevelamer carbonate 800 mg Tab  Commonly known as:  RENVELA  Take 1,600 mg by mouth 3 (three) times daily with meals.     vitamin D 1000 units Tab  Commonly known as:  VITAMIN D3  Take 1,000 Units by mouth once daily.            Indwelling Lines/Drains at time of discharge:   Lines/Drains/Airways     Drain                 Hemodialysis AV Fistula 08/04/19 2249 Left upper arm 247 days                Time spent on the  discharge of patient: 35 minutes  Patient was seen and examined on the date of discharge and determined to be suitable for discharge.         Yaneth Mckeon NP  Department of Hospital Medicine  [facility]

## 2020-04-08 NOTE — HOSPITAL COURSE
Ms. García is a 62-year-old female who admitted to hospital for volume overload and hyperkalemia.  COVID negative.  Patient underwent hemodialysis with neg volume removal 2 L. Patient stable discharge after hemodialysis if O2 sat >92% RA after after hemodialysis. Discussed with patient adherence to low salt diet, renal diet, and 1L/dfluid intake. Follow up outpatient HD TTS.

## 2020-04-14 ENCOUNTER — HOSPITAL ENCOUNTER (OUTPATIENT)
Dept: RADIOLOGY | Facility: HOSPITAL | Age: 65
Discharge: HOME | End: 2020-04-14
Attending: NURSE PRACTITIONER
Payer: MEDICARE

## 2020-04-14 NOTE — PHYSICIAN QUERY
"PT Name: April Vasquez  MR #: [id number]    Physician Query Form - Heart  Condition Clarification     CDS: Brandy Capley, RN  Email: [email or web address]    This form is a permanent document in the medical record.     Query Date: April 14, 2020    By submitting this query, we are merely seeking further clarification of documentation. Please utilize your independent clinical judgment when addressing the question(s) below.    The medical record contains the following   Indicators     Supporting Clinical Findings Location in Medical Record   X BNP   BNP: 1,582 (H)     Labs 4/5/2020 19:21    EF     X Radiology findings Decreased diffuse interstitial opacities. CXR 4/5      Echo Results      "Ascites" documented     X "SOB" or "NIELSON" documented Pt is a 64 y.o. female with ESRD on HD (MWF), diastolic dysfunction, tobacco abuse, HTN, and MVR presents with a complaint of SOB. On arrival pt was dyspneic and states taht she needed to be dialyzed and felt she was volume overloaded.  She was also noted to have an elevated BP at 230/100.      SOB (shortness of breath)  C/o pulmonary edema on CXR.  Continue IV lasix.  Pt does have ESRD and underlying diastolic dysfunction per chart review.      BNP is elevated.  CXR shows diffuse interstitial opacities. COVID negative.  And pt has no myalgias, fevers, sore throat.     Fluid overload  Continue IV lasix.  HD due MWF    Diastolic dysfunction  Per chart review.  IV lasix.  Monitor fluid status   Discharge summary 4/6, filed 4/8            H&P 4/6    "Hypoxia" documented     X Heart Failure documented Pt with PMHx of HTN, ESRD presenting 2/2 rales in fluid overload and shortness of breath consistent with CHF exacerbation/ hypertensive emergency.    Acute on chronic combined systolic and diastolic heart failure  Acute pulmonary edema with elevated BNP, markedly hypertensive, HD for fluid control and afterload reduction as tolerated, I/O's, daily weights.  Echo Aug 2019 showed " "preserved EF and grade II diastolic dysfunction.  Repeat echo   ED provider note 4/5        Previous encounter notes/ hospital medicine H&P 3/15/2020   X "Edema" documented Musculoskeletal: Normal range of motion. She exhibits edema.    H&P 4/6    Diuretics/Meds      Treatment:      Other:      Heart failure (HF) can be acute, chronic or both. It is generally further specificed as systolic, diastolic, or combined. Lastly, it is important to identify an underlying etiology if known or suspected.     Common clues to acute exacerbation:  Rapidly progressive symptoms (w/in 2 weeks of presentation), using IV diuretics to treat, using supplemental O2, pulmonary edema on Xray, MI w/in 4 weeks, and/or BNP >500    Systolic Heart Failure: is defined as chart documentation of a left ventricular ejection fraction (LVEF) less than 40%     Diastolic Heart Failure: is defined as a left ventricular ejection fraction (LVEF) greater than 40%   +      Evidence of diastolic dysfunction on echocardiography OR    Right heart catheterization wedge pressure above 12 mm Hg OR    Left heart catheterization left ventricular end diastolic pressure 18 mm Hg or above.    References: *American Heart Association    The clinical guidelines noted below are only system guidelines, and do not replace the providers clinical judgment.     Provider, please specify the diagnosis associated with above clinical findings  [ x  ] Acute on Chronic Combined Systolic and Diastolic Heart Failure      [   ] Other Type of Heart Failure (please specify type):   [   ] Other (please specify):   [  ] Clinically Undetermined                           Please document in your progress notes daily for the duration of treatment until resolved and include in your discharge summary.  "

## 2020-04-15 ENCOUNTER — CLINICAL SUPPORT (OUTPATIENT)
Dept: SMOKING CESSATION | Facility: CLINIC | Age: 65
End: 2020-04-15
Payer: COMMERCIAL

## 2020-04-15 DIAGNOSIS — F17.200 NICOTINE DEPENDENCE: Primary | ICD-10-CM

## 2020-04-15 PROCEDURE — 99401 PR PREVENT COUNSEL,INDIV,15 MIN: ICD-10-PCS | Mod: S$GLB,TXP,,

## 2020-04-15 PROCEDURE — 99401 PREV MED CNSL INDIV APPRX 15: CPT | Mod: S$GLB,TXP,,

## 2020-04-15 PROCEDURE — 99999 PR PBB SHADOW E&M-EST. PATIENT-LVL I: CPT | Mod: PBBFAC,TXP,,

## 2020-04-15 PROCEDURE — 99999 PR PBB SHADOW E&M-EST. PATIENT-LVL I: ICD-10-PCS | Mod: PBBFAC,TXP,,

## 2020-04-15 NOTE — Clinical Note
Called patient to follow up on smoking cessation progress. Patient continues to smoke about 7-12 cpd, stated she has cut back a little but mostly on the days she is wearing the patch. Patient reports not wearing the patch everyday, stated she forgets to put it on. Informed patient the importance of being consistent with wearing the patch in order for it to help her become tobacco free. Patient stated she understood and will try harder this coming week to use patch daily and work on rate reduction of cigarettes. Will follow up with patient next week.

## 2020-04-15 NOTE — PROGRESS NOTES
Individual Follow-Up Form    4/15/2020    Quit Date: TBD    Clinical Status of Patient: Outpatient    Length of Service: 15 minutes    Continuing Medication: yes  Patches    Other Medications: none     Target Symptoms: Withdrawal and medication side effects. The following were  rated moderate (3) to severe (4) on TCRS:  · Moderate (3): none  · Severe (4): none    Comments: Called patient to follow up on smoking cessation progress. Patient continues to smoke about 7-12 cpd, stated she has cut back a little but mostly on the days she is wearing the patch. Patient reports not wearing the patch everyday, stated she forgets to put it on. Informed patient the importance of being consistent with wearing the patch in order for it to help her become tobacco free. Patient stated she understood and will try harder this coming week to use patch daily and work on rate reduction of cigarettes. Will follow up with patient next week.    Diagnosis: F17.200    Next Visit: 2 weeks

## 2020-04-17 ENCOUNTER — PATIENT OUTREACH (OUTPATIENT)
Dept: ADMINISTRATIVE | Facility: CLINIC | Age: 65
End: 2020-04-17

## 2020-04-17 NOTE — PROGRESS NOTES
Please forward this important TCC information to your provider in order to maximize the post discharge care delivery of this patient.    C3 nurse spoke with April Vasquez a TCC post hospital discharge follow up call. The patient has a scheduled HOSFU appointment with cardiology on 05/21.  Respectfully,  Ellie Reis RN  [facility]    [email or web address]       Please do not reply to this message, as this inbox is not routinely monitored.

## 2020-04-23 ENCOUNTER — TELEPHONE (OUTPATIENT)
Dept: SMOKING CESSATION | Facility: CLINIC | Age: 65
End: 2020-04-23

## 2020-04-23 NOTE — TELEPHONE ENCOUNTER
Called patient in regards to phone follow up for smoking cessation. Unable to reach patient, and not able to leave voicemail. Will call back at a later time.

## 2020-04-30 ENCOUNTER — CLINICAL SUPPORT (OUTPATIENT)
Dept: SMOKING CESSATION | Facility: CLINIC | Age: 65
End: 2020-04-30
Payer: COMMERCIAL

## 2020-04-30 DIAGNOSIS — F17.200 NICOTINE DEPENDENCE: Primary | ICD-10-CM

## 2020-04-30 PROCEDURE — 99999 PR PBB SHADOW E&M-EST. PATIENT-LVL I: CPT | Mod: PBBFAC,TXP,,

## 2020-04-30 PROCEDURE — 99402 PREV MED CNSL INDIV APPRX 30: CPT | Mod: S$GLB,TXP,,

## 2020-04-30 PROCEDURE — 99999 PR PBB SHADOW E&M-EST. PATIENT-LVL I: ICD-10-PCS | Mod: PBBFAC,TXP,,

## 2020-04-30 PROCEDURE — 99402 PR PREVENT COUNSEL,INDIV,30 MIN: ICD-10-PCS | Mod: S$GLB,TXP,,

## 2020-04-30 RX ORDER — IBUPROFEN 200 MG
1 TABLET ORAL DAILY
Qty: 14 PATCH | Refills: 0 | Status: SHIPPED | OUTPATIENT
Start: 2020-04-30 | End: 2020-06-04

## 2020-04-30 NOTE — PROGRESS NOTES
Individual Follow-Up Form    4/30/2020    Quit Date: TBD    Clinical Status of Patient: Outpatient    Length of Service: 30 minutes    Continuing Medication: no    Other Medications: none     Target Symptoms: Withdrawal and medication side effects. The following were  rated moderate (3) to severe (4) on TCRS:  · Moderate (3): none  · Severe (4): none    Comments: Called patient to follow up on smoking cessation progress. Patient continues to cut back on her smoking, not really sure how much but she knows the patches are helping. Patient reports running out of patches, will send refill. Informed her not to cancel her scheduled appointments with smoking cessation, we will be following up via telephone call for now. Explained to her it's important to follow up with me in order to get refill for her patches. Patient is to continue to work on rate reduction of cigarettes, start keeping track of how much she is really smoking daily, and move location of where she keeps her cigarettes. Patient is to follow up in 2 weeks.       Diagnosis: F17.200    Next Visit: 2 weeks

## 2020-04-30 NOTE — Clinical Note
Called patient to follow up on smoking cessation progress. Patient continues to cut back on her smoking, not really sure how much but she knows the patches are helping. Patient reports running out of patches, will send refill. Informed her not to cancel her scheduled appointments with smoking cessation, we will be following up via telephone call for now. Explained to her it's important to follow up with me in order to get refill for her patches. Patient is to continue to work on rate reduction of cigarettes, start keeping track of how much she is really smoking daily, and move location of where she keeps her cigarettes. Patient is to follow up in 2 weeks.

## 2020-05-06 ENCOUNTER — TELEPHONE (OUTPATIENT)
Dept: TRANSPLANT | Facility: CLINIC | Age: 65
End: 2020-05-06

## 2020-05-06 NOTE — TELEPHONE ENCOUNTER
Patient appointments were canceled due to COVID. She will need to be rescheduled for the rest of her workup. Sent to .   ----- Message from Jemma Salinas sent at 5/6/2020  9:42 AM CDT -----  Contact: [facility] osorio/Francis [phone] is requesting a status letter regarding pt       Gen[facility] [phone]

## 2020-05-14 ENCOUNTER — CLINICAL SUPPORT (OUTPATIENT)
Dept: SMOKING CESSATION | Facility: CLINIC | Age: 65
End: 2020-05-14
Payer: COMMERCIAL

## 2020-05-14 DIAGNOSIS — F17.200 NICOTINE DEPENDENCE: Primary | ICD-10-CM

## 2020-05-14 PROCEDURE — 99999 PR PBB SHADOW E&M-EST. PATIENT-LVL I: ICD-10-PCS | Mod: PBBFAC,TXP,,

## 2020-05-14 PROCEDURE — 99402 PR PREVENT COUNSEL,INDIV,30 MIN: ICD-10-PCS | Mod: S$GLB,TXP,,

## 2020-05-14 PROCEDURE — 99999 PR PBB SHADOW E&M-EST. PATIENT-LVL I: CPT | Mod: PBBFAC,TXP,,

## 2020-05-14 PROCEDURE — 99402 PREV MED CNSL INDIV APPRX 30: CPT | Mod: S$GLB,TXP,,

## 2020-05-14 RX ORDER — DM/P-EPHED/ACETAMINOPH/DOXYLAM 30-7.5/3
2 LIQUID (ML) ORAL
Qty: 100 LOZENGE | Refills: 0 | Status: SHIPPED | OUTPATIENT
Start: 2020-05-14 | End: 2020-06-04 | Stop reason: SDUPTHER

## 2020-05-14 NOTE — PROGRESS NOTES
Individual Follow-Up Form    5/14/2020    Quit Date: n/a    Clinical Status of Patient: Outpatient    Length of Service: 30 minutes    Continuing Medication: yes  Patches    Other Medications: n/a     Target Symptoms: Withdrawal and medication side effects. The following were  rated moderate (3) to severe (4) on TCRS:  · Moderate (3): 0  · Severe (4): 0    Comments: pt was contacted to discuss her progress with smoking cessation, she is currently on the 21mg nicotine patch daily, she is finding it hard to avoid the after meal cigarette, this has been a challenge since starting her nicotine patches preventing her from having a successful quit, after discussion recommend the 2mg nicotine lozenge to be used in place of a cigarette during these habit times, recommend she continue with the same dose of the nicotine patches, session handout discussed strategies discussed. Will follow     Diagnosis: F17.210    Next Visit: 1 week

## 2020-05-14 NOTE — Clinical Note
Comments: pt was contacted to discuss her progress with smoking cessation, she is currently on the 21mg nicotine patch daily, she is finding it hard to avoid the after meal cigarette, this has been a challenge since starting her nicotine patches preventing her from having a successful quit, after discussion recommend the 2mg nicotine lozenge to be used in place of a cigarette during these habit times, recommend she continue with the same dose of the nicotine patches, session handout discussed strategies discussed. Will follow

## 2020-05-21 ENCOUNTER — CLINICAL SUPPORT (OUTPATIENT)
Dept: SMOKING CESSATION | Facility: CLINIC | Age: 65
End: 2020-05-21
Payer: COMMERCIAL

## 2020-05-21 DIAGNOSIS — F17.200 NICOTINE DEPENDENCE: ICD-10-CM

## 2020-05-21 PROCEDURE — 99999 PR PBB SHADOW E&M-EST. PATIENT-LVL I: ICD-10-PCS | Mod: PBBFAC,TXP,,

## 2020-05-21 PROCEDURE — 99402 PR PREVENT COUNSEL,INDIV,30 MIN: ICD-10-PCS | Mod: S$GLB,TXP,,

## 2020-05-21 PROCEDURE — 99402 PREV MED CNSL INDIV APPRX 30: CPT | Mod: S$GLB,TXP,,

## 2020-05-21 PROCEDURE — 99999 PR PBB SHADOW E&M-EST. PATIENT-LVL I: CPT | Mod: PBBFAC,TXP,,

## 2020-05-21 NOTE — Clinical Note
Comments: pt was contacted for smoking cessation, she is currently smoking after meals. It was recommended that she start using the nicotine lozenge in place of those cigarettes, she just picked them up from pharmacy therefore she has not tried yet. Recommend that she continue the 21mg nicotine patches daily and start the nicotine lozenge, strategies discussed, session handout discussed. Will follow up in 2 weeks

## 2020-05-21 NOTE — PROGRESS NOTES
Individual Follow-Up Form    5/21/2020    Quit Date: n/a    Clinical Status of Patient: Outpatient    Length of Service: 30 minutes    Continuing Medication: yes  Patches or Nicotine Lozenges    Other Medications: n/a     Target Symptoms: Withdrawal and medication side effects. The following were  rated moderate (3) to severe (4) on TCRS:  · Moderate (3): 0  · Severe (4): 0    Comments: pt was contacted for smoking cessation, she is currently smoking after meals. It was recommended that she start using the nicotine lozenge in place of those cigarettes, she just picked them up from pharmacy therefore she has not tried yet. Recommend that she continue the 21mg nicotine patches daily and start the nicotine lozenge, strategies discussed, session handout discussed. Will follow up in 2 weeks     Diagnosis: F17.210    Next Visit: 2 weeks

## 2020-05-28 ENCOUNTER — OFFICE VISIT (OUTPATIENT)
Dept: CARDIOLOGY | Facility: CLINIC | Age: 65
End: 2020-05-28
Payer: MEDICARE

## 2020-05-28 DIAGNOSIS — Z99.2 ESRD (END STAGE RENAL DISEASE) ON DIALYSIS: Primary | ICD-10-CM

## 2020-05-28 DIAGNOSIS — Z01.810 PREOP CARDIOVASCULAR EXAM: ICD-10-CM

## 2020-05-28 DIAGNOSIS — I15.0 RENOVASCULAR HYPERTENSION: ICD-10-CM

## 2020-05-28 DIAGNOSIS — I51.89 DIASTOLIC DYSFUNCTION: ICD-10-CM

## 2020-05-28 DIAGNOSIS — N18.6 ESRD (END STAGE RENAL DISEASE) ON DIALYSIS: Primary | ICD-10-CM

## 2020-05-28 PROBLEM — I16.1 HYPERTENSIVE EMERGENCY: Status: RESOLVED | Noted: 2020-04-05 | Resolved: 2020-05-28

## 2020-05-28 PROBLEM — E87.70 FLUID OVERLOAD: Status: RESOLVED | Noted: 2020-01-27 | Resolved: 2020-05-28

## 2020-05-28 PROCEDURE — 99204 OFFICE O/P NEW MOD 45 MIN: CPT | Mod: 95,TXP,, | Performed by: INTERNAL MEDICINE

## 2020-05-28 PROCEDURE — 99204 PR OFFICE/OUTPT VISIT, NEW, LEVL IV, 45-59 MIN: ICD-10-PCS | Mod: 95,TXP,, | Performed by: INTERNAL MEDICINE

## 2020-05-28 RX ORDER — CARVEDILOL 12.5 MG/1
12.5 TABLET ORAL 2 TIMES DAILY WITH MEALS
Qty: 180 TABLET | Refills: 3 | Status: SHIPPED | OUTPATIENT
Start: 2020-05-28 | End: 2022-08-19 | Stop reason: SDUPTHER

## 2020-05-28 NOTE — PROGRESS NOTES
"Subjective:   Patient ID:  April Vasquez is a 64 y.o. female who presents for follow-up of No chief complaint on file.      HPI:   Pt seen today as an audio visit for pre-op renal trx.  She has ESRD on HD and HTN.  Over the past ~2 months she was admitted for HTN emergency and volume overload. The first admission was due to dietary indiscretion however the second admission she states that she was not eating high Na foods.  She states that her "BPs always running high"  - 200/90s, during dialysis BPs can drop into the 90s  EF range from 45-60 with abn diastolic indices. LVH PAP have been normal.  Most recent echo from 8-2019 - mod MR was preasent and LV dimensions were normal however larger than previous.  Overall, she states that she feels good and offers no complaints.  She denies any exertional chest discomfort, exertional dyspnea, palpitations, TIA's, syncope or presyncope      SPECT 3-2020    The perfusion scan is free of evidence from myocardial ischemia or injury.    There is a mild intensity defect in the anterior wall of the left ventricle, secondary to breast attenuation.    Gated perfusion images showed an ejection fraction of 48% at rest and 52% post stress. Normal ejection fraction is greater than 51%.    There is normal wall motion at rest and post stress.    LV cavity size is normal at rest and normal at stress.    The EKG portion of this study is negative for ischemia.    The patient reported chest pain during the stress test.    The blood pressure response to stress was normal.    Arrhythmias during stress: rare PACs, occasional PVCs.    When compared to the prior study on 8/3/2017, there is now a fixed defect in the anterior wall, likely due to breast artifact.    ECHO 8-2019  · Normal left ventricular systolic function. The estimated ejection fraction is 55%  · No wall motion abnormalities.  · Mild concentric left ventricular hypertrophy.  · Grade II (moderate) left ventricular " diastolic dysfunction consistent with pseudonormalization.  · Normal right ventricular systolic function.  · Moderate mitral regurgitation.  · The estimated PA systolic pressure is 29 mm Hg        There were no vitals filed for this visit.  There is no height or weight on file to calculate BMI.  CrCl cannot be calculated (Patient's most recent lab result is older than the maximum 7 days allowed.).    Lab Results   Component Value Date     04/06/2020    K 5.7 (H) 04/06/2020    CL 96 04/06/2020    CO2 29 04/06/2020    BUN 62 (H) 04/06/2020    CREATININE 9.8 (H) 04/06/2020    GLU 70 04/06/2020    HGBA1C 4.6 08/05/2019    MG 3.0 (H) 03/15/2020    AST 20 04/05/2020    ALT 20 04/05/2020    ALBUMIN 3.9 04/05/2020    PROT 8.0 04/05/2020    BILITOT 0.4 04/05/2020    WBC 10.04 04/06/2020    HGB 8.2 (L) 04/06/2020    HCT 25.7 (L) 04/06/2020    MCV 91 04/06/2020     04/06/2020    INR 1.0 03/15/2020    TSH 1.838 08/05/2019    CHOL 150 02/13/2020    HDL 54 02/13/2020    LDLCALC 72.4 02/13/2020    TRIG 118 02/13/2020       Current Outpatient Medications   Medication Sig    carvediloL (COREG) 12.5 MG tablet Take 1 tablet (12.5 mg total) by mouth 2 (two) times daily with meals.    cloNIDine (CATAPRES) 0.3 MG tablet Take 1 tablet (0.3 mg total) by mouth once daily.    hydrALAZINE (APRESOLINE) 100 MG tablet Take 1 tablet (100 mg total) by mouth 3 (three) times daily.    nicotine (NICODERM CQ) 21 mg/24 hr Place 1 patch onto the skin once daily.    nicotine polacrilex 2 MG Lozg Take 1 lozenge (2 mg total) by mouth as needed (1 per hour as needed in place of a cigarette, max of 15 per day).    olmesartan (BENICAR) 40 MG tablet Take 1 tablet (40 mg total) by mouth once daily.    sevelamer carbonate (RENVELA) 800 mg Tab Take 1,600 mg by mouth 3 (three) times daily with meals.    vitamin D (VITAMIN D3) 1000 units Tab Take 1,000 Units by mouth once daily.     No current facility-administered medications for this visit.         Review of Systems   Constitution: Negative for malaise/fatigue.   Eyes: Negative for visual disturbance.   Cardiovascular: Negative for chest pain, claudication, dyspnea on exertion, irregular heartbeat, near-syncope, orthopnea and palpitations.   Respiratory: Negative for shortness of breath and sleep disturbances due to breathing.    Musculoskeletal: Negative for muscle cramps, muscle weakness and myalgias.   Gastrointestinal: Negative for abdominal pain and heartburn.   Genitourinary: Negative for nocturia.   Neurological: Negative for difficulty with concentration, excessive daytime sleepiness, light-headedness, loss of balance, paresthesias and vertigo.       Objective:       Assessment:     1. ESRD (end stage renal disease) on dialysis    2. Renovascular hypertension    3. Diastolic dysfunction    4. Preop cardiovascular exam        Plan:   Schedule echo that is already ordered. If LVEF is preserved and PAP normal, will be ok to proceed with renal transplant.  If significant changes are found, she will require additional evaluation.  Add coreg 12.5 mg BID for better BP control.  Advised to hold prior on MWF prior to dialysis.      No orders of the defined types were placed in this encounter.      Established Patient - Audio Only Telehealth Visit     The patient location is: home  The chief complaint leading to consultation is: preop renal transplant  Visit type: Virtual visit with audio only (telephone)  Total time spent with patient: 30 minutes       The reason for the audio only service rather than synchronous audio and video virtual visit was related to technical difficulties or patient preference/necessity.     Each patient to whom I provide medical services by telemedicine is:  (1) informed of the relationship between the physician and patient and the respective role of any other health care provider with respect to management of the patient; and (2) notified that they may decline to receive medical  services by telemedicine and may withdraw from such care at any time. Patient verbally consented to receive this service via voice-only telephone call.            This service was not originating from a related E/M service provided within the previous 7 days nor will  to an E/M service or procedure within the next 24 hours or my soonest available appointment.  Prevailing standard of care was able to be met in this audio-only visit.

## 2020-05-28 NOTE — LETTER
May 28, 2020      Brandee Morton, GLENN  [address] [facility]  1st Floor Clinic  [facility] [address]           [facility]  [address]  [place] [address]  Phone: [phone]          Patient: April Vasquez   MR Number: [id number]   YOB: 1955   Date of Visit: 5/28/2020       Dear Brandee Morton:    Thank you for referring April Vasquez to me for evaluation. Attached you will find relevant portions of my assessment and plan of care.    If you have questions, please do not hesitate to call me. I look forward to following April Vasquez along with you.    Sincerely,    Shelton العلي MD    Enclosure  CC:  No Recipients    If you would like to receive this communication electronically, please contact [email or web address] or [phone] to request more information on [employer] Link access.    For providers and/or their staff who would like to refer a patient to [facility], please contact us through our one-stop-shop provider referral line, [facility], at [phone].    If you feel you have received this communication in error or would no longer like to receive these types of communications, please e-mail [email or web address]

## 2020-06-04 ENCOUNTER — CLINICAL SUPPORT (OUTPATIENT)
Dept: SMOKING CESSATION | Facility: CLINIC | Age: 65
End: 2020-06-04
Payer: COMMERCIAL

## 2020-06-04 DIAGNOSIS — F17.200 NICOTINE DEPENDENCE: ICD-10-CM

## 2020-06-04 PROCEDURE — 99999 PR PBB SHADOW E&M-EST. PATIENT-LVL I: CPT | Mod: PBBFAC,TXP,,

## 2020-06-04 PROCEDURE — 99999 PR PBB SHADOW E&M-EST. PATIENT-LVL I: ICD-10-PCS | Mod: PBBFAC,TXP,,

## 2020-06-04 PROCEDURE — 99402 PR PREVENT COUNSEL,INDIV,30 MIN: ICD-10-PCS | Mod: S$GLB,TXP,,

## 2020-06-04 PROCEDURE — 99402 PREV MED CNSL INDIV APPRX 30: CPT | Mod: S$GLB,TXP,,

## 2020-06-04 RX ORDER — DM/P-EPHED/ACETAMINOPH/DOXYLAM 30-7.5/3
2 LIQUID (ML) ORAL
Qty: 100 LOZENGE | Refills: 0 | Status: SHIPPED | OUTPATIENT
Start: 2020-06-04 | End: 2020-06-18 | Stop reason: SDUPTHER

## 2020-06-04 RX ORDER — IBUPROFEN 200 MG
1 TABLET ORAL DAILY
Qty: 14 PATCH | Refills: 0 | Status: SHIPPED | OUTPATIENT
Start: 2020-06-04 | End: 2020-06-18

## 2020-06-04 NOTE — Clinical Note
Comments: pt presents for smoking cessation follow up smoking 1 cigarette or so every few days, she is comfortable and wearing her nicotine patch 21mg daily, she is also using the 2mg nicotine lozenge throughout the day as needed for strong thoughts and urges to smoke, she has not purchased cigarettes but when her son comes over he smokes and this is when she slips but she has discussed with him to be more mindful of her not smoking when he comes over so she is less likely to get the strong desire to smoke, recommend dropping to the 14mg nicotine patch daily x 2 weeks. Session handout discussed. Strategies discussed. Will follow

## 2020-06-04 NOTE — PROGRESS NOTES
Individual Follow-Up Form    6/4/2020    Quit Date: n/a    Clinical Status of Patient: Outpatient    Length of Service: 30 minutes    Continuing Medication: yes  Patches or Nicotine Lozenges    Other Medications: n/a     Target Symptoms: Withdrawal and medication side effects. The following were  rated moderate (3) to severe (4) on TCRS:  · Moderate (3): 0  · Severe (4): 0    Comments: pt presents for smoking cessation follow up smoking 1 cigarette or so every few days, she is comfortable and wearing her nicotine patch 21mg daily, she is also using the 2mg nicotine lozenge throughout the day as needed for strong thoughts and urges to smoke, she has not purchased cigarettes but when her son comes over he smokes and this is when she slips but she has discussed with him to be more mindful of her not smoking when he comes over so she is less likely to get the strong desire to smoke, recommend dropping to the 14mg nicotine patch daily x 2 weeks. Session handout discussed. Strategies discussed. Will follow     Diagnosis: F17.210    Next Visit: 2 weeks

## 2020-06-08 ENCOUNTER — TELEPHONE (OUTPATIENT)
Dept: UROGYNECOLOGY | Facility: CLINIC | Age: 65
End: 2020-06-08

## 2020-06-08 NOTE — TELEPHONE ENCOUNTER
Spoke to pt regarding her appointment on 6/16/2020. Pt was informed Dr. Murillo does not perform WWE/PAP and she will need to contact the transplant team to get rescheduled correctly or contact her GYN. Pt is aware and verbalizes understanding.

## 2020-06-18 ENCOUNTER — CLINICAL SUPPORT (OUTPATIENT)
Dept: SMOKING CESSATION | Facility: CLINIC | Age: 65
End: 2020-06-18
Payer: COMMERCIAL

## 2020-06-18 DIAGNOSIS — F17.200 NICOTINE DEPENDENCE: Primary | ICD-10-CM

## 2020-06-18 PROCEDURE — 99402 PREV MED CNSL INDIV APPRX 30: CPT | Mod: S$GLB,TXP,,

## 2020-06-18 PROCEDURE — 99402 PR PREVENT COUNSEL,INDIV,30 MIN: ICD-10-PCS | Mod: S$GLB,TXP,,

## 2020-06-18 PROCEDURE — 99999 PR PBB SHADOW E&M-EST. PATIENT-LVL I: CPT | Mod: PBBFAC,TXP,,

## 2020-06-18 PROCEDURE — 99999 PR PBB SHADOW E&M-EST. PATIENT-LVL I: ICD-10-PCS | Mod: PBBFAC,TXP,,

## 2020-06-18 RX ORDER — NICOTINE 7MG/24HR
1 PATCH, TRANSDERMAL 24 HOURS TRANSDERMAL DAILY
Qty: 14 PATCH | Refills: 0 | Status: SHIPPED | OUTPATIENT
Start: 2020-06-18 | End: 2020-07-18

## 2020-06-18 RX ORDER — DM/P-EPHED/ACETAMINOPH/DOXYLAM 30-7.5/3
2 LIQUID (ML) ORAL
Qty: 100 LOZENGE | Refills: 0 | Status: SHIPPED | OUTPATIENT
Start: 2020-06-18 | End: 2020-07-19

## 2020-06-18 NOTE — Clinical Note
Comments: pt presents for her telephonic appt follow up as a continued non-smoker, she quit smoking in March and has not had any slips, she is doing great and admits that there are days she is not changing out the nicotine patches because she feels like she does not need them any more, she is currently on the 14mg patch, recommend that she drop to the 7mg patch and do this dose patch for about a week then every other day until comfortable not wearing at all, she is using the nicotine nicotine 2mg for urges but not daily. Session handout discussed, benefit end date discussed, will follow

## 2020-06-18 NOTE — PROGRESS NOTES
Individual Follow-Up Form    6/18/2020    Quit Date: march     Clinical Status of Patient: Outpatient    Length of Service: 30 minutes    Continuing Medication: yes  Patches or Nicotine Lozenges    Other Medications: n/a     Target Symptoms: Withdrawal and medication side effects. The following were  rated moderate (3) to severe (4) on TCRS:  · Moderate (3): 0  · Severe (4): 0    Comments: pt presents for her telephonic appt follow up as a continued non-smoker, she quit smoking in March and has not had any slips, she is doing great and admits that there are days she is not changing out the nicotine patches because she feels like she does not need them any more, she is currently on the 14mg patch, recommend that she drop to the 7mg patch and do this dose patch for about a week then every other day until comfortable not wearing at all, she is using the nicotine nicotine 2mg for urges but not daily. Session handout discussed, benefit end date discussed, will follow     Diagnosis: F17.210    Next Visit: 2 weeks

## 2020-06-29 DIAGNOSIS — Z12.11 SPECIAL SCREENING FOR MALIGNANT NEOPLASMS, COLON: Primary | ICD-10-CM

## 2020-06-29 DIAGNOSIS — Z99.2 DIALYSIS PATIENT: ICD-10-CM

## 2020-06-29 RX ORDER — POLYETHYLENE GLYCOL 3350, SODIUM SULFATE ANHYDROUS, SODIUM BICARBONATE, SODIUM CHLORIDE, POTASSIUM CHLORIDE 236; 22.74; 6.74; 5.86; 2.97 G/4L; G/4L; G/4L; G/4L; G/4L
4 POWDER, FOR SOLUTION ORAL ONCE
Qty: 4000 ML | Refills: 0 | Status: SHIPPED | OUTPATIENT
Start: 2020-06-29 | End: 2020-06-29

## 2020-07-20 ENCOUNTER — TELEPHONE (OUTPATIENT)
Dept: TRANSPLANT | Facility: CLINIC | Age: 65
End: 2020-07-20

## 2020-07-21 ENCOUNTER — HOSPITAL ENCOUNTER (OUTPATIENT)
Dept: CARDIOLOGY | Facility: HOSPITAL | Age: 65
Discharge: HOME | End: 2020-07-21
Attending: NURSE PRACTITIONER
Payer: MEDICARE

## 2020-07-21 ENCOUNTER — LAB VISIT (OUTPATIENT)
Dept: SURGERY | Facility: CLINIC | Age: 65
End: 2020-07-21
Payer: MEDICARE

## 2020-07-21 ENCOUNTER — HOSPITAL ENCOUNTER (OUTPATIENT)
Dept: RADIOLOGY | Facility: HOSPITAL | Age: 65
Discharge: HOME | End: 2020-07-21
Attending: NURSE PRACTITIONER
Payer: MEDICARE

## 2020-07-21 VITALS
BODY MASS INDEX: 19.46 KG/M2 | HEIGHT: 64 IN | WEIGHT: 114 LBS | HEART RATE: 64 BPM | SYSTOLIC BLOOD PRESSURE: 120 MMHG | DIASTOLIC BLOOD PRESSURE: 76 MMHG

## 2020-07-21 DIAGNOSIS — Z13.9 SCREENING PROCEDURE: ICD-10-CM

## 2020-07-21 DIAGNOSIS — Z76.82 ORGAN TRANSPLANT CANDIDATE: ICD-10-CM

## 2020-07-21 LAB
ASCENDING AORTA: 2.81 CM
AV INDEX (PROSTH): 0.71
AV MEAN GRADIENT: 6 MMHG
AV PEAK GRADIENT: 13 MMHG
AV VALVE AREA: 1.91 CM2
AV VELOCITY RATIO: 0.63
BSA FOR ECHO PROCEDURE: 1.53 M2
CV ECHO LV RWT: 0.37 CM
DOP CALC AO PEAK VEL: 1.8 M/S
DOP CALC AO VTI: 36.27 CM
DOP CALC LVOT AREA: 2.7 CM2
DOP CALC LVOT DIAMETER: 1.85 CM
DOP CALC LVOT PEAK VEL: 1.13 M/S
DOP CALC LVOT STROKE VOLUME: 69.37 CM3
DOP CALCLVOT PEAK VEL VTI: 25.82 CM
E WAVE DECELERATION TIME: 163.65 MSEC
E/A RATIO: 1.04
E/E' RATIO: 17.4 M/S
ECHO LV POSTERIOR WALL: 0.98 CM (ref 0.6–1.1)
FRACTIONAL SHORTENING: 30 % (ref 28–44)
INTERVENTRICULAR SEPTUM: 1.02 CM (ref 0.6–1.1)
IVRT: 79.92 MSEC
LA MAJOR: 5.75 CM
LA MINOR: 5.74 CM
LA WIDTH: 4.62 CM
LEFT ATRIUM SIZE: 4.46 CM
LEFT ATRIUM VOLUME INDEX: 65.3 ML/M2
LEFT ATRIUM VOLUME: 100.62 CM3
LEFT INTERNAL DIMENSION IN SYSTOLE: 3.64 CM (ref 2.1–4)
LEFT VENTRICLE DIASTOLIC VOLUME INDEX: 85.14 ML/M2
LEFT VENTRICLE DIASTOLIC VOLUME: 131.14 ML
LEFT VENTRICLE MASS INDEX: 127 G/M2
LEFT VENTRICLE SYSTOLIC VOLUME INDEX: 36.2 ML/M2
LEFT VENTRICLE SYSTOLIC VOLUME: 55.76 ML
LEFT VENTRICULAR INTERNAL DIMENSION IN DIASTOLE: 5.23 CM (ref 3.5–6)
LEFT VENTRICULAR MASS: 196.02 G
LV LATERAL E/E' RATIO: 17.4 M/S
LV SEPTAL E/E' RATIO: 17.4 M/S
MV PEAK A VEL: 0.84 M/S
MV PEAK E VEL: 0.87 M/S
MV STENOSIS PRESSURE HALF TIME: 47.46 MS
MV VALVE AREA P 1/2 METHOD: 4.64 CM2
PISA TR MAX VEL: 2.41 M/S
PULM VEIN S/D RATIO: 1.55
PV PEAK D VEL: 0.4 M/S
PV PEAK S VEL: 0.62 M/S
RA MAJOR: 4.6 CM
RA PRESSURE: 3 MMHG
RA WIDTH: 3.92 CM
RIGHT VENTRICULAR END-DIASTOLIC DIMENSION: 4 CM
RV TISSUE DOPPLER FREE WALL SYSTOLIC VELOCITY 1 (APICAL 4 CHAMBER VIEW): 12.19 CM/S
SINUS: 2.53 CM
STJ: 1.94 CM
TDI LATERAL: 0.05 M/S
TDI SEPTAL: 0.05 M/S
TDI: 0.05 M/S
TR MAX PG: 23 MMHG
TRICUSPID ANNULAR PLANE SYSTOLIC EXCURSION: 2.39 CM
TV REST PULMONARY ARTERY PRESSURE: 26 MMHG

## 2020-07-21 PROCEDURE — U0003 INFECTIOUS AGENT DETECTION BY NUCLEIC ACID (DNA OR RNA); SEVERE ACUTE RESPIRATORY SYNDROME CORONAVIRUS 2 (SARS-COV-2) (CORONAVIRUS DISEASE [COVID-19]), AMPLIFIED PROBE TECHNIQUE, MAKING USE OF HIGH THROUGHPUT TECHNOLOGIES AS DESCRIBED BY CMS-2020-01-R: HCPCS | Mod: TXP

## 2020-07-21 PROCEDURE — 77067 SCR MAMMO BI INCL CAD: CPT | Mod: TC,TXP

## 2020-07-21 PROCEDURE — 93306 TTE W/DOPPLER COMPLETE: CPT | Mod: TXP

## 2020-07-21 PROCEDURE — 77067 MAMMO DIGITAL SCREENING BILAT WITH CAD: ICD-10-PCS | Mod: 26,TXP,, | Performed by: RADIOLOGY

## 2020-07-21 PROCEDURE — 93306 ECHO (CUPID ONLY): ICD-10-PCS | Mod: 26,TXP,, | Performed by: INTERNAL MEDICINE

## 2020-07-21 PROCEDURE — 77067 SCR MAMMO BI INCL CAD: CPT | Mod: 26,TXP,, | Performed by: RADIOLOGY

## 2020-07-21 PROCEDURE — 93306 TTE W/DOPPLER COMPLETE: CPT | Mod: 26,TXP,, | Performed by: INTERNAL MEDICINE

## 2020-07-21 NOTE — PROGRESS NOTES
Please call and inform patient that echo looks good.  She doesn't need anything further from us regarding her kidney transplant.

## 2020-07-22 ENCOUNTER — TELEPHONE (OUTPATIENT)
Dept: RADIOLOGY | Facility: HOSPITAL | Age: 65
End: 2020-07-22

## 2020-07-22 LAB — SARS-COV-2 RNA RESP QL NAA+PROBE: NOT DETECTED

## 2020-07-22 NOTE — TELEPHONE ENCOUNTER
Spoke with patient and explained mammogram findings.Patient expressed understanding of results. Patient scheduled abnormal mammogram follow up appointment at [facility] on 7/23/2020.

## 2020-07-23 ENCOUNTER — TELEPHONE (OUTPATIENT)
Dept: TRANSPLANT | Facility: CLINIC | Age: 65
End: 2020-07-23

## 2020-07-23 DIAGNOSIS — Z76.82 ORGAN TRANSPLANT CANDIDATE: Primary | ICD-10-CM
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
## 2020-07-24 ENCOUNTER — TELEPHONE (OUTPATIENT)
Dept: TRANSPLANT | Facility: CLINIC | Age: 65
End: 2020-07-24

## 2020-07-24 NOTE — TELEPHONE ENCOUNTER
----- Message from Karley Hinojosa sent at 7/24/2020  6:41 AM CDT -----  Contact: April   [phone]  Paul,  Please call Ms. Vasquez today.  Thanks,    Karley  ----- Message -----  From: Khadijah Gonzalez LCSW  Sent: 7/23/2020   3:24 PM CDT  To: Karley Whitney, #    Financial coordinators, will you please look into her insurance and give her a call?  Social work will check on transportation.  Khadijah  ----- Message -----  From: Benita Celaya  Sent: 7/23/2020   3:11 PM CDT  To: [facility] Financial Counselors, #    Can someone do an reassessment for transportation and insurance update on this patient?  Benita   ----- Message -----  From: Christiano Sanders RN  Sent: 7/23/2020  12:06 PM CDT  To: Benita Celaya, #      ----- Message -----  From: Tiera Ramírez  Sent: 7/23/2020   8:02 AM CDT  To: Selene Irvin Staff    Caller says she cannot keep her appts. This a.m. , since she has no ride.    Also when tried to reschedule the testing, received a notice that they are not covered by her insurance of medicare part a and b.         Pls call to discuss this.

## 2020-07-27 ENCOUNTER — LAB VISIT (OUTPATIENT)
Dept: FAMILY MEDICINE | Facility: CLINIC | Age: 65
End: 2020-07-27
Payer: MEDICARE

## 2020-07-27 DIAGNOSIS — Z76.82 ORGAN TRANSPLANT CANDIDATE: ICD-10-CM

## 2020-07-27 PROCEDURE — U0003 INFECTIOUS AGENT DETECTION BY NUCLEIC ACID (DNA OR RNA); SEVERE ACUTE RESPIRATORY SYNDROME CORONAVIRUS 2 (SARS-COV-2) (CORONAVIRUS DISEASE [COVID-19]), AMPLIFIED PROBE TECHNIQUE, MAKING USE OF HIGH THROUGHPUT TECHNOLOGIES AS DESCRIBED BY CMS-2020-01-R: HCPCS

## 2020-07-28 ENCOUNTER — LAB VISIT (OUTPATIENT)
Dept: LAB | Facility: HOSPITAL | Age: 65
End: 2020-07-28
Attending: STUDENT IN AN ORGANIZED HEALTH CARE EDUCATION/TRAINING PROGRAM
Payer: MEDICARE

## 2020-07-28 ENCOUNTER — OFFICE VISIT (OUTPATIENT)
Dept: INFECTIOUS DISEASES | Facility: CLINIC | Age: 65
End: 2020-07-28
Payer: MEDICARE

## 2020-07-28 VITALS
BODY MASS INDEX: 20.51 KG/M2 | TEMPERATURE: 99 F | WEIGHT: 120.13 LBS | DIASTOLIC BLOOD PRESSURE: 75 MMHG | HEART RATE: 60 BPM | HEIGHT: 64 IN | SYSTOLIC BLOOD PRESSURE: 168 MMHG

## 2020-07-28 DIAGNOSIS — Z22.7 LATENT TUBERCULOSIS: ICD-10-CM

## 2020-07-28 DIAGNOSIS — Z76.82 ORGAN TRANSPLANT CANDIDATE: ICD-10-CM

## 2020-07-28 DIAGNOSIS — Z76.82 ORGAN TRANSPLANT CANDIDATE: Primary | ICD-10-CM

## 2020-07-28 LAB
ALBUMIN SERPL BCP-MCNC: 3.7 G/DL (ref 3.5–5.2)
ALP SERPL-CCNC: 167 U/L (ref 55–135)
ALT SERPL W/O P-5'-P-CCNC: 11 U/L (ref 10–44)
ANION GAP SERPL CALC-SCNC: 14 MMOL/L (ref 8–16)
AST SERPL-CCNC: 18 U/L (ref 10–40)
BILIRUB SERPL-MCNC: 0.4 MG/DL (ref 0.1–1)
BUN SERPL-MCNC: 38 MG/DL (ref 8–23)
CALCIUM SERPL-MCNC: 9.5 MG/DL (ref 8.7–10.5)
CHLORIDE SERPL-SCNC: 93 MMOL/L (ref 95–110)
CO2 SERPL-SCNC: 34 MMOL/L (ref 23–29)
CREAT SERPL-MCNC: 8.4 MG/DL (ref 0.5–1.4)
EST. GFR  (AFRICAN AMERICAN): 5.3 ML/MIN/1.73 M^2
EST. GFR  (NON AFRICAN AMERICAN): 4.6 ML/MIN/1.73 M^2
GLUCOSE SERPL-MCNC: 85 MG/DL (ref 70–110)
HEPATITIS A ANTIBODY, IGG: POSITIVE
POTASSIUM SERPL-SCNC: 4.6 MMOL/L (ref 3.5–5.1)
PROT SERPL-MCNC: 7.9 G/DL (ref 6–8.4)
SARS-COV-2 RNA RESP QL NAA+PROBE: NOT DETECTED
SODIUM SERPL-SCNC: 141 MMOL/L (ref 136–145)

## 2020-07-28 PROCEDURE — 99999 PR PBB SHADOW E&M-EST. PATIENT-LVL III: CPT | Mod: PBBFAC,GC,TXP, | Performed by: STUDENT IN AN ORGANIZED HEALTH CARE EDUCATION/TRAINING PROGRAM

## 2020-07-28 PROCEDURE — 80053 COMPREHEN METABOLIC PANEL: CPT | Mod: TXP

## 2020-07-28 PROCEDURE — 86790 VIRUS ANTIBODY NOS: CPT | Mod: TXP

## 2020-07-28 PROCEDURE — 36415 COLL VENOUS BLD VENIPUNCTURE: CPT | Mod: TXP

## 2020-07-28 PROCEDURE — 99213 OFFICE O/P EST LOW 20 MIN: CPT | Mod: PBBFAC,TXP | Performed by: STUDENT IN AN ORGANIZED HEALTH CARE EDUCATION/TRAINING PROGRAM

## 2020-07-28 PROCEDURE — 99204 OFFICE O/P NEW MOD 45 MIN: CPT | Mod: S$PBB,GC,TXP, | Performed by: STUDENT IN AN ORGANIZED HEALTH CARE EDUCATION/TRAINING PROGRAM

## 2020-07-28 PROCEDURE — 99204 PR OFFICE/OUTPT VISIT, NEW, LEVL IV, 45-59 MIN: ICD-10-PCS | Mod: S$PBB,GC,TXP, | Performed by: STUDENT IN AN ORGANIZED HEALTH CARE EDUCATION/TRAINING PROGRAM

## 2020-07-28 PROCEDURE — 99999 PR PBB SHADOW E&M-EST. PATIENT-LVL III: ICD-10-PCS | Mod: PBBFAC,GC,TXP, | Performed by: STUDENT IN AN ORGANIZED HEALTH CARE EDUCATION/TRAINING PROGRAM

## 2020-07-28 RX ORDER — LANOLIN ALCOHOL/MO/W.PET/CERES
50 CREAM (GRAM) TOPICAL DAILY
Qty: 90 TABLET | Refills: 2 | Status: SHIPPED | OUTPATIENT
Start: 2020-07-28 | End: 2021-04-24

## 2020-07-28 RX ORDER — ISONIAZID 300 MG/1
300 TABLET ORAL DAILY
Qty: 90 TABLET | Refills: 2 | Status: SHIPPED | OUTPATIENT
Start: 2020-07-28 | End: 2021-04-24

## 2020-07-28 NOTE — PROGRESS NOTES
Pre Transplant Infectious Diseases Consult  Kidney Transplant Recipient Evaluation    Reason for Visit:  Pre Transplant Evaluation  She was born in [place] and received the BCG vaccine in the past. Moved to the  in 1978. Last traveled to [place] in 1984. Patient has beed on hemodialysis since 10/2016. Former smoker, quit 1.5 months ago after undergoing 3 months of rehab. Lives in [place], [place] with 2 sons.     Organ:  Kidney    Etiology of Kidney Disease:  hypertension    History of Prior Transplant:  No    Currently taking immunosuppressants/steroids:  No    History of Splenectomy:  No    Infectious History:  Current/recent infections or currently taking antibiotics?  No  History of recurrent infections (sinuses, throat, bladder/kidneys, intestines, skin, dental, lung, catheter (HD/PD) related, or peritonitis/SBP)?  No  Any major hospitalizations due to infection?  No  If diabetic, history of diabetic foot infection/osteomyelitis?  No  History of shingles?  No  History of STDs (syphilis, viral hepatitis, HIV)?  No  Exposure to TB or ever had a positive TB skin test?  No history of exposure. 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  History of residence in coccidioides endemic areas ([facility]?  No  Any foreign travel?  Yes  Any associated illness?  Yes, diarrhea, 1981    Social/Environmental:  Occupational:  Retired, worked as a teller at the [employer]  Animal exposures (dogs, cats, farm animals, bird cages, fish tanks):  No, dog ran away last year  Hobbies (gardening, hike, fish/hunting, etc): No  Consumption of raw/undercooked meat or seafood?  No  Any injectable or smoked recreational drug use?  No    Immunization History:  Childhood vaccines:  Yes  Last Flu shot: yes, last October/november  Tetanus/TDAP:   Hepatitis A: no  Hepatitis B: yes  Prevnar-13:  ?  Pneumovax-23: ?  Shingles (Zostavax/Shingrix): no  Meningococcal: no  Other:     Pre-transplant serologies:  VZV IgG +  quant gold +  strongy ab -  Hep B sAb +, sAg -, core ab - (immune to hepatitis B)  CMV IgG +  HCV ab -  HIV -  RPR non-reactive  Hep A IgG + (immune to hepatitis A)    Serologies:  CMV IgG Interpretation   Date Value Ref Range Status   02/13/2020 Reactive (A) Non-Reactive Final     Hep B Core Total Ab   Date Value Ref Range Status   02/13/2020 Negative  Final     Hep B S Ab   Date Value Ref Range Status   11/16/2016 Negative  Final     Hepatitis B Surface Ag   Date Value Ref Range Status   02/13/2020 Negative Negative Final     HIV 1/2 Ag/Ab   Date Value Ref Range Status   02/13/2020 Negative Negative Final     TB Gold Plus   Date Value Ref Range Status   02/13/2020 Positive (A)  Final     Comment:     The Nil tube value is used to determine if the patient has a  preexisting immune response which could cause a false-positive  reading on the test.  In order for a test to be valid, the   NIL tube must have a value of less than or equal to 8.0 IU/mL.  The mitogen control tube is used to assure the patient has a   healthy immune status and also serves as a control for correct  blood handling and incubation.  It is used to detect false-  negative readings.  The mitogen tube must have a gamma   interferon value of greater than or equal to 0.5 IU/mL higher  that the value of the Nil tube.  The TB antigen tubes are coated with the M. tuberculosis   specific antigens. For a test to be considered positive,the  TB antigen tube values minus the Nil tube value must be   greater than or equal to 0.35 IU/mL.  Diagnosing or excluding tuberculosis disease, and assessing  the probability of LTNI, requires a combination of   epidemiological, historical, medical, and diagnostic findings  that should be taken into account when interpreting   Quantiferon-TB Gold Plus results.       RPR   Date Value Ref Range Status    02/13/2020 Non-reactive Non-reactive Final     Strongyloides Ab IgG   Date Value Ref Range Status   02/13/2020 Negative Negative Final     Comment:     No detectable levels of IgG antibodies to Strongyloides.  Repeat testing in 1-2 weeks if clinically indicated.  Test Performed by:  [facility]  [address] [place] [address]  : Daniel Huitron M.D. Ph.D.; CLIA# [id number]       Varicella Interpretation   Date Value Ref Range Status   02/13/2020 Positive (A) Negative Final     Comment:     <or=0.8    Negative        No detectable IgG antibody to Varicella zoster  by the IGSELLE test. Such individuals are presumed to be   uninfected with Varicella zoster and to be susceptible to   primary infection.  0.9-1.0    Equivocal  >or=1.1    Positive        Indicates presence of detectable IgG antibody to Varicella   zoster by the GISELLE test. Indicative of previous or current   infection.          Review of Systems   Constitution: Negative for chills and fever.   HENT: Negative for sore throat.    Eyes: Negative for photophobia.   Cardiovascular: Negative for leg swelling.   Respiratory: Negative for cough, hemoptysis and shortness of breath.    Skin: Positive for dry skin.   Musculoskeletal: Positive for myalgias. Negative for joint pain and joint swelling.   Gastrointestinal: Positive for constipation. Negative for diarrhea, nausea and vomiting.   Genitourinary: Negative for dysuria.   Neurological: Positive for paresthesias. Negative for headaches.   Psychiatric/Behavioral: Negative for altered mental status.     Physical Exam  Constitutional:       General: She is not in acute distress.     Appearance: Normal appearance. She is not ill-appearing, toxic-appearing or diaphoretic.   HENT:      Head: Normocephalic and atraumatic.      Mouth/Throat:      Mouth: Mucous membranes are moist.      Pharynx: No oropharyngeal exudate or posterior oropharyngeal  erythema.   Eyes:      General: No scleral icterus.     Conjunctiva/sclera: Conjunctivae normal.   Neck:      Musculoskeletal: Normal range of motion and neck supple.   Cardiovascular:      Rate and Rhythm: Normal rate and regular rhythm.      Pulses: Normal pulses.   Pulmonary:      Effort: Pulmonary effort is normal. No respiratory distress.      Breath sounds: Normal breath sounds. No wheezing, rhonchi or rales.   Abdominal:      General: There is no distension.      Palpations: Abdomen is soft.      Tenderness: There is no abdominal tenderness. There is no guarding.   Musculoskeletal:      Right lower leg: No edema.      Left lower leg: No edema.   Lymphadenopathy:      Cervical: No cervical adenopathy.   Skin:     General: Skin is warm and dry.      Coloration: Skin is not jaundiced.   Neurological:      Mental Status: She is alert and oriented to person, place, and time.   Psychiatric:         Mood and Affect: Mood normal.         Behavior: Behavior normal.              Counseling:   I discussed with the patient the risk for increased susceptibility to infections following transplantation including increased risk for infection right after transplant and if rejection should occur.  The patient has been counseled on the importance of vaccinations including but not limited to a yearly flu vaccine. Patient was also instructed to encourage that family/caretakers receive their flu vaccine yearly. The patient was encouraged to contact us about any problems that may develop after immunizations and possible side effects were reviewed.     Specific guidance has been provided to the patient regarding the patient's occupation, hobbies and activities to avoid future infectious complications. These include but are not limited to: avoiding raw/undercooked meats and seafood, avoiding unpasteurized milk/cheeses, proper (hand) hygiene, contact with animals and appropriate vaccination of animals, use of mosquito/tick  precautions, avoiding walking barefoot, avoiding sick contacts, and seeking medical advice prior to foreign travel (specifically developing countries).     Transplant Candidacy: Based on available information, there are no identified significant barriers to transplantation from an infectious disease standpoint pending acceptable serologies and subject to recommendations below.     Final determination of transplant candidacy will be made once evaluation is complete and reviewed by the Transplant Selection Committee.      ID recommendations:  Vaccination review pending. Patient states will obtain vaccination records. Will help to facilitate this. Script given for Shingrix (2 doses, 0 then 2 months). If unable to obtain vaccination records will order Tdap & Prevnar followed by Pneumovax 8 weeks later and 2nd dose of Pneumovax 5 years after the first dose.  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 Have prescribed INH and pyridoxine (to decrease risk of peripheral neuropathy). This regimen is least likely to interfere with medications needed for transplant. Will treat for 9 months & will need monthly CMP to monitor liver enzymes & function. Patient counseled on adverse effects of medication.

## 2020-07-30 ENCOUNTER — HOSPITAL ENCOUNTER (OUTPATIENT)
Dept: RADIOLOGY | Facility: HOSPITAL | Age: 65
Discharge: HOME | End: 2020-07-30
Attending: NURSE PRACTITIONER
Payer: MEDICARE

## 2020-07-30 ENCOUNTER — HOSPITAL ENCOUNTER (OUTPATIENT)
Dept: PULMONOLOGY | Facility: CLINIC | Age: 65
Discharge: HOME | End: 2020-07-30
Payer: MEDICARE

## 2020-07-30 DIAGNOSIS — Z76.82 ORGAN TRANSPLANT CANDIDATE: ICD-10-CM

## 2020-07-30 DIAGNOSIS — R92.8 ABNORMAL MAMMOGRAM: ICD-10-CM

## 2020-07-30 LAB
ALLENS TEST: ABNORMAL
DELSYS: ABNORMAL
HCO3 UR-SCNC: 31.2 MMOL/L (ref 24–28)
PCO2 BLDA: 46 MMHG (ref 35–45)
PH SMN: 7.44 [PH] (ref 7.35–7.45)
PO2 BLDA: 93 MMHG (ref 80–100)
POC BE: 7 MMOL/L
POC SATURATED O2: 97 % (ref 95–100)
POC TCO2: 33 MMOL/L (ref 23–27)
SAMPLE: ABNORMAL
SITE: ABNORMAL

## 2020-07-30 PROCEDURE — 82803 BLOOD GASES ANY COMBINATION: CPT | Mod: PBBFAC,NTX | Performed by: INTERNAL MEDICINE

## 2020-07-30 PROCEDURE — 94727 GAS DIL/WSHOT DETER LNG VOL: CPT | Mod: 26,S$PBB,TXP, | Performed by: INTERNAL MEDICINE

## 2020-07-30 PROCEDURE — 36600 WITHDRAWAL OF ARTERIAL BLOOD: CPT | Mod: PBBFAC,TXP | Performed by: INTERNAL MEDICINE

## 2020-07-30 PROCEDURE — 77061 MAMMO DIGITAL DIAGNOSTIC LEFT WITH TOMOSYNTHESIS_CAD: ICD-10-PCS | Mod: 26,LT,TXP, | Performed by: RADIOLOGY

## 2020-07-30 PROCEDURE — 77065 DX MAMMO INCL CAD UNI: CPT | Mod: TC,PO,LT,NTX

## 2020-07-30 PROCEDURE — 94010 BREATHING CAPACITY TEST: CPT | Mod: PBBFAC,TXP | Performed by: INTERNAL MEDICINE

## 2020-07-30 PROCEDURE — 94729 PR C02/MEMBANE DIFFUSE CAPACITY: ICD-10-PCS | Mod: 26,S$PBB,TXP, | Performed by: INTERNAL MEDICINE

## 2020-07-30 PROCEDURE — 77065 DX MAMMO INCL CAD UNI: CPT | Mod: 26,LT,TXP, | Performed by: RADIOLOGY

## 2020-07-30 PROCEDURE — 77061 BREAST TOMOSYNTHESIS UNI: CPT | Mod: 26,LT,TXP, | Performed by: RADIOLOGY

## 2020-07-30 PROCEDURE — 94729 DIFFUSING CAPACITY: CPT | Mod: 26,S$PBB,TXP, | Performed by: INTERNAL MEDICINE

## 2020-07-30 PROCEDURE — 77065 MAMMO DIGITAL DIAGNOSTIC LEFT WITH TOMOSYNTHESIS_CAD: ICD-10-PCS | Mod: 26,LT,TXP, | Performed by: RADIOLOGY

## 2020-07-30 PROCEDURE — 94010 BREATHING CAPACITY TEST: ICD-10-PCS | Mod: 26,S$PBB,TXP, | Performed by: INTERNAL MEDICINE

## 2020-07-30 PROCEDURE — 36600 PR WITHDRAWAL OF ARTERIAL BLOOD: ICD-10-PCS | Mod: S$PBB,TXP,, | Performed by: INTERNAL MEDICINE

## 2020-07-30 PROCEDURE — 94727 PR PULM FUNCTION TEST BY GAS: ICD-10-PCS | Mod: 26,S$PBB,TXP, | Performed by: INTERNAL MEDICINE

## 2020-07-30 PROCEDURE — 94729 DIFFUSING CAPACITY: CPT | Mod: PBBFAC,TXP | Performed by: INTERNAL MEDICINE

## 2020-07-30 PROCEDURE — 94727 GAS DIL/WSHOT DETER LNG VOL: CPT | Mod: PBBFAC,NTX | Performed by: INTERNAL MEDICINE

## 2020-07-30 PROCEDURE — 36600 WITHDRAWAL OF ARTERIAL BLOOD: CPT | Mod: S$PBB,TXP,, | Performed by: INTERNAL MEDICINE

## 2020-07-30 PROCEDURE — 94010 BREATHING CAPACITY TEST: CPT | Mod: 26,S$PBB,TXP, | Performed by: INTERNAL MEDICINE

## 2020-07-30 PROCEDURE — 77061 BREAST TOMOSYNTHESIS UNI: CPT | Mod: TC,PO,LT,TXP

## 2020-07-31 NOTE — PROGRESS NOTES
I have reviewed the notes, assessments, and/or procedures performed by Dr. Bowman, I concur with her/his documentation of April Vasquez.

## 2020-08-03 ENCOUNTER — LAB VISIT (OUTPATIENT)
Dept: FAMILY MEDICINE | Facility: CLINIC | Age: 65
End: 2020-08-03
Payer: MEDICARE

## 2020-08-03 PROCEDURE — U0003 INFECTIOUS AGENT DETECTION BY NUCLEIC ACID (DNA OR RNA); SEVERE ACUTE RESPIRATORY SYNDROME CORONAVIRUS 2 (SARS-COV-2) (CORONAVIRUS DISEASE [COVID-19]), AMPLIFIED PROBE TECHNIQUE, MAKING USE OF HIGH THROUGHPUT TECHNOLOGIES AS DESCRIBED BY CMS-2020-01-R: HCPCS

## 2020-08-04 LAB — SARS-COV-2 RNA RESP QL NAA+PROBE: NOT DETECTED

## 2020-08-04 NOTE — PROGRESS NOTES
Vaccinations (per records from [facility]):  Prevnar 3/2/2020  Pneumovax 2/21/2017  Hep A IgG positive 7/28/2020  Hep B Engerix B 2/3/2020, 4/20/2018, 2/19/2018, 1/17/2018 (Hep B sAb negative 12/6/2019, positive 2/13/2020)  Influenza 9/20/2019    Ms. Vasquez will need the Tdap vaccination, 2nd dose of Hepatitis B vaccination, and 2nd pneumovax 2/21/2022. She will need yearly flu vaccine and Tdap every 10 years (unless contaminated wound). She was given a script for Shingrix last visit.    Informed patient of the above. She is to undergo a colonoscopy on 8/6 and would like receive a script for the Tdap, and 2nd dose of Hep B. Will leave script in the office for her to  on 8/6.

## 2020-08-06 ENCOUNTER — HOSPITAL ENCOUNTER (OUTPATIENT)
Facility: HOSPITAL | Age: 65
Discharge: HOME | End: 2020-08-06
Attending: INTERNAL MEDICINE | Admitting: INTERNAL MEDICINE
Payer: MEDICARE

## 2020-08-06 ENCOUNTER — ANESTHESIA EVENT (OUTPATIENT)
Dept: ENDOSCOPY | Facility: HOSPITAL | Age: 65
End: 2020-08-06
Payer: MEDICARE

## 2020-08-06 ENCOUNTER — ANESTHESIA (OUTPATIENT)
Dept: ENDOSCOPY | Facility: HOSPITAL | Age: 65
End: 2020-08-06
Payer: MEDICARE

## 2020-08-06 VITALS
BODY MASS INDEX: 20.14 KG/M2 | DIASTOLIC BLOOD PRESSURE: 76 MMHG | WEIGHT: 118 LBS | TEMPERATURE: 98 F | HEIGHT: 64 IN | SYSTOLIC BLOOD PRESSURE: 169 MMHG | RESPIRATION RATE: 16 BRPM | OXYGEN SATURATION: 99 % | HEART RATE: 60 BPM

## 2020-08-06 DIAGNOSIS — Z12.11 COLON CANCER SCREENING: Primary | ICD-10-CM

## 2020-08-06 DIAGNOSIS — Z12.11 SCREENING FOR COLON CANCER: ICD-10-CM

## 2020-08-06 PROCEDURE — 25000003 PHARM REV CODE 250: Mod: NTX | Performed by: INTERNAL MEDICINE

## 2020-08-06 PROCEDURE — 88305 TISSUE EXAM BY PATHOLOGIST: ICD-10-PCS | Mod: 26,TXP,, | Performed by: PATHOLOGY

## 2020-08-06 PROCEDURE — 88305 TISSUE EXAM BY PATHOLOGIST: CPT | Mod: NTX | Performed by: PATHOLOGY

## 2020-08-06 PROCEDURE — 37000008 HC ANESTHESIA 1ST 15 MINUTES: Mod: NTX | Performed by: INTERNAL MEDICINE

## 2020-08-06 PROCEDURE — 63600175 PHARM REV CODE 636 W HCPCS: Mod: NTX | Performed by: STUDENT IN AN ORGANIZED HEALTH CARE EDUCATION/TRAINING PROGRAM

## 2020-08-06 PROCEDURE — 37000009 HC ANESTHESIA EA ADD 15 MINS: Mod: NTX | Performed by: INTERNAL MEDICINE

## 2020-08-06 PROCEDURE — E9220 PRA ENDO ANESTHESIA: ICD-10-PCS | Mod: PT,TXP,, | Performed by: NURSE ANESTHETIST, CERTIFIED REGISTERED

## 2020-08-06 PROCEDURE — 45385 COLONOSCOPY W/LESION REMOVAL: CPT | Mod: TXP,,, | Performed by: INTERNAL MEDICINE

## 2020-08-06 PROCEDURE — 45385 COLONOSCOPY W/LESION REMOVAL: CPT | Mod: TXP | Performed by: INTERNAL MEDICINE

## 2020-08-06 PROCEDURE — 45385 PR COLONOSCOPY,REMV LESN,SNARE: ICD-10-PCS | Mod: TXP,,, | Performed by: INTERNAL MEDICINE

## 2020-08-06 PROCEDURE — 88305 TISSUE EXAM BY PATHOLOGIST: CPT | Mod: 26,TXP,, | Performed by: PATHOLOGY

## 2020-08-06 PROCEDURE — E9220 PRA ENDO ANESTHESIA: HCPCS | Mod: PT,TXP,, | Performed by: NURSE ANESTHETIST, CERTIFIED REGISTERED

## 2020-08-06 PROCEDURE — 27201089 HC SNARE, DISP (ANY): Mod: TXP | Performed by: INTERNAL MEDICINE

## 2020-08-06 RX ORDER — PROPOFOL 10 MG/ML
VIAL (ML) INTRAVENOUS CONTINUOUS PRN
Status: DISCONTINUED | OUTPATIENT
Start: 2020-08-06 | End: 2020-08-06

## 2020-08-06 RX ORDER — LIDOCAINE HYDROCHLORIDE 20 MG/ML
INJECTION INTRAVENOUS
Status: DISCONTINUED | OUTPATIENT
Start: 2020-08-06 | End: 2020-08-06

## 2020-08-06 RX ORDER — PROPOFOL 10 MG/ML
VIAL (ML) INTRAVENOUS
Status: DISCONTINUED | OUTPATIENT
Start: 2020-08-06 | End: 2020-08-06

## 2020-08-06 RX ORDER — SODIUM CHLORIDE 9 MG/ML
INJECTION, SOLUTION INTRAVENOUS CONTINUOUS
Status: DISCONTINUED | OUTPATIENT
Start: 2020-08-06 | End: 2020-08-06 | Stop reason: HOSPADM

## 2020-08-06 RX ADMIN — PROPOFOL 200 MCG/KG/MIN: 10 INJECTION, EMULSION INTRAVENOUS at 08:08

## 2020-08-06 RX ADMIN — PROPOFOL 20 MG: 10 INJECTION, EMULSION INTRAVENOUS at 08:08

## 2020-08-06 RX ADMIN — PROPOFOL 60 MG: 10 INJECTION, EMULSION INTRAVENOUS at 08:08

## 2020-08-06 RX ADMIN — SODIUM CHLORIDE: 0.9 INJECTION, SOLUTION INTRAVENOUS at 08:08

## 2020-08-06 RX ADMIN — PROPOFOL 30 MG: 10 INJECTION, EMULSION INTRAVENOUS at 08:08

## 2020-08-06 RX ADMIN — LIDOCAINE HYDROCHLORIDE 80 MG: 20 INJECTION, SOLUTION INTRAVENOUS at 08:08

## 2020-08-06 NOTE — TRANSFER OF CARE
"Anesthesia Transfer of Care Note    Patient: April Vasquez    Procedure(s) Performed: Procedure(s) (LRB):  COLONOSCOPY (N/A)    Patient location: GI    Anesthesia Type: general    Transport from OR: Transported from OR on room air with adequate spontaneous ventilation    Post pain: adequate analgesia    Post assessment: no apparent anesthetic complications and tolerated procedure well    Post vital signs: stable    Level of consciousness: awake    Nausea/Vomiting: no nausea/vomiting    Complications: none    Transfer of care protocol was followed      Last vitals:   Visit Vitals  BP (!) 183/79 (BP Location: Right arm, Patient Position: Lying)   Pulse (!) 57   Temp 36.7 °C (98.1 °F) (Oral)   Resp 14   Ht 5' 4" (1.626 m)   Wt 53.5 kg (118 lb)   SpO2 99%   Breastfeeding No   BMI 20.25 kg/m²     "

## 2020-08-06 NOTE — ANESTHESIA PREPROCEDURE EVALUATION
08/06/2020  April Vasquez is a 64 y.o., female.  Past Medical History:   Diagnosis Date    Anemia     Diabetes mellitus, type 2     ESRD (end stage renal disease) on dialysis 2017    Gallstones     Hypertension     Pulmonary edema     Renal disorder     dialysis MIRANDA fistula    Tobacco abuse      Past Surgical History:   Procedure Laterality Date    AVF  2017    CHOLECYSTECTOMY  2016    Permacath Right 2017    TUBAL LIGATION       Patient Active Problem List   Diagnosis    ESRD (end stage renal disease) on dialysis    SOB (shortness of breath)    Leukocytosis    Hyperkalemia    Diastolic dysfunction    DM2 (diabetes mellitus, type 2)    Renovascular hypertension    Screening for colon cancer           Anesthesia Evaluation    I have reviewed the Patient Summary Reports.         Review of Systems  Cardiovascular:   Hypertension  Hypertension    Pulmonary:   Shortness of breath    Renal/:   Chronic Renal Disease  Kidney Function/Disease    Endocrine:   Diabetes  Diabetes        Physical Exam  General:  Well nourished    Airway/Jaw/Neck:  Airway Findings: Mouth Opening: Normal Tongue: Normal  General Airway Assessment: Adult  Mallampati: II  TM Distance: Normal, at least 6 cm  Jaw/Neck Findings:     Neck ROM: Normal ROM  Neck Findings:      Dental:  Dental Findings: Upper Dentures, Lower Dentures        Mental Status:  Mental Status Findings:  Cooperative, Alert and Oriented         Anesthesia Plan  Type of Anesthesia, risks & benefits discussed:  Anesthesia Type:  general  Patient's Preference:   Intra-op Monitoring Plan:   Intra-op Monitoring Plan Comments:   Post Op Pain Control Plan:   Post Op Pain Control Plan Comments:   Induction:   IV  Beta Blocker:  Patient is not currently on a Beta-Blocker (No further documentation required).       Informed Consent: Patient understands risks  and agrees with Anesthesia plan.  Questions answered. Anesthesia consent signed with patient.  ASA Score: 3     Day of Surgery Review of History & Physical: I have interviewed and examined the patient. I have reviewed the patient's H&P dated:  There are no significant changes.          Ready For Surgery From Anesthesia Perspective.

## 2020-08-06 NOTE — [employer] PATIENT INSTRUCTIONS
Discharge Summary/Instructions after an Endoscopic Procedure  Patient Name: April Vasquez  Patient MRN: [id number]  Patient YOB: 1955  Thursday, August 6, 2020  Hood Hernadez MD  RESTRICTIONS:  During your procedure today, you received medications for sedation.  These   medications may affect your judgment, balance and coordination.  Therefore,   for 24 hours, you have the following restrictions:   - DO NOT drive a car, operate machinery, make legal/financial decisions,   sign important papers or drink alcohol.    ACTIVITY:  Today: no heavy lifting, straining or running due to procedural   sedation/anesthesia.  The following day: return to full activity including work.  DIET:  Eat and drink normally unless instructed otherwise.     TREATMENT FOR COMMON SIDE EFFECTS:  - Mild abdominal pain, nausea, belching, bloating or excessive gas:  rest,   eat lightly and use a heating pad.  - Sore Throat: treat with throat lozenges and/or gargle with warm salt   water.  - Because air was used during the procedure, expelling large amounts of air   from your rectum or belching is normal.  - If a bowel prep was taken, you may not have a bowel movement for 1-3 days.    This is normal.  SYMPTOMS TO WATCH FOR AND REPORT TO YOUR PHYSICIAN:  1. Abdominal pain or bloating, other than gas cramps.  2. Chest pain.  3. Back pain.  4. Signs of infection such as: chills or fever occurring within 24 hours   after the procedure.  5. Rectal bleeding, which would show as bright red, maroon, or black stools.   (A tablespoon of blood from the rectum is not serious, especially if   hemorrhoids are present.)  6. Vomiting.  7. Weakness or dizziness.  GO DIRECTLY TO THE NEAREST EMERGENCY ROOM IF YOU HAVE ANY OF THE FOLLOWING:      Difficulty breathing              Chills and/or fever over 101 F   Persistent vomiting and/or vomiting blood   Severe abdominal pain   Severe chest pain   Black, tarry stools   Bleeding- more than one  tablespoon   Any other symptom or condition that you feel may need urgent attention  Your doctor recommends these additional instructions:  If any biopsies were taken, your doctors clinic will contact you in 1 to 2   weeks with any results.  - Patient has a contact number available for emergencies.  The signs and   symptoms of potential delayed complications were discussed with the   patient.  Return to normal activities tomorrow.  Written discharge   instructions were provided to the patient.   - Discharge patient to home.   - Resume previous diet.   - Continue present medications.   - Await pathology results.   - Repeat colonoscopy in 1 year for surveillance due to inadequate prep.  For questions, problems or results please call your physician - Hood Hernadez MD at Work:  [phone].  [facility], EMERGENCY ROOM PHONE NUMBER: [phone]  IF A COMPLICATION OR EMERGENCY SITUATION ARISES AND YOU ARE UNABLE TO REACH   YOUR PHYSICIAN - GO DIRECTLY TO THE EMERGENCY ROOM.  Hood Hernadez MD  8/6/2020 9:19:21 AM  This report has been verified and signed electronically.  [employer]

## 2020-08-06 NOTE — ANESTHESIA POSTPROCEDURE EVALUATION
Anesthesia Post Evaluation    Patient: April Vasquez    Procedure(s) Performed: Procedure(s) (LRB):  COLONOSCOPY (N/A)    Final Anesthesia Type: general    Patient location during evaluation: PACU  Patient participation: Yes- Able to Participate  Level of consciousness: awake and alert  Post-procedure vital signs: reviewed and stable  Pain management: adequate  Airway patency: patent    PONV status at discharge: No PONV  Anesthetic complications: no      Cardiovascular status: blood pressure returned to baseline and stable  Respiratory status: unassisted  Hydration status: euvolemic  Follow-up not needed.          Vitals Value Taken Time   /76 08/06/20 0953   Temp 36.6 °C (97.9 °F) 08/06/20 0923   Pulse 60 08/06/20 0953   Resp 16 08/06/20 0953   SpO2 99 % 08/06/20 0953         Event Time   Out of Recovery 10:07:34         Pain/Jacinta Score: Jacinta Score: 10 (8/6/2020  9:59 AM)

## 2020-08-06 NOTE — H&P
Short Stay Endoscopy History and Physical    PCP - Nasra Iraheta MD    Procedure - Colonoscopy  Sedation: GA  ASA - per anesthesia  Mallampati - per anesthesia  History of Anesthesia problems - no  Family history Anesthesia problems -  no     HPI:  This is a 64 y.o. female here for evaluation of : Screening for CRC    Reflux - no  Dysphagia - no  Abdominal pain - no  Diarrhea - no    ROS:  Constitutional: No fevers, chills, No weight loss  ENT: No allergies  CV: No chest pain  Pulm: No cough, No shortness of breath  Ophtho: No vision changes  GI: see HPI  Medical History:  has a past medical history of Anemia, Diabetes mellitus, type 2, ESRD (end stage renal disease) on dialysis (2017), Gallstones, Hypertension, Pulmonary edema, Renal disorder, and Tobacco abuse.    Surgical History:  has a past surgical history that includes Cholecystectomy (2016); AVF (2017); Permacath (Right, 2017); and Tubal ligation.    Family History: family history includes Cancer in her mother; Cervical cancer in her mother; Diabetes in her father and sister; Drug abuse in her paternal grandmother; Hypertension in her mother; Kidney disease in her mother; Ovarian cancer in her mother.. Otherwise no colon cancer, inflammatory bowel disease, or GI malignancies.    Social History:  reports that she has quit smoking. Her smoking use included cigarettes. She has a 10.00 pack-year smoking history. She has never used smokeless tobacco. She reports that she does not drink alcohol or use drugs.    Review of patient's allergies indicates:  No Known Allergies    Medications:   Medications Prior to Admission   Medication Sig Dispense Refill Last Dose    carvediloL (COREG) 12.5 MG tablet Take 1 tablet (12.5 mg total) by mouth 2 (two) times daily with meals. 180 tablet 3     cloNIDine (CATAPRES) 0.3 MG tablet Take 1 tablet (0.3 mg total) by mouth once daily. 30 tablet 11     hydrALAZINE (APRESOLINE) 100 MG tablet Take 1 tablet (100 mg total) by mouth 3  (three) times daily. 90 tablet 11     isoniazid (NYDRAZID) 300 MG Tab Take 1 tablet (300 mg total) by mouth once daily. 90 tablet 2     olmesartan (BENICAR) 40 MG tablet Take 1 tablet (40 mg total) by mouth once daily. 90 tablet 3     pyridoxine, vitamin B6, (VITAMIN B-6) 50 MG Tab Take 1 tablet (50 mg total) by mouth once daily. 90 tablet 2     sevelamer carbonate (RENVELA) 800 mg Tab Take 1,600 mg by mouth 3 (three) times daily with meals.       vitamin D (VITAMIN D3) 1000 units Tab Take 1,000 Units by mouth once daily.          Objective Findings:    Vital Signs: Per nursing notes.    Physical Exam:  General Appearance: Well appearing in no acute distress  Head:   Normocephalic, without obvious abnormality  Eyes:    No scleral icterus  Airway: Open  Neck: No restriction in mobility  Lungs: CTA bilaterally in anterior and posterior fields, no wheezes, no crackles.  Heart:  Regular rate and rhythm, S1, S2 normal, no murmurs heard  Abdomen: Soft, non tender, non distended      Labs:  Lab Results   Component Value Date    WBC 10.04 04/06/2020    HGB 8.2 (L) 04/06/2020    HCT 25.7 (L) 04/06/2020     04/06/2020    CHOL 150 02/13/2020    TRIG 118 02/13/2020    HDL 54 02/13/2020    ALT 11 07/28/2020    AST 18 07/28/2020     07/28/2020    K 4.6 07/28/2020    CL 93 (L) 07/28/2020    CREATININE 8.4 (H) 07/28/2020    BUN 38 (H) 07/28/2020    CO2 34 (H) 07/28/2020    TSH 1.838 08/05/2019    INR 1.0 03/15/2020    HGBA1C 4.6 08/05/2019         I have explained the risks and benefits of endoscopy procedures to the patient including but not limited to bleeding, perforation, infection, and death.    Thank you so much for allowing me to participate in the care of April Hernadez MD

## 2020-08-10 LAB
FINAL PATHOLOGIC DIAGNOSIS: NORMAL
GROSS: NORMAL

## 2020-08-12 ENCOUNTER — TELEPHONE (OUTPATIENT)
Dept: GASTROENTEROLOGY | Facility: CLINIC | Age: 65
End: 2020-08-12

## 2020-08-12 NOTE — TELEPHONE ENCOUNTER
Spoke with patient , results given as written by Dr. Hernadez  Pt verbalizes understadning and appreciates the call.  Thanks MA

## 2020-08-12 NOTE — TELEPHONE ENCOUNTER
----- Message from Hood Hernadez MD sent at 8/12/2020  3:55 PM CDT -----  Please notify patient, the colon polyps were benign.

## 2020-08-17 DIAGNOSIS — Z76.82 ORGAN TRANSPLANT CANDIDATE: Primary | ICD-10-CM

## 2020-08-18 ENCOUNTER — TELEPHONE (OUTPATIENT)
Dept: TRANSPLANT | Facility: CLINIC | Age: 65
End: 2020-08-18

## 2020-08-25 ENCOUNTER — TELEPHONE (OUTPATIENT)
Dept: TRANSPLANT | Facility: CLINIC | Age: 65
End: 2020-08-25

## 2020-08-25 NOTE — TELEPHONE ENCOUNTER
Adherence:    Yanet Gonzalez, RN and charge nurse at pt's dialysis center ([phone]) reports over last three months that the patient has had no AMAs, no no-shows, and no concerns regarding labs, caregivers, transportation, or any additional psychosocial concerns.  Yanet reports pt drives herself to treatment.  Yanet reports pt's last PTH on 8/3/2020 was 848 which is down from PTH >1000 last month.    SW contacted pt ([phone]) to f/u on previous concerns documented regarding pt's inability to keep appointments due to lack of transportation and not having insurance coverage for testing.  Pt reports transportation concerns have been resolved as she has had her personal vehicle repaired.  Pt also reports insurance concerns have also been addressed.  SW encouraged pt to contact kidney transplant team if concerns arise in the future, to which pt agreed.

## 2020-08-28 ENCOUNTER — TELEPHONE (OUTPATIENT)
Dept: TRANSPLANT | Facility: CLINIC | Age: 65
End: 2020-08-28

## 2020-08-28 ENCOUNTER — COMMITTEE REVIEW (OUTPATIENT)
Dept: TRANSPLANT | Facility: CLINIC | Age: 65
End: 2020-08-28

## 2020-08-28 DIAGNOSIS — Z76.82 PRE-KIDNEY TRANSPLANT, LISTED: Primary | ICD-10-CM

## 2020-08-28 DIAGNOSIS — Z76.82 ORGAN TRANSPLANT CANDIDATE: ICD-10-CM

## 2020-08-28 NOTE — LETTER
August 28, 2020    Quinn Glasgow  [address] [place] [address] [address]  [place] [address]       Dear Dr. Benitez:    Patient: April Vasquez   MR Number: [id number]   YOB: 1955     Your patient, April Vasquez, was recently discussed at the [facility] Selection Committee meeting on 8/28/2020. I am happy to inform you that April has been approved for transplantation.  She has met selection criteria for a kidney transplant related to ESRD secondary to primary diagnosis of Diabetes Mellitus - Type II. Your patient will be placed on the cadaveric wait list pending final financial approval from insurance company.     We appreciate your confidence in allowing us to participate in your patients care.  If you have any questions or concerns, please do not hesitate to contact me.    Sincerely,      Martha Vergara MD  Medical Director, Kidney & Kidney/Pancreas Transplantation    tj.Enclosed    Cc: Quinn Glasgow MD          [facility] [place]

## 2020-08-28 NOTE — COMMITTEE REVIEW
Native Organ Dx: Diabetes Mellitus - Type II      SELECTION COMMITTEE NOTE    April Vasquez was presented at selection committee on 8/28/2020.  Patient met selection criteria for kidney transplant related to ESRD due to   Diabetes Mellitus - Type II.  No absolute contraindications to transplant at this time.  Patient will be placed on the cadaveric wait list pending final financial approval from insurance company.  Patient will return to clinic for routine appointment in 1 year. Patient meets criteria for High KDPI kidney offer. Patient meets HCV+ kidney offer. Patient meets criteria for dual/enbloc.          Note written by Nicki Reyna RN    ===============================================    I was present at the meeting and attest to the decision of the committee.    Marshall Ferrera  08/28/2020

## 2020-09-16 ENCOUNTER — TELEPHONE (OUTPATIENT)
Dept: TRANSPLANT | Facility: CLINIC | Age: 65
End: 2020-09-16

## 2020-09-16 NOTE — TELEPHONE ENCOUNTER
"  KIDNEY WAIT LISTING NOTE    Date of Financial clearance to list: 9/15/20    [facility]:     Organ: Kidney  Name:       April Vasquez   : 1955          Gender:     female    MRN#: [id number]                                 State of Permanent Residence:  [address] [place] [address] [address] [place] [address]  Ethnicity: //   Race:      White    CLINICAL INFORMATION   Candidate Medical Urgency Status: Active (1)  Number of Previous Kidney Transplants: 0  Number of Previous Solid Organ Transplants: 0  Did you enter number of previous kidney or other solid organ transplants? yes  Is this Candidate a Prior Living Donor: no  (If yes, please generate letter to [employer] with patient's date of donation, recipient SSN, signed by Surgical Director after patient is listed in order to receive priority points).      ABO  ABO Blood Group:   B POS     ABO Confirmation: (THESE DATES MUST BE PRIOR TO THE LIST DATE AND SUPPORTED BY SEPARATE LAB REPORTS)    Internal Results    Lab Results   Component Value Date    GROUPTRH B POS 2020    GROUPTRH B POS 2017     No results found for: ABO    External Results    ABO Date 1:    ABO Date 2  Are either of these ABO results based on External Labs? no  (If Yes, STOP and go to source document in Media Tab for verification).    VITALS  Height:  5' 3.9"  Weight:  53.6 kg  (Use height from Transplant clinic visits only).  Did you enter height/weight? Yes    HLA    Class I:  Lab Results   Component Value Date    TYXS0NB 24 2017    FVRP3RF 68 2017    PYSQ2YO 35 2017    PHRW4TS 61 2017    AYOSK8GD 6 2017    LIUEQ3WV XX 2017    VQEIB9DS 10 2017    LSJLX3IA 4 2017       Class II:  Lab Results   Component Value Date    ERRHMR69ZX 4 2017    IVHBKM36HU 11 2017    TXKHRW972CB 52 2017    MGPPTS8795 53 2017    SYUBJ9EE 7 2017    TYTQS9LD 8 2017       Tested for HLA Antibodies: Yes, antibodies " "detected     If result is "Positive" antibodies are detected     If result is "Negative or questionable" no antibodies detected    Lab Results   Component Value Date    CIPRAS Positive 02/13/2020    CIIPRAS Positive 02/13/2020       DIALYSIS INFORMATION  Is patient Pre-Dialysis: No     GFR Information  Report GFR being used as the criteria for placement on the kidney list. If not, leave blank  GFR < or = 20 ml/min? n/a  If Yes, Specify value  ___   ml/min     Initial date GFR became 20 or less:   Is GFR obtained from an Outside lab Result? n/a  (If YES verify with source document scanned into media)    If patient on Dialysis:    Is candidate currently on dialysis for ESRD? Yes  If Yes,  Date Chronic Dialysis Started:   2/9/2017  (verify with source document in Media Tab)   Dialysis Unit Name: [address] [facility] [address]  [place] [address]                        Physician Name:  Dr. Martha Meade  [facility]#: [id number]    DIABETES INFORMATION  Primary Native Kidney Diagnosis: Diabetes Mellitus - Type II  C-Peptide Value - No results found for: CPEPTIDE  Current Diabetes Status: Type II    FOR NON-KIDNEY DEPARTMENT USE ONLY:  Additional Organs Registered? none    Maximum Acceptable Number of HLA Mismatches  ABDR:     6      (0-6)               AB:               (0-4)  ADR:   _____  (0-4)              BDR: _____ (0-4)  A:        _____  (0-2)              B:      _____ (0-2)          DR: ______ (0-2)    Will Recipient Accept?   Accept HBcAB Positive Organ:            Yes  Accept HBV DAX Positive Organ:        no  Accept HCV Antibody Positive Organ: yes   Accept HCV DAX Positive Organ: yes    Dual Kidney and En Bloc Opt In : No  Dual  Local:   No  Dual Import:   No  En Bloc Local:   No  En Bloc Import: No     Accept KDPI > 85: Single: No     Local: No     Import: No  Accept KDPI > 85: Dual: No     Local: No     Import: No      ### NURSE TO VERIFY CONSENT AND MAKE ANY NECESSARY CHANGES NEEDED IN UNET AT " THE TIME OF VERIFICATION ###    Unacceptible Antigens  If yes, list     Lab Results   Component Value Date    WP6AKQF Cw5,B65,B64,B7,B67,B81,B42,B8 02/13/2020    CIABCLM WEAK B55, B56 02/13/2020    CIIAB ,DR1,DR51,DR9,DQ5,DQ6,DR10,DQ4 02/13/2020    ABCMT WEAK DR14, DRB1*04:01, DRB1*04:04 02/13/2020       ### DO NOT LIST IF ANTIGEN VALUE WEAK ###

## 2020-09-16 NOTE — PROGRESS NOTES
YEARLY LIST MANAGEMENT NOTE    April Vasquez's kidney transplant listing status reviewed.  Patient is due for follow-up appointments on 2/13/21.  Appointments will be scheduled per protocol.

## 2020-09-16 NOTE — LETTER
2020     April Vasquez  [address] [place] [address]    Dear April Vasquez:  MRN: [id number]    Congratulations! On 2020, you were placed on  the waiting list for a  donor transplant.    Your candidacy for kidney transplant is based on the following criteria: ESRD due to DM Type II.    Your transplant coordinator while on the waiting list is Nicki Reyna RN. They can be reached at [phone] or [phone] with any questions.      What to do now?    ? Ask your living donors to call and begin testing   Give your donors our phone number, [phone]   Make sure your donors have your name and date of birth when they call   You will get transplanted much faster if you have a living donor    ? Have your blood sent to our Transplant Lab every month   If you are on dialysis - our Transplant Lab will work with your dialysis unit to send your blood every month   If you are not on dialysis   - If you live near an [facility] lab, we will schedule you to have blood drawn every month  - If you do not live near an [facility] lab, you will be sent blood kits in the mail. You will need to take a kit to your local lab or doctor to have your blood drawn every month and mail to the Transplant Lab.     ? Call us with ANY CHANGES   Phone numbers - we must be able to reach you anytime of the day or night when a kidney is available   Address   Insurance coverage   Dialysis unit or kidney doctor   Nehemias: if you have surgery, stay in the hospital, have to get blood, or have an infection    ? Review your Kidney/Kidney-Pancreas Transplant Guide    This will give you detailed information about what happens when  - you are on the waiting list   - you are called when a kidney is available     The [facility] has a transplant surgeon and physician available 365  days a year, 24 hours a day to coordinate organ acceptance, procurement, surgical placement and to  address  urgent patient care issues.  You will be notified in writing of any changes to our Transplant  Centers staffing plan that would impact your ability to receive a transplant.     Attached is a letter from the [employer] ([employer]). It describes the services and  information offered to patients by [employer] and the Organ Procurement and Transplant Network. We look  forward to working with you while on the waiting list.      Congratulations,     Your Transplant Partner   [facility]    [facility] [place] [address]   [phone]  lh/enclosure    cc: Dr. Quinn [facility]                                                                          The Organ Procurement and Transplantation Network   Toll-free patient services line: Your resource for organ transplant information     If you have a question regarding your own medical care, you always should call your transplant hospital first. However, for general organ transplant-related information, you can call the Organ Procurement and Transplantation Network (OPTN) toll-free patient services line at [phone].     Anyone, including potential transplant candidates, candidates, recipients, family members, friends, living donors, and donor family members, can call this number to:     · Talk about organ donation, living donation, the transplant process, the donation process, and transplant policies.   · Get a free patient information kit with helpful booklets, waiting list and transplant information, and a list of all transplant hospitals.   · Ask questions about the OPTN website (https://optn.transplant.hrsa.gov/), the [employer]s ([employer]) website (https://unos.org/), or the [employer] website for living donors and transplant recipients. (https://www.transplantliving.org/).   · Learn how the OPTN can help you.   · Talk about any concerns that you  may have with a transplant hospital.     The nations transplant system, the OPTN, is managed under federal contract by the [employer]aring ([employer]), which is a non-profit charitable organization. The OPTN helps create and define organ sharing policies that make the best use of donated organs. This process continuously evaluating new advances and discoveries so policies can be adapted to best serve patients waiting for transplants. To do so, the OPTN works closely with transplant professionals, transplant patients, transplant candidates, donor families, living donors, and the public. All transplant programs and organ procurement organizations throughout the country are OPTN members and are obligated to follow the policies the OPTN creates for allocating organs.     The OPTN also is responsible for:   · Providing educational material for patients, the public, and professionals.   · Raising awareness of the need for donated organs and tissue.   · Coordinating organ procurement, matching, and placement.   · Collecting information about every organ transplant and donation that occurs in the [place].     Remember, you should contact your transplant hospital directly if you have questions or concerns about your own medical care including medical records, work-up progress, and test results.     We are not your transplant hospital, and our staff will not be able to answer questions about your case, so please keep your transplant hospitals phone number handy.   However, while you research your transplant needs and learn as much as you can about transplantation and donation, we welcome your call to our toll-free patient services line at [phone].

## 2020-09-28 ENCOUNTER — TELEPHONE (OUTPATIENT)
Dept: SMOKING CESSATION | Facility: CLINIC | Age: 65
End: 2020-09-28

## 2020-09-28 NOTE — TELEPHONE ENCOUNTER
1st attempt unable to leave message regarding smoking cessation quit 1 episode phone follow up no answer.

## 2020-12-04 DIAGNOSIS — Z76.82 ORGAN TRANSPLANT CANDIDATE: Primary | ICD-10-CM

## 2020-12-07 DIAGNOSIS — Z76.82 ORGAN TRANSPLANT CANDIDATE: Primary | ICD-10-CM

## 2020-12-08 ENCOUNTER — TELEPHONE (OUTPATIENT)
Dept: TRANSPLANT | Facility: CLINIC | Age: 65
End: 2020-12-08

## 2021-03-01 ENCOUNTER — TELEPHONE (OUTPATIENT)
Dept: TRANSPLANT | Facility: CLINIC | Age: 66
End: 2021-03-01

## 2021-03-31 ENCOUNTER — CLINICAL SUPPORT (OUTPATIENT)
Dept: SMOKING CESSATION | Facility: CLINIC | Age: 66
End: 2021-03-31
Payer: COMMERCIAL

## 2021-03-31 DIAGNOSIS — F17.200 NICOTINE DEPENDENCE: Primary | ICD-10-CM

## 2021-03-31 PROCEDURE — 99407 PR TOBACCO USE CESSATION INTENSIVE >10 MINUTES: ICD-10-PCS | Mod: NTX,S$GLB,,

## 2021-03-31 PROCEDURE — 99407 BEHAV CHNG SMOKING > 10 MIN: CPT | Mod: NTX,S$GLB,,

## 2021-04-01 DIAGNOSIS — Z76.82 PRE-KIDNEY TRANSPLANT, LISTED: Primary | ICD-10-CM

## 2021-04-19 ENCOUNTER — HOSPITAL ENCOUNTER (INPATIENT)
Facility: HOSPITAL | Age: 66
LOS: 3 days | Discharge: HOME | DRG: 304 | End: 2021-04-22
Attending: EMERGENCY MEDICINE | Admitting: EMERGENCY MEDICINE
Payer: MEDICARE

## 2021-04-19 DIAGNOSIS — I16.1 HYPERTENSIVE EMERGENCY: ICD-10-CM

## 2021-04-19 DIAGNOSIS — I16.0 HYPERTENSIVE URGENCY: ICD-10-CM

## 2021-04-19 DIAGNOSIS — R07.9 CHEST PAIN: ICD-10-CM

## 2021-04-19 DIAGNOSIS — R06.02 SHORTNESS OF BREATH: ICD-10-CM

## 2021-04-19 PROBLEM — I10 ESSENTIAL HYPERTENSION: Status: ACTIVE | Noted: 2021-04-19

## 2021-04-19 PROBLEM — I50.33 ACUTE ON CHRONIC DIASTOLIC HEART FAILURE: Status: ACTIVE | Noted: 2020-03-09

## 2021-04-19 LAB
ALBUMIN SERPL BCP-MCNC: 3.3 G/DL (ref 3.5–5.2)
ALP SERPL-CCNC: 119 U/L (ref 55–135)
ALT SERPL W/O P-5'-P-CCNC: 19 U/L (ref 10–44)
ANION GAP SERPL CALC-SCNC: 21 MMOL/L (ref 8–16)
AST SERPL-CCNC: 18 U/L (ref 10–40)
BASOPHILS # BLD AUTO: 0.11 K/UL (ref 0–0.2)
BASOPHILS NFR BLD: 0.5 % (ref 0–1.9)
BILIRUB SERPL-MCNC: 0.4 MG/DL (ref 0.1–1)
BNP SERPL-MCNC: 1555 PG/ML (ref 0–99)
BUN SERPL-MCNC: 70 MG/DL (ref 8–23)
CALCIUM SERPL-MCNC: 10.1 MG/DL (ref 8.7–10.5)
CHLORIDE SERPL-SCNC: 97 MMOL/L (ref 95–110)
CO2 SERPL-SCNC: 23 MMOL/L (ref 23–29)
CREAT SERPL-MCNC: 10.4 MG/DL (ref 0.5–1.4)
CTP QC/QA: YES
DIFFERENTIAL METHOD: ABNORMAL
EOSINOPHIL # BLD AUTO: 0.4 K/UL (ref 0–0.5)
EOSINOPHIL NFR BLD: 1.8 % (ref 0–8)
ERYTHROCYTE [DISTWIDTH] IN BLOOD BY AUTOMATED COUNT: 15.4 % (ref 11.5–14.5)
EST. GFR  (AFRICAN AMERICAN): 4 ML/MIN/1.73 M^2
EST. GFR  (NON AFRICAN AMERICAN): 4 ML/MIN/1.73 M^2
GLUCOSE SERPL-MCNC: 113 MG/DL (ref 70–110)
HCT VFR BLD AUTO: 32.8 % (ref 37–48.5)
HGB BLD-MCNC: 11 G/DL (ref 12–16)
IMM GRANULOCYTES # BLD AUTO: 0.14 K/UL (ref 0–0.04)
IMM GRANULOCYTES NFR BLD AUTO: 0.7 % (ref 0–0.5)
LACTATE SERPL-SCNC: 1.2 MMOL/L (ref 0.5–2.2)
LYMPHOCYTES # BLD AUTO: 1.6 K/UL (ref 1–4.8)
LYMPHOCYTES NFR BLD: 7.7 % (ref 18–48)
MAGNESIUM SERPL-MCNC: 1.9 MG/DL (ref 1.6–2.6)
MCH RBC QN AUTO: 30.8 PG (ref 27–31)
MCHC RBC AUTO-ENTMCNC: 33.5 G/DL (ref 32–36)
MCV RBC AUTO: 92 FL (ref 82–98)
MONOCYTES # BLD AUTO: 1.1 K/UL (ref 0.3–1)
MONOCYTES NFR BLD: 5 % (ref 4–15)
NEUTROPHILS # BLD AUTO: 17.7 K/UL (ref 1.8–7.7)
NEUTROPHILS NFR BLD: 84.3 % (ref 38–73)
NRBC BLD-RTO: 0 /100 WBC
PHOSPHATE SERPL-MCNC: 5.6 MG/DL (ref 2.7–4.5)
PLATELET # BLD AUTO: 311 K/UL (ref 150–450)
PMV BLD AUTO: 11.3 FL (ref 9.2–12.9)
POTASSIUM SERPL-SCNC: 5.1 MMOL/L (ref 3.5–5.1)
PROCALCITONIN SERPL IA-MCNC: 0.25 NG/ML
PROT SERPL-MCNC: 7.2 G/DL (ref 6–8.4)
RBC # BLD AUTO: 3.57 M/UL (ref 4–5.4)
SARS-COV-2 RDRP RESP QL NAA+PROBE: NEGATIVE
SODIUM SERPL-SCNC: 141 MMOL/L (ref 136–145)
TROPONIN I SERPL DL<=0.01 NG/ML-MCNC: 0.11 NG/ML (ref 0–0.03)
TROPONIN I SERPL DL<=0.01 NG/ML-MCNC: 0.67 NG/ML (ref 0–0.03)
TROPONIN I SERPL DL<=0.01 NG/ML-MCNC: 0.67 NG/ML (ref 0–0.03)
WBC # BLD AUTO: 20.96 K/UL (ref 3.9–12.7)

## 2021-04-19 PROCEDURE — 25000003 PHARM REV CODE 250: Mod: NTX | Performed by: INTERNAL MEDICINE

## 2021-04-19 PROCEDURE — 86706 HEP B SURFACE ANTIBODY: CPT | Mod: NTX | Performed by: INTERNAL MEDICINE

## 2021-04-19 PROCEDURE — 63600175 PHARM REV CODE 636 W HCPCS: Mod: NTX | Performed by: EMERGENCY MEDICINE

## 2021-04-19 PROCEDURE — 93010 EKG 12-LEAD: ICD-10-PCS | Mod: NTX,,, | Performed by: INTERNAL MEDICINE

## 2021-04-19 PROCEDURE — 83605 ASSAY OF LACTIC ACID: CPT | Mod: NTX | Performed by: EMERGENCY MEDICINE

## 2021-04-19 PROCEDURE — 96374 THER/PROPH/DIAG INJ IV PUSH: CPT | Mod: NTX

## 2021-04-19 PROCEDURE — 87340 HEPATITIS B SURFACE AG IA: CPT | Mod: NTX | Performed by: INTERNAL MEDICINE

## 2021-04-19 PROCEDURE — 87040 BLOOD CULTURE FOR BACTERIA: CPT | Mod: 59,NTX | Performed by: EMERGENCY MEDICINE

## 2021-04-19 PROCEDURE — 83735 ASSAY OF MAGNESIUM: CPT | Mod: NTX | Performed by: INTERNAL MEDICINE

## 2021-04-19 PROCEDURE — 25000003 PHARM REV CODE 250: Mod: NTX | Performed by: CLINIC/CENTER

## 2021-04-19 PROCEDURE — 63600175 PHARM REV CODE 636 W HCPCS: Mod: NTX | Performed by: INTERNAL MEDICINE

## 2021-04-19 PROCEDURE — 83880 ASSAY OF NATRIURETIC PEPTIDE: CPT | Mod: NTX | Performed by: EMERGENCY MEDICINE

## 2021-04-19 PROCEDURE — 36415 COLL VENOUS BLD VENIPUNCTURE: CPT | Mod: NTX | Performed by: INTERNAL MEDICINE

## 2021-04-19 PROCEDURE — 84145 PROCALCITONIN (PCT): CPT | Mod: NTX | Performed by: EMERGENCY MEDICINE

## 2021-04-19 PROCEDURE — 93005 ELECTROCARDIOGRAM TRACING: CPT | Mod: NTX

## 2021-04-19 PROCEDURE — 99285 EMERGENCY DEPT VISIT HI MDM: CPT | Mod: 25,NTX

## 2021-04-19 PROCEDURE — 96375 TX/PRO/DX INJ NEW DRUG ADDON: CPT | Mod: NTX

## 2021-04-19 PROCEDURE — U0002 COVID-19 LAB TEST NON-CDC: HCPCS | Mod: NTX | Performed by: EMERGENCY MEDICINE

## 2021-04-19 PROCEDURE — 84484 ASSAY OF TROPONIN QUANT: CPT | Mod: NTX | Performed by: EMERGENCY MEDICINE

## 2021-04-19 PROCEDURE — 94761 N-INVAS EAR/PLS OXIMETRY MLT: CPT | Mod: NTX

## 2021-04-19 PROCEDURE — 93010 ELECTROCARDIOGRAM REPORT: CPT | Mod: NTX,,, | Performed by: INTERNAL MEDICINE

## 2021-04-19 PROCEDURE — 84484 ASSAY OF TROPONIN QUANT: CPT | Mod: 91,NTX | Performed by: INTERNAL MEDICINE

## 2021-04-19 PROCEDURE — 84100 ASSAY OF PHOSPHORUS: CPT | Mod: NTX | Performed by: INTERNAL MEDICINE

## 2021-04-19 PROCEDURE — 20000000 HC ICU ROOM: Mod: NTX

## 2021-04-19 PROCEDURE — 85025 COMPLETE CBC W/AUTO DIFF WBC: CPT | Mod: NTX | Performed by: EMERGENCY MEDICINE

## 2021-04-19 PROCEDURE — 80100014 HC HEMODIALYSIS 1:1: Mod: NTX

## 2021-04-19 PROCEDURE — 25000003 PHARM REV CODE 250: Mod: NTX | Performed by: EMERGENCY MEDICINE

## 2021-04-19 PROCEDURE — 63600175 PHARM REV CODE 636 W HCPCS: Mod: NTX | Performed by: CLINIC/CENTER

## 2021-04-19 PROCEDURE — 80053 COMPREHEN METABOLIC PANEL: CPT | Mod: NTX | Performed by: EMERGENCY MEDICINE

## 2021-04-19 RX ORDER — IPRATROPIUM BROMIDE AND ALBUTEROL SULFATE 2.5; .5 MG/3ML; MG/3ML
3 SOLUTION RESPIRATORY (INHALATION) EVERY 4 HOURS PRN
Status: DISCONTINUED | OUTPATIENT
Start: 2021-04-19 | End: 2021-04-22 | Stop reason: HOSPADM

## 2021-04-19 RX ORDER — ONDANSETRON 2 MG/ML
4 INJECTION INTRAMUSCULAR; INTRAVENOUS EVERY 8 HOURS PRN
Status: DISCONTINUED | OUTPATIENT
Start: 2021-04-19 | End: 2021-04-22 | Stop reason: HOSPADM

## 2021-04-19 RX ORDER — CLONIDINE HYDROCHLORIDE 0.1 MG/1
0.3 TABLET ORAL DAILY
Status: DISCONTINUED | OUTPATIENT
Start: 2021-04-19 | End: 2021-04-19

## 2021-04-19 RX ORDER — FUROSEMIDE 10 MG/ML
40 INJECTION INTRAMUSCULAR; INTRAVENOUS
Status: COMPLETED | OUTPATIENT
Start: 2021-04-19 | End: 2021-04-19

## 2021-04-19 RX ORDER — IBUPROFEN 200 MG
24 TABLET ORAL
Status: DISCONTINUED | OUTPATIENT
Start: 2021-04-19 | End: 2021-04-22 | Stop reason: HOSPADM

## 2021-04-19 RX ORDER — VANCOMYCIN HCL IN 5 % DEXTROSE 1G/250ML
20 PLASTIC BAG, INJECTION (ML) INTRAVENOUS ONCE
Status: COMPLETED | OUTPATIENT
Start: 2021-04-19 | End: 2021-04-19

## 2021-04-19 RX ORDER — GLUCAGON 1 MG
1 KIT INJECTION
Status: DISCONTINUED | OUTPATIENT
Start: 2021-04-19 | End: 2021-04-22 | Stop reason: HOSPADM

## 2021-04-19 RX ORDER — TALC
6 POWDER (GRAM) TOPICAL NIGHTLY PRN
Status: DISCONTINUED | OUTPATIENT
Start: 2021-04-19 | End: 2021-04-22 | Stop reason: HOSPADM

## 2021-04-19 RX ORDER — HYDRALAZINE HYDROCHLORIDE 25 MG/1
100 TABLET, FILM COATED ORAL 3 TIMES DAILY
Status: DISCONTINUED | OUTPATIENT
Start: 2021-04-19 | End: 2021-04-22 | Stop reason: HOSPADM

## 2021-04-19 RX ORDER — IBUPROFEN 200 MG
16 TABLET ORAL
Status: DISCONTINUED | OUTPATIENT
Start: 2021-04-19 | End: 2021-04-22 | Stop reason: HOSPADM

## 2021-04-19 RX ORDER — NAPROXEN SODIUM 220 MG/1
162 TABLET, FILM COATED ORAL
Status: COMPLETED | OUTPATIENT
Start: 2021-04-19 | End: 2021-04-19

## 2021-04-19 RX ORDER — PROMETHAZINE HYDROCHLORIDE 25 MG/1
25 TABLET ORAL EVERY 6 HOURS PRN
Status: DISCONTINUED | OUTPATIENT
Start: 2021-04-19 | End: 2021-04-22 | Stop reason: HOSPADM

## 2021-04-19 RX ORDER — NIFEDIPINE 30 MG/1
30 TABLET, EXTENDED RELEASE ORAL DAILY
Status: DISCONTINUED | OUTPATIENT
Start: 2021-04-20 | End: 2021-04-19

## 2021-04-19 RX ORDER — CARVEDILOL 12.5 MG/1
12.5 TABLET ORAL 2 TIMES DAILY WITH MEALS
Status: DISCONTINUED | OUTPATIENT
Start: 2021-04-19 | End: 2021-04-22 | Stop reason: HOSPADM

## 2021-04-19 RX ORDER — NIFEDIPINE 30 MG/1
30 TABLET, EXTENDED RELEASE ORAL DAILY
Status: DISCONTINUED | OUTPATIENT
Start: 2021-04-19 | End: 2021-04-20

## 2021-04-19 RX ORDER — CLONIDINE HYDROCHLORIDE 0.1 MG/1
0.3 TABLET ORAL
Status: COMPLETED | OUTPATIENT
Start: 2021-04-19 | End: 2021-04-19

## 2021-04-19 RX ORDER — LANOLIN ALCOHOL/MO/W.PET/CERES
1 CREAM (GRAM) TOPICAL DAILY
Status: ON HOLD | COMMUNITY
Start: 2020-08-17 | End: 2021-06-14 | Stop reason: HOSPADM

## 2021-04-19 RX ORDER — CHOLECALCIFEROL (VITAMIN D3) 25 MCG
1000 TABLET ORAL DAILY
Status: DISCONTINUED | OUTPATIENT
Start: 2021-04-19 | End: 2021-04-22 | Stop reason: HOSPADM

## 2021-04-19 RX ORDER — SODIUM CHLORIDE 0.9 % (FLUSH) 0.9 %
10 SYRINGE (ML) INJECTION
Status: DISCONTINUED | OUTPATIENT
Start: 2021-04-19 | End: 2021-04-22 | Stop reason: HOSPADM

## 2021-04-19 RX ORDER — HYDRALAZINE HYDROCHLORIDE 20 MG/ML
20 INJECTION INTRAMUSCULAR; INTRAVENOUS ONCE
Status: COMPLETED | OUTPATIENT
Start: 2021-04-19 | End: 2021-04-19

## 2021-04-19 RX ORDER — OXYCODONE HYDROCHLORIDE 5 MG/1
5 TABLET ORAL EVERY 6 HOURS PRN
Status: DISCONTINUED | OUTPATIENT
Start: 2021-04-19 | End: 2021-04-19

## 2021-04-19 RX ORDER — CLONIDINE 0.3 MG/24H
1 PATCH, EXTENDED RELEASE TRANSDERMAL
Status: DISCONTINUED | OUTPATIENT
Start: 2021-04-19 | End: 2021-04-20

## 2021-04-19 RX ORDER — HYDRALAZINE HYDROCHLORIDE 20 MG/ML
20 INJECTION INTRAMUSCULAR; INTRAVENOUS
Status: COMPLETED | OUTPATIENT
Start: 2021-04-19 | End: 2021-04-19

## 2021-04-19 RX ORDER — NICARDIPINE HYDROCHLORIDE 0.2 MG/ML
2.5 INJECTION INTRAVENOUS CONTINUOUS
Status: DISCONTINUED | OUTPATIENT
Start: 2021-04-19 | End: 2021-04-19

## 2021-04-19 RX ORDER — MUPIROCIN 20 MG/G
OINTMENT TOPICAL 2 TIMES DAILY
Status: DISCONTINUED | OUTPATIENT
Start: 2021-04-19 | End: 2021-04-22 | Stop reason: HOSPADM

## 2021-04-19 RX ORDER — AMOXICILLIN 250 MG
1 CAPSULE ORAL 2 TIMES DAILY PRN
Status: DISCONTINUED | OUTPATIENT
Start: 2021-04-19 | End: 2021-04-22 | Stop reason: HOSPADM

## 2021-04-19 RX ORDER — SEVELAMER CARBONATE 800 MG/1
1600 TABLET, FILM COATED ORAL
Status: DISCONTINUED | OUTPATIENT
Start: 2021-04-19 | End: 2021-04-22 | Stop reason: HOSPADM

## 2021-04-19 RX ORDER — SODIUM CHLORIDE 9 MG/ML
INJECTION, SOLUTION INTRAVENOUS ONCE
Status: DISCONTINUED | OUTPATIENT
Start: 2021-04-19 | End: 2021-04-22 | Stop reason: HOSPADM

## 2021-04-19 RX ORDER — LORAZEPAM 0.5 MG/1
0.5 TABLET ORAL ONCE
Status: COMPLETED | OUTPATIENT
Start: 2021-04-19 | End: 2021-04-19

## 2021-04-19 RX ORDER — LANOLIN ALCOHOL/MO/W.PET/CERES
50 CREAM (GRAM) TOPICAL DAILY
Status: DISCONTINUED | OUTPATIENT
Start: 2021-04-19 | End: 2021-04-22 | Stop reason: HOSPADM

## 2021-04-19 RX ORDER — HEPARIN SODIUM 5000 [USP'U]/ML
5000 INJECTION, SOLUTION INTRAVENOUS; SUBCUTANEOUS EVERY 8 HOURS
Status: DISCONTINUED | OUTPATIENT
Start: 2021-04-19 | End: 2021-04-22 | Stop reason: HOSPADM

## 2021-04-19 RX ORDER — ACETAMINOPHEN 500 MG
500 TABLET ORAL EVERY 6 HOURS PRN
Status: DISCONTINUED | OUTPATIENT
Start: 2021-04-19 | End: 2021-04-22 | Stop reason: HOSPADM

## 2021-04-19 RX ORDER — CLONIDINE HYDROCHLORIDE 0.3 MG/1
1 TABLET ORAL DAILY
COMMUNITY
Start: 2020-09-30 | End: 2021-05-06 | Stop reason: SDUPTHER

## 2021-04-19 RX ORDER — ISONIAZID 300 MG/1
300 TABLET ORAL DAILY
Status: ON HOLD | COMMUNITY
Start: 2020-08-17 | End: 2021-06-14 | Stop reason: HOSPADM

## 2021-04-19 RX ORDER — CARVEDILOL 12.5 MG/1
1 TABLET ORAL 2 TIMES DAILY
COMMUNITY
Start: 2020-09-30 | End: 2021-05-06 | Stop reason: SDUPTHER

## 2021-04-19 RX ORDER — ISONIAZID 300 MG/1
300 TABLET ORAL DAILY
Status: DISCONTINUED | OUTPATIENT
Start: 2021-04-19 | End: 2021-04-22 | Stop reason: HOSPADM

## 2021-04-19 RX ADMIN — AZITHROMYCIN MONOHYDRATE 500 MG: 500 INJECTION, POWDER, LYOPHILIZED, FOR SOLUTION INTRAVENOUS at 06:04

## 2021-04-19 RX ADMIN — FUROSEMIDE 40 MG: 10 INJECTION, SOLUTION INTRAVENOUS at 04:04

## 2021-04-19 RX ADMIN — CLONIDINE 1 PATCH: 0.3 PATCH TRANSDERMAL at 04:04

## 2021-04-19 RX ADMIN — ACETAMINOPHEN 500 MG: 500 TABLET ORAL at 04:04

## 2021-04-19 RX ADMIN — SEVELAMER CARBONATE 1600 MG: 800 TABLET, FILM COATED ORAL at 08:04

## 2021-04-19 RX ADMIN — SEVELAMER CARBONATE 1600 MG: 800 TABLET, FILM COATED ORAL at 04:04

## 2021-04-19 RX ADMIN — ASPIRIN 162 MG: 81 TABLET, CHEWABLE ORAL at 04:04

## 2021-04-19 RX ADMIN — LORAZEPAM 0.5 MG: 0.5 TABLET ORAL at 11:04

## 2021-04-19 RX ADMIN — VANCOMYCIN HYDROCHLORIDE 1000 MG: 1 INJECTION, POWDER, LYOPHILIZED, FOR SOLUTION INTRAVENOUS at 08:04

## 2021-04-19 RX ADMIN — HYDRALAZINE HYDROCHLORIDE 100 MG: 25 TABLET, FILM COATED ORAL at 08:04

## 2021-04-19 RX ADMIN — MUPIROCIN: 20 OINTMENT TOPICAL at 08:04

## 2021-04-19 RX ADMIN — NIFEDIPINE 30 MG: 30 TABLET, FILM COATED, EXTENDED RELEASE ORAL at 11:04

## 2021-04-19 RX ADMIN — HYDRALAZINE HYDROCHLORIDE 20 MG: 20 INJECTION INTRAMUSCULAR; INTRAVENOUS at 03:04

## 2021-04-19 RX ADMIN — CARVEDILOL 12.5 MG: 12.5 TABLET, FILM COATED ORAL at 06:04

## 2021-04-19 RX ADMIN — HYDRALAZINE HYDROCHLORIDE 20 MG: 20 INJECTION INTRAMUSCULAR; INTRAVENOUS at 10:04

## 2021-04-19 RX ADMIN — HEPARIN SODIUM 5000 UNITS: 5000 INJECTION INTRAVENOUS; SUBCUTANEOUS at 09:04

## 2021-04-19 RX ADMIN — CEFTRIAXONE 2 G: 2 INJECTION, SOLUTION INTRAVENOUS at 05:04

## 2021-04-19 RX ADMIN — HEPARIN SODIUM 5000 UNITS: 5000 INJECTION INTRAVENOUS; SUBCUTANEOUS at 05:04

## 2021-04-19 RX ADMIN — CLONIDINE HYDROCHLORIDE 0.3 MG: 0.1 TABLET ORAL at 08:04

## 2021-04-19 RX ADMIN — NICARDIPINE HYDROCHLORIDE 2.5 MG/HR: 0.2 INJECTION, SOLUTION INTRAVENOUS at 04:04

## 2021-04-19 RX ADMIN — HEPARIN SODIUM 5000 UNITS: 5000 INJECTION INTRAVENOUS; SUBCUTANEOUS at 01:04

## 2021-04-19 RX ADMIN — CHOLECALCIFEROL TAB 25 MCG (1000 UNIT) 1000 UNITS: 25 TAB at 08:04

## 2021-04-19 RX ADMIN — ISONIAZID 300 MG: 300 TABLET ORAL at 08:04

## 2021-04-19 RX ADMIN — Medication 6 MG: at 08:04

## 2021-04-19 RX ADMIN — PYRIDOXINE HCL TAB 50 MG 50 MG: 50 TAB at 08:04

## 2021-04-19 RX ADMIN — HYDRALAZINE HYDROCHLORIDE 100 MG: 25 TABLET, FILM COATED ORAL at 04:04

## 2021-04-19 RX ADMIN — CLONIDINE HYDROCHLORIDE 0.3 MG: 0.1 TABLET ORAL at 03:04

## 2021-04-19 RX ADMIN — NITROGLYCERIN 1 INCH: 20 OINTMENT TOPICAL at 04:04

## 2021-04-19 RX ADMIN — CARVEDILOL 12.5 MG: 12.5 TABLET, FILM COATED ORAL at 04:04

## 2021-04-20 LAB
ALBUMIN SERPL BCP-MCNC: 3.1 G/DL (ref 3.5–5.2)
ALP SERPL-CCNC: 118 U/L (ref 55–135)
ALT SERPL W/O P-5'-P-CCNC: 14 U/L (ref 10–44)
ANION GAP SERPL CALC-SCNC: 12 MMOL/L (ref 8–16)
AST SERPL-CCNC: 16 U/L (ref 10–40)
BASOPHILS # BLD AUTO: 0.08 K/UL (ref 0–0.2)
BASOPHILS NFR BLD: 0.7 % (ref 0–1.9)
BILIRUB SERPL-MCNC: 0.4 MG/DL (ref 0.1–1)
BUN SERPL-MCNC: 39 MG/DL (ref 8–23)
CALCIUM SERPL-MCNC: 10.2 MG/DL (ref 8.7–10.5)
CHLORIDE SERPL-SCNC: 99 MMOL/L (ref 95–110)
CO2 SERPL-SCNC: 27 MMOL/L (ref 23–29)
CREAT SERPL-MCNC: 6.8 MG/DL (ref 0.5–1.4)
DIFFERENTIAL METHOD: ABNORMAL
EOSINOPHIL # BLD AUTO: 0.2 K/UL (ref 0–0.5)
EOSINOPHIL NFR BLD: 2.1 % (ref 0–8)
ERYTHROCYTE [DISTWIDTH] IN BLOOD BY AUTOMATED COUNT: 15.8 % (ref 11.5–14.5)
EST. GFR  (AFRICAN AMERICAN): 7 ML/MIN/1.73 M^2
EST. GFR  (NON AFRICAN AMERICAN): 6 ML/MIN/1.73 M^2
GLUCOSE SERPL-MCNC: 96 MG/DL (ref 70–110)
HBV SURFACE AG SERPL QL IA: NEGATIVE
HCT VFR BLD AUTO: 30.6 % (ref 37–48.5)
HGB BLD-MCNC: 10.2 G/DL (ref 12–16)
IMM GRANULOCYTES # BLD AUTO: 0.08 K/UL (ref 0–0.04)
IMM GRANULOCYTES NFR BLD AUTO: 0.7 % (ref 0–0.5)
LYMPHOCYTES # BLD AUTO: 1.5 K/UL (ref 1–4.8)
LYMPHOCYTES NFR BLD: 13.1 % (ref 18–48)
MAGNESIUM SERPL-MCNC: 2 MG/DL (ref 1.6–2.6)
MCH RBC QN AUTO: 30.9 PG (ref 27–31)
MCHC RBC AUTO-ENTMCNC: 33.3 G/DL (ref 32–36)
MCV RBC AUTO: 93 FL (ref 82–98)
MONOCYTES # BLD AUTO: 0.7 K/UL (ref 0.3–1)
MONOCYTES NFR BLD: 5.6 % (ref 4–15)
NEUTROPHILS # BLD AUTO: 9.1 K/UL (ref 1.8–7.7)
NEUTROPHILS NFR BLD: 77.8 % (ref 38–73)
NRBC BLD-RTO: 0 /100 WBC
PHOSPHATE SERPL-MCNC: 4.5 MG/DL (ref 2.7–4.5)
PLATELET # BLD AUTO: 290 K/UL (ref 150–450)
PMV BLD AUTO: 11.6 FL (ref 9.2–12.9)
POTASSIUM SERPL-SCNC: 5.3 MMOL/L (ref 3.5–5.1)
PROT SERPL-MCNC: 6.8 G/DL (ref 6–8.4)
RBC # BLD AUTO: 3.3 M/UL (ref 4–5.4)
SODIUM SERPL-SCNC: 138 MMOL/L (ref 136–145)
WBC # BLD AUTO: 11.7 K/UL (ref 3.9–12.7)

## 2021-04-20 PROCEDURE — 85025 COMPLETE CBC W/AUTO DIFF WBC: CPT | Mod: NTX | Performed by: INTERNAL MEDICINE

## 2021-04-20 PROCEDURE — 94761 N-INVAS EAR/PLS OXIMETRY MLT: CPT | Mod: NTX

## 2021-04-20 PROCEDURE — 63600175 PHARM REV CODE 636 W HCPCS: Mod: NTX | Performed by: INTERNAL MEDICINE

## 2021-04-20 PROCEDURE — 84100 ASSAY OF PHOSPHORUS: CPT | Mod: NTX | Performed by: INTERNAL MEDICINE

## 2021-04-20 PROCEDURE — 83735 ASSAY OF MAGNESIUM: CPT | Mod: NTX | Performed by: INTERNAL MEDICINE

## 2021-04-20 PROCEDURE — 80053 COMPREHEN METABOLIC PANEL: CPT | Mod: NTX | Performed by: INTERNAL MEDICINE

## 2021-04-20 PROCEDURE — 20000000 HC ICU ROOM: Mod: NTX

## 2021-04-20 PROCEDURE — 25000003 PHARM REV CODE 250: Mod: NTX | Performed by: CLINIC/CENTER

## 2021-04-20 PROCEDURE — 25000003 PHARM REV CODE 250: Mod: NTX | Performed by: INTERNAL MEDICINE

## 2021-04-20 PROCEDURE — 25000003 PHARM REV CODE 250: Mod: NTX | Performed by: STUDENT IN AN ORGANIZED HEALTH CARE EDUCATION/TRAINING PROGRAM

## 2021-04-20 PROCEDURE — 36415 COLL VENOUS BLD VENIPUNCTURE: CPT | Mod: NTX | Performed by: INTERNAL MEDICINE

## 2021-04-20 RX ORDER — NIFEDIPINE 30 MG/1
60 TABLET, EXTENDED RELEASE ORAL DAILY
Status: DISCONTINUED | OUTPATIENT
Start: 2021-04-21 | End: 2021-04-22 | Stop reason: HOSPADM

## 2021-04-20 RX ORDER — NICARDIPINE HYDROCHLORIDE 0.2 MG/ML
0-15 INJECTION INTRAVENOUS CONTINUOUS
Status: DISCONTINUED | OUTPATIENT
Start: 2021-04-20 | End: 2021-04-21

## 2021-04-20 RX ORDER — CLONIDINE HYDROCHLORIDE 0.1 MG/1
0.2 TABLET ORAL 3 TIMES DAILY
Status: DISCONTINUED | OUTPATIENT
Start: 2021-04-20 | End: 2021-04-21

## 2021-04-20 RX ADMIN — HYDRALAZINE HYDROCHLORIDE 100 MG: 25 TABLET, FILM COATED ORAL at 02:04

## 2021-04-20 RX ADMIN — HYDRALAZINE HYDROCHLORIDE 100 MG: 25 TABLET, FILM COATED ORAL at 08:04

## 2021-04-20 RX ADMIN — ONDANSETRON 4 MG: 2 INJECTION INTRAMUSCULAR; INTRAVENOUS at 04:04

## 2021-04-20 RX ADMIN — Medication 6 MG: at 10:04

## 2021-04-20 RX ADMIN — ACETAMINOPHEN 500 MG: 500 TABLET ORAL at 12:04

## 2021-04-20 RX ADMIN — ISONIAZID 300 MG: 300 TABLET ORAL at 08:04

## 2021-04-20 RX ADMIN — HEPARIN SODIUM 5000 UNITS: 5000 INJECTION INTRAVENOUS; SUBCUTANEOUS at 05:04

## 2021-04-20 RX ADMIN — SEVELAMER CARBONATE 1600 MG: 800 TABLET, FILM COATED ORAL at 08:04

## 2021-04-20 RX ADMIN — NICARDIPINE HYDROCHLORIDE 2.5 MG/HR: 0.2 INJECTION, SOLUTION INTRAVENOUS at 03:04

## 2021-04-20 RX ADMIN — HEPARIN SODIUM 5000 UNITS: 5000 INJECTION INTRAVENOUS; SUBCUTANEOUS at 10:04

## 2021-04-20 RX ADMIN — NIFEDIPINE 30 MG: 30 TABLET, FILM COATED, EXTENDED RELEASE ORAL at 08:04

## 2021-04-20 RX ADMIN — SEVELAMER CARBONATE 1600 MG: 800 TABLET, FILM COATED ORAL at 05:04

## 2021-04-20 RX ADMIN — HEPARIN SODIUM 5000 UNITS: 5000 INJECTION INTRAVENOUS; SUBCUTANEOUS at 02:04

## 2021-04-20 RX ADMIN — CLONIDINE HYDROCHLORIDE 0.2 MG: 0.1 TABLET ORAL at 08:04

## 2021-04-20 RX ADMIN — NICARDIPINE HYDROCHLORIDE 7.5 MG/HR: 0.2 INJECTION, SOLUTION INTRAVENOUS at 05:04

## 2021-04-20 RX ADMIN — MUPIROCIN: 20 OINTMENT TOPICAL at 08:04

## 2021-04-20 RX ADMIN — CHOLECALCIFEROL TAB 25 MCG (1000 UNIT) 1000 UNITS: 25 TAB at 08:04

## 2021-04-20 RX ADMIN — SEVELAMER CARBONATE 1600 MG: 800 TABLET, FILM COATED ORAL at 11:04

## 2021-04-20 RX ADMIN — CLONIDINE HYDROCHLORIDE 0.2 MG: 0.1 TABLET ORAL at 02:04

## 2021-04-20 RX ADMIN — CARVEDILOL 12.5 MG: 12.5 TABLET, FILM COATED ORAL at 08:04

## 2021-04-20 RX ADMIN — NICARDIPINE HYDROCHLORIDE 7.5 MG/HR: 0.2 INJECTION, SOLUTION INTRAVENOUS at 11:04

## 2021-04-20 RX ADMIN — CARVEDILOL 12.5 MG: 12.5 TABLET, FILM COATED ORAL at 04:04

## 2021-04-20 RX ADMIN — PYRIDOXINE HCL TAB 50 MG 50 MG: 50 TAB at 08:04

## 2021-04-20 RX ADMIN — ACETAMINOPHEN 500 MG: 500 TABLET ORAL at 06:04

## 2021-04-21 PROBLEM — R06.02 SHORTNESS OF BREATH: Status: ACTIVE | Noted: 2021-04-21

## 2021-04-21 LAB
ALBUMIN SERPL BCP-MCNC: 2.9 G/DL (ref 3.5–5.2)
ALP SERPL-CCNC: 103 U/L (ref 55–135)
ALT SERPL W/O P-5'-P-CCNC: 11 U/L (ref 10–44)
ANION GAP SERPL CALC-SCNC: 15 MMOL/L (ref 8–16)
AST SERPL-CCNC: 13 U/L (ref 10–40)
BASOPHILS # BLD AUTO: 0.08 K/UL (ref 0–0.2)
BASOPHILS NFR BLD: 0.7 % (ref 0–1.9)
BILIRUB SERPL-MCNC: 0.4 MG/DL (ref 0.1–1)
BUN SERPL-MCNC: 54 MG/DL (ref 8–23)
CALCIUM SERPL-MCNC: 9.6 MG/DL (ref 8.7–10.5)
CHLORIDE SERPL-SCNC: 99 MMOL/L (ref 95–110)
CO2 SERPL-SCNC: 26 MMOL/L (ref 23–29)
CREAT SERPL-MCNC: 10.1 MG/DL (ref 0.5–1.4)
DIFFERENTIAL METHOD: ABNORMAL
EOSINOPHIL # BLD AUTO: 0.4 K/UL (ref 0–0.5)
EOSINOPHIL NFR BLD: 3.2 % (ref 0–8)
ERYTHROCYTE [DISTWIDTH] IN BLOOD BY AUTOMATED COUNT: 15.9 % (ref 11.5–14.5)
EST. GFR  (AFRICAN AMERICAN): 4 ML/MIN/1.73 M^2
EST. GFR  (NON AFRICAN AMERICAN): 4 ML/MIN/1.73 M^2
GLUCOSE SERPL-MCNC: 95 MG/DL (ref 70–110)
HCT VFR BLD AUTO: 28.7 % (ref 37–48.5)
HGB BLD-MCNC: 9.4 G/DL (ref 12–16)
IMM GRANULOCYTES # BLD AUTO: 0.09 K/UL (ref 0–0.04)
IMM GRANULOCYTES NFR BLD AUTO: 0.8 % (ref 0–0.5)
LYMPHOCYTES # BLD AUTO: 2.5 K/UL (ref 1–4.8)
LYMPHOCYTES NFR BLD: 22.6 % (ref 18–48)
MAGNESIUM SERPL-MCNC: 2.2 MG/DL (ref 1.6–2.6)
MCH RBC QN AUTO: 30.7 PG (ref 27–31)
MCHC RBC AUTO-ENTMCNC: 32.8 G/DL (ref 32–36)
MCV RBC AUTO: 94 FL (ref 82–98)
MONOCYTES # BLD AUTO: 0.8 K/UL (ref 0.3–1)
MONOCYTES NFR BLD: 7.6 % (ref 4–15)
NEUTROPHILS # BLD AUTO: 7.2 K/UL (ref 1.8–7.7)
NEUTROPHILS NFR BLD: 65.1 % (ref 38–73)
NRBC BLD-RTO: 0 /100 WBC
PHOSPHATE SERPL-MCNC: 5.9 MG/DL (ref 2.7–4.5)
PLATELET # BLD AUTO: 314 K/UL (ref 150–450)
PMV BLD AUTO: 11.1 FL (ref 9.2–12.9)
POTASSIUM SERPL-SCNC: 4.8 MMOL/L (ref 3.5–5.1)
PROT SERPL-MCNC: 6.2 G/DL (ref 6–8.4)
RBC # BLD AUTO: 3.06 M/UL (ref 4–5.4)
SODIUM SERPL-SCNC: 140 MMOL/L (ref 136–145)
WBC # BLD AUTO: 11.12 K/UL (ref 3.9–12.7)

## 2021-04-21 PROCEDURE — 21400001 HC TELEMETRY ROOM: Mod: NTX

## 2021-04-21 PROCEDURE — 36415 COLL VENOUS BLD VENIPUNCTURE: CPT | Mod: NTX | Performed by: INTERNAL MEDICINE

## 2021-04-21 PROCEDURE — 80053 COMPREHEN METABOLIC PANEL: CPT | Mod: NTX | Performed by: INTERNAL MEDICINE

## 2021-04-21 PROCEDURE — 25000003 PHARM REV CODE 250: Mod: NTX | Performed by: INTERNAL MEDICINE

## 2021-04-21 PROCEDURE — 84100 ASSAY OF PHOSPHORUS: CPT | Mod: NTX | Performed by: INTERNAL MEDICINE

## 2021-04-21 PROCEDURE — 63600175 PHARM REV CODE 636 W HCPCS: Mod: NTX | Performed by: INTERNAL MEDICINE

## 2021-04-21 PROCEDURE — 25000003 PHARM REV CODE 250: Mod: NTX | Performed by: STUDENT IN AN ORGANIZED HEALTH CARE EDUCATION/TRAINING PROGRAM

## 2021-04-21 PROCEDURE — 85025 COMPLETE CBC W/AUTO DIFF WBC: CPT | Mod: NTX | Performed by: INTERNAL MEDICINE

## 2021-04-21 PROCEDURE — 63600175 PHARM REV CODE 636 W HCPCS: Mod: NTX | Performed by: STUDENT IN AN ORGANIZED HEALTH CARE EDUCATION/TRAINING PROGRAM

## 2021-04-21 PROCEDURE — 83735 ASSAY OF MAGNESIUM: CPT | Mod: NTX | Performed by: INTERNAL MEDICINE

## 2021-04-21 PROCEDURE — 94761 N-INVAS EAR/PLS OXIMETRY MLT: CPT | Mod: NTX

## 2021-04-21 PROCEDURE — 80100014 HC HEMODIALYSIS 1:1: Mod: NTX

## 2021-04-21 RX ORDER — CALCIUM CARBONATE 200(500)MG
500 TABLET,CHEWABLE ORAL DAILY PRN
Status: DISCONTINUED | OUTPATIENT
Start: 2021-04-21 | End: 2021-04-22 | Stop reason: HOSPADM

## 2021-04-21 RX ORDER — CLONIDINE HYDROCHLORIDE 0.1 MG/1
0.3 TABLET ORAL 3 TIMES DAILY
Status: DISCONTINUED | OUTPATIENT
Start: 2021-04-21 | End: 2021-04-22 | Stop reason: HOSPADM

## 2021-04-21 RX ADMIN — HYDRALAZINE HYDROCHLORIDE 100 MG: 25 TABLET, FILM COATED ORAL at 02:04

## 2021-04-21 RX ADMIN — HEPARIN SODIUM 5000 UNITS: 5000 INJECTION INTRAVENOUS; SUBCUTANEOUS at 05:04

## 2021-04-21 RX ADMIN — HEPARIN SODIUM 5000 UNITS: 5000 INJECTION INTRAVENOUS; SUBCUTANEOUS at 08:04

## 2021-04-21 RX ADMIN — CLONIDINE HYDROCHLORIDE 0.3 MG: 0.1 TABLET ORAL at 08:04

## 2021-04-21 RX ADMIN — ISONIAZID 300 MG: 300 TABLET ORAL at 08:04

## 2021-04-21 RX ADMIN — HEPARIN SODIUM 5000 UNITS: 5000 INJECTION INTRAVENOUS; SUBCUTANEOUS at 02:04

## 2021-04-21 RX ADMIN — CARVEDILOL 12.5 MG: 12.5 TABLET, FILM COATED ORAL at 07:04

## 2021-04-21 RX ADMIN — CARVEDILOL 12.5 MG: 12.5 TABLET, FILM COATED ORAL at 04:04

## 2021-04-21 RX ADMIN — CHOLECALCIFEROL TAB 25 MCG (1000 UNIT) 1000 UNITS: 25 TAB at 08:04

## 2021-04-21 RX ADMIN — HYDRALAZINE HYDROCHLORIDE 100 MG: 25 TABLET, FILM COATED ORAL at 08:04

## 2021-04-21 RX ADMIN — PYRIDOXINE HCL TAB 50 MG 50 MG: 50 TAB at 08:04

## 2021-04-21 RX ADMIN — Medication 6 MG: at 11:04

## 2021-04-21 RX ADMIN — SEVELAMER CARBONATE 1600 MG: 800 TABLET, FILM COATED ORAL at 04:04

## 2021-04-21 RX ADMIN — MUPIROCIN: 20 OINTMENT TOPICAL at 08:04

## 2021-04-21 RX ADMIN — CLONIDINE HYDROCHLORIDE 0.2 MG: 0.1 TABLET ORAL at 08:04

## 2021-04-21 RX ADMIN — SEVELAMER CARBONATE 1600 MG: 800 TABLET, FILM COATED ORAL at 11:04

## 2021-04-21 RX ADMIN — SEVELAMER CARBONATE 1600 MG: 800 TABLET, FILM COATED ORAL at 07:04

## 2021-04-21 RX ADMIN — CLONIDINE HYDROCHLORIDE 0.2 MG: 0.1 TABLET ORAL at 02:04

## 2021-04-21 RX ADMIN — NIFEDIPINE 60 MG: 30 TABLET, FILM COATED, EXTENDED RELEASE ORAL at 08:04

## 2021-04-21 RX ADMIN — ONDANSETRON 4 MG: 2 INJECTION INTRAMUSCULAR; INTRAVENOUS at 11:04

## 2021-04-22 VITALS
HEART RATE: 57 BPM | HEIGHT: 64 IN | TEMPERATURE: 97 F | OXYGEN SATURATION: 98 % | RESPIRATION RATE: 18 BRPM | BODY MASS INDEX: 21.3 KG/M2 | DIASTOLIC BLOOD PRESSURE: 72 MMHG | SYSTOLIC BLOOD PRESSURE: 162 MMHG | WEIGHT: 124.75 LBS

## 2021-04-22 PROBLEM — I16.1 HYPERTENSIVE EMERGENCY: Status: RESOLVED | Noted: 2021-04-19 | Resolved: 2021-04-22

## 2021-04-22 PROBLEM — R06.02 SOB (SHORTNESS OF BREATH): Status: RESOLVED | Noted: 2019-10-12 | Resolved: 2021-04-22

## 2021-04-22 PROBLEM — R06.02 SHORTNESS OF BREATH: Status: RESOLVED | Noted: 2021-04-21 | Resolved: 2021-04-22

## 2021-04-22 PROBLEM — I50.33 ACUTE ON CHRONIC DIASTOLIC HEART FAILURE: Status: RESOLVED | Noted: 2020-03-09 | Resolved: 2021-04-22

## 2021-04-22 LAB
ALBUMIN SERPL BCP-MCNC: 3.2 G/DL (ref 3.5–5.2)
ALP SERPL-CCNC: 117 U/L (ref 55–135)
ALT SERPL W/O P-5'-P-CCNC: 9 U/L (ref 10–44)
ANION GAP SERPL CALC-SCNC: 10 MMOL/L (ref 8–16)
AST SERPL-CCNC: 14 U/L (ref 10–40)
BASOPHILS # BLD AUTO: 0.07 K/UL (ref 0–0.2)
BASOPHILS NFR BLD: 0.6 % (ref 0–1.9)
BILIRUB SERPL-MCNC: 0.3 MG/DL (ref 0.1–1)
BUN SERPL-MCNC: 24 MG/DL (ref 8–23)
CALCIUM SERPL-MCNC: 10.2 MG/DL (ref 8.7–10.5)
CHLORIDE SERPL-SCNC: 99 MMOL/L (ref 95–110)
CO2 SERPL-SCNC: 29 MMOL/L (ref 23–29)
CREAT SERPL-MCNC: 6.1 MG/DL (ref 0.5–1.4)
DIFFERENTIAL METHOD: ABNORMAL
EOSINOPHIL # BLD AUTO: 0.3 K/UL (ref 0–0.5)
EOSINOPHIL NFR BLD: 2.5 % (ref 0–8)
ERYTHROCYTE [DISTWIDTH] IN BLOOD BY AUTOMATED COUNT: 15.5 % (ref 11.5–14.5)
EST. GFR  (AFRICAN AMERICAN): 8 ML/MIN/1.73 M^2
EST. GFR  (NON AFRICAN AMERICAN): 7 ML/MIN/1.73 M^2
GLUCOSE SERPL-MCNC: 101 MG/DL (ref 70–110)
HBV SURFACE AB SER QL IA: POSITIVE
HBV SURFACE AB SERPL IA-ACNC: 306 MIU/ML
HCT VFR BLD AUTO: 30.9 % (ref 37–48.5)
HGB BLD-MCNC: 10.1 G/DL (ref 12–16)
IMM GRANULOCYTES # BLD AUTO: 0.15 K/UL (ref 0–0.04)
IMM GRANULOCYTES NFR BLD AUTO: 1.3 % (ref 0–0.5)
LYMPHOCYTES # BLD AUTO: 2.2 K/UL (ref 1–4.8)
LYMPHOCYTES NFR BLD: 19 % (ref 18–48)
MAGNESIUM SERPL-MCNC: 2.1 MG/DL (ref 1.6–2.6)
MCH RBC QN AUTO: 30.6 PG (ref 27–31)
MCHC RBC AUTO-ENTMCNC: 32.7 G/DL (ref 32–36)
MCV RBC AUTO: 94 FL (ref 82–98)
MONOCYTES # BLD AUTO: 0.8 K/UL (ref 0.3–1)
MONOCYTES NFR BLD: 7.3 % (ref 4–15)
NEUTROPHILS # BLD AUTO: 8 K/UL (ref 1.8–7.7)
NEUTROPHILS NFR BLD: 69.3 % (ref 38–73)
NRBC BLD-RTO: 1 /100 WBC
PHOSPHATE SERPL-MCNC: 4.9 MG/DL (ref 2.7–4.5)
PLATELET # BLD AUTO: 318 K/UL (ref 150–450)
PMV BLD AUTO: 10.8 FL (ref 9.2–12.9)
POTASSIUM SERPL-SCNC: 4.6 MMOL/L (ref 3.5–5.1)
PROT SERPL-MCNC: 6.8 G/DL (ref 6–8.4)
RBC # BLD AUTO: 3.3 M/UL (ref 4–5.4)
SODIUM SERPL-SCNC: 138 MMOL/L (ref 136–145)
WBC # BLD AUTO: 11.58 K/UL (ref 3.9–12.7)

## 2021-04-22 PROCEDURE — 25000003 PHARM REV CODE 250: Mod: NTX | Performed by: STUDENT IN AN ORGANIZED HEALTH CARE EDUCATION/TRAINING PROGRAM

## 2021-04-22 PROCEDURE — 63600175 PHARM REV CODE 636 W HCPCS: Mod: NTX | Performed by: STUDENT IN AN ORGANIZED HEALTH CARE EDUCATION/TRAINING PROGRAM

## 2021-04-22 PROCEDURE — 36415 COLL VENOUS BLD VENIPUNCTURE: CPT | Mod: NTX | Performed by: STUDENT IN AN ORGANIZED HEALTH CARE EDUCATION/TRAINING PROGRAM

## 2021-04-22 PROCEDURE — 80053 COMPREHEN METABOLIC PANEL: CPT | Mod: NTX | Performed by: STUDENT IN AN ORGANIZED HEALTH CARE EDUCATION/TRAINING PROGRAM

## 2021-04-22 PROCEDURE — 83735 ASSAY OF MAGNESIUM: CPT | Mod: NTX | Performed by: STUDENT IN AN ORGANIZED HEALTH CARE EDUCATION/TRAINING PROGRAM

## 2021-04-22 PROCEDURE — 25000003 PHARM REV CODE 250: Mod: NTX | Performed by: INTERNAL MEDICINE

## 2021-04-22 PROCEDURE — 85025 COMPLETE CBC W/AUTO DIFF WBC: CPT | Mod: NTX | Performed by: STUDENT IN AN ORGANIZED HEALTH CARE EDUCATION/TRAINING PROGRAM

## 2021-04-22 PROCEDURE — 84100 ASSAY OF PHOSPHORUS: CPT | Mod: NTX | Performed by: STUDENT IN AN ORGANIZED HEALTH CARE EDUCATION/TRAINING PROGRAM

## 2021-04-22 RX ORDER — ISOSORBIDE MONONITRATE 30 MG/1
60 TABLET, EXTENDED RELEASE ORAL DAILY
Status: DISCONTINUED | OUTPATIENT
Start: 2021-04-22 | End: 2021-04-22 | Stop reason: HOSPADM

## 2021-04-22 RX ORDER — ISOSORBIDE MONONITRATE 60 MG/1
60 TABLET, EXTENDED RELEASE ORAL DAILY
Qty: 30 TABLET | Refills: 5 | Status: ON HOLD | OUTPATIENT
Start: 2021-04-22 | End: 2021-07-08 | Stop reason: SDUPTHER

## 2021-04-22 RX ORDER — NIFEDIPINE 60 MG/1
60 TABLET, EXTENDED RELEASE ORAL DAILY
Qty: 30 TABLET | Refills: 5 | Status: SHIPPED | OUTPATIENT
Start: 2021-04-23 | End: 2021-05-06

## 2021-04-22 RX ADMIN — HEPARIN SODIUM 5000 UNITS: 5000 INJECTION INTRAVENOUS; SUBCUTANEOUS at 06:04

## 2021-04-22 RX ADMIN — ISONIAZID 300 MG: 300 TABLET ORAL at 09:04

## 2021-04-22 RX ADMIN — PYRIDOXINE HCL TAB 50 MG 50 MG: 50 TAB at 09:04

## 2021-04-22 RX ADMIN — CLONIDINE HYDROCHLORIDE 0.3 MG: 0.1 TABLET ORAL at 09:04

## 2021-04-22 RX ADMIN — ISOSORBIDE MONONITRATE 60 MG: 30 TABLET, EXTENDED RELEASE ORAL at 12:04

## 2021-04-22 RX ADMIN — HYDRALAZINE HYDROCHLORIDE 100 MG: 25 TABLET, FILM COATED ORAL at 09:04

## 2021-04-22 RX ADMIN — CHOLECALCIFEROL TAB 25 MCG (1000 UNIT) 1000 UNITS: 25 TAB at 09:04

## 2021-04-22 RX ADMIN — CARVEDILOL 12.5 MG: 12.5 TABLET, FILM COATED ORAL at 09:04

## 2021-04-22 RX ADMIN — SEVELAMER CARBONATE 1600 MG: 800 TABLET, FILM COATED ORAL at 12:04

## 2021-04-22 RX ADMIN — NIFEDIPINE 60 MG: 30 TABLET, FILM COATED, EXTENDED RELEASE ORAL at 09:04

## 2021-04-23 ENCOUNTER — PATIENT OUTREACH (OUTPATIENT)
Dept: ADMINISTRATIVE | Facility: HOSPITAL | Age: 66
End: 2021-04-23

## 2021-04-23 ENCOUNTER — PATIENT OUTREACH (OUTPATIENT)
Dept: ADMINISTRATIVE | Facility: CLINIC | Age: 66
End: 2021-04-23

## 2021-04-23 DIAGNOSIS — E11.69 TYPE 2 DIABETES MELLITUS WITH OTHER SPECIFIED COMPLICATION, UNSPECIFIED WHETHER LONG TERM INSULIN USE: Primary | ICD-10-CM

## 2021-04-24 LAB
BACTERIA BLD CULT: NORMAL
BACTERIA BLD CULT: NORMAL

## 2021-04-26 ENCOUNTER — TELEPHONE (OUTPATIENT)
Dept: FAMILY MEDICINE | Facility: CLINIC | Age: 66
End: 2021-04-26

## 2021-05-06 ENCOUNTER — HOSPITAL ENCOUNTER (OUTPATIENT)
Dept: RADIOLOGY | Facility: HOSPITAL | Age: 66
Discharge: HOME | End: 2021-05-06
Attending: NURSE PRACTITIONER
Payer: MEDICARE

## 2021-05-06 ENCOUNTER — OFFICE VISIT (OUTPATIENT)
Dept: TRANSPLANT | Facility: CLINIC | Age: 66
End: 2021-05-06
Payer: MEDICARE

## 2021-05-06 VITALS
SYSTOLIC BLOOD PRESSURE: 180 MMHG | DIASTOLIC BLOOD PRESSURE: 74 MMHG | TEMPERATURE: 98 F | BODY MASS INDEX: 21.05 KG/M2 | WEIGHT: 118.81 LBS | OXYGEN SATURATION: 100 % | HEIGHT: 63 IN | RESPIRATION RATE: 20 BRPM | HEART RATE: 89 BPM

## 2021-05-06 DIAGNOSIS — N25.81 SECONDARY HYPERPARATHYROIDISM: ICD-10-CM

## 2021-05-06 DIAGNOSIS — N18.6 ESRD ON HEMODIALYSIS: ICD-10-CM

## 2021-05-06 DIAGNOSIS — Z76.82 ORGAN TRANSPLANT CANDIDATE: ICD-10-CM

## 2021-05-06 DIAGNOSIS — N18.6 ANEMIA IN ESRD (END-STAGE RENAL DISEASE): ICD-10-CM

## 2021-05-06 DIAGNOSIS — I15.0 RENOVASCULAR HYPERTENSION: ICD-10-CM

## 2021-05-06 DIAGNOSIS — Z76.82 PATIENT ON WAITING LIST FOR KIDNEY TRANSPLANT: Primary | ICD-10-CM

## 2021-05-06 DIAGNOSIS — E11.21 TYPE 2 DIABETES MELLITUS WITH DIABETIC NEPHROPATHY, WITHOUT LONG-TERM CURRENT USE OF INSULIN: ICD-10-CM

## 2021-05-06 DIAGNOSIS — D63.1 ANEMIA IN ESRD (END-STAGE RENAL DISEASE): ICD-10-CM

## 2021-05-06 DIAGNOSIS — Z99.2 ESRD ON HEMODIALYSIS: ICD-10-CM

## 2021-05-06 PROCEDURE — 76770 US EXAM ABDO BACK WALL COMP: CPT | Mod: 26,TXP,, | Performed by: RADIOLOGY

## 2021-05-06 PROCEDURE — 99999 PR PBB SHADOW E&M-EST. PATIENT-LVL IV: ICD-10-PCS | Mod: PBBFAC,TXP,, | Performed by: NURSE PRACTITIONER

## 2021-05-06 PROCEDURE — 71046 X-RAY EXAM CHEST 2 VIEWS: CPT | Mod: TC,TXP

## 2021-05-06 PROCEDURE — 71046 X-RAY EXAM CHEST 2 VIEWS: CPT | Mod: 26,TXP,, | Performed by: RADIOLOGY

## 2021-05-06 PROCEDURE — 99999 PR PBB SHADOW E&M-EST. PATIENT-LVL IV: CPT | Mod: PBBFAC,TXP,, | Performed by: NURSE PRACTITIONER

## 2021-05-06 PROCEDURE — 99214 OFFICE O/P EST MOD 30 MIN: CPT | Mod: PBBFAC,25,TXP | Performed by: NURSE PRACTITIONER

## 2021-05-06 PROCEDURE — 71046 XR CHEST PA AND LATERAL: ICD-10-PCS | Mod: 26,TXP,, | Performed by: RADIOLOGY

## 2021-05-06 PROCEDURE — 99214 PR OFFICE/OUTPT VISIT, EST, LEVL IV, 30-39 MIN: ICD-10-PCS | Mod: S$PBB,TXP,, | Performed by: NURSE PRACTITIONER

## 2021-05-06 PROCEDURE — 76770 US RETROPERITONEAL COMPLETE: ICD-10-PCS | Mod: 26,TXP,, | Performed by: RADIOLOGY

## 2021-05-06 PROCEDURE — 99214 OFFICE O/P EST MOD 30 MIN: CPT | Mod: S$PBB,TXP,, | Performed by: NURSE PRACTITIONER

## 2021-05-06 PROCEDURE — 76770 US EXAM ABDO BACK WALL COMP: CPT | Mod: TC,TXP

## 2021-05-07 ENCOUNTER — CLINICAL SUPPORT (OUTPATIENT)
Dept: OPHTHALMOLOGY | Facility: CLINIC | Age: 66
End: 2021-05-07
Attending: INTERNAL MEDICINE
Payer: MEDICARE

## 2021-05-07 ENCOUNTER — OFFICE VISIT (OUTPATIENT)
Dept: FAMILY MEDICINE | Facility: CLINIC | Age: 66
End: 2021-05-07
Payer: MEDICARE

## 2021-05-07 ENCOUNTER — EPISODE CHANGES (OUTPATIENT)
Dept: TRANSPLANT | Facility: CLINIC | Age: 66
End: 2021-05-07

## 2021-05-07 ENCOUNTER — TELEPHONE (OUTPATIENT)
Dept: TRANSPLANT | Facility: CLINIC | Age: 66
End: 2021-05-07

## 2021-05-07 VITALS
DIASTOLIC BLOOD PRESSURE: 88 MMHG | SYSTOLIC BLOOD PRESSURE: 160 MMHG | OXYGEN SATURATION: 98 % | HEART RATE: 83 BPM | TEMPERATURE: 98 F | WEIGHT: 116.06 LBS | HEIGHT: 63 IN | BODY MASS INDEX: 20.56 KG/M2

## 2021-05-07 DIAGNOSIS — N28.1 SIMPLE RENAL CYST: ICD-10-CM

## 2021-05-07 DIAGNOSIS — I15.0 RENOVASCULAR HYPERTENSION: Primary | ICD-10-CM

## 2021-05-07 DIAGNOSIS — N18.6 ESRD (END STAGE RENAL DISEASE) ON DIALYSIS: ICD-10-CM

## 2021-05-07 DIAGNOSIS — D12.6 TUBULAR ADENOMA OF COLON: ICD-10-CM

## 2021-05-07 DIAGNOSIS — Z12.31 ENCOUNTER FOR SCREENING MAMMOGRAM FOR MALIGNANT NEOPLASM OF BREAST: ICD-10-CM

## 2021-05-07 DIAGNOSIS — Z99.2 ESRD (END STAGE RENAL DISEASE) ON DIALYSIS: ICD-10-CM

## 2021-05-07 DIAGNOSIS — Z22.7 LATENT TUBERCULOSIS: ICD-10-CM

## 2021-05-07 DIAGNOSIS — I10 ESSENTIAL HYPERTENSION: ICD-10-CM

## 2021-05-07 DIAGNOSIS — M25.562 ACUTE PAIN OF LEFT KNEE: ICD-10-CM

## 2021-05-07 DIAGNOSIS — Z86.39 HISTORY OF DIABETES MELLITUS: ICD-10-CM

## 2021-05-07 DIAGNOSIS — Z76.82 ORGAN TRANSPLANT CANDIDATE: Primary | ICD-10-CM

## 2021-05-07 DIAGNOSIS — E11.69 TYPE 2 DIABETES MELLITUS WITH OTHER SPECIFIED COMPLICATION, UNSPECIFIED WHETHER LONG TERM INSULIN USE: ICD-10-CM

## 2021-05-07 DIAGNOSIS — M79.652 LEFT THIGH PAIN: ICD-10-CM

## 2021-05-07 PROBLEM — D72.829 LEUKOCYTOSIS: Status: RESOLVED | Noted: 2020-01-27 | Resolved: 2021-05-07

## 2021-05-07 PROBLEM — E87.5 HYPERKALEMIA: Status: RESOLVED | Noted: 2020-01-28 | Resolved: 2021-05-07

## 2021-05-07 PROCEDURE — 99999 PR PBB SHADOW E&M-EST. PATIENT-LVL V: ICD-10-PCS | Mod: PBBFAC,,, | Performed by: INTERNAL MEDICINE

## 2021-05-07 PROCEDURE — 99999 PR PBB SHADOW E&M-EST. PATIENT-LVL V: CPT | Mod: PBBFAC,,, | Performed by: INTERNAL MEDICINE

## 2021-05-07 PROCEDURE — 99204 PR OFFICE/OUTPT VISIT, NEW, LEVL IV, 45-59 MIN: ICD-10-PCS | Mod: S$PBB,,, | Performed by: INTERNAL MEDICINE

## 2021-05-07 PROCEDURE — 99215 OFFICE O/P EST HI 40 MIN: CPT | Mod: PBBFAC,PO | Performed by: INTERNAL MEDICINE

## 2021-05-07 PROCEDURE — 99204 OFFICE O/P NEW MOD 45 MIN: CPT | Mod: S$PBB,,, | Performed by: INTERNAL MEDICINE

## 2021-05-07 RX ORDER — DICLOFENAC SODIUM 10 MG/G
GEL TOPICAL
Qty: 100 G | Refills: 1 | Status: SHIPPED | OUTPATIENT
Start: 2021-05-07 | End: 2022-08-19

## 2021-05-07 RX ORDER — NIFEDIPINE 60 MG/1
1 TABLET, EXTENDED RELEASE ORAL
COMMUNITY
Start: 2021-04-23 | End: 2022-08-19 | Stop reason: ALTCHOICE

## 2021-05-07 RX ORDER — CLONIDINE HYDROCHLORIDE 0.3 MG/1
0.3 TABLET ORAL 2 TIMES DAILY
Qty: 60 TABLET | Refills: 11 | Status: SHIPPED | OUTPATIENT
Start: 2021-05-07 | End: 2021-07-27

## 2021-05-07 RX ORDER — DOXERCALCIFEROL 4 UG/2ML
4 INJECTION INTRAVENOUS
COMMUNITY
Start: 2020-12-18 | End: 2021-12-17

## 2021-05-07 RX ORDER — OLMESARTAN MEDOXOMIL 40 MG/1
40 TABLET ORAL DAILY
Qty: 90 TABLET | Refills: 3 | Status: SHIPPED | OUTPATIENT
Start: 2021-05-07 | End: 2022-08-19

## 2021-05-11 ENCOUNTER — TELEPHONE (OUTPATIENT)
Dept: INFECTIOUS DISEASES | Facility: CLINIC | Age: 66
End: 2021-05-11

## 2021-05-12 ENCOUNTER — TELEPHONE (OUTPATIENT)
Dept: INFECTIOUS DISEASES | Facility: CLINIC | Age: 66
End: 2021-05-12

## 2021-05-25 ENCOUNTER — CLINICAL SUPPORT (OUTPATIENT)
Dept: FAMILY MEDICINE | Facility: CLINIC | Age: 66
End: 2021-05-25
Payer: MEDICARE

## 2021-05-25 VITALS — HEART RATE: 64 BPM | SYSTOLIC BLOOD PRESSURE: 132 MMHG | DIASTOLIC BLOOD PRESSURE: 70 MMHG | OXYGEN SATURATION: 97 %

## 2021-05-25 DIAGNOSIS — I10 ESSENTIAL HYPERTENSION: Primary | ICD-10-CM

## 2021-05-25 PROCEDURE — 99999 PR PBB SHADOW E&M-EST. PATIENT-LVL I: CPT | Mod: PBBFAC,,,

## 2021-05-25 PROCEDURE — 99499 NO LOS: ICD-10-PCS | Mod: S$PBB,,, | Performed by: INTERNAL MEDICINE

## 2021-05-25 PROCEDURE — 99499 UNLISTED E&M SERVICE: CPT | Mod: S$PBB,,, | Performed by: INTERNAL MEDICINE

## 2021-05-25 PROCEDURE — 99999 PR PBB SHADOW E&M-EST. PATIENT-LVL I: ICD-10-PCS | Mod: PBBFAC,,,

## 2021-05-25 PROCEDURE — 99211 OFF/OP EST MAY X REQ PHY/QHP: CPT | Mod: PBBFAC,PO

## 2021-05-26 ENCOUNTER — EPISODE CHANGES (OUTPATIENT)
Dept: TRANSPLANT | Facility: CLINIC | Age: 66
End: 2021-05-26

## 2021-06-13 ENCOUNTER — HOSPITAL ENCOUNTER (OUTPATIENT)
Facility: HOSPITAL | Age: 66
Discharge: HOME | End: 2021-06-14
Attending: EMERGENCY MEDICINE | Admitting: HOSPITALIST
Payer: MEDICARE

## 2021-06-13 DIAGNOSIS — R07.9 CHEST PAIN: ICD-10-CM

## 2021-06-13 DIAGNOSIS — R06.02 SOB (SHORTNESS OF BREATH): ICD-10-CM

## 2021-06-13 DIAGNOSIS — I16.0 HYPERTENSIVE URGENCY: Primary | ICD-10-CM

## 2021-06-13 DIAGNOSIS — E87.70 HYPERVOLEMIA, UNSPECIFIED HYPERVOLEMIA TYPE: ICD-10-CM

## 2021-06-13 DIAGNOSIS — N18.6 ESRD (END STAGE RENAL DISEASE) ON DIALYSIS: ICD-10-CM

## 2021-06-13 DIAGNOSIS — Z99.2 ESRD (END STAGE RENAL DISEASE) ON DIALYSIS: ICD-10-CM

## 2021-06-13 DIAGNOSIS — J20.9 ACUTE BRONCHITIS, UNSPECIFIED ORGANISM: ICD-10-CM

## 2021-06-13 LAB
ALBUMIN SERPL BCP-MCNC: 3.5 G/DL (ref 3.5–5.2)
ALP SERPL-CCNC: 129 U/L (ref 55–135)
ALT SERPL W/O P-5'-P-CCNC: 10 U/L (ref 10–44)
ANION GAP SERPL CALC-SCNC: 13 MMOL/L (ref 8–16)
AST SERPL-CCNC: 17 U/L (ref 10–40)
BASOPHILS # BLD AUTO: 0.1 K/UL (ref 0–0.2)
BASOPHILS NFR BLD: 0.6 % (ref 0–1.9)
BILIRUB SERPL-MCNC: 0.5 MG/DL (ref 0.1–1)
BNP SERPL-MCNC: 2605 PG/ML (ref 0–99)
BUN SERPL-MCNC: 37 MG/DL (ref 8–23)
CALCIUM SERPL-MCNC: 9.6 MG/DL (ref 8.7–10.5)
CHLORIDE SERPL-SCNC: 103 MMOL/L (ref 95–110)
CO2 SERPL-SCNC: 27 MMOL/L (ref 23–29)
CREAT SERPL-MCNC: 9 MG/DL (ref 0.5–1.4)
CTP QC/QA: YES
DIFFERENTIAL METHOD: ABNORMAL
EOSINOPHIL # BLD AUTO: 0.6 K/UL (ref 0–0.5)
EOSINOPHIL NFR BLD: 3.8 % (ref 0–8)
ERYTHROCYTE [DISTWIDTH] IN BLOOD BY AUTOMATED COUNT: 17.6 % (ref 11.5–14.5)
EST. GFR  (AFRICAN AMERICAN): 5 ML/MIN/1.73 M^2
EST. GFR  (NON AFRICAN AMERICAN): 4 ML/MIN/1.73 M^2
GLUCOSE SERPL-MCNC: 102 MG/DL (ref 70–110)
HCT VFR BLD AUTO: 32.6 % (ref 37–48.5)
HGB BLD-MCNC: 10.6 G/DL (ref 12–16)
IMM GRANULOCYTES # BLD AUTO: 0.07 K/UL (ref 0–0.04)
IMM GRANULOCYTES NFR BLD AUTO: 0.4 % (ref 0–0.5)
LACTATE SERPL-SCNC: 0.9 MMOL/L (ref 0.5–2.2)
LYMPHOCYTES # BLD AUTO: 1.6 K/UL (ref 1–4.8)
LYMPHOCYTES NFR BLD: 9.3 % (ref 18–48)
MAGNESIUM SERPL-MCNC: 2.3 MG/DL (ref 1.6–2.6)
MCH RBC QN AUTO: 30.7 PG (ref 27–31)
MCHC RBC AUTO-ENTMCNC: 32.5 G/DL (ref 32–36)
MCV RBC AUTO: 95 FL (ref 82–98)
MONOCYTES # BLD AUTO: 1 K/UL (ref 0.3–1)
MONOCYTES NFR BLD: 6.2 % (ref 4–15)
NEUTROPHILS # BLD AUTO: 13.3 K/UL (ref 1.8–7.7)
NEUTROPHILS NFR BLD: 79.7 % (ref 38–73)
NRBC BLD-RTO: 0 /100 WBC
PHOSPHATE SERPL-MCNC: 3.7 MG/DL (ref 2.7–4.5)
PLATELET # BLD AUTO: 201 K/UL (ref 150–450)
PMV BLD AUTO: 11.5 FL (ref 9.2–12.9)
POTASSIUM SERPL-SCNC: 5 MMOL/L (ref 3.5–5.1)
PROCALCITONIN SERPL IA-MCNC: 0.2 NG/ML
PROT SERPL-MCNC: 7.1 G/DL (ref 6–8.4)
RBC # BLD AUTO: 3.45 M/UL (ref 4–5.4)
SARS-COV-2 RDRP RESP QL NAA+PROBE: NEGATIVE
SODIUM SERPL-SCNC: 143 MMOL/L (ref 136–145)
TROPONIN I SERPL DL<=0.01 NG/ML-MCNC: 0.03 NG/ML (ref 0–0.03)
WBC # BLD AUTO: 16.63 K/UL (ref 3.9–12.7)

## 2021-06-13 PROCEDURE — 36415 COLL VENOUS BLD VENIPUNCTURE: CPT | Mod: NTX | Performed by: PHYSICIAN ASSISTANT

## 2021-06-13 PROCEDURE — 93005 ELECTROCARDIOGRAM TRACING: CPT | Mod: NTX

## 2021-06-13 PROCEDURE — G0378 HOSPITAL OBSERVATION PER HR: HCPCS | Mod: NTX

## 2021-06-13 PROCEDURE — 63600175 PHARM REV CODE 636 W HCPCS: Mod: NTX | Performed by: EMERGENCY MEDICINE

## 2021-06-13 PROCEDURE — 94640 AIRWAY INHALATION TREATMENT: CPT | Mod: NTX

## 2021-06-13 PROCEDURE — 96375 TX/PRO/DX INJ NEW DRUG ADDON: CPT | Mod: NTX

## 2021-06-13 PROCEDURE — 84100 ASSAY OF PHOSPHORUS: CPT | Mod: NTX | Performed by: EMERGENCY MEDICINE

## 2021-06-13 PROCEDURE — 96366 THER/PROPH/DIAG IV INF ADDON: CPT | Mod: NTX

## 2021-06-13 PROCEDURE — 85025 COMPLETE CBC W/AUTO DIFF WBC: CPT | Mod: NTX | Performed by: EMERGENCY MEDICINE

## 2021-06-13 PROCEDURE — 25000003 PHARM REV CODE 250: Mod: NTX | Performed by: EMERGENCY MEDICINE

## 2021-06-13 PROCEDURE — 27000221 HC OXYGEN, UP TO 24 HOURS: Mod: NTX

## 2021-06-13 PROCEDURE — 25000242 PHARM REV CODE 250 ALT 637 W/ HCPCS: Mod: NTX | Performed by: NURSE PRACTITIONER

## 2021-06-13 PROCEDURE — G0257 UNSCHED DIALYSIS ESRD PT HOS: HCPCS

## 2021-06-13 PROCEDURE — 93010 ELECTROCARDIOGRAM REPORT: CPT | Mod: NTX,,, | Performed by: INTERNAL MEDICINE

## 2021-06-13 PROCEDURE — 96367 TX/PROPH/DG ADDL SEQ IV INF: CPT | Mod: NTX

## 2021-06-13 PROCEDURE — 83880 ASSAY OF NATRIURETIC PEPTIDE: CPT | Mod: NTX | Performed by: EMERGENCY MEDICINE

## 2021-06-13 PROCEDURE — 83605 ASSAY OF LACTIC ACID: CPT | Mod: NTX | Performed by: EMERGENCY MEDICINE

## 2021-06-13 PROCEDURE — 83735 ASSAY OF MAGNESIUM: CPT | Mod: NTX | Performed by: EMERGENCY MEDICINE

## 2021-06-13 PROCEDURE — 94644 CONT INHLJ TX 1ST HOUR: CPT | Mod: NTX

## 2021-06-13 PROCEDURE — 99285 EMERGENCY DEPT VISIT HI MDM: CPT | Mod: 25,NTX

## 2021-06-13 PROCEDURE — 80053 COMPREHEN METABOLIC PANEL: CPT | Mod: NTX | Performed by: EMERGENCY MEDICINE

## 2021-06-13 PROCEDURE — 84484 ASSAY OF TROPONIN QUANT: CPT | Mod: NTX | Performed by: EMERGENCY MEDICINE

## 2021-06-13 PROCEDURE — 93010 EKG 12-LEAD: ICD-10-PCS | Mod: NTX,,, | Performed by: INTERNAL MEDICINE

## 2021-06-13 PROCEDURE — 80100014 HC HEMODIALYSIS 1:1: Mod: NTX

## 2021-06-13 PROCEDURE — 25000003 PHARM REV CODE 250: Mod: NTX | Performed by: PHYSICIAN ASSISTANT

## 2021-06-13 PROCEDURE — 84145 PROCALCITONIN (PCT): CPT | Mod: NTX | Performed by: EMERGENCY MEDICINE

## 2021-06-13 PROCEDURE — 96365 THER/PROPH/DIAG IV INF INIT: CPT | Mod: NTX

## 2021-06-13 PROCEDURE — 87040 BLOOD CULTURE FOR BACTERIA: CPT | Mod: NTX | Performed by: EMERGENCY MEDICINE

## 2021-06-13 PROCEDURE — U0002 COVID-19 LAB TEST NON-CDC: HCPCS | Mod: NTX | Performed by: EMERGENCY MEDICINE

## 2021-06-13 PROCEDURE — 84484 ASSAY OF TROPONIN QUANT: CPT | Mod: 91,NTX | Performed by: NURSE PRACTITIONER

## 2021-06-13 PROCEDURE — 94761 N-INVAS EAR/PLS OXIMETRY MLT: CPT | Mod: NTX

## 2021-06-13 PROCEDURE — 25000003 PHARM REV CODE 250: Mod: NTX | Performed by: INTERNAL MEDICINE

## 2021-06-13 PROCEDURE — 25000242 PHARM REV CODE 250 ALT 637 W/ HCPCS: Mod: NTX | Performed by: EMERGENCY MEDICINE

## 2021-06-13 PROCEDURE — 84484 ASSAY OF TROPONIN QUANT: CPT | Mod: 91,NTX | Performed by: PHYSICIAN ASSISTANT

## 2021-06-13 RX ORDER — NIFEDIPINE 30 MG/1
60 TABLET, EXTENDED RELEASE ORAL DAILY
Status: DISCONTINUED | OUTPATIENT
Start: 2021-06-14 | End: 2021-06-14

## 2021-06-13 RX ORDER — HYDRALAZINE HYDROCHLORIDE 25 MG/1
100 TABLET, FILM COATED ORAL 3 TIMES DAILY
Status: DISCONTINUED | OUTPATIENT
Start: 2021-06-13 | End: 2021-06-13

## 2021-06-13 RX ORDER — ONDANSETRON 2 MG/ML
8 INJECTION INTRAMUSCULAR; INTRAVENOUS
Status: COMPLETED | OUTPATIENT
Start: 2021-06-13 | End: 2021-06-13

## 2021-06-13 RX ORDER — ACETAMINOPHEN 325 MG/1
650 TABLET ORAL EVERY 4 HOURS PRN
Status: DISCONTINUED | OUTPATIENT
Start: 2021-06-13 | End: 2021-06-14 | Stop reason: HOSPADM

## 2021-06-13 RX ORDER — IBUPROFEN 200 MG
16 TABLET ORAL
Status: DISCONTINUED | OUTPATIENT
Start: 2021-06-13 | End: 2021-06-14 | Stop reason: HOSPADM

## 2021-06-13 RX ORDER — SODIUM CHLORIDE 0.9 % (FLUSH) 0.9 %
10 SYRINGE (ML) INJECTION
Status: DISCONTINUED | OUTPATIENT
Start: 2021-06-13 | End: 2021-06-14 | Stop reason: HOSPADM

## 2021-06-13 RX ORDER — ISOSORBIDE MONONITRATE 30 MG/1
60 TABLET, EXTENDED RELEASE ORAL DAILY
Status: DISCONTINUED | OUTPATIENT
Start: 2021-06-13 | End: 2021-06-14

## 2021-06-13 RX ORDER — SODIUM CHLORIDE 0.9 % (FLUSH) 0.9 %
10 SYRINGE (ML) INJECTION EVERY 8 HOURS
Status: DISCONTINUED | OUTPATIENT
Start: 2021-06-13 | End: 2021-06-13

## 2021-06-13 RX ORDER — HYDRALAZINE HYDROCHLORIDE 25 MG/1
100 TABLET, FILM COATED ORAL 3 TIMES DAILY
Status: DISCONTINUED | OUTPATIENT
Start: 2021-06-13 | End: 2021-06-14 | Stop reason: HOSPADM

## 2021-06-13 RX ORDER — ONDANSETRON 2 MG/ML
4 INJECTION INTRAMUSCULAR; INTRAVENOUS EVERY 8 HOURS PRN
Status: DISCONTINUED | OUTPATIENT
Start: 2021-06-13 | End: 2021-06-14 | Stop reason: HOSPADM

## 2021-06-13 RX ORDER — AZITHROMYCIN 250 MG/1
250 TABLET, FILM COATED ORAL DAILY
Status: DISCONTINUED | OUTPATIENT
Start: 2021-06-14 | End: 2021-06-14 | Stop reason: HOSPADM

## 2021-06-13 RX ORDER — SODIUM CHLORIDE 9 MG/ML
INJECTION, SOLUTION INTRAVENOUS ONCE
Status: DISCONTINUED | OUTPATIENT
Start: 2021-06-13 | End: 2021-06-14 | Stop reason: HOSPADM

## 2021-06-13 RX ORDER — IPRATROPIUM BROMIDE AND ALBUTEROL SULFATE 2.5; .5 MG/3ML; MG/3ML
3 SOLUTION RESPIRATORY (INHALATION) EVERY 4 HOURS
Status: DISCONTINUED | OUTPATIENT
Start: 2021-06-13 | End: 2021-06-13

## 2021-06-13 RX ORDER — CLONIDINE HYDROCHLORIDE 0.1 MG/1
0.3 TABLET ORAL
Status: COMPLETED | OUTPATIENT
Start: 2021-06-13 | End: 2021-06-13

## 2021-06-13 RX ORDER — ALBUTEROL SULFATE 2.5 MG/.5ML
10 SOLUTION RESPIRATORY (INHALATION)
Status: COMPLETED | OUTPATIENT
Start: 2021-06-13 | End: 2021-06-13

## 2021-06-13 RX ORDER — GLUCAGON 1 MG
1 KIT INJECTION
Status: DISCONTINUED | OUTPATIENT
Start: 2021-06-13 | End: 2021-06-14 | Stop reason: HOSPADM

## 2021-06-13 RX ORDER — LANOLIN ALCOHOL/MO/W.PET/CERES
800 CREAM (GRAM) TOPICAL
Status: DISCONTINUED | OUTPATIENT
Start: 2021-06-13 | End: 2021-06-14 | Stop reason: HOSPADM

## 2021-06-13 RX ORDER — IPRATROPIUM BROMIDE AND ALBUTEROL SULFATE 2.5; .5 MG/3ML; MG/3ML
3 SOLUTION RESPIRATORY (INHALATION) EVERY 6 HOURS PRN
Status: DISCONTINUED | OUTPATIENT
Start: 2021-06-13 | End: 2021-06-14 | Stop reason: HOSPADM

## 2021-06-13 RX ORDER — MUPIROCIN 20 MG/G
OINTMENT TOPICAL 2 TIMES DAILY
Status: DISCONTINUED | OUTPATIENT
Start: 2021-06-13 | End: 2021-06-14 | Stop reason: HOSPADM

## 2021-06-13 RX ORDER — CLONIDINE HYDROCHLORIDE 0.1 MG/1
0.3 TABLET ORAL 2 TIMES DAILY
Status: DISCONTINUED | OUTPATIENT
Start: 2021-06-13 | End: 2021-06-14

## 2021-06-13 RX ORDER — AMOXICILLIN 250 MG
1 CAPSULE ORAL DAILY PRN
Status: DISCONTINUED | OUTPATIENT
Start: 2021-06-13 | End: 2021-06-14 | Stop reason: HOSPADM

## 2021-06-13 RX ORDER — TALC
6 POWDER (GRAM) TOPICAL NIGHTLY PRN
Status: DISCONTINUED | OUTPATIENT
Start: 2021-06-13 | End: 2021-06-14 | Stop reason: HOSPADM

## 2021-06-13 RX ORDER — HYDRALAZINE HYDROCHLORIDE 20 MG/ML
10 INJECTION INTRAMUSCULAR; INTRAVENOUS
Status: COMPLETED | OUTPATIENT
Start: 2021-06-13 | End: 2021-06-13

## 2021-06-13 RX ORDER — IBUPROFEN 200 MG
24 TABLET ORAL
Status: DISCONTINUED | OUTPATIENT
Start: 2021-06-13 | End: 2021-06-14 | Stop reason: HOSPADM

## 2021-06-13 RX ORDER — VALSARTAN 80 MG/1
320 TABLET ORAL DAILY
Refills: 3 | Status: DISCONTINUED | OUTPATIENT
Start: 2021-06-14 | End: 2021-06-14

## 2021-06-13 RX ORDER — SEVELAMER CARBONATE 800 MG/1
1600 TABLET, FILM COATED ORAL
Status: DISCONTINUED | OUTPATIENT
Start: 2021-06-13 | End: 2021-06-14 | Stop reason: HOSPADM

## 2021-06-13 RX ORDER — CARVEDILOL 12.5 MG/1
12.5 TABLET ORAL 2 TIMES DAILY WITH MEALS
Status: DISCONTINUED | OUTPATIENT
Start: 2021-06-13 | End: 2021-06-14

## 2021-06-13 RX ORDER — AMOXICILLIN 250 MG
1 CAPSULE ORAL 2 TIMES DAILY
Status: DISCONTINUED | OUTPATIENT
Start: 2021-06-13 | End: 2021-06-13

## 2021-06-13 RX ADMIN — HYDRALAZINE HYDROCHLORIDE 10 MG: 20 INJECTION, SOLUTION INTRAMUSCULAR; INTRAVENOUS at 08:06

## 2021-06-13 RX ADMIN — CLONIDINE HYDROCHLORIDE 0.3 MG: 0.1 TABLET ORAL at 08:06

## 2021-06-13 RX ADMIN — ONDANSETRON 8 MG: 2 INJECTION INTRAMUSCULAR; INTRAVENOUS at 08:06

## 2021-06-13 RX ADMIN — ACETAMINOPHEN 650 MG: 325 TABLET ORAL at 08:06

## 2021-06-13 RX ADMIN — Medication 6 MG: at 08:06

## 2021-06-13 RX ADMIN — IPRATROPIUM BROMIDE AND ALBUTEROL SULFATE 3 ML: .5; 3 SOLUTION RESPIRATORY (INHALATION) at 11:06

## 2021-06-13 RX ADMIN — HYDRALAZINE HYDROCHLORIDE 100 MG: 25 TABLET, FILM COATED ORAL at 02:06

## 2021-06-13 RX ADMIN — HYDRALAZINE HYDROCHLORIDE 100 MG: 25 TABLET, FILM COATED ORAL at 08:06

## 2021-06-13 RX ADMIN — ALBUTEROL SULFATE 10 MG: 2.5 SOLUTION RESPIRATORY (INHALATION) at 09:06

## 2021-06-13 RX ADMIN — AZITHROMYCIN MONOHYDRATE 500 MG: 500 INJECTION, POWDER, LYOPHILIZED, FOR SOLUTION INTRAVENOUS at 11:06

## 2021-06-13 RX ADMIN — MUPIROCIN: 20 OINTMENT TOPICAL at 08:06

## 2021-06-13 RX ADMIN — ISOSORBIDE MONONITRATE 60 MG: 30 TABLET, EXTENDED RELEASE ORAL at 10:06

## 2021-06-13 RX ADMIN — CEFTRIAXONE 1 G: 1 INJECTION, SOLUTION INTRAVENOUS at 10:06

## 2021-06-14 VITALS
HEART RATE: 60 BPM | HEIGHT: 64 IN | TEMPERATURE: 98 F | DIASTOLIC BLOOD PRESSURE: 74 MMHG | BODY MASS INDEX: 19.2 KG/M2 | RESPIRATION RATE: 18 BRPM | SYSTOLIC BLOOD PRESSURE: 173 MMHG | OXYGEN SATURATION: 95 % | WEIGHT: 112.44 LBS

## 2021-06-14 LAB
ALBUMIN SERPL BCP-MCNC: 3.1 G/DL (ref 3.5–5.2)
ALP SERPL-CCNC: 105 U/L (ref 55–135)
ALT SERPL W/O P-5'-P-CCNC: 7 U/L (ref 10–44)
ANION GAP SERPL CALC-SCNC: 10 MMOL/L (ref 8–16)
AST SERPL-CCNC: 13 U/L (ref 10–40)
BASOPHILS # BLD AUTO: 0.06 K/UL (ref 0–0.2)
BASOPHILS NFR BLD: 0.6 % (ref 0–1.9)
BILIRUB SERPL-MCNC: 0.5 MG/DL (ref 0.1–1)
BUN SERPL-MCNC: 22 MG/DL (ref 8–23)
CALCIUM SERPL-MCNC: 9.9 MG/DL (ref 8.7–10.5)
CHLORIDE SERPL-SCNC: 103 MMOL/L (ref 95–110)
CO2 SERPL-SCNC: 26 MMOL/L (ref 23–29)
CREAT SERPL-MCNC: 5.8 MG/DL (ref 0.5–1.4)
DIFFERENTIAL METHOD: ABNORMAL
EOSINOPHIL # BLD AUTO: 0.4 K/UL (ref 0–0.5)
EOSINOPHIL NFR BLD: 4.2 % (ref 0–8)
ERYTHROCYTE [DISTWIDTH] IN BLOOD BY AUTOMATED COUNT: 17.4 % (ref 11.5–14.5)
EST. GFR  (AFRICAN AMERICAN): 8 ML/MIN/1.73 M^2
EST. GFR  (NON AFRICAN AMERICAN): 7 ML/MIN/1.73 M^2
GLUCOSE SERPL-MCNC: 84 MG/DL (ref 70–110)
HCT VFR BLD AUTO: 32.1 % (ref 37–48.5)
HGB BLD-MCNC: 10.3 G/DL (ref 12–16)
IMM GRANULOCYTES # BLD AUTO: 0.03 K/UL (ref 0–0.04)
IMM GRANULOCYTES NFR BLD AUTO: 0.3 % (ref 0–0.5)
LYMPHOCYTES # BLD AUTO: 1.8 K/UL (ref 1–4.8)
LYMPHOCYTES NFR BLD: 18.9 % (ref 18–48)
MAGNESIUM SERPL-MCNC: 2.4 MG/DL (ref 1.6–2.6)
MCH RBC QN AUTO: 30.7 PG (ref 27–31)
MCHC RBC AUTO-ENTMCNC: 32.1 G/DL (ref 32–36)
MCV RBC AUTO: 96 FL (ref 82–98)
MONOCYTES # BLD AUTO: 0.7 K/UL (ref 0.3–1)
MONOCYTES NFR BLD: 7.4 % (ref 4–15)
NEUTROPHILS # BLD AUTO: 6.4 K/UL (ref 1.8–7.7)
NEUTROPHILS NFR BLD: 68.6 % (ref 38–73)
NRBC BLD-RTO: 0 /100 WBC
PHOSPHATE SERPL-MCNC: 5.1 MG/DL (ref 2.7–4.5)
PLATELET # BLD AUTO: 161 K/UL (ref 150–450)
PMV BLD AUTO: 11.4 FL (ref 9.2–12.9)
POTASSIUM SERPL-SCNC: 5.4 MMOL/L (ref 3.5–5.1)
PROT SERPL-MCNC: 6.7 G/DL (ref 6–8.4)
RBC # BLD AUTO: 3.36 M/UL (ref 4–5.4)
SODIUM SERPL-SCNC: 139 MMOL/L (ref 136–145)
WBC # BLD AUTO: 9.35 K/UL (ref 3.9–12.7)

## 2021-06-14 PROCEDURE — 80053 COMPREHEN METABOLIC PANEL: CPT | Mod: NTX | Performed by: PHYSICIAN ASSISTANT

## 2021-06-14 PROCEDURE — G0378 HOSPITAL OBSERVATION PER HR: HCPCS | Mod: NTX

## 2021-06-14 PROCEDURE — 36415 COLL VENOUS BLD VENIPUNCTURE: CPT | Mod: NTX | Performed by: PHYSICIAN ASSISTANT

## 2021-06-14 PROCEDURE — 25000003 PHARM REV CODE 250: Mod: NTX | Performed by: NURSE PRACTITIONER

## 2021-06-14 PROCEDURE — 84100 ASSAY OF PHOSPHORUS: CPT | Mod: NTX | Performed by: PHYSICIAN ASSISTANT

## 2021-06-14 PROCEDURE — 25000003 PHARM REV CODE 250: Mod: NTX | Performed by: PHYSICIAN ASSISTANT

## 2021-06-14 PROCEDURE — 96376 TX/PRO/DX INJ SAME DRUG ADON: CPT | Mod: NTX

## 2021-06-14 PROCEDURE — 63600175 PHARM REV CODE 636 W HCPCS: Mod: NTX | Performed by: PHYSICIAN ASSISTANT

## 2021-06-14 PROCEDURE — 63700000 PHARM REV CODE 250 ALT 637 W/O HCPCS: Mod: NTX | Performed by: NURSE PRACTITIONER

## 2021-06-14 PROCEDURE — 83735 ASSAY OF MAGNESIUM: CPT | Mod: NTX | Performed by: PHYSICIAN ASSISTANT

## 2021-06-14 PROCEDURE — 85025 COMPLETE CBC W/AUTO DIFF WBC: CPT | Mod: NTX | Performed by: PHYSICIAN ASSISTANT

## 2021-06-14 RX ORDER — ISOSORBIDE MONONITRATE 30 MG/1
60 TABLET, EXTENDED RELEASE ORAL DAILY
Status: DISCONTINUED | OUTPATIENT
Start: 2021-06-14 | End: 2021-06-14 | Stop reason: HOSPADM

## 2021-06-14 RX ORDER — AZITHROMYCIN 250 MG/1
250 TABLET, FILM COATED ORAL DAILY
Qty: 3 TABLET | Refills: 0 | Status: SHIPPED | OUTPATIENT
Start: 2021-06-14 | End: 2021-06-17

## 2021-06-14 RX ORDER — NIFEDIPINE 30 MG/1
60 TABLET, EXTENDED RELEASE ORAL DAILY
Status: DISCONTINUED | OUTPATIENT
Start: 2021-06-14 | End: 2021-06-14 | Stop reason: HOSPADM

## 2021-06-14 RX ORDER — CLONIDINE HYDROCHLORIDE 0.1 MG/1
0.3 TABLET ORAL 2 TIMES DAILY
Status: DISCONTINUED | OUTPATIENT
Start: 2021-06-14 | End: 2021-06-14 | Stop reason: HOSPADM

## 2021-06-14 RX ORDER — VALSARTAN 80 MG/1
320 TABLET ORAL DAILY
Status: DISCONTINUED | OUTPATIENT
Start: 2021-06-14 | End: 2021-06-14 | Stop reason: HOSPADM

## 2021-06-14 RX ORDER — CARVEDILOL 12.5 MG/1
12.5 TABLET ORAL 2 TIMES DAILY WITH MEALS
Status: DISCONTINUED | OUTPATIENT
Start: 2021-06-14 | End: 2021-06-14 | Stop reason: HOSPADM

## 2021-06-14 RX ADMIN — CEFTRIAXONE 1 G: 1 INJECTION, SOLUTION INTRAVENOUS at 08:06

## 2021-06-14 RX ADMIN — MUPIROCIN: 20 OINTMENT TOPICAL at 08:06

## 2021-06-14 RX ADMIN — VALSARTAN 320 MG: 80 TABLET, FILM COATED ORAL at 06:06

## 2021-06-14 RX ADMIN — CARVEDILOL 12.5 MG: 12.5 TABLET, FILM COATED ORAL at 05:06

## 2021-06-14 RX ADMIN — AZITHROMYCIN MONOHYDRATE 250 MG: 250 TABLET ORAL at 08:06

## 2021-06-14 RX ADMIN — SEVELAMER CARBONATE 1600 MG: 800 TABLET, FILM COATED ORAL at 12:06

## 2021-06-14 RX ADMIN — CLONIDINE HYDROCHLORIDE 0.3 MG: 0.1 TABLET ORAL at 06:06

## 2021-06-14 RX ADMIN — SEVELAMER CARBONATE 1600 MG: 800 TABLET, FILM COATED ORAL at 08:06

## 2021-06-14 RX ADMIN — ISOSORBIDE MONONITRATE 60 MG: 30 TABLET, EXTENDED RELEASE ORAL at 06:06

## 2021-06-14 RX ADMIN — NIFEDIPINE 60 MG: 30 TABLET, FILM COATED, EXTENDED RELEASE ORAL at 06:06

## 2021-06-18 LAB
BACTERIA BLD CULT: NORMAL
BACTERIA BLD CULT: NORMAL

## 2021-07-02 ENCOUNTER — HOSPITAL ENCOUNTER (EMERGENCY)
Facility: HOSPITAL | Age: 66
Discharge: HOME | End: 2021-07-02
Attending: EMERGENCY MEDICINE
Payer: MEDICARE

## 2021-07-02 VITALS
BODY MASS INDEX: 19.12 KG/M2 | RESPIRATION RATE: 8 BRPM | DIASTOLIC BLOOD PRESSURE: 85 MMHG | HEIGHT: 64 IN | HEART RATE: 54 BPM | WEIGHT: 112 LBS | SYSTOLIC BLOOD PRESSURE: 209 MMHG | OXYGEN SATURATION: 96 % | TEMPERATURE: 98 F

## 2021-07-02 DIAGNOSIS — R06.03 RESPIRATORY DISTRESS: ICD-10-CM

## 2021-07-02 DIAGNOSIS — J81.0 ACUTE PULMONARY EDEMA: ICD-10-CM

## 2021-07-02 DIAGNOSIS — R07.9 CHEST PAIN: ICD-10-CM

## 2021-07-02 DIAGNOSIS — J98.01 ACUTE BRONCHOSPASM: Primary | ICD-10-CM

## 2021-07-02 DIAGNOSIS — I10 UNCONTROLLED HYPERTENSION: ICD-10-CM

## 2021-07-02 LAB
ALBUMIN SERPL BCP-MCNC: 3.2 G/DL (ref 3.5–5.2)
ALP SERPL-CCNC: 127 U/L (ref 55–135)
ALT SERPL W/O P-5'-P-CCNC: 10 U/L (ref 10–44)
ANION GAP SERPL CALC-SCNC: 14 MMOL/L (ref 8–16)
AST SERPL-CCNC: 17 U/L (ref 10–40)
BASOPHILS # BLD AUTO: 0.04 K/UL (ref 0–0.2)
BASOPHILS NFR BLD: 0.3 % (ref 0–1.9)
BILIRUB SERPL-MCNC: 0.5 MG/DL (ref 0.1–1)
BNP SERPL-MCNC: 2340 PG/ML (ref 0–99)
BUN SERPL-MCNC: 30 MG/DL (ref 8–23)
CALCIUM SERPL-MCNC: 9.3 MG/DL (ref 8.7–10.5)
CHLORIDE SERPL-SCNC: 99 MMOL/L (ref 95–110)
CO2 SERPL-SCNC: 27 MMOL/L (ref 23–29)
CREAT SERPL-MCNC: 7.5 MG/DL (ref 0.5–1.4)
CTP QC/QA: YES
DIFFERENTIAL METHOD: ABNORMAL
EOSINOPHIL # BLD AUTO: 0.1 K/UL (ref 0–0.5)
EOSINOPHIL NFR BLD: 0.7 % (ref 0–8)
ERYTHROCYTE [DISTWIDTH] IN BLOOD BY AUTOMATED COUNT: 15.5 % (ref 11.5–14.5)
EST. GFR  (AFRICAN AMERICAN): 6 ML/MIN/1.73 M^2
EST. GFR  (NON AFRICAN AMERICAN): 5 ML/MIN/1.73 M^2
GLUCOSE SERPL-MCNC: 125 MG/DL (ref 70–110)
HCT VFR BLD AUTO: 28.5 % (ref 37–48.5)
HGB BLD-MCNC: 9.8 G/DL (ref 12–16)
IMM GRANULOCYTES # BLD AUTO: 0.06 K/UL (ref 0–0.04)
IMM GRANULOCYTES NFR BLD AUTO: 0.4 % (ref 0–0.5)
LYMPHOCYTES # BLD AUTO: 1 K/UL (ref 1–4.8)
LYMPHOCYTES NFR BLD: 7.4 % (ref 18–48)
MAGNESIUM SERPL-MCNC: 2.4 MG/DL (ref 1.6–2.6)
MCH RBC QN AUTO: 30.5 PG (ref 27–31)
MCHC RBC AUTO-ENTMCNC: 34.4 G/DL (ref 32–36)
MCV RBC AUTO: 89 FL (ref 82–98)
MONOCYTES # BLD AUTO: 0.3 K/UL (ref 0.3–1)
MONOCYTES NFR BLD: 2 % (ref 4–15)
NEUTROPHILS # BLD AUTO: 12.4 K/UL (ref 1.8–7.7)
NEUTROPHILS NFR BLD: 89.2 % (ref 38–73)
NRBC BLD-RTO: 0 /100 WBC
PHOSPHATE SERPL-MCNC: 4.6 MG/DL (ref 2.7–4.5)
PLATELET # BLD AUTO: 237 K/UL (ref 150–450)
PMV BLD AUTO: 11.8 FL (ref 9.2–12.9)
POTASSIUM SERPL-SCNC: 4.2 MMOL/L (ref 3.5–5.1)
PROT SERPL-MCNC: 6.9 G/DL (ref 6–8.4)
RBC # BLD AUTO: 3.21 M/UL (ref 4–5.4)
SARS-COV-2 RDRP RESP QL NAA+PROBE: NEGATIVE
SODIUM SERPL-SCNC: 140 MMOL/L (ref 136–145)
TROPONIN I SERPL DL<=0.01 NG/ML-MCNC: 0.02 NG/ML (ref 0–0.03)
WBC # BLD AUTO: 13.92 K/UL (ref 3.9–12.7)

## 2021-07-02 PROCEDURE — 80053 COMPREHEN METABOLIC PANEL: CPT | Mod: NTX | Performed by: EMERGENCY MEDICINE

## 2021-07-02 PROCEDURE — 99291 CRITICAL CARE FIRST HOUR: CPT | Mod: 25,NTX

## 2021-07-02 PROCEDURE — 93010 EKG 12-LEAD: ICD-10-PCS | Mod: NTX,,, | Performed by: INTERNAL MEDICINE

## 2021-07-02 PROCEDURE — 83880 ASSAY OF NATRIURETIC PEPTIDE: CPT | Mod: NTX | Performed by: EMERGENCY MEDICINE

## 2021-07-02 PROCEDURE — 85025 COMPLETE CBC W/AUTO DIFF WBC: CPT | Mod: NTX | Performed by: EMERGENCY MEDICINE

## 2021-07-02 PROCEDURE — 93005 ELECTROCARDIOGRAM TRACING: CPT | Mod: NTX

## 2021-07-02 PROCEDURE — U0002 COVID-19 LAB TEST NON-CDC: HCPCS | Mod: NTX | Performed by: EMERGENCY MEDICINE

## 2021-07-02 PROCEDURE — 93010 ELECTROCARDIOGRAM REPORT: CPT | Mod: NTX,,, | Performed by: INTERNAL MEDICINE

## 2021-07-02 PROCEDURE — 84100 ASSAY OF PHOSPHORUS: CPT | Mod: NTX | Performed by: EMERGENCY MEDICINE

## 2021-07-02 PROCEDURE — 63600175 PHARM REV CODE 636 W HCPCS: Mod: NTX | Performed by: EMERGENCY MEDICINE

## 2021-07-02 PROCEDURE — 84484 ASSAY OF TROPONIN QUANT: CPT | Mod: NTX | Performed by: EMERGENCY MEDICINE

## 2021-07-02 PROCEDURE — 83735 ASSAY OF MAGNESIUM: CPT | Mod: NTX | Performed by: EMERGENCY MEDICINE

## 2021-07-02 PROCEDURE — 96374 THER/PROPH/DIAG INJ IV PUSH: CPT | Mod: NTX

## 2021-07-02 RX ORDER — HYDRALAZINE HYDROCHLORIDE 20 MG/ML
10 INJECTION INTRAMUSCULAR; INTRAVENOUS
Status: COMPLETED | OUTPATIENT
Start: 2021-07-02 | End: 2021-07-02

## 2021-07-02 RX ORDER — ALBUTEROL SULFATE 90 UG/1
1-2 AEROSOL, METERED RESPIRATORY (INHALATION) EVERY 6 HOURS PRN
Qty: 8 G | Refills: 1 | Status: SHIPPED | OUTPATIENT
Start: 2021-07-02 | End: 2022-08-19

## 2021-07-02 RX ADMIN — HYDRALAZINE HYDROCHLORIDE 10 MG: 20 INJECTION INTRAMUSCULAR; INTRAVENOUS at 08:07

## 2021-07-06 ENCOUNTER — HOSPITAL ENCOUNTER (INPATIENT)
Facility: HOSPITAL | Age: 66
LOS: 2 days | Discharge: HOME | DRG: 304 | End: 2021-07-08
Attending: EMERGENCY MEDICINE | Admitting: INTERNAL MEDICINE
Payer: MEDICARE

## 2021-07-06 DIAGNOSIS — R00.1 BRADYCARDIA: ICD-10-CM

## 2021-07-06 DIAGNOSIS — R07.9 CHEST PAIN: ICD-10-CM

## 2021-07-06 DIAGNOSIS — I16.0 HYPERTENSIVE URGENCY, MALIGNANT: ICD-10-CM

## 2021-07-06 DIAGNOSIS — I16.1 HYPERTENSIVE EMERGENCY: Primary | ICD-10-CM

## 2021-07-06 DIAGNOSIS — R53.1 GENERALIZED WEAKNESS: ICD-10-CM

## 2021-07-06 DIAGNOSIS — I50.9 ACUTE ON CHRONIC CONGESTIVE HEART FAILURE, UNSPECIFIED HEART FAILURE TYPE: ICD-10-CM

## 2021-07-06 DIAGNOSIS — I10 ESSENTIAL HYPERTENSION: ICD-10-CM

## 2021-07-06 PROBLEM — N04.9 NEPHROTIC SYNDROME: Status: ACTIVE | Noted: 2021-07-06

## 2021-07-06 LAB
ALBUMIN SERPL BCP-MCNC: 3.4 G/DL (ref 3.5–5.2)
ALP SERPL-CCNC: 124 U/L (ref 55–135)
ALT SERPL W/O P-5'-P-CCNC: 9 U/L (ref 10–44)
ANION GAP SERPL CALC-SCNC: 11 MMOL/L (ref 8–16)
AST SERPL-CCNC: 15 U/L (ref 10–40)
BASOPHILS # BLD AUTO: 0.06 K/UL (ref 0–0.2)
BASOPHILS NFR BLD: 0.5 % (ref 0–1.9)
BILIRUB SERPL-MCNC: 0.5 MG/DL (ref 0.1–1)
BNP SERPL-MCNC: 1990 PG/ML (ref 0–99)
BUN SERPL-MCNC: 25 MG/DL (ref 8–23)
CALCIUM SERPL-MCNC: 9.2 MG/DL (ref 8.7–10.5)
CHLORIDE SERPL-SCNC: 97 MMOL/L (ref 95–110)
CO2 SERPL-SCNC: 32 MMOL/L (ref 23–29)
CREAT SERPL-MCNC: 5.8 MG/DL (ref 0.5–1.4)
CTP QC/QA: YES
DIFFERENTIAL METHOD: ABNORMAL
EOSINOPHIL # BLD AUTO: 0.4 K/UL (ref 0–0.5)
EOSINOPHIL NFR BLD: 2.7 % (ref 0–8)
ERYTHROCYTE [DISTWIDTH] IN BLOOD BY AUTOMATED COUNT: 15.5 % (ref 11.5–14.5)
EST. GFR  (AFRICAN AMERICAN): 8 ML/MIN/1.73 M^2
EST. GFR  (NON AFRICAN AMERICAN): 7 ML/MIN/1.73 M^2
GLUCOSE SERPL-MCNC: 114 MG/DL (ref 70–110)
HCT VFR BLD AUTO: 29.7 % (ref 37–48.5)
HGB BLD-MCNC: 9.9 G/DL (ref 12–16)
IMM GRANULOCYTES # BLD AUTO: 0.07 K/UL (ref 0–0.04)
IMM GRANULOCYTES NFR BLD AUTO: 0.5 % (ref 0–0.5)
LYMPHOCYTES # BLD AUTO: 2 K/UL (ref 1–4.8)
LYMPHOCYTES NFR BLD: 15.3 % (ref 18–48)
MAGNESIUM SERPL-MCNC: 2.4 MG/DL (ref 1.6–2.6)
MCH RBC QN AUTO: 30.1 PG (ref 27–31)
MCHC RBC AUTO-ENTMCNC: 33.3 G/DL (ref 32–36)
MCV RBC AUTO: 90 FL (ref 82–98)
MONOCYTES # BLD AUTO: 1 K/UL (ref 0.3–1)
MONOCYTES NFR BLD: 7.3 % (ref 4–15)
NEUTROPHILS # BLD AUTO: 9.6 K/UL (ref 1.8–7.7)
NEUTROPHILS NFR BLD: 73.7 % (ref 38–73)
NRBC BLD-RTO: 0 /100 WBC
PLATELET # BLD AUTO: 238 K/UL (ref 150–450)
PMV BLD AUTO: 11.9 FL (ref 9.2–12.9)
POCT GLUCOSE: 111 MG/DL (ref 70–110)
POCT GLUCOSE: 113 MG/DL (ref 70–110)
POTASSIUM SERPL-SCNC: 3.8 MMOL/L (ref 3.5–5.1)
PROT SERPL-MCNC: 7 G/DL (ref 6–8.4)
RBC # BLD AUTO: 3.29 M/UL (ref 4–5.4)
SARS-COV-2 RDRP RESP QL NAA+PROBE: NEGATIVE
SODIUM SERPL-SCNC: 140 MMOL/L (ref 136–145)
TROPONIN I SERPL DL<=0.01 NG/ML-MCNC: 0.03 NG/ML (ref 0–0.03)
TSH SERPL DL<=0.005 MIU/L-ACNC: 2.57 UIU/ML (ref 0.4–4)
WBC # BLD AUTO: 13.1 K/UL (ref 3.9–12.7)

## 2021-07-06 PROCEDURE — 83880 ASSAY OF NATRIURETIC PEPTIDE: CPT | Mod: NTX | Performed by: EMERGENCY MEDICINE

## 2021-07-06 PROCEDURE — 63600175 PHARM REV CODE 636 W HCPCS: Mod: NTX | Performed by: INTERNAL MEDICINE

## 2021-07-06 PROCEDURE — 93010 EKG 12-LEAD: ICD-10-PCS | Mod: NTX,,, | Performed by: INTERNAL MEDICINE

## 2021-07-06 PROCEDURE — 20000000 HC ICU ROOM: Mod: NTX

## 2021-07-06 PROCEDURE — 96365 THER/PROPH/DIAG IV INF INIT: CPT | Mod: NTX

## 2021-07-06 PROCEDURE — A4216 STERILE WATER/SALINE, 10 ML: HCPCS | Mod: NTX | Performed by: INTERNAL MEDICINE

## 2021-07-06 PROCEDURE — 93005 ELECTROCARDIOGRAM TRACING: CPT | Mod: NTX

## 2021-07-06 PROCEDURE — 99291 CRITICAL CARE FIRST HOUR: CPT | Mod: 25,NTX

## 2021-07-06 PROCEDURE — 80053 COMPREHEN METABOLIC PANEL: CPT | Mod: NTX | Performed by: EMERGENCY MEDICINE

## 2021-07-06 PROCEDURE — U0002 COVID-19 LAB TEST NON-CDC: HCPCS | Mod: NTX | Performed by: EMERGENCY MEDICINE

## 2021-07-06 PROCEDURE — 93010 ELECTROCARDIOGRAM REPORT: CPT | Mod: NTX,,, | Performed by: INTERNAL MEDICINE

## 2021-07-06 PROCEDURE — 25000003 PHARM REV CODE 250: Mod: NTX | Performed by: INTERNAL MEDICINE

## 2021-07-06 PROCEDURE — 25000003 PHARM REV CODE 250: Mod: NTX | Performed by: EMERGENCY MEDICINE

## 2021-07-06 PROCEDURE — 94761 N-INVAS EAR/PLS OXIMETRY MLT: CPT | Mod: NTX

## 2021-07-06 PROCEDURE — 84484 ASSAY OF TROPONIN QUANT: CPT | Mod: NTX | Performed by: EMERGENCY MEDICINE

## 2021-07-06 PROCEDURE — 83735 ASSAY OF MAGNESIUM: CPT | Mod: NTX | Performed by: EMERGENCY MEDICINE

## 2021-07-06 PROCEDURE — 84443 ASSAY THYROID STIM HORMONE: CPT | Mod: NTX | Performed by: EMERGENCY MEDICINE

## 2021-07-06 PROCEDURE — 85025 COMPLETE CBC W/AUTO DIFF WBC: CPT | Mod: NTX | Performed by: EMERGENCY MEDICINE

## 2021-07-06 RX ORDER — AMOXICILLIN 250 MG
1 CAPSULE ORAL 2 TIMES DAILY
Status: DISCONTINUED | OUTPATIENT
Start: 2021-07-06 | End: 2021-07-08 | Stop reason: HOSPADM

## 2021-07-06 RX ORDER — POLYETHYLENE GLYCOL 3350 17 G/17G
17 POWDER, FOR SOLUTION ORAL DAILY
Status: DISCONTINUED | OUTPATIENT
Start: 2021-07-06 | End: 2021-07-08 | Stop reason: HOSPADM

## 2021-07-06 RX ORDER — ISOSORBIDE MONONITRATE 30 MG/1
60 TABLET, EXTENDED RELEASE ORAL DAILY
Status: DISCONTINUED | OUTPATIENT
Start: 2021-07-06 | End: 2021-07-06

## 2021-07-06 RX ORDER — NICARDIPINE HYDROCHLORIDE 0.2 MG/ML
0-15 INJECTION INTRAVENOUS CONTINUOUS
Status: DISCONTINUED | OUTPATIENT
Start: 2021-07-06 | End: 2021-07-07

## 2021-07-06 RX ORDER — CLONIDINE HYDROCHLORIDE 0.1 MG/1
0.3 TABLET ORAL 2 TIMES DAILY
Status: DISCONTINUED | OUTPATIENT
Start: 2021-07-06 | End: 2021-07-07

## 2021-07-06 RX ORDER — CLONIDINE HYDROCHLORIDE 0.1 MG/1
0.3 TABLET ORAL
Status: COMPLETED | OUTPATIENT
Start: 2021-07-06 | End: 2021-07-06

## 2021-07-06 RX ORDER — ASPIRIN 325 MG
325 TABLET ORAL
Status: COMPLETED | OUTPATIENT
Start: 2021-07-06 | End: 2021-07-06

## 2021-07-06 RX ORDER — HYDRALAZINE HYDROCHLORIDE 25 MG/1
100 TABLET, FILM COATED ORAL 3 TIMES DAILY
Status: DISCONTINUED | OUTPATIENT
Start: 2021-07-06 | End: 2021-07-08 | Stop reason: HOSPADM

## 2021-07-06 RX ORDER — ISOSORBIDE MONONITRATE 30 MG/1
30 TABLET, EXTENDED RELEASE ORAL ONCE
Status: COMPLETED | OUTPATIENT
Start: 2021-07-06 | End: 2021-07-06

## 2021-07-06 RX ORDER — NICARDIPINE HYDROCHLORIDE 0.2 MG/ML
0-15 INJECTION INTRAVENOUS CONTINUOUS
Status: DISCONTINUED | OUTPATIENT
Start: 2021-07-06 | End: 2021-07-06

## 2021-07-06 RX ORDER — HYDRALAZINE HYDROCHLORIDE 25 MG/1
100 TABLET, FILM COATED ORAL ONCE
Status: COMPLETED | OUTPATIENT
Start: 2021-07-06 | End: 2021-07-06

## 2021-07-06 RX ORDER — CARVEDILOL 12.5 MG/1
12.5 TABLET ORAL ONCE
Status: COMPLETED | OUTPATIENT
Start: 2021-07-06 | End: 2021-07-06

## 2021-07-06 RX ORDER — TALC
6 POWDER (GRAM) TOPICAL NIGHTLY PRN
Status: DISCONTINUED | OUTPATIENT
Start: 2021-07-06 | End: 2021-07-08 | Stop reason: HOSPADM

## 2021-07-06 RX ORDER — SEVELAMER CARBONATE 800 MG/1
1600 TABLET, FILM COATED ORAL
Status: DISCONTINUED | OUTPATIENT
Start: 2021-07-06 | End: 2021-07-08 | Stop reason: HOSPADM

## 2021-07-06 RX ORDER — SODIUM CHLORIDE 9 MG/ML
INJECTION, SOLUTION INTRAVENOUS
Status: DISCONTINUED | OUTPATIENT
Start: 2021-07-06 | End: 2021-07-08 | Stop reason: HOSPADM

## 2021-07-06 RX ORDER — SODIUM CHLORIDE 0.9 % (FLUSH) 0.9 %
10 SYRINGE (ML) INJECTION EVERY 8 HOURS
Status: DISCONTINUED | OUTPATIENT
Start: 2021-07-06 | End: 2021-07-08 | Stop reason: HOSPADM

## 2021-07-06 RX ORDER — ISOSORBIDE MONONITRATE 30 MG/1
90 TABLET, EXTENDED RELEASE ORAL DAILY
Status: DISCONTINUED | OUTPATIENT
Start: 2021-07-07 | End: 2021-07-07

## 2021-07-06 RX ORDER — MUPIROCIN 20 MG/G
OINTMENT TOPICAL 2 TIMES DAILY
Status: DISCONTINUED | OUTPATIENT
Start: 2021-07-06 | End: 2021-07-08 | Stop reason: HOSPADM

## 2021-07-06 RX ORDER — SODIUM CHLORIDE 9 MG/ML
INJECTION, SOLUTION INTRAVENOUS ONCE
Status: DISCONTINUED | OUTPATIENT
Start: 2021-07-06 | End: 2021-07-08 | Stop reason: HOSPADM

## 2021-07-06 RX ORDER — HEPARIN SODIUM 5000 [USP'U]/ML
5000 INJECTION, SOLUTION INTRAVENOUS; SUBCUTANEOUS EVERY 8 HOURS
Status: DISCONTINUED | OUTPATIENT
Start: 2021-07-06 | End: 2021-07-08 | Stop reason: HOSPADM

## 2021-07-06 RX ADMIN — NICARDIPINE HYDROCHLORIDE 5 MG/HR: 0.2 INJECTION, SOLUTION INTRAVENOUS at 07:07

## 2021-07-06 RX ADMIN — HYDRALAZINE HYDROCHLORIDE 100 MG: 25 TABLET, FILM COATED ORAL at 04:07

## 2021-07-06 RX ADMIN — CLONIDINE HYDROCHLORIDE 0.3 MG: 0.1 TABLET ORAL at 08:07

## 2021-07-06 RX ADMIN — CARVEDILOL 12.5 MG: 12.5 TABLET, FILM COATED ORAL at 04:07

## 2021-07-06 RX ADMIN — POLYETHYLENE GLYCOL 3350 17 G: 17 POWDER, FOR SOLUTION ORAL at 10:07

## 2021-07-06 RX ADMIN — HYDRALAZINE HYDROCHLORIDE 100 MG: 25 TABLET, FILM COATED ORAL at 08:07

## 2021-07-06 RX ADMIN — Medication 10 ML: at 09:07

## 2021-07-06 RX ADMIN — SEVELAMER CARBONATE 1600 MG: 800 TABLET, FILM COATED ORAL at 05:07

## 2021-07-06 RX ADMIN — NICARDIPINE HYDROCHLORIDE 5 MG/HR: 0.2 INJECTION, SOLUTION INTRAVENOUS at 03:07

## 2021-07-06 RX ADMIN — HEPARIN SODIUM 5000 UNITS: 5000 INJECTION INTRAVENOUS; SUBCUTANEOUS at 09:07

## 2021-07-06 RX ADMIN — DOCUSATE SODIUM 50 MG AND SENNOSIDES 8.6 MG 1 TABLET: 8.6; 5 TABLET, FILM COATED ORAL at 08:07

## 2021-07-06 RX ADMIN — ISOSORBIDE MONONITRATE 60 MG: 30 TABLET, EXTENDED RELEASE ORAL at 02:07

## 2021-07-06 RX ADMIN — HEPARIN SODIUM 5000 UNITS: 5000 INJECTION INTRAVENOUS; SUBCUTANEOUS at 02:07

## 2021-07-06 RX ADMIN — HYDRALAZINE HYDROCHLORIDE 100 MG: 25 TABLET, FILM COATED ORAL at 02:07

## 2021-07-06 RX ADMIN — MUPIROCIN: 20 OINTMENT TOPICAL at 10:07

## 2021-07-06 RX ADMIN — CLONIDINE HYDROCHLORIDE 0.3 MG: 0.1 TABLET ORAL at 06:07

## 2021-07-06 RX ADMIN — Medication 10 ML: at 02:07

## 2021-07-06 RX ADMIN — ASPIRIN 325 MG ORAL TABLET 325 MG: 325 PILL ORAL at 04:07

## 2021-07-06 RX ADMIN — MUPIROCIN: 20 OINTMENT TOPICAL at 08:07

## 2021-07-06 RX ADMIN — ISOSORBIDE MONONITRATE 30 MG: 30 TABLET, EXTENDED RELEASE ORAL at 05:07

## 2021-07-06 RX ADMIN — DOCUSATE SODIUM 50 MG AND SENNOSIDES 8.6 MG 1 TABLET: 8.6; 5 TABLET, FILM COATED ORAL at 10:07

## 2021-07-07 LAB
ALBUMIN SERPL BCP-MCNC: 3.1 G/DL (ref 3.5–5.2)
ALP SERPL-CCNC: 101 U/L (ref 55–135)
ALT SERPL W/O P-5'-P-CCNC: 8 U/L (ref 10–44)
ANION GAP SERPL CALC-SCNC: 12 MMOL/L (ref 8–16)
AST SERPL-CCNC: 15 U/L (ref 10–40)
BASOPHILS # BLD AUTO: 0.07 K/UL (ref 0–0.2)
BASOPHILS NFR BLD: 0.5 % (ref 0–1.9)
BILIRUB SERPL-MCNC: 0.5 MG/DL (ref 0.1–1)
BUN SERPL-MCNC: 36 MG/DL (ref 8–23)
CALCIUM SERPL-MCNC: 9.3 MG/DL (ref 8.7–10.5)
CHLORIDE SERPL-SCNC: 98 MMOL/L (ref 95–110)
CO2 SERPL-SCNC: 28 MMOL/L (ref 23–29)
CREAT SERPL-MCNC: 7.4 MG/DL (ref 0.5–1.4)
DIFFERENTIAL METHOD: ABNORMAL
EOSINOPHIL # BLD AUTO: 0.5 K/UL (ref 0–0.5)
EOSINOPHIL NFR BLD: 3.5 % (ref 0–8)
ERYTHROCYTE [DISTWIDTH] IN BLOOD BY AUTOMATED COUNT: 15.9 % (ref 11.5–14.5)
EST. GFR  (AFRICAN AMERICAN): 6 ML/MIN/1.73 M^2
EST. GFR  (NON AFRICAN AMERICAN): 5 ML/MIN/1.73 M^2
GLUCOSE SERPL-MCNC: 101 MG/DL (ref 70–110)
HCT VFR BLD AUTO: 28.2 % (ref 37–48.5)
HGB BLD-MCNC: 9.4 G/DL (ref 12–16)
IMM GRANULOCYTES # BLD AUTO: 0.05 K/UL (ref 0–0.04)
IMM GRANULOCYTES NFR BLD AUTO: 0.4 % (ref 0–0.5)
LYMPHOCYTES # BLD AUTO: 1.9 K/UL (ref 1–4.8)
LYMPHOCYTES NFR BLD: 14 % (ref 18–48)
MAGNESIUM SERPL-MCNC: 2.6 MG/DL (ref 1.6–2.6)
MCH RBC QN AUTO: 30.6 PG (ref 27–31)
MCHC RBC AUTO-ENTMCNC: 33.3 G/DL (ref 32–36)
MCV RBC AUTO: 92 FL (ref 82–98)
MONOCYTES # BLD AUTO: 0.9 K/UL (ref 0.3–1)
MONOCYTES NFR BLD: 6.2 % (ref 4–15)
NEUTROPHILS # BLD AUTO: 10.3 K/UL (ref 1.8–7.7)
NEUTROPHILS NFR BLD: 75.4 % (ref 38–73)
NRBC BLD-RTO: 0 /100 WBC
PHOSPHATE SERPL-MCNC: 3.7 MG/DL (ref 2.7–4.5)
PLATELET # BLD AUTO: 254 K/UL (ref 150–450)
PMV BLD AUTO: 11.2 FL (ref 9.2–12.9)
POTASSIUM SERPL-SCNC: 4.3 MMOL/L (ref 3.5–5.1)
PROT SERPL-MCNC: 6.3 G/DL (ref 6–8.4)
RBC # BLD AUTO: 3.07 M/UL (ref 4–5.4)
SODIUM SERPL-SCNC: 138 MMOL/L (ref 136–145)
WBC # BLD AUTO: 13.62 K/UL (ref 3.9–12.7)

## 2021-07-07 PROCEDURE — 80053 COMPREHEN METABOLIC PANEL: CPT | Mod: NTX | Performed by: INTERNAL MEDICINE

## 2021-07-07 PROCEDURE — 25000003 PHARM REV CODE 250: Mod: NTX | Performed by: INTERNAL MEDICINE

## 2021-07-07 PROCEDURE — 36415 COLL VENOUS BLD VENIPUNCTURE: CPT | Mod: NTX | Performed by: INTERNAL MEDICINE

## 2021-07-07 PROCEDURE — A4216 STERILE WATER/SALINE, 10 ML: HCPCS | Mod: NTX | Performed by: INTERNAL MEDICINE

## 2021-07-07 PROCEDURE — 84100 ASSAY OF PHOSPHORUS: CPT | Mod: NTX | Performed by: INTERNAL MEDICINE

## 2021-07-07 PROCEDURE — 80100016 HC MAINTENANCE HEMODIALYSIS: Mod: NTX

## 2021-07-07 PROCEDURE — 63600175 PHARM REV CODE 636 W HCPCS: Mod: NTX | Performed by: INTERNAL MEDICINE

## 2021-07-07 PROCEDURE — 21400001 HC TELEMETRY ROOM: Mod: NTX

## 2021-07-07 PROCEDURE — 25000003 PHARM REV CODE 250: Mod: NTX | Performed by: PHYSICIAN ASSISTANT

## 2021-07-07 PROCEDURE — 83735 ASSAY OF MAGNESIUM: CPT | Mod: NTX | Performed by: INTERNAL MEDICINE

## 2021-07-07 PROCEDURE — 85025 COMPLETE CBC W/AUTO DIFF WBC: CPT | Mod: NTX | Performed by: INTERNAL MEDICINE

## 2021-07-07 RX ORDER — LOSARTAN POTASSIUM 25 MG/1
50 TABLET ORAL DAILY
Status: DISCONTINUED | OUTPATIENT
Start: 2021-07-07 | End: 2021-07-08

## 2021-07-07 RX ORDER — ACETAMINOPHEN 500 MG
500 TABLET ORAL EVERY 6 HOURS PRN
Status: DISCONTINUED | OUTPATIENT
Start: 2021-07-07 | End: 2021-07-08 | Stop reason: HOSPADM

## 2021-07-07 RX ORDER — ISOSORBIDE MONONITRATE 30 MG/1
60 TABLET, EXTENDED RELEASE ORAL ONCE
Status: DISCONTINUED | OUTPATIENT
Start: 2021-07-07 | End: 2021-07-07

## 2021-07-07 RX ORDER — CLONIDINE HYDROCHLORIDE 0.1 MG/1
0.3 TABLET ORAL 3 TIMES DAILY
Status: DISCONTINUED | OUTPATIENT
Start: 2021-07-07 | End: 2021-07-08 | Stop reason: HOSPADM

## 2021-07-07 RX ORDER — ISOSORBIDE MONONITRATE 30 MG/1
120 TABLET, EXTENDED RELEASE ORAL DAILY
Status: DISCONTINUED | OUTPATIENT
Start: 2021-07-08 | End: 2021-07-08 | Stop reason: HOSPADM

## 2021-07-07 RX ADMIN — ACETAMINOPHEN 500 MG: 500 TABLET, FILM COATED ORAL at 09:07

## 2021-07-07 RX ADMIN — HEPARIN SODIUM 5000 UNITS: 5000 INJECTION INTRAVENOUS; SUBCUTANEOUS at 02:07

## 2021-07-07 RX ADMIN — MUPIROCIN: 20 OINTMENT TOPICAL at 08:07

## 2021-07-07 RX ADMIN — SEVELAMER CARBONATE 1600 MG: 800 TABLET, FILM COATED ORAL at 08:07

## 2021-07-07 RX ADMIN — CLONIDINE HYDROCHLORIDE 0.3 MG: 0.1 TABLET ORAL at 08:07

## 2021-07-07 RX ADMIN — HEPARIN SODIUM 5000 UNITS: 5000 INJECTION INTRAVENOUS; SUBCUTANEOUS at 05:07

## 2021-07-07 RX ADMIN — Medication 10 ML: at 05:07

## 2021-07-07 RX ADMIN — SEVELAMER CARBONATE 1600 MG: 800 TABLET, FILM COATED ORAL at 12:07

## 2021-07-07 RX ADMIN — LOSARTAN POTASSIUM 50 MG: 25 TABLET, FILM COATED ORAL at 10:07

## 2021-07-07 RX ADMIN — HYDRALAZINE HYDROCHLORIDE 100 MG: 25 TABLET, FILM COATED ORAL at 09:07

## 2021-07-07 RX ADMIN — NICARDIPINE HYDROCHLORIDE 8 MG/HR: 0.2 INJECTION, SOLUTION INTRAVENOUS at 08:07

## 2021-07-07 RX ADMIN — NICARDIPINE HYDROCHLORIDE 5 MG/HR: 0.2 INJECTION, SOLUTION INTRAVENOUS at 03:07

## 2021-07-07 RX ADMIN — Medication 10 ML: at 09:07

## 2021-07-07 RX ADMIN — HEPARIN SODIUM 5000 UNITS: 5000 INJECTION INTRAVENOUS; SUBCUTANEOUS at 09:07

## 2021-07-07 RX ADMIN — HYDRALAZINE HYDROCHLORIDE 100 MG: 25 TABLET, FILM COATED ORAL at 02:07

## 2021-07-07 RX ADMIN — Medication 6 MG: at 09:07

## 2021-07-07 RX ADMIN — DOCUSATE SODIUM 50 MG AND SENNOSIDES 8.6 MG 1 TABLET: 8.6; 5 TABLET, FILM COATED ORAL at 09:07

## 2021-07-07 RX ADMIN — DOCUSATE SODIUM 50 MG AND SENNOSIDES 8.6 MG 1 TABLET: 8.6; 5 TABLET, FILM COATED ORAL at 08:07

## 2021-07-07 RX ADMIN — SEVELAMER CARBONATE 1600 MG: 800 TABLET, FILM COATED ORAL at 05:07

## 2021-07-07 RX ADMIN — CLONIDINE HYDROCHLORIDE 0.3 MG: 0.1 TABLET ORAL at 09:07

## 2021-07-07 RX ADMIN — ISOSORBIDE MONONITRATE 90 MG: 30 TABLET, EXTENDED RELEASE ORAL at 08:07

## 2021-07-07 RX ADMIN — CLONIDINE HYDROCHLORIDE 0.3 MG: 0.1 TABLET ORAL at 01:07

## 2021-07-07 RX ADMIN — CLONIDINE HYDROCHLORIDE 0.3 MG: 0.1 TABLET ORAL at 02:07

## 2021-07-07 RX ADMIN — POLYETHYLENE GLYCOL 3350 17 G: 17 POWDER, FOR SOLUTION ORAL at 08:07

## 2021-07-07 RX ADMIN — HYDRALAZINE HYDROCHLORIDE 100 MG: 25 TABLET, FILM COATED ORAL at 08:07

## 2021-07-08 VITALS
OXYGEN SATURATION: 99 % | WEIGHT: 114 LBS | HEIGHT: 64 IN | HEART RATE: 71 BPM | BODY MASS INDEX: 19.46 KG/M2 | RESPIRATION RATE: 19 BRPM | DIASTOLIC BLOOD PRESSURE: 74 MMHG | SYSTOLIC BLOOD PRESSURE: 158 MMHG | TEMPERATURE: 99 F

## 2021-07-08 PROBLEM — I16.0 HYPERTENSIVE URGENCY, MALIGNANT: Status: RESOLVED | Noted: 2021-07-06 | Resolved: 2021-07-08

## 2021-07-08 LAB
ALBUMIN SERPL BCP-MCNC: 3.1 G/DL (ref 3.5–5.2)
ALP SERPL-CCNC: 114 U/L (ref 55–135)
ALT SERPL W/O P-5'-P-CCNC: 8 U/L (ref 10–44)
ANION GAP SERPL CALC-SCNC: 12 MMOL/L (ref 8–16)
AST SERPL-CCNC: 14 U/L (ref 10–40)
BASOPHILS # BLD AUTO: 0.05 K/UL (ref 0–0.2)
BASOPHILS NFR BLD: 0.4 % (ref 0–1.9)
BILIRUB SERPL-MCNC: 0.4 MG/DL (ref 0.1–1)
BUN SERPL-MCNC: 25 MG/DL (ref 8–23)
CALCIUM SERPL-MCNC: 9.8 MG/DL (ref 8.7–10.5)
CHLORIDE SERPL-SCNC: 98 MMOL/L (ref 95–110)
CO2 SERPL-SCNC: 29 MMOL/L (ref 23–29)
CREAT SERPL-MCNC: 5.9 MG/DL (ref 0.5–1.4)
DIFFERENTIAL METHOD: ABNORMAL
EOSINOPHIL # BLD AUTO: 0.4 K/UL (ref 0–0.5)
EOSINOPHIL NFR BLD: 3.5 % (ref 0–8)
ERYTHROCYTE [DISTWIDTH] IN BLOOD BY AUTOMATED COUNT: 16.2 % (ref 11.5–14.5)
EST. GFR  (AFRICAN AMERICAN): 8 ML/MIN/1.73 M^2
EST. GFR  (NON AFRICAN AMERICAN): 7 ML/MIN/1.73 M^2
GLUCOSE SERPL-MCNC: 98 MG/DL (ref 70–110)
HCT VFR BLD AUTO: 28.9 % (ref 37–48.5)
HGB BLD-MCNC: 9.3 G/DL (ref 12–16)
IMM GRANULOCYTES # BLD AUTO: 0.04 K/UL (ref 0–0.04)
IMM GRANULOCYTES NFR BLD AUTO: 0.3 % (ref 0–0.5)
LYMPHOCYTES # BLD AUTO: 2.4 K/UL (ref 1–4.8)
LYMPHOCYTES NFR BLD: 20.6 % (ref 18–48)
MAGNESIUM SERPL-MCNC: 2.4 MG/DL (ref 1.6–2.6)
MCH RBC QN AUTO: 30 PG (ref 27–31)
MCHC RBC AUTO-ENTMCNC: 32.2 G/DL (ref 32–36)
MCV RBC AUTO: 93 FL (ref 82–98)
MONOCYTES # BLD AUTO: 1 K/UL (ref 0.3–1)
MONOCYTES NFR BLD: 8.3 % (ref 4–15)
NEUTROPHILS # BLD AUTO: 7.9 K/UL (ref 1.8–7.7)
NEUTROPHILS NFR BLD: 66.9 % (ref 38–73)
NRBC BLD-RTO: 0 /100 WBC
PHOSPHATE SERPL-MCNC: 4 MG/DL (ref 2.7–4.5)
PLATELET # BLD AUTO: 226 K/UL (ref 150–450)
PMV BLD AUTO: 11.8 FL (ref 9.2–12.9)
POTASSIUM SERPL-SCNC: 4.2 MMOL/L (ref 3.5–5.1)
PROT SERPL-MCNC: 6.4 G/DL (ref 6–8.4)
RBC # BLD AUTO: 3.1 M/UL (ref 4–5.4)
SODIUM SERPL-SCNC: 139 MMOL/L (ref 136–145)
WBC # BLD AUTO: 11.75 K/UL (ref 3.9–12.7)

## 2021-07-08 PROCEDURE — 25000003 PHARM REV CODE 250: Mod: NTX | Performed by: INTERNAL MEDICINE

## 2021-07-08 PROCEDURE — 63600175 PHARM REV CODE 636 W HCPCS: Mod: NTX | Performed by: INTERNAL MEDICINE

## 2021-07-08 PROCEDURE — 25000003 PHARM REV CODE 250: Mod: NTX | Performed by: HOSPITALIST

## 2021-07-08 PROCEDURE — 85025 COMPLETE CBC W/AUTO DIFF WBC: CPT | Mod: NTX | Performed by: INTERNAL MEDICINE

## 2021-07-08 PROCEDURE — 36415 COLL VENOUS BLD VENIPUNCTURE: CPT | Mod: NTX | Performed by: INTERNAL MEDICINE

## 2021-07-08 PROCEDURE — 84100 ASSAY OF PHOSPHORUS: CPT | Mod: NTX | Performed by: INTERNAL MEDICINE

## 2021-07-08 PROCEDURE — 83735 ASSAY OF MAGNESIUM: CPT | Mod: NTX | Performed by: INTERNAL MEDICINE

## 2021-07-08 PROCEDURE — A4216 STERILE WATER/SALINE, 10 ML: HCPCS | Mod: NTX | Performed by: INTERNAL MEDICINE

## 2021-07-08 PROCEDURE — 80053 COMPREHEN METABOLIC PANEL: CPT | Mod: NTX | Performed by: INTERNAL MEDICINE

## 2021-07-08 RX ORDER — HYDRALAZINE HYDROCHLORIDE 20 MG/ML
10 INJECTION INTRAMUSCULAR; INTRAVENOUS EVERY 6 HOURS PRN
Status: DISCONTINUED | OUTPATIENT
Start: 2021-07-08 | End: 2021-07-08 | Stop reason: HOSPADM

## 2021-07-08 RX ORDER — CARVEDILOL 3.12 MG/1
3.12 TABLET ORAL 2 TIMES DAILY WITH MEALS
Status: DISCONTINUED | OUTPATIENT
Start: 2021-07-08 | End: 2021-07-08 | Stop reason: HOSPADM

## 2021-07-08 RX ORDER — HYDRALAZINE HYDROCHLORIDE 20 MG/ML
10 INJECTION INTRAMUSCULAR; INTRAVENOUS ONCE
Status: COMPLETED | OUTPATIENT
Start: 2021-07-08 | End: 2021-07-08

## 2021-07-08 RX ORDER — ISOSORBIDE MONONITRATE 120 MG/1
120 TABLET, EXTENDED RELEASE ORAL DAILY
Qty: 90 TABLET | Refills: 3 | Status: SHIPPED | OUTPATIENT
Start: 2021-07-08 | End: 2022-08-19

## 2021-07-08 RX ORDER — ALPRAZOLAM 0.5 MG/1
0.5 TABLET ORAL 2 TIMES DAILY PRN
Status: DISCONTINUED | OUTPATIENT
Start: 2021-07-08 | End: 2021-07-08 | Stop reason: HOSPADM

## 2021-07-08 RX ORDER — LOSARTAN POTASSIUM 25 MG/1
50 TABLET ORAL ONCE
Status: COMPLETED | OUTPATIENT
Start: 2021-07-08 | End: 2021-07-08

## 2021-07-08 RX ORDER — NIFEDIPINE 30 MG/1
60 TABLET, EXTENDED RELEASE ORAL DAILY
Status: DISCONTINUED | OUTPATIENT
Start: 2021-07-08 | End: 2021-07-08 | Stop reason: HOSPADM

## 2021-07-08 RX ORDER — LOSARTAN POTASSIUM 25 MG/1
100 TABLET ORAL DAILY
Status: DISCONTINUED | OUTPATIENT
Start: 2021-07-09 | End: 2021-07-08 | Stop reason: HOSPADM

## 2021-07-08 RX ADMIN — LOSARTAN POTASSIUM 50 MG: 25 TABLET, FILM COATED ORAL at 09:07

## 2021-07-08 RX ADMIN — HYDRALAZINE HYDROCHLORIDE 10 MG: 20 INJECTION INTRAMUSCULAR; INTRAVENOUS at 06:07

## 2021-07-08 RX ADMIN — HYDRALAZINE HYDROCHLORIDE 100 MG: 25 TABLET, FILM COATED ORAL at 08:07

## 2021-07-08 RX ADMIN — NIFEDIPINE 60 MG: 30 TABLET, FILM COATED, EXTENDED RELEASE ORAL at 01:07

## 2021-07-08 RX ADMIN — CLONIDINE HYDROCHLORIDE 0.3 MG: 0.1 TABLET ORAL at 03:07

## 2021-07-08 RX ADMIN — HYDRALAZINE HYDROCHLORIDE 10 MG: 20 INJECTION INTRAMUSCULAR; INTRAVENOUS at 12:07

## 2021-07-08 RX ADMIN — CARVEDILOL 3.12 MG: 3.12 TABLET, FILM COATED ORAL at 09:07

## 2021-07-08 RX ADMIN — SEVELAMER CARBONATE 1600 MG: 800 TABLET, FILM COATED ORAL at 11:07

## 2021-07-08 RX ADMIN — SEVELAMER CARBONATE 1600 MG: 800 TABLET, FILM COATED ORAL at 04:07

## 2021-07-08 RX ADMIN — CARVEDILOL 3.12 MG: 3.12 TABLET, FILM COATED ORAL at 04:07

## 2021-07-08 RX ADMIN — LOSARTAN POTASSIUM 50 MG: 25 TABLET, FILM COATED ORAL at 08:07

## 2021-07-08 RX ADMIN — HYDRALAZINE HYDROCHLORIDE 10 MG: 20 INJECTION INTRAMUSCULAR; INTRAVENOUS at 05:07

## 2021-07-08 RX ADMIN — Medication 10 ML: at 05:07

## 2021-07-08 RX ADMIN — SEVELAMER CARBONATE 1600 MG: 800 TABLET, FILM COATED ORAL at 08:07

## 2021-07-08 RX ADMIN — DOCUSATE SODIUM 50 MG AND SENNOSIDES 8.6 MG 1 TABLET: 8.6; 5 TABLET, FILM COATED ORAL at 08:07

## 2021-07-08 RX ADMIN — HYDRALAZINE HYDROCHLORIDE 100 MG: 25 TABLET, FILM COATED ORAL at 03:07

## 2021-07-08 RX ADMIN — Medication 10 ML: at 01:07

## 2021-07-08 RX ADMIN — CLONIDINE HYDROCHLORIDE 0.3 MG: 0.1 TABLET ORAL at 08:07

## 2021-07-08 RX ADMIN — ALPRAZOLAM 0.5 MG: 0.5 TABLET ORAL at 03:07

## 2021-07-08 RX ADMIN — ISOSORBIDE MONONITRATE 120 MG: 30 TABLET, EXTENDED RELEASE ORAL at 08:07

## 2021-07-08 RX ADMIN — HEPARIN SODIUM 5000 UNITS: 5000 INJECTION INTRAVENOUS; SUBCUTANEOUS at 01:07

## 2021-07-12 ENCOUNTER — PATIENT OUTREACH (OUTPATIENT)
Dept: ADMINISTRATIVE | Facility: CLINIC | Age: 66
End: 2021-07-12

## 2021-07-27 ENCOUNTER — HOSPITAL ENCOUNTER (OUTPATIENT)
Dept: RADIOLOGY | Facility: HOSPITAL | Age: 66
Discharge: HOME | End: 2021-07-27
Attending: INTERNAL MEDICINE
Payer: MEDICARE

## 2021-07-27 ENCOUNTER — OFFICE VISIT (OUTPATIENT)
Dept: FAMILY MEDICINE | Facility: CLINIC | Age: 66
End: 2021-07-27
Payer: MEDICARE

## 2021-07-27 VITALS
WEIGHT: 108.44 LBS | DIASTOLIC BLOOD PRESSURE: 70 MMHG | SYSTOLIC BLOOD PRESSURE: 160 MMHG | HEIGHT: 64 IN | TEMPERATURE: 98 F | OXYGEN SATURATION: 96 % | BODY MASS INDEX: 18.51 KG/M2 | HEART RATE: 65 BPM

## 2021-07-27 DIAGNOSIS — R11.0 NAUSEA: Primary | ICD-10-CM

## 2021-07-27 DIAGNOSIS — R11.0 NAUSEA: ICD-10-CM

## 2021-07-27 DIAGNOSIS — Z99.2 ESRD (END STAGE RENAL DISEASE) ON DIALYSIS: ICD-10-CM

## 2021-07-27 DIAGNOSIS — I15.0 RENOVASCULAR HYPERTENSION: ICD-10-CM

## 2021-07-27 DIAGNOSIS — I10 ESSENTIAL HYPERTENSION: ICD-10-CM

## 2021-07-27 DIAGNOSIS — R53.81 MALAISE AND FATIGUE: ICD-10-CM

## 2021-07-27 DIAGNOSIS — R53.83 MALAISE AND FATIGUE: ICD-10-CM

## 2021-07-27 DIAGNOSIS — N18.6 ESRD (END STAGE RENAL DISEASE) ON DIALYSIS: ICD-10-CM

## 2021-07-27 PROBLEM — I16.0 HYPERTENSIVE URGENCY: Status: RESOLVED | Noted: 2021-06-13 | Resolved: 2021-07-27

## 2021-07-27 PROBLEM — R06.02 SOB (SHORTNESS OF BREATH): Status: RESOLVED | Noted: 2019-10-12 | Resolved: 2021-07-27

## 2021-07-27 PROBLEM — Z12.11 SCREENING FOR COLON CANCER: Status: RESOLVED | Noted: 2020-08-06 | Resolved: 2021-07-27

## 2021-07-27 PROCEDURE — 99999 PR PBB SHADOW E&M-EST. PATIENT-LVL V: CPT | Mod: PBBFAC,,, | Performed by: INTERNAL MEDICINE

## 2021-07-27 PROCEDURE — 99214 OFFICE O/P EST MOD 30 MIN: CPT | Mod: S$PBB,,, | Performed by: INTERNAL MEDICINE

## 2021-07-27 PROCEDURE — 99999 PR PBB SHADOW E&M-EST. PATIENT-LVL V: ICD-10-PCS | Mod: PBBFAC,,, | Performed by: INTERNAL MEDICINE

## 2021-07-27 PROCEDURE — 71046 XR CHEST PA AND LATERAL: ICD-10-PCS | Mod: 26,NTX,, | Performed by: RADIOLOGY

## 2021-07-27 PROCEDURE — 71046 X-RAY EXAM CHEST 2 VIEWS: CPT | Mod: TC,FY,PO,NTX

## 2021-07-27 PROCEDURE — 71046 X-RAY EXAM CHEST 2 VIEWS: CPT | Mod: 26,NTX,, | Performed by: RADIOLOGY

## 2021-07-27 PROCEDURE — 99215 OFFICE O/P EST HI 40 MIN: CPT | Mod: PBBFAC,PO | Performed by: INTERNAL MEDICINE

## 2021-07-27 PROCEDURE — 99214 PR OFFICE/OUTPT VISIT, EST, LEVL IV, 30-39 MIN: ICD-10-PCS | Mod: S$PBB,,, | Performed by: INTERNAL MEDICINE

## 2021-07-27 RX ORDER — HEPARIN SOD,PORCINE/0.9 % NACL 100/ML
KIT INTRAVENOUS
COMMUNITY
Start: 2020-12-18 | End: 2021-12-17

## 2021-07-27 RX ORDER — ONDANSETRON 4 MG/1
4 TABLET, ORALLY DISINTEGRATING ORAL EVERY 8 HOURS PRN
Qty: 30 TABLET | Refills: 0 | Status: SHIPPED | OUTPATIENT
Start: 2021-07-27 | End: 2023-09-21 | Stop reason: ALTCHOICE

## 2021-07-27 RX ORDER — CLONIDINE 0.3 MG/24H
1 PATCH, EXTENDED RELEASE TRANSDERMAL
Qty: 4 PATCH | Refills: 11 | Status: SHIPPED | OUTPATIENT
Start: 2021-07-27 | End: 2022-08-19 | Stop reason: SINTOL

## 2021-08-04 PROCEDURE — U0002 COVID-19 LAB TEST NON-CDC: HCPCS | Mod: NTX | Performed by: PHYSICIAN ASSISTANT

## 2021-08-04 PROCEDURE — 99285 EMERGENCY DEPT VISIT HI MDM: CPT | Mod: 25,NTX

## 2021-08-04 PROCEDURE — 96374 THER/PROPH/DIAG INJ IV PUSH: CPT | Mod: NTX

## 2021-08-04 PROCEDURE — 96375 TX/PRO/DX INJ NEW DRUG ADDON: CPT | Mod: NTX

## 2021-08-04 PROCEDURE — 82962 GLUCOSE BLOOD TEST: CPT | Mod: NTX

## 2021-08-05 ENCOUNTER — HOSPITAL ENCOUNTER (INPATIENT)
Facility: HOSPITAL | Age: 66
LOS: 1 days | Discharge: HOME | DRG: 304 | End: 2021-08-06
Attending: EMERGENCY MEDICINE | Admitting: INTERNAL MEDICINE
Payer: MEDICARE

## 2021-08-05 ENCOUNTER — TELEPHONE (OUTPATIENT)
Dept: FAMILY MEDICINE | Facility: CLINIC | Age: 66
End: 2021-08-05

## 2021-08-05 DIAGNOSIS — R06.02 SOB (SHORTNESS OF BREATH): ICD-10-CM

## 2021-08-05 DIAGNOSIS — I16.0 HYPERTENSIVE URGENCY: Primary | ICD-10-CM

## 2021-08-05 DIAGNOSIS — Z20.822 SUSPECTED COVID-19 VIRUS INFECTION: ICD-10-CM

## 2021-08-05 DIAGNOSIS — R00.0 TACHYCARDIA: ICD-10-CM

## 2021-08-05 DIAGNOSIS — R07.9 CHEST PAIN: ICD-10-CM

## 2021-08-05 DIAGNOSIS — R09.02 HYPOXIA: ICD-10-CM

## 2021-08-05 DIAGNOSIS — N18.6 ESRD (END STAGE RENAL DISEASE): ICD-10-CM

## 2021-08-05 DIAGNOSIS — I16.1 HYPERTENSIVE EMERGENCY: ICD-10-CM

## 2021-08-05 PROBLEM — A08.4 VIRAL GASTROENTERITIS: Status: ACTIVE | Noted: 2021-08-05

## 2021-08-05 LAB
ALBUMIN SERPL BCP-MCNC: 2.7 G/DL (ref 3.5–5.2)
ALBUMIN SERPL BCP-MCNC: 3.4 G/DL (ref 3.5–5.2)
ALP SERPL-CCNC: 102 U/L (ref 55–135)
ALP SERPL-CCNC: 80 U/L (ref 55–135)
ALT SERPL W/O P-5'-P-CCNC: 7 U/L (ref 10–44)
ALT SERPL W/O P-5'-P-CCNC: 9 U/L (ref 10–44)
ANION GAP SERPL CALC-SCNC: 10 MMOL/L (ref 8–16)
ANION GAP SERPL CALC-SCNC: 13 MMOL/L (ref 8–16)
AST SERPL-CCNC: 16 U/L (ref 10–40)
AST SERPL-CCNC: 20 U/L (ref 10–40)
BASOPHILS # BLD AUTO: 0.03 K/UL (ref 0–0.2)
BASOPHILS # BLD AUTO: 0.03 K/UL (ref 0–0.2)
BASOPHILS NFR BLD: 0.2 % (ref 0–1.9)
BASOPHILS NFR BLD: 0.3 % (ref 0–1.9)
BILIRUB SERPL-MCNC: 0.5 MG/DL (ref 0.1–1)
BILIRUB SERPL-MCNC: 0.5 MG/DL (ref 0.1–1)
BNP SERPL-MCNC: 2039 PG/ML (ref 0–99)
BUN SERPL-MCNC: 14 MG/DL (ref 8–23)
BUN SERPL-MCNC: 16 MG/DL (ref 8–23)
CALCIUM SERPL-MCNC: 8.7 MG/DL (ref 8.7–10.5)
CALCIUM SERPL-MCNC: 9.6 MG/DL (ref 8.7–10.5)
CHLORIDE SERPL-SCNC: 95 MMOL/L (ref 95–110)
CHLORIDE SERPL-SCNC: 96 MMOL/L (ref 95–110)
CK SERPL-CCNC: 33 U/L (ref 20–180)
CO2 SERPL-SCNC: 31 MMOL/L (ref 23–29)
CO2 SERPL-SCNC: 31 MMOL/L (ref 23–29)
CREAT SERPL-MCNC: 4.6 MG/DL (ref 0.5–1.4)
CREAT SERPL-MCNC: 4.9 MG/DL (ref 0.5–1.4)
CRP SERPL-MCNC: 49.8 MG/L (ref 0–8.2)
CTP QC/QA: YES
D DIMER PPP IA.FEU-MCNC: 0.78 MG/L FEU
DIFFERENTIAL METHOD: ABNORMAL
DIFFERENTIAL METHOD: ABNORMAL
EOSINOPHIL # BLD AUTO: 0 K/UL (ref 0–0.5)
EOSINOPHIL # BLD AUTO: 0 K/UL (ref 0–0.5)
EOSINOPHIL NFR BLD: 0.2 % (ref 0–8)
EOSINOPHIL NFR BLD: 0.3 % (ref 0–8)
ERYTHROCYTE [DISTWIDTH] IN BLOOD BY AUTOMATED COUNT: 16.4 % (ref 11.5–14.5)
ERYTHROCYTE [DISTWIDTH] IN BLOOD BY AUTOMATED COUNT: 16.5 % (ref 11.5–14.5)
EST. GFR  (AFRICAN AMERICAN): 10 ML/MIN/1.73 M^2
EST. GFR  (AFRICAN AMERICAN): 11 ML/MIN/1.73 M^2
EST. GFR  (NON AFRICAN AMERICAN): 9 ML/MIN/1.73 M^2
EST. GFR  (NON AFRICAN AMERICAN): 9 ML/MIN/1.73 M^2
FERRITIN SERPL-MCNC: 2535 NG/ML (ref 20–300)
FOLATE SERPL-MCNC: 33.1 NG/ML (ref 4–24)
GLUCOSE SERPL-MCNC: 108 MG/DL (ref 70–110)
GLUCOSE SERPL-MCNC: 82 MG/DL (ref 70–110)
HCT VFR BLD AUTO: 27.7 % (ref 37–48.5)
HCT VFR BLD AUTO: 32.4 % (ref 37–48.5)
HGB BLD-MCNC: 10.7 G/DL (ref 12–16)
HGB BLD-MCNC: 9 G/DL (ref 12–16)
IMM GRANULOCYTES # BLD AUTO: 0.03 K/UL (ref 0–0.04)
IMM GRANULOCYTES # BLD AUTO: 0.06 K/UL (ref 0–0.04)
IMM GRANULOCYTES NFR BLD AUTO: 0.3 % (ref 0–0.5)
IMM GRANULOCYTES NFR BLD AUTO: 0.5 % (ref 0–0.5)
IRON SERPL-MCNC: 25 UG/DL (ref 30–160)
LACTATE SERPL-SCNC: 1.1 MMOL/L (ref 0.5–2.2)
LDH SERPL L TO P-CCNC: 477 U/L (ref 110–260)
LYMPHOCYTES # BLD AUTO: 1.1 K/UL (ref 1–4.8)
LYMPHOCYTES # BLD AUTO: 1.4 K/UL (ref 1–4.8)
LYMPHOCYTES NFR BLD: 10.4 % (ref 18–48)
LYMPHOCYTES NFR BLD: 12.8 % (ref 18–48)
MAGNESIUM SERPL-MCNC: 2.2 MG/DL (ref 1.6–2.6)
MCH RBC QN AUTO: 30 PG (ref 27–31)
MCH RBC QN AUTO: 30.3 PG (ref 27–31)
MCHC RBC AUTO-ENTMCNC: 32.5 G/DL (ref 32–36)
MCHC RBC AUTO-ENTMCNC: 33 G/DL (ref 32–36)
MCV RBC AUTO: 92 FL (ref 82–98)
MCV RBC AUTO: 92 FL (ref 82–98)
MONOCYTES # BLD AUTO: 0.7 K/UL (ref 0.3–1)
MONOCYTES # BLD AUTO: 0.7 K/UL (ref 0.3–1)
MONOCYTES NFR BLD: 5.6 % (ref 4–15)
MONOCYTES NFR BLD: 8 % (ref 4–15)
NEUTROPHILS # BLD AUTO: 10.8 K/UL (ref 1.8–7.7)
NEUTROPHILS # BLD AUTO: 6.8 K/UL (ref 1.8–7.7)
NEUTROPHILS NFR BLD: 78.3 % (ref 38–73)
NEUTROPHILS NFR BLD: 83.1 % (ref 38–73)
NRBC BLD-RTO: 0 /100 WBC
NRBC BLD-RTO: 0 /100 WBC
PHOSPHATE SERPL-MCNC: 3.9 MG/DL (ref 2.7–4.5)
PLATELET # BLD AUTO: 159 K/UL (ref 150–450)
PLATELET # BLD AUTO: ABNORMAL K/UL (ref 150–450)
PMV BLD AUTO: 11.2 FL (ref 9.2–12.9)
PMV BLD AUTO: ABNORMAL FL (ref 9.2–12.9)
POCT GLUCOSE: 102 MG/DL (ref 70–110)
POCT GLUCOSE: 78 MG/DL (ref 70–110)
POTASSIUM SERPL-SCNC: 3.8 MMOL/L (ref 3.5–5.1)
POTASSIUM SERPL-SCNC: 3.8 MMOL/L (ref 3.5–5.1)
PROCALCITONIN SERPL IA-MCNC: 0.38 NG/ML
PROT SERPL-MCNC: 5.6 G/DL (ref 6–8.4)
PROT SERPL-MCNC: 7 G/DL (ref 6–8.4)
RBC # BLD AUTO: 3 M/UL (ref 4–5.4)
RBC # BLD AUTO: 3.53 M/UL (ref 4–5.4)
SARS-COV-2 RDRP RESP QL NAA+PROBE: NEGATIVE
SATURATED IRON: 14 % (ref 20–50)
SODIUM SERPL-SCNC: 137 MMOL/L (ref 136–145)
SODIUM SERPL-SCNC: 139 MMOL/L (ref 136–145)
TOTAL IRON BINDING CAPACITY: 176 UG/DL (ref 250–450)
TRANSFERRIN SERPL-MCNC: 119 MG/DL (ref 200–375)
TROPONIN I SERPL DL<=0.01 NG/ML-MCNC: 0.06 NG/ML (ref 0–0.03)
VIT B12 SERPL-MCNC: 709 PG/ML (ref 210–950)
WBC # BLD AUTO: 12.93 K/UL (ref 3.9–12.7)
WBC # BLD AUTO: 8.74 K/UL (ref 3.9–12.7)

## 2021-08-05 PROCEDURE — 21400001 HC TELEMETRY ROOM: Mod: NTX

## 2021-08-05 PROCEDURE — 63600175 PHARM REV CODE 636 W HCPCS: Mod: NTX | Performed by: INTERNAL MEDICINE

## 2021-08-05 PROCEDURE — 25000003 PHARM REV CODE 250: Mod: NTX | Performed by: INTERNAL MEDICINE

## 2021-08-05 PROCEDURE — 25000003 PHARM REV CODE 250: Mod: NTX | Performed by: PHYSICIAN ASSISTANT

## 2021-08-05 PROCEDURE — 83880 ASSAY OF NATRIURETIC PEPTIDE: CPT | Mod: NTX | Performed by: EMERGENCY MEDICINE

## 2021-08-05 PROCEDURE — 85025 COMPLETE CBC W/AUTO DIFF WBC: CPT | Mod: 91,NTX | Performed by: INTERNAL MEDICINE

## 2021-08-05 PROCEDURE — 86140 C-REACTIVE PROTEIN: CPT | Mod: NTX | Performed by: EMERGENCY MEDICINE

## 2021-08-05 PROCEDURE — 87040 BLOOD CULTURE FOR BACTERIA: CPT | Mod: 59,NTX | Performed by: EMERGENCY MEDICINE

## 2021-08-05 PROCEDURE — 80053 COMPREHEN METABOLIC PANEL: CPT | Mod: NTX | Performed by: EMERGENCY MEDICINE

## 2021-08-05 PROCEDURE — 84145 PROCALCITONIN (PCT): CPT | Mod: NTX | Performed by: EMERGENCY MEDICINE

## 2021-08-05 PROCEDURE — 82728 ASSAY OF FERRITIN: CPT | Mod: NTX | Performed by: EMERGENCY MEDICINE

## 2021-08-05 PROCEDURE — 93005 ELECTROCARDIOGRAM TRACING: CPT | Mod: NTX,59

## 2021-08-05 PROCEDURE — 85379 FIBRIN DEGRADATION QUANT: CPT | Mod: NTX | Performed by: EMERGENCY MEDICINE

## 2021-08-05 PROCEDURE — 93010 ELECTROCARDIOGRAM REPORT: CPT | Mod: NTX,,, | Performed by: INTERNAL MEDICINE

## 2021-08-05 PROCEDURE — 84100 ASSAY OF PHOSPHORUS: CPT | Mod: NTX | Performed by: INTERNAL MEDICINE

## 2021-08-05 PROCEDURE — 83540 ASSAY OF IRON: CPT | Mod: NTX | Performed by: INTERNAL MEDICINE

## 2021-08-05 PROCEDURE — 82746 ASSAY OF FOLIC ACID SERUM: CPT | Mod: NTX | Performed by: EMERGENCY MEDICINE

## 2021-08-05 PROCEDURE — 93010 ELECTROCARDIOGRAM REPORT: CPT | Mod: 76,NTX,, | Performed by: INTERNAL MEDICINE

## 2021-08-05 PROCEDURE — 83605 ASSAY OF LACTIC ACID: CPT | Mod: NTX | Performed by: EMERGENCY MEDICINE

## 2021-08-05 PROCEDURE — 84484 ASSAY OF TROPONIN QUANT: CPT | Mod: NTX | Performed by: EMERGENCY MEDICINE

## 2021-08-05 PROCEDURE — 82550 ASSAY OF CK (CPK): CPT | Mod: NTX | Performed by: EMERGENCY MEDICINE

## 2021-08-05 PROCEDURE — 93010 EKG 12-LEAD: ICD-10-PCS | Mod: 76,NTX,, | Performed by: INTERNAL MEDICINE

## 2021-08-05 PROCEDURE — 83735 ASSAY OF MAGNESIUM: CPT | Mod: NTX | Performed by: INTERNAL MEDICINE

## 2021-08-05 PROCEDURE — 90935 HEMODIALYSIS ONE EVALUATION: CPT | Mod: NTX

## 2021-08-05 PROCEDURE — 25500020 PHARM REV CODE 255: Mod: NTX | Performed by: INTERNAL MEDICINE

## 2021-08-05 PROCEDURE — 85025 COMPLETE CBC W/AUTO DIFF WBC: CPT | Mod: NTX | Performed by: EMERGENCY MEDICINE

## 2021-08-05 PROCEDURE — 83615 LACTATE (LD) (LDH) ENZYME: CPT | Mod: NTX | Performed by: EMERGENCY MEDICINE

## 2021-08-05 PROCEDURE — 82607 VITAMIN B-12: CPT | Mod: NTX | Performed by: EMERGENCY MEDICINE

## 2021-08-05 PROCEDURE — 63600175 PHARM REV CODE 636 W HCPCS: Mod: NTX | Performed by: EMERGENCY MEDICINE

## 2021-08-05 PROCEDURE — 80053 COMPREHEN METABOLIC PANEL: CPT | Mod: 91,NTX | Performed by: INTERNAL MEDICINE

## 2021-08-05 RX ORDER — LABETALOL HYDROCHLORIDE 5 MG/ML
10 INJECTION, SOLUTION INTRAVENOUS
Status: COMPLETED | OUTPATIENT
Start: 2021-08-05 | End: 2021-08-05

## 2021-08-05 RX ORDER — HYDRALAZINE HYDROCHLORIDE 25 MG/1
100 TABLET, FILM COATED ORAL 3 TIMES DAILY
Status: DISCONTINUED | OUTPATIENT
Start: 2021-08-05 | End: 2021-08-05

## 2021-08-05 RX ORDER — NIFEDIPINE 30 MG/1
60 TABLET, EXTENDED RELEASE ORAL DAILY
Status: DISCONTINUED | OUTPATIENT
Start: 2021-08-05 | End: 2021-08-06 | Stop reason: HOSPADM

## 2021-08-05 RX ORDER — AMOXICILLIN 250 MG
1 CAPSULE ORAL 2 TIMES DAILY PRN
Status: DISCONTINUED | OUTPATIENT
Start: 2021-08-05 | End: 2021-08-06 | Stop reason: HOSPADM

## 2021-08-05 RX ORDER — IBUPROFEN 200 MG
24 TABLET ORAL
Status: DISCONTINUED | OUTPATIENT
Start: 2021-08-05 | End: 2021-08-05

## 2021-08-05 RX ORDER — CARVEDILOL 12.5 MG/1
12.5 TABLET ORAL 2 TIMES DAILY WITH MEALS
Status: DISCONTINUED | OUTPATIENT
Start: 2021-08-05 | End: 2021-08-05

## 2021-08-05 RX ORDER — CLONIDINE 0.3 MG/24H
1 PATCH, EXTENDED RELEASE TRANSDERMAL
Status: DISCONTINUED | OUTPATIENT
Start: 2021-08-05 | End: 2021-08-06 | Stop reason: HOSPADM

## 2021-08-05 RX ORDER — ONDANSETRON 2 MG/ML
8 INJECTION INTRAMUSCULAR; INTRAVENOUS EVERY 6 HOURS
Status: DISCONTINUED | OUTPATIENT
Start: 2021-08-05 | End: 2021-08-06

## 2021-08-05 RX ORDER — GLUCAGON 1 MG
1 KIT INJECTION
Status: DISCONTINUED | OUTPATIENT
Start: 2021-08-05 | End: 2021-08-05

## 2021-08-05 RX ORDER — SEVELAMER CARBONATE 800 MG/1
1600 TABLET, FILM COATED ORAL
Status: DISCONTINUED | OUTPATIENT
Start: 2021-08-05 | End: 2021-08-06 | Stop reason: HOSPADM

## 2021-08-05 RX ORDER — CINACALCET 30 MG/1
60 TABLET, FILM COATED ORAL
Status: DISCONTINUED | OUTPATIENT
Start: 2021-08-05 | End: 2021-08-06 | Stop reason: HOSPADM

## 2021-08-05 RX ORDER — NIFEDIPINE 30 MG/1
60 TABLET, EXTENDED RELEASE ORAL DAILY
Status: DISCONTINUED | OUTPATIENT
Start: 2021-08-05 | End: 2021-08-05

## 2021-08-05 RX ORDER — OXYCODONE HYDROCHLORIDE 5 MG/1
5 TABLET ORAL EVERY 6 HOURS PRN
Status: DISCONTINUED | OUTPATIENT
Start: 2021-08-05 | End: 2021-08-05

## 2021-08-05 RX ORDER — IBUPROFEN 200 MG
16 TABLET ORAL
Status: DISCONTINUED | OUTPATIENT
Start: 2021-08-05 | End: 2021-08-05

## 2021-08-05 RX ORDER — CHOLECALCIFEROL (VITAMIN D3) 25 MCG
1000 TABLET ORAL DAILY
Status: DISCONTINUED | OUTPATIENT
Start: 2021-08-05 | End: 2021-08-06 | Stop reason: HOSPADM

## 2021-08-05 RX ORDER — MUPIROCIN 20 MG/G
OINTMENT TOPICAL 2 TIMES DAILY
Status: DISCONTINUED | OUTPATIENT
Start: 2021-08-05 | End: 2021-08-06 | Stop reason: HOSPADM

## 2021-08-05 RX ORDER — IPRATROPIUM BROMIDE AND ALBUTEROL SULFATE 2.5; .5 MG/3ML; MG/3ML
3 SOLUTION RESPIRATORY (INHALATION) EVERY 4 HOURS PRN
Status: DISCONTINUED | OUTPATIENT
Start: 2021-08-05 | End: 2021-08-06 | Stop reason: HOSPADM

## 2021-08-05 RX ORDER — FAMOTIDINE 10 MG/ML
20 INJECTION INTRAVENOUS DAILY
Status: DISCONTINUED | OUTPATIENT
Start: 2021-08-05 | End: 2021-08-06 | Stop reason: HOSPADM

## 2021-08-05 RX ORDER — HYDRALAZINE HYDROCHLORIDE 20 MG/ML
10 INJECTION INTRAMUSCULAR; INTRAVENOUS
Status: COMPLETED | OUTPATIENT
Start: 2021-08-05 | End: 2021-08-05

## 2021-08-05 RX ORDER — LOSARTAN POTASSIUM 25 MG/1
100 TABLET ORAL DAILY
Status: DISCONTINUED | OUTPATIENT
Start: 2021-08-06 | End: 2021-08-06 | Stop reason: HOSPADM

## 2021-08-05 RX ORDER — LOSARTAN POTASSIUM 25 MG/1
25 TABLET ORAL DAILY
Status: DISCONTINUED | OUTPATIENT
Start: 2021-08-05 | End: 2021-08-05

## 2021-08-05 RX ORDER — CARVEDILOL 12.5 MG/1
12.5 TABLET ORAL 2 TIMES DAILY WITH MEALS
Status: DISCONTINUED | OUTPATIENT
Start: 2021-08-05 | End: 2021-08-06 | Stop reason: HOSPADM

## 2021-08-05 RX ORDER — HYDRALAZINE HYDROCHLORIDE 25 MG/1
100 TABLET, FILM COATED ORAL 3 TIMES DAILY
Status: DISCONTINUED | OUTPATIENT
Start: 2021-08-05 | End: 2021-08-06 | Stop reason: HOSPADM

## 2021-08-05 RX ORDER — ONDANSETRON 2 MG/ML
4 INJECTION INTRAMUSCULAR; INTRAVENOUS EVERY 8 HOURS PRN
Status: DISCONTINUED | OUTPATIENT
Start: 2021-08-05 | End: 2021-08-06 | Stop reason: HOSPADM

## 2021-08-05 RX ORDER — SODIUM CHLORIDE 9 MG/ML
INJECTION, SOLUTION INTRAVENOUS ONCE
Status: DISCONTINUED | OUTPATIENT
Start: 2021-08-05 | End: 2021-08-06 | Stop reason: HOSPADM

## 2021-08-05 RX ORDER — ONDANSETRON 2 MG/ML
4 INJECTION INTRAMUSCULAR; INTRAVENOUS ONCE
Status: COMPLETED | OUTPATIENT
Start: 2021-08-05 | End: 2021-08-05

## 2021-08-05 RX ORDER — HEPARIN SODIUM 5000 [USP'U]/ML
5000 INJECTION, SOLUTION INTRAVENOUS; SUBCUTANEOUS EVERY 8 HOURS
Status: DISCONTINUED | OUTPATIENT
Start: 2021-08-05 | End: 2021-08-06 | Stop reason: HOSPADM

## 2021-08-05 RX ORDER — CLONIDINE 0.3 MG/24H
1 PATCH, EXTENDED RELEASE TRANSDERMAL
Status: DISCONTINUED | OUTPATIENT
Start: 2021-08-05 | End: 2021-08-05

## 2021-08-05 RX ORDER — FUROSEMIDE 10 MG/ML
80 INJECTION INTRAMUSCULAR; INTRAVENOUS
Status: COMPLETED | OUTPATIENT
Start: 2021-08-05 | End: 2021-08-05

## 2021-08-05 RX ORDER — ACETAMINOPHEN 500 MG
500 TABLET ORAL EVERY 6 HOURS PRN
Status: DISCONTINUED | OUTPATIENT
Start: 2021-08-05 | End: 2021-08-06 | Stop reason: HOSPADM

## 2021-08-05 RX ORDER — ISOSORBIDE MONONITRATE 30 MG/1
120 TABLET, EXTENDED RELEASE ORAL DAILY
Status: DISCONTINUED | OUTPATIENT
Start: 2021-08-05 | End: 2021-08-06 | Stop reason: HOSPADM

## 2021-08-05 RX ORDER — SODIUM CHLORIDE 0.9 % (FLUSH) 0.9 %
10 SYRINGE (ML) INJECTION
Status: DISCONTINUED | OUTPATIENT
Start: 2021-08-05 | End: 2021-08-06 | Stop reason: HOSPADM

## 2021-08-05 RX ORDER — TALC
6 POWDER (GRAM) TOPICAL NIGHTLY PRN
Status: DISCONTINUED | OUTPATIENT
Start: 2021-08-05 | End: 2021-08-06 | Stop reason: HOSPADM

## 2021-08-05 RX ORDER — FUROSEMIDE 10 MG/ML
120 INJECTION INTRAMUSCULAR; INTRAVENOUS ONCE
Status: DISCONTINUED | OUTPATIENT
Start: 2021-08-05 | End: 2021-08-05

## 2021-08-05 RX ORDER — ISOSORBIDE MONONITRATE 30 MG/1
120 TABLET, EXTENDED RELEASE ORAL DAILY
Status: DISCONTINUED | OUTPATIENT
Start: 2021-08-05 | End: 2021-08-05

## 2021-08-05 RX ADMIN — HYDRALAZINE HYDROCHLORIDE 10 MG: 20 INJECTION INTRAMUSCULAR; INTRAVENOUS at 01:08

## 2021-08-05 RX ADMIN — ONDANSETRON 8 MG: 2 INJECTION INTRAMUSCULAR; INTRAVENOUS at 05:08

## 2021-08-05 RX ADMIN — HEPARIN SODIUM 5000 UNITS: 5000 INJECTION INTRAVENOUS; SUBCUTANEOUS at 06:08

## 2021-08-05 RX ADMIN — CARVEDILOL 12.5 MG: 12.5 TABLET, FILM COATED ORAL at 08:08

## 2021-08-05 RX ADMIN — LOSARTAN POTASSIUM 25 MG: 25 TABLET, FILM COATED ORAL at 08:08

## 2021-08-05 RX ADMIN — MUPIROCIN: 20 OINTMENT TOPICAL at 10:08

## 2021-08-05 RX ADMIN — ONDANSETRON 8 MG: 2 INJECTION INTRAMUSCULAR; INTRAVENOUS at 10:08

## 2021-08-05 RX ADMIN — HEPARIN SODIUM 5000 UNITS: 5000 INJECTION INTRAVENOUS; SUBCUTANEOUS at 10:08

## 2021-08-05 RX ADMIN — FUROSEMIDE 80 MG: 10 INJECTION, SOLUTION INTRAMUSCULAR; INTRAVENOUS at 01:08

## 2021-08-05 RX ADMIN — CARVEDILOL 12.5 MG: 12.5 TABLET, FILM COATED ORAL at 10:08

## 2021-08-05 RX ADMIN — CHOLECALCIFEROL TAB 25 MCG (1000 UNIT) 1000 UNITS: 25 TAB at 08:08

## 2021-08-05 RX ADMIN — HYDRALAZINE HYDROCHLORIDE 100 MG: 25 TABLET ORAL at 08:08

## 2021-08-05 RX ADMIN — ONDANSETRON 4 MG: 2 INJECTION INTRAMUSCULAR; INTRAVENOUS at 06:08

## 2021-08-05 RX ADMIN — MUPIROCIN: 20 OINTMENT TOPICAL at 11:08

## 2021-08-05 RX ADMIN — CINACALCET 60 MG: 30 TABLET, FILM COATED ORAL at 08:08

## 2021-08-05 RX ADMIN — ISOSORBIDE MONONITRATE 120 MG: 30 TABLET, EXTENDED RELEASE ORAL at 08:08

## 2021-08-05 RX ADMIN — ONDANSETRON 4 MG: 2 INJECTION, SOLUTION INTRAMUSCULAR; INTRAVENOUS at 01:08

## 2021-08-05 RX ADMIN — HYDRALAZINE HYDROCHLORIDE 100 MG: 25 TABLET ORAL at 10:08

## 2021-08-05 RX ADMIN — IOHEXOL 60 ML: 350 INJECTION, SOLUTION INTRAVENOUS at 03:08

## 2021-08-05 RX ADMIN — NIFEDIPINE 60 MG: 30 TABLET, FILM COATED, EXTENDED RELEASE ORAL at 08:08

## 2021-08-05 RX ADMIN — HEPARIN SODIUM 5000 UNITS: 5000 INJECTION INTRAVENOUS; SUBCUTANEOUS at 05:08

## 2021-08-05 RX ADMIN — LABETALOL HYDROCHLORIDE 10 MG: 5 INJECTION INTRAVENOUS at 06:08

## 2021-08-05 RX ADMIN — FAMOTIDINE 20 MG: 10 INJECTION INTRAVENOUS at 05:08

## 2021-08-06 VITALS
DIASTOLIC BLOOD PRESSURE: 82 MMHG | SYSTOLIC BLOOD PRESSURE: 146 MMHG | TEMPERATURE: 98 F | BODY MASS INDEX: 17.28 KG/M2 | RESPIRATION RATE: 19 BRPM | HEIGHT: 64 IN | HEART RATE: 91 BPM | OXYGEN SATURATION: 93 % | WEIGHT: 101.19 LBS

## 2021-08-06 PROBLEM — R09.02 HYPOXIA: Status: RESOLVED | Noted: 2021-08-05 | Resolved: 2021-08-06

## 2021-08-06 PROBLEM — I16.1 HYPERTENSIVE EMERGENCY: Status: RESOLVED | Noted: 2021-08-05 | Resolved: 2021-08-06

## 2021-08-06 PROBLEM — I50.33 ACUTE ON CHRONIC DIASTOLIC HEART FAILURE: Status: RESOLVED | Noted: 2020-03-09 | Resolved: 2021-08-06

## 2021-08-06 PROBLEM — J81.0 ACUTE PULMONARY EDEMA: Status: RESOLVED | Noted: 2017-02-05 | Resolved: 2021-08-06

## 2021-08-06 LAB
ALBUMIN SERPL BCP-MCNC: 3 G/DL (ref 3.5–5.2)
ALP SERPL-CCNC: 88 U/L (ref 55–135)
ALT SERPL W/O P-5'-P-CCNC: 8 U/L (ref 10–44)
ANION GAP SERPL CALC-SCNC: 11 MMOL/L (ref 8–16)
AST SERPL-CCNC: 16 U/L (ref 10–40)
BILIRUB SERPL-MCNC: 0.5 MG/DL (ref 0.1–1)
BUN SERPL-MCNC: 28 MG/DL (ref 8–23)
CALCIUM SERPL-MCNC: 8.8 MG/DL (ref 8.7–10.5)
CHLORIDE SERPL-SCNC: 94 MMOL/L (ref 95–110)
CO2 SERPL-SCNC: 30 MMOL/L (ref 23–29)
CREAT SERPL-MCNC: 5.1 MG/DL (ref 0.5–1.4)
EST. GFR  (AFRICAN AMERICAN): 10 ML/MIN/1.73 M^2
EST. GFR  (NON AFRICAN AMERICAN): 8 ML/MIN/1.73 M^2
GLUCOSE SERPL-MCNC: 82 MG/DL (ref 70–110)
MAGNESIUM SERPL-MCNC: 2.3 MG/DL (ref 1.6–2.6)
PHOSPHATE SERPL-MCNC: 4.4 MG/DL (ref 2.7–4.5)
POCT GLUCOSE: 110 MG/DL (ref 70–110)
POCT GLUCOSE: 92 MG/DL (ref 70–110)
POTASSIUM SERPL-SCNC: 3.9 MMOL/L (ref 3.5–5.1)
PROT SERPL-MCNC: 6.4 G/DL (ref 6–8.4)
SODIUM SERPL-SCNC: 135 MMOL/L (ref 136–145)

## 2021-08-06 PROCEDURE — 25000003 PHARM REV CODE 250: Mod: NTX | Performed by: INTERNAL MEDICINE

## 2021-08-06 PROCEDURE — 63600175 PHARM REV CODE 636 W HCPCS: Mod: NTX | Performed by: INTERNAL MEDICINE

## 2021-08-06 PROCEDURE — 80100016 HC MAINTENANCE HEMODIALYSIS: Mod: NTX

## 2021-08-06 PROCEDURE — 80053 COMPREHEN METABOLIC PANEL: CPT | Mod: NTX | Performed by: INTERNAL MEDICINE

## 2021-08-06 PROCEDURE — 80100014 HC HEMODIALYSIS 1:1: Mod: NTX

## 2021-08-06 PROCEDURE — 36415 COLL VENOUS BLD VENIPUNCTURE: CPT | Mod: NTX | Performed by: INTERNAL MEDICINE

## 2021-08-06 PROCEDURE — 84100 ASSAY OF PHOSPHORUS: CPT | Mod: NTX | Performed by: INTERNAL MEDICINE

## 2021-08-06 PROCEDURE — 83735 ASSAY OF MAGNESIUM: CPT | Mod: NTX | Performed by: INTERNAL MEDICINE

## 2021-08-06 RX ADMIN — HEPARIN SODIUM 5000 UNITS: 5000 INJECTION INTRAVENOUS; SUBCUTANEOUS at 05:08

## 2021-08-06 RX ADMIN — HYDRALAZINE HYDROCHLORIDE 100 MG: 25 TABLET ORAL at 09:08

## 2021-08-06 RX ADMIN — CHOLECALCIFEROL TAB 25 MCG (1000 UNIT) 1000 UNITS: 25 TAB at 09:08

## 2021-08-06 RX ADMIN — CARVEDILOL 12.5 MG: 12.5 TABLET, FILM COATED ORAL at 04:08

## 2021-08-06 RX ADMIN — NIFEDIPINE 60 MG: 30 TABLET, FILM COATED, EXTENDED RELEASE ORAL at 09:08

## 2021-08-06 RX ADMIN — CARVEDILOL 12.5 MG: 12.5 TABLET, FILM COATED ORAL at 09:08

## 2021-08-06 RX ADMIN — LOSARTAN POTASSIUM 100 MG: 25 TABLET, FILM COATED ORAL at 09:08

## 2021-08-06 RX ADMIN — CINACALCET 60 MG: 30 TABLET, FILM COATED ORAL at 09:08

## 2021-08-06 RX ADMIN — SEVELAMER CARBONATE 1600 MG: 800 TABLET, FILM COATED ORAL at 09:08

## 2021-08-06 RX ADMIN — SEVELAMER CARBONATE 1600 MG: 800 TABLET, FILM COATED ORAL at 04:08

## 2021-08-06 RX ADMIN — FAMOTIDINE 20 MG: 10 INJECTION INTRAVENOUS at 09:08

## 2021-08-06 RX ADMIN — ISOSORBIDE MONONITRATE 120 MG: 30 TABLET, EXTENDED RELEASE ORAL at 09:08

## 2021-08-06 RX ADMIN — HYDRALAZINE HYDROCHLORIDE 100 MG: 25 TABLET ORAL at 04:08

## 2021-08-09 ENCOUNTER — PATIENT OUTREACH (OUTPATIENT)
Dept: ADMINISTRATIVE | Facility: CLINIC | Age: 66
End: 2021-08-09

## 2021-08-09 DIAGNOSIS — Z76.82 ORGAN TRANSPLANT CANDIDATE: Primary | ICD-10-CM

## 2021-08-09 DIAGNOSIS — Z12.11 COLON CANCER SCREENING: Primary | ICD-10-CM

## 2021-08-09 DIAGNOSIS — Z76.82 ORGAN TRANSPLANT CANDIDATE: ICD-10-CM

## 2021-08-09 LAB
BACTERIA BLD CULT: NORMAL
BACTERIA BLD CULT: NORMAL

## 2021-08-26 ENCOUNTER — TELEPHONE (OUTPATIENT)
Dept: TRANSPLANT | Facility: CLINIC | Age: 66
End: 2021-08-26

## 2021-09-27 ENCOUNTER — PATIENT OUTREACH (OUTPATIENT)
Dept: ADMINISTRATIVE | Facility: OTHER | Age: 66
End: 2021-09-27

## 2021-09-30 ENCOUNTER — TELEPHONE (OUTPATIENT)
Dept: TRANSPLANT | Facility: CLINIC | Age: 66
End: 2021-09-30

## 2021-10-06 ENCOUNTER — TELEPHONE (OUTPATIENT)
Dept: TRANSPLANT | Facility: CLINIC | Age: 66
End: 2021-10-06

## 2021-10-08 ENCOUNTER — TELEPHONE (OUTPATIENT)
Dept: TRANSPLANT | Facility: CLINIC | Age: 66
End: 2021-10-08

## 2021-10-28 RX ORDER — HYDRALAZINE HYDROCHLORIDE 100 MG/1
100 TABLET, FILM COATED ORAL 3 TIMES DAILY
Qty: 90 TABLET | Refills: 11 | Status: SHIPPED | OUTPATIENT
Start: 2021-10-28 | End: 2022-06-20

## 2021-11-16 ENCOUNTER — HOSPITAL ENCOUNTER (OUTPATIENT)
Dept: CARDIOLOGY | Facility: HOSPITAL | Age: 66
Discharge: HOME | End: 2021-11-16
Attending: NURSE PRACTITIONER
Payer: MEDICARE

## 2021-11-16 VITALS — BODY MASS INDEX: 17.24 KG/M2 | WEIGHT: 101 LBS | HEIGHT: 64 IN

## 2021-11-16 DIAGNOSIS — Z76.82 ORGAN TRANSPLANT CANDIDATE: ICD-10-CM

## 2021-11-16 LAB
AV INDEX (PROSTH): 0.69
AV MEAN GRADIENT: 6 MMHG
AV PEAK GRADIENT: 9 MMHG
AV VALVE AREA: 2.01 CM2
AV VELOCITY RATIO: 0.68
BSA FOR ECHO PROCEDURE: 1.44 M2
CV ECHO LV RWT: 0.57 CM
DOP CALC AO PEAK VEL: 1.46 M/S
DOP CALC AO VTI: 33.8 CM
DOP CALC LVOT AREA: 2.9 CM2
DOP CALC LVOT DIAMETER: 1.92 CM
DOP CALC LVOT PEAK VEL: 1 M/S
DOP CALC LVOT STROKE VOLUME: 67.98 CM3
DOP CALCLVOT PEAK VEL VTI: 23.49 CM
E WAVE DECELERATION TIME: 271.71 MSEC
E/A RATIO: 1.35
E/E' RATIO: 26.67 M/S
ECHO LV POSTERIOR WALL: 1.21 CM (ref 0.6–1.1)
EJECTION FRACTION: 65 %
FRACTIONAL SHORTENING: 25 % (ref 28–44)
INTERVENTRICULAR SEPTUM: 1.64 CM (ref 0.6–1.1)
IVRT: 131.49 MSEC
LA MINOR: 5.98 CM
LA WIDTH: 5.24 CM
LEFT ATRIUM SIZE: 3.96 CM
LEFT INTERNAL DIMENSION IN SYSTOLE: 3.17 CM (ref 2.1–4)
LEFT VENTRICLE DIASTOLIC VOLUME INDEX: 54.69 ML/M2
LEFT VENTRICLE DIASTOLIC VOLUME: 79.85 ML
LEFT VENTRICLE MASS INDEX: 160 G/M2
LEFT VENTRICLE SYSTOLIC VOLUME INDEX: 27.3 ML/M2
LEFT VENTRICLE SYSTOLIC VOLUME: 39.89 ML
LEFT VENTRICULAR INTERNAL DIMENSION IN DIASTOLE: 4.23 CM (ref 3.5–6)
LEFT VENTRICULAR MASS: 232.9 G
LV LATERAL E/E' RATIO: 24 M/S
LV SEPTAL E/E' RATIO: 30 M/S
MV PEAK A VEL: 0.89 M/S
MV PEAK E VEL: 1.2 M/S
MV STENOSIS PRESSURE HALF TIME: 78.8 MS
MV VALVE AREA P 1/2 METHOD: 2.79 CM2
PISA TR MAX VEL: 2.16 M/S
PULM VEIN S/D RATIO: 1.18
PV PEAK D VEL: 0.34 M/S
PV PEAK S VEL: 0.4 M/S
PV PEAK VELOCITY: 1.09 CM/S
RA PRESSURE: 3 MMHG
RV TISSUE DOPPLER FREE WALL SYSTOLIC VELOCITY 1 (APICAL 4 CHAMBER VIEW): 10.71 CM/S
SINUS: 2.68 CM
STJ: 1.85 CM
TDI LATERAL: 0.05 M/S
TDI SEPTAL: 0.04 M/S
TDI: 0.05 M/S
TR MAX PG: 19 MMHG
TRICUSPID ANNULAR PLANE SYSTOLIC EXCURSION: 2.08 CM
TV REST PULMONARY ARTERY PRESSURE: 22 MMHG

## 2021-11-16 PROCEDURE — 93306 ECHO (CUPID ONLY): ICD-10-PCS | Mod: 26,TXP,, | Performed by: INTERNAL MEDICINE

## 2021-11-16 PROCEDURE — 93306 TTE W/DOPPLER COMPLETE: CPT | Mod: TXP

## 2021-11-16 PROCEDURE — 93306 TTE W/DOPPLER COMPLETE: CPT | Mod: 26,TXP,, | Performed by: INTERNAL MEDICINE

## 2021-11-22 ENCOUNTER — PATIENT OUTREACH (OUTPATIENT)
Dept: ADMINISTRATIVE | Facility: OTHER | Age: 66
End: 2021-11-22
Payer: MEDICARE

## 2021-11-23 ENCOUNTER — TELEPHONE (OUTPATIENT)
Dept: TRANSPLANT | Facility: CLINIC | Age: 66
End: 2021-11-23
Payer: MEDICARE

## 2021-12-01 ENCOUNTER — TELEPHONE (OUTPATIENT)
Dept: TRANSPLANT | Facility: CLINIC | Age: 66
End: 2021-12-01
Payer: MEDICARE

## 2021-12-16 DIAGNOSIS — Z76.82 ORGAN TRANSPLANT CANDIDATE: Primary | ICD-10-CM

## 2021-12-23 DIAGNOSIS — Z01.818 PRE-OP TESTING: ICD-10-CM

## 2021-12-23 DIAGNOSIS — Z99.2 DIALYSIS PATIENT: Primary | ICD-10-CM

## 2022-01-13 ENCOUNTER — HOSPITAL ENCOUNTER (OUTPATIENT)
Dept: RADIOLOGY | Facility: HOSPITAL | Age: 67
Discharge: HOME | End: 2022-01-13
Attending: NURSE PRACTITIONER
Payer: MEDICARE

## 2022-01-13 DIAGNOSIS — Z12.31 ENCOUNTER FOR SCREENING MAMMOGRAM FOR MALIGNANT NEOPLASM OF BREAST: ICD-10-CM

## 2022-01-13 DIAGNOSIS — Z76.82 ORGAN TRANSPLANT CANDIDATE: ICD-10-CM

## 2022-01-13 PROCEDURE — 77063 MAMMO DIGITAL SCREENING BILAT WITH TOMO: ICD-10-PCS | Mod: 26,TXP,, | Performed by: RADIOLOGY

## 2022-01-13 PROCEDURE — 77063 BREAST TOMOSYNTHESIS BI: CPT | Mod: 26,TXP,, | Performed by: RADIOLOGY

## 2022-01-13 PROCEDURE — 77067 SCR MAMMO BI INCL CAD: CPT | Mod: 26,TXP,, | Performed by: RADIOLOGY

## 2022-01-13 PROCEDURE — 77063 BREAST TOMOSYNTHESIS BI: CPT | Mod: TC,TXP

## 2022-01-13 PROCEDURE — 77067 SCR MAMMO BI INCL CAD: CPT | Mod: TC,TXP

## 2022-01-13 PROCEDURE — 77067 MAMMO DIGITAL SCREENING BILAT WITH TOMO: ICD-10-PCS | Mod: 26,TXP,, | Performed by: RADIOLOGY

## 2022-01-18 ENCOUNTER — PATIENT OUTREACH (OUTPATIENT)
Dept: ADMINISTRATIVE | Facility: OTHER | Age: 67
End: 2022-01-18
Payer: MEDICARE

## 2022-01-29 ENCOUNTER — LAB VISIT (OUTPATIENT)
Dept: PRIMARY CARE CLINIC | Facility: CLINIC | Age: 67
End: 2022-01-29
Payer: MEDICARE

## 2022-01-29 DIAGNOSIS — Z01.818 PRE-OP TESTING: ICD-10-CM

## 2022-01-29 PROCEDURE — U0005 INFEC AGEN DETEC AMPLI PROBE: HCPCS | Performed by: INTERNAL MEDICINE

## 2022-01-29 PROCEDURE — U0003 INFECTIOUS AGENT DETECTION BY NUCLEIC ACID (DNA OR RNA); SEVERE ACUTE RESPIRATORY SYNDROME CORONAVIRUS 2 (SARS-COV-2) (CORONAVIRUS DISEASE [COVID-19]), AMPLIFIED PROBE TECHNIQUE, MAKING USE OF HIGH THROUGHPUT TECHNOLOGIES AS DESCRIBED BY CMS-2020-01-R: HCPCS | Mod: NTX | Performed by: INTERNAL MEDICINE

## 2022-01-30 LAB
SARS-COV-2 RNA RESP QL NAA+PROBE: NOT DETECTED
SARS-COV-2- CYCLE NUMBER: NORMAL

## 2022-02-17 DIAGNOSIS — Z76.82 ORGAN TRANSPLANT CANDIDATE: Primary | ICD-10-CM

## 2022-03-02 ENCOUNTER — TELEPHONE (OUTPATIENT)
Dept: TRANSPLANT | Facility: CLINIC | Age: 67
End: 2022-03-02
Payer: MEDICARE

## 2022-03-31 ENCOUNTER — HOSPITAL ENCOUNTER (OUTPATIENT)
Dept: RADIOLOGY | Facility: HOSPITAL | Age: 67
Discharge: HOME | End: 2022-03-31
Attending: NURSE PRACTITIONER
Payer: MEDICARE

## 2022-03-31 DIAGNOSIS — Z76.82 ORGAN TRANSPLANT CANDIDATE: ICD-10-CM

## 2022-03-31 PROCEDURE — 71046 XR CHEST PA AND LATERAL: ICD-10-PCS | Mod: 26,TXP,, | Performed by: RADIOLOGY

## 2022-03-31 PROCEDURE — 71046 X-RAY EXAM CHEST 2 VIEWS: CPT | Mod: 26,TXP,, | Performed by: RADIOLOGY

## 2022-03-31 PROCEDURE — 76770 US EXAM ABDO BACK WALL COMP: CPT | Mod: 26,TXP,, | Performed by: RADIOLOGY

## 2022-03-31 PROCEDURE — 71046 X-RAY EXAM CHEST 2 VIEWS: CPT | Mod: TC,TXP

## 2022-03-31 PROCEDURE — 76770 US RETROPERITONEAL COMPLETE: ICD-10-PCS | Mod: 26,TXP,, | Performed by: RADIOLOGY

## 2022-03-31 PROCEDURE — 76770 US EXAM ABDO BACK WALL COMP: CPT | Mod: TC,TXP

## 2022-04-05 ENCOUNTER — TELEPHONE (OUTPATIENT)
Dept: OBSTETRICS AND GYNECOLOGY | Facility: CLINIC | Age: 67
End: 2022-04-05
Payer: MEDICARE

## 2022-04-07 ENCOUNTER — PATIENT OUTREACH (OUTPATIENT)
Dept: ADMINISTRATIVE | Facility: OTHER | Age: 67
End: 2022-04-07
Payer: MEDICARE

## 2022-04-07 NOTE — PROGRESS NOTES
Health Maintenance Due   Topic Date Due    COVID-19 Vaccine (1) Never done    TETANUS VACCINE  Never done    Shingles Vaccine (1 of 2) Never done    Cervical Cancer Screening  10/24/2020    Colorectal Cancer Screening  08/06/2021     Updates were requested from care everywhere.  Chart was reviewed for overdue Proactive [facility] Encounters (LAYNE) topics (CRS, Breast Cancer Screening, Eye exam)  Health Maintenance has been updated.  LINKS immunization registry triggered.  Immunizations were reconciled.

## 2022-04-29 ENCOUNTER — TELEPHONE (OUTPATIENT)
Dept: CARDIOLOGY | Facility: HOSPITAL | Age: 67
End: 2022-04-29
Payer: MEDICARE

## 2022-05-03 ENCOUNTER — TELEPHONE (OUTPATIENT)
Dept: TRANSPLANT | Facility: CLINIC | Age: 67
End: 2022-05-03
Payer: MEDICARE

## 2022-06-06 DIAGNOSIS — Z12.11 COLON CANCER SCREENING: Primary | ICD-10-CM

## 2022-06-06 DIAGNOSIS — Z76.82 ORGAN TRANSPLANT CANDIDATE: ICD-10-CM

## 2022-06-06 DIAGNOSIS — Z76.82 PRE-KIDNEY TRANSPLANT, LISTED: Primary | ICD-10-CM

## 2022-06-07 ENCOUNTER — TELEPHONE (OUTPATIENT)
Dept: CARDIOLOGY | Facility: HOSPITAL | Age: 67
End: 2022-06-07
Payer: MEDICARE

## 2022-06-09 ENCOUNTER — HOSPITAL ENCOUNTER (OUTPATIENT)
Dept: CARDIOLOGY | Facility: HOSPITAL | Age: 67
Discharge: HOME | End: 2022-06-09
Attending: NURSE PRACTITIONER
Payer: MEDICARE

## 2022-06-09 VITALS
HEIGHT: 64 IN | HEART RATE: 78 BPM | DIASTOLIC BLOOD PRESSURE: 72 MMHG | BODY MASS INDEX: 17.24 KG/M2 | SYSTOLIC BLOOD PRESSURE: 162 MMHG | RESPIRATION RATE: 18 BRPM | WEIGHT: 101 LBS

## 2022-06-09 DIAGNOSIS — Z76.82 ORGAN TRANSPLANT CANDIDATE: ICD-10-CM

## 2022-06-09 LAB
CV STRESS BASE HR: 63 BPM
DIASTOLIC BLOOD PRESSURE: 67 MMHG
EJECTION FRACTION- HIGH: 65 %
END DIASTOLIC INDEX-HIGH: 153 ML/M2
END DIASTOLIC INDEX-LOW: 93 ML/M2
END SYSTOLIC INDEX-HIGH: 71 ML/M2
END SYSTOLIC INDEX-LOW: 31 ML/M2
OHS CV CPX 1 MINUTE RECOVERY HEART RATE: 100 BPM
OHS CV CPX 85 PERCENT MAX PREDICTED HEART RATE MALE: 127
OHS CV CPX MAX PREDICTED HEART RATE: 150
OHS CV CPX PATIENT IS FEMALE: 1
OHS CV CPX PATIENT IS MALE: 0
OHS CV CPX PEAK HEAR RATE: 69 BPM
OHS CV CPX PERCENT MAX PREDICTED HEART RATE ACHIEVED: 46
OHS CV CPX RATE PRESSURE PRODUCT PRESENTING: NORMAL
RETIRED EF AND QEF - SEE NOTES: 53 %
STRESS ST DEPRESSION: 0.5 MM
SYSTOLIC BLOOD PRESSURE: 167 MMHG

## 2022-06-09 PROCEDURE — 78452 STRESS TEST WITH MYOCARDIAL PERFUSION (CUPID ONLY): ICD-10-PCS | Mod: 26,TXP,, | Performed by: INTERNAL MEDICINE

## 2022-06-09 PROCEDURE — A9502 TC99M TETROFOSMIN: HCPCS | Mod: TXP

## 2022-06-09 PROCEDURE — 78452 HT MUSCLE IMAGE SPECT MULT: CPT | Mod: 26,TXP,, | Performed by: INTERNAL MEDICINE

## 2022-06-09 PROCEDURE — 93016 STRESS TEST WITH MYOCARDIAL PERFUSION (CUPID ONLY): ICD-10-PCS | Mod: TXP,,, | Performed by: INTERNAL MEDICINE

## 2022-06-09 PROCEDURE — 93018 STRESS TEST WITH MYOCARDIAL PERFUSION (CUPID ONLY): ICD-10-PCS | Mod: TXP,,, | Performed by: INTERNAL MEDICINE

## 2022-06-09 PROCEDURE — 93018 CV STRESS TEST I&R ONLY: CPT | Mod: TXP,,, | Performed by: INTERNAL MEDICINE

## 2022-06-09 PROCEDURE — 63600175 PHARM REV CODE 636 W HCPCS: Mod: TXP | Performed by: NURSE PRACTITIONER

## 2022-06-09 PROCEDURE — 93016 CV STRESS TEST SUPVJ ONLY: CPT | Mod: TXP,,, | Performed by: INTERNAL MEDICINE

## 2022-06-09 RX ORDER — AMINOPHYLLINE 25 MG/ML
75 INJECTION, SOLUTION INTRAVENOUS ONCE
Status: COMPLETED | OUTPATIENT
Start: 2022-06-09 | End: 2022-06-09

## 2022-06-09 RX ORDER — REGADENOSON 0.08 MG/ML
0.4 INJECTION, SOLUTION INTRAVENOUS ONCE
Status: COMPLETED | OUTPATIENT
Start: 2022-06-09 | End: 2022-06-09

## 2022-06-09 RX ADMIN — REGADENOSON 0.4 MG: 0.08 INJECTION, SOLUTION INTRAVENOUS at 09:06

## 2022-06-09 RX ADMIN — AMINOPHYLLINE 75 MG: 25 INJECTION, SOLUTION INTRAVENOUS at 09:06

## 2022-06-10 ENCOUNTER — TELEPHONE (OUTPATIENT)
Dept: TRANSPLANT | Facility: CLINIC | Age: 67
End: 2022-06-10
Payer: MEDICARE

## 2022-06-10 NOTE — PROGRESS NOTES
"Dialysis compliance found under "Media Tab" > personal history or pt questionnaire > dialysis compliance. Compliance Appears acceptable    "

## 2022-06-19 ENCOUNTER — HOSPITAL ENCOUNTER (EMERGENCY)
Facility: HOSPITAL | Age: 67
Discharge: HOME | End: 2022-06-19
Attending: EMERGENCY MEDICINE
Payer: MEDICARE

## 2022-06-19 VITALS
HEART RATE: 66 BPM | OXYGEN SATURATION: 98 % | BODY MASS INDEX: 19.68 KG/M2 | WEIGHT: 114.63 LBS | RESPIRATION RATE: 17 BRPM | SYSTOLIC BLOOD PRESSURE: 191 MMHG | DIASTOLIC BLOOD PRESSURE: 82 MMHG | TEMPERATURE: 98 F
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
## 2022-06-19 DIAGNOSIS — M25.579 ANKLE PAIN: ICD-10-CM

## 2022-06-19 DIAGNOSIS — R52 PAIN: ICD-10-CM

## 2022-06-19 DIAGNOSIS — M25.472 PAIN AND SWELLING OF LEFT ANKLE: Primary | ICD-10-CM

## 2022-06-19 DIAGNOSIS — M25.572 PAIN AND SWELLING OF LEFT ANKLE: Primary | ICD-10-CM

## 2022-06-19 PROCEDURE — 99284 EMERGENCY DEPT VISIT MOD MDM: CPT | Mod: 25,NTX

## 2022-06-19 PROCEDURE — 25000003 PHARM REV CODE 250: Mod: NTX | Performed by: NURSE PRACTITIONER

## 2022-06-19 RX ORDER — HYDROCODONE BITARTRATE AND ACETAMINOPHEN 5; 325 MG/1; MG/1
1 TABLET ORAL
Status: COMPLETED | OUTPATIENT
Start: 2022-06-19 | End: 2022-06-19

## 2022-06-19 RX ORDER — HYDROCODONE BITARTRATE AND ACETAMINOPHEN 5; 325 MG/1; MG/1
1 TABLET ORAL EVERY 6 HOURS PRN
Qty: 12 TABLET | Refills: 0 | Status: SHIPPED | OUTPATIENT
Start: 2022-06-19 | End: 2022-06-22

## 2022-06-19 RX ADMIN — HYDROCODONE BITARTRATE AND ACETAMINOPHEN 1 TABLET: 5; 325 TABLET ORAL at 12:06

## 2022-06-19 NOTE — ED PROVIDER NOTES
Encounter Date: 6/19/2022    SCRIBE #1 NOTE: I, Cayla Simms, am scribing for, and in the presence of,  KENDRA Bach. I have scribed the following portions of the note - Other sections scribed: HPI, ROS.       History     Chief Complaint   Patient presents with    Ankle Pain     Pt reporting left ankle pain after a fall today. No obvious deformity, swelling. Pt reporting she is unable to put weight on foot. Pt has a hx of dialysis, M, W, F.      CC: Left ankle pain    HPI: April Vasquez, a 66 y.o. female with a pertinent past medical history of DM, ESRD, HTN, and pulmonary edema, presents to the ED with constant left ankle pain that began around 5 AM. Pt reports twisting her ankle and falling on her back while trying to take off her shoe. She is unable to put weight on it. She has an associated symptom of left leg pain. Took Tylenol around 5 AM with no relief. Hx of dialysis on mondays, wednesdays, and fridays. No other exacerbating or alleviating factors. Patient denies left foot pain, or any other associated symptoms.     Patient Active Problem List:     ESRD (end stage renal disease) on dialysis MWF [facility] on [address]     History of diabetes mellitus 9/2016 normal a1c since then     Renovascular hypertension     Essential hypertension     Tubular adenoma of colon     Simple renal cyst     Latent tuberculosis     Nephrotic syndrome     Viral gastroenteritis      The history is provided by the patient. No  was used.     Review of patient's allergies indicates:  No Known Allergies  Past Medical History:   Diagnosis Date    Anemia     Colon polyps     COVID-19 08/2021    Diabetes mellitus, type 2     ESRD (end stage renal disease) on dialysis 2017    Gallstones     Hypertension     Pulmonary edema     Renal disorder     dialysis MIRANDA fistula    Tobacco abuse     Uses walker      Past Surgical History:   Procedure Laterality Date    AVF  2017     CHOLECYSTECTOMY  2016    COLONOSCOPY N/A 2020    Procedure: COLONOSCOPY;  Surgeon: Hood Hernadez MD;  Location: [facility] ([address] [place] [address]);  Service: Endoscopy;  Laterality: N/A;  labs prior-dialysis  covid test lapalco 8/3    Permacath Right 2017    TUBAL LIGATION       Family History   Problem Relation Age of Onset    Kidney disease Mother     Hypertension Mother     Cervical cancer Mother     Cancer Mother     Ovarian cancer Mother     Diabetes Father     Drug abuse Paternal Grandmother     Diabetes Sister     No Known Problems Sister     No Known Problems Sister     No Known Problems Sister     No Known Problems Son     No Known Problems Son     No Known Problems Son     No Known Problems Daughter     Breast cancer Neg Hx     Colon cancer Neg Hx      Social History     Tobacco Use    Smoking status: Former Smoker     Packs/day: 0.50     Years: 20.00     Pack years: 10.00     Types: Cigarettes     Quit date: 2020     Years since quittin.9    Smokeless tobacco: Never Used   Substance Use Topics    Alcohol use: No    Drug use: No     Review of Systems   Constitutional: Negative for fever.   Gastrointestinal: Negative for vomiting.   Musculoskeletal: Positive for arthralgias, gait problem and joint swelling.        (+) Left ankle pain  (+) Left leg pain  (-) Left foot pain   Skin: Negative for color change, pallor, rash and wound.   Psychiatric/Behavioral: Negative for confusion.       Physical Exam     Initial Vitals [22 1045]   BP Pulse Resp Temp SpO2   (!) 146/78 79 18 97.9 °F (36.6 °C) 98 %      MAP       --         Physical Exam    Nursing note and vitals reviewed.  Constitutional: She appears well-developed and well-nourished. She is not diaphoretic. She is cooperative.  Non-toxic appearance. She does not have a sickly appearance. She does not appear ill. No distress.   HENT:   Head: Normocephalic and atraumatic.   Right Ear: External ear normal.   Left Ear: External ear  normal.   Nose: Nose normal.   Mouth/Throat: Mucous membranes are normal. No trismus in the jaw.   Neck: Phonation normal.   Normal range of motion.  Cardiovascular: Normal rate, regular rhythm and intact distal pulses.   Pulses:       Dorsalis pedis pulses are 2+ on the left side.   Musculoskeletal:      Cervical back: Normal range of motion.      Left upper leg: No deformity, tenderness or bony tenderness.      Left knee: No swelling, deformity or bony tenderness.      Left lower leg: Tenderness (Distal Tib/Fib) present.      Left ankle: Swelling (Lateral) present. Tenderness present over the lateral malleolus and base of 5th metatarsal. No medial malleolus or proximal fibula tenderness.      Left foot: Tenderness (proximal) present. No swelling.     Neurological: She is alert and oriented to person, place, and time. No sensory deficit. Coordination normal. GCS eye subscore is 4. GCS verbal subscore is 5. GCS motor subscore is 6.   Skin: Skin is warm, dry and intact. Capillary refill takes less than 2 seconds. No bruising, no laceration and no rash noted. No cyanosis or erythema.   Psychiatric: She has a normal mood and affect. Her speech is normal and behavior is normal. Judgment and thought content normal.         ED Course   Procedures  Labs Reviewed - No data to display       Imaging Results          CT Ankle (Including Hindfoot) Without Contrast Left (Final result)  Result time 06/19/22 13:34:56    Final result by Toi Rowell MD (06/19/22 13:34:56)                 Impression:      1. No fracture or dislocation.  2. Mild Achilles tendinosis with enthesopathy.  3. Soft tissue edema over the lateral ankle.  Additional findings above.      Electronically signed by: Toi Rowell MD  Date:    06/19/2022  Time:    13:34             Narrative:    EXAMINATION:  CT ANKLE (INCLUDING HINDFOOT) WITHOUT CONTRAST LEFT    CLINICAL HISTORY:  Ankle trauma, fracture, xray done (Age >= 5y);    TECHNIQUE:  CT left ankle  performed without contrast.    COMPARISON:  06/19/2022    FINDINGS:  No acute fracture or dislocation.  Ankle mortise is symmetric.  Talar dome is maintained.    No lytic or blastic lesions.    Achilles enthesopathy and plantar calcaneal spur noted.  Mild thickening of Achilles tendon suggesting tendinosis.  Remaining tendons and ligaments are grossly unremarkable within limits of CT assessment.  Soft tissue edema noted over the lateral ankle.                               X-Ray Foot Complete Left (Final result)  Result time 06/19/22 12:05:24    Final result by Freddy Abernathy MD (06/19/22 12:05:24)                 Impression:      No definite evidence of acute fracture or dislocation.      Electronically signed by: Freddy Abernathy  Date:    06/19/2022  Time:    12:05             Narrative:    EXAMINATION:  XR FOOT COMPLETE 3 VIEW LEFT    CLINICAL HISTORY:  .  Pain, unspecified    TECHNIQUE:  AP, lateral and oblique views of the left foot were performed.    COMPARISON:  None    FINDINGS:  No definite evidence of acute fracture or dislocation.  Lisfranc joint appears congruent.  Joint spaces maintained.  Enthesopathic change at the calcaneus.  No definite radiopaque foreign body.  Ankle joint effusion may be present.                               X-Ray Tibia Fibula 2 View Left (Final result)  Result time 06/19/22 12:08:09    Final result by Freddy Abernathy MD (06/19/22 12:08:09)                 Impression:      Examination limited by suboptimal patient positioning on the frontal view, with overlap of distal tibia and fibula.  There is suggestion of some degree of cortical irregularity at the lateral aspect of the lateral malleolus.  Some degree of overlying soft tissue swelling is also suggested.  This is equivocal for recent fracture.  Recommend clinical correlation with consideration of repeat dedicated ankle views versus CT for further characterization as warranted.      Electronically signed by: Freddy  Michela  Date:    06/19/2022  Time:    12:08             Narrative:    EXAMINATION:  XR TIBIA FIBULA 2 VIEW LEFT    CLINICAL HISTORY:  Pain, unspecified    TECHNIQUE:  AP and lateral views of the left tibia and fibula were performed.    COMPARISON:  Same day left ankle radiograph.    FINDINGS:  Examination limited by suboptimal patient positioning on the frontal view, with overlap of distal tibia and fibula.  There is suggestion of some degree of cortical irregularity at the lateral aspect of the lateral malleolus.  Some degree of overlying soft tissue swelling is suggested.  This is equivocal for recent fracture.    Elsewhere, no definite additional acute fractures are suggested.  Degenerative changes are partially imaged at the knee.                               X-Ray Ankle Complete Left (Final result)  Result time 06/19/22 11:13:23    Final result by Freddy Abernathy MD (06/19/22 11:13:23)                 Impression:      Noting limitations above, no convincing evidence of acute fracture or dislocation.      Electronically signed by: Freddy Abernathy  Date:    06/19/2022  Time:    11:13             Narrative:    EXAMINATION:  XR ANKLE COMPLETE 3 VIEW LEFT    CLINICAL HISTORY:  Pain in unspecified ankle and joints of unspecified foot    TECHNIQUE:  AP, lateral and oblique views of the left ankle were performed.    COMPARISON:  None    FINDINGS:  Examination is somewhat limited by suboptimal patient positioning on the frontal view.  Noting this, no definite acute fracture or dislocation is appreciated.  Talar dome appears intact.  Ankle mortise symmetric.  No findings to suggest syndesmotic widening.  Enthesopathic change of the calcaneus.  Possible ankle joint effusion.  No definite radiopaque foreign body.                                 Medications   HYDROcodone-acetaminophen 5-325 mg per tablet 1 tablet (1 tablet Oral Given 6/19/22 1215)     Medical Decision Making:   History:   Old Medical Records: I decided  to obtain old medical records.       APC / Resident Notes:   This is an evaluation of a 66 y.o. female that presents to the Emergency Department for left ankle pain. Physical Exam shows a non-toxic, afebrile, and well appearing female.  There is tenderness palpation with swelling over left lateral malleolus.  No significant tenderness or swelling over the left medial malleolus.  Tenderness on the distal tib-fib.  No tenderness over the knee or proximal tib-fib.  Tenderness over the proximal foot.  Distal pulses intact. Vital signs are reassuring. If available, previous records reviewed. RESULTS:  X-ray of the ankle without acute displaced fracture dislocation.  Possible joint effusion x-ray foot without acute fracture dislocation.  X-ray of the tib-fib was suboptimal positioning and potential cortical irregularity.  Recommends additional imaging.  CT ankle without acute fracture dislocation.     My overall impression is Left Ankle Pain and Swelling. I considered, but at this time, do not suspect acute displaced fracture, dislocation, septic joint, cellulitis.    ED Course: offered crutches, has crutches at home. Discharge Meds/Instructions: RICE. The diagnosis, treatment plan, instructions for follow-up as well as ED return precautions were discussed. All questions have been answered. SUAD Guerrier, KENDRA-C     Scribe Attestation:   Scribe #1: I performed the above scribed service and the documentation accurately describes the services I performed. I attest to the accuracy of the note.        ED Course as of 06/19/22 1449   Sun Jun 19, 2022   1240 X-ray of the tib-fib with cortical irregularity, recommends additional imaging.  Discussed with patient.  Reports pain is improving. [AF]      ED Course User Index  [AF] KENDRA Bach, SUAD Guerrier, KENDRA-C, personally performed the services described in this documentation. All medical record entries made by the scribe were at my direction and in my  presence. I have reviewed the chart and agree that the record reflects my personal performance and is accurate and complete.      Clinical Impression:   Final diagnoses:  [M25.579] Ankle pain  [R52] Pain  [M25.572, M25.472] Pain and swelling of left ankle (Primary)  [W19.XXXA] Fall, initial encounter          ED Disposition Condition    Discharge Stable        ED Prescriptions     Medication Sig Dispense Start Date End Date Auth. Provider    HYDROcodone-acetaminophen (NORCO) 5-325 mg per tablet Take 1 tablet by mouth every 6 (six) hours as needed for Pain. 12 tablet 6/19/2022 6/22/2022 KENDRA Bach        Follow-up Information     Follow up With Specialties Details Why Contact Info    Zeny Porter MD Orthopedic Surgery Call  To Schedule an Appointment for Follow-Up in 3 - 4 days if pain not improved [address]  [address]  [facility]  [facility] [address]  [phone]      [facility] Emergency Dept Emergency Medicine Go to  If symptoms worsen [address]  [facility] [address] [phone]           KENDRA Bach  06/19/22 1289

## 2022-06-20 RX ORDER — HYDRALAZINE HYDROCHLORIDE 100 MG/1
TABLET, FILM COATED ORAL
Qty: 90 TABLET | Refills: 2 | Status: SHIPPED | OUTPATIENT
Start: 2022-06-20 | End: 2022-08-19 | Stop reason: SDUPTHER

## 2022-06-20 NOTE — TELEPHONE ENCOUNTER
Patient states she fell yesterday 06/19 and sprain her left ankle. Patient went to ER for fall on yesterday. Patient is taking hydrocodone-acetaminophen (NORCO) for the sprain ankle. Patient is also requesting medication refill for hydrALAZINE (APRESOLINE). Made appointment 07/12 at 2:20 pm.

## 2022-07-11 PROBLEM — N25.81 SECONDARY HYPERPARATHYROIDISM OF RENAL ORIGIN: Status: ACTIVE | Noted: 2018-10-15

## 2022-07-11 PROBLEM — N18.9 ANEMIA IN CHRONIC KIDNEY DISEASE: Status: ACTIVE | Noted: 2018-10-12

## 2022-07-11 PROBLEM — D63.1 ANEMIA IN CHRONIC KIDNEY DISEASE: Status: ACTIVE | Noted: 2018-10-12

## 2022-08-18 NOTE — PROGRESS NOTES
This note was created by combination of typed  and M-Modal dictation.  Transcription errors may be present.  If there are any questions, please contact me.    Assessment and Plan:   Normal physical exam  Encounter for screening for cervical cancer  Tubular adenoma of colon  -due for repeat colonoscopy.    Needs see gynecology.  Referral submitted.  -     Ambulatory referral/consult to Gynecology; Future; Expected date: 08/26/2022  -     Ambulatory referral/consult to Endo Procedure ; Future; Expected date: 08/19/2022    Essential hypertension  -BP high on intake better on a on repeat but still high.  Has not been taking nifedipine or isosorbide or olmesartan.  Has been taking clonidine patch and hydralazine t.i.d..  Clonidine patch with side effect of dermatitis.    Change clonidine to p.o. t.i.d.  Add back carvedilol  BP check in about 2 weeks.  If still high add back nifedipine.  May need to add back olmesartan as well.   -     hydrALAZINE (APRESOLINE) 100 MG tablet; TAKE 1 TABLET(100 MG) BY MOUTH THREE TIMES DAILY  Dispense: 270 tablet; Refill: 3  -     carvediloL (COREG) 12.5 MG tablet; Take 1 tablet (12.5 mg total) by mouth 2 (two) times daily with meals.  Dispense: 180 tablet; Refill: 3  -     cloNIDine (CATAPRES) 0.3 MG tablet; Take 1 tablet (0.3 mg total) by mouth 3 (three) times daily.  Dispense: 270 tablet; Refill: 3    Secondary hyperparathyroidism of renal origin  ESRD (end stage renal disease) on dialysis [facility] on [facility]  -followed by nephrology Dr. Diego    Medications Discontinued During This Encounter   Medication Reason    isosorbide mononitrate (IMDUR) 120 MG 24 hr tablet Patient no longer taking    cloNIDine 0.3 mg/24 hr td ptwk (CATAPRES) 0.3 mg/24 hr Side effects    NIFEdipine (PROCARDIA-XL) 60 MG (OSM) 24 hr tablet Therapy completed    albuterol (PROVENTIL/VENTOLIN HFA) 90 mcg/actuation inhaler Patient no longer taking    diclofenac sodium (VOLTAREN) 1 %  Gel Patient no longer taking    olmesartan (BENICAR) 40 MG tablet     carvediloL (COREG) 12.5 MG tablet Reorder    hydrALAZINE (APRESOLINE) 100 MG tablet Reorder       meds sent this encounter:  Medications Ordered This Encounter   Medications    carvediloL (COREG) 12.5 MG tablet     Sig: Take 1 tablet (12.5 mg total) by mouth 2 (two) times daily with meals.     Dispense:  180 tablet     Refill:  3     .    cloNIDine (CATAPRES) 0.3 MG tablet     Sig: Take 1 tablet (0.3 mg total) by mouth 3 (three) times daily.     Dispense:  270 tablet     Refill:  3     .    hydrALAZINE (APRESOLINE) 100 MG tablet     Sig: TAKE 1 TABLET(100 MG) BY MOUTH THREE TIMES DAILY     Dispense:  270 tablet     Refill:  3     .       Follow Up: No follow-ups on file. nurse visit 2 weeks for BP check. Add back nifedipine if still high. And possibly olmesartan    Subjective:     Chief Complaint   Patient presents with    Annual Exam       HPI  April is a 66 y.o. female, last appointment with this clinic was Visit date not found.  Social History     Tobacco Use    Smoking status: Former Smoker     Packs/day: 0.50     Years: 20.00     Pack years: 10.00     Types: Cigarettes     Quit date: 2020     Years since quittin.1    Smokeless tobacco: Never Used   Substance Use Topics    Alcohol use: No      Social History     Occupational History    Occupation: disabled x ,  at the [employer]      Social History     Social History Narrative    Lives with 2 sons       No LMP recorded. Patient has had an ablation.    Last visit with me last year  ESRD/HD MWF  HTN, hx of hospitalizations for hypertensive emergency. Hydralazine, olmesartan, isosorbide. Possibly not taking nifedipine or coreg. Changed clonidine to patch.  Colonoscopy TA   Anemia of CKD  hyperPTH     On cinacalcet 150 TIW  mircera q2 weeks  Calcitriol 1.5 mcg with HD  Coreg 12.5  Clonidine 0.3 TTS  Hydralazine 100 TID  Nifedipine 60  olmesartan 40  renvela 800  TID  Vit D3 1000 IU  IV iron/venafer  Calcitriol  Cinacalcet post dialysis 3 times a week     7/18  Hb 8.7    6/6/2022 outside labs  Hb 10.4  Ferritin 1215       12/2021  HCV negative  HBV SAb immune    Needs c scope (inadequate prep)    BP high on intake  Does not have a home BP cuff - broke  Was high at dialysis earlier today she notes  She took off the clonidine patch last night and did not put it back on.   The clonidine patch irritates her skin.  She puts it on her stomach or her arm and those areas are irritated.  Has been using the patch for the past year or so with worsening irritation.      Taking hydralazine tid  Not taking olmesartan (tells me she ran out b/c that was not renewed). Out x 3 months.  Reports BP is variable over time.    Not taking isosorbide   Not taking nifedipine  Not taking carvedilol    No chest pain, no headaches.    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 Eccentric LVH. No WMA. Normal RV systolic function. PA pressure 26. Normal CVP 3  3/3/20 nu med stress test neg for ischemia       Renovascular hypertension 05/28/2020    History of diabetes mellitus 9/2016 normal a1c since then 04/06/2020    ESRD (end stage renal disease) on dialysis MWF [facility] on [address] 08/05/2019    Secondary hyperparathyroidism of renal  origin 10/15/2018    Anemia in chronic kidney disease 10/12/2018       PAST MEDICAL PROBLEMS, PAST SURGICAL HISTORY: please see relevant portions of the electronic medical record    ALLERGIES AND MEDICATIONS: updated and reviewed.  Review of patient's allergies indicates:  No Known Allergies    Medication List with Changes/Refills   Current Medications    ALBUTEROL (PROVENTIL/VENTOLIN HFA) 90 MCG/ACTUATION INHALER    Inhale 1-2 puffs into the lungs every 6 (six) hours as needed for Wheezing.    CALCITRIOL (ROCALTROL) 0.25 MCG CAP    Take 1.5 mcg by mouth 3 (three) times a week.    CARVEDILOL (COREG) 12.5 MG TABLET    Take 1 tablet (12.5 mg total) by mouth 2 (two) times daily with meals.    CINACALCET HCL (SENSIPAR ORAL)    Take 120 mg by mouth 3 (three) times a week.    CLONIDINE 0.3 MG/24 HR TD PTWK (CATAPRES) 0.3 MG/24 HR    Place 1 patch onto the skin every 7 days.    DICLOFENAC SODIUM (VOLTAREN) 1 % GEL    Left knee: Apply the gel (4 g) to the affected area 4 times daily. Do not apply more than 16 g daily    HYDRALAZINE (APRESOLINE) 100 MG TABLET    TAKE 1 TABLET(100 MG) BY MOUTH THREE TIMES DAILY    ISOSORBIDE MONONITRATE (IMDUR) 120 MG 24 HR TABLET    Take 1 tablet (120 mg total) by mouth once daily.    METHOXY PEG-EPOETIN BETA (MIRCERA INJ)    Inject 75 mcg as directed every 14 (fourteen) days.    NIFEDIPINE (PROCARDIA-XL) 60 MG (OSM) 24 HR TABLET    Take 1 tablet by mouth.    OLMESARTAN (BENICAR) 40 MG TABLET    Take 1 tablet (40 mg total) by mouth once daily.    ONDANSETRON (ZOFRAN-ODT) 4 MG TBDL    Take 1 tablet (4 mg total) by mouth every 8 (eight) hours as needed (nausea).    SEVELAMER CARBONATE (RENVELA) 800 MG TAB    Take 1,600 mg by mouth 3 (three) times daily with meals.    VITAMIN D (VITAMIN D3) 1000 UNITS TAB    Take 1,000 Units by mouth once daily.      Review of Systems   Constitutional: Negative for fever, malaise/fatigue and weight loss.   HENT: Negative for congestion.    Eyes: Negative for  "blurred vision and pain.   Respiratory: Negative for shortness of breath and wheezing.    Cardiovascular: Negative for chest pain, palpitations and leg swelling.   Gastrointestinal: Negative for abdominal pain, blood in stool, constipation, diarrhea and heartburn.   Genitourinary:        Still makes urine but not daily   Neurological: Negative for tingling, focal weakness, weakness and headaches.       Objective:   Physical Exam   Vitals:    08/19/22 1036 08/19/22 1058   BP: (!) 154/90 (!) 152/80   BP Location: Right arm Right arm   Patient Position: Sitting Sitting   BP Method: Small (Manual) Medium (Manual)   Pulse: (!) 119    Temp: 98 °F (36.7 °C)    TempSrc: Oral    SpO2: 97%    Height: 5' 4" (1.626 m)     Body mass index is 19.68 kg/m².      Height: 5' 4" (162.6 cm)     Physical Exam  Constitutional:       General: She is not in acute distress.     Appearance: She is well-developed.   Cardiovascular:      Rate and Rhythm: Normal rate and regular rhythm.      Heart sounds: Normal heart sounds. No murmur heard.  Pulmonary:      Effort: Pulmonary effort is normal.      Breath sounds: Normal breath sounds.   Musculoskeletal:         General: Normal range of motion.      Right lower leg: No edema.      Left lower leg: No edema.   Lymphadenopathy:      Cervical: No cervical adenopathy.   Skin:     General: Skin is warm and dry.   Neurological:      Mental Status: She is alert and oriented to person, place, and time.      Coordination: Coordination normal.   Psychiatric:         Behavior: Behavior normal.         Thought Content: Thought content normal.           "

## 2022-08-19 ENCOUNTER — OFFICE VISIT (OUTPATIENT)
Dept: FAMILY MEDICINE | Facility: CLINIC | Age: 67
End: 2022-08-19
Payer: MEDICARE

## 2022-08-19 VITALS
OXYGEN SATURATION: 97 % | DIASTOLIC BLOOD PRESSURE: 80 MMHG | HEART RATE: 119 BPM | HEIGHT: 64 IN | SYSTOLIC BLOOD PRESSURE: 152 MMHG | BODY MASS INDEX: 19.68 KG/M2 | TEMPERATURE: 98 F

## 2022-08-19 DIAGNOSIS — D12.6 TUBULAR ADENOMA OF COLON: ICD-10-CM

## 2022-08-19 DIAGNOSIS — I10 ESSENTIAL HYPERTENSION: ICD-10-CM

## 2022-08-19 DIAGNOSIS — Z99.2 ESRD (END STAGE RENAL DISEASE) ON DIALYSIS: ICD-10-CM

## 2022-08-19 DIAGNOSIS — N18.6 ESRD (END STAGE RENAL DISEASE) ON DIALYSIS: ICD-10-CM

## 2022-08-19 DIAGNOSIS — Z00.00 NORMAL PHYSICAL EXAM: Primary | ICD-10-CM

## 2022-08-19 DIAGNOSIS — Z12.4 ENCOUNTER FOR SCREENING FOR CERVICAL CANCER: ICD-10-CM

## 2022-08-19 DIAGNOSIS — N25.81 SECONDARY HYPERPARATHYROIDISM OF RENAL ORIGIN: ICD-10-CM

## 2022-08-19 PROCEDURE — 99215 OFFICE O/P EST HI 40 MIN: CPT | Mod: PBBFAC,PO | Performed by: INTERNAL MEDICINE

## 2022-08-19 PROCEDURE — 99999 PR PBB SHADOW E&M-EST. PATIENT-LVL V: ICD-10-PCS | Mod: PBBFAC,,, | Performed by: INTERNAL MEDICINE

## 2022-08-19 PROCEDURE — 99396 PREV VISIT EST AGE 40-64: CPT | Mod: S$PBB,GZ,, | Performed by: INTERNAL MEDICINE

## 2022-08-19 PROCEDURE — 99999 PR PBB SHADOW E&M-EST. PATIENT-LVL V: CPT | Mod: PBBFAC,,, | Performed by: INTERNAL MEDICINE

## 2022-08-19 PROCEDURE — 99396 PR PREVENTIVE VISIT,EST,40-64: ICD-10-PCS | Mod: S$PBB,GZ,, | Performed by: INTERNAL MEDICINE

## 2022-08-19 RX ORDER — CARVEDILOL 12.5 MG/1
12.5 TABLET ORAL 2 TIMES DAILY WITH MEALS
Qty: 180 TABLET | Refills: 3 | Status: SHIPPED | OUTPATIENT
Start: 2022-08-19 | End: 2023-10-04 | Stop reason: SDUPTHER

## 2022-08-19 RX ORDER — CLONIDINE HYDROCHLORIDE 0.3 MG/1
0.3 TABLET ORAL 3 TIMES DAILY
Qty: 270 TABLET | Refills: 3 | Status: SHIPPED | OUTPATIENT
Start: 2022-08-19 | End: 2023-07-06

## 2022-08-19 RX ORDER — HYDRALAZINE HYDROCHLORIDE 100 MG/1
TABLET, FILM COATED ORAL
Qty: 270 TABLET | Refills: 3 | Status: SHIPPED | OUTPATIENT
Start: 2022-08-19 | End: 2023-09-15

## 2022-11-11 ENCOUNTER — COMMITTEE REVIEW (OUTPATIENT)
Dept: TRANSPLANT | Facility: CLINIC | Age: 67
End: 2022-11-11
Payer: MEDICARE

## 2022-11-11 NOTE — COMMITTEE REVIEW
Native Organ Dx: Diabetes Mellitus - Type II      Not approved for LRD/CAD transplant due to multiple cancellations and no shows to appointments.      Note written by Nicki De La Torre RN    ===============================================    I was present at the meeting and attest to the decision of the committee.

## 2022-11-11 NOTE — LETTER
November 11, 2022    April Vasquez  [address] [place] [address]    Dear April Vasquez:  MRN: [id number]    It is the duty of the [facility] Selection [facility] to determine which patients are candidates for a transplant. For this reason, our committee has the difficult task of evaluating patients to determine which ones have the greatest chance of having a successful transplant. We are aware of the magnitude of this responsibility, and we approach it with reverence and humility.    Your current health status was reviewed at a recent selection committee meeting.  It is with regret I inform you that you are no longer a suitable transplant candidate because of multiple cancellations/no shows to appointments and lack of follow up with the transplant team. You can be re-referred for evaluation after showing at least 6 months of compliance with all medical recommendations.  Your name has been removed from the wait list effective November 11, 2022.    The [facility] carefully considers each patient's transplant candidacy and determines whether it is safe to proceed with transplantation on a case-by-case basis using established selection criteria.  In the past, you were considered to be a suitable transplant candidate.  At present, the risk of proceeding with an elective transplant surgery has become too high.                                                                                                 Although the selection committee believes you are not a suitable transplant candidate, you have the option to be evaluated at other transplant centers.  You may request your [facility] records be sent to any center of your choice by contacting our Medical Records Department at [phone].                                                                               Attached is a letter from the United Network for Organ Sharing (UNOS).  It describes the services and information  offered to patients by UNOS and the Organ Procurement and Transplant Network.                                                                                                                                      The [facility] sincerely wishes you the best and remains available to answer any questions.  Please do not hesitate to contact our pre-transplant office if we can assist you in any other way.                                                                               Sincerely,      Martha Vergara MD  Medical Director, Kidney & Kidney/Pancreas Transplantation  lh/enclosure    Cc: Dr. Quinn Glasgow         [facility]               The Organ Procurement and Transplantation Network   Toll-free patient services line: Your resource for organ transplant information     If you have a question regarding your own medical care, you always should call your transplant hospital first. However, for general organ transplant-related information, you can call the Organ Procurement and Transplantation Network (OPTN) toll-free patient services line at [phone].     Anyone, including potential transplant candidates, candidates, recipients, family members, friends, living donors, and donor family members, can call this number to:     Talk about organ donation, living donation, the transplant process, the donation process, and transplant policies.   Get a free patient information kit with helpful booklets, waiting list and transplant information, and a list of all transplant hospitals.   Ask questions about the OPTN website (https://optn.transplant.hrsa.gov/), the United Network for Organ Sharings (UNOS) website (https://unos.org/), or the UNOS website for living donors and transplant recipients. (https://www.transplantliving.org/).   Learn how the OPTN can help you.   Talk about any concerns that you may have with a transplant hospital.     The nations transplant  system, the OPTN, is managed under federal contract by the United Network for Organ Sharing (UNOS), which is a non-profit charitable organization. The OPTN helps create and define organ sharing policies that make the best use of donated organs. This process continuously evaluating new advances and discoveries so policies can be adapted to best serve patients waiting for transplants. To do so, the OPTN works closely with transplant professionals, transplant patients, transplant candidates, donor families, living donors, and the public. All transplant programs and organ procurement organizations throughout the country are OPTN members and are obligated to follow the policies the OPTN creates for allocating organs.     The OPTN also is responsible for:   Providing educational material for patients, the public, and professionals.   Raising awareness of the need for donated organs and tissue.   Coordinating organ procurement, matching, and placement.   Collecting information about every organ transplant and donation that occurs in the [place].     Remember, you should contact your transplant hospital directly if you have questions or concerns about your own medical care including medical records, work-up progress, and test results.     We are not your transplant hospital, and our staff will not be able to answer questions about your case, so please keep your transplant hospitals phone number handy.   However, while you research your transplant needs and learn as much as you can about transplantation and donation, we welcome your call to our toll-free patient services line at [phone].

## 2022-12-20 ENCOUNTER — TELEPHONE (OUTPATIENT)
Dept: FAMILY MEDICINE | Facility: CLINIC | Age: 67
End: 2022-12-20
Payer: MEDICARE

## 2022-12-20 NOTE — TELEPHONE ENCOUNTER
----- Message from Nata Reid sent at 12/20/2022  3:30 PM CST -----  Name of Who is Calling: YANI LIEBERMAN [[id number]]           What is the request in detail: Patient is requesting a call back to schedule an appointment for vomiting.  Patient states she has been vomiting the last 4 days.  Please assist.           Can the clinic reply by [facility]: No           [facility]: [phone]

## 2023-07-14 ENCOUNTER — TELEPHONE (OUTPATIENT)
Dept: TRANSPLANT | Facility: CLINIC | Age: 68
End: 2023-07-14
Payer: MEDICARE

## 2023-07-14 NOTE — TELEPHONE ENCOUNTER
Contacted patient to review initial intake information. Patient reports the followin. Can you walk up a flight of stairs without getting short of breath or stopping? no   2. Can you walk one block without getting short of breath or having to stop? no  3. Do you use oxygen? no  4. Do you use a cane, walker, or wheel chair to assist in mobility? no  5. Have you been hospitalized or had recent surgery in the last 6 months? No    A. Stroke   B. Heart surgery or heart catheterization   C. Broken bone  6. Do you have any cuts, open sores (ulcers), or wounds anywhere on your body? No    7. Do you go to dialysis? Yes What days? M,W,F  8. Preferred appointment day?   9. Caregiver? Son (Michele)    Patient is aware the next steps will include completing records and compliance verification. Patient is aware once provider review and insurance authorization is received we will contact patient to schedule initial visit. Patient is aware that initial visit will begin prior to / at 7 am and will conclude at approximately 3 pm on date of appointment. All questions answered at this time.

## 2023-07-14 NOTE — LETTER
July 14, 2023    Quinn Glasgow  [address] [place] [address] [address]  [place] [address]  Phone: [phone]  Fax: [phone]    Dear Dr. Quinn Glasgow:    Patient: April Vasquez  MR Number [id number]  Date of Birth 1955    Thank you for your referral of April Vasquez to our transplant program.      Your patient's information will be screened by our Referral Nurse Coordinators to determine initial medical candidacy and if any further records are required. We will contact you if further information is needed to process the referral.     Once all needed information is received it will be forwarded to our Transplant Financial Coordinators who will obtain insurance authorization. Upon insurance approval, your patient will be contacted by our  to schedule their appointments with the transplant team. When appointments are made you will receive a copy of the appointment letter that your patient will receive.     This process may take two to six weeks to complete.     In the event your patient is not a candidate a copy of our transplant programs opinion including reasons will be returned to you to comply with your yearly review regulations. A copy of that letter will also be sent to the patient.     The following information is needed to process a referral:  Medicare form 2728 for all patients on dialysis.  Dental clearance is required if your patient has primary Medicaid.  Current immunization record.  This information can be faxed to [phone].  Current Dietician evaluation can be faxed to [phone].    Thank you again for your trust in our program and the care of your patient.  If there is anything we can do for you or your staff, please feel free to contact us at [phone].    Sincerely,   [facility]  Kidney & Kidney/Pancreas Transplant Team  [address], [place] [id number] [phone]

## 2023-07-19 ENCOUNTER — SOCIAL WORK (OUTPATIENT)
Dept: TRANSPLANT | Facility: CLINIC | Age: 68
End: 2023-07-19
Payer: MEDICARE

## 2023-07-19 ENCOUNTER — TELEPHONE (OUTPATIENT)
Dept: TRANSPLANT | Facility: CLINIC | Age: 68
End: 2023-07-19
Payer: MEDICARE

## 2023-07-19 NOTE — PROGRESS NOTES
Pt re-referred for transplant evaluation.  She has a hx of noncompliance with dialysis and medical appointments.  SW requested compliance check from [facility].  Pt has five no shows due to MD appts, transportation and illness.  Please see media tab for compliance check form.

## 2023-07-19 NOTE — TELEPHONE ENCOUNTER
----- Message from Khadijah Gonzalez LCSW sent at 7/19/2023 11:08 AM CDT -----  She has five no shows due to MD appts, transportation and illness.  Let's schedule her and we can discuss her compliance when she comes in.  Make sure she knows to bring a caregiver along.  S  ----- Message -----  From: Michelle Abadie, RN  Sent: 7/17/2023   8:28 PM CDT  To: [facility] Transplant Social Workers    Hello,    We have received a referral on the above patient. She was removed from the transplant waiting list due to:  It is with regret I inform you that you are no longer a suitable transplant candidate because of multiple cancellations/no shows to appointments and lack of follow up with the transplant team. You can be re-referred for evaluation after showing at least 6 months of compliance with all medical recommendations.  Your name has been removed from the wait list effective November 11, 2022.    Her referral form indicates that she misses 3 treatments and shortens 2.    A SW review is needed before we can process this referral.    Thanks,  Annamaria

## 2023-07-24 ENCOUNTER — TELEPHONE (OUTPATIENT)
Dept: TRANSPLANT | Facility: CLINIC | Age: 68
End: 2023-07-24
Payer: MEDICARE

## 2023-07-25 ENCOUNTER — TELEPHONE (OUTPATIENT)
Dept: TRANSPLANT | Facility: CLINIC | Age: 68
End: 2023-07-25
Payer: MEDICARE

## 2023-07-26 DIAGNOSIS — Z76.82 ORGAN TRANSPLANT CANDIDATE: Primary | ICD-10-CM

## 2023-07-26 DIAGNOSIS — Z91.89 CARDIOVASCULAR EVENT RISK: ICD-10-CM

## 2023-09-15 DIAGNOSIS — I10 ESSENTIAL HYPERTENSION: ICD-10-CM

## 2023-09-15 RX ORDER — HYDRALAZINE HYDROCHLORIDE 100 MG/1
TABLET, FILM COATED ORAL
Qty: 270 TABLET | Refills: 3 | Status: SHIPPED | OUTPATIENT
Start: 2023-09-15

## 2023-09-21 ENCOUNTER — OFFICE VISIT (OUTPATIENT)
Dept: TRANSPLANT | Facility: CLINIC | Age: 68
End: 2023-09-21
Payer: MEDICARE

## 2023-09-21 ENCOUNTER — NURSE TRIAGE (OUTPATIENT)
Dept: ADMINISTRATIVE | Facility: CLINIC | Age: 68
End: 2023-09-21
Payer: MEDICARE

## 2023-09-21 ENCOUNTER — LAB VISIT (OUTPATIENT)
Dept: LAB | Facility: HOSPITAL | Age: 68
End: 2023-09-21
Payer: MEDICARE

## 2023-09-21 VITALS
RESPIRATION RATE: 18 BRPM | SYSTOLIC BLOOD PRESSURE: 221 MMHG | HEART RATE: 48 BPM | BODY MASS INDEX: 17.15 KG/M2 | TEMPERATURE: 97 F | WEIGHT: 96.81 LBS | DIASTOLIC BLOOD PRESSURE: 98 MMHG | OXYGEN SATURATION: 98 % | HEIGHT: 63 IN

## 2023-09-21 DIAGNOSIS — Z86.11 HISTORY OF TB (TUBERCULOSIS): Primary | ICD-10-CM

## 2023-09-21 DIAGNOSIS — Z76.82 ORGAN TRANSPLANT CANDIDATE: ICD-10-CM

## 2023-09-21 LAB
ABO + RH BLD: NORMAL
ALBUMIN SERPL BCP-MCNC: 3.4 G/DL (ref 3.5–5.2)
ALP SERPL-CCNC: 130 U/L (ref 55–135)
ALT SERPL W/O P-5'-P-CCNC: 6 U/L (ref 10–44)
ANION GAP SERPL CALC-SCNC: 11 MMOL/L (ref 8–16)
APTT PPP: 24.9 SEC (ref 21–32)
AST SERPL-CCNC: 13 U/L (ref 10–40)
BASOPHILS # BLD AUTO: 0.07 K/UL (ref 0–0.2)
BASOPHILS NFR BLD: 0.8 % (ref 0–1.9)
BILIRUB DIRECT SERPL-MCNC: 0.2 MG/DL (ref 0.1–0.3)
BILIRUB SERPL-MCNC: 0.5 MG/DL (ref 0.1–1)
BLD GP AB SCN CELLS X3 SERPL QL: NORMAL
BUN SERPL-MCNC: 22 MG/DL (ref 8–23)
CALCIUM SERPL-MCNC: 10.1 MG/DL (ref 8.7–10.5)
CHLORIDE SERPL-SCNC: 99 MMOL/L (ref 95–110)
CHOLEST SERPL-MCNC: 157 MG/DL (ref 120–199)
CHOLEST/HDLC SERPL: 3.1 {RATIO} (ref 2–5)
CO2 SERPL-SCNC: 27 MMOL/L (ref 23–29)
CREAT SERPL-MCNC: 5.8 MG/DL (ref 0.5–1.4)
DIFFERENTIAL METHOD: ABNORMAL
EOSINOPHIL # BLD AUTO: 0.8 K/UL (ref 0–0.5)
EOSINOPHIL NFR BLD: 8.7 % (ref 0–8)
ERYTHROCYTE [DISTWIDTH] IN BLOOD BY AUTOMATED COUNT: 17.2 % (ref 11.5–14.5)
EST. GFR  (NO RACE VARIABLE): 7.6 ML/MIN/1.73 M^2
ESTIMATED AVG GLUCOSE: 77 MG/DL (ref 68–131)
GLUCOSE SERPL-MCNC: 97 MG/DL (ref 70–110)
HAV IGG SER QL IA: REACTIVE
HBA1C MFR BLD: 4.3 % (ref 4–5.6)
HBV CORE AB SERPL QL IA: NORMAL
HBV SURFACE AB SER-ACNC: 41.64 MIU/ML
HBV SURFACE AB SER-ACNC: REACTIVE M[IU]/ML
HBV SURFACE AG SERPL QL IA: NORMAL
HCT VFR BLD AUTO: 33 % (ref 37–48.5)
HCV AB SERPL QL IA: NORMAL
HDLC SERPL-MCNC: 50 MG/DL (ref 40–75)
HDLC SERPL: 31.8 % (ref 20–50)
HGB BLD-MCNC: 10.9 G/DL (ref 12–16)
HIV 1+2 AB+HIV1 P24 AG SERPL QL IA: NORMAL
IMM GRANULOCYTES # BLD AUTO: 0.03 K/UL (ref 0–0.04)
IMM GRANULOCYTES NFR BLD AUTO: 0.3 % (ref 0–0.5)
INR PPP: 1 (ref 0.8–1.2)
LDLC SERPL CALC-MCNC: 92.2 MG/DL (ref 63–159)
LYMPHOCYTES # BLD AUTO: 1.9 K/UL (ref 1–4.8)
LYMPHOCYTES NFR BLD: 21.9 % (ref 18–48)
MCH RBC QN AUTO: 30.8 PG (ref 27–31)
MCHC RBC AUTO-ENTMCNC: 33 G/DL (ref 32–36)
MCV RBC AUTO: 93 FL (ref 82–98)
MONOCYTES # BLD AUTO: 0.6 K/UL (ref 0.3–1)
MONOCYTES NFR BLD: 7 % (ref 4–15)
NEUTROPHILS # BLD AUTO: 5.4 K/UL (ref 1.8–7.7)
NEUTROPHILS NFR BLD: 61.3 % (ref 38–73)
NONHDLC SERPL-MCNC: 107 MG/DL
NRBC BLD-RTO: 0 /100 WBC
PHOSPHATE SERPL-MCNC: 5.8 MG/DL (ref 2.7–4.5)
PLATELET # BLD AUTO: 116 K/UL (ref 150–450)
PMV BLD AUTO: 13 FL (ref 9.2–12.9)
POTASSIUM SERPL-SCNC: 4.3 MMOL/L (ref 3.5–5.1)
PROT SERPL-MCNC: 6.6 G/DL (ref 6–8.4)
PROTHROMBIN TIME: 10.9 SEC (ref 9–12.5)
PTH-INTACT SERPL-MCNC: 528.3 PG/ML (ref 9–77)
RBC # BLD AUTO: 3.54 M/UL (ref 4–5.4)
RPR SER QL: NORMAL
SODIUM SERPL-SCNC: 137 MMOL/L (ref 136–145)
SPECIMEN OUTDATE: NORMAL
TRIGL SERPL-MCNC: 74 MG/DL (ref 30–150)
VARICELLA INTERPRETATION: POSITIVE
VARICELLA ZOSTER IGG: 3794 AU/ML
WBC # BLD AUTO: 8.83 K/UL (ref 3.9–12.7)

## 2023-09-21 PROCEDURE — 99999 PR PBB SHADOW E&M-EST. PATIENT-LVL IV: CPT | Mod: PBBFAC,TXP,,

## 2023-09-21 PROCEDURE — 86480 TB TEST CELL IMMUN MEASURE: CPT | Mod: TXP | Performed by: NURSE PRACTITIONER

## 2023-09-21 PROCEDURE — 83036 HEMOGLOBIN GLYCOSYLATED A1C: CPT | Mod: TXP | Performed by: NURSE PRACTITIONER

## 2023-09-21 PROCEDURE — 86828 HLA CLASS I&II ANTIBODY QUAL: CPT | Mod: PO,TXP | Performed by: NURSE PRACTITIONER

## 2023-09-21 PROCEDURE — 81376 HLA II TYPING 1 LOCUS LR: CPT | Mod: 91,PO,TXP | Performed by: NURSE PRACTITIONER

## 2023-09-21 PROCEDURE — 99214 OFFICE O/P EST MOD 30 MIN: CPT | Mod: PBBFAC,TXP

## 2023-09-21 PROCEDURE — 87340 HEPATITIS B SURFACE AG IA: CPT | Mod: TXP | Performed by: NURSE PRACTITIONER

## 2023-09-21 PROCEDURE — 85730 THROMBOPLASTIN TIME PARTIAL: CPT | Mod: TXP | Performed by: NURSE PRACTITIONER

## 2023-09-21 PROCEDURE — 86850 RBC ANTIBODY SCREEN: CPT | Mod: TXP | Performed by: NURSE PRACTITIONER

## 2023-09-21 PROCEDURE — 86592 SYPHILIS TEST NON-TREP QUAL: CPT | Mod: TXP | Performed by: NURSE PRACTITIONER

## 2023-09-21 PROCEDURE — 86787 VARICELLA-ZOSTER ANTIBODY: CPT | Mod: TXP | Performed by: NURSE PRACTITIONER

## 2023-09-21 PROCEDURE — 80053 COMPREHEN METABOLIC PANEL: CPT | Mod: TXP | Performed by: NURSE PRACTITIONER

## 2023-09-21 PROCEDURE — 86790 VIRUS ANTIBODY NOS: CPT | Mod: TXP | Performed by: NURSE PRACTITIONER

## 2023-09-21 PROCEDURE — 86832 HLA CLASS I HIGH DEFIN QUAL: CPT | Mod: PO,TXP | Performed by: NURSE PRACTITIONER

## 2023-09-21 PROCEDURE — 99204 PR OFFICE/OUTPT VISIT, NEW, LEVL IV, 45-59 MIN: ICD-10-PCS | Mod: S$PBB,TXP,, | Performed by: NURSE PRACTITIONER

## 2023-09-21 PROCEDURE — 85610 PROTHROMBIN TIME: CPT | Mod: TXP | Performed by: NURSE PRACTITIONER

## 2023-09-21 PROCEDURE — 87389 HIV-1 AG W/HIV-1&-2 AB AG IA: CPT | Mod: TXP | Performed by: NURSE PRACTITIONER

## 2023-09-21 PROCEDURE — 99204 OFFICE O/P NEW MOD 45 MIN: CPT | Mod: S$PBB,TXP,, | Performed by: TRANSPLANT SURGERY

## 2023-09-21 PROCEDURE — 81376 HLA II TYPING 1 LOCUS LR: CPT | Mod: PO,TXP | Performed by: NURSE PRACTITIONER

## 2023-09-21 PROCEDURE — 85025 COMPLETE CBC W/AUTO DIFF WBC: CPT | Mod: TXP | Performed by: NURSE PRACTITIONER

## 2023-09-21 PROCEDURE — 82248 BILIRUBIN DIRECT: CPT | Mod: TXP | Performed by: NURSE PRACTITIONER

## 2023-09-21 PROCEDURE — 99999 PR PBB SHADOW E&M-EST. PATIENT-LVL IV: ICD-10-PCS | Mod: PBBFAC,TXP,,

## 2023-09-21 PROCEDURE — 84100 ASSAY OF PHOSPHORUS: CPT | Mod: TXP | Performed by: NURSE PRACTITIONER

## 2023-09-21 PROCEDURE — 86803 HEPATITIS C AB TEST: CPT | Mod: TXP | Performed by: NURSE PRACTITIONER

## 2023-09-21 PROCEDURE — 83970 ASSAY OF PARATHORMONE: CPT | Mod: TXP | Performed by: NURSE PRACTITIONER

## 2023-09-21 PROCEDURE — 86825 HLA X-MATH NON-CYTOTOXIC: CPT | Mod: PO,TXP | Performed by: NURSE PRACTITIONER

## 2023-09-21 PROCEDURE — 86644 CMV ANTIBODY: CPT | Mod: TXP | Performed by: NURSE PRACTITIONER

## 2023-09-21 PROCEDURE — 99204 OFFICE O/P NEW MOD 45 MIN: CPT | Mod: S$PBB,TXP,, | Performed by: NURSE PRACTITIONER

## 2023-09-21 PROCEDURE — 86825 HLA X-MATH NON-CYTOTOXIC: CPT | Mod: 91,PO,TXP | Performed by: NURSE PRACTITIONER

## 2023-09-21 PROCEDURE — 80061 LIPID PANEL: CPT | Mod: TXP | Performed by: NURSE PRACTITIONER

## 2023-09-21 PROCEDURE — 86833 HLA CLASS II HIGH DEFIN QUAL: CPT | Mod: PO,TXP | Performed by: NURSE PRACTITIONER

## 2023-09-21 PROCEDURE — 86829 HLA CLASS I/II ANTIBODY QUAL: CPT | Mod: 91,PO,TXP | Performed by: NURSE PRACTITIONER

## 2023-09-21 PROCEDURE — 86706 HEP B SURFACE ANTIBODY: CPT | Mod: 91,TXP | Performed by: NURSE PRACTITIONER

## 2023-09-21 PROCEDURE — 86704 HEP B CORE ANTIBODY TOTAL: CPT | Mod: TXP | Performed by: NURSE PRACTITIONER

## 2023-09-21 PROCEDURE — 99204 PR OFFICE/OUTPT VISIT, NEW, LEVL IV, 45-59 MIN: ICD-10-PCS | Mod: S$PBB,TXP,, | Performed by: TRANSPLANT SURGERY

## 2023-09-21 RX ORDER — CINACALCET 60 MG/1
60 TABLET, FILM COATED ORAL
COMMUNITY
End: 2023-10-17

## 2023-09-21 NOTE — TELEPHONE ENCOUNTER
Pt called in stating that she has elevated BP today. Went to transplant appt today and BP was elevated while at appt and as a result pt was sent to ED from appt. States most recent /70 today. Sates when it was elevated earlier she had not taken her meds. States while in the ED, they had her take her prescribed meds and BP came down. States was not given any additional meds while in ED. States she was told to follow up with provider. When she tried to get appt, none available. Pt states she had mild HA this morning when BP was elevated. States she is dialysis pt and for some time she had low BP after dialysis but adds lately BP has been running higher. No symptoms at present. States she is trying to make appt. Per ED directives. Attempt to make appt per dispo not successful. Pt declined VV with alternate provider. States she would like to see if PCP can fit her in for BP check. Advised would route to provider. Advised to monitor and to call back with concerns or worsening symptoms. Pt verbalized understanding.     Reason for Disposition   Systolic BP >= 160 OR Diastolic >= 100    Additional Information   Negative: Sounds like a life-threatening emergency to the triager   Negative: Systolic BP >= 160 OR Diastolic >= 100, and any cardiac (e.g., breathing difficulty, chest pain) or neurologic symptoms (e.g., new-onset blurred or double vision)   Negative: Pregnant 20 or more weeks (or postpartum < 6 weeks) with new hand or face swelling   Negative: Pregnant 20 or more weeks (or postpartum < 6 weeks) and Systolic BP >= 160 OR Diastolic >= 110   Negative: Patient sounds very sick or weak to the triager   Negative: Systolic BP >= 200 OR Diastolic >= 120 and having NO cardiac or neurologic symptoms   Negative: Pregnant 20 or more weeks (or postpartum < 6 weeks) with Systolic BP >= 140 OR Diastolic >= 90   Negative: Systolic BP >= 180 OR Diastolic >= 110, and missed most recent dose of blood pressure medication    Negative: Systolic BP >= 180 OR Diastolic >= 110   Negative: Patient wants to be seen   Negative: Ran out of BP medications   Negative: Taking BP medications and feels is having side effects (e.g., impotence, cough, dizziness)    Protocols used: Blood Pressure - High-A-OH

## 2023-09-21 NOTE — PROGRESS NOTES
Transplant Surgery  Kidney Transplant Recipient Evaluation    Referring Physician: Quinn Glasgow  Current Nephrologist: Quinn Glasgow    Subjective:     Reason for Visit: evaluate transplant candidacy    History of Present Illness: April Vasquez is a 67 y.o. year old female undergoing transplant evaluation.    Dialysis History: April is on hemodialysis.      Transplant History: N/A    Etiology of Renal Disease: Diabetes Mellitus - Type II (based on medical records from referral).    External provider notes reviewed: Yes    Review of Systems  Objective:     Physical Exam:  Constitutional:   Vitals reviewed: yes   Well-nourished and well-groomed: yes  Eyes:   Sclerae icteric: no   Extraocular movements intact: yes  GI:    Bowel sounds normal: yes   Tenderness: no    If yes, quadrant/location: not applicable   Palpable masses: no    If yes, quadrant/location: not applicable   Hepatosplenomegaly: no   Ascites: no   Hernia: no    If yes, type/location: not applicable   Surgical scars: yes    If yes, type/location: Pfannenstiel  laparoscopic port sites  Resp:   Effort normal: yes   Breath sounds normal: yes    CV:   Regular rate and rhythm: yes   Heart sounds normal: yes   Femoral pulses normal: yes   Extremities edematous: no  Skin:   Rashes or lesions present: no    If yes, describe:not applicable   Jaundice:: no    Musculoskeletal:   Gait normal: yes   Strength normal: yes  Psych:   Oriented to person, place, and time: yes   Affect and mood normal: yes    Additional comments: not applicable    Diagnostics:  The following labs have been reviewed: CBC  CMP  INR  The following radiology images have been independently reviewed and interpreted: Pelvic Xray    Counseling: We provided April Vasquez with a group education session today.  We discussed kidney transplantation at length with her, including risks, potential complications, and alternatives in the management of her renal failure.  The  discussion included complications related to anesthesia, bleeding, infection, primary nonfunction, and ATN.  I discussed the typical postoperative course, length of hospitalization, the need for long-term immunosuppression, and the need for long-term routine follow-up.  I discussed living-donor and -donor transplantation and the relative advantages and disadvantages of each.  I also discussed average waiting times for both living donation and  donation.  I discussed national and center-specific survival rates.  I also mentioned the potential benefit of multicenter listing to candidates listed with centers within more than one organ procurement organization.  All questions were answered.    Patient advised that it is recommended that all transplant candidates, and their close contacts and household members receive Covid vaccination.    Final determination of transplant candidacy will be made once evaluation is complete and reviewed by the Kidney & Kidney/Pancreas Selection Committee.    Coronavirus disease (COVID-19) caused by severe acute respiratory virus coronavirus 2 (SARS-C0V 2) is associated with increased mortality in solid organ transplant recipients (SOT) compared to non-transplant patients. Vaccine responses to vaccination are depressed against SARS-CoV2 compared to normal individuals but improve with third vaccination doses. Vaccination prior to SOT provides both the best opportunity for transplant candidates to develop protective immunity and to reduce the risk of serious COVID19 infections post transplantation. Organ transplant candidates at [facility] will be required to receive SARS-CoV-2 vaccination prior to being listed with a an active status, whenever possible. Exceptions will be made for disability related reasons or for sincerely held Mu-ism beliefs.          Transplant Surgery - Candidacy   Assessment/Plan:   April Vasquez has end  stage renal disease (ESRD) on dialysis. I see no surgical contraindication to placing a kidney transplant. Based on available information, April Vasquez is a suitable kidney transplant candidate.     Additional testing to be completed according to the Written Order Guidelines for Adult Pre-kidney and Pancreas Transplant Evaluation (KI-02).  Interpretation of tests and discussion of patient management involves all members of the multidisciplinary transplant team.    Klever Garcia Jr, MD

## 2023-09-21 NOTE — PROGRESS NOTES
PHARM.D. PRE-TRANSPLANT NOTE:    This patient's medication therapy was evaluated as part of her pre-transplant evaluation.      The following general pharmacologic concerns were noted: none     The following concerns for post-operative pain management were noted: none    The following pharmacologic concerns related to HCV therapy were noted: none      This patient's medication profile was reviewed for considerations for DAA Hepatitis C therapy:    [x]  No current inducers of CYP 3A4 or PGP  [x]  No amiodarone on this patient's EMR profile in the last 24 months  [x]  No past or current atrial fibrillation on this patient's EMR profile       Current Outpatient Medications   Medication Sig Dispense Refill    carvediloL (COREG) 12.5 MG tablet Take 1 tablet (12.5 mg total) by mouth 2 (two) times daily with meals. 180 tablet 3    cinacalcet (SENSIPAR) 60 MG Tab Take 60 mg by mouth daily with breakfast.      cloNIDine (CATAPRES) 0.3 MG tablet TAKE 1 TABLET(0.3 MG) BY MOUTH THREE TIMES DAILY 270 tablet 0    hydrALAZINE (APRESOLINE) 100 MG tablet TAKE 1 TABLET(100 MG) BY MOUTH THREE TIMES DAILY 270 tablet 3    sevelamer carbonate (RENVELA) 800 mg Tab Take 1,600 mg by mouth 3 (three) times daily with meals.      vitamin D (VITAMIN D3) 1000 units Tab Take 1,000 Units by mouth once daily.       No current facility-administered medications for this visit.           I am available for consultation and can be contacted, as needed by the other members of the Transplant team.

## 2023-09-21 NOTE — PROGRESS NOTES
PRE-TRANSPLANT INFECTIOUS DISEASE CONSULT    Reason for Visit:  Pre-transplant evaluation  Referring Provider: Dr. Quinn Thapa*     History of Present Illness:    67 y.o. female with a history of ESRD  2/2 HTN, currently on HD presents for pre-kidney transplant evaluation.     Saw ID 2020 for pre-txplnt eval and + QG.  Did not complete treatment      Infectious History:  Recent hospital admissions: No  Recent infections: No  Recent or current antibiotic use: No  History of recurrent infections *(sinus / pneumonia / UTI / SBP)*: No  History of diabetic foot wound or bone/joint infection: No  Recent dental infections, issues or procedures: No  History of chicken pox: Yes  History of shingles: No  History of STI: No  History of COVID infection: No    History of Immunosuppression:  Prior chemotherapy / immunosuppression: No  Prior transplant: No  History of splenectomy: No    Tuberculosis:  Prior screening for latent TB: Yes  Prior diagnosis of latent TB: Yes.  When/treatment? INH.  Did not complete.  Took about 5 months.     Risk factors for TB *known exposure, incarceration, homelessness*: Yes    Geographical exposures:  Currently lives in [place] with sons.     Lived in the following states: [place]  Lived or travelled to the [facility] [place]: No  International travel: No.  Born in [place].  Been here since 1978.  Haven't returned since  Travel-associated illness: No    Social/Environmental:  Occupation:  , .  Not currently working  Pets: No   Livestock: No  Fishing / hunting: No  Hobbies: Cooking   Water: [place]  Consumption of raw/undercooked meat or seafood?  No  Tobacco: No  Alcohol: No  Recreational drug use:  No  Sexual partners: not currently active       Past Histories:   Past Medical History:   Diagnosis Date    Anemia     Colon polyps     COVID-19 08/2021    Diabetes mellitus, type 2     ESRD (end stage renal disease) on dialysis 2017    Gallstones     Hypertension     Pulmonary  edema     Renal disorder     dialysis MIRANDA fistula    Tobacco abuse     Uses walker      Past Surgical History:   Procedure Laterality Date    AVF  2017    CHOLECYSTECTOMY  2016    COLONOSCOPY N/A 8/6/2020    Procedure: COLONOSCOPY;  Surgeon: Hood Hernadez MD;  Location: [facility] ([address] [place] [address]);  Service: Endoscopy;  Laterality: N/A;  labs prior-dialysis  covid test lapalco 8/3    Permacath Right 2017    TUBAL LIGATION       Family History   Problem Relation Age of Onset    Kidney disease Mother     Hypertension Mother     Cervical cancer Mother     Cancer Mother     Ovarian cancer Mother     Diabetes Father     Drug abuse Paternal Grandmother     Diabetes Sister     No Known Problems Sister     No Known Problems Sister     No Known Problems Sister     No Known Problems Son     No Known Problems Son     No Known Problems Son     No Known Problems Daughter     Breast cancer Neg Hx     Colon cancer Neg Hx      Social History     Tobacco Use    Smoking status: Former     Current packs/day: 0.00     Average packs/day: 0.5 packs/day for 20.0 years (10.0 ttl pk-yrs)     Types: Cigarettes     Start date: 6/24/2000     Quit date: 6/24/2020     Years since quitting: 3.2    Smokeless tobacco: Never   Substance Use Topics    Alcohol use: No    Drug use: No     Review of patient's allergies indicates:  No Known Allergies      Immunization History:  Received all childhood vaccines: Yes.  BCG vaccine as child  All household members receive annual flu vaccine: Yes  All household members are up to date on COVID vaccine: No.  Has not had any vaccination.  Family members unvaccinated       Current antibiotics:  Antibiotics (From admission, onward)      None              Review of Systems  Review of Systems   Constitutional: Negative for chills, fever and night sweats.   HENT:  Negative for congestion.    Respiratory:  Negative for cough, hemoptysis and sputum production.    Hematologic/Lymphatic: Negative for adenopathy.   Skin:   Negative for poor wound healing, rash and suspicious lesions.   Musculoskeletal:  Negative for joint swelling.   Gastrointestinal:  Negative for diarrhea.   Genitourinary:  Negative for dysuria.   Allergic/Immunologic: Negative for persistent infections.   All other systems reviewed and are negative.         Objective  Physical Exam  Vitals reviewed.   Constitutional:       General: She is not in acute distress.     Appearance: Normal appearance. She is not ill-appearing.   HENT:      Head: Normocephalic and atraumatic.      Mouth/Throat:      Mouth: Mucous membranes are moist.   Eyes:      General: No scleral icterus.     Conjunctiva/sclera: Conjunctivae normal.   Cardiovascular:      Rate and Rhythm: Normal rate.   Pulmonary:      Effort: Pulmonary effort is normal. No respiratory distress.   Musculoskeletal:      Cervical back: Normal range of motion.      Right lower leg: No edema.      Left lower leg: No edema.   Skin:     General: Skin is warm and dry.      Findings: No rash.      Comments: LUE AVF - + thrill, site clear   Neurological:      Mental Status: She is alert and oriented to person, place, and time.   Psychiatric:         Mood and Affect: Mood normal.         Behavior: Behavior normal.         Thought Content: Thought content normal.         Judgment: Judgment normal.           Labs:    CBC:   Lab Results   Component Value Date    WBC 8.74 08/05/2021    HGB 9.0 (L) 08/05/2021    HCT 27.7 (L) 08/05/2021    MCV 92 08/05/2021     08/05/2021    GRAN 6.8 08/05/2021    GRAN 78.3 (H) 08/05/2021    LYMPH 1.1 08/05/2021    LYMPH 12.8 (L) 08/05/2021    MONO 0.7 08/05/2021    MONO 8.0 08/05/2021    EOSINOPHIL 0.3 08/05/2021       Syphilis screening:   Lab Results   Component Value Date    RPR Non-reactive 02/13/2020        TB screening:   Lab Results   Component Value Date    TBGOLDPLUS Positive (A) 02/13/2020       HIV screening:   Lab Results   Component Value Date    BOH06BJEE Negative 02/13/2020        Strongyloides IgG:   Lab Results   Component Value Date    STRONGANTIGG Negative 02/13/2020       Hepatitis Serologies:   Lab Results   Component Value Date    HEPAIGG Positive (A) 07/28/2020    HEPBCAB Negative 02/13/2020    HEPBSAB Negative 11/16/2016    HEPBSURFABQU POSITIVE 06/09/2022    HEPBSURFABQU 122 06/09/2022    HEPCAB Negative 02/13/2020        Varicella IgG:   Lab Results   Component Value Date    VARICELLAINT Positive (A) 02/13/2020         Immunization History   Administered Date(s) Administered    Hepatitis B, Adult 10/20/2017, 01/17/2018, 02/19/2018, 04/20/2018, 02/03/2020    Influenza - Quadrivalent 09/30/2020    Influenza - Quadrivalent - PF *Preferred* (6 months and older) 10/08/2021    Influenza - Trivalent (ADULT) 10/01/2018    PPD Test 11/22/2016    Pneumococcal Conjugate - 13 Valent 03/02/2020    Pneumococcal Polysaccharide - 23 Valent 02/21/2017, 03/18/2022      Flu - up to date.  Received at dialysis   COVID - never   Hep A - denies.  Immunity  Hep B - received.  Immunity  PNA - PPSV 23 and PCV 13   TDAP - denies  Shingles  - denies     Assessment and Plan    1. Risks of Infection: Available serologies were reviewed. No unusual risks of infection or significant barriers to transplantation were identified from the infectious disease standpoint given the information available at this time.    - Acute infectious issues: None   - Pending serologies: Hepatitis A Ab, Hepatitis B surface Ab, Hepatitis B core Ab, Hepatitis B surface Ag, Hepatitis C Ab, HIV, Quantiferon gold / T-spot, RPR, Strongyloides IgG, and VZV IgG   - Will need referral to ID for positive QG and determination regarding need to repeat therapy as did not complete course.    - Please call if any pending serologic testing is positive.    2. Immunizations:  Based on the patient's immunization history and serologies, the following immunizations are recommended:  - Hepatitis A    Patient does have immunity to hepatitis  A    Vaccination ordered today: No. Reason for not ordering: immunity demonstrated on serology   - Hepatitis B    Patient does have immunity to hepatitis B    Vaccination ordered today: No. Reason for not ordering: Immunity   - COVID    Current CDC vaccination recommendations were discussed with the patient   - Annual high dose influenza     Vaccination ordered today: No. Reason for not ordering: vaccination up to date   - Prevnar 20    Vaccination ordered today: Yes   - Tdap    Vaccination ordered today: Yes   - Shingrix    Vaccination ordered today: Yes    Recommended Pre-Transplant Immunization Schedule   Vaccine  0m 1m 2m 6m   Pneumococcal conjugate vaccine (Prevnar 20) X      Tetanus-diphtheria-pertussis (Tdap)* X      Hepatitis A Vaccine (Havrix)** X   X   Hepatitis B Vaccine (Heplisav)** X X     Influenza (annual) X      Zoster Recombinant Vaccine (Shingrix) X  X           *Administer booster every 10 years.       **Administer if no immunity demonstrated on serologies               Patient will receive vaccines at local pharmacy. A written prescription was provided for all vaccine doses.     3. Counseling:   I discussed with the patient and her son the risk for increased susceptibility to infections following transplantation including increased risk for infection right after transplant and if rejection should occur.  The patient has been counseled on the importance of vaccinations to decrease risk of infection and severe illness. Specific guidance has been provided to the patient regarding the patient's occupation, hobbies and activities to avoid future infectious complications.     4. Transplant Candidacy: Based on available information, there are no identified significant barriers to transplantation from an infectious disease standpoint subject to the above.  Final determination of transplant candidacy will be made once evaluation is complete and reviewed by the Selection Committee.      Follow up with  infectious disease for evaluation of latent TB      The total time for evaluation and management services performed on 09/21/2023 was greater than 45 minutes.

## 2023-09-21 NOTE — PROGRESS NOTES
"0950 BP elevated 221/98 P 48. Notified Essie Rogers NP of all vitals taken with patient taking home BP meds @ 0838. Home BP meds taken by patient at 0838 were " hydralazine 100 mg PO and clonidine 0.3 mg PO". Patient AA/Ox4 with NAD and no patient complaints. Order received from Essie Rogers NP to send patient to ER for evaluation of elevated BP. Patient and caretaker educated and informed of order to ER from NP and agreed to go to ER. Patient escorted to ER via WC with caretaker by TAE Nieto MA. Dr. Garcia, Transplant Surgeon in clinic also informed with no new orders received.    "

## 2023-09-22 ENCOUNTER — TELEPHONE (OUTPATIENT)
Dept: FAMILY MEDICINE | Facility: CLINIC | Age: 68
End: 2023-09-22
Payer: MEDICARE

## 2023-09-22 LAB — CMV IGG SERPL QL IA: REACTIVE

## 2023-09-22 NOTE — TELEPHONE ENCOUNTER
----- Message from Dinora Rodríguez sent at 9/21/2023  2:42 PM CDT -----  Regarding: Appintjment  Name of Who is Calling:  Patient          What is the request in detail:  Patient was calling to make an appointment for her annual the first available date is 01/2024 and the patient also stated her blood pressure is high and would like to know if Dr. Cabral can change her blood pressure Rx.            Can the clinic reply by [facility]: No            [facility]

## 2023-09-23 LAB
GAMMA INTERFERON BACKGROUND BLD IA-ACNC: 0.35 IU/ML
M TB IFN-G CD4+ BCKGRND COR BLD-ACNC: 5.84 IU/ML
M TB IFN-G CD4+ BCKGRND COR BLD-ACNC: 6.79 IU/ML
MITOGEN IGNF BCKGRD COR BLD-ACNC: 9.65 IU/ML
TB GOLD PLUS: POSITIVE

## 2023-09-25 LAB — STRONGYLOIDES ANTIBODY IGG: NEGATIVE

## 2023-09-27 ENCOUNTER — TELEPHONE (OUTPATIENT)
Dept: TRANSPLANT | Facility: CLINIC | Age: 68
End: 2023-09-27
Payer: MEDICARE

## 2023-09-27 DIAGNOSIS — Z86.11 HISTORY OF TB (TUBERCULOSIS): ICD-10-CM

## 2023-09-27 DIAGNOSIS — Z76.82 AWAITING ORGAN TRANSPLANT STATUS: Primary | ICD-10-CM

## 2023-09-28 ENCOUNTER — TELEPHONE (OUTPATIENT)
Dept: FAMILY MEDICINE | Facility: CLINIC | Age: 68
End: 2023-09-28
Payer: MEDICARE

## 2023-09-28 NOTE — TELEPHONE ENCOUNTER
----- Message from Ha Olivera sent at 9/28/2023  1:18 PM CDT -----  Type: Patient Call Back    Who called:self     What is the request in detail: Asking for a call     Can the clinic reply by [facility]? NO     Would the patient rather a call back or a response via [facility]? Call     Best call back number: [phone] (home) < says her phone give her trouble with unknown numbers states to please leave a voice message if she doesn't answer

## 2023-09-28 NOTE — TELEPHONE ENCOUNTER
Pt states she really need to  pcp as she is concerned with her bp and can not wait  to see provider as her meds  needs adjusting, no appointmebnts

## 2023-10-03 NOTE — PROGRESS NOTES
This note was created by combination of typed  and M-Modal dictation.  Transcription errors may be present.  If there are any questions, please contact me.    Assessment and Plan:   Assessment and Plan    Essential hypertension  -borderline blood pressures.  She had dialysis earlier today so she is at her dry weight.  Recommended that she purchase a home BP cuff and monitor if  or above consistently she should contact me in which case add on amlodipine low-dose 2.5.    Stay on clonidine and carvedilol and hydralazine  -     cloNIDine (CATAPRES) 0.3 MG tablet; TAKE 1 TABLET(0.3 MG) BY MOUTH THREE TIMES DAILY  Dispense: 270 tablet; Refill: 3  -     carvediloL (COREG) 12.5 MG tablet; Take 1 tablet (12.5 mg total) by mouth 2 (two) times daily with meals.  Dispense: 180 tablet; Refill: 3    ESRD (end stage renal disease) on dialysis MWF [facility] on [address]  Secondary hyperparathyroidism of renal origin  -dialyzes Monday Wednesday Friday    History of diabetes mellitus 9/2016 normal a1c since then  Type 2 diabetes mellitus with diabetic nephropathy, without long-term current use of insulin  -diet-controlled    Encounter for screening for cervical cancer  -had car issues and never got in to see Gynecology.  Referral submitted again  -     Ambulatory referral/consult to Gynecology; Future; Expected date: 10/11/2023    Encounter for screening mammogram for malignant neoplasm of breast  -mammogram ordered  -     Mammo Digital Screening Bilat w/ Blake; Future; Expected date: 10/04/2023    Tubular adenoma of colon  -previously with TAs, incomplete prep, referral submitted  -     Ambulatory referral/consult to Endo Procedure ; Future; Expected date: 10/05/2023     Has upcoming cardiology consult and stress testing    Medications Discontinued During This Encounter   Medication Reason    vitamin D (VITAMIN D3) 1000 units Tab Alternate therapy    carvediloL (COREG) 12.5 MG tablet Reorder    cloNIDine  (CATAPRES) 0.3 MG tablet Reorder       meds sent this encounter:  Medications Ordered This Encounter   Medications    carvediloL (COREG) 12.5 MG tablet     Sig: Take 1 tablet (12.5 mg total) by mouth 2 (two) times daily with meals.     Dispense:  180 tablet     Refill:  3     .    cloNIDine (CATAPRES) 0.3 MG tablet     Sig: TAKE 1 TABLET(0.3 MG) BY MOUTH THREE TIMES DAILY     Dispense:  270 tablet     Refill:  3     .         Follow Up:   Future Appointments   Date Time Provider Department Center   10/10/2023 12:30 PM CARDIAC, PET IMAGING [facility] CARDPET [facility]   10/10/2023  1:45 PM ECHO, MAIN CAMPUS [facility] ECHOSTR [facility]   10/10/2023  2:30 PM Denilson Mar MD [facility] CARDIO [facility]         Subjective:   Subjective   Chief Complaint   Patient presents with    Hypertension     Follow up        HPI  April is a 67 y.o. female.    Social History     Tobacco Use    Smoking status: Former     Current packs/day: 0.00     Average packs/day: 0.5 packs/day for 20.0 years (10.0 ttl pk-yrs)     Types: Cigarettes     Start date: 6/24/2000     Quit date: 6/24/2020     Years since quitting: 3.2    Smokeless tobacco: Never   Substance Use Topics    Alcohol use: No      Social History     Occupational History    Occupation: disabled x 2009,  at the [employer]      Social History     Social History Narrative    Lives with her 2 sons    Caregiver Son Tor       No LMP recorded. Patient has had an ablation.    Last appointment with this clinic was Visit date not found. Last visit with me 8/19/2022   To summarize last visit and events leading up to today:  ESRD/HD MWF  HTN, hx of hospitalizations for hypertensive emergency. Hydralazine, olmesartan, isosorbide. Possibly not taking nifedipine or coreg. Changed clonidine to patch.  Colonoscopy TA 2020  Anemia of CKD  hyperPTH     8/2022 HTN not controlled, not taking meds. Clonidine patch with dermatitis. Changed to po. Added back coreg. Was not on olmesartan. If still high  restart.      Taken off transplant list b/c of repeated no shows  Saw transplant 9/21 for pre kidney transplant evaluation    Filling hydralazine   Clonidine   Cinacalcet and sevelamer    Today's visit:  When she went to her transplant consultation her blood pressure was very high.  She notes that she had missed her morning dose of her medicines and that was probably why.  She went to the emergency room but did not stay.  She took an extra dose of clonidine and hydralazine and her blood pressures did get better.    Her blood pressure today is borderline in intake and remains borderline on repeat.    She is taking the carvedilol twice daily, taking hydralazine and the clonidine 3 times daily.  She is comfortable taking it this frequently.  She estimates that she takes them about 8 hours apart.  She received dialysis today, she dialyzes typically from 6-11 in the morning.  She typically takes her medications after dialysis.    Does not have a home blood pressure cuff to monitor blood pressures.    Monthly vit D in addition to cinacalcet    Patient Care Team:  Darrick Cabral MD as PCP - General (Internal Medicine)  Halima Beasley NP as Nurse Practitioner (Transplant)  Shelton العلي MD as Consulting Physician (Cardiology)  Velasquez Mclaughlin MD as Consulting Physician (Infectious Diseases)      Patient Active Problem List    Diagnosis Date Noted    Viral gastroenteritis 08/05/2021    Nephrotic syndrome 07/06/2021    Tubular adenoma of colon 05/07/2021 8/6/20 colonoscopy 4 polyps; TAs x 2      Simple renal cyst 05/07/2021 5/6/2021 renal US with R renal cyst, simple      Latent tuberculosis 05/07/2021    Essential hypertension 04/19/2021 7/21/20 TTE normal LV systolic function, LVEF 55%; grade 2 diastolic dysfunction. Eccentric LVH. No WMA. Normal RV systolic function. PA pressure 26. Normal CVP 3  3/3/20 nu med stress test neg for ischemia       Renovascular hypertension 05/28/2020    History of  "diabetes mellitus 9/2016 normal a1c since then 04/06/2020    ESRD (end stage renal disease) on dialysis MWF [facility] [address] 08/05/2019    Secondary hyperparathyroidism of renal origin 10/15/2018    Anemia in chronic kidney disease 10/12/2018       PAST MEDICAL PROBLEMS, PAST SURGICAL HISTORY: please see relevant portions of the electronic medical record    ALLERGIES AND MEDICATIONS: updated and reviewed.  Medication List with Changes/Refills   Current Medications    CARVEDILOL (COREG) 12.5 MG TABLET    Take 1 tablet (12.5 mg total) by mouth 2 (two) times daily with meals.    CINACALCET (SENSIPAR) 60 MG TAB    Take 60 mg by mouth daily with breakfast.    CLONIDINE (CATAPRES) 0.3 MG TABLET    TAKE 1 TABLET(0.3 MG) BY MOUTH THREE TIMES DAILY    HYDRALAZINE (APRESOLINE) 100 MG TABLET    TAKE 1 TABLET(100 MG) BY MOUTH THREE TIMES DAILY    SEVELAMER CARBONATE (RENVELA) 800 MG TAB    Take 1,600 mg by mouth 3 (three) times daily with meals.    VITAMIN D (VITAMIN D3) 1000 UNITS TAB    Take 1,000 Units by mouth once daily.         Objective:   Objective   Physical Exam   Vitals:    10/04/23 1522 10/04/23 1543   BP: (!) 154/70 138/70   BP Location:  Right arm   Patient Position:  Sitting   BP Method:  Medium (Manual)   Pulse: 67    Temp: 98.9 °F (37.2 °C)    TempSrc: Oral    SpO2: 96%    Weight: 43.5 kg (95 lb 14.4 oz)    Height: 5' 4" (1.626 m)     Body mass index is 16.46 kg/m².  Weight: 43.5 kg (95 lb 14.4 oz)   Height: 5' 4" (162.6 cm)     Physical Exam  Constitutional:       General: She is not in acute distress.     Appearance: She is well-developed.   Eyes:      Extraocular Movements: Extraocular movements intact.   Cardiovascular:      Rate and Rhythm: Normal rate and regular rhythm.      Heart sounds: Normal heart sounds. No murmur heard.  Pulmonary:      Effort: Pulmonary effort is normal.      Breath sounds: Normal breath sounds.   Musculoskeletal:         General: Normal range of motion.      Right lower " leg: No edema.      Left lower leg: No edema.   Skin:     General: Skin is warm and dry.   Neurological:      Mental Status: She is alert and oriented to person, place, and time.      Coordination: Coordination normal.   Psychiatric:         Behavior: Behavior normal.         Thought Content: Thought content normal.

## 2023-10-04 ENCOUNTER — OFFICE VISIT (OUTPATIENT)
Dept: FAMILY MEDICINE | Facility: CLINIC | Age: 68
End: 2023-10-04
Payer: MEDICARE

## 2023-10-04 ENCOUNTER — TELEPHONE (OUTPATIENT)
Dept: TRANSPLANT | Facility: CLINIC | Age: 68
End: 2023-10-04
Payer: MEDICARE

## 2023-10-04 VITALS
TEMPERATURE: 99 F | HEIGHT: 64 IN | WEIGHT: 95.88 LBS | BODY MASS INDEX: 16.37 KG/M2 | DIASTOLIC BLOOD PRESSURE: 70 MMHG | SYSTOLIC BLOOD PRESSURE: 138 MMHG | OXYGEN SATURATION: 96 % | HEART RATE: 67 BPM

## 2023-10-04 DIAGNOSIS — I10 ESSENTIAL HYPERTENSION: Primary | ICD-10-CM

## 2023-10-04 DIAGNOSIS — Z99.2 ESRD (END STAGE RENAL DISEASE) ON DIALYSIS: ICD-10-CM

## 2023-10-04 DIAGNOSIS — Z86.39 HISTORY OF DIABETES MELLITUS: ICD-10-CM

## 2023-10-04 DIAGNOSIS — N18.6 ESRD (END STAGE RENAL DISEASE) ON DIALYSIS: ICD-10-CM

## 2023-10-04 DIAGNOSIS — D12.6 TUBULAR ADENOMA OF COLON: ICD-10-CM

## 2023-10-04 DIAGNOSIS — E11.21 TYPE 2 DIABETES MELLITUS WITH DIABETIC NEPHROPATHY, WITHOUT LONG-TERM CURRENT USE OF INSULIN: ICD-10-CM

## 2023-10-04 DIAGNOSIS — Z76.82 AWAITING ORGAN TRANSPLANT STATUS: Primary | ICD-10-CM

## 2023-10-04 DIAGNOSIS — Z12.4 ENCOUNTER FOR SCREENING FOR CERVICAL CANCER: ICD-10-CM

## 2023-10-04 DIAGNOSIS — N25.81 SECONDARY HYPERPARATHYROIDISM OF RENAL ORIGIN: ICD-10-CM

## 2023-10-04 DIAGNOSIS — Z12.31 ENCOUNTER FOR SCREENING MAMMOGRAM FOR MALIGNANT NEOPLASM OF BREAST: ICD-10-CM

## 2023-10-04 DIAGNOSIS — Z12.31 BREAST CANCER SCREENING BY MAMMOGRAM: ICD-10-CM

## 2023-10-04 PROCEDURE — 99215 OFFICE O/P EST HI 40 MIN: CPT | Mod: PBBFAC,PO | Performed by: INTERNAL MEDICINE

## 2023-10-04 PROCEDURE — 99999 PR PBB SHADOW E&M-EST. PATIENT-LVL V: CPT | Mod: PBBFAC,,, | Performed by: INTERNAL MEDICINE

## 2023-10-04 PROCEDURE — 99214 PR OFFICE/OUTPT VISIT, EST, LEVL IV, 30-39 MIN: ICD-10-PCS | Mod: S$PBB,,, | Performed by: INTERNAL MEDICINE

## 2023-10-04 PROCEDURE — 99999 PR PBB SHADOW E&M-EST. PATIENT-LVL V: ICD-10-PCS | Mod: PBBFAC,,, | Performed by: INTERNAL MEDICINE

## 2023-10-04 PROCEDURE — 99214 OFFICE O/P EST MOD 30 MIN: CPT | Mod: S$PBB,,, | Performed by: INTERNAL MEDICINE

## 2023-10-04 RX ORDER — ERGOCALCIFEROL 1.25 MG/1
50000 CAPSULE ORAL
COMMUNITY
End: 2023-10-17

## 2023-10-04 RX ORDER — CARVEDILOL 12.5 MG/1
12.5 TABLET ORAL 2 TIMES DAILY WITH MEALS
Qty: 180 TABLET | Refills: 3 | Status: SHIPPED | OUTPATIENT
Start: 2023-10-04

## 2023-10-04 RX ORDER — CLONIDINE HYDROCHLORIDE 0.3 MG/1
TABLET ORAL
Qty: 270 TABLET | Refills: 3 | Status: SHIPPED | OUTPATIENT
Start: 2023-10-04

## 2023-10-04 NOTE — PATIENT INSTRUCTIONS
PLEASE CHECK BP REGULARLY  IF BP ALWAYS 140 OR ABOVE - CALL THE OFFICE - WOULD START LOW DOSE AMLODIPINE 2.5 MG.

## 2023-10-05 ENCOUNTER — TELEPHONE (OUTPATIENT)
Dept: TRANSPLANT | Facility: CLINIC | Age: 68
End: 2023-10-05
Payer: MEDICARE

## 2023-10-06 ENCOUNTER — TELEPHONE (OUTPATIENT)
Dept: CARDIOLOGY | Facility: HOSPITAL | Age: 68
End: 2023-10-06
Payer: MEDICARE

## 2023-10-09 ENCOUNTER — TELEPHONE (OUTPATIENT)
Dept: TRANSPLANT | Facility: CLINIC | Age: 68
End: 2023-10-09
Payer: MEDICARE

## 2023-10-10 ENCOUNTER — OFFICE VISIT (OUTPATIENT)
Dept: CARDIOLOGY | Facility: CLINIC | Age: 68
End: 2023-10-10
Payer: MEDICARE

## 2023-10-10 ENCOUNTER — HOSPITAL ENCOUNTER (OUTPATIENT)
Dept: CARDIOLOGY | Facility: HOSPITAL | Age: 68
Discharge: HOME | End: 2023-10-10
Attending: NURSE PRACTITIONER
Payer: MEDICARE

## 2023-10-10 VITALS
BODY MASS INDEX: 16.9 KG/M2 | SYSTOLIC BLOOD PRESSURE: 187 MMHG | HEART RATE: 70 BPM | HEIGHT: 64 IN | BODY MASS INDEX: 16.56 KG/M2 | HEART RATE: 54 BPM | DIASTOLIC BLOOD PRESSURE: 77 MMHG | WEIGHT: 97 LBS | WEIGHT: 99 LBS | DIASTOLIC BLOOD PRESSURE: 56 MMHG | HEIGHT: 64 IN | OXYGEN SATURATION: 99 % | SYSTOLIC BLOOD PRESSURE: 156 MMHG

## 2023-10-10 VITALS
WEIGHT: 95 LBS | RESPIRATION RATE: 16 BRPM | HEART RATE: 67 BPM | BODY MASS INDEX: 16.22 KG/M2 | DIASTOLIC BLOOD PRESSURE: 56 MMHG | HEIGHT: 64 IN | SYSTOLIC BLOOD PRESSURE: 156 MMHG

## 2023-10-10 DIAGNOSIS — N18.6 ESRD (END STAGE RENAL DISEASE) ON DIALYSIS: ICD-10-CM

## 2023-10-10 DIAGNOSIS — Z86.39 HISTORY OF DIABETES MELLITUS: ICD-10-CM

## 2023-10-10 DIAGNOSIS — Z01.818 PRE-OP EVALUATION: Primary | ICD-10-CM

## 2023-10-10 DIAGNOSIS — Z76.82 ORGAN TRANSPLANT CANDIDATE: ICD-10-CM

## 2023-10-10 DIAGNOSIS — Z99.2 ESRD (END STAGE RENAL DISEASE) ON DIALYSIS: ICD-10-CM

## 2023-10-10 DIAGNOSIS — Z91.89 CARDIOVASCULAR EVENT RISK: ICD-10-CM

## 2023-10-10 DIAGNOSIS — I10 ESSENTIAL HYPERTENSION: ICD-10-CM

## 2023-10-10 LAB
ASCENDING AORTA: 2.77 CM
AV INDEX (PROSTH): 0.77
AV MEAN GRADIENT: 4 MMHG
AV PEAK GRADIENT: 9 MMHG
AV VALVE AREA BY VELOCITY RATIO: 2.23 CM²
AV VALVE AREA: 2.53 CM²
AV VELOCITY RATIO: 0.68
BSA FOR ECHO PROCEDURE: 1.41 M2
CFR FLOW - ANTERIOR: 2.12
CFR FLOW - INFERIOR: 2.25
CFR FLOW - LATERAL: 2.47
CFR FLOW - MAX: 2.91
CFR FLOW - MIN: 1.63
CFR FLOW - SEPTAL: 2.15
CFR FLOW - WHOLE HEART: 2.25
CV ECHO LV RWT: 0.32 CM
CV STRESS BASE HR: 60 BPM
DIASTOLIC BLOOD PRESSURE: 83 MMHG
DOP CALC AO PEAK VEL: 1.54 M/S
DOP CALC AO VTI: 39.94 CM
DOP CALC LVOT AREA: 3.3 CM2
DOP CALC LVOT DIAMETER: 2.05 CM
DOP CALC LVOT PEAK VEL: 1.04 M/S
DOP CALC LVOT STROKE VOLUME: 101.08 CM3
DOP CALCLVOT PEAK VEL VTI: 30.64 CM
E WAVE DECELERATION TIME: 241.87 MSEC
E/A RATIO: 1.08
E/E' RATIO: 15.09 M/S
ECHO LV POSTERIOR WALL: 0.83 CM (ref 0.6–1.1)
EJECTION FRACTION- HIGH: 59 %
END DIASTOLIC INDEX-HIGH: 155 ML/M2
END DIASTOLIC INDEX-LOW: 91 ML/M2
END SYSTOLIC INDEX-HIGH: 78 ML/M2
END SYSTOLIC INDEX-LOW: 40 ML/M2
FRACTIONAL SHORTENING: 37 % (ref 28–44)
INTERVENTRICULAR SEPTUM: 1.11 CM (ref 0.6–1.1)
IVRT: 91.34 MSEC
LA MAJOR: 5.51 CM
LA MINOR: 5.54 CM
LA WIDTH: 4.85 CM
LEFT ATRIUM SIZE: 4.17 CM
LEFT ATRIUM VOLUME INDEX MOD: 71.4 ML/M2
LEFT ATRIUM VOLUME INDEX: 66 ML/M2
LEFT ATRIUM VOLUME MOD: 102.82 CM3
LEFT ATRIUM VOLUME: 94.98 CM3
LEFT INTERNAL DIMENSION IN SYSTOLE: 3.28 CM (ref 2.1–4)
LEFT VENTRICLE DIASTOLIC VOLUME INDEX: 89.68 ML/M2
LEFT VENTRICLE DIASTOLIC VOLUME: 129.14 ML
LEFT VENTRICLE MASS INDEX: 129 G/M2
LEFT VENTRICLE SYSTOLIC VOLUME INDEX: 30.2 ML/M2
LEFT VENTRICLE SYSTOLIC VOLUME: 43.55 ML
LEFT VENTRICULAR INTERNAL DIMENSION IN DIASTOLE: 5.19 CM (ref 3.5–6)
LEFT VENTRICULAR MASS: 185.86 G
LV LATERAL E/E' RATIO: 13.83 M/S
LV SEPTAL E/E' RATIO: 16.6 M/S
MV A" WAVE DURATION": 19.41 MSEC
MV PEAK A VEL: 0.77 M/S
MV PEAK E VEL: 0.83 M/S
MV STENOSIS PRESSURE HALF TIME: 70.14 MS
MV VALVE AREA P 1/2 METHOD: 3.14 CM2
NUC STRESS DIASTOLIC VOLUME INDEX: 119
NUC STRESS EJECTION FRACTION: 51 %
NUC STRESS SYSTOLIC VOLUME INDEX: 58
OHS CV CPX 1 MINUTE RECOVERY HEART RATE: 79 BPM
OHS CV CPX 85 PERCENT MAX PREDICTED HEART RATE MALE: 126
OHS CV CPX MAX PREDICTED HEART RATE: 149
OHS CV CPX PATIENT IS FEMALE: 1
OHS CV CPX PATIENT IS MALE: 0
OHS CV CPX PEAK DIASTOLIC BLOOD PRESSURE: 40 MMHG
OHS CV CPX PEAK HEAR RATE: 53 BPM
OHS CV CPX PEAK RATE PRESSURE PRODUCT: 6625
OHS CV CPX PEAK SYSTOLIC BLOOD PRESSURE: 125 MMHG
OHS CV CPX PERCENT MAX PREDICTED HEART RATE ACHIEVED: 36
OHS CV CPX RATE PRESSURE PRODUCT PRESENTING: 9360
PISA TR MAX VEL: 2.77 M/S
PULM VEIN S/D RATIO: 1.15
PV PEAK D VEL: 0.52 M/S
PV PEAK S VEL: 0.6 M/S
RA MAJOR: 4.56 CM
RA PRESSURE ESTIMATED: 3 MMHG
RA WIDTH: 3.93 CM
REST FLOW - ANTERIOR: 0.86 CC/MIN/G
REST FLOW - INFERIOR: 0.72 CC/MIN/G
REST FLOW - LATERAL: 0.77 CC/MIN/G
REST FLOW - MAX: 0.99 CC/MIN/G
REST FLOW - MIN: 0.56 CC/MIN/G
REST FLOW - SEPTAL: 0.72 CC/MIN/G
REST FLOW - WHOLE HEART: 0.77 CC/MIN/G
RETIRED EF AND QEF - SEE NOTES: 47 %
RIGHT ATRIAL AREA: 15 CM2
RIGHT VENTRICULAR END-DIASTOLIC DIMENSION: 3.35 CM
RV TB RVSP: 6 MMHG
SINUS: 2.88 CM
STJ: 2.48 CM
STRESS FLOW - ANTERIOR: 1.83 CC/MIN/G
STRESS FLOW - INFERIOR: 1.63 CC/MIN/G
STRESS FLOW - LATERAL: 1.9 CC/MIN/G
STRESS FLOW - MAX: 2.16 CC/MIN/G
STRESS FLOW - MIN: 0.95 CC/MIN/G
STRESS FLOW - SEPTAL: 1.54 CC/MIN/G
STRESS FLOW - WHOLE HEART: 1.73 CC/MIN/G
SYSTOLIC BLOOD PRESSURE: 156 MMHG
TDI LATERAL: 0.06 M/S
TDI SEPTAL: 0.05 M/S
TDI: 0.06 M/S
TR MAX PG: 31 MMHG
TRICUSPID ANNULAR PLANE SYSTOLIC EXCURSION: 2.21 CM
TV REST PULMONARY ARTERY PRESSURE: 34 MMHG
Z-SCORE OF LEFT VENTRICULAR DIMENSION IN END DIASTOLE: 1.7
Z-SCORE OF LEFT VENTRICULAR DIMENSION IN END SYSTOLE: 1.49

## 2023-10-10 PROCEDURE — 93306 TTE W/DOPPLER COMPLETE: CPT | Mod: TXP

## 2023-10-10 PROCEDURE — 99999 PR PBB SHADOW E&M-EST. PATIENT-LVL IV: CPT | Mod: PBBFAC,TXP,, | Performed by: INTERNAL MEDICINE

## 2023-10-10 PROCEDURE — 78434 CARDIAC PET SCAN STRESS (CUPID ONLY): ICD-10-PCS | Mod: 26,TXP,, | Performed by: INTERNAL MEDICINE

## 2023-10-10 PROCEDURE — 78431 CARDIAC PET SCAN STRESS (CUPID ONLY): ICD-10-PCS | Mod: 26,TXP,, | Performed by: INTERNAL MEDICINE

## 2023-10-10 PROCEDURE — 93018 CARDIAC PET SCAN STRESS (CUPID ONLY): ICD-10-PCS | Mod: TXP,,, | Performed by: INTERNAL MEDICINE

## 2023-10-10 PROCEDURE — 99204 PR OFFICE/OUTPT VISIT, NEW, LEVL IV, 45-59 MIN: ICD-10-PCS | Mod: S$PBB,TXP,, | Performed by: INTERNAL MEDICINE

## 2023-10-10 PROCEDURE — 99204 OFFICE O/P NEW MOD 45 MIN: CPT | Mod: S$PBB,TXP,, | Performed by: INTERNAL MEDICINE

## 2023-10-10 PROCEDURE — 99999 PR PBB SHADOW E&M-EST. PATIENT-LVL IV: ICD-10-PCS | Mod: PBBFAC,TXP,, | Performed by: INTERNAL MEDICINE

## 2023-10-10 PROCEDURE — 93018 CV STRESS TEST I&R ONLY: CPT | Mod: TXP,,, | Performed by: INTERNAL MEDICINE

## 2023-10-10 PROCEDURE — 63600175 PHARM REV CODE 636 W HCPCS: Mod: TXP | Performed by: NURSE PRACTITIONER

## 2023-10-10 PROCEDURE — 78431 MYOCRD IMG PET RST&STRS CT: CPT | Mod: 26,TXP,, | Performed by: INTERNAL MEDICINE

## 2023-10-10 PROCEDURE — 78431 MYOCRD IMG PET RST&STRS CT: CPT | Mod: TXP

## 2023-10-10 PROCEDURE — 99214 OFFICE O/P EST MOD 30 MIN: CPT | Mod: PBBFAC,25,TXP | Performed by: INTERNAL MEDICINE

## 2023-10-10 PROCEDURE — 78434 AQMBF PET REST & RX STRESS: CPT | Mod: TXP

## 2023-10-10 PROCEDURE — 93306 TTE W/DOPPLER COMPLETE: CPT | Mod: 26,TXP,, | Performed by: INTERNAL MEDICINE

## 2023-10-10 PROCEDURE — 93016 CARDIAC PET SCAN STRESS (CUPID ONLY): ICD-10-PCS | Mod: TXP,,, | Performed by: INTERNAL MEDICINE

## 2023-10-10 PROCEDURE — 78434 AQMBF PET REST & RX STRESS: CPT | Mod: 26,TXP,, | Performed by: INTERNAL MEDICINE

## 2023-10-10 PROCEDURE — 93016 CV STRESS TEST SUPVJ ONLY: CPT | Mod: TXP,,, | Performed by: INTERNAL MEDICINE

## 2023-10-10 PROCEDURE — 93306 ECHO (CUPID ONLY): ICD-10-PCS | Mod: 26,TXP,, | Performed by: INTERNAL MEDICINE

## 2023-10-10 RX ORDER — REGADENOSON 0.08 MG/ML
0.4 INJECTION, SOLUTION INTRAVENOUS ONCE
Status: COMPLETED | OUTPATIENT
Start: 2023-10-10 | End: 2023-10-10

## 2023-10-10 RX ORDER — AMINOPHYLLINE 25 MG/ML
75 INJECTION, SOLUTION INTRAVENOUS ONCE
Status: COMPLETED | OUTPATIENT
Start: 2023-10-10 | End: 2023-10-10

## 2023-10-10 RX ADMIN — AMINOPHYLLINE 75 MG: 25 INJECTION, SOLUTION INTRAVENOUS at 01:10

## 2023-10-10 RX ADMIN — REGADENOSON 0.4 MG: 0.08 INJECTION, SOLUTION INTRAVENOUS at 01:10

## 2023-10-10 NOTE — PROGRESS NOTES
"Subjective:           Chief Complaint: No chief complaint on file.      HPI    67 year old female with ESRD on HD, DM, HTN here for a pre-op evaluation for renal transplant. Her TTE and PET were ordered by the transplant service.    She feels well overall. She denies chest pain/pressure/tightness/discomfort, dyspnea on exertion, orthopnea, PND, peripheral edema, palpitations, syncope or claudication. She reports shortness of breath when her blood pressure is severely elevated, which occurs "when I have too much fluid" and responds to clonidine and hydralazine.    She quit smoking 4-5 years ago and does not drink alcohol. No family history of heart disease. She does not exercise regularly but completes all ADLs without difficulty (climbed a flight of stairs in clinic last week with mild fatigue).    Review of Systems  Pertinent findings as per HPI.        Objective:      Vitals:    10/10/23 1422   BP: (!) 187/77   Pulse: (!) 54       Physical Exam  Constitutional:       Appearance: She is well-developed. She is not diaphoretic.   HENT:      Head: Normocephalic and atraumatic.   Eyes:      Pupils: Pupils are equal, round, and reactive to light.   Neck:      Vascular: No carotid bruit or JVD.   Cardiovascular:      Rate and Rhythm: Normal rate and regular rhythm.      Heart sounds: Normal heart sounds. Heart sounds not distant. No murmur heard.     No friction rub. No gallop. No S3 or S4 sounds.   Pulmonary:      Effort: Pulmonary effort is normal. No respiratory distress.      Breath sounds: Normal breath sounds. No wheezing.   Abdominal:      General: Bowel sounds are normal. There is no distension.      Palpations: Abdomen is soft.      Tenderness: There is no abdominal tenderness.   Musculoskeletal:         General: No swelling.      Cervical back: Normal range of motion.      Comments: BRODIE MCPHERSON   Skin:     General: Skin is warm.      Findings: No erythema.   Neurological:      Mental Status: She is alert and " oriented to person, place, and time.   Psychiatric:         Behavior: Behavior normal.           Assessment:       1. Pre-op evaluation    2. Organ transplant candidate    3. History of diabetes mellitus 9/2016 normal a1c since then    4. ESRD (end stage renal disease) on dialysis MWF [facility] on [address]    5. Essential hypertension          Plan     Pre-op evaluation  RCRI 1 - Class 2 risk - 6% risk of death, MI or cardiac arrest at 30 days  TTE reveals normal LVEF, severe LAE. PET stress negative for ischemia.  No further cardiovascular testing is recommended at this time  Given coronary calcification noted on PET, statin therapy is indicated    The 10-year ASCVD risk score (Porfirio DORSEY, et al., 2019) is: 14.1%    Values used to calculate the score:      Age: 67 years      Sex: Female      Is Non- : No      Diabetic: No      Tobacco smoker: No      Systolic Blood Pressure: 187 mmHg      Is BP treated: Yes      HDL Cholesterol: 50 mg/dL      Total Cholesterol: 157 mg/dL    Organ transplant candidate  ESRD (end stage renal disease) on dialysis MWF [facility] on [address]  Essential hypertension  Managed by renal transplant service    History of diabetes mellitus 9/2016 normal a1c since then  Managed by PCP

## 2023-10-12 ENCOUNTER — PATIENT MESSAGE (OUTPATIENT)
Dept: CARDIOLOGY | Facility: CLINIC | Age: 68
End: 2023-10-12
Payer: MEDICARE

## 2023-10-12 RX ORDER — ATORVASTATIN CALCIUM 40 MG/1
40 TABLET, FILM COATED ORAL DAILY
Qty: 90 TABLET | Refills: 3 | Status: SHIPPED | OUTPATIENT
Start: 2023-10-12 | End: 2023-10-17

## 2023-10-17 ENCOUNTER — OFFICE VISIT (OUTPATIENT)
Dept: OBSTETRICS AND GYNECOLOGY | Facility: CLINIC | Age: 68
End: 2023-10-17
Payer: MEDICARE

## 2023-10-17 VITALS
BODY MASS INDEX: 16.92 KG/M2 | DIASTOLIC BLOOD PRESSURE: 74 MMHG | SYSTOLIC BLOOD PRESSURE: 128 MMHG | WEIGHT: 99.13 LBS | HEIGHT: 64 IN

## 2023-10-17 DIAGNOSIS — Z12.4 ENCOUNTER FOR SCREENING FOR CERVICAL CANCER: ICD-10-CM

## 2023-10-17 DIAGNOSIS — Z01.419 ENCOUNTER FOR GYNECOLOGICAL EXAMINATION WITHOUT ABNORMAL FINDING: Primary | ICD-10-CM

## 2023-10-17 DIAGNOSIS — Z12.39 SCREENING BREAST EXAMINATION: ICD-10-CM

## 2023-10-17 PROCEDURE — G0101 PR CA SCREEN;PELVIC/BREAST EXAM: ICD-10-PCS | Mod: S$PBB,,,

## 2023-10-17 PROCEDURE — G0101 CA SCREEN;PELVIC/BREAST EXAM: HCPCS | Mod: PBBFAC

## 2023-10-17 PROCEDURE — 99999 PR PBB SHADOW E&M-EST. PATIENT-LVL III: CPT | Mod: PBBFAC,,,

## 2023-10-17 PROCEDURE — 99999 PR PBB SHADOW E&M-EST. PATIENT-LVL III: ICD-10-PCS | Mod: PBBFAC,,,

## 2023-10-17 PROCEDURE — G0101 CA SCREEN;PELVIC/BREAST EXAM: HCPCS | Mod: S$PBB,,,

## 2023-10-17 PROCEDURE — 88175 CYTOPATH C/V AUTO FLUID REDO: CPT

## 2023-10-17 PROCEDURE — 87624 HPV HI-RISK TYP POOLED RSLT: CPT

## 2023-10-17 PROCEDURE — 99213 OFFICE O/P EST LOW 20 MIN: CPT | Mod: PBBFAC,25

## 2023-10-17 NOTE — PROGRESS NOTES
Subjective:      Patient ID: April Vasquez is a 67 y.o. female.    Chief Complaint:  Well Woman      History of Present Illness  HPI  Annual Exam-Postmenopausal  Patient presents for annual exam. The patient has no complaints today. The patient is not sexually active. She is a . GYN screening history: last pap: approximate date  and was normal and last mammogram: approximate date 2022 and was normal. The patient is not taking hormone replacement therapy. Patient denies post-menopausal vaginal bleeding. The patient wears seatbelts: yes. The patient participates in regular exercise: yes. Has the patient ever been transfused or tattooed?: no. The patient reports that there is not domestic violence in her life.  The patient does not  smoke- stopped 4 years ago.         Last pap smear: / HPV NEG  DUE TODAY  History of abnormal pap smears: DENIES  Mammogram: 2022 (scheduled for 10/31)   Tc:  3.4 %  Colonoscopy:  (DUE- ORDERED BY PCP )  DEXA: PCP WILL ORDER     GYN FH:   Breast cancer: none  Colon cancer: none  Ovarian cancer: none  Endometrial cancer: none    GYN & OB History  No LMP recorded (lmp unknown). Patient has had an ablation.   Date of Last Pap: 10/17/2023    OB History    Para Term  AB Living   3 2 1 1 0 4   SAB IAB Ectopic Multiple Live Births   0 0 0 1 3      # Outcome Date GA Lbr Thien/2nd Weight Sex Delivery Anes PTL Lv   3       CS-LTranv      2 Term      Vag-Spont  N MORELIA   1A       CS-LTranv  Y MORELIA      Complications: Twins   1B       CS-LTranv  Y MORELIA      Complications: Twins     Past Medical History:  Past Medical History:   Diagnosis Date    Anemia     Colon polyps     COVID-19 2021    Diabetes mellitus, type 2     ESRD (end stage renal disease) on dialysis 2017    Gallstones     Hypertension     Pulmonary edema     Renal disorder     dialysis MIRANDA fistula    Tobacco abuse     Uses walker        Past Surgical History:  Past Surgical  History:   Procedure Laterality Date    AVF  2017    CHOLECYSTECTOMY  2016    COLONOSCOPY N/A 8/6/2020    Procedure: COLONOSCOPY;  Surgeon: Hood Hrenadez MD;  Location: [facility] ([address] [place] [address]);  Service: Endoscopy;  Laterality: N/A;  labs prior-dialysis  covid test lapalco 8/3    Permacath Right 2017    TUBAL LIGATION         Family History:  Family History   Problem Relation Age of Onset    Kidney disease Mother     Hypertension Mother     Cervical cancer Mother     Cancer Mother     Ovarian cancer Mother     Diabetes Father     Drug abuse Paternal Grandmother     Diabetes Sister     No Known Problems Sister     No Known Problems Sister     No Known Problems Sister     No Known Problems Son     No Known Problems Son     No Known Problems Son     No Known Problems Daughter     Breast cancer Neg Hx     Colon cancer Neg Hx        Allergies:  Review of patient's allergies indicates:  No Known Allergies    Medications:  Current Outpatient Medications on File Prior to Visit   Medication Sig Dispense Refill    carvediloL (COREG) 12.5 MG tablet Take 1 tablet (12.5 mg total) by mouth 2 (two) times daily with meals. 180 tablet 3    cloNIDine (CATAPRES) 0.3 MG tablet TAKE 1 TABLET(0.3 MG) BY MOUTH THREE TIMES DAILY 270 tablet 3    hydrALAZINE (APRESOLINE) 100 MG tablet TAKE 1 TABLET(100 MG) BY MOUTH THREE TIMES DAILY 270 tablet 3    sevelamer carbonate (RENVELA) 800 mg Tab Take 1,600 mg by mouth 3 (three) times daily with meals.      [DISCONTINUED] atorvastatin (LIPITOR) 40 MG tablet Take 1 tablet (40 mg total) by mouth once daily. 90 tablet 3    [DISCONTINUED] cinacalcet (SENSIPAR) 60 MG Tab Take 60 mg by mouth daily with breakfast.      [DISCONTINUED] ergocalciferol (ERGOCALCIFEROL) 50,000 unit Cap Take 50,000 Units by mouth every 30 days.       No current facility-administered medications on file prior to visit.       Social History:  Social History     Tobacco Use    Smoking status: Former     Current packs/day: 0.00      Average packs/day: 0.5 packs/day for 20.0 years (10.0 ttl pk-yrs)     Types: Cigarettes     Start date: 6/24/2000     Quit date: 6/24/2020     Years since quitting: 3.3    Smokeless tobacco: Never   Substance Use Topics    Alcohol use: No    Drug use: No            Review of Systems  Review of Systems   Constitutional:  Negative for activity change and appetite change.   Respiratory:  Negative for shortness of breath.    Gastrointestinal:  Negative for abdominal pain.   Endocrine: Positive for diabetes.   Genitourinary: Negative.  Negative for flank pain, pelvic pain, vaginal bleeding, vaginal pain, postmenopausal bleeding and vaginal odor.   Musculoskeletal:  Positive for myalgias.   Integumentary:  Negative.   Neurological: Negative.  Negative for headaches.   Hematological: Negative.    Breast: negative.  Negative for breast self exam         Objective:     Physical Exam:   Constitutional: She is oriented to person, place, and time. She appears well-developed. She is cooperative. No distress.        HENT:   Head: Normocephalic.      Cardiovascular:       Exam reveals no clubbing.        Pulmonary/Chest: Effort normal and breath sounds normal. Right breast exhibits no nipple discharge, no skin change, no tenderness, no bleeding and no swelling. Left breast exhibits no nipple discharge, no skin change, no tenderness, no bleeding and no swelling. Breasts are symmetrical.        Abdominal: Soft. There is no abdominal tenderness. Hernia confirmed negative in the right inguinal area and confirmed negative in the left inguinal area.     Genitourinary:    Inguinal canal, uterus, right adnexa, left adnexa and rectum normal.   Rectum:      No tenderness.      Pelvic exam was performed with patient supine.   The external female genitalia was normal.   No external genitalia lesions identified,Genitalia hair distrobution normal .   Labial bartholins normal.Cervix is normal. There is vaginal discharge (thin white) in the  vagina. No tenderness in the vagina.    No signs of injury in the vagina.   Vagina was moist.Vaginal atrophy noted. Cervix exhibits no discharge and no tenderness. Uterus is not tender. Normal urethral meatus.Urethra findings: no tendernessBladder findings: no bladder tenderness          Musculoskeletal: Normal range of motion and moves all extremeties.      Lymphadenopathy: No inguinal adenopathy noted on the right or left side.    Neurological: She is alert and oriented to person, place, and time.    Skin: Skin is warm. Nails show no clubbing.    Psychiatric: She has a normal mood and affect. Her behavior is normal. Judgment and thought content normal.         Assessment:     1. Encounter for gynecological examination without abnormal finding    2. Encounter for screening for cervical cancer    3. Screening breast examination           Plan:   1. Encounter for gynecological examination without abnormal finding  - Liquid-Based Pap Smear, Screening  - HPV High Risk Genotypes, PCR  - Pap and HPV done today.  -   Screening tests as ordered.  - Diet and exercise encouraged.  Seat belt use encouraged.  Reviewed ASCCP Pap guidelines and screening recommendations.    Counseling: injury prevention: Driving under the influence of alcohol  Weapons  Seatbelts  Bicycle helmets  Adequate sleep  Exercise  Stress management techniques  indications for and frequency of periodic gynecologic exam  reviewed current Pap guidelines. Explained new understanding of natural history of cervical disease and improved Paps. Recommended guideline concordant care.  Patient referred to Primary Care Physician for follow up and care gaps.    2. Encounter for screening for cervical cancer  - Ambulatory referral/consult to Gynecology    3. Screening breast examination  - Self breast exams encouraged  MMG ordered by PCP/ scheduled on 10/31/2023    Follow up in 1 year for sooner exam or sooner if needed.

## 2023-10-22 ENCOUNTER — EPISODE CHANGES (OUTPATIENT)
Dept: TRANSPLANT | Facility: CLINIC | Age: 68
End: 2023-10-22

## 2023-10-22 LAB
FINAL PATHOLOGIC DIAGNOSIS: NORMAL
Lab: NORMAL

## 2023-10-23 LAB
HPV HR 12 DNA SPEC QL NAA+PROBE: NEGATIVE
HPV16 AG SPEC QL: NEGATIVE
HPV18 DNA SPEC QL NAA+PROBE: NEGATIVE

## 2023-10-24 ENCOUNTER — HOSPITAL ENCOUNTER (OUTPATIENT)
Dept: RADIOLOGY | Facility: HOSPITAL | Age: 68
Discharge: HOME | End: 2023-10-24
Attending: NURSE PRACTITIONER
Payer: MEDICARE

## 2023-10-24 DIAGNOSIS — Z76.82 ORGAN TRANSPLANT CANDIDATE: ICD-10-CM

## 2023-10-24 PROCEDURE — 72170 X-RAY EXAM OF PELVIS: CPT | Mod: 26,TXP,, | Performed by: RADIOLOGY

## 2023-10-24 PROCEDURE — 72170 XR PELVIS ROUTINE AP: ICD-10-PCS | Mod: 26,TXP,, | Performed by: RADIOLOGY

## 2023-10-24 PROCEDURE — 71046 X-RAY EXAM CHEST 2 VIEWS: CPT | Mod: TC,TXP

## 2023-10-24 PROCEDURE — 93978 VASCULAR STUDY: CPT | Mod: 26,TXP,, | Performed by: RADIOLOGY

## 2023-10-24 PROCEDURE — 72170 X-RAY EXAM OF PELVIS: CPT | Mod: TC,TXP

## 2023-10-24 PROCEDURE — 93978 VASCULAR STUDY: CPT | Mod: TC,TXP

## 2023-10-24 PROCEDURE — 71046 XR CHEST PA AND LATERAL: ICD-10-PCS | Mod: 26,TXP,, | Performed by: RADIOLOGY

## 2023-10-24 PROCEDURE — 71046 X-RAY EXAM CHEST 2 VIEWS: CPT | Mod: 26,TXP,, | Performed by: RADIOLOGY

## 2023-10-24 PROCEDURE — 76770 US RETROPERITONEAL COMPLETE: ICD-10-PCS | Mod: 26,TXP,, | Performed by: RADIOLOGY

## 2023-10-24 PROCEDURE — 76770 US EXAM ABDO BACK WALL COMP: CPT | Mod: TC,TXP

## 2023-10-24 PROCEDURE — 76770 US EXAM ABDO BACK WALL COMP: CPT | Mod: 26,TXP,, | Performed by: RADIOLOGY

## 2023-10-24 PROCEDURE — 93978 US DOPP ILIACS BILATERAL: ICD-10-PCS | Mod: 26,TXP,, | Performed by: RADIOLOGY

## 2023-10-29 ENCOUNTER — HOSPITAL ENCOUNTER (INPATIENT)
Facility: HOSPITAL | Age: 68
LOS: 4 days | Discharge: HOME | DRG: 193 | End: 2023-11-02
Attending: EMERGENCY MEDICINE | Admitting: INTERNAL MEDICINE
Payer: MEDICARE

## 2023-10-29 DIAGNOSIS — J18.9 COMMUNITY ACQUIRED PNEUMONIA, UNSPECIFIED LATERALITY: Primary | ICD-10-CM

## 2023-10-29 DIAGNOSIS — Z99.2 ESRD (END STAGE RENAL DISEASE) ON DIALYSIS: ICD-10-CM

## 2023-10-29 DIAGNOSIS — N18.6 ESRD (END STAGE RENAL DISEASE) ON DIALYSIS: ICD-10-CM

## 2023-10-29 DIAGNOSIS — R06.02 SOB (SHORTNESS OF BREATH): ICD-10-CM

## 2023-10-29 DIAGNOSIS — J18.9 PNEUMONIA: ICD-10-CM

## 2023-10-29 DIAGNOSIS — J18.9 COMMUNITY ACQUIRED PNEUMONIA: ICD-10-CM

## 2023-10-29 DIAGNOSIS — I10 ESSENTIAL HYPERTENSION: ICD-10-CM

## 2023-10-29 DIAGNOSIS — R07.9 CHEST PAIN: ICD-10-CM

## 2023-10-29 LAB
ALBUMIN SERPL BCP-MCNC: 2.9 G/DL (ref 3.5–5.2)
ALP SERPL-CCNC: 120 U/L (ref 55–135)
ALT SERPL W/O P-5'-P-CCNC: 5 U/L (ref 10–44)
ANION GAP SERPL CALC-SCNC: 17 MMOL/L (ref 8–16)
AST SERPL-CCNC: 11 U/L (ref 10–40)
BASOPHILS # BLD AUTO: 0.06 K/UL (ref 0–0.2)
BASOPHILS NFR BLD: 0.3 % (ref 0–1.9)
BILIRUB SERPL-MCNC: 0.5 MG/DL (ref 0.1–1)
BNP SERPL-MCNC: 3403 PG/ML (ref 0–99)
BUN SERPL-MCNC: 31 MG/DL (ref 8–23)
CALCIUM SERPL-MCNC: 9.8 MG/DL (ref 8.7–10.5)
CHLORIDE SERPL-SCNC: 100 MMOL/L (ref 95–110)
CO2 SERPL-SCNC: 21 MMOL/L (ref 23–29)
CREAT SERPL-MCNC: 7.2 MG/DL (ref 0.5–1.4)
CTP QC/QA: YES
CTP QC/QA: YES
DIFFERENTIAL METHOD: ABNORMAL
EOSINOPHIL # BLD AUTO: 0.2 K/UL (ref 0–0.5)
EOSINOPHIL NFR BLD: 0.9 % (ref 0–8)
ERYTHROCYTE [DISTWIDTH] IN BLOOD BY AUTOMATED COUNT: 16.6 % (ref 11.5–14.5)
EST. GFR  (NO RACE VARIABLE): 6 ML/MIN/1.73 M^2
GLUCOSE SERPL-MCNC: 104 MG/DL (ref 70–110)
HCT VFR BLD AUTO: 32.4 % (ref 37–48.5)
HGB BLD-MCNC: 10.3 G/DL (ref 12–16)
IMM GRANULOCYTES # BLD AUTO: 0.1 K/UL (ref 0–0.04)
IMM GRANULOCYTES NFR BLD AUTO: 0.6 % (ref 0–0.5)
LACTATE SERPL-SCNC: 0.7 MMOL/L (ref 0.5–2.2)
LACTATE SERPL-SCNC: 1 MMOL/L (ref 0.5–2.2)
LYMPHOCYTES # BLD AUTO: 1.3 K/UL (ref 1–4.8)
LYMPHOCYTES NFR BLD: 7.6 % (ref 18–48)
MAGNESIUM SERPL-MCNC: 2.2 MG/DL (ref 1.6–2.6)
MCH RBC QN AUTO: 29.9 PG (ref 27–31)
MCHC RBC AUTO-ENTMCNC: 31.8 G/DL (ref 32–36)
MCV RBC AUTO: 94 FL (ref 82–98)
MONOCYTES # BLD AUTO: 0.7 K/UL (ref 0.3–1)
MONOCYTES NFR BLD: 4.2 % (ref 4–15)
NEUTROPHILS # BLD AUTO: 14.9 K/UL (ref 1.8–7.7)
NEUTROPHILS NFR BLD: 86.4 % (ref 38–73)
NRBC BLD-RTO: 0 /100 WBC
PHOSPHATE SERPL-MCNC: 6.1 MG/DL (ref 2.7–4.5)
PLATELET # BLD AUTO: 287 K/UL (ref 150–450)
PMV BLD AUTO: 10.6 FL (ref 9.2–12.9)
POC MOLECULAR INFLUENZA A AGN: NEGATIVE
POC MOLECULAR INFLUENZA B AGN: NEGATIVE
POCT GLUCOSE: 153 MG/DL (ref 70–110)
POCT GLUCOSE: 225 MG/DL (ref 70–110)
POTASSIUM SERPL-SCNC: 4.7 MMOL/L (ref 3.5–5.1)
PROCALCITONIN SERPL IA-MCNC: 0.49 NG/ML
PROT SERPL-MCNC: 7.1 G/DL (ref 6–8.4)
RBC # BLD AUTO: 3.45 M/UL (ref 4–5.4)
SARS-COV-2 RDRP RESP QL NAA+PROBE: NEGATIVE
SODIUM SERPL-SCNC: 138 MMOL/L (ref 136–145)
TROPONIN I SERPL DL<=0.01 NG/ML-MCNC: 0.02 NG/ML (ref 0–0.03)
WBC # BLD AUTO: 17.23 K/UL (ref 3.9–12.7)

## 2023-10-29 PROCEDURE — 83880 ASSAY OF NATRIURETIC PEPTIDE: CPT | Mod: NTX | Performed by: EMERGENCY MEDICINE

## 2023-10-29 PROCEDURE — 87040 BLOOD CULTURE FOR BACTERIA: CPT | Mod: NTX | Performed by: EMERGENCY MEDICINE

## 2023-10-29 PROCEDURE — 83735 ASSAY OF MAGNESIUM: CPT | Mod: NTX | Performed by: EMERGENCY MEDICINE

## 2023-10-29 PROCEDURE — 84145 PROCALCITONIN (PCT): CPT | Mod: NTX | Performed by: EMERGENCY MEDICINE

## 2023-10-29 PROCEDURE — 99285 EMERGENCY DEPT VISIT HI MDM: CPT | Mod: 25,NTX

## 2023-10-29 PROCEDURE — 87502 INFLUENZA DNA AMP PROBE: CPT | Mod: NTX

## 2023-10-29 PROCEDURE — 63600175 PHARM REV CODE 636 W HCPCS: Mod: NTX | Performed by: EMERGENCY MEDICINE

## 2023-10-29 PROCEDURE — 84100 ASSAY OF PHOSPHORUS: CPT | Mod: NTX | Performed by: EMERGENCY MEDICINE

## 2023-10-29 PROCEDURE — 94640 AIRWAY INHALATION TREATMENT: CPT | Mod: NTX,XB

## 2023-10-29 PROCEDURE — 25000003 PHARM REV CODE 250: Mod: NTX | Performed by: EMERGENCY MEDICINE

## 2023-10-29 PROCEDURE — 93010 ELECTROCARDIOGRAM REPORT: CPT | Mod: NTX,,, | Performed by: INTERNAL MEDICINE

## 2023-10-29 PROCEDURE — 80053 COMPREHEN METABOLIC PANEL: CPT | Mod: NTX | Performed by: EMERGENCY MEDICINE

## 2023-10-29 PROCEDURE — 96365 THER/PROPH/DIAG IV INF INIT: CPT | Mod: NTX

## 2023-10-29 PROCEDURE — 93010 EKG 12-LEAD: ICD-10-PCS | Mod: NTX,,, | Performed by: INTERNAL MEDICINE

## 2023-10-29 PROCEDURE — 96374 THER/PROPH/DIAG INJ IV PUSH: CPT | Mod: 59,NTX

## 2023-10-29 PROCEDURE — 25000242 PHARM REV CODE 250 ALT 637 W/ HCPCS: Mod: NTX | Performed by: EMERGENCY MEDICINE

## 2023-10-29 PROCEDURE — 11000001 HC ACUTE MED/SURG PRIVATE ROOM: Mod: NTX

## 2023-10-29 PROCEDURE — 84484 ASSAY OF TROPONIN QUANT: CPT | Mod: NTX | Performed by: EMERGENCY MEDICINE

## 2023-10-29 PROCEDURE — 94761 N-INVAS EAR/PLS OXIMETRY MLT: CPT | Mod: NTX

## 2023-10-29 PROCEDURE — 87635 SARS-COV-2 COVID-19 AMP PRB: CPT | Mod: NTX | Performed by: EMERGENCY MEDICINE

## 2023-10-29 PROCEDURE — 25000003 PHARM REV CODE 250: Mod: NTX | Performed by: INTERNAL MEDICINE

## 2023-10-29 PROCEDURE — 83605 ASSAY OF LACTIC ACID: CPT | Mod: 91,NTX | Performed by: INTERNAL MEDICINE

## 2023-10-29 PROCEDURE — 96375 TX/PRO/DX INJ NEW DRUG ADDON: CPT | Mod: NTX

## 2023-10-29 PROCEDURE — 36415 COLL VENOUS BLD VENIPUNCTURE: CPT | Mod: NTX | Performed by: INTERNAL MEDICINE

## 2023-10-29 PROCEDURE — 85025 COMPLETE CBC W/AUTO DIFF WBC: CPT | Mod: NTX | Performed by: EMERGENCY MEDICINE

## 2023-10-29 PROCEDURE — 93005 ELECTROCARDIOGRAM TRACING: CPT | Mod: NTX

## 2023-10-29 PROCEDURE — 83605 ASSAY OF LACTIC ACID: CPT | Mod: NTX | Performed by: EMERGENCY MEDICINE

## 2023-10-29 RX ORDER — ACETAMINOPHEN 500 MG
500 TABLET ORAL EVERY 6 HOURS PRN
COMMUNITY

## 2023-10-29 RX ORDER — INSULIN ASPART 100 [IU]/ML
0-5 INJECTION, SOLUTION INTRAVENOUS; SUBCUTANEOUS
Status: DISCONTINUED | OUTPATIENT
Start: 2023-10-29 | End: 2023-10-29

## 2023-10-29 RX ORDER — CARVEDILOL 12.5 MG/1
12.5 TABLET ORAL 2 TIMES DAILY WITH MEALS
Status: DISCONTINUED | OUTPATIENT
Start: 2023-10-29 | End: 2023-10-31

## 2023-10-29 RX ORDER — SODIUM CHLORIDE 0.9 % (FLUSH) 0.9 %
10 SYRINGE (ML) INJECTION EVERY 12 HOURS PRN
Status: DISCONTINUED | OUTPATIENT
Start: 2023-10-29 | End: 2023-11-02 | Stop reason: HOSPADM

## 2023-10-29 RX ORDER — HYDRALAZINE HYDROCHLORIDE 20 MG/ML
10 INJECTION INTRAMUSCULAR; INTRAVENOUS
Status: COMPLETED | OUTPATIENT
Start: 2023-10-29 | End: 2023-10-29

## 2023-10-29 RX ORDER — ONDANSETRON 8 MG/1
8 TABLET, ORALLY DISINTEGRATING ORAL EVERY 8 HOURS PRN
Status: DISCONTINUED | OUTPATIENT
Start: 2023-10-29 | End: 2023-11-02 | Stop reason: HOSPADM

## 2023-10-29 RX ORDER — POLYETHYLENE GLYCOL 3350 17 G/17G
17 POWDER, FOR SOLUTION ORAL DAILY
Status: DISCONTINUED | OUTPATIENT
Start: 2023-10-29 | End: 2023-11-02 | Stop reason: HOSPADM

## 2023-10-29 RX ORDER — HYDRALAZINE HYDROCHLORIDE 20 MG/ML
10 INJECTION INTRAMUSCULAR; INTRAVENOUS EVERY 6 HOURS PRN
Status: DISCONTINUED | OUTPATIENT
Start: 2023-10-29 | End: 2023-10-31

## 2023-10-29 RX ORDER — AZITHROMYCIN 250 MG/1
500 TABLET, FILM COATED ORAL DAILY
Status: DISCONTINUED | OUTPATIENT
Start: 2023-10-30 | End: 2023-11-01

## 2023-10-29 RX ORDER — HYDRALAZINE HYDROCHLORIDE 25 MG/1
100 TABLET, FILM COATED ORAL 3 TIMES DAILY
Status: DISCONTINUED | OUTPATIENT
Start: 2023-10-29 | End: 2023-11-02 | Stop reason: HOSPADM

## 2023-10-29 RX ORDER — CLONIDINE HYDROCHLORIDE 0.1 MG/1
0.3 TABLET ORAL
Status: COMPLETED | OUTPATIENT
Start: 2023-10-29 | End: 2023-10-29

## 2023-10-29 RX ORDER — IBUPROFEN 200 MG
24 TABLET ORAL
Status: DISCONTINUED | OUTPATIENT
Start: 2023-10-29 | End: 2023-10-29

## 2023-10-29 RX ORDER — CARVEDILOL 12.5 MG/1
12.5 TABLET ORAL
Status: DISCONTINUED | OUTPATIENT
Start: 2023-10-29 | End: 2023-10-29

## 2023-10-29 RX ORDER — GLUCAGON 1 MG
1 KIT INJECTION
Status: DISCONTINUED | OUTPATIENT
Start: 2023-10-29 | End: 2023-10-29

## 2023-10-29 RX ORDER — METHYLPREDNISOLONE SOD SUCC 125 MG
125 VIAL (EA) INJECTION
Status: COMPLETED | OUTPATIENT
Start: 2023-10-29 | End: 2023-10-29

## 2023-10-29 RX ORDER — IBUPROFEN 200 MG
16 TABLET ORAL
Status: DISCONTINUED | OUTPATIENT
Start: 2023-10-29 | End: 2023-10-29

## 2023-10-29 RX ORDER — NALOXONE HCL 0.4 MG/ML
0.02 VIAL (ML) INJECTION
Status: DISCONTINUED | OUTPATIENT
Start: 2023-10-29 | End: 2023-11-02 | Stop reason: HOSPADM

## 2023-10-29 RX ORDER — CLONIDINE HYDROCHLORIDE 0.1 MG/1
0.3 TABLET ORAL 3 TIMES DAILY
Status: DISCONTINUED | OUTPATIENT
Start: 2023-10-29 | End: 2023-10-31

## 2023-10-29 RX ORDER — HYDRALAZINE HYDROCHLORIDE 25 MG/1
100 TABLET, FILM COATED ORAL
Status: DISCONTINUED | OUTPATIENT
Start: 2023-10-29 | End: 2023-10-29

## 2023-10-29 RX ORDER — ACETAMINOPHEN 500 MG
1000 TABLET ORAL EVERY 8 HOURS PRN
Status: DISCONTINUED | OUTPATIENT
Start: 2023-10-29 | End: 2023-11-02 | Stop reason: HOSPADM

## 2023-10-29 RX ORDER — CLONIDINE HYDROCHLORIDE 0.1 MG/1
0.3 TABLET ORAL
Status: DISCONTINUED | OUTPATIENT
Start: 2023-10-29 | End: 2023-10-29

## 2023-10-29 RX ORDER — IPRATROPIUM BROMIDE AND ALBUTEROL SULFATE 2.5; .5 MG/3ML; MG/3ML
3 SOLUTION RESPIRATORY (INHALATION)
Status: COMPLETED | OUTPATIENT
Start: 2023-10-29 | End: 2023-10-29

## 2023-10-29 RX ORDER — SEVELAMER CARBONATE 800 MG/1
1600 TABLET, FILM COATED ORAL
Status: DISCONTINUED | OUTPATIENT
Start: 2023-10-29 | End: 2023-11-02 | Stop reason: HOSPADM

## 2023-10-29 RX ORDER — SENNOSIDES 8.6 MG/1
8.6 TABLET ORAL DAILY PRN
Status: DISCONTINUED | OUTPATIENT
Start: 2023-10-29 | End: 2023-11-02 | Stop reason: HOSPADM

## 2023-10-29 RX ORDER — AMOXICILLIN 250 MG
1 CAPSULE ORAL 2 TIMES DAILY
Status: DISCONTINUED | OUTPATIENT
Start: 2023-10-29 | End: 2023-10-29

## 2023-10-29 RX ADMIN — CLONIDINE HYDROCHLORIDE 0.3 MG: 0.1 TABLET ORAL at 01:10

## 2023-10-29 RX ADMIN — CLONIDINE HYDROCHLORIDE 0.3 MG: 0.1 TABLET ORAL at 10:10

## 2023-10-29 RX ADMIN — IPRATROPIUM BROMIDE AND ALBUTEROL SULFATE 3 ML: 2.5; .5 SOLUTION RESPIRATORY (INHALATION) at 10:10

## 2023-10-29 RX ADMIN — AZITHROMYCIN 500 MG: 500 INJECTION, POWDER, LYOPHILIZED, FOR SOLUTION INTRAVENOUS at 01:10

## 2023-10-29 RX ADMIN — CARVEDILOL 12.5 MG: 12.5 TABLET, FILM COATED ORAL at 05:10

## 2023-10-29 RX ADMIN — CLONIDINE HYDROCHLORIDE 0.3 MG: 0.1 TABLET ORAL at 04:10

## 2023-10-29 RX ADMIN — SEVELAMER CARBONATE 1600 MG: 800 TABLET, FILM COATED ORAL at 04:10

## 2023-10-29 RX ADMIN — IPRATROPIUM BROMIDE AND ALBUTEROL SULFATE 3 ML: 2.5; .5 SOLUTION RESPIRATORY (INHALATION) at 11:10

## 2023-10-29 RX ADMIN — HYDRALAZINE HYDROCHLORIDE 100 MG: 25 TABLET, FILM COATED ORAL at 04:10

## 2023-10-29 RX ADMIN — HYDRALAZINE HYDROCHLORIDE 10 MG: 20 INJECTION INTRAMUSCULAR; INTRAVENOUS at 11:10

## 2023-10-29 RX ADMIN — METHYLPREDNISOLONE SODIUM SUCCINATE 125 MG: 125 INJECTION, POWDER, FOR SOLUTION INTRAMUSCULAR; INTRAVENOUS at 11:10

## 2023-10-29 RX ADMIN — CEFTRIAXONE 1 G: 1 INJECTION, POWDER, FOR SOLUTION INTRAMUSCULAR; INTRAVENOUS at 12:10

## 2023-10-29 RX ADMIN — HYDRALAZINE HYDROCHLORIDE 100 MG: 25 TABLET, FILM COATED ORAL at 10:10

## 2023-10-29 NOTE — ASSESSMENT & PLAN NOTE
- Hb ~ 10.3 on admission  - Normocytic, normochromic  - Likely 2/2 pt's ESRD  - Monitor daily CBC  - May transfuse for Hb < 7.0

## 2023-10-29 NOTE — ASSESSMENT & PLAN NOTE
- Pt w/ systolic BP ranging 180-200 systolic on admission  - No evidence for end-organ damage  - Likely volume-driven in the setting of her ESRD, could also be a rebound effect from not taking her Clonidine  - Pt on Coreg 12.5 mg BID, Clonidine 0.3 mg TID and Hydralazine 100 mg TID at home  - Will resume her home medications and defer to nephrology making any changes to her current antihypertensive regimen

## 2023-10-29 NOTE — HPI
April Vasquez is a 68 y.o. female with a PMHx of HTN, DM type II, and ESRD on MWF HD,  who presented to [facility] ED w/ worsening productive cough.   Pt reports that her symptoms first started about 2 weeks ago w/ mild cough initially, but her cough has been getting worse over the last 3 days w/ production of greenish sputum. Her cough has been associated w/ chills, subjective fevers, headache, dizziness and generalized malaise. She also reports associated rhinorrhea and congestion. She tried OP Robitussin w/o significant relief of her symptoms. Pt denied any chest pain, palpitations, SOB, or recent sick contacts. According to her, she had hx of heavy smoking in the past but she quitted about 4-5 years ago. She doesn't recall being to the hospital before w/ respiratory symptoms, and she was never diagnosed w/ COPD or asthma.    On admission to the ED; pt was hypertensive w/ -200 systolic and borderline hypoxic w/ SpO2 92-94%, otherwise vitally stable. Her admission labs were remarkable for WBCs 17.2, Hb 10.3, CO2 21, AG 17, phos 6.1, BUN 31, Cr 7.2, BNP 3,403 and mildly elevated procal at 0.49. She tested -ve for COVID-19 and Influenza. Her CXR was remarkable for perihilar interstitial opacities suggestive of mild interstitial edema or pneumonitis.  No confluent area of consolidation. Pt received a dose of IV Ceftriaxone, IV Azithro and a dose of IV methylprednisolone 125 mg before she was admitted for further inpatient management.

## 2023-10-29 NOTE — PHARMACY MED REC
"Admission Medication History     The home medication history was taken by Ximena Daley.    You may go to "Admission" then "Reconcile Home Medications" tabs to review and/or act upon these items.     The home medication list has been updated by the Pharmacy department.   Please read ALL comments highlighted in yellow.   Please address this information as you see fit.    Feel free to contact us if you have any questions or require assistance.    Medications listed below were obtained from: Patient/family and Analytic software- [employer] and added to home medication:     Vitamin D   Acetaminophen 500 mg    The medication reconciliation was completed by the patient's bedside.      Ximena Daley  [phone]                  .          "

## 2023-10-29 NOTE — ED PROVIDER NOTES
Encounter Date: 10/29/2023    SCRIBE #1 NOTE: I, Irma Huang, am scribing for, and in the presence of,  Sukhdev Jones MD. I have scribed the following portions of the note - Other sections scribed: HPI, ROS, PE, MDM.       History     Chief Complaint   Patient presents with    Cough     Reports cough and weakness x 4 days. Reports sob. Denies CP. /105 in triage, denies taking meds today. Reports dizziness reports dialysis M/W/F, received full treatment Friday.      April Vasquez is a 68 y.o. female, with a PMHx of HTN, pulmonary edema, ESRD on dialysis MWF, DM type II, who presents to the ED with complaint of cough (with green production) onset 2 weeks ago. Patient reports associated lightheadedness (since 3 days ago), HA, rhinorrhea, congestion. Patient has been taking Robitussin to treat her symptoms. No other exacerbating or alleviating factors. Denies bleeding or other associated symptoms. Patient quit smoking tobacco 4 years ago (smoked most of her life).    The history is provided by the patient. No  was used.     Review of patient's allergies indicates:  No Known Allergies  Past Medical History:   Diagnosis Date    Anemia     Colon polyps     COVID-19 08/2021    Diabetes mellitus, type 2     ESRD (end stage renal disease) on dialysis 2017    Gallstones     Hypertension     Pulmonary edema     Renal disorder     dialysis MIRANDA fistula    Tobacco abuse     Uses walker      Past Surgical History:   Procedure Laterality Date    AVF  2017    CHOLECYSTECTOMY  2016    COLONOSCOPY N/A 8/6/2020    Procedure: COLONOSCOPY;  Surgeon: Hood Hernadez MD;  Location: [facility] ([address] [place] [address]);  Service: Endoscopy;  Laterality: N/A;  labs prior-dialysis  covid test lapalco 8/3    Permacath Right 2017    TUBAL LIGATION       Family History   Problem Relation Age of Onset    Kidney disease Mother     Hypertension Mother     Cervical cancer Mother     Cancer Mother     Ovarian cancer  Mother     Diabetes Father     Drug abuse Paternal Grandmother     Diabetes Sister     No Known Problems Sister     No Known Problems Sister     No Known Problems Sister     No Known Problems Son     No Known Problems Son     No Known Problems Son     No Known Problems Daughter     Breast cancer Neg Hx     Colon cancer Neg Hx      Social History     Tobacco Use    Smoking status: Former     Current packs/day: 0.00     Average packs/day: 0.5 packs/day for 20.0 years (10.0 ttl pk-yrs)     Types: Cigarettes     Start date: 6/24/2000     Quit date: 6/24/2020     Years since quitting: 3.3    Smokeless tobacco: Never   Substance Use Topics    Alcohol use: No    Drug use: No     Review of Systems   Constitutional:  Negative for chills and fever.   HENT:  Positive for congestion and rhinorrhea. Negative for sore throat.    Eyes:  Negative for visual disturbance.   Respiratory:  Positive for cough. Negative for shortness of breath.    Cardiovascular:  Negative for chest pain.   Gastrointestinal:  Negative for abdominal pain, blood in stool, diarrhea, nausea and vomiting.   Genitourinary:  Negative for dysuria, frequency and hematuria.   Musculoskeletal:  Negative for back pain.   Skin:  Negative for rash.   Neurological:  Positive for light-headedness and headaches. Negative for dizziness and weakness.       Physical Exam     Initial Vitals [10/29/23 0929]   BP Pulse Resp Temp SpO2   (!) 243/105 70 16 98 °F (36.7 °C) 96 %      MAP       --         Physical Exam    Nursing note and vitals reviewed.  Constitutional: She appears well-developed and well-nourished.   HENT:   Head: Normocephalic and atraumatic.   Eyes: Conjunctivae and EOM are normal. Pupils are equal, round, and reactive to light.   No pallor.   Neck: Neck supple. No thyromegaly present. No JVD present.   Normal range of motion.  Cardiovascular:  Normal rate, regular rhythm and normal heart sounds.     Exam reveals no gallop and no friction rub.       No murmur  heard.  Pulmonary/Chest: No respiratory distress.   Rhonchi cleared with cough. Bilateral wheezing.   Abdominal: Abdomen is soft. Bowel sounds are normal. There is no abdominal tenderness.   Musculoskeletal:         General: No tenderness or edema. Normal range of motion.      Cervical back: Normal range of motion and neck supple.      Comments: Left biceps AV graft with good flow. No leg edema.      Neurological: She is alert and oriented to person, place, and time. She has normal strength. GCS score is 15. GCS eye subscore is 4. GCS verbal subscore is 5. GCS motor subscore is 6.   Skin: Skin is warm and dry. Capillary refill takes less than 2 seconds.   Psychiatric: She has a normal mood and affect.         ED Course   Procedures  Labs Reviewed   CBC W/ AUTO DIFFERENTIAL - Abnormal; Notable for the following components:       Result Value    WBC 17.23 (*)     RBC 3.45 (*)     Hemoglobin 10.3 (*)     Hematocrit 32.4 (*)     MCHC 31.8 (*)     RDW 16.6 (*)     Immature Granulocytes 0.6 (*)     Gran # (ANC) 14.9 (*)     Immature Grans (Abs) 0.10 (*)     Gran % 86.4 (*)     Lymph % 7.6 (*)     All other components within normal limits   COMPREHENSIVE METABOLIC PANEL - Abnormal; Notable for the following components:    CO2 21 (*)     BUN 31 (*)     Creatinine 7.2 (*)     Albumin 2.9 (*)     ALT 5 (*)     eGFR 6 (*)     Anion Gap 17 (*)     All other components within normal limits   B-TYPE NATRIURETIC PEPTIDE - Abnormal; Notable for the following components:    BNP 3,403 (*)     All other components within normal limits   PHOSPHORUS - Abnormal; Notable for the following components:    Phosphorus 6.1 (*)     All other components within normal limits   PROCALCITONIN - Abnormal; Notable for the following components:    Procalcitonin 0.49 (*)     All other components within normal limits   CULTURE, BLOOD   CULTURE, BLOOD   TROPONIN I   MAGNESIUM   LACTIC ACID, PLASMA   SARS-COV-2 RDRP GENE   POCT INFLUENZA A/B MOLECULAR  "         Imaging Results              X-Ray Chest AP Portable (Final result)  Result time 10/29/23 11:26:43      Final result by Mark Caba MD (10/29/23 11:26:43)                   Impression:      Perihilar interstitial opacities suggestive of mild interstitial edema or pneumonitis.  No confluent area of consolidation.      Electronically signed by: Mark Caba MD  Date:    10/29/2023  Time:    11:26               Narrative:    EXAMINATION:  XR CHEST AP PORTABLE    CLINICAL HISTORY:  Provided history is "COUGH;  ".    TECHNIQUE:  One view of the chest.    COMPARISON:  10/24/2023.    FINDINGS:  Cardiac wires overlie the chest.  Cardiomediastinal silhouette is stable.  Atherosclerotic calcifications overlie the aortic arch.  Mild central vascular congestion with prominent perihilar interstitial lung markings.  Probable subsegmental atelectasis or scarring in the left mid lung.  No confluent area of consolidation.  No large pleural effusion.  No pneumothorax.                                       Medications   azithromycin (ZITHROMAX) 500 mg in dextrose 5 % (D5W) 250 mL IVPB (Vial-Mate) (500 mg Intravenous New Bag 10/29/23 1335)   cloNIDine tablet 0.3 mg (has no administration in time range)   hydrALAZINE injection 10 mg (10 mg Intravenous Given 10/29/23 1104)   albuterol-ipratropium 2.5 mg-0.5 mg/3 mL nebulizer solution 3 mL (3 mLs Nebulization Given 10/29/23 1105)   methylPREDNISolone sodium succinate injection 125 mg (125 mg Intravenous Given 10/29/23 1103)   cefTRIAXone (ROCEPHIN) 1 g in dextrose 5 % in water (D5W) 100 mL IVPB (MB+) (0 g Intravenous Stopped 10/29/23 1334)     Medical Decision Making  This is an emergent evaluation of a 68 y.o. female who presents with cough. The patient was seen and examined. The history and physical exam was obtained. The nursing notes and vital signs were reviewed. Secondary to symptoms and examination findings, I ordered chest xray, CBC, CMP, magnesium, phosphorus, " Troponin, BNP, lctic acid, procalcitonin, blood cultures, COVID, flu. Will treat with albuterol nebulizer, hydralazine, methylprednisolone.      Amount and/or Complexity of Data Reviewed  Labs: ordered.  Radiology: ordered.  ECG/medicine tests: ordered.    Risk  Prescription drug management.      Additional MDM:   Pneumonia: Status: The patient's pulse oximetry was borderline (93-94%). Blood Cultures: 2 blood cultures were done before antibiotics.  The patient has a community acquired pneumonia and is being treated with Ceftriaxone 1 g IVPB and Azithromycin 500 mg PO. Consultants: Internal Medicine. Condition: The patient's condition was felt to be stable.        Scribe Attestation:   Scribe #1: I performed the above scribed service and the documentation accurately describes the services I performed. I attest to the accuracy of the note.              Pt is class IV PSI/PORT with 8.2-9.3% mortality with hospitalization recommended. Curb 65 states moderate risk 6.8% mortality and to consider inpatient or outpatient with close follow up. Spoke with Dr. LIAT Gilliland. We will admit the patient.           Clinical Impression:   Final diagnoses:  [R06.02] SOB (shortness of breath)  [J18.9] Pneumonia        ED Disposition Condition    Admit                I, Sukhdev Jones, personally performed the services described in this documentation. All medical record entries made by the scribe were at my direction and in my presence. I have reviewed the chart and agree that the record reflects my personal performance and is accurate and complete.      Sukhdev Jones MD  10/29/23 1894

## 2023-10-29 NOTE — ASSESSMENT & PLAN NOTE
- Pt w/ Hx of ESRD, likely 2/2 longstanding hx of HTN / DM II  - Dialyzes Q MWF, last HD was on Friday 10/27, follows w/ Dr. Diego  - Resume HD per nephrology recs  - Resume home Renvela 1600 mg TID  - Avoid nephrotoxic meds  - Renally dose all medications

## 2023-10-29 NOTE — ASSESSMENT & PLAN NOTE
- Bicarb ~ 21, AG ~ 17 on admission  - Likely 2/2 pt's ESRD  - Will check lactate level  - Will likely improve w/ HD

## 2023-10-29 NOTE — NURSING
[facility] [place]  Nurses Note -- 4 Eyes      10/29/2023       Skin assessed on: Q Shift      [x] No Pressure Injuries Present    []Prevention Measures Documented    [] Yes LDA  for Pressure Injury Previously documented     [] Yes New Pressure Injury Discovered   [] LDA for New Pressure Injury Added      Attending RN:  Angélica Jerry RN     Second RN:  Jordan Juarez RN

## 2023-10-29 NOTE — H&P
[facility]  [facility] Medicine  History & Physical    Patient Name: April Vasquez  MRN: [id number]  Patient Class: IP- Inpatient  Admission Date: 10/29/2023  Attending Physician: La Gilliland MD   Primary Care Provider: Darrick Cabral MD         Patient information was obtained from patient and ER records.     Subjective:     Principal Problem:Community acquired pneumonia    Chief Complaint:   Chief Complaint   Patient presents with    Cough     Reports cough and weakness x 4 days. Reports sob. Denies CP. /105 in triage, denies taking meds today. Reports dizziness reports dialysis M/W/F, received full treatment Friday.         HPI: April Vasquez is a 68 y.o. female with a PMHx of HTN, DM type II, and ESRD on MWF HD,  who presented to [facility] ED w/ worsening productive cough.   Pt reports that her symptoms first started about 2 weeks ago w/ mild cough initially, but her cough has been getting worse over the last 3 days w/ production of greenish sputum. Her cough has been associated w/ chills, subjective fevers, headache, dizziness and generalized malaise. She also reports associated rhinorrhea and congestion. She tried OP Robitussin w/o significant relief of her symptoms. Pt denied any chest pain, palpitations, SOB, or recent sick contacts. According to her, she had hx of heavy smoking in the past but she quitted about 4-5 years ago. She doesn't recall being to the hospital before w/ respiratory symptoms, and she was never diagnosed w/ COPD or asthma.    On admission to the ED; pt was hypertensive w/ -200 systolic and borderline hypoxic w/ SpO2 92-94%, otherwise vitally stable. Her admission labs were remarkable for WBCs 17.2, Hb 10.3, CO2 21, AG 17, phos 6.1, BUN 31, Cr 7.2, BNP 3,403 and mildly elevated procal at 0.49. She tested -ve for COVID-19 and Influenza. Her CXR was remarkable for perihilar interstitial opacities suggestive of mild interstitial edema or pneumonitis.   No confluent area of consolidation. Pt received a dose of IV Ceftriaxone, IV Azithro and a dose of IV methylprednisolone 125 mg before she was admitted for further inpatient management.      Past Medical History:   Diagnosis Date    Anemia     Colon polyps     COVID-19 08/2021    Diabetes mellitus, type 2     ESRD (end stage renal disease) on dialysis 2017    Gallstones     Hypertension     Pulmonary edema     Renal disorder     dialysis MIRANDA fistula    Tobacco abuse     Uses walker        Past Surgical History:   Procedure Laterality Date    AVF  2017    CHOLECYSTECTOMY  2016    COLONOSCOPY N/A 8/6/2020    Procedure: COLONOSCOPY;  Surgeon: Hood Hernadez MD;  Location: [facility] ([address] [place] [address]);  Service: Endoscopy;  Laterality: N/A;  labs prior-dialysis  covid test lapalco 8/3    Permacath Right 2017    TUBAL LIGATION         Review of patient's allergies indicates:  No Known Allergies    No current facility-administered medications on file prior to encounter.     Current Outpatient Medications on File Prior to Encounter   Medication Sig    acetaminophen (TYLENOL) 500 MG tablet Take 500 mg by mouth every 6 (six) hours as needed for Pain.    carvediloL (COREG) 12.5 MG tablet Take 1 tablet (12.5 mg total) by mouth 2 (two) times daily with meals.    cloNIDine (CATAPRES) 0.3 MG tablet TAKE 1 TABLET(0.3 MG) BY MOUTH THREE TIMES DAILY    ergocalciferol, vitamin D2, (VITAMIN D ORAL) Take by mouth.    hydrALAZINE (APRESOLINE) 100 MG tablet TAKE 1 TABLET(100 MG) BY MOUTH THREE TIMES DAILY    sevelamer carbonate (RENVELA) 800 mg Tab Take 1,600 mg by mouth 3 (three) times daily with meals.     Family History       Problem Relation (Age of Onset)    Cancer Mother    Cervical cancer Mother    Diabetes Father, Sister    Drug abuse Paternal Grandmother    Hypertension Mother    Kidney disease Mother    No Known Problems Sister, Sister, Sister, Son, Son, Son, Daughter    Ovarian cancer Mother          Tobacco  Use    Smoking status: Former     Current packs/day: 0.00     Average packs/day: 0.5 packs/day for 20.0 years (10.0 ttl pk-yrs)     Types: Cigarettes     Start date: 6/24/2000     Quit date: 6/24/2020     Years since quitting: 3.3    Smokeless tobacco: Never   Substance and Sexual Activity    Alcohol use: No    Drug use: No    Sexual activity: Yes     Partners: Male     Birth control/protection: Post-menopausal     Review of Systems   Constitutional:  Positive for chills and fatigue.   HENT:  Positive for congestion and rhinorrhea.    Respiratory:  Positive for cough. Negative for shortness of breath and wheezing.    Cardiovascular:  Negative for chest pain, palpitations and leg swelling.   Gastrointestinal:  Negative for abdominal pain, diarrhea, nausea and vomiting.   Genitourinary:  Negative for dysuria and hematuria.   Musculoskeletal:  Positive for myalgias. Negative for arthralgias.   Neurological:  Positive for dizziness and headaches. Negative for weakness.   Psychiatric/Behavioral:  Negative for behavioral problems and confusion.      Objective:     Vital Signs (Most Recent):  Temp: 98 °F (36.7 °C) (10/29/23 0929)  Pulse: 68 (10/29/23 1430)  Resp: 17 (10/29/23 1430)  BP: (!) 171/85 (10/29/23 1430)  SpO2: 100 % (10/29/23 1430) Vital Signs (24h Range):  Temp:  [98 °F (36.7 °C)] 98 °F (36.7 °C)  Pulse:  [51-70] 68  Resp:  [12-21] 17  SpO2:  [94 %-100 %] 100 %  BP: (171-243)/() 171/85     Weight: 44 kg (97 lb)  Body mass index is 16.65 kg/m².     Physical Exam  Constitutional:       General: She is not in acute distress.     Appearance: Normal appearance. She is not toxic-appearing.   HENT:      Head: Normocephalic and atraumatic.      Nose: Congestion present.      Mouth/Throat:      Mouth: Mucous membranes are moist.      Pharynx: Oropharynx is clear.   Eyes:      Extraocular Movements: Extraocular movements intact.      Conjunctiva/sclera: Conjunctivae normal.   Cardiovascular:      Rate and  Rhythm: Normal rate and regular rhythm.      Pulses: Normal pulses.      Heart sounds: Normal heart sounds. No murmur heard.     No gallop.   Pulmonary:      Effort: Pulmonary effort is normal.      Comments: Pt had audible rales bilaterally, more prominent over the R side. No wheezes were appreciated.  Musculoskeletal:      Cervical back: Normal range of motion and neck supple.      Right lower leg: No edema.      Left lower leg: No edema.   Skin:     General: Skin is warm and dry.   Neurological:      General: No focal deficit present.      Mental Status: She is alert and oriented to person, place, and time.      Motor: No weakness.   Psychiatric:         Mood and Affect: Mood normal.         Behavior: Behavior normal.                Significant Labs: All pertinent labs within the past 24 hours have been reviewed.  CBC:   Recent Labs   Lab 10/29/23  0958   WBC 17.23*   HGB 10.3*   HCT 32.4*        CMP:   Recent Labs   Lab 10/29/23  0958      K 4.7      CO2 21*      BUN 31*   CREATININE 7.2*   CALCIUM 9.8   PROT 7.1   ALBUMIN 2.9*   BILITOT 0.5   ALKPHOS 120   AST 11   ALT 5*   ANIONGAP 17*     Cardiac Markers:   Recent Labs   Lab 10/29/23  0958   BNP 3,403*       Significant Imaging:     CXR AP Portable 10/29/2023:  FINDINGS:  Cardiac wires overlie the chest.  Cardiomediastinal silhouette is stable.  Atherosclerotic calcifications overlie the aortic arch.  Mild central vascular congestion with prominent perihilar interstitial lung markings.  Probable subsegmental atelectasis or scarring in the left mid lung.  No confluent area of consolidation.  No large pleural effusion.  No pneumothorax.     Impression:   Perihilar interstitial opacities suggestive of mild interstitial edema or pneumonitis.  No confluent area of consolidation.       Assessment/Plan:     * Community acquired pneumonia  - Pt w/ progressive productive cough and mild hypoxia x last 2 weeks  - Pt tested NEGATIVE for COVID  and Influenza  - Although her CXR was unremarkable for focal consolidates will start pt on Abx treatment given high suspicion for PNA, in the setting of her worsening smx, leukocytosis and elevated procal levels  - Pt received x1 dose of IV Ceftriaxone 1g + IV Azithromycin 500 mg in the ED, will cont x 5 days total (switched Azithro to PO)  - Obtain sputum culture  - Obtain urine for Streptococcal Ag  - Consider obtaining CT chest if no improvement in symptoms within 48 hrs of initiation of antibiotics      Secondary hyperparathyroidism of renal origin  - Pt's last PTH level ~ 528.3, 09/2023  - Currently normocalcemic, hyperphosphatemic  - Resume home Sevelamer 1600 mg TID      Anemia in chronic kidney disease  - Hb ~ 10.3 on admission  - Normocytic, normochromic  - Likely 2/2 pt's ESRD  - Monitor daily CBC  - May transfuse for Hb < 7.0    Hypertensive urgency  - Pt w/ systolic BP ranging 180-200 systolic on admission  - No evidence for end-organ damage  - Likely volume-driven in the setting of her ESRD, could also be a rebound effect from not taking her Clonidine  - Pt on Coreg 12.5 mg BID, Clonidine 0.3 mg TID and Hydralazine 100 mg TID at home  - Will resume her home medications and defer to nephrology making any changes to her current antihypertensive regimen     ESRD (end stage renal disease) on dialysis  - Pt w/ Hx of ESRD, likely 2/2 longstanding hx of HTN / DM II  - Dialyzes Q MWF, last HD was on Friday 10/27, follows w/ Dr. Diego  - Resume HD per nephrology recs  - Resume home Renvela 1600 mg TID  - Avoid nephrotoxic meds  - Renally dose all medications    Metabolic acidosis  - Bicarb ~ 21, AG ~ 17 on admission  - Likely 2/2 pt's ESRD  - Will check lactate level  - Will likely improve w/ HD    Diabetes mellitus type II, controlled  - last A1c ~ 4.3 ?!  - Pt not on any active treatment OP  - Cont to monitor for now        VTE Risk Mitigation (From admission, onward)         Ordered     IP VTE LOW RISK  PATIENT  Once         10/29/23 1352     Place sequential compression device  Until discontinued         10/29/23 7756                           La Gilliland MD  Department of Hospital Medicine  [facility] [place] - Emergency Dept

## 2023-10-29 NOTE — ASSESSMENT & PLAN NOTE
- Pt w/ progressive productive cough and mild hypoxia x last 2 weeks  - Pt tested NEGATIVE for COVID and Influenza  - Although her CXR was unremarkable for focal consolidates will start pt on Abx treatment given high suspicion for PNA, in the setting of her worsening smx, leukocytosis and elevated procal levels  - Pt received x1 dose of IV Ceftriaxone 1g + IV Azithromycin 500 mg in the ED, will cont x 5 days total (switched Azithro to PO)  - Obtain sputum culture  - Obtain urine for Streptococcal Ag  - Consider obtaining CT chest if no improvement in symptoms within 48 hrs of initiation of antibiotics

## 2023-10-29 NOTE — ASSESSMENT & PLAN NOTE
- Pt's last PTH level ~ 528.3, 09/2023  - Currently normocalcemic, hyperphosphatemic  - Resume home Sevelamer 1600 mg TID

## 2023-10-29 NOTE — SUBJECTIVE & OBJECTIVE
Past Medical History:   Diagnosis Date    Anemia     Colon polyps     COVID-19 08/2021    Diabetes mellitus, type 2     ESRD (end stage renal disease) on dialysis 2017    Gallstones     Hypertension     Pulmonary edema     Renal disorder     dialysis MIRANDA fistula    Tobacco abuse     Uses walker        Past Surgical History:   Procedure Laterality Date    AVF  2017    CHOLECYSTECTOMY  2016    COLONOSCOPY N/A 8/6/2020    Procedure: COLONOSCOPY;  Surgeon: Hood Hernadez MD;  Location: [address]);  Service: Endoscopy;  Laterality: N/A;  labs prior-dialysis  covid test lapalco 8/3    Permacath Right 2017    TUBAL LIGATION         Review of patient's allergies indicates:  No Known Allergies    No current facility-administered medications on file prior to encounter.     Current Outpatient Medications on File Prior to Encounter   Medication Sig    acetaminophen (TYLENOL) 500 MG tablet Take 500 mg by mouth every 6 (six) hours as needed for Pain.    carvediloL (COREG) 12.5 MG tablet Take 1 tablet (12.5 mg total) by mouth 2 (two) times daily with meals.    cloNIDine (CATAPRES) 0.3 MG tablet TAKE 1 TABLET(0.3 MG) BY MOUTH THREE TIMES DAILY    ergocalciferol, vitamin D2, (VITAMIN D ORAL) Take by mouth.    hydrALAZINE (APRESOLINE) 100 MG tablet TAKE 1 TABLET(100 MG) BY MOUTH THREE TIMES DAILY    sevelamer carbonate (RENVELA) 800 mg Tab Take 1,600 mg by mouth 3 (three) times daily with meals.     Family History       Problem Relation (Age of Onset)    Cancer Mother    Cervical cancer Mother    Diabetes Father, Sister    Drug abuse Paternal Grandmother    Hypertension Mother    Kidney disease Mother    No Known Problems Sister, Sister, Sister, Son, Son, Son, Daughter    Ovarian cancer Mother          Tobacco Use    Smoking status: Former     Current packs/day: 0.00     Average packs/day: 0.5 packs/day for 20.0 years (10.0 ttl pk-yrs)     Types: Cigarettes     Start date: 6/24/2000     Quit date: 6/24/2020     Years  since quitting: 3.3    Smokeless tobacco: Never   Substance and Sexual Activity    Alcohol use: No    Drug use: No    Sexual activity: Yes     Partners: Male     Birth control/protection: Post-menopausal     Review of Systems   Constitutional:  Positive for chills and fatigue.   HENT:  Positive for congestion and rhinorrhea.    Respiratory:  Positive for cough. Negative for shortness of breath and wheezing.    Cardiovascular:  Negative for chest pain, palpitations and leg swelling.   Gastrointestinal:  Negative for abdominal pain, diarrhea, nausea and vomiting.   Genitourinary:  Negative for dysuria and hematuria.   Musculoskeletal:  Positive for myalgias. Negative for arthralgias.   Neurological:  Positive for dizziness and headaches. Negative for weakness.   Psychiatric/Behavioral:  Negative for behavioral problems and confusion.      Objective:     Vital Signs (Most Recent):  Temp: 98 °F (36.7 °C) (10/29/23 0929)  Pulse: 68 (10/29/23 1430)  Resp: 17 (10/29/23 1430)  BP: (!) 171/85 (10/29/23 1430)  SpO2: 100 % (10/29/23 1430) Vital Signs (24h Range):  Temp:  [98 °F (36.7 °C)] 98 °F (36.7 °C)  Pulse:  [51-70] 68  Resp:  [12-21] 17  SpO2:  [94 %-100 %] 100 %  BP: (171-243)/() 171/85     Weight: 44 kg (97 lb)  Body mass index is 16.65 kg/m².     Physical Exam  Constitutional:       General: She is not in acute distress.     Appearance: Normal appearance. She is not toxic-appearing.   HENT:      Head: Normocephalic and atraumatic.      Nose: Congestion present.      Mouth/Throat:      Mouth: Mucous membranes are moist.      Pharynx: Oropharynx is clear.   Eyes:      Extraocular Movements: Extraocular movements intact.      Conjunctiva/sclera: Conjunctivae normal.   Cardiovascular:      Rate and Rhythm: Normal rate and regular rhythm.      Pulses: Normal pulses.      Heart sounds: Normal heart sounds. No murmur heard.     No gallop.   Pulmonary:      Effort: Pulmonary effort is normal.      Comments: Pt had  audible rales bilaterally, more prominent over the R side. No wheezes were appreciated.  Musculoskeletal:      Cervical back: Normal range of motion and neck supple.      Right lower leg: No edema.      Left lower leg: No edema.   Skin:     General: Skin is warm and dry.   Neurological:      General: No focal deficit present.      Mental Status: She is alert and oriented to person, place, and time.      Motor: No weakness.   Psychiatric:         Mood and Affect: Mood normal.         Behavior: Behavior normal.                Significant Labs: All pertinent labs within the past 24 hours have been reviewed.  CBC:   Recent Labs   Lab 10/29/23  0958   WBC 17.23*   HGB 10.3*   HCT 32.4*        CMP:   Recent Labs   Lab 10/29/23  0958      K 4.7      CO2 21*      BUN 31*   CREATININE 7.2*   CALCIUM 9.8   PROT 7.1   ALBUMIN 2.9*   BILITOT 0.5   ALKPHOS 120   AST 11   ALT 5*   ANIONGAP 17*     Cardiac Markers:   Recent Labs   Lab 10/29/23  0958   BNP 3,403*       Significant Imaging:     CXR AP Portable 10/29/2023:  FINDINGS:  Cardiac wires overlie the chest.  Cardiomediastinal silhouette is stable.  Atherosclerotic calcifications overlie the aortic arch.  Mild central vascular congestion with prominent perihilar interstitial lung markings.  Probable subsegmental atelectasis or scarring in the left mid lung.  No confluent area of consolidation.  No large pleural effusion.  No pneumothorax.     Impression:   Perihilar interstitial opacities suggestive of mild interstitial edema or pneumonitis.  No confluent area of consolidation.

## 2023-10-30 PROBLEM — I50.30 (HFPEF) HEART FAILURE WITH PRESERVED EJECTION FRACTION: Status: ACTIVE | Noted: 2023-10-30

## 2023-10-30 LAB
HLA DQA1 1: NORMAL
HLA DQA1 2: NORMAL
HLA SSO DNA TYPING - DPA INTERPRETATION: NORMAL
HLA SSO DNA TYPING - DPB INTERPRETATION: NORMAL
HLA-DPA1 1: NORMAL
HLA-DPA1 2: NORMAL
HLA-DPB1 1: NORMAL
HLA-DPB1 2: NORMAL
Lab: NORMAL
POCT GLUCOSE: 102 MG/DL (ref 70–110)
POCT GLUCOSE: 143 MG/DL (ref 70–110)
POCT GLUCOSE: 151 MG/DL (ref 70–110)
SSDPA TESTING DATE: NORMAL
SSDPB TESTING DATE: NORMAL
SSDQA TESTING DATE: NORMAL

## 2023-10-30 PROCEDURE — 11000001 HC ACUTE MED/SURG PRIVATE ROOM: Mod: NTX

## 2023-10-30 PROCEDURE — 25000003 PHARM REV CODE 250: Mod: NTX | Performed by: INTERNAL MEDICINE

## 2023-10-30 PROCEDURE — 80100016 HC MAINTENANCE HEMODIALYSIS: Mod: NTX

## 2023-10-30 PROCEDURE — 63600175 PHARM REV CODE 636 W HCPCS: Mod: NTX | Performed by: INTERNAL MEDICINE

## 2023-10-30 PROCEDURE — 63700000 PHARM REV CODE 250 ALT 637 W/O HCPCS: Mod: NTX | Performed by: INTERNAL MEDICINE

## 2023-10-30 RX ORDER — AZITHROMYCIN 500 MG/1
500 TABLET, FILM COATED ORAL DAILY
Qty: 1 TABLET | Refills: 0 | Status: SHIPPED | OUTPATIENT
Start: 2023-10-31 | End: 2023-11-01

## 2023-10-30 RX ORDER — MUPIROCIN 20 MG/G
OINTMENT TOPICAL 2 TIMES DAILY
Status: DISCONTINUED | OUTPATIENT
Start: 2023-10-30 | End: 2023-11-02 | Stop reason: HOSPADM

## 2023-10-30 RX ORDER — CEFDINIR 300 MG/1
300 CAPSULE ORAL 2 TIMES DAILY
Qty: 10 CAPSULE | Refills: 0 | Status: SHIPPED | OUTPATIENT
Start: 2023-10-31 | End: 2023-11-02 | Stop reason: SDUPTHER

## 2023-10-30 RX ADMIN — CLONIDINE HYDROCHLORIDE 0.3 MG: 0.1 TABLET ORAL at 05:10

## 2023-10-30 RX ADMIN — CARVEDILOL 12.5 MG: 12.5 TABLET, FILM COATED ORAL at 05:10

## 2023-10-30 RX ADMIN — AZITHROMYCIN DIHYDRATE 500 MG: 250 TABLET ORAL at 08:10

## 2023-10-30 RX ADMIN — HYDRALAZINE HYDROCHLORIDE 100 MG: 25 TABLET, FILM COATED ORAL at 05:10

## 2023-10-30 RX ADMIN — SEVELAMER CARBONATE 1600 MG: 800 TABLET, FILM COATED ORAL at 12:10

## 2023-10-30 RX ADMIN — SEVELAMER CARBONATE 1600 MG: 800 TABLET, FILM COATED ORAL at 05:10

## 2023-10-30 RX ADMIN — HYDRALAZINE HYDROCHLORIDE 10 MG: 20 INJECTION INTRAMUSCULAR; INTRAVENOUS at 06:10

## 2023-10-30 RX ADMIN — HYDRALAZINE HYDROCHLORIDE 100 MG: 25 TABLET, FILM COATED ORAL at 09:10

## 2023-10-30 RX ADMIN — CEFTRIAXONE 1 G: 1 INJECTION, POWDER, FOR SOLUTION INTRAMUSCULAR; INTRAVENOUS at 08:10

## 2023-10-30 RX ADMIN — CLONIDINE HYDROCHLORIDE 0.3 MG: 0.1 TABLET ORAL at 09:10

## 2023-10-30 RX ADMIN — HYDRALAZINE HYDROCHLORIDE 10 MG: 20 INJECTION INTRAMUSCULAR; INTRAVENOUS at 12:10

## 2023-10-30 RX ADMIN — MUPIROCIN: 20 OINTMENT TOPICAL at 09:10

## 2023-10-30 NOTE — PROGRESS NOTES
AVS virtually reviewed with patient in its entirety with emphasis on medications, follow-up appointments and reasons to return to the ED or contact the [facility] On Call Nurse Care Line. Patient also encouraged to utilize their patient portal. Ease and convenience of use reiterated. Education complete and patient voiced understanding. All questions answered. Discharge teaching complete.   *Pt remains hypertensive post HD (SBP 200s) Pt states it is usually 160s-180s after dialysis. Bedside RN gave BP meds-discharge may be held until BP improves.*

## 2023-10-30 NOTE — CONSULTS
68 y o f known to me with esrd on chronic HD at [facility] MWF last rx this past Friday usual tx 3 h 30 min. Admitted with 1-2 week hx of progressive cough but in the last 2 days worst with dyspnea . Admitted for eval     Admit wbc 17K with mild L shift     Renal input requested to help with care     Denies CNS ENT GI  RHEUM OR DERM SX has had some recent night sweats and chills    Past Medical History:   Diagnosis Date    (HFpEF) heart failure with preserved ejection fraction 10/30/2023    Anemia     Colon polyps     COVID-19 08/2021    Diabetes mellitus, type 2     ESRD (end stage renal disease) on dialysis 2017    Gallstones     Hypertension     Pulmonary edema     Renal disorder     dialysis MIRANDA fistula    Tobacco abuse     Uses walker      Past Surgical History:   Procedure Laterality Date    AVF  2017    CHOLECYSTECTOMY  2016    COLONOSCOPY N/A 8/6/2020    Procedure: COLONOSCOPY;  Surgeon: Hood Hernadez MD;  Location: [facility] ([address] [place] [address]);  Service: Endoscopy;  Laterality: N/A;  labs prior-dialysis  covid test lapalco 8/3    Permacath Right 2017    TUBAL LIGATION       Review of patient's allergies indicates:  No Known Allergies    Current Facility-Administered Medications   Medication    acetaminophen tablet 1,000 mg    azithromycin tablet 500 mg    carvediloL tablet 12.5 mg    cefTRIAXone (ROCEPHIN) 1 g in dextrose 5 % in water (D5W) 100 mL IVPB (MB+)    cloNIDine tablet 0.3 mg    dextrose 10% bolus 125 mL 125 mL    dextrose 10% bolus 250 mL 250 mL    hydrALAZINE injection 10 mg    hydrALAZINE tablet 100 mg    mupirocin 2 % ointment    naloxone 0.4 mg/mL injection 0.02 mg    ondansetron disintegrating tablet 8 mg    polyethylene glycol packet 17 g    senna tablet 8.6 mg    sevelamer carbonate tablet 1,600 mg    sodium chloride 0.9% bolus 250 mL 250 mL    sodium chloride 0.9% flush 10 mL     Social History     Socioeconomic History    Marital status:     Number of children: 4   Occupational  History    Occupation: disabled x 2009,  at the [employer]   Tobacco Use    Smoking status: Former     Current packs/day: 0.00     Average packs/day: 0.5 packs/day for 20.0 years (10.0 ttl pk-yrs)     Types: Cigarettes     Start date: 6/24/2000     Quit date: 6/24/2020     Years since quitting: 3.3    Smokeless tobacco: Never   Substance and Sexual Activity    Alcohol use: No    Drug use: No    Sexual activity: Yes     Partners: Male     Birth control/protection: Post-menopausal   Social History Narrative    Lives with her 2 sons    Caregiver Son Tor     Family History   Problem Relation Age of Onset    Kidney disease Mother     Hypertension Mother     Cervical cancer Mother     Cancer Mother     Ovarian cancer Mother     Diabetes Father     Drug abuse Paternal Grandmother     Diabetes Sister     No Known Problems Sister     No Known Problems Sister     No Known Problems Sister     No Known Problems Son     No Known Problems Son     No Known Problems Son     No Known Problems Daughter     Breast cancer Neg Hx     Colon cancer Neg Hx          LABS    Recent Results (from the past 24 hour(s))   POCT glucose    Collection Time: 10/29/23  2:43 PM   Result Value Ref Range    POCT Glucose 225 (H) 70 - 110 mg/dL   Lactic acid, plasma    Collection Time: 10/29/23  4:59 PM   Result Value Ref Range    Lactate (Lactic Acid) 1.0 0.5 - 2.2 mmol/L   POCT glucose    Collection Time: 10/29/23  8:17 PM   Result Value Ref Range    POCT Glucose 153 (H) 70 - 110 mg/dL   POCT glucose    Collection Time: 10/30/23  8:02 AM   Result Value Ref Range    POCT Glucose 143 (H) 70 - 110 mg/dL   ]    I/O last 3 completed shifts:  In: 239.3 [IV Piggyback:239.3]  Out: -     Vitals:    10/30/23 0527 10/30/23 0712 10/30/23 0900 10/30/23 1051   BP: (!) 186/62 (!) 193/81  (!) 179/81   Pulse:  (!) 57  61   Resp:  18  20   Temp:  98.3 °F (36.8 °C)  98.7 °F (37.1 °C)   TempSrc:  Oral  Oral   SpO2:  96% 96% 96%   Weight:       Height:            No Jvd, Thyromegaly or Lymphadenopathy  Lungs: Fairly clear anteriorly and laterally  Cor: RRR no G or rubs  Abd: Soft benign good bowel sounds non tender  Ext: No E C C  L arm avf with good p-b-t    A)    ESRD  PNA ??  COPD?? Pt is an ex smoker  Anem ia of ckd  2nd hyperpth   DM  Hypoalb   Degree of chf ( ef 47%)    P)    Renal Diet  Home meds  Protect access  HD mwf  EPO  prn   Binders prn   Adjust all meds to the degree of renal fx  Close follow up I/O and weights  Maintain Hydration

## 2023-10-30 NOTE — NURSING
[facility] [place]  Nurses Note -- 4 Eyes      10/30/2023       Skin assessed on: Q Shift      [x] No Pressure Injuries Present    [x]Prevention Measures Documented    [] Yes LDA  for Pressure Injury Previously documented     [] Yes New Pressure Injury Discovered   [] LDA for New Pressure Injury Added      Attending RN:  Xiomara Denise RN     Second RN:  Farheen Guillen LPN

## 2023-10-30 NOTE — PLAN OF CARE
10/30/23 1434   Final Note   Assessment Type Final Discharge Note   Anticipated Discharge Disposition Home   What phone number can be called within the next 1-3 days to see how you are doing after discharge? [phone]   Hospital Resources/Appts/Education Provided Provided patient/caregiver with written discharge plan information;Appointments scheduled and added to AVS   Post-Acute Status   Post-Acute Authorization Dialysis   Diaylsis Status Set-up Complete/Auth obtained   Discharge Delays None known at this time     SW spoke with patient regarding anticipated discharge for today. Patient to resume dialysis treatment at [facility], discharge orders faxed as requested to [facility] at [phone]. Patient to go home with her son at discharge. Nurse Xiomara Denise notified that patient is clear for discharge from case management standpoint.

## 2023-10-30 NOTE — DISCHARGE INSTRUCTIONS
Resistant Blood Pressure, BP is 143/65 - 150/70 on the following regimen:  Nifedipine 60 mg b.i.d.  Coreg 12.5 mg b.i.d. (heart rate will not allow higher dose of this)  Clonidine 0.3 mg t.i.d.  Hydralazine 100 mg t.i.d.  Minoxidil 20 mg daily  Losartan 100 mg daily  Check your BP several times a day, and if BP getting too low, stop Minoxidil   Follow up with PCP this week to check BP diary and make adjustments  Follow up with KTM      Thank you for trusting [facility] and me with your care.  We are honored that you entrusted us with your healthcare needs. Your satisfaction is very important to us and we hope you have been very pleased with your experience at [facility]. After your discharge you may receive a survey asking you to rate your hospital experience. We encourage you to take the time to complete the survey as your feedback allows us to identify areas for improvement as well as recognize our staff for their care. We hope that you have received the very best care possible during your hospitalization at [facility], as your satisfaction is our top priority.    Let me know if there is anything more I can do!!        Evens Frederick MD  Internal Medicine Staff        PATIENT GENERAL DISCHARGE INFORMATION   Things that YOU are responsible for to Manage Your Care At Home:  1. Getting your prescriptions filled.  2. Taking you medications as directed. (DO NOT MISS ANY DOSES!)  3. Going to your follow-up doctor appointments.                 *This is important because it allows the doctor to monitor your progress and make changes.      If you are unable to make your follow up appointments, please call the number listed and reschedule this appointment.   After discharge, if you need assistance, you can call [facility] for 24/7 assistance at [phone]  If you are experience any signs or symptoms, Call your doctor or Call [phone] and come to your nearest Emergency  Room.    You should receive a call from [facility] within 48-72 hours to help manage your care after discharge.   Please try to make sure that you answer your phone for this important phone call.

## 2023-10-30 NOTE — PLAN OF CARE
Problem: Adult Inpatient Plan of Care  Goal: Plan of Care Review  Outcome: Ongoing, Progressing  Goal: Patient-Specific Goal (Individualized)  Outcome: Ongoing, Progressing  Goal: Absence of Hospital-Acquired Illness or Injury  Outcome: Ongoing, Progressing  Goal: Optimal Comfort and Wellbeing  Outcome: Ongoing, Progressing  Goal: Readiness for Transition of Care  Outcome: Ongoing, Progressing     Problem: Diabetes Comorbidity  Goal: Blood Glucose Level Within Targeted Range  Outcome: Ongoing, Progressing     Problem: Fluid Imbalance (Pneumonia)  Goal: Fluid Balance  Outcome: Ongoing, Progressing     Problem: Infection (Pneumonia)  Goal: Resolution of Infection Signs and Symptoms  Outcome: Ongoing, Progressing     Problem: Respiratory Compromise (Pneumonia)  Goal: Effective Oxygenation and Ventilation  Outcome: Ongoing, Progressing     Problem: Device-Related Complication Risk (Hemodialysis)  Goal: Safe, Effective Therapy Delivery  Outcome: Ongoing, Progressing     Problem: Hemodynamic Instability (Hemodialysis)  Goal: Effective Tissue Perfusion  Outcome: Ongoing, Progressing     Problem: Infection (Hemodialysis)  Goal: Absence of Infection Signs and Symptoms  Outcome: Ongoing, Progressing

## 2023-10-30 NOTE — DISCHARGE SUMMARY
[facility] Medicine  Discharge Summary      Patient Name: April Vasquez  MRN: [id number]  [facility]: [id number]  Patient Class: IP- Inpatient  Admission Date: 10/29/2023  Hospital Length of Stay: 1 days  Discharge Date and Time:  10/30/2023 1:08 PM  Attending Physician: Evens Frederick MD   Discharging Provider: Evens Frederick MD  Primary Care Provider: Darrick Cabral MD    Primary Care Team:  HOSP MED 1    HPI:   Apirl Vasquez is a 68 y.o. female with a PMHx of HTN, DM type II, and ESRD on MWF HD,  who presented to [facility] ED w/ worsening productive cough.   Pt reports that her symptoms first started about 2 weeks ago w/ mild cough initially, but her cough has been getting worse over the last 3 days w/ production of greenish sputum. Her cough has been associated w/ chills, subjective fevers, headache, dizziness and generalized malaise. She also reports associated rhinorrhea and congestion. She tried OP Robitussin w/o significant relief of her symptoms. Pt denied any chest pain, palpitations, SOB, or recent sick contacts. According to her, she had hx of heavy smoking in the past but she quitted about 4-5 years ago. She doesn't recall being to the hospital before w/ respiratory symptoms, and she was never diagnosed w/ COPD or asthma.    On admission to the ED; pt was hypertensive w/ -200 systolic and borderline hypoxic w/ SpO2 92-94%, otherwise vitally stable. Her admission labs were remarkable for WBCs 17.2, Hb 10.3, CO2 21, AG 17, phos 6.1, BUN 31, Cr 7.2, BNP 3,403 and mildly elevated procal at 0.49. She tested -ve for COVID-19 and Influenza. Her CXR was remarkable for perihilar interstitial opacities suggestive of mild interstitial edema or pneumonitis.  No confluent area of consolidation. Pt received a dose of IV Ceftriaxone, IV Azithro and a dose of IV methylprednisolone 125 mg before she was admitted for further inpatient management.      * No surgery found *      Hospital Course:    Patient admitted with PNA and Volume overload.patient has been  started on IV Abx and nephrology did HD ,patient is stable on RA. Her BP remains elevated over night,added Norvasc and increased prn IV Hydralazine,DC clonidine and coreg for bradycardia,BP remains elevated,transferred to ICU on Cardene drip.Cardene infusion stopped.  Patient appears to have rebound hypertension after dialysis, again shooting up to the 200s.  Heart rate has recovered, will resume Coreg in addition to clonidine, hydralazine, amlodipine.    Very difficult to control blood pressure, now on:  Nifedipine 60 mg b.i.d.  Coreg 12.5 mg b.i.d. (heart rate will not allow higher BB)  Clonidine 0.3 mg t.i.d.  Hydralazine 100 mg t.i.d.  Minoxidil 20 mg daily  Losartan 100 mg daily  Can not be diuretics, does not make urine  Can not be on spironolactone specifically, because ESRD/K    Will check renal ultrasound to rule out renal artery stenosis, given resistant hypertension despite maximum blood pressure medication treatment.     Renal ultrasound did NOT show Renal artery stenosis.      Patient instructions:  Resistant Blood Pressure, BP is 143/65 - 150/70 on the following regimen:  Nifedipine 60 mg b.i.d.  Coreg 12.5 mg b.i.d. (heart rate will not allow higher dose of this)  Clonidine 0.3 mg t.i.d.  Hydralazine 100 mg t.i.d.  Minoxidil 20 mg daily  Losartan 100 mg daily  Check your BP several times a day, and if BP getting too low, stop Minoxidil   Follow up with PCP this week to check BP diary and make adjustments  Follow up with KTM      Thank you for trusting [facility] and me with your care.  We are honored that you entrusted us with your healthcare needs. Your satisfaction is very important to us and we hope you have been very pleased with your experience at [facility]. After your discharge you may receive a survey asking you to rate your hospital experience. We encourage you to take the time to complete the survey as  "your feedback allows us to identify areas for improvement as well as recognize our staff for their care. We hope that you have received the very best care possible during your hospitalization at [facility], as your satisfaction is our top priority.    Let me know if there is anything more I can do!!        Evens Frederick MD  Internal Medicine Staff      Goals of Care Treatment Preferences:  Code Status: Full Code      Consults:   Consults (From admission, onward)          Status Ordering Provider     Inpatient consult to Nephrology  Once        Provider:  (Not yet assigned)    Acknowledged ALEA JAMISON            Cardiac/Vascular  (HFpEF) heart failure with preserved ejection fraction  GII dd on 2019 ECHO  BNP >3000 on admission  Remove fluid per HD      Final Active Diagnoses:    Diagnosis Date Noted POA    PRINCIPAL PROBLEM:  Community acquired pneumonia [J18.9] 08/05/2019 Yes    (HFpEF) heart failure with preserved ejection fraction [I50.30] 10/30/2023 Yes    Hypertensive urgency [I16.0] 06/13/2021 Yes    ESRD (end stage renal disease) on dialysis [N18.6, Z99.2] 08/05/2019 Not Applicable    Secondary hyperparathyroidism of renal origin [N25.81] 10/15/2018 Yes    Anemia in chronic kidney disease [N18.9, D63.1] 10/12/2018 Yes    Diabetes mellitus type II, controlled [E11.9] 09/19/2016 Yes    Metabolic acidosis [E87.20] 09/19/2016 Yes      Problems Resolved During this Admission:       Discharged Condition: good    Disposition: home    Follow Up:    Patient Instructions:      Ambulatory referral/consult to Internal Medicine   Standing Status: Future   Referral Priority: Routine Referral Type: Consultation   Referral Reason: Specialty Services Required   Requested Specialty: Internal Medicine   Number of Visits Requested: 1       Significant Diagnostic Studies: {diagnostics:51403::"N/    Recent Results (from the past 100 hour(s))   POCT glucose    Collection Time: 10/29/23  2:43 PM   Result Value Ref Range "    POCT Glucose 225 (H) 70 - 110 mg/dL   Lactic acid, plasma    Collection Time: 10/29/23  4:59 PM   Result Value Ref Range    Lactate (Lactic Acid) 1.0 0.5 - 2.2 mmol/L   POCT glucose    Collection Time: 10/29/23  8:17 PM   Result Value Ref Range    POCT Glucose 153 (H) 70 - 110 mg/dL   POCT glucose    Collection Time: 10/30/23  8:02 AM   Result Value Ref Range    POCT Glucose 143 (H) 70 - 110 mg/dL   POCT glucose    Collection Time: 10/30/23  5:16 PM   Result Value Ref Range    POCT Glucose 151 (H) 70 - 110 mg/dL   POCT glucose    Collection Time: 10/30/23 11:11 PM   Result Value Ref Range    POCT Glucose 102 70 - 110 mg/dL   Hepatitis B surface antibody    Collection Time: 10/31/23  3:47 AM   Result Value Ref Range    Hep B S Ab 45.57 mIU/mL    Hep B S Ab Reactive    Hepatitis B surface antigen    Collection Time: 10/31/23  3:47 AM   Result Value Ref Range    Hepatitis B Surface Ag Non-reactive Non-reactive   POCT glucose    Collection Time: 10/31/23 12:04 PM   Result Value Ref Range    POCT Glucose 73 70 - 110 mg/dL   POCT glucose    Collection Time: 10/31/23  5:30 PM   Result Value Ref Range    POCT Glucose 95 70 - 110 mg/dL   Magnesium    Collection Time: 11/01/23  8:27 AM   Result Value Ref Range    Magnesium 2.4 1.6 - 2.6 mg/dL   Phosphorus    Collection Time: 11/01/23  8:27 AM   Result Value Ref Range    Phosphorus 6.2 (H) 2.7 - 4.5 mg/dL   Comprehensive Metabolic Panel    Collection Time: 11/01/23  8:27 AM   Result Value Ref Range    Sodium 134 (L) 136 - 145 mmol/L    Potassium 5.0 3.5 - 5.1 mmol/L    Chloride 95 95 - 110 mmol/L    CO2 26 23 - 29 mmol/L    Glucose 80 70 - 110 mg/dL    BUN 43 (H) 8 - 23 mg/dL    Creatinine 7.0 (H) 0.5 - 1.4 mg/dL    Calcium 9.7 8.7 - 10.5 mg/dL    Total Protein 6.6 6.0 - 8.4 g/dL    Albumin 2.8 (L) 3.5 - 5.2 g/dL    Total Bilirubin 0.4 0.1 - 1.0 mg/dL    Alkaline Phosphatase 96 55 - 135 U/L    AST 12 10 - 40 U/L    ALT 5 (L) 10 - 44 U/L    eGFR 6 (A) >60 mL/min/1.73 m^2     Anion Gap 13 8 - 16 mmol/L   CBC Auto Differential    Collection Time: 11/01/23  8:27 AM   Result Value Ref Range    WBC 12.47 3.90 - 12.70 K/uL    RBC 3.47 (L) 4.00 - 5.40 M/uL    Hemoglobin 10.4 (L) 12.0 - 16.0 g/dL    Hematocrit 32.4 (L) 37.0 - 48.5 %    MCV 93 82 - 98 fL    MCH 30.0 27.0 - 31.0 pg    MCHC 32.1 32.0 - 36.0 g/dL    RDW 16.0 (H) 11.5 - 14.5 %    Platelets 301 150 - 450 K/uL    MPV 9.6 9.2 - 12.9 fL    Immature Granulocytes 0.8 (H) 0.0 - 0.5 %    Gran # (ANC) 9.6 (H) 1.8 - 7.7 K/uL    Immature Grans (Abs) 0.10 (H) 0.00 - 0.04 K/uL    Lymph # 1.9 1.0 - 4.8 K/uL    Mono # 0.8 0.3 - 1.0 K/uL    Eos # 0.1 0.0 - 0.5 K/uL    Baso # 0.02 0.00 - 0.20 K/uL    nRBC 0 0 /100 WBC    Gran % 76.5 (H) 38.0 - 73.0 %    Lymph % 15.2 (L) 18.0 - 48.0 %    Mono % 6.5 4.0 - 15.0 %    Eosinophil % 0.8 0.0 - 8.0 %    Basophil % 0.2 0.0 - 1.9 %    Differential Method Automated    POCT glucose    Collection Time: 11/01/23 12:02 PM   Result Value Ref Range    POCT Glucose 74 70 - 110 mg/dL   POCT glucose    Collection Time: 11/01/23 11:02 PM   Result Value Ref Range    POCT Glucose 96 70 - 110 mg/dL   Magnesium    Collection Time: 11/02/23  3:11 AM   Result Value Ref Range    Magnesium 2.3 1.6 - 2.6 mg/dL   Phosphorus    Collection Time: 11/02/23  3:11 AM   Result Value Ref Range    Phosphorus 4.3 2.7 - 4.5 mg/dL   Comprehensive Metabolic Panel    Collection Time: 11/02/23  3:11 AM   Result Value Ref Range    Sodium 136 136 - 145 mmol/L    Potassium 4.6 3.5 - 5.1 mmol/L    Chloride 104 95 - 110 mmol/L    CO2 27 23 - 29 mmol/L    Glucose 86 70 - 110 mg/dL    BUN 20 8 - 23 mg/dL    Creatinine 4.3 (H) 0.5 - 1.4 mg/dL    Calcium 9.4 8.7 - 10.5 mg/dL    Total Protein 5.8 (L) 6.0 - 8.4 g/dL    Albumin 2.6 (L) 3.5 - 5.2 g/dL    Total Bilirubin 0.4 0.1 - 1.0 mg/dL    Alkaline Phosphatase 83 55 - 135 U/L    AST 12 10 - 40 U/L    ALT 6 (L) 10 - 44 U/L    eGFR 11 (A) >60 mL/min/1.73 m^2    Anion Gap 5 (L) 8 - 16 mmol/L   CBC Auto  Differential    Collection Time: 11/02/23  3:11 AM   Result Value Ref Range    WBC 10.37 3.90 - 12.70 K/uL    RBC 3.00 (L) 4.00 - 5.40 M/uL    Hemoglobin 9.0 (L) 12.0 - 16.0 g/dL    Hematocrit 28.9 (L) 37.0 - 48.5 %    MCV 96 82 - 98 fL    MCH 30.0 27.0 - 31.0 pg    MCHC 31.1 (L) 32.0 - 36.0 g/dL    RDW 16.0 (H) 11.5 - 14.5 %    Platelets 216 150 - 450 K/uL    MPV 10.3 9.2 - 12.9 fL    Immature Granulocytes 0.7 (H) 0.0 - 0.5 %    Gran # (ANC) 7.6 1.8 - 7.7 K/uL    Immature Grans (Abs) 0.07 (H) 0.00 - 0.04 K/uL    Lymph # 1.8 1.0 - 4.8 K/uL    Mono # 0.8 0.3 - 1.0 K/uL    Eos # 0.1 0.0 - 0.5 K/uL    Baso # 0.02 0.00 - 0.20 K/uL    nRBC 0 0 /100 WBC    Gran % 73.3 (H) 38.0 - 73.0 %    Lymph % 17.6 (L) 18.0 - 48.0 %    Mono % 7.3 4.0 - 15.0 %    Eosinophil % 0.9 0.0 - 8.0 %    Basophil % 0.2 0.0 - 1.9 %    Differential Method Automated        Microbiology Results (last 7 days)       Procedure Component Value Units Date/Time    Blood Culture #1 **CANNOT BE ORDERED STAT** [[id number]] Collected: 10/29/23 1103    Order Status: Completed Specimen: Blood from Peripheral, Forearm, Right Updated: 10/30/23 1303     Blood Culture, Routine No Growth to date      No Growth to date    Blood Culture #2 **CANNOT BE ORDERED STAT** [[id number]] Collected: 10/29/23 1103    Order Status: Completed Specimen: Blood from Peripheral, Antecubital, Right Updated: 10/30/23 1303     Blood Culture, Routine No Growth to date      No Growth to date    Culture, Respiratory with Gram Stain [[id number]]     Order Status: No result Specimen: Respiratory from Sputum             Imaging Results              X-Ray Chest AP Portable (Final result)  Result time 10/29/23 11:26:43      Final result by Mark Caba MD (10/29/23 11:26:43)                   Impression:      Perihilar interstitial opacities suggestive of mild interstitial edema or pneumonitis.  No confluent area of consolidation.      Electronically signed by: Mark Caba,  "MD  Date:    10/29/2023  Time:    11:26               Narrative:    EXAMINATION:  XR CHEST AP PORTABLE    CLINICAL HISTORY:  Provided history is "COUGH;  ".    TECHNIQUE:  One view of the chest.    COMPARISON:  10/24/2023.    FINDINGS:  Cardiac wires overlie the chest.  Cardiomediastinal silhouette is stable.  Atherosclerotic calcifications overlie the aortic arch.  Mild central vascular congestion with prominent perihilar interstitial lung markings.  Probable subsegmental atelectasis or scarring in the left mid lung.  No confluent area of consolidation.  No large pleural effusion.  No pneumothorax.                                          Pending Diagnostic Studies:       None           Medications:  Reconciled Home Medications:      Medication List        START taking these medications      losartan 100 MG tablet  Commonly known as: COZAAR  Take 1 tablet (100 mg total) by mouth once daily.  Start taking on: November 3, 2023     minoxidiL 10 MG Tab  Commonly known as: LONITEN  Take 2 tablets (20 mg total) by mouth once daily.  Start taking on: November 3, 2023     NIFEdipine 60 MG (OSM) 24 hr tablet  Commonly known as: PROCARDIA-XL  Take 1 tablet (60 mg total) by mouth 2 (two) times a day.            CONTINUE taking these medications      acetaminophen 500 MG tablet  Commonly known as: TYLENOL  Take 500 mg by mouth every 6 (six) hours as needed for Pain.     carvediloL 12.5 MG tablet  Commonly known as: COREG  Take 1 tablet (12.5 mg total) by mouth 2 (two) times daily with meals.     cloNIDine 0.3 MG tablet  Commonly known as: CATAPRES  TAKE 1 TABLET(0.3 MG) BY MOUTH THREE TIMES DAILY     hydrALAZINE 100 MG tablet  Commonly known as: APRESOLINE  TAKE 1 TABLET(100 MG) BY MOUTH THREE TIMES DAILY     sevelamer carbonate 800 mg Tab  Commonly known as: RENVELA  Take 1,600 mg by mouth 3 (three) times daily with meals.     VITAMIN D ORAL  Take by mouth.            ASK your doctor about these medications      azithromycin " 500 MG tablet  Commonly known as: ZITHROMAX  Take 1 tablet (500 mg total) by mouth once daily. for 1 day  Ask about: Should I take this medication?              Indwelling Lines/Drains at time of discharge:   Lines/Drains/Airways       Drain  Duration                  Hemodialysis AV Fistula 08/04/19 7259 Left upper arm 1547 days                    Time spent on the discharge of patient: 35 minutes         Evens Frederick MD  Department of Hospital Medicine  [facility]

## 2023-10-30 NOTE — NURSING
Message was sent to Dr. Funes due to elevated blood pressure reading. Order was given to administer morning medication early,per order. Plan of care ongoing.

## 2023-10-30 NOTE — NURSING
Received report care assumed. Patient lying in bed resting, NAD noted. Safety precautions maintained.     [facility]  Nurses Note -- 4 Eyes      10/29/2023       Skin assessed on: Q Shift      [x] No Pressure Injuries Present    []Prevention Measures Documented    [] Yes LDA  for Pressure Injury Previously documented     [] Yes New Pressure Injury Discovered   [] LDA for New Pressure Injury Added      Attending RN:  Farheen Guillen LPN     Second RN:  Angélica Jerry RN

## 2023-10-30 NOTE — PLAN OF CARE
No acute distress noted, patient free from falls or injury this shift.  Bed in low position, wheels locked, call light in reach for assistance, plan of care continued.       Problem: Fluid Imbalance (Pneumonia)  Goal: Fluid Balance  Outcome: Ongoing, Progressing     Problem: Infection (Pneumonia)  Goal: Resolution of Infection Signs and Symptoms  Outcome: Ongoing, Progressing     Problem: Respiratory Compromise (Pneumonia)  Goal: Effective Oxygenation and Ventilation  Outcome: Ongoing, Progressing

## 2023-10-31 LAB
HBV SURFACE AB SER-ACNC: 45.57 MIU/ML
HBV SURFACE AB SER-ACNC: REACTIVE M[IU]/ML
HBV SURFACE AG SERPL QL IA: NORMAL

## 2023-10-31 PROCEDURE — 63700000 PHARM REV CODE 250 ALT 637 W/O HCPCS: Mod: NTX | Performed by: INTERNAL MEDICINE

## 2023-10-31 PROCEDURE — 86706 HEP B SURFACE ANTIBODY: CPT | Mod: 91,NTX | Performed by: INTERNAL MEDICINE

## 2023-10-31 PROCEDURE — 63600175 PHARM REV CODE 636 W HCPCS: Mod: NTX | Performed by: HOSPITALIST

## 2023-10-31 PROCEDURE — 87340 HEPATITIS B SURFACE AG IA: CPT | Mod: NTX | Performed by: INTERNAL MEDICINE

## 2023-10-31 PROCEDURE — 20000000 HC ICU ROOM: Mod: NTX

## 2023-10-31 PROCEDURE — 25000003 PHARM REV CODE 250: Mod: NTX | Performed by: HOSPITALIST

## 2023-10-31 PROCEDURE — 36415 COLL VENOUS BLD VENIPUNCTURE: CPT | Mod: NTX | Performed by: INTERNAL MEDICINE

## 2023-10-31 PROCEDURE — 25000003 PHARM REV CODE 250: Mod: NTX | Performed by: INTERNAL MEDICINE

## 2023-10-31 PROCEDURE — 63600175 PHARM REV CODE 636 W HCPCS: Mod: NTX | Performed by: STUDENT IN AN ORGANIZED HEALTH CARE EDUCATION/TRAINING PROGRAM

## 2023-10-31 PROCEDURE — 25000003 PHARM REV CODE 250: Mod: NTX | Performed by: REGISTERED NURSE

## 2023-10-31 PROCEDURE — 25000003 PHARM REV CODE 250: Mod: NTX | Performed by: STUDENT IN AN ORGANIZED HEALTH CARE EDUCATION/TRAINING PROGRAM

## 2023-10-31 PROCEDURE — 63600175 PHARM REV CODE 636 W HCPCS: Mod: NTX | Performed by: INTERNAL MEDICINE

## 2023-10-31 RX ORDER — GUAIFENESIN 100 MG/5ML
200 SOLUTION ORAL EVERY 4 HOURS PRN
Status: DISCONTINUED | OUTPATIENT
Start: 2023-10-31 | End: 2023-11-02 | Stop reason: HOSPADM

## 2023-10-31 RX ORDER — HYDRALAZINE HYDROCHLORIDE 20 MG/ML
20 INJECTION INTRAMUSCULAR; INTRAVENOUS EVERY 4 HOURS PRN
Status: DISCONTINUED | OUTPATIENT
Start: 2023-10-31 | End: 2023-10-31

## 2023-10-31 RX ORDER — AMLODIPINE BESYLATE 5 MG/1
10 TABLET ORAL DAILY
Status: DISCONTINUED | OUTPATIENT
Start: 2023-10-31 | End: 2023-10-31

## 2023-10-31 RX ORDER — NICARDIPINE HYDROCHLORIDE 0.2 MG/ML
0-15 INJECTION INTRAVENOUS CONTINUOUS
Status: DISCONTINUED | OUTPATIENT
Start: 2023-10-31 | End: 2023-11-01

## 2023-10-31 RX ORDER — HYDROXYZINE PAMOATE 25 MG/1
25 CAPSULE ORAL ONCE
Status: COMPLETED | OUTPATIENT
Start: 2023-10-31 | End: 2023-10-31

## 2023-10-31 RX ORDER — TALC
6 POWDER (GRAM) TOPICAL NIGHTLY PRN
Status: DISCONTINUED | OUTPATIENT
Start: 2023-10-31 | End: 2023-11-02 | Stop reason: HOSPADM

## 2023-10-31 RX ORDER — ONDANSETRON 2 MG/ML
4 INJECTION INTRAMUSCULAR; INTRAVENOUS ONCE
Status: COMPLETED | OUTPATIENT
Start: 2023-10-31 | End: 2023-10-31

## 2023-10-31 RX ADMIN — ONDANSETRON 8 MG: 8 TABLET, ORALLY DISINTEGRATING ORAL at 08:10

## 2023-10-31 RX ADMIN — ACETAMINOPHEN 1000 MG: 500 TABLET ORAL at 10:10

## 2023-10-31 RX ADMIN — HYDRALAZINE HYDROCHLORIDE 100 MG: 25 TABLET, FILM COATED ORAL at 03:10

## 2023-10-31 RX ADMIN — SEVELAMER CARBONATE 1600 MG: 800 TABLET, FILM COATED ORAL at 12:10

## 2023-10-31 RX ADMIN — AZITHROMYCIN DIHYDRATE 500 MG: 250 TABLET ORAL at 09:10

## 2023-10-31 RX ADMIN — SEVELAMER CARBONATE 1600 MG: 800 TABLET, FILM COATED ORAL at 09:10

## 2023-10-31 RX ADMIN — SEVELAMER CARBONATE 1600 MG: 800 TABLET, FILM COATED ORAL at 05:10

## 2023-10-31 RX ADMIN — MUPIROCIN: 20 OINTMENT TOPICAL at 09:10

## 2023-10-31 RX ADMIN — HYDROXYZINE PAMOATE 25 MG: 25 CAPSULE ORAL at 12:10

## 2023-10-31 RX ADMIN — NICARDIPINE HYDROCHLORIDE 0.5 MG/HR: 0.2 INJECTION, SOLUTION INTRAVENOUS at 11:10

## 2023-10-31 RX ADMIN — HYDRALAZINE HYDROCHLORIDE 20 MG: 20 INJECTION INTRAMUSCULAR; INTRAVENOUS at 08:10

## 2023-10-31 RX ADMIN — ONDANSETRON 4 MG: 2 INJECTION INTRAMUSCULAR; INTRAVENOUS at 08:10

## 2023-10-31 RX ADMIN — CEFTRIAXONE 1 G: 1 INJECTION, POWDER, FOR SOLUTION INTRAMUSCULAR; INTRAVENOUS at 09:10

## 2023-10-31 RX ADMIN — HYDRALAZINE HYDROCHLORIDE 100 MG: 25 TABLET, FILM COATED ORAL at 06:10

## 2023-10-31 RX ADMIN — HYDRALAZINE HYDROCHLORIDE 10 MG: 20 INJECTION INTRAMUSCULAR; INTRAVENOUS at 02:10

## 2023-10-31 RX ADMIN — AMLODIPINE BESYLATE 10 MG: 5 TABLET ORAL at 09:10

## 2023-10-31 RX ADMIN — CLONIDINE HYDROCHLORIDE 0.3 MG: 0.1 TABLET ORAL at 06:10

## 2023-10-31 NOTE — ASSESSMENT & PLAN NOTE
- Pt w/ systolic BP ranging 180-200 systolic on admission  - No evidence for end-organ damage  - Likely volume-driven in the setting of her ESRD, could also be a rebound effect from not taking her Clonidine  - Pt on Coreg 12.5 mg BID, Clonidine 0.3 mg TID and Hydralazine 100 mg TID at home  - Will resume her home medications and defer to nephrology making any changes to her current antihypertensive regimen ,  Her BP remains elevated over night,added Norvasc and increased prn IV Hydralazine,DC clonidine and coreg for bradycardia,BP remains elevated,transferred to ICU on Cardene drip.

## 2023-10-31 NOTE — NURSING
Pt has been discharged, Pt received discharge instructions, pt verbalized understanding of discharge instructions. Pt AAOx4, respirations even and unlabored, Pt arrive back to unit at 1724 via bed from dialysis. Pt blood pressure was not stable. Notified Dr. Frederick of elevated blood pressure, Per MD lacy to give oral bp meds and recheck her BP around 6:30pm, if SBP <170 can go home. If not then give IV hydralazine  and let night nurse check bp at 1930. No complaints of pain. Safety precautions in place, Meds delivered to the room, case management cleared patient for discharge.  Pt educated about the importance of being sent home with a stable blood pressure. @ 1832 bp was still elevated, Iv Hydralazine give at 1842. Report given to Elle STRONG RN.

## 2023-10-31 NOTE — PLAN OF CARE
Chart check complete, pt resting comfortably.  BP elevated throughout the night.  Scheduled and PRN medications given for elevated BP, BP checked. BG monitored, WNL.  Tele box monitored, bradycardia noted.  Pt does not urinate, no BM this shift.  Pt on 1500ml fluids restrictions.  Prn sleep aid given x1.  No acute distress noted, pt free from falls or injury this shift.  Bed in low position, wheels locked, call light in reach for assistance, will continue to monitor.      Problem: Adult Inpatient Plan of Care  Goal: Plan of Care Review  Outcome: Ongoing, Progressing     Problem: Adult Inpatient Plan of Care  Goal: Patient-Specific Goal (Individualized)  Outcome: Ongoing, Progressing     Problem: Adult Inpatient Plan of Care  Goal: Absence of Hospital-Acquired Illness or Injury  Outcome: Ongoing, Progressing     Problem: Adult Inpatient Plan of Care  Goal: Optimal Comfort and Wellbeing  Outcome: Ongoing, Progressing     Problem: Diabetes Comorbidity  Goal: Blood Glucose Level Within Targeted Range  Outcome: Ongoing, Progressing     Problem: Fluid Imbalance (Pneumonia)  Goal: Fluid Balance  Outcome: Ongoing, Progressing     Problem: Hemodynamic Instability (Hemodialysis)  Goal: Effective Tissue Perfusion  Outcome: Ongoing, Progressing

## 2023-10-31 NOTE — NURSING
Pt's BP still elevated (196/86) despite BP meds given.  NP Showers notified of elevated BP and MD's orders for the pt to stay if SBP is >170.  Discharge order cancelled.

## 2023-10-31 NOTE — NURSING
[facility] [place] 4 Eyes      10/30/2023       Skin assessed on: Q Shift      [x] No Pressure Injuries Present    [x]Prevention Measures Documented    [] Yes LDA  for Pressure Injury Previously documented     [] Yes New Pressure Injury Discovered   [] LDA for New Pressure Injury Added      Attending RN:  Elle Booth RN     Second RN:  SADIA Velarde

## 2023-10-31 NOTE — NURSING
[facility], [facility]  Nurses Note -- 4 Eyes      10/31/2023       Skin assessed on: Q Shift      [x] No Pressure Injuries Present    []Prevention Measures Documented    [] Yes LDA  for Pressure Injury Previously documented     [] Yes New Pressure Injury Discovered   [] LDA for New Pressure Injury Added      Attending RN:  Ofelia Jara RN     Second RN:  RAYSHAWN Almeida

## 2023-10-31 NOTE — SUBJECTIVE & OBJECTIVE
Past Medical History:   Diagnosis Date    (HFpEF) heart failure with preserved ejection fraction 10/30/2023    Anemia     Colon polyps     COVID-19 08/2021    Diabetes mellitus, type 2     ESRD (end stage renal disease) on dialysis 2017    Gallstones     Hypertension     Pulmonary edema     Renal disorder     dialysis MIRANDA fistula    Tobacco abuse     Uses walker        Past Surgical History:   Procedure Laterality Date    AVF  2017    CHOLECYSTECTOMY  2016    COLONOSCOPY N/A 8/6/2020    Procedure: COLONOSCOPY;  Surgeon: Hood Hernadez MD;  Location: [facility] ([address] [place] [address]);  Service: Endoscopy;  Laterality: N/A;  labs prior-dialysis  covid test lapalco 8/3    Permacath Right 2017    TUBAL LIGATION         Review of patient's allergies indicates:  No Known Allergies    No current facility-administered medications on file prior to encounter.     Current Outpatient Medications on File Prior to Encounter   Medication Sig    acetaminophen (TYLENOL) 500 MG tablet Take 500 mg by mouth every 6 (six) hours as needed for Pain.    carvediloL (COREG) 12.5 MG tablet Take 1 tablet (12.5 mg total) by mouth 2 (two) times daily with meals.    cloNIDine (CATAPRES) 0.3 MG tablet TAKE 1 TABLET(0.3 MG) BY MOUTH THREE TIMES DAILY    ergocalciferol, vitamin D2, (VITAMIN D ORAL) Take by mouth.    hydrALAZINE (APRESOLINE) 100 MG tablet TAKE 1 TABLET(100 MG) BY MOUTH THREE TIMES DAILY    sevelamer carbonate (RENVELA) 800 mg Tab Take 1,600 mg by mouth 3 (three) times daily with meals.     Family History       Problem Relation (Age of Onset)    Cancer Mother    Cervical cancer Mother    Diabetes Father, Sister    Drug abuse Paternal Grandmother    Hypertension Mother    Kidney disease Mother    No Known Problems Sister, Sister, Sister, Son, Son, Son, Daughter    Ovarian cancer Mother          Tobacco Use    Smoking status: Former     Current packs/day: 0.00     Average packs/day: 0.5 packs/day for 20.0 years (10.0 ttl pk-yrs)     Types:  Cigarettes     Start date: 6/24/2000     Quit date: 6/24/2020     Years since quitting: 3.3    Smokeless tobacco: Never   Substance and Sexual Activity    Alcohol use: No    Drug use: No    Sexual activity: Yes     Partners: Male     Birth control/protection: Post-menopausal     Review of Systems   Constitutional:  Positive for chills and fatigue.   HENT:  Positive for congestion and rhinorrhea.    Respiratory:  Positive for cough. Negative for shortness of breath and wheezing.    Cardiovascular:  Negative for chest pain, palpitations and leg swelling.   Gastrointestinal:  Negative for abdominal pain, diarrhea, nausea and vomiting.   Genitourinary:  Negative for dysuria and hematuria.   Musculoskeletal:  Positive for myalgias. Negative for arthralgias.   Neurological:  Positive for dizziness and headaches. Negative for weakness.   Psychiatric/Behavioral:  Negative for behavioral problems and confusion.      Objective:     Vital Signs (Most Recent):  Temp: 98.2 °F (36.8 °C) (10/31/23 1045)  Pulse: (!) 55 (10/31/23 0910)  Resp: 17 (10/31/23 0743)  BP: (!) 221/86 (10/31/23 0910)  SpO2: 97 % (10/31/23 0743) Vital Signs (24h Range):  Temp:  [97 °F (36.1 °C)-98.5 °F (36.9 °C)] 98.2 °F (36.8 °C)  Pulse:  [53-60] 55  Resp:  [14-20] 17  SpO2:  [92 %-97 %] 97 %  BP: (173-227)/(76-99) 221/86     Weight: 42.2 kg (93 lb)  Body mass index is 15.96 kg/m².     Physical Exam  Constitutional:       General: She is not in acute distress.     Appearance: Normal appearance. She is not toxic-appearing.   HENT:      Head: Normocephalic and atraumatic.      Nose: Congestion present.      Mouth/Throat:      Mouth: Mucous membranes are moist.      Pharynx: Oropharynx is clear.   Eyes:      Extraocular Movements: Extraocular movements intact.      Conjunctiva/sclera: Conjunctivae normal.   Cardiovascular:      Rate and Rhythm: Normal rate and regular rhythm.      Pulses: Normal pulses.      Heart sounds: Normal heart sounds. No murmur  "heard.     No gallop.   Pulmonary:      Effort: Pulmonary effort is normal.      Comments: Pt had audible rales bilaterally, more prominent over the R side. No wheezes were appreciated.  Musculoskeletal:      Cervical back: Normal range of motion and neck supple.      Right lower leg: No edema.      Left lower leg: No edema.   Skin:     General: Skin is warm and dry.   Neurological:      General: No focal deficit present.      Mental Status: She is alert and oriented to person, place, and time.      Motor: No weakness.   Psychiatric:         Mood and Affect: Mood normal.         Behavior: Behavior normal.                Significant Labs: All pertinent labs within the past 24 hours have been reviewed.  CBC:   No results for input(s): "WBC", "HGB", "HCT", "PLT" in the last 48 hours.    CMP:   No results for input(s): "NA", "K", "CL", "CO2", "GLU", "BUN", "CREATININE", "CALCIUM", "PROT", "ALBUMIN", "BILITOT", "ALKPHOS", "AST", "ALT", "ANIONGAP", "EGFRNONAA" in the last 48 hours.    Invalid input(s): "ESTGFAFRICA"    Cardiac Markers:   No results for input(s): "CKMB", "MYOGLOBIN", "BNP", "TROPISTAT" in the last 48 hours.      Significant Imaging:     CXR AP Portable 10/29/2023:  FINDINGS:  Cardiac wires overlie the chest.  Cardiomediastinal silhouette is stable.  Atherosclerotic calcifications overlie the aortic arch.  Mild central vascular congestion with prominent perihilar interstitial lung markings.  Probable subsegmental atelectasis or scarring in the left mid lung.  No confluent area of consolidation.  No large pleural effusion.  No pneumothorax.     Impression:   Perihilar interstitial opacities suggestive of mild interstitial edema or pneumonitis.  No confluent area of consolidation.     "

## 2023-10-31 NOTE — PROGRESS NOTES
[facility] Medicine  Progress Note    Patient Name: April Vasquez  MRN: [id number]  Patient Class: IP- Inpatient   Admission Date: 10/29/2023  Length of Stay: 2 days  Attending Physician: Car Rosario, *  Primary Care Provider: Darrick Cabral MD        Subjective:     Principal Problem:Community acquired pneumonia        HPI:  April Vasquez is a 68 y.o. female with a PMHx of HTN, DM type II, and ESRD on MWF HD,  who presented to [facility] ED w/ worsening productive cough.   Pt reports that her symptoms first started about 2 weeks ago w/ mild cough initially, but her cough has been getting worse over the last 3 days w/ production of greenish sputum. Her cough has been associated w/ chills, subjective fevers, headache, dizziness and generalized malaise. She also reports associated rhinorrhea and congestion. She tried OP Robitussin w/o significant relief of her symptoms. Pt denied any chest pain, palpitations, SOB, or recent sick contacts. According to her, she had hx of heavy smoking in the past but she quitted about 4-5 years ago. She doesn't recall being to the hospital before w/ respiratory symptoms, and she was never diagnosed w/ COPD or asthma.    On admission to the ED; pt was hypertensive w/ -200 systolic and borderline hypoxic w/ SpO2 92-94%, otherwise vitally stable. Her admission labs were remarkable for WBCs 17.2, Hb 10.3, CO2 21, AG 17, phos 6.1, BUN 31, Cr 7.2, BNP 3,403 and mildly elevated procal at 0.49. She tested -ve for COVID-19 and Influenza. Her CXR was remarkable for perihilar interstitial opacities suggestive of mild interstitial edema or pneumonitis.  No confluent area of consolidation. Pt received a dose of IV Ceftriaxone, IV Azithro and a dose of IV methylprednisolone 125 mg before she was admitted for further inpatient management.      Overview/Hospital Course:  Patient admitted with PNA and Volume overload.patient has been  started on IV Abx  and nephrology did HD ,patient is stable on RA.  Her BP remains elevated over night,added Norvasc and increased prn IV Hydralazine,DC clonidine and coreg for bradycardia,BP remains elevated,transferred to ICU on Cardene drip.          Past Medical History:   Diagnosis Date    (HFpEF) heart failure with preserved ejection fraction 10/30/2023    Anemia     Colon polyps     COVID-19 08/2021    Diabetes mellitus, type 2     ESRD (end stage renal disease) on dialysis 2017    Gallstones     Hypertension     Pulmonary edema     Renal disorder     dialysis MIRANDA fistula    Tobacco abuse     Uses walker        Past Surgical History:   Procedure Laterality Date    AVF  2017    CHOLECYSTECTOMY  2016    COLONOSCOPY N/A 8/6/2020    Procedure: COLONOSCOPY;  Surgeon: Hood Hernadez MD;  Location: [facility] ([address] [place] [address]);  Service: Endoscopy;  Laterality: N/A;  labs prior-dialysis  covid test lapalco 8/3    Permacath Right 2017    TUBAL LIGATION         Review of patient's allergies indicates:  No Known Allergies    No current facility-administered medications on file prior to encounter.     Current Outpatient Medications on File Prior to Encounter   Medication Sig    acetaminophen (TYLENOL) 500 MG tablet Take 500 mg by mouth every 6 (six) hours as needed for Pain.    carvediloL (COREG) 12.5 MG tablet Take 1 tablet (12.5 mg total) by mouth 2 (two) times daily with meals.    cloNIDine (CATAPRES) 0.3 MG tablet TAKE 1 TABLET(0.3 MG) BY MOUTH THREE TIMES DAILY    ergocalciferol, vitamin D2, (VITAMIN D ORAL) Take by mouth.    hydrALAZINE (APRESOLINE) 100 MG tablet TAKE 1 TABLET(100 MG) BY MOUTH THREE TIMES DAILY    sevelamer carbonate (RENVELA) 800 mg Tab Take 1,600 mg by mouth 3 (three) times daily with meals.     Family History       Problem Relation (Age of Onset)    Cancer Mother    Cervical cancer Mother    Diabetes Father, Sister    Drug abuse Paternal Grandmother    Hypertension Mother    Kidney disease Mother     No Known Problems Sister, Sister, Sister, Son, Son, Son, Daughter    Ovarian cancer Mother          Tobacco Use    Smoking status: Former     Current packs/day: 0.00     Average packs/day: 0.5 packs/day for 20.0 years (10.0 ttl pk-yrs)     Types: Cigarettes     Start date: 6/24/2000     Quit date: 6/24/2020     Years since quitting: 3.3    Smokeless tobacco: Never   Substance and Sexual Activity    Alcohol use: No    Drug use: No    Sexual activity: Yes     Partners: Male     Birth control/protection: Post-menopausal     Review of Systems   Constitutional:  Positive for chills and fatigue.   HENT:  Positive for congestion and rhinorrhea.    Respiratory:  Positive for cough. Negative for shortness of breath and wheezing.    Cardiovascular:  Negative for chest pain, palpitations and leg swelling.   Gastrointestinal:  Negative for abdominal pain, diarrhea, nausea and vomiting.   Genitourinary:  Negative for dysuria and hematuria.   Musculoskeletal:  Positive for myalgias. Negative for arthralgias.   Neurological:  Positive for dizziness and headaches. Negative for weakness.   Psychiatric/Behavioral:  Negative for behavioral problems and confusion.      Objective:     Vital Signs (Most Recent):  Temp: 98.2 °F (36.8 °C) (10/31/23 1045)  Pulse: (!) 55 (10/31/23 0910)  Resp: 17 (10/31/23 0743)  BP: (!) 221/86 (10/31/23 0910)  SpO2: 97 % (10/31/23 0743) Vital Signs (24h Range):  Temp:  [97 °F (36.1 °C)-98.5 °F (36.9 °C)] 98.2 °F (36.8 °C)  Pulse:  [53-60] 55  Resp:  [14-20] 17  SpO2:  [92 %-97 %] 97 %  BP: (173-227)/(76-99) 221/86     Weight: 42.2 kg (93 lb)  Body mass index is 15.96 kg/m².     Physical Exam  Constitutional:       General: She is not in acute distress.     Appearance: Normal appearance. She is not toxic-appearing.   HENT:      Head: Normocephalic and atraumatic.      Nose: Congestion present.      Mouth/Throat:      Mouth: Mucous membranes are moist.      Pharynx: Oropharynx is clear.   Eyes:       "Extraocular Movements: Extraocular movements intact.      Conjunctiva/sclera: Conjunctivae normal.   Cardiovascular:      Rate and Rhythm: Normal rate and regular rhythm.      Pulses: Normal pulses.      Heart sounds: Normal heart sounds. No murmur heard.     No gallop.   Pulmonary:      Effort: Pulmonary effort is normal.      Comments: Pt had audible rales bilaterally, more prominent over the R side. No wheezes were appreciated.  Musculoskeletal:      Cervical back: Normal range of motion and neck supple.      Right lower leg: No edema.      Left lower leg: No edema.   Skin:     General: Skin is warm and dry.   Neurological:      General: No focal deficit present.      Mental Status: She is alert and oriented to person, place, and time.      Motor: No weakness.   Psychiatric:         Mood and Affect: Mood normal.         Behavior: Behavior normal.                Significant Labs: All pertinent labs within the past 24 hours have been reviewed.  CBC:   No results for input(s): "WBC", "HGB", "HCT", "PLT" in the last 48 hours.    CMP:   No results for input(s): "NA", "K", "CL", "CO2", "GLU", "BUN", "CREATININE", "CALCIUM", "PROT", "ALBUMIN", "BILITOT", "ALKPHOS", "AST", "ALT", "ANIONGAP", "EGFRNONAA" in the last 48 hours.    Invalid input(s): "ESTGFAFRICA"    Cardiac Markers:   No results for input(s): "CKMB", "MYOGLOBIN", "BNP", "TROPISTAT" in the last 48 hours.      Significant Imaging:     CXR AP Portable 10/29/2023:  FINDINGS:  Cardiac wires overlie the chest.  Cardiomediastinal silhouette is stable.  Atherosclerotic calcifications overlie the aortic arch.  Mild central vascular congestion with prominent perihilar interstitial lung markings.  Probable subsegmental atelectasis or scarring in the left mid lung.  No confluent area of consolidation.  No large pleural effusion.  No pneumothorax.     Impression:   Perihilar interstitial opacities suggestive of mild interstitial edema or pneumonitis.  No confluent area " of consolidation.         Assessment/Plan:      * Community acquired pneumonia  - Pt w/ progressive productive cough and mild hypoxia x last 2 weeks  - Pt tested NEGATIVE for COVID and Influenza  - Although her CXR was unremarkable for focal consolidates will start pt on Abx treatment given high suspicion for PNA, in the setting of her worsening smx, leukocytosis and elevated procal levels  - Pt received x1 dose of IV Ceftriaxone 1g + IV Azithromycin 500 mg in the ED, will cont x 5 days total (switched Azithro to PO)  - Obtain sputum culture  - Obtain urine for Streptococcal Ag  - Consider obtaining CT chest if no improvement in symptoms within 48 hrs of initiation of antibiotics      (HFpEF) heart failure with preserved ejection fraction  GII dd on 2019 ECHO  BNP >3000 on admission  Remove fluid per HD    Secondary hyperparathyroidism of renal origin  - Pt's last PTH level ~ 528.3, 09/2023  - Currently normocalcemic, hyperphosphatemic  - Resume home Sevelamer 1600 mg TID      Anemia in chronic kidney disease  - Hb ~ 10.3 on admission  - Normocytic, normochromic  - Likely 2/2 pt's ESRD  - Monitor daily CBC  - May transfuse for Hb < 7.0    Nephrotic syndrome  On HD.      Hypertensive urgency  - Pt w/ systolic BP ranging 180-200 systolic on admission  - No evidence for end-organ damage  - Likely volume-driven in the setting of her ESRD, could also be a rebound effect from not taking her Clonidine  - Pt on Coreg 12.5 mg BID, Clonidine 0.3 mg TID and Hydralazine 100 mg TID at home  - Will resume her home medications and defer to nephrology making any changes to her current antihypertensive regimen ,  Her BP remains elevated over night,added Norvasc and increased prn IV Hydralazine,DC clonidine and coreg for bradycardia,BP remains elevated,transferred to ICU on Cardene drip.    ESRD (end stage renal disease) on dialysis  - Pt w/ Hx of ESRD, likely 2/2 longstanding hx of HTN / DM II  - Dialyzes Q MWF, last HD was on  Friday 10/27, follows w/ Dr. Diego  - Resume HD per nephrology recs  - Resume home Renvela 1600 mg TID  - Avoid nephrotoxic meds  - Renally dose all medications    Metabolic acidosis  - Bicarb ~ 21, AG ~ 17 on admission  - Likely 2/2 pt's ESRD  - Will check lactate level  - Will likely improve w/ HD    Diabetes mellitus type II, controlled  - last A1c ~ 4.3 ?!  - Pt not on any active treatment OP  - Cont to monitor for now        VTE Risk Mitigation (From admission, onward)         Ordered     IP VTE LOW RISK PATIENT  Once         10/29/23 1358     Place sequential compression device  Until discontinued         10/29/23 1358                Discharge Planning   CECILIO: 10/30/2023     Code Status: Full Code   Is the patient medically ready for discharge?:     Reason for patient still in hospital (select all that apply): Patient trending condition      Discharge Delays: None known at this time        Critical care time spent on the evaluation and treatment of severe organ dysfunction, review of pertinent labs and imaging studies, discussions with consulting providers and discussions with patient/family:  Over 30  minutes.      Car Rosario MD  Department of Hospital Medicine   [facility] - Intensive Care

## 2023-10-31 NOTE — PLAN OF CARE
Chart check complete, pt resting, anxious.  BP elevated throughout the night, PRN medications given.  Pt denies headache or pain.  BG monitored.  Tele box monitored, bradycardia noted.  Infrequent cough noted.  Pt on 1500ml fluid restrictions.  MIRANDA fistula with dressing intact.  No distress noted, pt free from falls or injury this shift.  Bed in low position, wheels locked, call light in reach for assistance, will continue to monitor.      Problem: Adult Inpatient Plan of Care  Goal: Plan of Care Review  10/31/2023 0437 by Elle Booth RN  Outcome: Ongoing, Progressing    Problem: Adult Inpatient Plan of Care  Goal: Patient-Specific Goal (Individualized)  10/31/2023 0437 by Elle Booth, RN  Outcome: Ongoing, Progressing    Problem: Adult Inpatient Plan of Care  Goal: Absence of Hospital-Acquired Illness or Injury  10/31/2023 0437 by Elle Booth, RN  Outcome: Ongoing, Progressing     Problem: Adult Inpatient Plan of Care  Goal: Optimal Comfort and Wellbeing  10/31/2023 0437 by Elle Booth, RN  Outcome: Ongoing, Progressing     Problem: Infection (Pneumonia)  Goal: Resolution of Infection Signs and Symptoms  10/31/2023 0437 by Elle Booth, RN  Outcome: Ongoing, Progressing     Problem: Hemodynamic Instability (Hemodialysis)  Goal: Effective Tissue Perfusion  10/31/2023 0437 by Elle Booth RN  Outcome: Ongoing, Progressing

## 2023-10-31 NOTE — PROGRESS NOTES
68 y o f known to me with esrd on chronic HD at [facility] MWF admitted with PNA     Dialyzes MWF     Denies CNS ENT GI  RHEUM OR DERM SX has had some recent night sweats and chills    Past Medical History:   Diagnosis Date    (HFpEF) heart failure with preserved ejection fraction 10/30/2023    Anemia     Colon polyps     COVID-19 08/2021    Diabetes mellitus, type 2     ESRD (end stage renal disease) on dialysis 2017    Gallstones     Hypertension     Pulmonary edema     Renal disorder     dialysis MIRANDA fistula    Tobacco abuse     Uses walker      Past Surgical History:   Procedure Laterality Date    AVF  2017    CHOLECYSTECTOMY  2016    COLONOSCOPY N/A 8/6/2020    Procedure: COLONOSCOPY;  Surgeon: Hood Hernadez MD;  Location: [facility] ([address] [place] [address]);  Service: Endoscopy;  Laterality: N/A;  labs prior-dialysis  covid test lapalco 8/3    Permacath Right 2017    TUBAL LIGATION       Review of patient's allergies indicates:  No Known Allergies    Current Facility-Administered Medications   Medication    acetaminophen tablet 1,000 mg    azithromycin tablet 500 mg    cefTRIAXone (ROCEPHIN) 1 g in dextrose 5 % in water (D5W) 100 mL IVPB (MB+)    dextrose 10% bolus 125 mL 125 mL    dextrose 10% bolus 250 mL 250 mL    hydrALAZINE tablet 100 mg    mupirocin 2 % ointment    naloxone 0.4 mg/mL injection 0.02 mg    niCARdipine 40 mg/200 mL (0.2 mg/mL) infusion    ondansetron disintegrating tablet 8 mg    polyethylene glycol packet 17 g    senna tablet 8.6 mg    sevelamer carbonate tablet 1,600 mg    sodium chloride 0.9% bolus 250 mL 250 mL    sodium chloride 0.9% flush 10 mL     Social History     Socioeconomic History    Marital status:     Number of children: 4   Occupational History    Occupation: disabled x 2009,  at the [email or web address]   Tobacco Use    Smoking status: Former     Current packs/day: 0.00     Average packs/day: 0.5 packs/day for 20.0 years (10.0 ttl pk-yrs)     Types: Cigarettes     Start date:  6/24/2000     Quit date: 6/24/2020     Years since quitting: 3.3    Smokeless tobacco: Never   Substance and Sexual Activity    Alcohol use: No    Drug use: No    Sexual activity: Yes     Partners: Male     Birth control/protection: Post-menopausal   Social History Narrative    Lives with her 2 sons    Caregiver Son Tro     Family History   Problem Relation Age of Onset    Kidney disease Mother     Hypertension Mother     Cervical cancer Mother     Cancer Mother     Ovarian cancer Mother     Diabetes Father     Drug abuse Paternal Grandmother     Diabetes Sister     No Known Problems Sister     No Known Problems Sister     No Known Problems Sister     No Known Problems Son     No Known Problems Son     No Known Problems Son     No Known Problems Daughter     Breast cancer Neg Hx     Colon cancer Neg Hx          LABS    Recent Results (from the past 24 hour(s))   POCT glucose    Collection Time: 10/30/23  5:16 PM   Result Value Ref Range    POCT Glucose 151 (H) 70 - 110 mg/dL   POCT glucose    Collection Time: 10/30/23 11:11 PM   Result Value Ref Range    POCT Glucose 102 70 - 110 mg/dL   ]    I/O last 3 completed shifts:  In: 680 [P.O.:680]  Out: 500 [Other:500]    Vitals:    10/31/23 1330 10/31/23 1345 10/31/23 1400 10/31/23 1415   BP: (!) 178/84 (!) 179/84 (!) 190/85 (!) 196/87   Pulse: 60 (!) 55 (!) 57 (!) 56   Resp: 20 15 14 10   Temp:       TempSrc:       SpO2: 95% 95% 95% 95%   Weight:       Height:           No Jvd, Thyromegaly or Lymphadenopathy  Lungs: Fairly clear anteriorly and laterally Pst L lung rhonchi   Cor: RRR no G or rubs  Abd: Soft benign good bowel sounds non tender  Ext: No E C C  L arm avf with good p-b-t    A)    ESRD HD MWF   PNA   COPD?? Pt is an ex smoker  Anem ia of ckd  2nd hyperpth   DM  Hypoalb   Degree of chf ( ef 47%)    P)    Renal Diet  Home meds  Protect access  HD mwf  EPO  prn   Binders prn   Adjust all meds to the degree of renal fx  Close follow up I/O and  weights  Maintain Hydration

## 2023-10-31 NOTE — NURSING
BP elevated despite meds and PRN hydralazine.  MD lacy'estela morning dose of BP meds to be given early

## 2023-11-01 LAB
ALBUMIN SERPL BCP-MCNC: 2.8 G/DL (ref 3.5–5.2)
ALP SERPL-CCNC: 96 U/L (ref 55–135)
ALT SERPL W/O P-5'-P-CCNC: 5 U/L (ref 10–44)
ANION GAP SERPL CALC-SCNC: 13 MMOL/L (ref 8–16)
AST SERPL-CCNC: 12 U/L (ref 10–40)
BASOPHILS # BLD AUTO: 0.02 K/UL (ref 0–0.2)
BASOPHILS NFR BLD: 0.2 % (ref 0–1.9)
BILIRUB SERPL-MCNC: 0.4 MG/DL (ref 0.1–1)
BUN SERPL-MCNC: 43 MG/DL (ref 8–23)
CALCIUM SERPL-MCNC: 9.7 MG/DL (ref 8.7–10.5)
CHLORIDE SERPL-SCNC: 95 MMOL/L (ref 95–110)
CO2 SERPL-SCNC: 26 MMOL/L (ref 23–29)
CREAT SERPL-MCNC: 7 MG/DL (ref 0.5–1.4)
DIFFERENTIAL METHOD: ABNORMAL
EOSINOPHIL # BLD AUTO: 0.1 K/UL (ref 0–0.5)
EOSINOPHIL NFR BLD: 0.8 % (ref 0–8)
ERYTHROCYTE [DISTWIDTH] IN BLOOD BY AUTOMATED COUNT: 16 % (ref 11.5–14.5)
EST. GFR  (NO RACE VARIABLE): 6 ML/MIN/1.73 M^2
GLUCOSE SERPL-MCNC: 80 MG/DL (ref 70–110)
HCT VFR BLD AUTO: 32.4 % (ref 37–48.5)
HGB BLD-MCNC: 10.4 G/DL (ref 12–16)
IMM GRANULOCYTES # BLD AUTO: 0.1 K/UL (ref 0–0.04)
IMM GRANULOCYTES NFR BLD AUTO: 0.8 % (ref 0–0.5)
LYMPHOCYTES # BLD AUTO: 1.9 K/UL (ref 1–4.8)
LYMPHOCYTES NFR BLD: 15.2 % (ref 18–48)
MAGNESIUM SERPL-MCNC: 2.4 MG/DL (ref 1.6–2.6)
MCH RBC QN AUTO: 30 PG (ref 27–31)
MCHC RBC AUTO-ENTMCNC: 32.1 G/DL (ref 32–36)
MCV RBC AUTO: 93 FL (ref 82–98)
MONOCYTES # BLD AUTO: 0.8 K/UL (ref 0.3–1)
MONOCYTES NFR BLD: 6.5 % (ref 4–15)
NEUTROPHILS # BLD AUTO: 9.6 K/UL (ref 1.8–7.7)
NEUTROPHILS NFR BLD: 76.5 % (ref 38–73)
NRBC BLD-RTO: 0 /100 WBC
PHOSPHATE SERPL-MCNC: 6.2 MG/DL (ref 2.7–4.5)
PLATELET # BLD AUTO: 301 K/UL (ref 150–450)
PMV BLD AUTO: 9.6 FL (ref 9.2–12.9)
POCT GLUCOSE: 73 MG/DL (ref 70–110)
POCT GLUCOSE: 74 MG/DL (ref 70–110)
POCT GLUCOSE: 95 MG/DL (ref 70–110)
POCT GLUCOSE: 96 MG/DL (ref 70–110)
POTASSIUM SERPL-SCNC: 5 MMOL/L (ref 3.5–5.1)
PROT SERPL-MCNC: 6.6 G/DL (ref 6–8.4)
RBC # BLD AUTO: 3.47 M/UL (ref 4–5.4)
SODIUM SERPL-SCNC: 134 MMOL/L (ref 136–145)
WBC # BLD AUTO: 12.47 K/UL (ref 3.9–12.7)

## 2023-11-01 PROCEDURE — 94761 N-INVAS EAR/PLS OXIMETRY MLT: CPT | Mod: NTX

## 2023-11-01 PROCEDURE — 25000003 PHARM REV CODE 250: Mod: NTX | Performed by: HOSPITALIST

## 2023-11-01 PROCEDURE — 85025 COMPLETE CBC W/AUTO DIFF WBC: CPT | Mod: NTX | Performed by: STUDENT IN AN ORGANIZED HEALTH CARE EDUCATION/TRAINING PROGRAM

## 2023-11-01 PROCEDURE — 25000003 PHARM REV CODE 250: Mod: NTX | Performed by: STUDENT IN AN ORGANIZED HEALTH CARE EDUCATION/TRAINING PROGRAM

## 2023-11-01 PROCEDURE — 63600175 PHARM REV CODE 636 W HCPCS: Mod: NTX | Performed by: STUDENT IN AN ORGANIZED HEALTH CARE EDUCATION/TRAINING PROGRAM

## 2023-11-01 PROCEDURE — 20000000 HC ICU ROOM: Mod: NTX

## 2023-11-01 PROCEDURE — 80053 COMPREHEN METABOLIC PANEL: CPT | Mod: NTX | Performed by: STUDENT IN AN ORGANIZED HEALTH CARE EDUCATION/TRAINING PROGRAM

## 2023-11-01 PROCEDURE — 36415 COLL VENOUS BLD VENIPUNCTURE: CPT | Mod: NTX | Performed by: STUDENT IN AN ORGANIZED HEALTH CARE EDUCATION/TRAINING PROGRAM

## 2023-11-01 PROCEDURE — 84100 ASSAY OF PHOSPHORUS: CPT | Mod: NTX | Performed by: STUDENT IN AN ORGANIZED HEALTH CARE EDUCATION/TRAINING PROGRAM

## 2023-11-01 PROCEDURE — 63600175 PHARM REV CODE 636 W HCPCS: Mod: NTX | Performed by: HOSPITALIST

## 2023-11-01 PROCEDURE — 80100014 HC HEMODIALYSIS 1:1: Mod: NTX

## 2023-11-01 PROCEDURE — 83735 ASSAY OF MAGNESIUM: CPT | Mod: NTX | Performed by: STUDENT IN AN ORGANIZED HEALTH CARE EDUCATION/TRAINING PROGRAM

## 2023-11-01 RX ORDER — LOSARTAN POTASSIUM 25 MG/1
100 TABLET ORAL DAILY
Status: DISCONTINUED | OUTPATIENT
Start: 2023-11-01 | End: 2023-11-02 | Stop reason: HOSPADM

## 2023-11-01 RX ORDER — CARVEDILOL 12.5 MG/1
12.5 TABLET ORAL 2 TIMES DAILY WITH MEALS
Status: DISCONTINUED | OUTPATIENT
Start: 2023-11-01 | End: 2023-11-02 | Stop reason: HOSPADM

## 2023-11-01 RX ORDER — AMLODIPINE BESYLATE 5 MG/1
10 TABLET ORAL DAILY
Status: DISCONTINUED | OUTPATIENT
Start: 2023-11-01 | End: 2023-11-02

## 2023-11-01 RX ORDER — LABETALOL HYDROCHLORIDE 5 MG/ML
10 INJECTION, SOLUTION INTRAVENOUS EVERY 6 HOURS PRN
Status: DISCONTINUED | OUTPATIENT
Start: 2023-11-01 | End: 2023-11-02 | Stop reason: HOSPADM

## 2023-11-01 RX ORDER — CLONIDINE HYDROCHLORIDE 0.1 MG/1
0.3 TABLET ORAL 3 TIMES DAILY
Status: DISCONTINUED | OUTPATIENT
Start: 2023-11-01 | End: 2023-11-02 | Stop reason: HOSPADM

## 2023-11-01 RX ADMIN — CLONIDINE HYDROCHLORIDE 0.3 MG: 0.1 TABLET ORAL at 08:11

## 2023-11-01 RX ADMIN — SEVELAMER CARBONATE 1600 MG: 800 TABLET, FILM COATED ORAL at 05:11

## 2023-11-01 RX ADMIN — HYDRALAZINE HYDROCHLORIDE 100 MG: 25 TABLET, FILM COATED ORAL at 08:11

## 2023-11-01 RX ADMIN — CARVEDILOL 12.5 MG: 12.5 TABLET, FILM COATED ORAL at 01:11

## 2023-11-01 RX ADMIN — LOSARTAN POTASSIUM 100 MG: 25 TABLET, FILM COATED ORAL at 05:11

## 2023-11-01 RX ADMIN — NICARDIPINE HYDROCHLORIDE 2.5 MG/HR: 0.2 INJECTION, SOLUTION INTRAVENOUS at 01:11

## 2023-11-01 RX ADMIN — HYDRALAZINE HYDROCHLORIDE 100 MG: 25 TABLET, FILM COATED ORAL at 01:11

## 2023-11-01 RX ADMIN — MUPIROCIN: 20 OINTMENT TOPICAL at 08:11

## 2023-11-01 RX ADMIN — CLONIDINE HYDROCHLORIDE 0.3 MG: 0.1 TABLET ORAL at 01:11

## 2023-11-01 RX ADMIN — SEVELAMER CARBONATE 1600 MG: 800 TABLET, FILM COATED ORAL at 08:11

## 2023-11-01 RX ADMIN — AMLODIPINE BESYLATE 10 MG: 5 TABLET ORAL at 08:11

## 2023-11-01 RX ADMIN — GUAIFENESIN 200 MG: 200 SOLUTION ORAL at 08:11

## 2023-11-01 RX ADMIN — LABETALOL HYDROCHLORIDE 10 MG: 5 INJECTION INTRAVENOUS at 10:11

## 2023-11-01 RX ADMIN — CEFTRIAXONE 1 G: 1 INJECTION, POWDER, FOR SOLUTION INTRAMUSCULAR; INTRAVENOUS at 08:11

## 2023-11-01 NOTE — ASSESSMENT & PLAN NOTE
- Pt w/ systolic BP ranging 180-200 systolic on admission  - No evidence for end-organ damage  - Likely volume-driven in the setting of her ESRD, could also be a rebound effect from not taking her Clonidine  - Pt on Coreg 12.5 mg BID, Clonidine 0.3 mg TID and Hydralazine 100 mg TID at home  - Will resume her home medications and defer to nephrology making any changes to her current antihypertensive regimen ,  Her BP remains elevated over night,added Norvasc and increased prn IV Hydralazine,DC clonidine and coreg for bradycardia,BP remains elevated,transferred to ICU on Cardene drip.  -Cardene infusion stopped.   - HR recovered, will add make Correg to clonidine, hydralazine and amlodipine

## 2023-11-01 NOTE — PLAN OF CARE
Problem: Adult Inpatient Plan of Care  Goal: Optimal Comfort and Wellbeing  Outcome: Ongoing, Progressing     Problem: Adult Inpatient Plan of Care  Goal: Absence of Hospital-Acquired Illness or Injury  Outcome: Ongoing, Progressing     Problem: Diabetes Comorbidity  Goal: Blood Glucose Level Within Targeted Range  Outcome: Ongoing, Progressing     Problem: Infection (Pneumonia)  Goal: Resolution of Infection Signs and Symptoms  Outcome: Ongoing, Progressing     Problem: Infection (Hemodialysis)  Goal: Absence of Infection Signs and Symptoms  Outcome: Ongoing, Progressing

## 2023-11-01 NOTE — ASSESSMENT & PLAN NOTE
- Pt w/ progressive productive cough and mild hypoxia x last 2 weeks  - Pt tested NEGATIVE for COVID and Influenza  - Although her CXR was unremarkable for focal consolidates will start pt on Abx treatment given high suspicion for PNA, in the setting of her worsening smx, leukocytosis and elevated procal levels  - Pt received x1 dose of IV Ceftriaxone 1g + IV Azithromycin 500 mg in the ED, will cont x 5 days total (switched Azithro to PO)  - Obtain sputum culture- no result  Improving

## 2023-11-01 NOTE — PLAN OF CARE
Case Management Assessment     PCP: Darrick Cabral  Pharmacy: [facility]      Patient Arrived From: home   Existing Help at Home: Michele phan    Barriers to Discharge: none    Discharge Plan:    A. Home with son   B. Home health         11/01/23 1313   Discharge Assessment   Assessment Type Discharge Planning Assessment   Confirmed/corrected address, phone number and insurance Yes   Confirmed Demographics Correct on Facesheet   Source of Information patient   Communicated CECILIO with patient/caregiver Yes   People in Home child(jaylyn), adult   Do you expect to return to your current living situation? Yes   Do you have help at home or someone to help you manage your care at home? Yes   Prior to hospitilization cognitive status: Alert/Oriented   Current cognitive status: Alert/Oriented   Equipment Currently Used at Home none   Readmission within 30 days? No   Patient currently being followed by outpatient case management? No   Do you currently have service(s) that help you manage your care at home? No   Do you take prescription medications? Yes   Do you have prescription coverage? Yes   Coverage Medicare   Do you have any problems affording any of your prescribed medications? No   Is the patient taking medications as prescribed? yes   Who is going to help you get home at discharge? Michele Phan   How do you get to doctors appointments? car, drives self   Are you on dialysis? No   Do you take coumadin? No   DME Needed Upon Discharge    (tbd)   Discharge Plan discussed with: Patient   Transition of Care Barriers None   Discharge Plan A Home with family   Discharge Plan B Home Health   OTHER   Name(s) of People in Home Michele phan

## 2023-11-01 NOTE — PROGRESS NOTES
[facility] Medicine  Progress Note    Patient Name: April Vasquez  MRN: [id number]  Patient Class: IP- Inpatient   Admission Date: 10/29/2023  Length of Stay: 3 days  Attending Physician: Evens Frederick MD  Primary Care Provider: Darrick Cabral MD        Subjective:     Principal Problem:Community acquired pneumonia        HPI:  April Vasquez is a 68 y.o. female with a PMHx of HTN, DM type II, and ESRD on MWF HD,  who presented to [facility] ED w/ worsening productive cough.   Pt reports that her symptoms first started about 2 weeks ago w/ mild cough initially, but her cough has been getting worse over the last 3 days w/ production of greenish sputum. Her cough has been associated w/ chills, subjective fevers, headache, dizziness and generalized malaise. She also reports associated rhinorrhea and congestion. She tried OP Robitussin w/o significant relief of her symptoms. Pt denied any chest pain, palpitations, SOB, or recent sick contacts. According to her, she had hx of heavy smoking in the past but she quitted about 4-5 years ago. She doesn't recall being to the hospital before w/ respiratory symptoms, and she was never diagnosed w/ COPD or asthma.    On admission to the ED; pt was hypertensive w/ -200 systolic and borderline hypoxic w/ SpO2 92-94%, otherwise vitally stable. Her admission labs were remarkable for WBCs 17.2, Hb 10.3, CO2 21, AG 17, phos 6.1, BUN 31, Cr 7.2, BNP 3,403 and mildly elevated procal at 0.49. She tested -ve for COVID-19 and Influenza. Her CXR was remarkable for perihilar interstitial opacities suggestive of mild interstitial edema or pneumonitis.  No confluent area of consolidation. Pt received a dose of IV Ceftriaxone, IV Azithro and a dose of IV methylprednisolone 125 mg before she was admitted for further inpatient management.      Overview/Hospital Course:  Patient admitted with PNA and Volume overload.patient has been  started on IV Abx and  nephrology did HD ,patient is stable on RA. Her BP remains elevated over night,added Norvasc and increased prn IV Hydralazine,DC clonidine and coreg for bradycardia,BP remains elevated,transferred to ICU on Cardene drip.    Cardene infusion stopped.  Patient appears to have rebound hypertension after dialysis, again shooting up to the 200s.  Heart rate has recovered, will resume Coreg in addition to clonidine, hydralazine, amlodipine.          Interval History:  NAEON.  No new issues.   Denies complaints.  She does not feel the elevated blood pressure  All questions answered and updates on care given.       ROS:  General: Negative for fevers or chills.  Cardiac: Negative for chest pain or orthopnea   Pulmonary: Negative for dyspnea or wheezing.  GI: Negative for abdominal distention or pain     Vitals:    11/01/23 1145 11/01/23 1200 11/01/23 1215 11/01/23 1230   BP: (!) 227/93 (!) 186/85  (!) 218/93   BP Location:       Patient Position:       Pulse: 75 62 98 (!) 112   Resp: (!) 23 12 (!) 27 (!) 26   Temp:       TempSrc:       SpO2: 98% 95% 96% 96%   Weight:       Height:              Body mass index is 15.96 kg/m².      PHYSICAL EXAM:  GENERAL APPEARANCE: alert and cooperative, and appears to be in no acute distress.  HEENT:     HEAD: NC/AT     EYES: PERRL, EOMI.  Vision is grossly intact.  NECK: Neck supple, non-tender without LAD, masses or thyromegaly.  CARDIAC: There is no peripheral edema, cyanosis or pallor.   LUNGS: Clear to auscultation and percussion without rales or wheezing  ABDOMEN: Non-distended. No guarding.  MSK: No joint erythema or tenderness.   EXTREMITIES: No significant deformity or joint abnormality. No edema.   NEUROLOGICAL: CN II-XII grossly intact.   SKIN: Skin normal color, texture and turgor with no lesions or eruptions.  PSYCHIATRIC: Oriented. No tangential speech. No Hyperactive features.        Recent Results (from the past 24 hour(s))   Magnesium    Collection Time: 11/01/23  8:27 AM    Result Value Ref Range    Magnesium 2.4 1.6 - 2.6 mg/dL   Phosphorus    Collection Time: 11/01/23  8:27 AM   Result Value Ref Range    Phosphorus 6.2 (H) 2.7 - 4.5 mg/dL   Comprehensive Metabolic Panel    Collection Time: 11/01/23  8:27 AM   Result Value Ref Range    Sodium 134 (L) 136 - 145 mmol/L    Potassium 5.0 3.5 - 5.1 mmol/L    Chloride 95 95 - 110 mmol/L    CO2 26 23 - 29 mmol/L    Glucose 80 70 - 110 mg/dL    BUN 43 (H) 8 - 23 mg/dL    Creatinine 7.0 (H) 0.5 - 1.4 mg/dL    Calcium 9.7 8.7 - 10.5 mg/dL    Total Protein 6.6 6.0 - 8.4 g/dL    Albumin 2.8 (L) 3.5 - 5.2 g/dL    Total Bilirubin 0.4 0.1 - 1.0 mg/dL    Alkaline Phosphatase 96 55 - 135 U/L    AST 12 10 - 40 U/L    ALT 5 (L) 10 - 44 U/L    eGFR 6 (A) >60 mL/min/1.73 m^2    Anion Gap 13 8 - 16 mmol/L   CBC Auto Differential    Collection Time: 11/01/23  8:27 AM   Result Value Ref Range    WBC 12.47 3.90 - 12.70 K/uL    RBC 3.47 (L) 4.00 - 5.40 M/uL    Hemoglobin 10.4 (L) 12.0 - 16.0 g/dL    Hematocrit 32.4 (L) 37.0 - 48.5 %    MCV 93 82 - 98 fL    MCH 30.0 27.0 - 31.0 pg    MCHC 32.1 32.0 - 36.0 g/dL    RDW 16.0 (H) 11.5 - 14.5 %    Platelets 301 150 - 450 K/uL    MPV 9.6 9.2 - 12.9 fL    Immature Granulocytes 0.8 (H) 0.0 - 0.5 %    Gran # (ANC) 9.6 (H) 1.8 - 7.7 K/uL    Immature Grans (Abs) 0.10 (H) 0.00 - 0.04 K/uL    Lymph # 1.9 1.0 - 4.8 K/uL    Mono # 0.8 0.3 - 1.0 K/uL    Eos # 0.1 0.0 - 0.5 K/uL    Baso # 0.02 0.00 - 0.20 K/uL    nRBC 0 0 /100 WBC    Gran % 76.5 (H) 38.0 - 73.0 %    Lymph % 15.2 (L) 18.0 - 48.0 %    Mono % 6.5 4.0 - 15.0 %    Eosinophil % 0.8 0.0 - 8.0 %    Basophil % 0.2 0.0 - 1.9 %    Differential Method Automated    POCT glucose    Collection Time: 11/01/23 12:02 PM   Result Value Ref Range    POCT Glucose 74 70 - 110 mg/dL       Microbiology Results (last 7 days)     Procedure Component Value Units Date/Time    Blood Culture #2 **CANNOT BE ORDERED STAT** [[id number]] Collected: 10/29/23 1103    Order Status: Completed  "Specimen: Blood from Peripheral, Antecubital, Right Updated: 10/31/23 1303     Blood Culture, Routine No Growth to date      No Growth to date      No Growth to date    Blood Culture #1 **CANNOT BE ORDERED STAT** [[id number]] Collected: 10/29/23 1103    Order Status: Completed Specimen: Blood from Peripheral, Forearm, Right Updated: 10/31/23 1303     Blood Culture, Routine No Growth to date      No Growth to date      No Growth to date    Culture, Respiratory with Gram Stain [[id number]]     Order Status: No result Specimen: Respiratory from Sputum            Imaging Results          X-Ray Chest AP Portable (Final result)  Result time 10/29/23 11:26:43    Final result by Mark Caba MD (10/29/23 11:26:43)                 Impression:      Perihilar interstitial opacities suggestive of mild interstitial edema or pneumonitis.  No confluent area of consolidation.      Electronically signed by: Mark Caba MD  Date:    10/29/2023  Time:    11:26             Narrative:    EXAMINATION:  XR CHEST AP PORTABLE    CLINICAL HISTORY:  Provided history is "COUGH;  ".    TECHNIQUE:  One view of the chest.    COMPARISON:  10/24/2023.    FINDINGS:  Cardiac wires overlie the chest.  Cardiomediastinal silhouette is stable.  Atherosclerotic calcifications overlie the aortic arch.  Mild central vascular congestion with prominent perihilar interstitial lung markings.  Probable subsegmental atelectasis or scarring in the left mid lung.  No confluent area of consolidation.  No large pleural effusion.  No pneumothorax.                                       Assessment/Plan:      * Community acquired pneumonia  - Pt w/ progressive productive cough and mild hypoxia x last 2 weeks  - Pt tested NEGATIVE for COVID and Influenza  - Although her CXR was unremarkable for focal consolidates will start pt on Abx treatment given high suspicion for PNA, in the setting of her worsening smx, leukocytosis and elevated procal levels  - Pt " received x1 dose of IV Ceftriaxone 1g + IV Azithromycin 500 mg in the ED, will cont x 5 days total (switched Azithro to PO)  - Obtain sputum culture- no result  Improving    Hypertensive urgency  - Pt w/ systolic BP ranging 180-200 systolic on admission  - No evidence for end-organ damage  - Likely volume-driven in the setting of her ESRD, could also be a rebound effect from not taking her Clonidine  - Pt on Coreg 12.5 mg BID, Clonidine 0.3 mg TID and Hydralazine 100 mg TID at home  - Will resume her home medications and defer to nephrology making any changes to her current antihypertensive regimen ,  Her BP remains elevated over night,added Norvasc and increased prn IV Hydralazine,DC clonidine and coreg for bradycardia,BP remains elevated,transferred to ICU on Cardene drip.  -Cardene infusion stopped.   - HR recovered, will add make Correg to clonidine, hydralazine and amlodipine    (HFpEF) heart failure with preserved ejection fraction  GII dd on 2019 ECHO  BNP >3000 on admission  Remove fluid per HD    Secondary hyperparathyroidism of renal origin  - Pt's last PTH level ~ 528.3, 09/2023  - Currently normocalcemic, hyperphosphatemic  - Resume home Sevelamer 1600 mg TID      Anemia in chronic kidney disease  - Hb ~ 10.3 on admission  - Normocytic, normochromic  - Likely 2/2 pt's ESRD  - Monitor daily CBC  - May transfuse for Hb < 7.0    Nephrotic syndrome  On HD.      ESRD (end stage renal disease) on dialysis  - Pt w/ Hx of ESRD, likely 2/2 longstanding hx of HTN / DM II  - Dialyzes Q MWF, last HD was on Friday 10/27, follows w/ Dr. Diego  - Resume HD per nephrology recs  - Resume home Renvela 1600 mg TID  - Avoid nephrotoxic meds  - Renally dose all medications    Metabolic acidosis  - Bicarb ~ 21, AG ~ 17 on admission  - Likely 2/2 pt's ESRD  - Will check lactate level  - Will likely improve w/ HD    Diabetes mellitus type II, controlled  - last A1c ~ 4.3 ?!  - Pt not on any active treatment OP  - Cont to  monitor for now      VTE Risk Mitigation (From admission, onward)         Ordered     IP VTE LOW RISK PATIENT  Once         10/29/23 1358     Place sequential compression device  Until discontinued         10/29/23 1358                Discharge Planning   CECILIO:      Code Status: Full Code   Is the patient medically ready for discharge?:     Reason for patient still in hospital (select all that apply): Patient trending condition and Treatment      Discharge Delays: None known at this time        Critical care time spent on the evaluation and treatment of severe organ dysfunction, review of pertinent labs and imaging studies, discussions with consulting providers and discussions with patient/family: 35 minutes.      Evens Frederick MD  Department of Hospital Medicine   [facility] - Intensive Care

## 2023-11-01 NOTE — PROGRESS NOTES
68 y o f known to me with esrd on chronic HD at [facility] MWF admitted with PNA     Dialyzes MWF     Denies CNS ENT GI  RHEUM OR DERM SX has had some recent night sweats and chills    Feeling better cough down     Past Medical History:   Diagnosis Date    (HFpEF) heart failure with preserved ejection fraction 10/30/2023    Anemia     Colon polyps     COVID-19 08/2021    Diabetes mellitus, type 2     ESRD (end stage renal disease) on dialysis 2017    Gallstones     Hypertension     Pulmonary edema     Renal disorder     dialysis MIRANDA fistula    Tobacco abuse     Uses walker      Past Surgical History:   Procedure Laterality Date    AVF  2017    CHOLECYSTECTOMY  2016    COLONOSCOPY N/A 8/6/2020    Procedure: COLONOSCOPY;  Surgeon: Hood Hernadez MD;  Location: [facility] ([address] [place] [address]);  Service: Endoscopy;  Laterality: N/A;  labs prior-dialysis  covid test lapalco 8/3    Permacath Right 2017    TUBAL LIGATION       Review of patient's allergies indicates:  No Known Allergies    Current Facility-Administered Medications   Medication    acetaminophen tablet 1,000 mg    amLODIPine tablet 10 mg    carvediloL tablet 12.5 mg    cefTRIAXone (ROCEPHIN) 1 g in dextrose 5 % in water (D5W) 100 mL IVPB (MB+)    cloNIDine tablet 0.3 mg    dextrose 10% bolus 125 mL 125 mL    dextrose 10% bolus 250 mL 250 mL    guaiFENesin 100 mg/5 ml syrup 200 mg    hydrALAZINE tablet 100 mg    melatonin tablet 6 mg    mupirocin 2 % ointment    naloxone 0.4 mg/mL injection 0.02 mg    ondansetron disintegrating tablet 8 mg    polyethylene glycol packet 17 g    senna tablet 8.6 mg    sevelamer carbonate tablet 1,600 mg    sodium chloride 0.9% bolus 250 mL 250 mL    sodium chloride 0.9% flush 10 mL     Social History     Socioeconomic History    Marital status:     Number of children: 4   Occupational History    Occupation: disabled x 2009,  at the [employer]   Tobacco Use    Smoking status: Former     Current packs/day: 0.00      Average packs/day: 0.5 packs/day for 20.0 years (10.0 ttl pk-yrs)     Types: Cigarettes     Start date: 6/24/2000     Quit date: 6/24/2020     Years since quitting: 3.3    Smokeless tobacco: Never   Substance and Sexual Activity    Alcohol use: No    Drug use: No    Sexual activity: Yes     Partners: Male     Birth control/protection: Post-menopausal   Social History Narrative    Lives with her 2 sons    Caregiver Son Tor     Family History   Problem Relation Age of Onset    Kidney disease Mother     Hypertension Mother     Cervical cancer Mother     Cancer Mother     Ovarian cancer Mother     Diabetes Father     Drug abuse Paternal Grandmother     Diabetes Sister     No Known Problems Sister     No Known Problems Sister     No Known Problems Sister     No Known Problems Son     No Known Problems Son     No Known Problems Son     No Known Problems Daughter     Breast cancer Neg Hx     Colon cancer Neg Hx          LABS    Recent Results (from the past 24 hour(s))   Magnesium    Collection Time: 11/01/23  8:27 AM   Result Value Ref Range    Magnesium 2.4 1.6 - 2.6 mg/dL   Phosphorus    Collection Time: 11/01/23  8:27 AM   Result Value Ref Range    Phosphorus 6.2 (H) 2.7 - 4.5 mg/dL   Comprehensive Metabolic Panel    Collection Time: 11/01/23  8:27 AM   Result Value Ref Range    Sodium 134 (L) 136 - 145 mmol/L    Potassium 5.0 3.5 - 5.1 mmol/L    Chloride 95 95 - 110 mmol/L    CO2 26 23 - 29 mmol/L    Glucose 80 70 - 110 mg/dL    BUN 43 (H) 8 - 23 mg/dL    Creatinine 7.0 (H) 0.5 - 1.4 mg/dL    Calcium 9.7 8.7 - 10.5 mg/dL    Total Protein 6.6 6.0 - 8.4 g/dL    Albumin 2.8 (L) 3.5 - 5.2 g/dL    Total Bilirubin 0.4 0.1 - 1.0 mg/dL    Alkaline Phosphatase 96 55 - 135 U/L    AST 12 10 - 40 U/L    ALT 5 (L) 10 - 44 U/L    eGFR 6 (A) >60 mL/min/1.73 m^2    Anion Gap 13 8 - 16 mmol/L   CBC Auto Differential    Collection Time: 11/01/23  8:27 AM   Result Value Ref Range    WBC 12.47 3.90 - 12.70 K/uL    RBC 3.47 (L) 4.00 -  5.40 M/uL    Hemoglobin 10.4 (L) 12.0 - 16.0 g/dL    Hematocrit 32.4 (L) 37.0 - 48.5 %    MCV 93 82 - 98 fL    MCH 30.0 27.0 - 31.0 pg    MCHC 32.1 32.0 - 36.0 g/dL    RDW 16.0 (H) 11.5 - 14.5 %    Platelets 301 150 - 450 K/uL    MPV 9.6 9.2 - 12.9 fL    Immature Granulocytes 0.8 (H) 0.0 - 0.5 %    Gran # (ANC) 9.6 (H) 1.8 - 7.7 K/uL    Immature Grans (Abs) 0.10 (H) 0.00 - 0.04 K/uL    Lymph # 1.9 1.0 - 4.8 K/uL    Mono # 0.8 0.3 - 1.0 K/uL    Eos # 0.1 0.0 - 0.5 K/uL    Baso # 0.02 0.00 - 0.20 K/uL    nRBC 0 0 /100 WBC    Gran % 76.5 (H) 38.0 - 73.0 %    Lymph % 15.2 (L) 18.0 - 48.0 %    Mono % 6.5 4.0 - 15.0 %    Eosinophil % 0.8 0.0 - 8.0 %    Basophil % 0.2 0.0 - 1.9 %    Differential Method Automated    POCT glucose    Collection Time: 11/01/23 12:02 PM   Result Value Ref Range    POCT Glucose 74 70 - 110 mg/dL   ]    I/O last 3 completed shifts:  In: 1273.6 [P.O.:840; I.V.:235.4; IV Piggyback:198.2]  Out: 0     Vitals:    11/01/23 1245 11/01/23 1300 11/01/23 1315 11/01/23 1330   BP: (!) 197/92 (!) 197/88 (!) 213/93 (!) 208/93   Pulse: 76 65 86 73   Resp: 12 13 (!) 22 16   Temp:       TempSrc:       SpO2: 95% (!) 93% 97% 96%   Weight:       Height:           No Jvd, Thyromegaly or Lymphadenopathy  Lungs: Fairly clear anteriorly and laterally Pst L lung rhonchi   Cor: RRR no G or rubs  Abd: Soft benign good bowel sounds non tender  Ext: No E C C  L arm avf with good p-b-t    A)    ESRD HD MWF   PNA   COPD?? Pt is an ex smoker  Anem ia of ckd  2nd hyperpth   DM  Hypoalb   Degree of chf ( ef 47%)    P)    Renal Diet  Home meds  Protect access  HD mwf  EPO  prn   Binders prn   Adjust all meds to the degree of renal fx  Close follow up I/O and weights  Maintain Hydration

## 2023-11-01 NOTE — CARE UPDATE
[facility], [facility]  Nurses Note -- 4 Eyes      10/31/2023       Skin assessed on: Q Shift      [x] No Pressure Injuries Present    [x]Prevention Measures Documented    [] Yes LDA  for Pressure Injury Previously documented     [] Yes New Pressure Injury Discovered   [] LDA for New Pressure Injury Added      Attending RN:  Alyssa Block RN     Second RN:  TUSHAR Gilbert

## 2023-11-02 ENCOUNTER — CLINICAL SUPPORT (OUTPATIENT)
Dept: ENDOSCOPY | Facility: HOSPITAL | Age: 68
DRG: 193 | End: 2023-11-02
Attending: INTERNAL MEDICINE
Payer: MEDICARE

## 2023-11-02 ENCOUNTER — TELEPHONE (OUTPATIENT)
Dept: ENDOSCOPY | Facility: HOSPITAL | Age: 68
End: 2023-11-02

## 2023-11-02 VITALS
WEIGHT: 93 LBS | BODY MASS INDEX: 15.88 KG/M2 | DIASTOLIC BLOOD PRESSURE: 76 MMHG | HEIGHT: 64 IN | SYSTOLIC BLOOD PRESSURE: 173 MMHG | TEMPERATURE: 99 F | RESPIRATION RATE: 20 BRPM | HEART RATE: 70 BPM | OXYGEN SATURATION: 94 %

## 2023-11-02 DIAGNOSIS — D12.6 TUBULAR ADENOMA OF COLON: ICD-10-CM

## 2023-11-02 LAB
ALBUMIN SERPL BCP-MCNC: 2.6 G/DL (ref 3.5–5.2)
ALP SERPL-CCNC: 83 U/L (ref 55–135)
ALT SERPL W/O P-5'-P-CCNC: 6 U/L (ref 10–44)
ANION GAP SERPL CALC-SCNC: 5 MMOL/L (ref 8–16)
AST SERPL-CCNC: 12 U/L (ref 10–40)
BACTERIA BLD CULT: NORMAL
BACTERIA BLD CULT: NORMAL
BASOPHILS # BLD AUTO: 0.02 K/UL (ref 0–0.2)
BASOPHILS NFR BLD: 0.2 % (ref 0–1.9)
BILIRUB SERPL-MCNC: 0.4 MG/DL (ref 0.1–1)
BUN SERPL-MCNC: 20 MG/DL (ref 8–23)
CALCIUM SERPL-MCNC: 9.4 MG/DL (ref 8.7–10.5)
CHLORIDE SERPL-SCNC: 104 MMOL/L (ref 95–110)
CO2 SERPL-SCNC: 27 MMOL/L (ref 23–29)
CREAT SERPL-MCNC: 4.3 MG/DL (ref 0.5–1.4)
DIFFERENTIAL METHOD: ABNORMAL
EOSINOPHIL # BLD AUTO: 0.1 K/UL (ref 0–0.5)
EOSINOPHIL NFR BLD: 0.9 % (ref 0–8)
ERYTHROCYTE [DISTWIDTH] IN BLOOD BY AUTOMATED COUNT: 16 % (ref 11.5–14.5)
EST. GFR  (NO RACE VARIABLE): 11 ML/MIN/1.73 M^2
GLUCOSE SERPL-MCNC: 86 MG/DL (ref 70–110)
HCT VFR BLD AUTO: 28.9 % (ref 37–48.5)
HGB BLD-MCNC: 9 G/DL (ref 12–16)
IMM GRANULOCYTES # BLD AUTO: 0.07 K/UL (ref 0–0.04)
IMM GRANULOCYTES NFR BLD AUTO: 0.7 % (ref 0–0.5)
LYMPHOCYTES # BLD AUTO: 1.8 K/UL (ref 1–4.8)
LYMPHOCYTES NFR BLD: 17.6 % (ref 18–48)
MAGNESIUM SERPL-MCNC: 2.3 MG/DL (ref 1.6–2.6)
MCH RBC QN AUTO: 30 PG (ref 27–31)
MCHC RBC AUTO-ENTMCNC: 31.1 G/DL (ref 32–36)
MCV RBC AUTO: 96 FL (ref 82–98)
MONOCYTES # BLD AUTO: 0.8 K/UL (ref 0.3–1)
MONOCYTES NFR BLD: 7.3 % (ref 4–15)
NEUTROPHILS # BLD AUTO: 7.6 K/UL (ref 1.8–7.7)
NEUTROPHILS NFR BLD: 73.3 % (ref 38–73)
NRBC BLD-RTO: 0 /100 WBC
PHOSPHATE SERPL-MCNC: 4.3 MG/DL (ref 2.7–4.5)
PLATELET # BLD AUTO: 216 K/UL (ref 150–450)
PMV BLD AUTO: 10.3 FL (ref 9.2–12.9)
POCT GLUCOSE: 97 MG/DL (ref 70–110)
POTASSIUM SERPL-SCNC: 4.6 MMOL/L (ref 3.5–5.1)
PROT SERPL-MCNC: 5.8 G/DL (ref 6–8.4)
RBC # BLD AUTO: 3 M/UL (ref 4–5.4)
SODIUM SERPL-SCNC: 136 MMOL/L (ref 136–145)
WBC # BLD AUTO: 10.37 K/UL (ref 3.9–12.7)

## 2023-11-02 PROCEDURE — 84100 ASSAY OF PHOSPHORUS: CPT | Mod: NTX | Performed by: STUDENT IN AN ORGANIZED HEALTH CARE EDUCATION/TRAINING PROGRAM

## 2023-11-02 PROCEDURE — 25000003 PHARM REV CODE 250: Mod: NTX | Performed by: HOSPITALIST

## 2023-11-02 PROCEDURE — 85025 COMPLETE CBC W/AUTO DIFF WBC: CPT | Mod: NTX | Performed by: STUDENT IN AN ORGANIZED HEALTH CARE EDUCATION/TRAINING PROGRAM

## 2023-11-02 PROCEDURE — 63600175 PHARM REV CODE 636 W HCPCS: Mod: NTX | Performed by: HOSPITALIST

## 2023-11-02 PROCEDURE — 25000003 PHARM REV CODE 250: Mod: NTX | Performed by: STUDENT IN AN ORGANIZED HEALTH CARE EDUCATION/TRAINING PROGRAM

## 2023-11-02 PROCEDURE — 80053 COMPREHEN METABOLIC PANEL: CPT | Mod: NTX | Performed by: STUDENT IN AN ORGANIZED HEALTH CARE EDUCATION/TRAINING PROGRAM

## 2023-11-02 PROCEDURE — 63600175 PHARM REV CODE 636 W HCPCS: Mod: NTX | Performed by: STUDENT IN AN ORGANIZED HEALTH CARE EDUCATION/TRAINING PROGRAM

## 2023-11-02 PROCEDURE — 94761 N-INVAS EAR/PLS OXIMETRY MLT: CPT | Mod: NTX

## 2023-11-02 PROCEDURE — 83735 ASSAY OF MAGNESIUM: CPT | Mod: NTX | Performed by: STUDENT IN AN ORGANIZED HEALTH CARE EDUCATION/TRAINING PROGRAM

## 2023-11-02 PROCEDURE — 36415 COLL VENOUS BLD VENIPUNCTURE: CPT | Mod: NTX | Performed by: STUDENT IN AN ORGANIZED HEALTH CARE EDUCATION/TRAINING PROGRAM

## 2023-11-02 RX ORDER — NIFEDIPINE 60 MG/1
60 TABLET, EXTENDED RELEASE ORAL 2 TIMES DAILY
Qty: 180 TABLET | Refills: 3 | Status: SHIPPED | OUTPATIENT
Start: 2023-11-02 | End: 2024-11-01

## 2023-11-02 RX ORDER — CEFDINIR 300 MG/1
300 CAPSULE ORAL 2 TIMES DAILY
Qty: 10 CAPSULE | Refills: 0 | Status: SHIPPED | OUTPATIENT
Start: 2023-11-02 | End: 2023-11-02 | Stop reason: HOSPADM

## 2023-11-02 RX ORDER — MINOXIDIL 2.5 MG/1
15 TABLET ORAL ONCE
Status: COMPLETED | OUTPATIENT
Start: 2023-11-02 | End: 2023-11-02

## 2023-11-02 RX ORDER — NIFEDIPINE 30 MG/1
60 TABLET, EXTENDED RELEASE ORAL 2 TIMES DAILY
Status: DISCONTINUED | OUTPATIENT
Start: 2023-11-02 | End: 2023-11-02 | Stop reason: HOSPADM

## 2023-11-02 RX ORDER — LOSARTAN POTASSIUM 100 MG/1
100 TABLET ORAL DAILY
Qty: 90 TABLET | Refills: 3 | Status: SHIPPED | OUTPATIENT
Start: 2023-11-03 | End: 2024-11-02

## 2023-11-02 RX ORDER — MINOXIDIL 10 MG/1
20 TABLET ORAL DAILY
Qty: 180 TABLET | Refills: 3 | Status: SHIPPED | OUTPATIENT
Start: 2023-11-03 | End: 2024-11-02

## 2023-11-02 RX ORDER — MINOXIDIL 2.5 MG/1
5 TABLET ORAL DAILY
Status: DISCONTINUED | OUTPATIENT
Start: 2023-11-02 | End: 2023-11-02

## 2023-11-02 RX ORDER — MINOXIDIL 2.5 MG/1
20 TABLET ORAL DAILY
Status: DISCONTINUED | OUTPATIENT
Start: 2023-11-03 | End: 2023-11-02 | Stop reason: HOSPADM

## 2023-11-02 RX ADMIN — SEVELAMER CARBONATE 1600 MG: 800 TABLET, FILM COATED ORAL at 07:11

## 2023-11-02 RX ADMIN — POLYETHYLENE GLYCOL 3350 17 G: 17 POWDER, FOR SOLUTION ORAL at 08:11

## 2023-11-02 RX ADMIN — MINOXIDIL 5 MG: 2.5 TABLET ORAL at 10:11

## 2023-11-02 RX ADMIN — HYDRALAZINE HYDROCHLORIDE 100 MG: 25 TABLET, FILM COATED ORAL at 08:11

## 2023-11-02 RX ADMIN — NIFEDIPINE 60 MG: 30 TABLET, FILM COATED, EXTENDED RELEASE ORAL at 02:11

## 2023-11-02 RX ADMIN — MUPIROCIN: 20 OINTMENT TOPICAL at 08:11

## 2023-11-02 RX ADMIN — SEVELAMER CARBONATE 1600 MG: 800 TABLET, FILM COATED ORAL at 11:11

## 2023-11-02 RX ADMIN — AMLODIPINE BESYLATE 10 MG: 5 TABLET ORAL at 08:11

## 2023-11-02 RX ADMIN — CLONIDINE HYDROCHLORIDE 0.3 MG: 0.1 TABLET ORAL at 02:11

## 2023-11-02 RX ADMIN — MINOXIDIL 15 MG: 2.5 TABLET ORAL at 01:11

## 2023-11-02 RX ADMIN — CLONIDINE HYDROCHLORIDE 0.3 MG: 0.1 TABLET ORAL at 08:11

## 2023-11-02 RX ADMIN — LABETALOL HYDROCHLORIDE 10 MG: 5 INJECTION INTRAVENOUS at 05:11

## 2023-11-02 RX ADMIN — CARVEDILOL 12.5 MG: 12.5 TABLET, FILM COATED ORAL at 07:11

## 2023-11-02 RX ADMIN — LOSARTAN POTASSIUM 100 MG: 25 TABLET, FILM COATED ORAL at 08:11

## 2023-11-02 RX ADMIN — CEFTRIAXONE 1 G: 1 INJECTION, POWDER, FOR SOLUTION INTRAMUSCULAR; INTRAVENOUS at 08:11

## 2023-11-02 RX ADMIN — HYDRALAZINE HYDROCHLORIDE 100 MG: 25 TABLET, FILM COATED ORAL at 02:11

## 2023-11-02 NOTE — PLAN OF CARE
Problem: Adult Inpatient Plan of Care  Goal: Plan of Care Review  Outcome: Ongoing, Progressing     Problem: Adult Inpatient Plan of Care  Goal: Patient-Specific Goal (Individualized)  Outcome: Ongoing, Progressing     Problem: Adult Inpatient Plan of Care  Goal: Absence of Hospital-Acquired Illness or Injury  Outcome: Ongoing, Progressing     Problem: Adult Inpatient Plan of Care  Goal: Optimal Comfort and Wellbeing  Outcome: Ongoing, Progressing     Problem: Adult Inpatient Plan of Care  Goal: Readiness for Transition of Care  Outcome: Ongoing, Progressing     Problem: Infection (Pneumonia)  Goal: Resolution of Infection Signs and Symptoms  Outcome: Ongoing, Progressing

## 2023-11-02 NOTE — NURSING
[facility], [facility] [place]  Nurses Note -- 4 Eyes      11/1/2023       Skin assessed on: Q Shift      [x] No Pressure Injuries Present    [x]Prevention Measures Documented    [] Yes LDA  for Pressure Injury Previously documented     [] Yes New Pressure Injury Discovered   [] LDA for New Pressure Injury Added      Attending RN:  Ramona Jaimes RN     Second RN:  Angie Berumen RN

## 2023-11-02 NOTE — PROGRESS NOTES
"                        Renal ICU Progress Note    Date of Admission:  10/29/2023  9:30 AM    Length of Stay: 4  Days    Subjective: n/a    Objective:    Current Facility-Administered Medications   Medication    acetaminophen tablet 1,000 mg    amLODIPine tablet 10 mg    carvediloL tablet 12.5 mg    cefTRIAXone (ROCEPHIN) 1 g in dextrose 5 % in water (D5W) 100 mL IVPB (MB+)    cloNIDine tablet 0.3 mg    dextrose 10% bolus 125 mL 125 mL    dextrose 10% bolus 250 mL 250 mL    guaiFENesin 100 mg/5 ml syrup 200 mg    hydrALAZINE tablet 100 mg    labetaloL injection 10 mg    losartan tablet 100 mg    melatonin tablet 6 mg    mupirocin 2 % ointment    naloxone 0.4 mg/mL injection 0.02 mg    ondansetron disintegrating tablet 8 mg    polyethylene glycol packet 17 g    senna tablet 8.6 mg    sevelamer carbonate tablet 1,600 mg    sodium chloride 0.9% bolus 250 mL 250 mL    sodium chloride 0.9% flush 10 mL       Vitals:    11/02/23 0515 11/02/23 0530 11/02/23 0545 11/02/23 0600   BP: (!) 187/91 (!) 193/91 (!) 185/84 (!) 187/86   BP Location:       Patient Position:       Pulse: 66 65 71 66   Resp: 19 16 18 14   Temp:       TempSrc:       SpO2: 95% 95% 96% 96%   Weight:       Height:           I/O last 3 completed shifts:  In: 1876.5 [P.O.:1080; I.V.:248.7; Other:250; IV Piggyback:297.8]  Out: 1250 [Other:1250]  No intake/output data recorded.      Physical Exam:     General: NAD  Neck: supple  Heart: RRR  Lungs: unlabored breathing  Abdomen: n/a  Limbs: n/a  Neurologic: asleep      Laboratories:    Recent Labs   Lab 11/02/23  0311   WBC 10.37   RBC 3.00*   HGB 9.0*   HCT 28.9*      MCV 96   MCH 30.0   MCHC 31.1*       Recent Labs   Lab 11/02/23  0311   CALCIUM 9.4   ALBUMIN 2.6*   PROT 5.8*      K 4.6   CO2 27      BUN 20   CREATININE 4.3*   ALKPHOS 83   ALT 6*   AST 12   BILITOT 0.4       No results for input(s): "COLORU", "CLARITYU", "SPECGRAV", "PHUR", "PROTEINUA", "GLUCOSEU", "BILIRUBINCON", "BLOODU", " ""WBCU", "RBCU", "BACTERIA", "MUCUS" in the last 24 hours.    Microbiology Results (last 7 days)       Procedure Component Value Units Date/Time    Blood Culture #2 **CANNOT BE ORDERED STAT** [[id number]] Collected: 10/29/23 1103    Order Status: Completed Specimen: Blood from Peripheral, Antecubital, Right Updated: 11/01/23 1303     Blood Culture, Routine No Growth to date      No Growth to date      No Growth to date      No Growth to date    Blood Culture #1 **CANNOT BE ORDERED STAT** [[id number]] Collected: 10/29/23 1103    Order Status: Completed Specimen: Blood from Peripheral, Forearm, Right Updated: 11/01/23 1303     Blood Culture, Routine No Growth to date      No Growth to date      No Growth to date      No Growth to date    Culture, Respiratory with Gram Stain [[id number]]     Order Status: No result Specimen: Respiratory from Sputum               Diagnostic Tests: n/a        Assessment:    67 y/o female with ESRD on HD admitted with:    - Community acquired pneumonia  - HTN urgency  - HFpEF  - DM-2 with renal manifestations  - Anemia of ckd  - HyperPO4- improving  - 2nd. HPT            Plan:    - Antib. Per admitting  - Dialysis Q MWF  - UF as tolerated (increase UF goal)  - BP control (on multiple Meds.)  - Epogen as needed  - Renal ADA diet  - Oral PO4- binders   - Other problems per admitting                  "

## 2023-11-02 NOTE — PROGRESS NOTES
[facility] Medicine  Progress Note    Patient Name: April Vasquez  MRN: [id number]  Patient Class: IP- Inpatient   Admission Date: 10/29/2023  Length of Stay: 4 days  Attending Physician: Evens Frederick MD  Primary Care Provider: Darrick Cabral MD        Subjective:     Principal Problem:Community acquired pneumonia        HPI:  April Vasquez is a 68 y.o. female with a PMHx of HTN, DM type II, and ESRD on MWF HD,  who presented to [facility] ED w/ worsening productive cough.   Pt reports that her symptoms first started about 2 weeks ago w/ mild cough initially, but her cough has been getting worse over the last 3 days w/ production of greenish sputum. Her cough has been associated w/ chills, subjective fevers, headache, dizziness and generalized malaise. She also reports associated rhinorrhea and congestion. She tried OP Robitussin w/o significant relief of her symptoms. Pt denied any chest pain, palpitations, SOB, or recent sick contacts. According to her, she had hx of heavy smoking in the past but she quitted about 4-5 years ago. She doesn't recall being to the hospital before w/ respiratory symptoms, and she was never diagnosed w/ COPD or asthma.    On admission to the ED; pt was hypertensive w/ -200 systolic and borderline hypoxic w/ SpO2 92-94%, otherwise vitally stable. Her admission labs were remarkable for WBCs 17.2, Hb 10.3, CO2 21, AG 17, phos 6.1, BUN 31, Cr 7.2, BNP 3,403 and mildly elevated procal at 0.49. She tested -ve for COVID-19 and Influenza. Her CXR was remarkable for perihilar interstitial opacities suggestive of mild interstitial edema or pneumonitis.  No confluent area of consolidation. Pt received a dose of IV Ceftriaxone, IV Azithro and a dose of IV methylprednisolone 125 mg before she was admitted for further inpatient management.      Overview/Hospital Course:  Patient admitted with PNA and Volume overload.patient has been  started on IV Abx and  nephrology did HD ,patient is stable on RA. Her BP remains elevated over night,added Norvasc and increased prn IV Hydralazine,DC clonidine and coreg for bradycardia,BP remains elevated,transferred to ICU on Cardene drip.Cardene infusion stopped.  Patient appears to have rebound hypertension after dialysis, again shooting up to the 200s.  Heart rate has recovered, will resume Coreg in addition to clonidine, hydralazine, amlodipine.    Very difficult to control blood pressure, now on:  Nifedipine 60 mg b.i.d.  Coreg 12.5 mg b.i.d. (heart rate will not allow higher BB)  Clonidine 0.3 mg t.i.d.  Hydralazine 100 mg t.i.d.  Minoxidil 20 mg daily  Losartan 100 mg daily  Can not be diuretics, does not make urine  Can not be on spironolactone specifically, because ESRD/K    Will check renal ultrasound to rule out renal artery stenosis, given resistant hypertension despite maximum blood pressure medication treatment.      Interval History:  NAEON.  No new issues.   Denies complaints. Just wants to come home badly  She does not feel the elevated blood pressure  All questions answered and updates on care given.       ROS:  General: Negative for fevers or chills.  Cardiac: Negative for chest pain or orthopnea   Pulmonary: Negative for dyspnea or wheezing.  GI: Negative for abdominal distention or pain     Vitals:    11/02/23 1405 11/02/23 1430 11/02/23 1500 11/02/23 1509   BP: (!) 176/79 (!) 177/79 (!) 159/70    BP Location: Right arm Right arm Right arm    Patient Position: Lying Lying Lying    Pulse: 68 65 65 66   Resp: (!) 23 15 12 13   Temp:    98.5 °F (36.9 °C)   TempSrc:    Oral   SpO2: (!) 94% (!) 94% (!) 94% (!) 94%   Weight:       Height:              Body mass index is 15.96 kg/m².      PHYSICAL EXAM:  GENERAL APPEARANCE: alert and cooperative, and appears to be in no acute distress.  HEENT:     HEAD: NC/AT     EYES: PERRL, EOMI.  Vision is grossly intact.  NECK: Neck supple, non-tender without LAD, masses or  thyromegaly.  CARDIAC: There is no peripheral edema, cyanosis or pallor.   LUNGS: Clear to auscultation and percussion without rales or wheezing  ABDOMEN: Non-distended. No guarding.  MSK: No joint erythema or tenderness.   EXTREMITIES: No significant deformity or joint abnormality. No edema.   NEUROLOGICAL: CN II-XII grossly intact.   SKIN: Skin normal color, texture and turgor with no lesions or eruptions.  PSYCHIATRIC: Oriented. No tangential speech. No Hyperactive features.        Recent Results (from the past 24 hour(s))   POCT glucose    Collection Time: 11/01/23 11:02 PM   Result Value Ref Range    POCT Glucose 96 70 - 110 mg/dL   Magnesium    Collection Time: 11/02/23  3:11 AM   Result Value Ref Range    Magnesium 2.3 1.6 - 2.6 mg/dL   Phosphorus    Collection Time: 11/02/23  3:11 AM   Result Value Ref Range    Phosphorus 4.3 2.7 - 4.5 mg/dL   Comprehensive Metabolic Panel    Collection Time: 11/02/23  3:11 AM   Result Value Ref Range    Sodium 136 136 - 145 mmol/L    Potassium 4.6 3.5 - 5.1 mmol/L    Chloride 104 95 - 110 mmol/L    CO2 27 23 - 29 mmol/L    Glucose 86 70 - 110 mg/dL    BUN 20 8 - 23 mg/dL    Creatinine 4.3 (H) 0.5 - 1.4 mg/dL    Calcium 9.4 8.7 - 10.5 mg/dL    Total Protein 5.8 (L) 6.0 - 8.4 g/dL    Albumin 2.6 (L) 3.5 - 5.2 g/dL    Total Bilirubin 0.4 0.1 - 1.0 mg/dL    Alkaline Phosphatase 83 55 - 135 U/L    AST 12 10 - 40 U/L    ALT 6 (L) 10 - 44 U/L    eGFR 11 (A) >60 mL/min/1.73 m^2    Anion Gap 5 (L) 8 - 16 mmol/L   CBC Auto Differential    Collection Time: 11/02/23  3:11 AM   Result Value Ref Range    WBC 10.37 3.90 - 12.70 K/uL    RBC 3.00 (L) 4.00 - 5.40 M/uL    Hemoglobin 9.0 (L) 12.0 - 16.0 g/dL    Hematocrit 28.9 (L) 37.0 - 48.5 %    MCV 96 82 - 98 fL    MCH 30.0 27.0 - 31.0 pg    MCHC 31.1 (L) 32.0 - 36.0 g/dL    RDW 16.0 (H) 11.5 - 14.5 %    Platelets 216 150 - 450 K/uL    MPV 10.3 9.2 - 12.9 fL    Immature Granulocytes 0.7 (H) 0.0 - 0.5 %    Gran # (ANC) 7.6 1.8 - 7.7 K/uL     "Immature Grans (Abs) 0.07 (H) 0.00 - 0.04 K/uL    Lymph # 1.8 1.0 - 4.8 K/uL    Mono # 0.8 0.3 - 1.0 K/uL    Eos # 0.1 0.0 - 0.5 K/uL    Baso # 0.02 0.00 - 0.20 K/uL    nRBC 0 0 /100 WBC    Gran % 73.3 (H) 38.0 - 73.0 %    Lymph % 17.6 (L) 18.0 - 48.0 %    Mono % 7.3 4.0 - 15.0 %    Eosinophil % 0.9 0.0 - 8.0 %    Basophil % 0.2 0.0 - 1.9 %    Differential Method Automated        Microbiology Results (last 7 days)       Procedure Component Value Units Date/Time    Blood Culture #1 **CANNOT BE ORDERED STAT** [[id number]] Collected: 10/29/23 1103    Order Status: Completed Specimen: Blood from Peripheral, Forearm, Right Updated: 11/02/23 1303     Blood Culture, Routine No Growth after 4 days.    Blood Culture #2 **CANNOT BE ORDERED STAT** [[id number]] Collected: 10/29/23 1103    Order Status: Completed Specimen: Blood from Peripheral, Antecubital, Right Updated: 11/02/23 1303     Blood Culture, Routine No Growth after 4 days.    Culture, Respiratory with Gram Stain [[id number]]     Order Status: No result Specimen: Respiratory from Sputum              Imaging Results              X-Ray Chest AP Portable (Final result)  Result time 10/29/23 11:26:43      Final result by Mark Caba MD (10/29/23 11:26:43)                   Impression:      Perihilar interstitial opacities suggestive of mild interstitial edema or pneumonitis.  No confluent area of consolidation.      Electronically signed by: Mark Caba MD  Date:    10/29/2023  Time:    11:26               Narrative:    EXAMINATION:  XR CHEST AP PORTABLE    CLINICAL HISTORY:  Provided history is "COUGH;  ".    TECHNIQUE:  One view of the chest.    COMPARISON:  10/24/2023.    FINDINGS:  Cardiac wires overlie the chest.  Cardiomediastinal silhouette is stable.  Atherosclerotic calcifications overlie the aortic arch.  Mild central vascular congestion with prominent perihilar interstitial lung markings.  Probable subsegmental atelectasis or scarring in the left " mid lung.  No confluent area of consolidation.  No large pleural effusion.  No pneumothorax.                                             Assessment/Plan:      * Community acquired pneumonia  - Pt w/ progressive productive cough and mild hypoxia x last 2 weeks  - Pt tested NEGATIVE for COVID and Influenza  - Although her CXR was unremarkable for focal consolidates will start pt on Abx treatment given high suspicion for PNA, in the setting of her worsening smx, leukocytosis and elevated procal levels  - Pt received x1 dose of IV Ceftriaxone 1g + IV Azithromycin 500 mg in the ED, will cont x 5 days total (switched Azithro to PO)  - Obtain sputum culture- no result  Improving    Hypertensive urgency  Very difficult to control blood pressure, now on:  Nifedipine 60 mg b.i.d.  Coreg 12.5 mg b.i.d. (heart rate will not allow higher BB)  Clonidine 0.3 mg t.i.d.  Hydralazine 100 mg t.i.d.  Minoxidil 20 mg daily  Losartan 100 mg daily  Can not be diuretics, does not make urine  Can not be on spironolactone specifically, because ESRD/K    Will check renal ultrasound to rule out renal artery stenosis, given resistant hypertension despite maximum blood pressure medication treatment.      (HFpEF) heart failure with preserved ejection fraction  GII dd on 2019 ECHO  BNP >3000 on admission  Remove fluid per HD    Secondary hyperparathyroidism of renal origin  - Pt's last PTH level ~ 528.3, 09/2023  - Currently normocalcemic, hyperphosphatemic  - Resume home Sevelamer 1600 mg TID      Anemia in chronic kidney disease  - Hb ~ 10.3 on admission  - Normocytic, normochromic  - Likely 2/2 pt's ESRD  - Monitor daily CBC  - May transfuse for Hb < 7.0    Nephrotic syndrome  On HD.      ESRD (end stage renal disease) on dialysis  - Pt w/ Hx of ESRD, likely 2/2 longstanding hx of HTN / DM II  - Dialyzes Q MWF, last HD was on Friday 10/27, follows w/ Dr. Diego  - Resume HD per nephrology recs  - Resume home Renvela 1600 mg TID  - Avoid  nephrotoxic meds  - Renally dose all medications    Metabolic acidosis  - Bicarb ~ 21, AG ~ 17 on admission  - Likely 2/2 pt's ESRD  - Will check lactate level  - Will likely improve w/ HD    Diabetes mellitus type II, controlled  - last A1c ~ 4.3 ?!  - Pt not on any active treatment OP  - Cont to monitor for now        VTE Risk Mitigation (From admission, onward)           Ordered     IP VTE LOW RISK PATIENT  Once         10/29/23 1358     Place sequential compression device  Until discontinued         10/29/23 1358                    Discharge Planning   CECILIO:      Code Status: Full Code   Is the patient medically ready for discharge?:     Reason for patient still in hospital (select all that apply): Patient trending condition and Treatment  Discharge Plan A: Home with family   Discharge Delays: None known at this time        Critical care time spent on the evaluation and treatment of severe organ dysfunction, review of pertinent labs and imaging studies, discussions with consulting providers and discussions with patient/family: 35 minutes.      Evens Frederick MD  Department of Hospital Medicine   [facility] - Intensive Care

## 2023-11-02 NOTE — NURSING
"[facility] - Intensive Care  ICU Shift Summary  Date: 11/2/2023      Prehospitalization: Home  Admit Date / LOS : 10/29/2023/ 4 days    Diagnosis: Community acquired pneumonia    Consults:        Active: Nephro       Needed: N/A     Code Status: Full Code   Advanced Directive: <no information>    LDA:  Lines/Drains/Airways       Peripheral Intravenous Line  Duration                  Hemodialysis AV Fistula 08/04/19 2249 Left upper arm 1550 days         Peripheral IV - Single Lumen 10/29/23 0946 20 G Anterior;Distal;Right Forearm 3 days                  Central Lines/Site/Justification:Patient Does Not Have Central Line  Urinary Cath/Order/Justification:Patient Does Not Have Urinary Catheter    Vasopressors/Infusions:        GOALS: Volume/ Hemodynamic: SBP <180                     RASS: 0  alert and calm    Pain Management: none       Pain Controlled: not applicable     Rhythm: NSR    Respiratory Device: Room Air                      Most Recent SBT/ SAT: N/A       MOVE Screen: PASS  ICU Liberation: not applicable    VTE Prophylaxis: Mechanical and Ambulation  Mobility: OOB to Chair  Stress Ulcer Prophylaxis: No    Isolation: No active isolations    Dietary:   Current Diet Order   Procedures    Diet renal Fluid - 1500mL     Order Specific Question:   Fluid restriction:     Answer:   Fluid - 1500mL      Tolerance: yes  Advancement: @ goal    I & O (24h):    Intake/Output Summary (Last 24 hours) at 11/2/2023 0539  Last data filed at 11/1/2023 1802  Gross per 24 hour   Intake 868.02 ml   Output 1250 ml   Net -381.98 ml        Restraints: No    Significant Dates:  Post Op Date: N/A  Rescue Date: N/A  Imaging/ Diagnostics: N/A    Noteworthy Labs:  See below    COVID Test: (--)  CBC/Anemia Labs: Coags:    Recent Labs   Lab 11/01/23  0827 11/02/23  0311   WBC 12.47 10.37   HGB 10.4* 9.0*   HCT 32.4* 28.9*    216   MCV 93 96   RDW 16.0* 16.0*    No results for input(s): "PT", "INR", "APTT" in the last 168 hours. " "    Chemistries:   Recent Labs   Lab 11/01/23  0827 11/02/23  0311   * 136   K 5.0 4.6   CL 95 104   CO2 26 27   BUN 43* 20   CREATININE 7.0* 4.3*   CALCIUM 9.7 9.4   PROT 6.6 5.8*   BILITOT 0.4 0.4   ALKPHOS 96 83   ALT 5* 6*   AST 12 12   MG 2.4 2.3   PHOS 6.2* 4.3        Cardiac Enzymes: Ejection Fractions:    No results for input(s): "CPK", "CPKMB", "MB", "TROPONINI" in the last 72 hours. EF   Date Value Ref Range Status   11/16/2021 65 % Final     Nuc Stress EF   Date Value Ref Range Status   10/10/2023 51 % Final        POCT Glucose: HbA1c:    Recent Labs   Lab 10/31/23  1730 11/01/23  1202 11/01/23  2302   POCTGLUCOSE 95 74 96    Hemoglobin A1C   Date Value Ref Range Status   09/21/2023 4.3 4.0 - 5.6 % Final     Comment:     ADA Screening Guidelines:  5.7-6.4%  Consistent with prediabetes  >or=6.5%  Consistent with diabetes    High levels of fetal hemoglobin interfere with the HbA1C  assay. Heterozygous hemoglobin variants (HbS, HgC, etc)do  not significantly interfere with this assay.   However, presence of multiple variants may affect accuracy.             ICU LOS 1d 18h  Level of Care: Critical Care    Chart Check: 12 HR Done  Shift Summary/Plan for the shift: Patient remains in ICU on room air. AAO x4 and afebrile. Patient SBP high in 190s, MD notified, PRN labetalol given x2, mild relief obtained. Patient declined CHG bath and linens changed, Pt stated "I should be going home today." Free of injury, falls, and skin breakdown on this shift.  "

## 2023-11-02 NOTE — TELEPHONE ENCOUNTER
Called patient for PAT appointment to schedule colonoscopy. Patient currently in hospital. Left voicemail message regarding new PAT appointment to schedule colonoscopy.

## 2023-11-02 NOTE — PLAN OF CARE
Patient currently in hospital. Called patient and left voicemail message regarding new PAT appointment to schedule colonoscopy.

## 2023-11-02 NOTE — NURSING
Discharge instructions provided. Active participation noted. Patient medications returned to patient from house supervisor. Awaiting family arrival for discharge.

## 2023-11-02 NOTE — ASSESSMENT & PLAN NOTE
Very difficult to control blood pressure, now on:   Nifedipine 60 mg b.i.d.   Coreg 12.5 mg b.i.d. (heart rate will not allow higher BB)   Clonidine 0.3 mg t.i.d.   Hydralazine 100 mg t.i.d.   Minoxidil 20 mg daily   Losartan 100 mg daily   Can not be diuretics, does not make urine   Can not be on spironolactone specifically, because ESRD/K    Will check renal ultrasound to rule out renal artery stenosis, given resistant hypertension despite maximum blood pressure medication treatment.

## 2023-11-02 NOTE — PLAN OF CARE
Patient from home with plenty of help from family. Patient to resume dialysis when she discharges.      11/02/23 1025   Discharge Reassessment   Assessment Type Discharge Planning Reassessment   Did the patient's condition or plan change since previous assessment? No   Discharge Plan A Home with family   Discharge Plan B Home with family   DME Needed Upon Discharge  none   Transition of Care Barriers None   Why the patient remains in the hospital Requires continued medical care   Post-Acute Status   Post-Acute Authorization Other   Other Status No Post-Acute Service Needs   Discharge Delays None known at this time

## 2023-11-06 ENCOUNTER — TELEPHONE (OUTPATIENT)
Dept: TRANSPLANT | Facility: CLINIC | Age: 68
End: 2023-11-06
Payer: MEDICARE

## 2023-11-06 PROBLEM — I70.0 THORACIC AORTIC ATHEROSCLEROSIS: Status: ACTIVE | Noted: 2023-11-06

## 2023-11-06 PROBLEM — E78.5 DYSLIPIDEMIA: Status: ACTIVE | Noted: 2023-11-06

## 2023-11-06 PROBLEM — I25.10 CORONARY ARTERY DISEASE INVOLVING NATIVE CORONARY ARTERY OF NATIVE HEART WITHOUT ANGINA PECTORIS: Status: ACTIVE | Noted: 2023-11-06

## 2023-11-07 ENCOUNTER — TELEPHONE (OUTPATIENT)
Dept: TRANSPLANT | Facility: CLINIC | Age: 68
End: 2023-11-07
Payer: MEDICARE

## 2023-11-08 NOTE — TELEPHONE ENCOUNTER
MA notes per Adherence form     FOR THE PAST THREE MONTHS:    4-AMA's 09/11/23 24 min 10/18/23 47 min, 11/06/23 44 min due to late arrivals unable to extend tx  11/04/23 43 min transportation    2-No-shows 10/30/23, 11/01/23 hospitalized     No concerns with care giving, transportation, or mental health    Scanned in pt's media    Ashanti Griffith  Abdominal Transplant MA

## 2023-11-09 ENCOUNTER — TELEPHONE (OUTPATIENT)
Dept: TRANSPLANT | Facility: CLINIC | Age: 68
End: 2023-11-09
Payer: MEDICARE

## 2023-11-09 NOTE — TELEPHONE ENCOUNTER
----- Message from Kristina Lehman RN sent at 11/9/2023 11:22 AM CST -----  Regarding: FW: reschedule appt  Contact: pt    ----- Message -----  From: Jessica Nava  Sent: 11/9/2023  10:35 AM CST  To: [facility] Clinical Support  Subject: reschedule appt                                  Pt would like to Reschedule Appt    Please call to advise    Phone [phone]    Thank You

## 2023-11-13 ENCOUNTER — PATIENT MESSAGE (OUTPATIENT)
Dept: INFECTIOUS DISEASES | Facility: CLINIC | Age: 68
End: 2023-11-13
Payer: MEDICARE

## 2023-11-14 ENCOUNTER — OFFICE VISIT (OUTPATIENT)
Dept: INFECTIOUS DISEASES | Facility: CLINIC | Age: 68
End: 2023-11-14
Payer: MEDICARE

## 2023-11-14 VITALS
TEMPERATURE: 100 F | HEIGHT: 64 IN | HEART RATE: 96 BPM | DIASTOLIC BLOOD PRESSURE: 78 MMHG | SYSTOLIC BLOOD PRESSURE: 188 MMHG | WEIGHT: 98.75 LBS | BODY MASS INDEX: 16.86 KG/M2

## 2023-11-14 DIAGNOSIS — Z86.11 HISTORY OF TB (TUBERCULOSIS): ICD-10-CM

## 2023-11-14 DIAGNOSIS — Z76.82 AWAITING ORGAN TRANSPLANT STATUS: ICD-10-CM

## 2023-11-14 DIAGNOSIS — R76.12 POSITIVE QUANTIFERON-TB GOLD TEST: Primary | ICD-10-CM

## 2023-11-14 PROCEDURE — 99999 PR PBB SHADOW E&M-EST. PATIENT-LVL IV: ICD-10-PCS | Mod: PBBFAC,TXP,, | Performed by: INTERNAL MEDICINE

## 2023-11-14 PROCEDURE — 99999 PR PBB SHADOW E&M-EST. PATIENT-LVL IV: CPT | Mod: PBBFAC,TXP,, | Performed by: INTERNAL MEDICINE

## 2023-11-14 PROCEDURE — 99214 OFFICE O/P EST MOD 30 MIN: CPT | Mod: PBBFAC,TXP | Performed by: INTERNAL MEDICINE

## 2023-11-14 PROCEDURE — 99215 OFFICE O/P EST HI 40 MIN: CPT | Mod: S$PBB,TXP,, | Performed by: INTERNAL MEDICINE

## 2023-11-14 PROCEDURE — 99215 PR OFFICE/OUTPT VISIT, EST, LEVL V, 40-54 MIN: ICD-10-PCS | Mod: S$PBB,TXP,, | Performed by: INTERNAL MEDICINE

## 2023-11-14 RX ORDER — LANOLIN ALCOHOL/MO/W.PET/CERES
50 CREAM (GRAM) TOPICAL DAILY
Qty: 30 TABLET | Refills: 2 | Status: SHIPPED | OUTPATIENT
Start: 2023-11-14 | End: 2024-02-12

## 2023-11-14 RX ORDER — ISONIAZID 100 MG/1
200 TABLET ORAL DAILY
Qty: 60 TABLET | Refills: 2 | Status: SHIPPED | OUTPATIENT
Start: 2023-11-14 | End: 2024-02-12

## 2023-11-14 NOTE — PROGRESS NOTES
Subjective:      Chief Complaint: Latent tuberculosis    History of Present Illness  68 y.o. female with a past medical history of T2DM, ESRD on HD MWF, presents for evaluation of positive quantiferon gold.    Patient is being evaluated for kidney transplant. She was seen in ID clinic 2020 and prescribed isoniazid and Vitamin B6 for latent tuberculosis. Patient did not follow-up for labs. Patient reports she completed 5 months of therapy, but discontinued as she was not able to get refills.    Patient was recently admitted for pneumonia, treated with ceftriaxone and azithromycin with resolution of symptoms. Patient has ongoing dry cough, overall improving, but not productive.    Patient denied having any fevers, night sweats, weight loss, chest pain.    Review of Systems   Constitutional:  Negative for chills, diaphoresis, fever and weight loss.   HENT:  Negative for congestion, sinus pain and sore throat.    Eyes:  Negative for photophobia and pain.   Respiratory:  Positive for cough. Negative for sputum production and shortness of breath.    Cardiovascular:  Negative for chest pain and leg swelling.   Gastrointestinal:  Negative for abdominal pain, diarrhea, nausea and vomiting.   Genitourinary:  Negative for dysuria and hematuria.   Musculoskeletal:  Negative for joint pain.   Skin:  Negative for rash.   Neurological:  Negative for focal weakness and headaches.   Psychiatric/Behavioral:  Negative for depression. The patient is not nervous/anxious.          Objective:   Physical Exam  Vitals reviewed.   Constitutional:       General: She is not in acute distress.     Appearance: She is well-developed. She is not diaphoretic.   HENT:      Head: Normocephalic and atraumatic.      Nose: Nose normal.   Eyes:      Conjunctiva/sclera: Conjunctivae normal.   Pulmonary:      Effort: Pulmonary effort is normal. No respiratory distress.      Breath sounds: No wheezing or rales.   Abdominal:      General: Abdomen is flat.  There is no distension.   Musculoskeletal:      Cervical back: Normal range of motion.      Right lower leg: No edema.      Left lower leg: No edema.   Skin:     General: Skin is warm and dry.      Findings: No erythema or rash.   Neurological:      Mental Status: She is alert.   Psychiatric:         Behavior: Behavior normal.           Significant results reviewed:    Sodium   Date Value Ref Range Status   11/02/2023 136 136 - 145 mmol/L Final   11/01/2023 134 (L) 136 - 145 mmol/L Final   10/29/2023 138 136 - 145 mmol/L Final      Potassium   Date Value Ref Range Status   11/02/2023 4.6 3.5 - 5.1 mmol/L Final   11/01/2023 5.0 3.5 - 5.1 mmol/L Final   10/29/2023 4.7 3.5 - 5.1 mmol/L Final      Chloride   Date Value Ref Range Status   11/02/2023 104 95 - 110 mmol/L Final   11/01/2023 95 95 - 110 mmol/L Final   10/29/2023 100 95 - 110 mmol/L Final      CO2   Date Value Ref Range Status   11/02/2023 27 23 - 29 mmol/L Final   11/01/2023 26 23 - 29 mmol/L Final   10/29/2023 21 (L) 23 - 29 mmol/L Final      BUN   Date Value Ref Range Status   11/02/2023 20 8 - 23 mg/dL Final   11/01/2023 43 (H) 8 - 23 mg/dL Final   10/29/2023 31 (H) 8 - 23 mg/dL Final      Creatinine   Date Value Ref Range Status   11/02/2023 4.3 (H) 0.5 - 1.4 mg/dL Final   11/01/2023 7.0 (H) 0.5 - 1.4 mg/dL Final   10/29/2023 7.2 (H) 0.5 - 1.4 mg/dL Final      Glucose   Date Value Ref Range Status   11/02/2023 86 70 - 110 mg/dL Final   11/01/2023 80 70 - 110 mg/dL Final   10/29/2023 104 70 - 110 mg/dL Final       ALT   Date Value Ref Range Status   11/02/2023 6 (L) 10 - 44 U/L Final   11/01/2023 5 (L) 10 - 44 U/L Final   10/29/2023 5 (L) 10 - 44 U/L Final      AST   Date Value Ref Range Status   11/02/2023 12 10 - 40 U/L Final   11/01/2023 12 10 - 40 U/L Final   10/29/2023 11 10 - 40 U/L Final      Total Bilirubin   Date Value Ref Range Status   11/02/2023 0.4 0.1 - 1.0 mg/dL Final     Comment:     For infants and newborns, interpretation of results  should be based  on gestational age, weight and in agreement with clinical  observations.    Premature Infant recommended reference ranges:  Up to 24 hours.............<8.0 mg/dL  Up to 48 hours............<12.0 mg/dL  3-5 days..................<15.0 mg/dL  6-29 days.................<15.0 mg/dL     11/01/2023 0.4 0.1 - 1.0 mg/dL Final     Comment:     For infants and newborns, interpretation of results should be based  on gestational age, weight and in agreement with clinical  observations.    Premature Infant recommended reference ranges:  Up to 24 hours.............<8.0 mg/dL  Up to 48 hours............<12.0 mg/dL  3-5 days..................<15.0 mg/dL  6-29 days.................<15.0 mg/dL     10/29/2023 0.5 0.1 - 1.0 mg/dL Final     Comment:     For infants and newborns, interpretation of results should be based  on gestational age, weight and in agreement with clinical  observations.    Premature Infant recommended reference ranges:  Up to 24 hours.............<8.0 mg/dL  Up to 48 hours............<12.0 mg/dL  3-5 days..................<15.0 mg/dL  6-29 days.................<15.0 mg/dL        Albumin   Date Value Ref Range Status   11/02/2023 2.6 (L) 3.5 - 5.2 g/dL Final   11/01/2023 2.8 (L) 3.5 - 5.2 g/dL Final   10/29/2023 2.9 (L) 3.5 - 5.2 g/dL Final      Total Protein   Date Value Ref Range Status   11/02/2023 5.8 (L) 6.0 - 8.4 g/dL Final   11/01/2023 6.6 6.0 - 8.4 g/dL Final   10/29/2023 7.1 6.0 - 8.4 g/dL Final      Alkaline Phosphatase   Date Value Ref Range Status   11/02/2023 83 55 - 135 U/L Final   11/01/2023 96 55 - 135 U/L Final   10/29/2023 120 55 - 135 U/L Final        WBC   Date Value Ref Range Status   11/02/2023 10.37 3.90 - 12.70 K/uL Final   11/01/2023 12.47 3.90 - 12.70 K/uL Final   10/29/2023 17.23 (H) 3.90 - 12.70 K/uL Final      Hemoglobin   Date Value Ref Range Status   11/02/2023 9.0 (L) 12.0 - 16.0 g/dL Final   11/01/2023 10.4 (L) 12.0 - 16.0 g/dL Final   10/29/2023 10.3 (L) 12.0 -  16.0 g/dL Final      Hematocrit   Date Value Ref Range Status   11/02/2023 28.9 (L) 37.0 - 48.5 % Final   11/01/2023 32.4 (L) 37.0 - 48.5 % Final   10/29/2023 32.4 (L) 37.0 - 48.5 % Final      Platelets   Date Value Ref Range Status   11/02/2023 216 150 - 450 K/uL Final   11/01/2023 301 150 - 450 K/uL Final   10/29/2023 287 150 - 450 K/uL Final        Chest Xray 10/29/2023  Perihilar interstitial opacities suggestive of mild interstitial edema or pneumonitis.  No confluent area of consolidation.     Assessment:   68 y.o. female with a past medical history of T2DM, ESRD on HD MWF, presents for evaluation of latent tuberculosis.    - Prior notes by ID and kidney transplant team reviewed  - Results reviewed as above      Plan:   - Will obtain chest x-ray to re-assess lung findings  - Patient with non-productive cough, but suspect lingering symptoms related to recent pneumonia    - If chest x-ray negative, will restart isoniazid 200 mg PO qdaily and Vitamin B6 50 mg qdaily  - Will need to chest qmonthly LFTs while on isoniazid therapy    - RTC 1 month        40 minutes of total time spent on the encounter, which includes face to face time and non-face to face time preparing to see the patient (eg, review of tests), Obtaining and/or reviewing separately obtained history, Documenting clinical information in the electronic or other health record, Independently interpreting results (not separately reported) and communicating results to the patient/family/caregiver, or Care coordination (not separately reported).       Melanie Merritt MD MPH  [facility] - [facility]ious Disease

## 2023-11-16 ENCOUNTER — PATIENT MESSAGE (OUTPATIENT)
Dept: INFECTIOUS DISEASES | Facility: CLINIC | Age: 68
End: 2023-11-16
Payer: MEDICARE

## 2023-11-21 ENCOUNTER — TELEPHONE (OUTPATIENT)
Dept: TRANSPLANT | Facility: CLINIC | Age: 68
End: 2023-11-21
Payer: MEDICARE

## 2023-11-21 NOTE — TELEPHONE ENCOUNTER
----- Message from Nya Cuadra MA sent at 11/21/2023 11:24 AM CST -----  Regarding: FW: Appt  Contact: Pt  [phone]    ----- Message -----  From: Bren Levine  Sent: 11/21/2023  11:11 AM CST  To: [facility] Pre-Kidney Transplant Non-Clinical  Subject: Appt                                                       Current Appt date:   11/21/23     Type of Appt: PRE-ADMIT NURSE 2, ENDO -[facility]    Reason for rescheduling:  Did not know appt was virtual need to reschedule for virtual appt      Caller:  April     Contact Preference: [phone]

## 2023-11-21 NOTE — TELEPHONE ENCOUNTER
----- Message from Kimmie Lafleur RN sent at 11/20/2023  4:55 PM CST -----    ----- Message -----  From: Diane Edwards  Sent: 11/20/2023   3:16 PM CST  To: [facility] Kidney Tx Clinical Support      Patient Returning Call        Who Called:pt  Does the patient know what this is regarding Dialysis changed because of holiday appt need to be rescheduled   Would the patient rather a call back or a response via [facility]? call  Best Call Back Number:[phone]  Additional Information: call back

## 2023-11-22 ENCOUNTER — CLINICAL SUPPORT (OUTPATIENT)
Dept: ENDOSCOPY | Facility: HOSPITAL | Age: 68
End: 2023-11-22
Attending: INTERNAL MEDICINE
Payer: MEDICARE

## 2023-11-22 ENCOUNTER — PATIENT MESSAGE (OUTPATIENT)
Dept: ADMINISTRATIVE | Facility: HOSPITAL | Age: 68
End: 2023-11-22
Payer: MEDICARE

## 2023-11-22 ENCOUNTER — TELEPHONE (OUTPATIENT)
Dept: ENDOSCOPY | Facility: HOSPITAL | Age: 68
End: 2023-11-22

## 2023-11-22 DIAGNOSIS — D12.6 TUBULAR ADENOMA OF COLON: ICD-10-CM

## 2023-11-22 NOTE — TELEPHONE ENCOUNTER
Attempted to contact patient to schedule colonoscopy. Left voicemail message with information to call Endoscopy scheduling department at [phone] to schedule procedure. Also, sent message via patient portal. New PAT appointment scheduled.

## 2023-11-22 NOTE — PLAN OF CARE
We attempted to reach you for your scheduled PAT appointment to schedule your colonoscopy. Left voicemail message. Please contact Endoscopy Scheduling at # [phone].

## 2023-11-28 ENCOUNTER — TELEPHONE (OUTPATIENT)
Dept: ENDOSCOPY | Facility: HOSPITAL | Age: 68
End: 2023-11-28

## 2023-12-18 LAB
B CELL RESULTS - XM AUTO: NEGATIVE
B MCS AVERAGE - XM AUTO: -6
CLASS I ANTIBODIES - LUMINEX: NORMAL
CLASS I ANTIBODY COMMENTS - LUMINEX: NORMAL
CLASS II ANTIBODIES - LUMINEX: NORMAL
CLASS II ANTIBODY COMMENTS - LUMINEX: NORMAL
CPRA %: 91
FXMAU TESTING DATE: NORMAL
HLATY INTERPRETATION: NORMAL
SERUM COLLECTION DT - LUMINEX CLASS I: NORMAL
SERUM COLLECTION DT - LUMINEX CLASS II: NORMAL
SERUM COLLECTION DT - XM AUTO: NORMAL
SPCL1 TESTING DATE: NORMAL
SPCL2 TESTING DATE: NORMAL
SPCLU TESTING DATE: NORMAL
T CELL RESULTS - XM AUTO: NEGATIVE
T MCS AVERAGE - XM AUTO: 0.6

## 2024-01-01 ENCOUNTER — HOSPITAL ENCOUNTER (INPATIENT)
Facility: HOSPITAL | Age: 69
LOS: 3 days | Discharge: HOME | DRG: 193 | End: 2024-11-29
Attending: EMERGENCY MEDICINE | Admitting: EMERGENCY MEDICINE
Payer: MEDICARE

## 2024-01-01 ENCOUNTER — OFFICE VISIT (OUTPATIENT)
Facility: CLINIC | Age: 69
End: 2024-01-01

## 2024-01-01 ENCOUNTER — HOSPITAL ENCOUNTER (INPATIENT)
Facility: HOSPITAL | Age: 69
LOS: 1 days | DRG: 296 | End: 2024-12-02
Attending: EMERGENCY MEDICINE
Payer: MEDICARE

## 2024-01-01 VITALS
HEIGHT: 64 IN | TEMPERATURE: 98 F | BODY MASS INDEX: 15.58 KG/M2 | OXYGEN SATURATION: 98 % | HEART RATE: 78 BPM | SYSTOLIC BLOOD PRESSURE: 126 MMHG | DIASTOLIC BLOOD PRESSURE: 58 MMHG | WEIGHT: 91.25 LBS | RESPIRATION RATE: 18 BRPM

## 2024-01-01 VITALS
OXYGEN SATURATION: 100 % | HEIGHT: 64 IN | DIASTOLIC BLOOD PRESSURE: 18 MMHG | TEMPERATURE: 89 F | WEIGHT: 88.19 LBS | SYSTOLIC BLOOD PRESSURE: 47 MMHG | HEART RATE: 64 BPM | BODY MASS INDEX: 15.06 KG/M2 | RESPIRATION RATE: 18 BRPM

## 2024-01-01 DIAGNOSIS — R64 CACHEXIA: ICD-10-CM

## 2024-01-01 DIAGNOSIS — I47.20 VENTRICULAR TACHYCARDIA: ICD-10-CM

## 2024-01-01 DIAGNOSIS — R07.9 CHEST PAIN: ICD-10-CM

## 2024-01-01 DIAGNOSIS — J18.9 PNEUMONIA OF BOTH LOWER LOBES DUE TO INFECTIOUS ORGANISM: Primary | ICD-10-CM

## 2024-01-01 DIAGNOSIS — I10 ESSENTIAL HYPERTENSION: ICD-10-CM

## 2024-01-01 DIAGNOSIS — I50.9 CHF (CONGESTIVE HEART FAILURE): ICD-10-CM

## 2024-01-01 DIAGNOSIS — I46.9 CARDIOPULMONARY ARREST: ICD-10-CM

## 2024-01-01 LAB
ALBUMIN SERPL BCP-MCNC: 1.6 G/DL (ref 3.5–5.2)
ALBUMIN SERPL BCP-MCNC: 1.6 G/DL (ref 3.5–5.2)
ALBUMIN SERPL BCP-MCNC: 1.7 G/DL (ref 3.5–5.2)
ALBUMIN SERPL BCP-MCNC: 1.7 G/DL (ref 3.5–5.2)
ALBUMIN SERPL BCP-MCNC: 1.9 G/DL (ref 3.5–5.2)
ALLENS TEST: ABNORMAL
ALLENS TEST: NORMAL
ALP SERPL-CCNC: 109 U/L (ref 40–150)
ALP SERPL-CCNC: 110 U/L (ref 40–150)
ALP SERPL-CCNC: 126 U/L (ref 40–150)
ALP SERPL-CCNC: 128 U/L (ref 40–150)
ALP SERPL-CCNC: 132 U/L (ref 40–150)
ALT SERPL W/O P-5'-P-CCNC: 10 U/L (ref 10–44)
ALT SERPL W/O P-5'-P-CCNC: 110 U/L (ref 10–44)
ALT SERPL W/O P-5'-P-CCNC: 7 U/L (ref 10–44)
ALT SERPL W/O P-5'-P-CCNC: 7 U/L (ref 10–44)
ALT SERPL W/O P-5'-P-CCNC: 8 U/L (ref 10–44)
ANION GAP SERPL CALC-SCNC: 12 MMOL/L (ref 8–16)
ANION GAP SERPL CALC-SCNC: 13 MMOL/L (ref 8–16)
ANION GAP SERPL CALC-SCNC: 13 MMOL/L (ref 8–16)
ANION GAP SERPL CALC-SCNC: 14 MMOL/L (ref 8–16)
ANION GAP SERPL CALC-SCNC: 27 MMOL/L (ref 8–16)
AST SERPL-CCNC: 12 U/L (ref 10–40)
AST SERPL-CCNC: 15 U/L (ref 10–40)
AST SERPL-CCNC: 16 U/L (ref 10–40)
AST SERPL-CCNC: 21 U/L (ref 10–40)
AST SERPL-CCNC: 386 U/L (ref 10–40)
AV INDEX (PROSTH): 0.53
AV MEAN GRADIENT: 5.8 MMHG
AV PEAK GRADIENT: 9 MMHG
AV VALVE AREA BY VELOCITY RATIO: 1.5 CM²
AV VALVE AREA: 1.7 CM²
AV VELOCITY RATIO: 0.47
BACTERIA #/AREA URNS HPF: NORMAL /HPF
BACTERIA BLD CULT: NORMAL
BACTERIA BLD CULT: NORMAL
BASOPHILS # BLD AUTO: 0.02 K/UL (ref 0–0.2)
BASOPHILS # BLD AUTO: 0.02 K/UL (ref 0–0.2)
BASOPHILS # BLD AUTO: 0.03 K/UL (ref 0–0.2)
BASOPHILS # BLD AUTO: 0.03 K/UL (ref 0–0.2)
BASOPHILS # BLD AUTO: 0.06 K/UL (ref 0–0.2)
BASOPHILS NFR BLD: 0.1 % (ref 0–1.9)
BASOPHILS NFR BLD: 0.2 % (ref 0–1.9)
BASOPHILS NFR BLD: 0.4 % (ref 0–1.9)
BASOPHILS NFR BLD: 0.4 % (ref 0–1.9)
BASOPHILS NFR BLD: 0.7 % (ref 0–1.9)
BILIRUB SERPL-MCNC: 0.4 MG/DL (ref 0.1–1)
BILIRUB SERPL-MCNC: 0.5 MG/DL (ref 0.1–1)
BILIRUB UR QL STRIP: NEGATIVE
BNP SERPL-MCNC: 394 PG/ML (ref 0–99)
BSA FOR ECHO PROCEDURE: 1.37 M2
BUN SERPL-MCNC: 25 MG/DL (ref 8–23)
BUN SERPL-MCNC: 32 MG/DL (ref 8–23)
BUN SERPL-MCNC: 41 MG/DL (ref 8–23)
BUN SERPL-MCNC: 41 MG/DL (ref 8–23)
BUN SERPL-MCNC: 43 MG/DL (ref 8–23)
CALCIUM SERPL-MCNC: 7.7 MG/DL (ref 8.7–10.5)
CALCIUM SERPL-MCNC: 7.7 MG/DL (ref 8.7–10.5)
CALCIUM SERPL-MCNC: 7.9 MG/DL (ref 8.7–10.5)
CALCIUM SERPL-MCNC: 7.9 MG/DL (ref 8.7–10.5)
CALCIUM SERPL-MCNC: 8.3 MG/DL (ref 8.7–10.5)
CHLORIDE SERPL-SCNC: 100 MMOL/L (ref 95–110)
CHLORIDE SERPL-SCNC: 101 MMOL/L (ref 95–110)
CHLORIDE SERPL-SCNC: 102 MMOL/L (ref 95–110)
CHLORIDE SERPL-SCNC: 103 MMOL/L (ref 95–110)
CHLORIDE SERPL-SCNC: 106 MMOL/L (ref 95–110)
CLARITY UR: ABNORMAL
CO2 SERPL-SCNC: 17 MMOL/L (ref 23–29)
CO2 SERPL-SCNC: 21 MMOL/L (ref 23–29)
CO2 SERPL-SCNC: 22 MMOL/L (ref 23–29)
CO2 SERPL-SCNC: 25 MMOL/L (ref 23–29)
CO2 SERPL-SCNC: 25 MMOL/L (ref 23–29)
COLOR UR: YELLOW
CREAT SERPL-MCNC: 3.7 MG/DL (ref 0.5–1.4)
CREAT SERPL-MCNC: 4.6 MG/DL (ref 0.5–1.4)
CREAT SERPL-MCNC: 4.6 MG/DL (ref 0.5–1.4)
CREAT SERPL-MCNC: 5.1 MG/DL (ref 0.5–1.4)
CREAT SERPL-MCNC: 5.7 MG/DL (ref 0.5–1.4)
CV ECHO LV RWT: 0.59 CM
DELSYS: ABNORMAL
DELSYS: NORMAL
DIFFERENTIAL METHOD BLD: ABNORMAL
DOP CALC AO PEAK VEL: 1.5 M/S
DOP CALC AO VTI: 30.2 CM
DOP CALC LVOT AREA: 3.1 CM2
DOP CALC LVOT DIAMETER: 2 CM
DOP CALC LVOT PEAK VEL: 0.7 M/S
DOP CALC LVOT STROKE VOLUME: 50.6 CM3
DOP CALC MV VTI: 26 CM
DOP CALCLVOT PEAK VEL VTI: 16.1 CM
E WAVE DECELERATION TIME: 150.22 MSEC
E/A RATIO: 0.74
E/E' RATIO: 16.22 M/S
ECHO LV POSTERIOR WALL: 1.1 CM (ref 0.6–1.1)
EOSINOPHIL # BLD AUTO: 0 K/UL (ref 0–0.5)
EOSINOPHIL # BLD AUTO: 0.1 K/UL (ref 0–0.5)
EOSINOPHIL # BLD AUTO: 0.2 K/UL (ref 0–0.5)
EOSINOPHIL NFR BLD: 0 % (ref 0–8)
EOSINOPHIL NFR BLD: 0.9 % (ref 0–8)
EOSINOPHIL NFR BLD: 1 % (ref 0–8)
EOSINOPHIL NFR BLD: 1.2 % (ref 0–8)
EOSINOPHIL NFR BLD: 1.9 % (ref 0–8)
ERYTHROCYTE [DISTWIDTH] IN BLOOD BY AUTOMATED COUNT: 17.9 % (ref 11.5–14.5)
ERYTHROCYTE [DISTWIDTH] IN BLOOD BY AUTOMATED COUNT: 18 % (ref 11.5–14.5)
ERYTHROCYTE [DISTWIDTH] IN BLOOD BY AUTOMATED COUNT: 18.4 % (ref 11.5–14.5)
ERYTHROCYTE [DISTWIDTH] IN BLOOD BY AUTOMATED COUNT: 18.5 % (ref 11.5–14.5)
ERYTHROCYTE [DISTWIDTH] IN BLOOD BY AUTOMATED COUNT: 18.6 % (ref 11.5–14.5)
ERYTHROCYTE [SEDIMENTATION RATE] IN BLOOD BY WESTERGREN METHOD: 18 MM/H
ERYTHROCYTE [SEDIMENTATION RATE] IN BLOOD BY WESTERGREN METHOD: 18 MM/H
EST. GFR  (NO RACE VARIABLE): 10 ML/MIN/1.73 M^2
EST. GFR  (NO RACE VARIABLE): 10 ML/MIN/1.73 M^2
EST. GFR  (NO RACE VARIABLE): 13 ML/MIN/1.73 M^2
EST. GFR  (NO RACE VARIABLE): 8 ML/MIN/1.73 M^2
EST. GFR  (NO RACE VARIABLE): 9 ML/MIN/1.73 M^2
FERRITIN SERPL-MCNC: 1787 NG/ML (ref 20–300)
FIO2: 100
FIO2: 100
FRACTIONAL SHORTENING: 27 % (ref 28–44)
GLUCOSE SERPL-MCNC: 19 MG/DL (ref 70–110)
GLUCOSE SERPL-MCNC: 55 MG/DL (ref 70–110)
GLUCOSE SERPL-MCNC: 68 MG/DL (ref 70–110)
GLUCOSE SERPL-MCNC: 69 MG/DL (ref 70–110)
GLUCOSE SERPL-MCNC: 76 MG/DL (ref 70–110)
GLUCOSE UR QL STRIP: NEGATIVE
HBV SURFACE AB SER-ACNC: 76.4 MIU/ML
HBV SURFACE AB SER-ACNC: REACTIVE M[IU]/ML
HBV SURFACE AG SERPL QL IA: NORMAL
HCO3 UR-SCNC: 25.4 MMOL/L (ref 24–28)
HCT VFR BLD AUTO: 21 % (ref 37–48.5)
HCT VFR BLD AUTO: 23.5 % (ref 37–48.5)
HCT VFR BLD AUTO: 25.5 % (ref 37–48.5)
HCT VFR BLD AUTO: 27.8 % (ref 37–48.5)
HCT VFR BLD AUTO: 28.9 % (ref 37–48.5)
HGB BLD-MCNC: 7 G/DL (ref 12–16)
HGB BLD-MCNC: 7.5 G/DL (ref 12–16)
HGB BLD-MCNC: 8.4 G/DL (ref 12–16)
HGB BLD-MCNC: 8.5 G/DL (ref 12–16)
HGB BLD-MCNC: 9.7 G/DL (ref 12–16)
HGB UR QL STRIP: NEGATIVE
HYALINE CASTS #/AREA URNS LPF: 0 /LPF
IMM GRANULOCYTES # BLD AUTO: 0.02 K/UL (ref 0–0.04)
IMM GRANULOCYTES # BLD AUTO: 0.04 K/UL (ref 0–0.04)
IMM GRANULOCYTES # BLD AUTO: 0.29 K/UL (ref 0–0.04)
IMM GRANULOCYTES NFR BLD AUTO: 0.2 % (ref 0–0.5)
IMM GRANULOCYTES NFR BLD AUTO: 0.2 % (ref 0–0.5)
IMM GRANULOCYTES NFR BLD AUTO: 0.3 % (ref 0–0.5)
IMM GRANULOCYTES NFR BLD AUTO: 0.5 % (ref 0–0.5)
IMM GRANULOCYTES NFR BLD AUTO: 1.9 % (ref 0–0.5)
INR PPP: 1.8 (ref 0.8–1.2)
INTERVENTRICULAR SEPTUM: 1 CM (ref 0.6–1.1)
IRON SERPL-MCNC: 68 UG/DL (ref 30–160)
IVC DIAMETER: 1.19 CM
KETONES UR QL STRIP: NEGATIVE
LA MAJOR: 4.24 CM
LA MINOR: 4.63 CM
LA WIDTH: 4.1 CM
LACTATE SERPL-SCNC: 10 MMOL/L (ref 0.5–2.2)
LDH SERPL L TO P-CCNC: 9.39 MMOL/L (ref 0.5–2.2)
LEFT ATRIUM SIZE: 3.89 CM
LEFT ATRIUM VOLUME INDEX: 42.9 ML/M2
LEFT ATRIUM VOLUME: 60.01 CM3
LEFT INTERNAL DIMENSION IN SYSTOLE: 2.7 CM (ref 2.1–4)
LEFT VENTRICLE DIASTOLIC VOLUME INDEX: 40.61 ML/M2
LEFT VENTRICLE DIASTOLIC VOLUME: 56.86 ML
LEFT VENTRICLE MASS INDEX: 86.3 G/M2
LEFT VENTRICLE SYSTOLIC VOLUME INDEX: 18.6 ML/M2
LEFT VENTRICLE SYSTOLIC VOLUME: 26.07 ML
LEFT VENTRICULAR INTERNAL DIMENSION IN DIASTOLE: 3.7 CM (ref 3.5–6)
LEFT VENTRICULAR MASS: 120.8 G
LEUKOCYTE ESTERASE UR QL STRIP: NEGATIVE
LIPASE SERPL-CCNC: 78 U/L (ref 4–60)
LV LATERAL E/E' RATIO: 12.17 M/S
LV SEPTAL E/E' RATIO: 24.33 M/S
LVED V (TEICH): 56.86 ML
LVES V (TEICH): 26.07 ML
LVOT MG: 1.33 MMHG
LVOT MV: 0.56 CM/S
LYMPHOCYTES # BLD AUTO: 1.2 K/UL (ref 1–4.8)
LYMPHOCYTES # BLD AUTO: 1.6 K/UL (ref 1–4.8)
LYMPHOCYTES # BLD AUTO: 1.7 K/UL (ref 1–4.8)
LYMPHOCYTES # BLD AUTO: 1.9 K/UL (ref 1–4.8)
LYMPHOCYTES # BLD AUTO: 2 K/UL (ref 1–4.8)
LYMPHOCYTES NFR BLD: 12.6 % (ref 18–48)
LYMPHOCYTES NFR BLD: 14.6 % (ref 18–48)
LYMPHOCYTES NFR BLD: 18 % (ref 18–48)
LYMPHOCYTES NFR BLD: 23.7 % (ref 18–48)
LYMPHOCYTES NFR BLD: 24 % (ref 18–48)
MAGNESIUM SERPL-MCNC: 1.8 MG/DL (ref 1.6–2.6)
MAGNESIUM SERPL-MCNC: 1.8 MG/DL (ref 1.6–2.6)
MAGNESIUM SERPL-MCNC: 1.9 MG/DL (ref 1.6–2.6)
MAGNESIUM SERPL-MCNC: 3.3 MG/DL (ref 1.6–2.6)
MAGNESIUM SERPL-MCNC: 3.3 MG/DL (ref 1.6–2.6)
MCH RBC QN AUTO: 30.2 PG (ref 27–31)
MCH RBC QN AUTO: 31.6 PG (ref 27–31)
MCH RBC QN AUTO: 31.8 PG (ref 27–31)
MCH RBC QN AUTO: 31.9 PG (ref 27–31)
MCH RBC QN AUTO: 31.9 PG (ref 27–31)
MCHC RBC AUTO-ENTMCNC: 30.6 G/DL (ref 32–36)
MCHC RBC AUTO-ENTMCNC: 31.9 G/DL (ref 32–36)
MCHC RBC AUTO-ENTMCNC: 32.9 G/DL (ref 32–36)
MCHC RBC AUTO-ENTMCNC: 33.3 G/DL (ref 32–36)
MCHC RBC AUTO-ENTMCNC: 33.6 G/DL (ref 32–36)
MCV RBC AUTO: 95 FL (ref 82–98)
MCV RBC AUTO: 96 FL (ref 82–98)
MCV RBC AUTO: 97 FL (ref 82–98)
MCV RBC AUTO: 99 FL (ref 82–98)
MCV RBC AUTO: 99 FL (ref 82–98)
MICROSCOPIC COMMENT: NORMAL
MODE: ABNORMAL
MODE: NORMAL
MONOCYTES # BLD AUTO: 0.5 K/UL (ref 0.3–1)
MONOCYTES # BLD AUTO: 0.6 K/UL (ref 0.3–1)
MONOCYTES # BLD AUTO: 0.6 K/UL (ref 0.3–1)
MONOCYTES NFR BLD: 3.3 % (ref 4–15)
MONOCYTES NFR BLD: 6.2 % (ref 4–15)
MONOCYTES NFR BLD: 6.2 % (ref 4–15)
MONOCYTES NFR BLD: 6.4 % (ref 4–15)
MONOCYTES NFR BLD: 8.6 % (ref 4–15)
MV MEAN GRADIENT: 3 MMHG
MV PEAK A VEL: 0.98 M/S
MV PEAK E VEL: 0.73 M/S
MV PEAK GRADIENT: 4 MMHG
MV STENOSIS PRESSURE HALF TIME: 43.56 MS
MV VALVE AREA BY CONTINUITY EQUATION: 1.94 CM2
MV VALVE AREA P 1/2 METHOD: 5.05 CM2
NEUTROPHILS # BLD AUTO: 12.8 K/UL (ref 1.8–7.7)
NEUTROPHILS # BLD AUTO: 4.6 K/UL (ref 1.8–7.7)
NEUTROPHILS # BLD AUTO: 5.4 K/UL (ref 1.8–7.7)
NEUTROPHILS # BLD AUTO: 6.2 K/UL (ref 1.8–7.7)
NEUTROPHILS # BLD AUTO: 6.4 K/UL (ref 1.8–7.7)
NEUTROPHILS NFR BLD: 65.7 % (ref 38–73)
NEUTROPHILS NFR BLD: 67.6 % (ref 38–73)
NEUTROPHILS NFR BLD: 73.8 % (ref 38–73)
NEUTROPHILS NFR BLD: 77.3 % (ref 38–73)
NEUTROPHILS NFR BLD: 82.1 % (ref 38–73)
NITRITE UR QL STRIP: NEGATIVE
NRBC BLD-RTO: 0 /100 WBC
NRBC BLD-RTO: 1 /100 WBC
OHS CV RV/LV RATIO: 0.78 CM
OHS QRS DURATION: 72 MS
OHS QTC CALCULATION: 428 MS
PCO2 BLDA: 43.2 MMHG (ref 35–45)
PEEP: 5
PEEP: 5
PH SMN: 7.38 [PH] (ref 7.35–7.45)
PH UR STRIP: 8 [PH] (ref 5–8)
PHOSPHATE SERPL-MCNC: 11.6 MG/DL (ref 2.7–4.5)
PHOSPHATE SERPL-MCNC: 11.6 MG/DL (ref 2.7–4.5)
PHOSPHATE SERPL-MCNC: 6 MG/DL (ref 2.7–4.5)
PHOSPHATE SERPL-MCNC: 6.3 MG/DL (ref 2.7–4.5)
PHOSPHATE SERPL-MCNC: 6.9 MG/DL (ref 2.7–4.5)
PISA TR MAX VEL: 2.84 M/S
PLATELET # BLD AUTO: 139 K/UL (ref 150–450)
PLATELET # BLD AUTO: 144 K/UL (ref 150–450)
PLATELET # BLD AUTO: 162 K/UL (ref 150–450)
PLATELET # BLD AUTO: 170 K/UL (ref 150–450)
PLATELET # BLD AUTO: 202 K/UL (ref 150–450)
PMV BLD AUTO: 11.1 FL (ref 9.2–12.9)
PMV BLD AUTO: 11.3 FL (ref 9.2–12.9)
PMV BLD AUTO: 11.5 FL (ref 9.2–12.9)
PMV BLD AUTO: 12.1 FL (ref 9.2–12.9)
PMV BLD AUTO: 12.1 FL (ref 9.2–12.9)
PO2 BLDA: 48 MMHG (ref 40–60)
POC BE: 0 MMOL/L
POC SATURATED O2: 82 % (ref 95–100)
POC TCO2: 27 MMOL/L (ref 24–29)
POCT GLUCOSE: 289 MG/DL (ref 70–110)
POTASSIUM SERPL-SCNC: 3.9 MMOL/L (ref 3.5–5.1)
POTASSIUM SERPL-SCNC: 4.1 MMOL/L (ref 3.5–5.1)
POTASSIUM SERPL-SCNC: 4.6 MMOL/L (ref 3.5–5.1)
POTASSIUM SERPL-SCNC: 4.7 MMOL/L (ref 3.5–5.1)
POTASSIUM SERPL-SCNC: 4.9 MMOL/L (ref 3.5–5.1)
PROT SERPL-MCNC: 4.1 G/DL (ref 6–8.4)
PROT SERPL-MCNC: 4.1 G/DL (ref 6–8.4)
PROT SERPL-MCNC: 4.3 G/DL (ref 6–8.4)
PROT SERPL-MCNC: 4.8 G/DL (ref 6–8.4)
PROT SERPL-MCNC: 5.1 G/DL (ref 6–8.4)
PROT UR QL STRIP: ABNORMAL
PROTHROMBIN TIME: 18.8 SEC (ref 9–12.5)
PV PEAK GRADIENT: 3 MMHG
PV PEAK VELOCITY: 0.86 M/S
RA MAJOR: 4.51 CM
RA PRESSURE ESTIMATED: 3 MMHG
RA WIDTH: 3 CM
RBC # BLD AUTO: 2.2 M/UL (ref 4–5.4)
RBC # BLD AUTO: 2.37 M/UL (ref 4–5.4)
RBC # BLD AUTO: 2.63 M/UL (ref 4–5.4)
RBC # BLD AUTO: 2.81 M/UL (ref 4–5.4)
RBC # BLD AUTO: 3.04 M/UL (ref 4–5.4)
RBC #/AREA URNS HPF: 0 /HPF (ref 0–4)
RIGHT VENTRICLE DIASTOLIC BASEL DIMENSION: 2.9 CM
RIGHT VENTRICULAR END-DIASTOLIC DIMENSION: 2.92 CM
RV TB RVSP: 6 MMHG
RV TISSUE DOPPLER FREE WALL SYSTOLIC VELOCITY 1 (APICAL 4 CHAMBER VIEW): 10.84 CM/S
SAMPLE: ABNORMAL
SAMPLE: NORMAL
SATURATED IRON: 121 % (ref 20–50)
SINUS: 3.14 CM
SITE: ABNORMAL
SITE: NORMAL
SODIUM SERPL-SCNC: 137 MMOL/L (ref 136–145)
SODIUM SERPL-SCNC: 139 MMOL/L (ref 136–145)
SODIUM SERPL-SCNC: 140 MMOL/L (ref 136–145)
SODIUM SERPL-SCNC: 141 MMOL/L (ref 136–145)
SODIUM SERPL-SCNC: 144 MMOL/L (ref 136–145)
SP GR UR STRIP: 1.01 (ref 1–1.03)
STJ: 1.58 CM
T4 FREE SERPL-MCNC: 0.78 NG/DL (ref 0.71–1.51)
TDI LATERAL: 0.06 M/S
TDI SEPTAL: 0.03 M/S
TDI: 0.05 M/S
TOTAL IRON BINDING CAPACITY: 56 UG/DL (ref 250–450)
TR MAX PG: 32 MMHG
TRANSFERRIN SERPL-MCNC: 38 MG/DL (ref 200–375)
TRICUSPID ANNULAR PLANE SYSTOLIC EXCURSION: 1.46 CM
TROPONIN I SERPL DL<=0.01 NG/ML-MCNC: 0.38 NG/ML (ref 0–0.03)
TSH SERPL DL<=0.005 MIU/L-ACNC: 7.18 UIU/ML (ref 0.4–4)
TV REST PULMONARY ARTERY PRESSURE: 35 MMHG
URN SPEC COLLECT METH UR: ABNORMAL
UROBILINOGEN UR STRIP-ACNC: NEGATIVE EU/DL
VANCOMYCIN SERPL-MCNC: 9.7 UG/ML
VT: 320
VT: 320
WBC # BLD AUTO: 15.64 K/UL (ref 3.9–12.7)
WBC # BLD AUTO: 7.07 K/UL (ref 3.9–12.7)
WBC # BLD AUTO: 8.03 K/UL (ref 3.9–12.7)
WBC # BLD AUTO: 8.05 K/UL (ref 3.9–12.7)
WBC # BLD AUTO: 8.65 K/UL (ref 3.9–12.7)
WBC #/AREA URNS HPF: 0 /HPF (ref 0–5)
Z-SCORE OF LEFT VENTRICULAR DIMENSION IN END DIASTOLE: -1.61
Z-SCORE OF LEFT VENTRICULAR DIMENSION IN END SYSTOLE: 0

## 2024-01-01 PROCEDURE — 97110 THERAPEUTIC EXERCISES: CPT | Mod: CQ

## 2024-01-01 PROCEDURE — 86706 HEP B SURFACE ANTIBODY: CPT | Performed by: HOSPITALIST

## 2024-01-01 PROCEDURE — 63600175 PHARM REV CODE 636 W HCPCS: Performed by: HOSPITALIST

## 2024-01-01 PROCEDURE — C1751 CATH, INF, PER/CENT/MIDLINE: HCPCS

## 2024-01-01 PROCEDURE — 94761 N-INVAS EAR/PLS OXIMETRY MLT: CPT

## 2024-01-01 PROCEDURE — 83735 ASSAY OF MAGNESIUM: CPT | Performed by: HOSPITALIST

## 2024-01-01 PROCEDURE — 82803 BLOOD GASES ANY COMBINATION: CPT

## 2024-01-01 PROCEDURE — 36415 COLL VENOUS BLD VENIPUNCTURE: CPT | Performed by: HOSPITALIST

## 2024-01-01 PROCEDURE — 80202 ASSAY OF VANCOMYCIN: CPT | Performed by: EMERGENCY MEDICINE

## 2024-01-01 PROCEDURE — 85610 PROTHROMBIN TIME: CPT | Performed by: EMERGENCY MEDICINE

## 2024-01-01 PROCEDURE — 80053 COMPREHEN METABOLIC PANEL: CPT | Performed by: EMERGENCY MEDICINE

## 2024-01-01 PROCEDURE — 85014 HEMATOCRIT: CPT

## 2024-01-01 PROCEDURE — 93010 ELECTROCARDIOGRAM REPORT: CPT | Mod: ,,, | Performed by: INTERNAL MEDICINE

## 2024-01-01 PROCEDURE — 84100 ASSAY OF PHOSPHORUS: CPT | Performed by: HOSPITALIST

## 2024-01-01 PROCEDURE — 84443 ASSAY THYROID STIM HORMONE: CPT | Performed by: EMERGENCY MEDICINE

## 2024-01-01 PROCEDURE — 25000003 PHARM REV CODE 250: Performed by: STUDENT IN AN ORGANIZED HEALTH CARE EDUCATION/TRAINING PROGRAM

## 2024-01-01 PROCEDURE — 82330 ASSAY OF CALCIUM: CPT

## 2024-01-01 PROCEDURE — 5A1D70Z PERFORMANCE OF URINARY FILTRATION, INTERMITTENT, LESS THAN 6 HOURS PER DAY: ICD-10-PCS | Performed by: STUDENT IN AN ORGANIZED HEALTH CARE EDUCATION/TRAINING PROGRAM

## 2024-01-01 PROCEDURE — 99900035 HC TECH TIME PER 15 MIN (STAT)

## 2024-01-01 PROCEDURE — 85025 COMPLETE CBC W/AUTO DIFF WBC: CPT | Performed by: EMERGENCY MEDICINE

## 2024-01-01 PROCEDURE — 83735 ASSAY OF MAGNESIUM: CPT | Performed by: EMERGENCY MEDICINE

## 2024-01-01 PROCEDURE — 83605 ASSAY OF LACTIC ACID: CPT | Performed by: EMERGENCY MEDICINE

## 2024-01-01 PROCEDURE — 63600175 PHARM REV CODE 636 W HCPCS: Performed by: EMERGENCY MEDICINE

## 2024-01-01 PROCEDURE — 5A12012 PERFORMANCE OF CARDIAC OUTPUT, SINGLE, MANUAL: ICD-10-PCS | Performed by: EMERGENCY MEDICINE

## 2024-01-01 PROCEDURE — 25000003 PHARM REV CODE 250: Performed by: EMERGENCY MEDICINE

## 2024-01-01 PROCEDURE — 84439 ASSAY OF FREE THYROXINE: CPT | Performed by: EMERGENCY MEDICINE

## 2024-01-01 PROCEDURE — 25000003 PHARM REV CODE 250: Performed by: HOSPITALIST

## 2024-01-01 PROCEDURE — 82800 BLOOD PH: CPT

## 2024-01-01 PROCEDURE — 85025 COMPLETE CBC W/AUTO DIFF WBC: CPT | Performed by: HOSPITALIST

## 2024-01-01 PROCEDURE — 02HV33Z INSERTION OF INFUSION DEVICE INTO SUPERIOR VENA CAVA, PERCUTANEOUS APPROACH: ICD-10-PCS

## 2024-01-01 PROCEDURE — 80053 COMPREHEN METABOLIC PANEL: CPT | Performed by: HOSPITALIST

## 2024-01-01 PROCEDURE — 99232 SBSQ HOSP IP/OBS MODERATE 35: CPT | Mod: ,,, | Performed by: STUDENT IN AN ORGANIZED HEALTH CARE EDUCATION/TRAINING PROGRAM

## 2024-01-01 PROCEDURE — 97165 OT EVAL LOW COMPLEX 30 MIN: CPT

## 2024-01-01 PROCEDURE — 94002 VENT MGMT INPAT INIT DAY: CPT

## 2024-01-01 PROCEDURE — 99223 1ST HOSP IP/OBS HIGH 75: CPT | Mod: ,,, | Performed by: INTERNAL MEDICINE

## 2024-01-01 PROCEDURE — 82728 ASSAY OF FERRITIN: CPT | Performed by: STUDENT IN AN ORGANIZED HEALTH CARE EDUCATION/TRAINING PROGRAM

## 2024-01-01 PROCEDURE — 63600175 PHARM REV CODE 636 W HCPCS

## 2024-01-01 PROCEDURE — 25000003 PHARM REV CODE 250

## 2024-01-01 PROCEDURE — 11000001 HC ACUTE MED/SURG PRIVATE ROOM

## 2024-01-01 PROCEDURE — 83690 ASSAY OF LIPASE: CPT | Performed by: EMERGENCY MEDICINE

## 2024-01-01 PROCEDURE — 36415 COLL VENOUS BLD VENIPUNCTURE: CPT | Performed by: EMERGENCY MEDICINE

## 2024-01-01 PROCEDURE — 84295 ASSAY OF SERUM SODIUM: CPT

## 2024-01-01 PROCEDURE — 83540 ASSAY OF IRON: CPT | Performed by: STUDENT IN AN ORGANIZED HEALTH CARE EDUCATION/TRAINING PROGRAM

## 2024-01-01 PROCEDURE — 94761 N-INVAS EAR/PLS OXIMETRY MLT: CPT | Mod: XB

## 2024-01-01 PROCEDURE — 87340 HEPATITIS B SURFACE AG IA: CPT | Performed by: HOSPITALIST

## 2024-01-01 PROCEDURE — 97116 GAIT TRAINING THERAPY: CPT | Mod: CQ

## 2024-01-01 PROCEDURE — 87040 BLOOD CULTURE FOR BACTERIA: CPT | Performed by: EMERGENCY MEDICINE

## 2024-01-01 PROCEDURE — 96367 TX/PROPH/DG ADDL SEQ IV INF: CPT

## 2024-01-01 PROCEDURE — 31500 INSERT EMERGENCY AIRWAY: CPT

## 2024-01-01 PROCEDURE — 90935 HEMODIALYSIS ONE EVALUATION: CPT

## 2024-01-01 PROCEDURE — 81000 URINALYSIS NONAUTO W/SCOPE: CPT | Performed by: EMERGENCY MEDICINE

## 2024-01-01 PROCEDURE — 36415 COLL VENOUS BLD VENIPUNCTURE: CPT | Performed by: STUDENT IN AN ORGANIZED HEALTH CARE EDUCATION/TRAINING PROGRAM

## 2024-01-01 PROCEDURE — 25500020 PHARM REV CODE 255: Performed by: HOSPITALIST

## 2024-01-01 PROCEDURE — 96365 THER/PROPH/DIAG IV INF INIT: CPT

## 2024-01-01 PROCEDURE — 63600175 PHARM REV CODE 636 W HCPCS: Performed by: STUDENT IN AN ORGANIZED HEALTH CARE EDUCATION/TRAINING PROGRAM

## 2024-01-01 PROCEDURE — 83880 ASSAY OF NATRIURETIC PEPTIDE: CPT | Performed by: EMERGENCY MEDICINE

## 2024-01-01 PROCEDURE — 93005 ELECTROCARDIOGRAM TRACING: CPT

## 2024-01-01 PROCEDURE — 20000000 HC ICU ROOM

## 2024-01-01 PROCEDURE — 97535 SELF CARE MNGMENT TRAINING: CPT

## 2024-01-01 PROCEDURE — 83605 ASSAY OF LACTIC ACID: CPT

## 2024-01-01 PROCEDURE — 84132 ASSAY OF SERUM POTASSIUM: CPT

## 2024-01-01 PROCEDURE — 82565 ASSAY OF CREATININE: CPT

## 2024-01-01 PROCEDURE — 99285 EMERGENCY DEPT VISIT HI MDM: CPT | Mod: 25

## 2024-01-01 PROCEDURE — 80100016 HC MAINTENANCE HEMODIALYSIS

## 2024-01-01 PROCEDURE — 84484 ASSAY OF TROPONIN QUANT: CPT | Performed by: EMERGENCY MEDICINE

## 2024-01-01 PROCEDURE — 84100 ASSAY OF PHOSPHORUS: CPT | Performed by: EMERGENCY MEDICINE

## 2024-01-01 PROCEDURE — 99223 1ST HOSP IP/OBS HIGH 75: CPT | Mod: ,,, | Performed by: STUDENT IN AN ORGANIZED HEALTH CARE EDUCATION/TRAINING PROGRAM

## 2024-01-01 PROCEDURE — 97161 PT EVAL LOW COMPLEX 20 MIN: CPT

## 2024-01-01 PROCEDURE — 36569 INSJ PICC 5 YR+ W/O IMAGING: CPT

## 2024-01-01 RX ORDER — MORPHINE SULFATE 4 MG/ML
2 INJECTION, SOLUTION INTRAMUSCULAR; INTRAVENOUS EVERY 4 HOURS PRN
Status: DISCONTINUED | OUTPATIENT
Start: 2024-01-01 | End: 2024-01-01

## 2024-01-01 RX ORDER — TALC
6 POWDER (GRAM) TOPICAL NIGHTLY PRN
Status: DISCONTINUED | OUTPATIENT
Start: 2024-01-01 | End: 2024-01-01 | Stop reason: HOSPADM

## 2024-01-01 RX ORDER — CLONIDINE HYDROCHLORIDE 0.1 MG/1
0.1 TABLET ORAL 2 TIMES DAILY
Status: DISCONTINUED | OUTPATIENT
Start: 2024-01-01 | End: 2024-01-01 | Stop reason: HOSPADM

## 2024-01-01 RX ORDER — CARVEDILOL 6.25 MG/1
6.25 TABLET ORAL 2 TIMES DAILY WITH MEALS
Status: DISCONTINUED | OUTPATIENT
Start: 2024-01-01 | End: 2024-01-01 | Stop reason: HOSPADM

## 2024-01-01 RX ORDER — POLYETHYLENE GLYCOL 3350 17 G/17G
17 POWDER, FOR SOLUTION ORAL 2 TIMES DAILY PRN
Status: DISCONTINUED | OUTPATIENT
Start: 2024-01-01 | End: 2024-01-01 | Stop reason: HOSPADM

## 2024-01-01 RX ORDER — LOSARTAN POTASSIUM 25 MG/1
50 TABLET ORAL DAILY
Status: DISCONTINUED | OUTPATIENT
Start: 2024-01-01 | End: 2024-01-01 | Stop reason: HOSPADM

## 2024-01-01 RX ORDER — SODIUM,POTASSIUM PHOSPHATES 280-250MG
2 POWDER IN PACKET (EA) ORAL
Status: DISCONTINUED | OUTPATIENT
Start: 2024-01-01 | End: 2024-01-01 | Stop reason: HOSPADM

## 2024-01-01 RX ORDER — GLUCAGON 1 MG
1 KIT INJECTION
Status: DISCONTINUED | OUTPATIENT
Start: 2024-01-01 | End: 2024-01-01

## 2024-01-01 RX ORDER — DOXYCYCLINE HYCLATE 100 MG
100 TABLET ORAL EVERY 12 HOURS
Status: DISCONTINUED | OUTPATIENT
Start: 2024-01-01 | End: 2024-01-01 | Stop reason: HOSPADM

## 2024-01-01 RX ORDER — EPINEPHRINE 0.1 MG/ML
INJECTION INTRAVENOUS CODE/TRAUMA/SEDATION MEDICATION
Status: COMPLETED | OUTPATIENT
Start: 2024-01-01 | End: 2024-01-01

## 2024-01-01 RX ORDER — NOREPINEPHRINE BITARTRATE/D5W 4MG/250ML
0-.2 PLASTIC BAG, INJECTION (ML) INTRAVENOUS CONTINUOUS
Status: DISCONTINUED | OUTPATIENT
Start: 2024-01-01 | End: 2024-01-01

## 2024-01-01 RX ORDER — ATROPINE SULFATE 0.1 MG/ML
INJECTION INTRAVENOUS CODE/TRAUMA/SEDATION MEDICATION
Status: COMPLETED | OUTPATIENT
Start: 2024-01-01 | End: 2024-01-01

## 2024-01-01 RX ORDER — SODIUM CHLORIDE 0.9 % (FLUSH) 0.9 %
10 SYRINGE (ML) INJECTION EVERY 12 HOURS PRN
Status: DISCONTINUED | OUTPATIENT
Start: 2024-01-01 | End: 2024-01-01 | Stop reason: HOSPADM

## 2024-01-01 RX ORDER — NOREPINEPHRINE BITARTRATE/D5W 4MG/250ML
0-3 PLASTIC BAG, INJECTION (ML) INTRAVENOUS CONTINUOUS
Status: DISCONTINUED | OUTPATIENT
Start: 2024-01-01 | End: 2024-01-01

## 2024-01-01 RX ORDER — PROCHLORPERAZINE EDISYLATE 5 MG/ML
5 INJECTION INTRAMUSCULAR; INTRAVENOUS EVERY 6 HOURS PRN
Status: DISCONTINUED | OUTPATIENT
Start: 2024-01-01 | End: 2024-01-01

## 2024-01-01 RX ORDER — ONDANSETRON HYDROCHLORIDE 2 MG/ML
4 INJECTION, SOLUTION INTRAVENOUS EVERY 8 HOURS PRN
Status: DISCONTINUED | OUTPATIENT
Start: 2024-01-01 | End: 2024-01-01

## 2024-01-01 RX ORDER — LORAZEPAM 2 MG/ML
1 INJECTION INTRAMUSCULAR EVERY 4 HOURS PRN
Status: DISCONTINUED | OUTPATIENT
Start: 2024-01-01 | End: 2024-01-01

## 2024-01-01 RX ORDER — IBUPROFEN 200 MG
16 TABLET ORAL
Status: DISCONTINUED | OUTPATIENT
Start: 2024-01-01 | End: 2024-01-01

## 2024-01-01 RX ORDER — HEPARIN SODIUM 5000 [USP'U]/ML
5000 INJECTION, SOLUTION INTRAVENOUS; SUBCUTANEOUS EVERY 12 HOURS
Status: DISCONTINUED | OUTPATIENT
Start: 2024-01-01 | End: 2024-01-01 | Stop reason: HOSPADM

## 2024-01-01 RX ORDER — CARVEDILOL 6.25 MG/1
6.25 TABLET ORAL 2 TIMES DAILY
Qty: 60 TABLET | Refills: 11 | Status: ON HOLD | OUTPATIENT
Start: 2024-01-01 | End: 2024-01-01

## 2024-01-01 RX ORDER — IBUPROFEN 200 MG
24 TABLET ORAL
Status: DISCONTINUED | OUTPATIENT
Start: 2024-01-01 | End: 2024-01-01

## 2024-01-01 RX ORDER — HYDRALAZINE HYDROCHLORIDE 25 MG/1
100 TABLET, FILM COATED ORAL EVERY 8 HOURS
Status: DISCONTINUED | OUTPATIENT
Start: 2024-01-01 | End: 2024-01-01

## 2024-01-01 RX ORDER — SODIUM BICARBONATE 1 MEQ/ML
SYRINGE (ML) INTRAVENOUS CODE/TRAUMA/SEDATION MEDICATION
Status: COMPLETED | OUTPATIENT
Start: 2024-01-01 | End: 2024-01-01

## 2024-01-01 RX ORDER — DEXTROMETHORPHAN POLISTIREX 30 MG/5 ML
1 SUSPENSION, EXTENDED RELEASE 12 HR ORAL DAILY PRN
Status: DISCONTINUED | OUTPATIENT
Start: 2024-01-01 | End: 2024-01-01

## 2024-01-01 RX ORDER — MAGNESIUM SULFATE HEPTAHYDRATE 500 MG/ML
INJECTION, SOLUTION INTRAMUSCULAR; INTRAVENOUS CODE/TRAUMA/SEDATION MEDICATION
Status: COMPLETED | OUTPATIENT
Start: 2024-01-01 | End: 2024-01-01

## 2024-01-01 RX ORDER — SODIUM CHLORIDE 0.9 % (FLUSH) 0.9 %
10 SYRINGE (ML) INJECTION EVERY 12 HOURS PRN
Status: DISCONTINUED | OUTPATIENT
Start: 2024-01-01 | End: 2024-01-01

## 2024-01-01 RX ORDER — NALOXONE HCL 0.4 MG/ML
0.02 VIAL (ML) INJECTION
Status: DISCONTINUED | OUTPATIENT
Start: 2024-01-01 | End: 2024-01-01

## 2024-01-01 RX ORDER — LANOLIN ALCOHOL/MO/W.PET/CERES
800 CREAM (GRAM) TOPICAL
Status: DISCONTINUED | OUTPATIENT
Start: 2024-01-01 | End: 2024-01-01 | Stop reason: HOSPADM

## 2024-01-01 RX ORDER — MUPIROCIN 20 MG/G
OINTMENT TOPICAL 2 TIMES DAILY
Status: DISCONTINUED | OUTPATIENT
Start: 2024-01-01 | End: 2024-01-01 | Stop reason: HOSPADM

## 2024-01-01 RX ORDER — HYDROCODONE BITARTRATE AND ACETAMINOPHEN 5; 325 MG/1; MG/1
1 TABLET ORAL EVERY 6 HOURS PRN
Status: DISCONTINUED | OUTPATIENT
Start: 2024-01-01 | End: 2024-01-01 | Stop reason: HOSPADM

## 2024-01-01 RX ORDER — SEVELAMER CARBONATE 800 MG/1
1600 TABLET, FILM COATED ORAL
Status: DISCONTINUED | OUTPATIENT
Start: 2024-01-01 | End: 2024-01-01 | Stop reason: HOSPADM

## 2024-01-01 RX ORDER — ALUMINUM HYDROXIDE, MAGNESIUM HYDROXIDE, AND SIMETHICONE 1200; 120; 1200 MG/30ML; MG/30ML; MG/30ML
30 SUSPENSION ORAL 4 TIMES DAILY PRN
Status: DISCONTINUED | OUTPATIENT
Start: 2024-01-01 | End: 2024-01-01 | Stop reason: HOSPADM

## 2024-01-01 RX ORDER — INSULIN ASPART 100 [IU]/ML
0-5 INJECTION, SOLUTION INTRAVENOUS; SUBCUTANEOUS EVERY 4 HOURS PRN
Status: DISCONTINUED | OUTPATIENT
Start: 2024-01-01 | End: 2024-01-01

## 2024-01-01 RX ORDER — HYDRALAZINE HYDROCHLORIDE 25 MG/1
100 TABLET, FILM COATED ORAL EVERY 12 HOURS
Status: DISCONTINUED | OUTPATIENT
Start: 2024-01-01 | End: 2024-01-01

## 2024-01-01 RX ORDER — IPRATROPIUM BROMIDE AND ALBUTEROL SULFATE 2.5; .5 MG/3ML; MG/3ML
3 SOLUTION RESPIRATORY (INHALATION) EVERY 4 HOURS PRN
Status: DISCONTINUED | OUTPATIENT
Start: 2024-01-01 | End: 2024-01-01 | Stop reason: HOSPADM

## 2024-01-01 RX ORDER — LORAZEPAM 2 MG/ML
0.5 INJECTION INTRAMUSCULAR EVERY 30 MIN PRN
Status: DISCONTINUED | OUTPATIENT
Start: 2024-01-01 | End: 2024-01-01

## 2024-01-01 RX ORDER — NALOXONE HCL 0.4 MG/ML
0.02 VIAL (ML) INJECTION
Status: DISCONTINUED | OUTPATIENT
Start: 2024-01-01 | End: 2024-01-01 | Stop reason: HOSPADM

## 2024-01-01 RX ORDER — PANTOPRAZOLE SODIUM 40 MG/10ML
40 INJECTION, POWDER, LYOPHILIZED, FOR SOLUTION INTRAVENOUS DAILY
Status: DISCONTINUED | OUTPATIENT
Start: 2024-01-01 | End: 2024-01-01

## 2024-01-01 RX ORDER — HYDRALAZINE HYDROCHLORIDE 25 MG/1
50 TABLET, FILM COATED ORAL EVERY 12 HOURS
Status: DISCONTINUED | OUTPATIENT
Start: 2024-01-01 | End: 2024-01-01 | Stop reason: HOSPADM

## 2024-01-01 RX ORDER — CALCIUM CHLORIDE INJECTION 100 MG/ML
INJECTION, SOLUTION INTRAVENOUS CODE/TRAUMA/SEDATION MEDICATION
Status: COMPLETED | OUTPATIENT
Start: 2024-01-01 | End: 2024-01-01

## 2024-01-01 RX ORDER — GUAIFENESIN AND DEXTROMETHORPHAN HYDROBROMIDE 10; 100 MG/5ML; MG/5ML
5 SYRUP ORAL EVERY 6 HOURS PRN
Status: DISCONTINUED | OUTPATIENT
Start: 2024-01-01 | End: 2024-01-01 | Stop reason: HOSPADM

## 2024-01-01 RX ORDER — DOXYCYCLINE HYCLATE 100 MG
100 TABLET ORAL EVERY 12 HOURS
Qty: 8 TABLET | Refills: 0 | Status: ON HOLD | OUTPATIENT
Start: 2024-01-01 | End: 2024-01-01

## 2024-01-01 RX ORDER — ACETAMINOPHEN 325 MG/1
650 TABLET ORAL EVERY 4 HOURS PRN
Status: DISCONTINUED | OUTPATIENT
Start: 2024-01-01 | End: 2024-01-01 | Stop reason: HOSPADM

## 2024-01-01 RX ORDER — HYDRALAZINE HYDROCHLORIDE 100 MG/1
100 TABLET, FILM COATED ORAL 2 TIMES DAILY
Qty: 60 TABLET | Refills: 11 | Status: ON HOLD | OUTPATIENT
Start: 2024-01-01 | End: 2024-01-01

## 2024-01-01 RX ORDER — CLONIDINE HYDROCHLORIDE 0.1 MG/1
0.1 TABLET ORAL 2 TIMES DAILY
Qty: 60 TABLET | Refills: 11 | Status: ON HOLD | OUTPATIENT
Start: 2024-01-01 | End: 2024-01-01

## 2024-01-01 RX ORDER — SODIUM CHLORIDE 9 MG/ML
INJECTION, SOLUTION INTRAVENOUS ONCE
Status: DISCONTINUED | OUTPATIENT
Start: 2024-01-01 | End: 2024-01-01

## 2024-01-01 RX ORDER — CLONIDINE HYDROCHLORIDE 0.1 MG/1
0.3 TABLET ORAL 3 TIMES DAILY
Status: DISCONTINUED | OUTPATIENT
Start: 2024-01-01 | End: 2024-01-01

## 2024-01-01 RX ORDER — CLONIDINE HYDROCHLORIDE 0.1 MG/1
0.1 TABLET ORAL 3 TIMES DAILY
Status: DISCONTINUED | OUTPATIENT
Start: 2024-01-01 | End: 2024-01-01

## 2024-01-01 RX ORDER — LOSARTAN POTASSIUM 50 MG/1
50 TABLET ORAL DAILY
Qty: 90 TABLET | Refills: 3 | Status: ON HOLD | OUTPATIENT
Start: 2024-01-01 | End: 2024-01-01

## 2024-01-01 RX ORDER — ONDANSETRON HYDROCHLORIDE 2 MG/ML
8 INJECTION, SOLUTION INTRAVENOUS EVERY 6 HOURS PRN
Status: DISCONTINUED | OUTPATIENT
Start: 2024-01-01 | End: 2024-01-01 | Stop reason: HOSPADM

## 2024-01-01 RX ORDER — CARVEDILOL 12.5 MG/1
12.5 TABLET ORAL 2 TIMES DAILY WITH MEALS
Status: DISCONTINUED | OUTPATIENT
Start: 2024-01-01 | End: 2024-01-01

## 2024-01-01 RX ORDER — PROCHLORPERAZINE EDISYLATE 5 MG/ML
5 INJECTION INTRAMUSCULAR; INTRAVENOUS EVERY 6 HOURS PRN
Status: DISCONTINUED | OUTPATIENT
Start: 2024-01-01 | End: 2024-01-01 | Stop reason: HOSPADM

## 2024-01-01 RX ORDER — LOSARTAN POTASSIUM 25 MG/1
100 TABLET ORAL DAILY
Status: DISCONTINUED | OUTPATIENT
Start: 2024-01-01 | End: 2024-01-01

## 2024-01-01 RX ORDER — AMIODARONE HYDROCHLORIDE 150 MG/3ML
INJECTION, SOLUTION INTRAVENOUS CODE/TRAUMA/SEDATION MEDICATION
Status: COMPLETED | OUTPATIENT
Start: 2024-01-01 | End: 2024-01-01

## 2024-01-01 RX ORDER — LACTULOSE 10 G/15ML
30 SOLUTION ORAL ONCE
Status: COMPLETED | OUTPATIENT
Start: 2024-01-01 | End: 2024-01-01

## 2024-01-01 RX ADMIN — EPINEPHRINE 0.1 MG: 0.1 INJECTION, SOLUTION ENDOTRACHEAL; INTRACARDIAC; INTRAVENOUS at 04:12

## 2024-01-01 RX ADMIN — HYDRALAZINE HYDROCHLORIDE 50 MG: 25 TABLET ORAL at 09:11

## 2024-01-01 RX ADMIN — ACETAMINOPHEN 650 MG: 325 TABLET ORAL at 09:11

## 2024-01-01 RX ADMIN — Medication 1.24 MCG/KG/MIN: at 07:12

## 2024-01-01 RX ADMIN — VANCOMYCIN HYDROCHLORIDE 750 MG: 750 INJECTION, POWDER, LYOPHILIZED, FOR SOLUTION INTRAVENOUS at 10:11

## 2024-01-01 RX ADMIN — CALCIUM CHLORIDE 1 G: 100 INJECTION, SOLUTION INTRAVENOUS at 04:12

## 2024-01-01 RX ADMIN — AMIODARONE HYDROCHLORIDE 1 MG/MIN: 1.8 INJECTION, SOLUTION INTRAVENOUS at 04:12

## 2024-01-01 RX ADMIN — HEPARIN SODIUM 5000 UNITS: 5000 INJECTION INTRAVENOUS; SUBCUTANEOUS at 12:11

## 2024-01-01 RX ADMIN — MAGNESIUM SULFATE HEPTAHYDRATE 2 G: 500 INJECTION, SOLUTION INTRAMUSCULAR; INTRAVENOUS at 04:12

## 2024-01-01 RX ADMIN — CLONIDINE HYDROCHLORIDE 0.1 MG: 0.1 TABLET ORAL at 09:11

## 2024-01-01 RX ADMIN — PIPERACILLIN SODIUM AND TAZOBACTAM SODIUM 4.5 G: 4; .5 INJECTION, POWDER, FOR SOLUTION INTRAVENOUS at 09:11

## 2024-01-01 RX ADMIN — MUPIROCIN: 20 OINTMENT TOPICAL at 08:11

## 2024-01-01 RX ADMIN — Medication 1 MCG/KG/MIN: at 06:12

## 2024-01-01 RX ADMIN — MAGNESIUM SULFATE HEPTAHYDRATE 2 G: 500 INJECTION, SOLUTION INTRAMUSCULAR; INTRAVENOUS at 02:12

## 2024-01-01 RX ADMIN — LACTULOSE 30 G: 20 SOLUTION ORAL at 11:11

## 2024-01-01 RX ADMIN — EPINEPHRINE 1 MG: 0.1 INJECTION, SOLUTION ENDOTRACHEAL; INTRACARDIAC; INTRAVENOUS at 02:12

## 2024-01-01 RX ADMIN — MUPIROCIN: 20 OINTMENT TOPICAL at 09:11

## 2024-01-01 RX ADMIN — LOSARTAN POTASSIUM 100 MG: 25 TABLET, FILM COATED ORAL at 08:11

## 2024-01-01 RX ADMIN — CARVEDILOL 6.25 MG: 6.25 TABLET, FILM COATED ORAL at 09:11

## 2024-01-01 RX ADMIN — NOREPINEPHRINE BITARTRATE 1.34 MCG/KG/MIN: 1 INJECTION, SOLUTION, CONCENTRATE INTRAVENOUS at 09:12

## 2024-01-01 RX ADMIN — HEPARIN SODIUM 5000 UNITS: 5000 INJECTION INTRAVENOUS; SUBCUTANEOUS at 09:11

## 2024-01-01 RX ADMIN — DOXYCYCLINE HYCLATE 100 MG: 100 TABLET, COATED ORAL at 08:11

## 2024-01-01 RX ADMIN — Medication 1 MCG/KG/MIN: at 05:12

## 2024-01-01 RX ADMIN — AMIODARONE HYDROCHLORIDE 300 MG: 50 INJECTION, SOLUTION INTRAVENOUS at 04:12

## 2024-01-01 RX ADMIN — MORPHINE SULFATE 2 MG: 4 INJECTION INTRAVENOUS at 11:12

## 2024-01-01 RX ADMIN — SEVELAMER CARBONATE 1600 MG: 800 TABLET, FILM COATED ORAL at 04:11

## 2024-01-01 RX ADMIN — HEPARIN SODIUM 5000 UNITS: 5000 INJECTION INTRAVENOUS; SUBCUTANEOUS at 08:11

## 2024-01-01 RX ADMIN — IOHEXOL 75 ML: 350 INJECTION, SOLUTION INTRAVENOUS at 10:11

## 2024-01-01 RX ADMIN — CARVEDILOL 6.25 MG: 6.25 TABLET, FILM COATED ORAL at 08:11

## 2024-01-01 RX ADMIN — SEVELAMER CARBONATE 1600 MG: 800 TABLET, FILM COATED ORAL at 08:11

## 2024-01-01 RX ADMIN — Medication 6 MG: at 09:11

## 2024-01-01 RX ADMIN — SODIUM BICARBONATE 50 MEQ: 84 INJECTION, SOLUTION INTRAVENOUS at 02:12

## 2024-01-01 RX ADMIN — PIPERACILLIN SODIUM AND TAZOBACTAM SODIUM 4.5 G: 4; .5 INJECTION, POWDER, FOR SOLUTION INTRAVENOUS at 08:11

## 2024-01-01 RX ADMIN — DEXTROSE MONOHYDRATE 25 G: 25 INJECTION, SOLUTION INTRAVENOUS at 04:12

## 2024-01-01 RX ADMIN — DOXYCYCLINE HYCLATE 100 MG: 100 TABLET, COATED ORAL at 11:11

## 2024-01-01 RX ADMIN — INSULIN ASPART 3 UNITS: 100 INJECTION, SOLUTION INTRAVENOUS; SUBCUTANEOUS at 09:12

## 2024-01-01 RX ADMIN — NOREPINEPHRINE BITARTRATE 1.32 MCG/KG/MIN: 4 INJECTION, SOLUTION INTRAVENOUS at 08:12

## 2024-01-01 RX ADMIN — DOXYCYCLINE HYCLATE 100 MG: 100 TABLET, COATED ORAL at 09:11

## 2024-01-01 RX ADMIN — ATROPINE SULFATE 1 MG: 0.1 INJECTION, SOLUTION INTRAVENOUS at 04:12

## 2024-01-03 ENCOUNTER — PATIENT MESSAGE (OUTPATIENT)
Dept: ADMINISTRATIVE | Facility: HOSPITAL | Age: 69
End: 2024-01-03
Payer: MEDICARE

## 2024-01-16 ENCOUNTER — TELEPHONE (OUTPATIENT)
Dept: TRANSPLANT | Facility: CLINIC | Age: 69
End: 2024-01-16
Payer: MEDICARE

## 2024-01-16 DIAGNOSIS — I82.429: ICD-10-CM

## 2024-01-16 DIAGNOSIS — Z76.82 AWAITING ORGAN TRANSPLANT STATUS: Primary | ICD-10-CM

## 2024-01-16 NOTE — TELEPHONE ENCOUNTER
----- Message from Kristina Lehman RN sent at 10/27/2023  5:34 PM CDT -----    ----- Message -----  From: Klever Garcia Jr., MD  Sent: 10/27/2023   2:48 PM CDT  To: [facility] Pre-Kidney Transplant Clinical    Iliac calcifications are present    Need noncon CT pelvis and iliac doppler US.

## 2024-01-18 ENCOUNTER — TELEPHONE (OUTPATIENT)
Dept: TRANSPLANT | Facility: CLINIC | Age: 69
End: 2024-01-18
Payer: MEDICARE

## 2024-02-05 PROBLEM — J18.9 COMMUNITY ACQUIRED PNEUMONIA: Status: RESOLVED | Noted: 2019-08-05 | Resolved: 2024-02-05

## 2024-03-11 ENCOUNTER — TELEPHONE (OUTPATIENT)
Dept: TRANSPLANT | Facility: CLINIC | Age: 69
End: 2024-03-11
Payer: MEDICARE

## 2024-03-11 NOTE — TELEPHONE ENCOUNTER
Spoke to pt regarding ct, mmg and RR that needed scheduling. Confirmed date, time and location of upcoming appts. Reminder mailed.

## 2024-03-19 ENCOUNTER — TELEPHONE (OUTPATIENT)
Dept: TRANSPLANT | Facility: CLINIC | Age: 69
End: 2024-03-19
Payer: MEDICARE

## 2024-03-19 DIAGNOSIS — Z12.11 COLON CANCER SCREENING: ICD-10-CM

## 2024-03-19 DIAGNOSIS — Z76.82 AWAITING ORGAN TRANSPLANT STATUS: Primary | ICD-10-CM

## 2024-03-19 DIAGNOSIS — Z76.82 ORGAN TRANSPLANT CANDIDATE: ICD-10-CM

## 2024-03-19 NOTE — TELEPHONE ENCOUNTER
Spoke w/pt regarding virtual colon appt that needed to be rc'd.  Pt stated she's not sure if she will be able to do virtual due to her email not working on her new phone. Pt stated she will get with phone carrier to help her fix the problem. Confirmed date, time and confirmed this is a virtual appt. Reminder mailed.

## 2024-04-01 ENCOUNTER — TELEPHONE (OUTPATIENT)
Dept: TRANSPLANT | Facility: CLINIC | Age: 69
End: 2024-04-01
Payer: MEDICARE

## 2024-04-01 NOTE — TELEPHONE ENCOUNTER
----- Message from Gabe Crockett MA sent at 4/1/2024  6:08 AM CDT -----  Regarding: FW: checking if missed appt have been r/s  Contact:  [phone]    ----- Message -----  From: Breanna Watson  Sent: 3/28/2024   4:27 PM CDT  To: [facility] Pre-Kidney Transplant Non-Clinical  Subject: checking if missed appt have been r/s            The patient called requesting to r/s missed appts from last month. Maybe appts she has r/s on 4/25. Please verify and notify patient     No further information provided    Patient can be contacted @# [phone]

## 2024-04-01 NOTE — TELEPHONE ENCOUNTER
Spoke w/pt regarding appts that were missed in December. Appts were already rescheduled for 4/25. Confirmed date, time and location of upcoming appts and reminder mailed.

## 2024-04-18 ENCOUNTER — TELEPHONE (OUTPATIENT)
Dept: TRANSPLANT | Facility: CLINIC | Age: 69
End: 2024-04-18
Payer: MEDICARE

## 2024-04-19 ENCOUNTER — TELEPHONE (OUTPATIENT)
Dept: TRANSPLANT | Facility: CLINIC | Age: 69
End: 2024-04-19
Payer: MEDICARE

## 2024-04-19 NOTE — TELEPHONE ENCOUNTER
MA notes per Adherence form     FOR THE PAST THREE MONTHS:    0-AMA's    2-No-shows 3/20/24 transportation, 4/10/24 inclement weather    No concerns with care giving, transportation, or mental health    Scanned in pt's media    Ashanti Griffith  Abdominal Transplant MA

## 2024-04-25 PROBLEM — Z01.818 PRE-TRANSPLANT EVALUATION FOR KIDNEY TRANSPLANT: Status: ACTIVE | Noted: 2024-04-25

## 2024-05-21 ENCOUNTER — CLINICAL SUPPORT (OUTPATIENT)
Dept: ENDOSCOPY | Facility: HOSPITAL | Age: 69
End: 2024-05-21
Payer: MEDICARE

## 2024-05-21 DIAGNOSIS — Z12.11 COLON CANCER SCREENING: ICD-10-CM

## 2024-05-21 DIAGNOSIS — Z76.82 ORGAN TRANSPLANT CANDIDATE: ICD-10-CM

## 2024-05-21 NOTE — PLAN OF CARE
We attempted to reach you to Schedule procedure. Please call back at [phone].. Left VM message for pt. To call Dept. For scheduling. Sent MY O message as well.

## 2024-06-04 ENCOUNTER — HOSPITAL ENCOUNTER (OUTPATIENT)
Dept: RADIOLOGY | Facility: HOSPITAL | Age: 69
Discharge: HOME | End: 2024-06-04
Attending: NURSE PRACTITIONER
Payer: MEDICARE

## 2024-06-04 VITALS — WEIGHT: 84 LBS | BODY MASS INDEX: 14.42 KG/M2

## 2024-06-04 DIAGNOSIS — I82.429: ICD-10-CM

## 2024-06-04 DIAGNOSIS — Z76.82 AWAITING ORGAN TRANSPLANT STATUS: ICD-10-CM

## 2024-06-04 DIAGNOSIS — Z12.31 BREAST CANCER SCREENING BY MAMMOGRAM: ICD-10-CM

## 2024-06-04 PROCEDURE — 77067 SCR MAMMO BI INCL CAD: CPT | Mod: 26,TXP,, | Performed by: RADIOLOGY

## 2024-06-04 PROCEDURE — 74176 CT ABD & PELVIS W/O CONTRAST: CPT | Mod: TC,TXP

## 2024-06-04 PROCEDURE — 77063 BREAST TOMOSYNTHESIS BI: CPT | Mod: 26,TXP,, | Performed by: RADIOLOGY

## 2024-06-04 PROCEDURE — 74176 CT ABD & PELVIS W/O CONTRAST: CPT | Mod: 26,TXP,, | Performed by: RADIOLOGY

## 2024-06-04 PROCEDURE — 77067 SCR MAMMO BI INCL CAD: CPT | Mod: TC,TXP

## 2024-06-04 PROCEDURE — 77063 BREAST TOMOSYNTHESIS BI: CPT | Mod: TC,TXP

## 2024-06-06 ENCOUNTER — TELEPHONE (OUTPATIENT)
Dept: TRANSPLANT | Facility: CLINIC | Age: 69
End: 2024-06-06
Payer: MEDICARE

## 2024-06-06 NOTE — TELEPHONE ENCOUNTER
----- Message from Klever Garcia Jr., MD sent at 6/6/2024 11:22 AM CDT -----  Lets close her out, yall.   DS  ----- Message -----  From: Fatimah Ramachandran MD  Sent: 6/5/2024   2:57 PM CDT  To: Henry Cohn MD; Klever Garcia Jr., MD; #    I agree  ----- Message -----  From: Klever Garcia Jr., MD  Sent: 6/5/2024   1:54 PM CDT  To: Henry Cohn MD; Fatimah Ramachandran MD; #    Iliac calcifications are present.   Likely Prohibitive. Theres a small spot distally on the right external but the amount of luminal narrowing proximally is severe.  Doppler last year said biphasic but I think it looks too bad.     sending to Dr. Ramachandran and Dr. Cohn

## 2024-06-11 ENCOUNTER — TELEPHONE (OUTPATIENT)
Dept: TRANSPLANT | Facility: HOSPITAL | Age: 69
End: 2024-06-11
Payer: MEDICARE

## 2024-06-11 NOTE — TELEPHONE ENCOUNTER
Transplant Nephrology Review of Pre-Transplant Workup    68 yr old white female with ESRD secondary to DM/HTN on HD since 2/9/2017. Appears to have been listed with us but was removed on 11/17/2022 secondary to no shows to appointments/labs.   ** Was not seen by nephrology or  when she was seen in  on 9/29/23.    Work up so far:    - checklist updated and pending only repeat colonoscopy   - CT abdomen and pelvis non contrast prohibitive.    Plan:   - present at selection for surgical discussion as CT appears to be prohibitive and likely will need closure of transplant evaluation   - if however decision made to proceed with evaluation will need    - to be seen by transplant nephrology, social work as well    - repeat colonoscopy    Jolene Hicks,    Transplant Nephrology

## 2024-06-14 ENCOUNTER — COMMITTEE REVIEW (OUTPATIENT)
Dept: TRANSPLANT | Facility: CLINIC | Age: 69
End: 2024-06-14
Payer: MEDICARE

## 2024-06-14 NOTE — COMMITTEE REVIEW
Native Organ Dx: Diabetes Mellitus - Type II      Not approved for LRD/CAD transplant due to surgically prohibitive for transplant.    Attempt to notify pt via phone, left vm message.  Note written by Kristina Lehman RN    ===============================================    I was present at the meeting and attest to the general consensus of the committee.   Ap Womack Jr.

## 2024-06-14 NOTE — LETTER
June 19, 2024    April Vasquez  [address] [place] [address]    Dear April Vasquez:  MRN: [id number]    It is the duty of the [facility] Selection Committee to determine which patients are candidates for a transplant. For this reason, our committee has the difficult task of evaluating patients to determine which ones have the greatest chance of having a successful transplant. We are aware of the magnitude of this responsibility, and we approach it with reverence and humility.    It is with regret I inform you that you are not approved as a transplant candidate due to Due to severely calcified iliac arteries. This means that your blood vessels used to supply blood to a transplanted kidney are hardened and have developed severe blockages that render them ineffective for use in transplant. At this time there is no treatment for reversal of this condition, and unfortunately this means that a kidney transplant is no longer an option.   Based on this review, we have determined that at this time, you are not a candidate for a transplant at [facility].      The selection committee carefully considers each patient's transplant candidacy and determines whether it is safe to proceed with transplantation on a case-by-case basis using established selection criteria.  At present, the risk of proceeding with an elective transplant surgery has become too high.                                                                               Although the selection committee believes you are not a suitable transplant candidate, you have the option to be evaluated at other transplant centers who may have different selection criteria.  You may request your [facility] records be sent to any center of your choice by contacting our Medical Records Department at [phone].                                                                               Attached is a letter from the [employer] for Organ Sharing (UNOS).  It  describes the services and information offered to patients by UNOS and the Organ Procurement and Transplant Network.    The [facility] Selection Committee sincerely wishes you the best and remains available to answer any questions.  Please do not hesitate to contact our pre-transplant office if we can assist you in any other way.                                                                               Sincerely,      Martha Vergara MD  Medical Director, Kidney & Kidney/Pancreas Transplantation     CC:  Quinn Glasgow MD            [facility]    Encl: UNOS Letter               The Organ Procurement and Transplantation Network   Toll-free patient services line: [phone]  Your resource for organ transplant information      Staffed 8:30 am - 5:00 pm ET Monday - Friday   Leave a message 24/7 to receive a call back    The Organ Procurement and Transplantation Network (OPTN) is the national transplant system. It makes the policies that decide how donated organs are matched to patients waiting for a transplant. The OPTN:    Makes sure donated organs get matched to people on the transplant waiting list  Tells people about the donation and transplant processes  Makes sure that the public knows about the need for more organ and tissue donations    The OPTN has a free patient services line that you can call to:  Get more information about:   o Organ donation and organ transplants   o Donation and transplant policies  Get an information kit with:   o A list of transplant hospitals   o Waiting list information  Talk about any questions you may have about your transplant hospital or organ procurement organization. The staff will do their best to help you or point you to others who may help.  Find out how you can volunteer with the OPTN and help shape transplant policy    The patient services line number is: 5-086-868-9674    Patient services line staff CANNOT answer questions about your own  medical care, including:  Waiting list status  Test results  Medical records  You will need to call your transplant hospital for this information.    The following websites have more information about transplantation and donation:  OPTN: https://optn.transplant.hrsa.gov/  For potential living donors and transplant recipients:   o Living with transplant: https://www.transplantliving.org/   o Living donation process: https://optn.transplant.hrsa.gov/living-donation/     o Financial assistance: https://www.livingdonorassistance.org/  Transplantation data: https://www.srtr.org/  Organ donation: https://www.organdonor.gov/    Volunteer with the OPTN: https://optn.transplant.hrsa.gov/get-involved

## 2024-06-14 NOTE — LETTER
June 19, 2024    April Vasquez  [address] [place] [address]        Dear April Vasquez:  MRN: [id number]    It is the duty of the [facility] Selection Committee to determine which patients are candidates for a transplant. For this reason, our committee has the difficult task of evaluating patients to determine which ones have the greatest chance of having a successful transplant. We are aware of the magnitude of this responsibility, and we approach it with reverence and humility.    It is with regret I inform you that you are not approved as a transplant candidate due to {TXP Pt denial reasons:77374}.  Your future transplant appointments for *** have been canceled.  Based on this review, we have determined that at this time, you are not a candidate for a transplant at [facility].      The selection committee carefully considers each patient's transplant candidacy and determines whether it is safe to proceed with transplantation on a case-by-case basis using established selection criteria.  At present, the risk of proceeding with an elective transplant surgery has become too high.                                                                               Although the selection committee believes you are not a suitable transplant candidate, you have the option to be evaluated at other transplant centers who may have different selection criteria.  You may request your [facility] records be sent to any center of your choice by contacting our Medical Records Department at [phone].                                                                               Attached is a letter from the [employer] for Organ Sharing (UNOS).  It describes the services and information offered to patients by UNOS and the Organ Procurement and Transplant Network.    The [facility] Selection Committee sincerely wishes you the best and remains available to answer any questions.  Please do not hesitate to contact our  pre-transplant office if we can assist you in any other way.                                                                               Sincerely,    {Signature:65370}    Cc: ***    Encl: UNOS Letter               The Organ Procurement and Transplantation Network   Toll-free patient services line: 6-567-356-1564  Your resource for organ transplant information      Staffed 8:30 am - 5:00 pm ET Monday - Friday   Leave a message 24/7 to receive a call back    The Organ Procurement and Transplantation Network (OPTN) is the national transplant system. It makes the policies that decide how donated organs are matched to patients waiting for a transplant. The OPTN:    Makes sure donated organs get matched to people on the transplant waiting list  Tells people about the donation and transplant processes  Makes sure that the public knows about the need for more organ and tissue donations    The OPTN has a free patient services line that you can call to:  Get more information about:   o Organ donation and organ transplants   o Donation and transplant policies  Get an information kit with:   o A list of transplant hospitals   o Waiting list information  Talk about any questions you may have about your transplant hospital or organ procurement organization. The staff will do their best to help you or point you to others who may help.  Find out how you can volunteer with the OPTN and help shape transplant policy    The patient services line number is: 1-720-929-7102    Patient services line staff CANNOT answer questions about your own medical care, including:  Waiting list status  Test results  Medical records  You will need to call your transplant hospital for this information.    The following websites have more information about transplantation and donation:  OPTN: https://optn.transplant.hrsa.gov/  For potential living donors and transplant recipients:   o Living with transplant: https://www.transplantliving.org/   o Living  donation process: https://optn.transplant.hrsa.gov/living-donation/     o Financial assistance: https://www.livingdonorassistance.org/  Transplantation data: https://www.srtr.org/  Organ donation: https://www.organdonor.gov/    Volunteer with the OPTN: https://optn.transplant.hrsa.gov/get-involved

## 2024-06-19 ENCOUNTER — PATIENT MESSAGE (OUTPATIENT)
Dept: ADMINISTRATIVE | Facility: HOSPITAL | Age: 69
End: 2024-06-19
Payer: MEDICARE

## 2024-06-23 NOTE — PATIENT INSTRUCTIONS
Paracentesis    Date/Time: 6/23/2024 1:05 PM    Performed by: Ricardo Ceballos DO  Authorized by: Ricardo Ceballos Please talk to the pharmacy about getting the shingles vaccine.

## 2024-07-20 ENCOUNTER — HOSPITAL ENCOUNTER (INPATIENT)
Facility: HOSPITAL | Age: 69
LOS: 4 days | Discharge: HOME | DRG: 177 | End: 2024-07-24
Attending: EMERGENCY MEDICINE | Admitting: STUDENT IN AN ORGANIZED HEALTH CARE EDUCATION/TRAINING PROGRAM
Payer: MEDICARE

## 2024-07-20 DIAGNOSIS — R06.02 SOB (SHORTNESS OF BREATH): ICD-10-CM

## 2024-07-20 DIAGNOSIS — U07.1 PNEUMONIA DUE TO COVID-19 VIRUS: ICD-10-CM

## 2024-07-20 DIAGNOSIS — R06.02 SHORTNESS OF BREATH: ICD-10-CM

## 2024-07-20 DIAGNOSIS — J12.82 PNEUMONIA DUE TO COVID-19 VIRUS: ICD-10-CM

## 2024-07-20 PROBLEM — A08.4 VIRAL GASTROENTERITIS: Status: RESOLVED | Noted: 2021-08-05 | Resolved: 2024-07-20

## 2024-07-20 PROBLEM — I16.0 HYPERTENSIVE URGENCY: Status: RESOLVED | Noted: 2021-06-13 | Resolved: 2024-07-20

## 2024-07-20 LAB
ALBUMIN SERPL BCP-MCNC: 2.2 G/DL (ref 3.5–5.2)
ALLENS TEST: ABNORMAL
ALP SERPL-CCNC: 131 U/L (ref 55–135)
ALT SERPL W/O P-5'-P-CCNC: 17 U/L (ref 10–44)
ANION GAP SERPL CALC-SCNC: 14 MMOL/L (ref 8–16)
AST SERPL-CCNC: 44 U/L (ref 10–40)
BASOPHILS # BLD AUTO: 0.01 K/UL (ref 0–0.2)
BASOPHILS NFR BLD: 0.1 % (ref 0–1.9)
BILIRUB SERPL-MCNC: 0.5 MG/DL (ref 0.1–1)
BNP SERPL-MCNC: 1271 PG/ML (ref 0–99)
BUN SERPL-MCNC: 23 MG/DL (ref 8–23)
CALCIUM SERPL-MCNC: 9.4 MG/DL (ref 8.7–10.5)
CHLORIDE SERPL-SCNC: 104 MMOL/L (ref 95–110)
CO2 SERPL-SCNC: 23 MMOL/L (ref 23–29)
CREAT SERPL-MCNC: 4.2 MG/DL (ref 0.5–1.4)
CTP QC/QA: YES
CTP QC/QA: YES
DIFFERENTIAL METHOD BLD: ABNORMAL
EOSINOPHIL # BLD AUTO: 0 K/UL (ref 0–0.5)
EOSINOPHIL NFR BLD: 0.5 % (ref 0–8)
ERYTHROCYTE [DISTWIDTH] IN BLOOD BY AUTOMATED COUNT: 16.8 % (ref 11.5–14.5)
EST. GFR  (NO RACE VARIABLE): 11 ML/MIN/1.73 M^2
GLUCOSE SERPL-MCNC: 75 MG/DL (ref 70–110)
HCO3 UR-SCNC: 30 MMOL/L (ref 24–28)
HCT VFR BLD AUTO: 27.5 % (ref 37–48.5)
HGB BLD-MCNC: 8.4 G/DL (ref 12–16)
IMM GRANULOCYTES # BLD AUTO: 0.06 K/UL (ref 0–0.04)
IMM GRANULOCYTES NFR BLD AUTO: 0.7 % (ref 0–0.5)
INR PPP: 1.2 (ref 0.8–1.2)
LACTATE SERPL-SCNC: 0.6 MMOL/L (ref 0.5–2.2)
LDH SERPL L TO P-CCNC: 244 U/L (ref 110–260)
LYMPHOCYTES # BLD AUTO: 0.7 K/UL (ref 1–4.8)
LYMPHOCYTES NFR BLD: 8.5 % (ref 18–48)
MAGNESIUM SERPL-MCNC: 2.1 MG/DL (ref 1.6–2.6)
MCH RBC QN AUTO: 30.3 PG (ref 27–31)
MCHC RBC AUTO-ENTMCNC: 30.5 G/DL (ref 32–36)
MCV RBC AUTO: 99 FL (ref 82–98)
MONOCYTES # BLD AUTO: 0.4 K/UL (ref 0.3–1)
MONOCYTES NFR BLD: 5.2 % (ref 4–15)
NEUTROPHILS # BLD AUTO: 7 K/UL (ref 1.8–7.7)
NEUTROPHILS NFR BLD: 85 % (ref 38–73)
NRBC BLD-RTO: 0 /100 WBC
PCO2 BLDA: 48.7 MMHG (ref 35–45)
PH SMN: 7.4 [PH] (ref 7.35–7.45)
PHOSPHATE SERPL-MCNC: 3.9 MG/DL (ref 2.7–4.5)
PLATELET # BLD AUTO: 285 K/UL (ref 150–450)
PMV BLD AUTO: 10.6 FL (ref 9.2–12.9)
PO2 BLDA: 49 MMHG (ref 40–60)
POC BE: 5 MMOL/L
POC MOLECULAR INFLUENZA A AGN: NEGATIVE
POC MOLECULAR INFLUENZA B AGN: NEGATIVE
POC SATURATED O2: 84 % (ref 95–100)
POC TCO2: 31 MMOL/L (ref 24–29)
POTASSIUM SERPL-SCNC: 3.4 MMOL/L (ref 3.5–5.1)
PROCALCITONIN SERPL IA-MCNC: 1.64 NG/ML
PROT SERPL-MCNC: 5.9 G/DL (ref 6–8.4)
PROTHROMBIN TIME: 13 SEC (ref 9–12.5)
RBC # BLD AUTO: 2.77 M/UL (ref 4–5.4)
SAMPLE: ABNORMAL
SARS-COV-2 RDRP RESP QL NAA+PROBE: POSITIVE
SITE: ABNORMAL
SODIUM SERPL-SCNC: 141 MMOL/L (ref 136–145)
TROPONIN I SERPL DL<=0.01 NG/ML-MCNC: 0.04 NG/ML (ref 0–0.03)
WBC # BLD AUTO: 8.23 K/UL (ref 3.9–12.7)

## 2024-07-20 PROCEDURE — 84484 ASSAY OF TROPONIN QUANT: CPT | Performed by: EMERGENCY MEDICINE

## 2024-07-20 PROCEDURE — 25000003 PHARM REV CODE 250: Performed by: INTERNAL MEDICINE

## 2024-07-20 PROCEDURE — 83529 ASAY OF INTERLEUKIN-6 (IL-6): CPT | Performed by: INTERNAL MEDICINE

## 2024-07-20 PROCEDURE — 83735 ASSAY OF MAGNESIUM: CPT | Performed by: EMERGENCY MEDICINE

## 2024-07-20 PROCEDURE — 83880 ASSAY OF NATRIURETIC PEPTIDE: CPT | Performed by: EMERGENCY MEDICINE

## 2024-07-20 PROCEDURE — 84100 ASSAY OF PHOSPHORUS: CPT | Performed by: EMERGENCY MEDICINE

## 2024-07-20 PROCEDURE — 87635 SARS-COV-2 COVID-19 AMP PRB: CPT | Performed by: EMERGENCY MEDICINE

## 2024-07-20 PROCEDURE — 25000003 PHARM REV CODE 250: Performed by: STUDENT IN AN ORGANIZED HEALTH CARE EDUCATION/TRAINING PROGRAM

## 2024-07-20 PROCEDURE — 21400001 HC TELEMETRY ROOM

## 2024-07-20 PROCEDURE — 93010 ELECTROCARDIOGRAM REPORT: CPT | Mod: ,,, | Performed by: INTERNAL MEDICINE

## 2024-07-20 PROCEDURE — 85610 PROTHROMBIN TIME: CPT | Performed by: INTERNAL MEDICINE

## 2024-07-20 PROCEDURE — 25000003 PHARM REV CODE 250: Performed by: EMERGENCY MEDICINE

## 2024-07-20 PROCEDURE — XW033E5 INTRODUCTION OF REMDESIVIR ANTI-INFECTIVE INTO PERIPHERAL VEIN, PERCUTANEOUS APPROACH, NEW TECHNOLOGY GROUP 5: ICD-10-PCS | Performed by: INTERNAL MEDICINE

## 2024-07-20 PROCEDURE — 99900035 HC TECH TIME PER 15 MIN (STAT)

## 2024-07-20 PROCEDURE — 83615 LACTATE (LD) (LDH) ENZYME: CPT | Performed by: STUDENT IN AN ORGANIZED HEALTH CARE EDUCATION/TRAINING PROGRAM

## 2024-07-20 PROCEDURE — 27000207 HC ISOLATION

## 2024-07-20 PROCEDURE — 80053 COMPREHEN METABOLIC PANEL: CPT | Performed by: EMERGENCY MEDICINE

## 2024-07-20 PROCEDURE — 84145 PROCALCITONIN (PCT): CPT | Performed by: INTERNAL MEDICINE

## 2024-07-20 PROCEDURE — 83605 ASSAY OF LACTIC ACID: CPT | Performed by: INTERNAL MEDICINE

## 2024-07-20 PROCEDURE — 63600175 PHARM REV CODE 636 W HCPCS: Performed by: INTERNAL MEDICINE

## 2024-07-20 PROCEDURE — 85025 COMPLETE CBC W/AUTO DIFF WBC: CPT | Performed by: EMERGENCY MEDICINE

## 2024-07-20 PROCEDURE — 93005 ELECTROCARDIOGRAM TRACING: CPT

## 2024-07-20 PROCEDURE — 82803 BLOOD GASES ANY COMBINATION: CPT

## 2024-07-20 RX ORDER — PANTOPRAZOLE SODIUM 40 MG/1
40 TABLET, DELAYED RELEASE ORAL DAILY
Status: DISCONTINUED | OUTPATIENT
Start: 2024-07-21 | End: 2024-07-24 | Stop reason: HOSPADM

## 2024-07-20 RX ORDER — LOSARTAN POTASSIUM 25 MG/1
100 TABLET ORAL DAILY
Status: DISCONTINUED | OUTPATIENT
Start: 2024-07-21 | End: 2024-07-24 | Stop reason: HOSPADM

## 2024-07-20 RX ORDER — ACETAMINOPHEN 325 MG/1
650 TABLET ORAL EVERY 6 HOURS PRN
Status: DISCONTINUED | OUTPATIENT
Start: 2024-07-20 | End: 2024-07-24 | Stop reason: HOSPADM

## 2024-07-20 RX ORDER — GUAIFENESIN 100 MG/5ML
200 SOLUTION ORAL
Status: COMPLETED | OUTPATIENT
Start: 2024-07-20 | End: 2024-07-20

## 2024-07-20 RX ORDER — LOPERAMIDE HYDROCHLORIDE 2 MG/1
2 CAPSULE ORAL EVERY 6 HOURS PRN
Status: DISCONTINUED | OUTPATIENT
Start: 2024-07-20 | End: 2024-07-24 | Stop reason: HOSPADM

## 2024-07-20 RX ORDER — DEXAMETHASONE SODIUM PHOSPHATE 4 MG/ML
6 INJECTION, SOLUTION INTRA-ARTICULAR; INTRALESIONAL; INTRAMUSCULAR; INTRAVENOUS; SOFT TISSUE ONCE
Status: COMPLETED | OUTPATIENT
Start: 2024-07-20 | End: 2024-07-20

## 2024-07-20 RX ORDER — POLYETHYLENE GLYCOL 3350 17 G/17G
17 POWDER, FOR SOLUTION ORAL 2 TIMES DAILY PRN
Status: DISCONTINUED | OUTPATIENT
Start: 2024-07-20 | End: 2024-07-23

## 2024-07-20 RX ORDER — SEVELAMER CARBONATE 800 MG/1
1600 TABLET, FILM COATED ORAL
Status: DISCONTINUED | OUTPATIENT
Start: 2024-07-21 | End: 2024-07-24 | Stop reason: HOSPADM

## 2024-07-20 RX ORDER — HYDRALAZINE HYDROCHLORIDE 20 MG/ML
10 INJECTION INTRAMUSCULAR; INTRAVENOUS EVERY 6 HOURS PRN
Status: DISCONTINUED | OUTPATIENT
Start: 2024-07-20 | End: 2024-07-24 | Stop reason: HOSPADM

## 2024-07-20 RX ORDER — CLONIDINE HYDROCHLORIDE 0.1 MG/1
0.3 TABLET ORAL 3 TIMES DAILY
Status: DISCONTINUED | OUTPATIENT
Start: 2024-07-20 | End: 2024-07-24 | Stop reason: HOSPADM

## 2024-07-20 RX ORDER — SODIUM CHLORIDE 0.9 % (FLUSH) 0.9 %
10 SYRINGE (ML) INJECTION
Status: DISCONTINUED | OUTPATIENT
Start: 2024-07-20 | End: 2024-07-24 | Stop reason: HOSPADM

## 2024-07-20 RX ORDER — GUAIFENESIN 100 MG/5ML
400 SOLUTION ORAL EVERY 6 HOURS PRN
Status: DISCONTINUED | OUTPATIENT
Start: 2024-07-20 | End: 2024-07-24 | Stop reason: HOSPADM

## 2024-07-20 RX ORDER — CARVEDILOL 12.5 MG/1
12.5 TABLET ORAL 2 TIMES DAILY WITH MEALS
Status: DISCONTINUED | OUTPATIENT
Start: 2024-07-21 | End: 2024-07-24 | Stop reason: HOSPADM

## 2024-07-20 RX ORDER — HYDRALAZINE HYDROCHLORIDE 25 MG/1
50 TABLET, FILM COATED ORAL EVERY 8 HOURS
Status: DISCONTINUED | OUTPATIENT
Start: 2024-07-21 | End: 2024-07-21

## 2024-07-20 RX ORDER — ASCORBIC ACID 500 MG
500 TABLET ORAL 2 TIMES DAILY
Status: DISCONTINUED | OUTPATIENT
Start: 2024-07-20 | End: 2024-07-20

## 2024-07-20 RX ORDER — TALC
6 POWDER (GRAM) TOPICAL NIGHTLY PRN
Status: DISCONTINUED | OUTPATIENT
Start: 2024-07-20 | End: 2024-07-24 | Stop reason: HOSPADM

## 2024-07-20 RX ORDER — ENOXAPARIN SODIUM 100 MG/ML
1 INJECTION SUBCUTANEOUS EVERY 24 HOURS
Status: DISCONTINUED | OUTPATIENT
Start: 2024-07-20 | End: 2024-07-24 | Stop reason: HOSPADM

## 2024-07-20 RX ORDER — ONDANSETRON HYDROCHLORIDE 2 MG/ML
4 INJECTION, SOLUTION INTRAVENOUS EVERY 6 HOURS PRN
Status: DISCONTINUED | OUTPATIENT
Start: 2024-07-20 | End: 2024-07-24 | Stop reason: HOSPADM

## 2024-07-20 RX ORDER — PANTOPRAZOLE SODIUM 40 MG/10ML
40 INJECTION, POWDER, LYOPHILIZED, FOR SOLUTION INTRAVENOUS ONCE
Status: COMPLETED | OUTPATIENT
Start: 2024-07-20 | End: 2024-07-20

## 2024-07-20 RX ORDER — MUPIROCIN 20 MG/G
OINTMENT TOPICAL 2 TIMES DAILY
Status: DISCONTINUED | OUTPATIENT
Start: 2024-07-20 | End: 2024-07-24 | Stop reason: HOSPADM

## 2024-07-20 RX ADMIN — DEXAMETHASONE SODIUM PHOSPHATE 6 MG: 4 INJECTION INTRA-ARTICULAR; INTRALESIONAL; INTRAMUSCULAR; INTRAVENOUS; SOFT TISSUE at 08:07

## 2024-07-20 RX ADMIN — CLONIDINE HYDROCHLORIDE 0.3 MG: 0.1 TABLET ORAL at 10:07

## 2024-07-20 RX ADMIN — REMDESIVIR 200 MG: 100 INJECTION, POWDER, LYOPHILIZED, FOR SOLUTION INTRAVENOUS at 08:07

## 2024-07-20 RX ADMIN — ENOXAPARIN SODIUM 40 MG: 40 INJECTION SUBCUTANEOUS at 10:07

## 2024-07-20 RX ADMIN — GUAIFENESIN 400 MG: 200 SOLUTION ORAL at 10:07

## 2024-07-20 RX ADMIN — SODIUM CHLORIDE 1000 ML: 9 INJECTION, SOLUTION INTRAVENOUS at 06:07

## 2024-07-20 RX ADMIN — MUPIROCIN: 20 OINTMENT TOPICAL at 10:07

## 2024-07-20 RX ADMIN — GUAIFENESIN 200 MG: 100 SOLUTION ORAL at 06:07

## 2024-07-20 RX ADMIN — PANTOPRAZOLE SODIUM 40 MG: 40 INJECTION, POWDER, FOR SOLUTION INTRAVENOUS at 08:07

## 2024-07-20 NOTE — ED TRIAGE NOTES
Pt reports to ED for cough, congestion and weakness for about 5 days. States she hasn't been eating or drinking because she can't taste the items. Pt dialysis pt, MWF.

## 2024-07-20 NOTE — ED PROVIDER NOTES
"Encounter Date: 7/20/2024    SCRIBE #1 NOTE: I, Trang Zendejas, am scribing for, and in the presence of,  Юлия Wylie MD. I have scribed the following portions of the note - Other sections scribed: HPI, ROS, PE.       History     Chief Complaint   Patient presents with    Cough     Patient reports cough, decreased appetite and weakness for 5 days, dialysis on MWF     68 y.o. female, with multiple medical problems including HTN, ESRD, Tobacco Abuse, and others, presents to the ED with complaints of an acute cough for the past 4-5 days. Patient reports taking Robitussin which only helped "loosen" the mucus, but her symptom continues to persist. Patient is also complaining of decreased fluid and food intake for the past 3 days due to her meals having "no taste" or tasting terribly. Patient states she feels "dehydrated" and weak. Patient reports having normal bowel movements and denies using home oxygen despite wearing a nasal cannula in the ED (room air sats at triage 88%). Patient receives dialysis MWF with her last full treatment occurring 1 day ago. Patient is able to ambulate without assistance. Patient denies rhinorrhea, sore throat, abdominal pain, chest pain, unsteady gait, fever, diarrhea, or general myalgias.     The history is provided by the patient. No  was used.     Review of patient's allergies indicates:  No Known Allergies  Past Medical History:   Diagnosis Date    (HFpEF) heart failure with preserved ejection fraction 10/30/2023    Anemia     Colon polyps     COVID-19 08/2021    Diabetes mellitus, type 2     ESRD (end stage renal disease) on dialysis 2017    Gallstones     Hypertension     Pulmonary edema     Renal disorder     dialysis MIRANDA fistula    Tobacco abuse     Uses walker      Past Surgical History:   Procedure Laterality Date    AVF  2017    CHOLECYSTECTOMY  2016    COLONOSCOPY N/A 8/6/2020    Procedure: COLONOSCOPY;  Surgeon: Hood Hernadez MD;  Location: [facility]" ENDO (4TH FLR);  Service: Endoscopy;  Laterality: N/A;  labs prior-dialysis  covid test lapalco 8/3    Permacath Right 2017    TUBAL LIGATION       Family History   Problem Relation Name Age of Onset    Kidney disease Mother      Hypertension Mother      Cervical cancer Mother      Cancer Mother      Ovarian cancer Mother      Diabetes Father      Drug abuse Paternal Grandmother      Diabetes Sister      No Known Problems Sister      No Known Problems Sister      No Known Problems Sister      No Known Problems Son      No Known Problems Son      No Known Problems Son      No Known Problems Daughter      Breast cancer Neg Hx      Colon cancer Neg Hx       Social History     Tobacco Use    Smoking status: Former     Current packs/day: 0.00     Average packs/day: 0.5 packs/day for 20.0 years (10.0 ttl pk-yrs)     Types: Cigarettes     Start date: 2000     Quit date: 2020     Years since quittin.0    Smokeless tobacco: Never   Substance Use Topics    Alcohol use: No    Drug use: No     Review of Systems   Constitutional:  Positive for appetite change (decreased). Negative for fever.   HENT:  Negative for rhinorrhea and sore throat.    Respiratory:  Positive for cough.    Cardiovascular:  Negative for chest pain.   Gastrointestinal:  Negative for abdominal pain and diarrhea.   Musculoskeletal:  Negative for gait problem and myalgias.   Neurological:  Positive for weakness.   All other systems reviewed and are negative.      Physical Exam     Initial Vitals   BP Pulse Resp Temp SpO2   24 1652 24 1652 24 1652 24 1652 24 1653   (!) 124/59 63 18 98 °F (36.7 °C) (!) 88 %      MAP       --                Physical Exam    Nursing note and vitals reviewed.  Constitutional: She appears well-developed and well-nourished.   HENT:   Head: Normocephalic and atraumatic.   Mouth/Throat: Uvula is midline. Mucous membranes are dry.   Eyes: EOM are normal. Pupils are equal, round, and reactive  to light.   Neck: Neck supple.   Normal range of motion.  Cardiovascular:  Normal rate, regular rhythm, normal heart sounds and intact distal pulses.           No murmur heard.  Pulmonary/Chest: Effort normal. No accessory muscle usage or stridor. No tachypnea. She has rales (basilar, in bilateral lower lobes).   Abdominal: Abdomen is soft. She exhibits no distension. There is no abdominal tenderness. There is no rebound and no guarding.   Musculoskeletal:         General: Normal range of motion.      Cervical back: Normal range of motion and neck supple.      Right lower leg: No edema.      Left lower leg: No edema.     Neurological: She is alert and oriented to person, place, and time. GCS eye subscore is 4. GCS verbal subscore is 5. GCS motor subscore is 6.   Skin: Skin is warm. No rash noted.   Psychiatric: She has a normal mood and affect.         ED Course   Critical Care    Date/Time: 7/20/2024 8:12 PM    Performed by: Юлия Wylie MD  Authorized by: Юлия Wylie MD  Direct patient critical care time: 10 minutes  Additional history critical care time: 10 minutes  Ordering / reviewing critical care time: 10 minutes  Documentation critical care time: 10 minutes  Consulting other physicians critical care time: 10 minutes  Total critical care time (exclusive of procedural time) : 50 minutes  Critical care was necessary to treat or prevent imminent or life-threatening deterioration of the following conditions: respiratory failure.  Critical care was time spent personally by me on the following activities: discussions with consultants, development of treatment plan with patient or surrogate, examination of patient, ordering and performing treatments and interventions, re-evaluation of patient's condition, ordering and review of laboratory studies, obtaining history from patient or surrogate, evaluation of patient's response to treatment, review of old charts, pulse oximetry and ordering and review of  radiographic studies.        Labs Reviewed   CBC W/ AUTO DIFFERENTIAL - Abnormal       Result Value    WBC 8.23      RBC 2.77 (*)     Hemoglobin 8.4 (*)     Hematocrit 27.5 (*)     MCV 99 (*)     MCH 30.3      MCHC 30.5 (*)     RDW 16.8 (*)     Platelets 285      MPV 10.6      Immature Granulocytes 0.7 (*)     Gran # (ANC) 7.0      Immature Grans (Abs) 0.06 (*)     Lymph # 0.7 (*)     Mono # 0.4      Eos # 0.0      Baso # 0.01      nRBC 0      Gran % 85.0 (*)     Lymph % 8.5 (*)     Mono % 5.2      Eosinophil % 0.5      Basophil % 0.1      Differential Method Automated     COMPREHENSIVE METABOLIC PANEL - Abnormal    Sodium 141      Potassium 3.4 (*)     Chloride 104      CO2 23      Glucose 75      BUN 23      Creatinine 4.2 (*)     Calcium 9.4      Total Protein 5.9 (*)     Albumin 2.2 (*)     Total Bilirubin 0.5      Alkaline Phosphatase 131      AST 44 (*)     ALT 17      eGFR 11 (*)     Anion Gap 14     TROPONIN I - Abnormal    Troponin I 0.035 (*)    B-TYPE NATRIURETIC PEPTIDE - Abnormal    BNP 1,271 (*)    SARS-COV-2 RDRP GENE - Abnormal    POC Rapid COVID Positive (*)      Acceptable Yes     ISTAT PROCEDURE - Abnormal    POC PH 7.398      POC PCO2 48.7 (*)     POC PO2 49      POC HCO3 30.0 (*)     POC BE 5 (*)     POC SATURATED O2 84      POC TCO2 31 (*)     Sample VENOUS      Site Other      Allens Test N/A     MAGNESIUM    Magnesium 2.1     PHOSPHORUS    Phosphorus 3.9     PROTIME-INR   LACTATE DEHYDROGENASE   LACTIC ACID, PLASMA   PROCALCITONIN   INTERLEUKIN-6   POCT INFLUENZA A/B MOLECULAR    POC Molecular Influenza A Ag Negative      POC Molecular Influenza B Ag Negative       Acceptable Yes       EKG Readings: (Independently Interpreted)   Initial Reading: No STEMI. Rhythm: Normal Sinus Rhythm. Heart Rate: 63. Ectopy: No Ectopy. Conduction: Normal. ST Segments: Normal ST Segments. T Waves: Normal. Axis: Normal. Clinical Impression: Normal Sinus Rhythm       Imaging Results                X-Ray Chest AP Portable (Final result)  Result time 07/20/24 19:30:25      Final result by Homer Cool MD (07/20/24 19:30:25)                   Impression:      New bilateral patchy asymmetric pulmonary airspace opacities, possibly representing pulmonary edema.  Pneumonia or other pathology not excluded.  Correlate clinically and with short-term follow-up radiographs until complete radiographic resolution.    Pre-existing enlargement of the cardiopericardial silhouette.    Pre-existing, but increased, prominence of the bilateral darian, possibly on a vascular basis.  Other underlying hilar pathology not excluded.    Poorly visualized right proximal humeral deformity, favored to be old.  Correlate clinically.  Consider follow-up right shoulder x-rays.    This report was flagged in Epic as abnormal.      Electronically signed by: Homer Cool  Date:    07/20/2024  Time:    19:30               Narrative:    EXAMINATION:  XR CHEST AP PORTABLE    CLINICAL HISTORY:  Shortness of breath    TECHNIQUE:  Single frontal view of the chest was performed.    COMPARISON:  Portable chest x-ray 10/29/2023    FINDINGS:  Midline thoracic trachea, allowing for rightward rotation of the patient's torso.    Pre-existing atherosclerotic calcification in the aortic arch.    Pre-existing, but also increased, bilateral hilar prominence.    Pre-existing enlargement of the cardiopericardial silhouette.    Increased bilateral patchy ground-glass pulmonary airspace opacities, more extensive in the right lung than the left (or the left upper lung appears spared).    No discrete pneumothorax or blunting of either lateral costophrenic angle.  Osseous detail is suboptimally visualized, especially in the spine.    A poorly visualized deformity of the right proximal humerus suggested but is probably old.    EKG leads project upon the torso.    Anatomic detail is poorly visualized in the imaged portion of the upper abdomen.                                        Medications   sodium chloride 0.9% flush 10 mL (has no administration in time range)   remdesivir 200 mg in 0.9% NaCl 250 mL infusion (has no administration in time range)   remdesivir 100 mg in 0.9% NaCl 250 mL infusion (has no administration in time range)   enoxaparin injection 40 mg (has no administration in time range)   acetaminophen tablet 650 mg (has no administration in time range)   polyethylene glycol packet 17 g (has no administration in time range)   loperamide capsule 2 mg (has no administration in time range)   ondansetron injection 4 mg (has no administration in time range)   dexAMETHasone injection 6 mg (has no administration in time range)   dexAMETHasone tablet 6 mg (has no administration in time range)   guaiFENesin 100 mg/5 ml syrup 400 mg (has no administration in time range)   pantoprazole EC tablet 40 mg (has no administration in time range)   pantoprazole injection 40 mg (has no administration in time range)   melatonin tablet 6 mg (has no administration in time range)   hydrALAZINE injection 10 mg (has no administration in time range)   mupirocin 2 % ointment (has no administration in time range)   guaiFENesin 100 mg/5 ml syrup 200 mg (200 mg Oral Given 7/20/24 1815)     Medical Decision Making  Amount and/or Complexity of Data Reviewed  Labs: ordered.  Radiology: ordered.    Risk  OTC drugs.  Decision regarding hospitalization.            Scribe Attestation:   Scribe #1: I performed the above scribed service and the documentation accurately describes the services I performed. I attest to the accuracy of the note.         Labs Reviewed  Pertinent lab and imaging findings include:   There is no leukocytosis, H and H 8.4 and 27.5 (chronically decreased), lactic acid within normal limits, potassium 3.4, creatinine 4.2 (chronic ESRD), troponin 0.035, BNP 1271 (chronic), rapid COVID-19 test positive, rapid flu test negative, chest x-ray with findings consistent with  bilateral pneumonia versus pulmonary edema,   Admission on 07/20/2024   Component Date Value Ref Range Status    POC Rapid COVID 07/20/2024 Positive (A)  Negative Final     Acceptable 07/20/2024 Yes   Final    POC Molecular Influenza A Ag 07/20/2024 Negative  Negative Final    POC Molecular Influenza B Ag 07/20/2024 Negative  Negative Final     Acceptable 07/20/2024 Yes   Final    Magnesium 07/20/2024 2.1  1.6 - 2.6 mg/dL Final    Phosphorus 07/20/2024 3.9  2.7 - 4.5 mg/dL Final    WBC 07/20/2024 8.23  3.90 - 12.70 K/uL Final    RBC 07/20/2024 2.77 (L)  4.00 - 5.40 M/uL Final    Hemoglobin 07/20/2024 8.4 (L)  12.0 - 16.0 g/dL Final    Hematocrit 07/20/2024 27.5 (L)  37.0 - 48.5 % Final    MCV 07/20/2024 99 (H)  82 - 98 fL Final    MCH 07/20/2024 30.3  27.0 - 31.0 pg Final    MCHC 07/20/2024 30.5 (L)  32.0 - 36.0 g/dL Final    RDW 07/20/2024 16.8 (H)  11.5 - 14.5 % Final    Platelets 07/20/2024 285  150 - 450 K/uL Final    MPV 07/20/2024 10.6  9.2 - 12.9 fL Final    Immature Granulocytes 07/20/2024 0.7 (H)  0.0 - 0.5 % Final    Gran # (ANC) 07/20/2024 7.0  1.8 - 7.7 K/uL Final    Immature Grans (Abs) 07/20/2024 0.06 (H)  0.00 - 0.04 K/uL Final    Comment: Mild elevation in immature granulocytes is non specific and   can be seen in a variety of conditions including stress response,   acute inflammation, trauma and pregnancy. Correlation with other   laboratory and clinical findings is essential.      Lymph # 07/20/2024 0.7 (L)  1.0 - 4.8 K/uL Final    Mono # 07/20/2024 0.4  0.3 - 1.0 K/uL Final    Eos # 07/20/2024 0.0  0.0 - 0.5 K/uL Final    Baso # 07/20/2024 0.01  0.00 - 0.20 K/uL Final    nRBC 07/20/2024 0  0 /100 WBC Final    Gran % 07/20/2024 85.0 (H)  38.0 - 73.0 % Final    Lymph % 07/20/2024 8.5 (L)  18.0 - 48.0 % Final    Mono % 07/20/2024 5.2  4.0 - 15.0 % Final    Eosinophil % 07/20/2024 0.5  0.0 - 8.0 % Final    Basophil % 07/20/2024 0.1  0.0 - 1.9 % Final    Differential  Method 07/20/2024 Automated   Final    Sodium 07/20/2024 141  136 - 145 mmol/L Final    Potassium 07/20/2024 3.4 (L)  3.5 - 5.1 mmol/L Final    Chloride 07/20/2024 104  95 - 110 mmol/L Final    CO2 07/20/2024 23  23 - 29 mmol/L Final    Glucose 07/20/2024 75  70 - 110 mg/dL Final    BUN 07/20/2024 23  8 - 23 mg/dL Final    Creatinine 07/20/2024 4.2 (H)  0.5 - 1.4 mg/dL Final    Calcium 07/20/2024 9.4  8.7 - 10.5 mg/dL Final    Total Protein 07/20/2024 5.9 (L)  6.0 - 8.4 g/dL Final    Albumin 07/20/2024 2.2 (L)  3.5 - 5.2 g/dL Final    Total Bilirubin 07/20/2024 0.5  0.1 - 1.0 mg/dL Final    Comment: For infants and newborns, interpretation of results should be based  on gestational age, weight and in agreement with clinical  observations.    Premature Infant recommended reference ranges:  Up to 24 hours.............<8.0 mg/dL  Up to 48 hours............<12.0 mg/dL  3-5 days..................<15.0 mg/dL  6-29 days.................<15.0 mg/dL      Alkaline Phosphatase 07/20/2024 131  55 - 135 U/L Final    AST 07/20/2024 44 (H)  10 - 40 U/L Final    ALT 07/20/2024 17  10 - 44 U/L Final    eGFR 07/20/2024 11 (A)  >60 mL/min/1.73 m^2 Final    Anion Gap 07/20/2024 14  8 - 16 mmol/L Final    Troponin I 07/20/2024 0.035 (H)  0.000 - 0.026 ng/mL Final    Comment: The reference interval for Troponin I represents the 99th percentile   cutoff   for our facility and is consistent with 3rd generation assay   performance.      BNP 07/20/2024 1,271 (H)  0 - 99 pg/mL Final    Values of less than 100 pg/ml are consistent with non-CHF populations.    POC PH 07/20/2024 7.398  7.35 - 7.45 Final    POC PCO2 07/20/2024 48.7 (H)  35 - 45 mmHg Final    POC PO2 07/20/2024 49  40 - 60 mmHg Final    POC HCO3 07/20/2024 30.0 (H)  24 - 28 mmol/L Final    POC BE 07/20/2024 5 (H)  -2 to 2 mmol/L Final    POC SATURATED O2 07/20/2024 84  95 - 100 % Final    POC TCO2 07/20/2024 31 (H)  24 - 29 mmol/L Final    Sample 07/20/2024 VENOUS   Final    Site  07/20/2024 Other   Final    Allens Test 07/20/2024 N/A   Final        Imaging Reviewed    Imaging Results               X-Ray Chest AP Portable (Final result)  Result time 07/20/24 19:30:25      Final result by Homer Cool MD (07/20/24 19:30:25)                   Impression:      New bilateral patchy asymmetric pulmonary airspace opacities, possibly representing pulmonary edema.  Pneumonia or other pathology not excluded.  Correlate clinically and with short-term follow-up radiographs until complete radiographic resolution.    Pre-existing enlargement of the cardiopericardial silhouette.    Pre-existing, but increased, prominence of the bilateral darian, possibly on a vascular basis.  Other underlying hilar pathology not excluded.    Poorly visualized right proximal humeral deformity, favored to be old.  Correlate clinically.  Consider follow-up right shoulder x-rays.    This report was flagged in Epic as abnormal.      Electronically signed by: Homer Cool  Date:    07/20/2024  Time:    19:30               Narrative:    EXAMINATION:  XR CHEST AP PORTABLE    CLINICAL HISTORY:  Shortness of breath    TECHNIQUE:  Single frontal view of the chest was performed.    COMPARISON:  Portable chest x-ray 10/29/2023    FINDINGS:  Midline thoracic trachea, allowing for rightward rotation of the patient's torso.    Pre-existing atherosclerotic calcification in the aortic arch.    Pre-existing, but also increased, bilateral hilar prominence.    Pre-existing enlargement of the cardiopericardial silhouette.    Increased bilateral patchy ground-glass pulmonary airspace opacities, more extensive in the right lung than the left (or the left upper lung appears spared).    No discrete pneumothorax or blunting of either lateral costophrenic angle.  Osseous detail is suboptimally visualized, especially in the spine.    A poorly visualized deformity of the right proximal humerus suggested but is probably old.    EKG leads project  upon the torso.    Anatomic detail is poorly visualized in the imaged portion of the upper abdomen.                                      Medications given in ED    Medications   sodium chloride 0.9% flush 10 mL (has no administration in time range)   remdesivir 200 mg in 0.9% NaCl 250 mL infusion (has no administration in time range)   remdesivir 100 mg in 0.9% NaCl 250 mL infusion (has no administration in time range)   enoxaparin injection 40 mg (has no administration in time range)   acetaminophen tablet 650 mg (has no administration in time range)   polyethylene glycol packet 17 g (has no administration in time range)   loperamide capsule 2 mg (has no administration in time range)   ondansetron injection 4 mg (has no administration in time range)   dexAMETHasone injection 6 mg (has no administration in time range)   dexAMETHasone tablet 6 mg (has no administration in time range)   guaiFENesin 100 mg/5 ml syrup 400 mg (has no administration in time range)   pantoprazole EC tablet 40 mg (has no administration in time range)   pantoprazole injection 40 mg (has no administration in time range)   melatonin tablet 6 mg (has no administration in time range)   hydrALAZINE injection 10 mg (has no administration in time range)   mupirocin 2 % ointment (has no administration in time range)   guaiFENesin 100 mg/5 ml syrup 200 mg (200 mg Oral Given 7/20/24 1815)         Note was created using voice recognition software. Note may have occasional typographical errors that may not have been identified and edited despite good loreto initial review prior to signing.    I, Юлия Wylie MD, personally performed the services described in this documentation. All medical record entries made by the scribe were at my direction and in my presence.  I have reviewed the chart and agree that the record reflects my personal performance and is accurate and complete.             Medical Decision Making:   Differential Diagnosis:   COPD  exacerbation, heart failure, ACS, pneumonia, hypoxemia, symptomatic anemia, dysrhythmia, metabolic disturbance, others   Clinical Tests:   Lab Tests: Ordered and Reviewed  Radiological Study: Ordered and Reviewed  Medical Tests: Ordered and Reviewed  ED Management:  Room air sats 88% on arrival.  Patient remained stable on 2 L nasal cannula throughout ED course.  Test results, imaging results and in-hospital management plan discussed with patient with understanding and agreement.               Clinical Impression:  Final diagnoses:  [R06.02] SOB (shortness of breath)  [R06.02] Shortness of breath  [U07.1, J12.82] Pneumonia due to COVID-19 virus          ED Disposition Condition    Admit                 Юлия Wylie MD  07/26/24 9603

## 2024-07-21 LAB
ANION GAP SERPL CALC-SCNC: 16 MMOL/L (ref 8–16)
BASOPHILS # BLD AUTO: 0.01 K/UL (ref 0–0.2)
BASOPHILS NFR BLD: 0.2 % (ref 0–1.9)
BUN SERPL-MCNC: 26 MG/DL (ref 8–23)
CALCIUM SERPL-MCNC: 8.7 MG/DL (ref 8.7–10.5)
CHLORIDE SERPL-SCNC: 106 MMOL/L (ref 95–110)
CO2 SERPL-SCNC: 20 MMOL/L (ref 23–29)
CREAT SERPL-MCNC: 4.5 MG/DL (ref 0.5–1.4)
DIFFERENTIAL METHOD BLD: ABNORMAL
EOSINOPHIL # BLD AUTO: 0 K/UL (ref 0–0.5)
EOSINOPHIL NFR BLD: 0 % (ref 0–8)
ERYTHROCYTE [DISTWIDTH] IN BLOOD BY AUTOMATED COUNT: 16.8 % (ref 11.5–14.5)
EST. GFR  (NO RACE VARIABLE): 10 ML/MIN/1.73 M^2
GLUCOSE SERPL-MCNC: 86 MG/DL (ref 70–110)
HCT VFR BLD AUTO: 27.5 % (ref 37–48.5)
HGB BLD-MCNC: 8.7 G/DL (ref 12–16)
IMM GRANULOCYTES # BLD AUTO: 0.07 K/UL (ref 0–0.04)
IMM GRANULOCYTES NFR BLD AUTO: 1.2 % (ref 0–0.5)
LYMPHOCYTES # BLD AUTO: 0.6 K/UL (ref 1–4.8)
LYMPHOCYTES NFR BLD: 9.8 % (ref 18–48)
MCH RBC QN AUTO: 30.5 PG (ref 27–31)
MCHC RBC AUTO-ENTMCNC: 31.6 G/DL (ref 32–36)
MCV RBC AUTO: 97 FL (ref 82–98)
MONOCYTES # BLD AUTO: 0.1 K/UL (ref 0.3–1)
MONOCYTES NFR BLD: 1.9 % (ref 4–15)
NEUTROPHILS # BLD AUTO: 5.1 K/UL (ref 1.8–7.7)
NEUTROPHILS NFR BLD: 86.9 % (ref 38–73)
NRBC BLD-RTO: 0 /100 WBC
OHS QRS DURATION: 78 MS
OHS QTC CALCULATION: 429 MS
PLATELET # BLD AUTO: 304 K/UL (ref 150–450)
PMV BLD AUTO: 10.2 FL (ref 9.2–12.9)
POTASSIUM SERPL-SCNC: 4 MMOL/L (ref 3.5–5.1)
RBC # BLD AUTO: 2.85 M/UL (ref 4–5.4)
SODIUM SERPL-SCNC: 142 MMOL/L (ref 136–145)
WBC # BLD AUTO: 5.91 K/UL (ref 3.9–12.7)

## 2024-07-21 PROCEDURE — 25000003 PHARM REV CODE 250: Performed by: INTERNAL MEDICINE

## 2024-07-21 PROCEDURE — 27000207 HC ISOLATION

## 2024-07-21 PROCEDURE — 21400001 HC TELEMETRY ROOM

## 2024-07-21 PROCEDURE — 36415 COLL VENOUS BLD VENIPUNCTURE: CPT | Performed by: INTERNAL MEDICINE

## 2024-07-21 PROCEDURE — 80048 BASIC METABOLIC PNL TOTAL CA: CPT | Performed by: INTERNAL MEDICINE

## 2024-07-21 PROCEDURE — 63600175 PHARM REV CODE 636 W HCPCS: Performed by: INTERNAL MEDICINE

## 2024-07-21 PROCEDURE — 85025 COMPLETE CBC W/AUTO DIFF WBC: CPT | Performed by: INTERNAL MEDICINE

## 2024-07-21 PROCEDURE — 25000003 PHARM REV CODE 250: Performed by: STUDENT IN AN ORGANIZED HEALTH CARE EDUCATION/TRAINING PROGRAM

## 2024-07-21 PROCEDURE — 80100014 HC HEMODIALYSIS 1:1

## 2024-07-21 RX ORDER — HYDRALAZINE HYDROCHLORIDE 25 MG/1
100 TABLET, FILM COATED ORAL EVERY 8 HOURS
Status: DISCONTINUED | OUTPATIENT
Start: 2024-07-21 | End: 2024-07-24 | Stop reason: HOSPADM

## 2024-07-21 RX ADMIN — ENOXAPARIN SODIUM 40 MG: 40 INJECTION SUBCUTANEOUS at 09:07

## 2024-07-21 RX ADMIN — SEVELAMER CARBONATE 1600 MG: 800 TABLET, FILM COATED ORAL at 01:07

## 2024-07-21 RX ADMIN — HYDRALAZINE HYDROCHLORIDE 100 MG: 25 TABLET ORAL at 09:07

## 2024-07-21 RX ADMIN — MUPIROCIN: 20 OINTMENT TOPICAL at 09:07

## 2024-07-21 RX ADMIN — SEVELAMER CARBONATE 1600 MG: 800 TABLET, FILM COATED ORAL at 08:07

## 2024-07-21 RX ADMIN — CARVEDILOL 12.5 MG: 12.5 TABLET, FILM COATED ORAL at 08:07

## 2024-07-21 RX ADMIN — PANTOPRAZOLE SODIUM 40 MG: 40 TABLET, DELAYED RELEASE ORAL at 08:07

## 2024-07-21 RX ADMIN — CLONIDINE HYDROCHLORIDE 0.3 MG: 0.1 TABLET ORAL at 09:07

## 2024-07-21 RX ADMIN — LOSARTAN POTASSIUM 100 MG: 25 TABLET, FILM COATED ORAL at 08:07

## 2024-07-21 RX ADMIN — DEXAMETHASONE 6 MG: 2 TABLET ORAL at 08:07

## 2024-07-21 RX ADMIN — GUAIFENESIN 400 MG: 200 SOLUTION ORAL at 09:07

## 2024-07-21 RX ADMIN — DOXYCYCLINE 100 MG: 100 INJECTION, POWDER, LYOPHILIZED, FOR SOLUTION INTRAVENOUS at 05:07

## 2024-07-21 RX ADMIN — REMDESIVIR 100 MG: 100 INJECTION, POWDER, LYOPHILIZED, FOR SOLUTION INTRAVENOUS at 08:07

## 2024-07-21 RX ADMIN — MUPIROCIN: 20 OINTMENT TOPICAL at 08:07

## 2024-07-21 RX ADMIN — HYDRALAZINE HYDROCHLORIDE 50 MG: 25 TABLET ORAL at 06:07

## 2024-07-21 RX ADMIN — CLONIDINE HYDROCHLORIDE 0.3 MG: 0.1 TABLET ORAL at 08:07

## 2024-07-21 RX ADMIN — DOXYCYCLINE 100 MG: 100 INJECTION, POWDER, LYOPHILIZED, FOR SOLUTION INTRAVENOUS at 04:07

## 2024-07-21 RX ADMIN — CEFTRIAXONE 1 G: 1 INJECTION, POWDER, FOR SOLUTION INTRAMUSCULAR; INTRAVENOUS at 04:07

## 2024-07-21 RX ADMIN — HYDRALAZINE HYDROCHLORIDE 50 MG: 25 TABLET ORAL at 01:07

## 2024-07-21 NOTE — PLAN OF CARE
Case Management Assessment     PCP: Dr. Cabral  Pharmacy: [facility] on [address]    Patient Arrived From: Home  Existing Help at Home Tor Vasquez [phone]     Barriers to Discharge: None    Discharge Plan:    A. Home   B. Home Health       Unable to reach pt on her phone. Spoke with Pt's son Tor for the Initial CM assessment. Son states he lives with her and will pick her up at discharge.     07/21/24 7703   Discharge Assessment   Assessment Type Discharge Planning Assessment   Confirmed/corrected address, phone number and insurance Yes   Confirmed Demographics Correct on Facesheet   Source of Information family   If unable to respond/provide information was family/caregiver contacted? Yes   Contact Name/Number Pt's son Tor Vasquez [phone]   Reason For Admission Shortness of breath   People in Home child(jaylyn), adult   Do you expect to return to your current living situation? Yes   Do you have help at home or someone to help you manage your care at home? Yes   Who are your caregiver(s) and their phone number(s)? Son   Readmission within 30 days? No   Patient currently being followed by outpatient case management? No   Do you currently have service(s) that help you manage your care at home? No   Do you take prescription medications? Yes   Do you have prescription coverage? Yes   Do you have any problems affording any of your prescribed medications? No   Who is going to help you get home at discharge? Blaise Vasquez [phone]   Are you on dialysis? Yes   Dialysis Name and Scheduled days [facility] MWF   Do you take coumadin? No   Discharge Plan A Home   Discharge Plan B Home with family   DME Needed Upon Discharge  none   Discharge Plan discussed with: Adult children   Transition of Care Barriers None

## 2024-07-21 NOTE — PROGRESS NOTES
[facility] Medicine  Progress Note    Patient Name: April Vasquez  MRN: [id number]  Patient Class: IP- Inpatient   Admission Date: 7/20/2024  Length of Stay: 1 days  Attending Physician: Edelmira Salazar,*  Primary Care Provider: Darrick Cabral MD        Subjective:     Principal Problem:Pneumonia due to COVID-19 virus        HPI:  68-year-old  female with a past medical history for malignant hypertension, ESRD on hemodialysis (Monday Wednesday Friday via a left arm AV fistula-sees Dr. Diego) presents to the ER with  dry cough and congestion with the associated weakness for about 5 days.  States that she has been eating or drinking because she can not taste anything at this time.  She did go to her dialysis this past week however.  She does not feel like she is volume overloaded.  Denies any nausea vomiting or diarrhea.  Denies any chest pain or pressure.  Does endorse some shortness of breath and treat dyspnea on exertion.  Denies any fevers.      Workup in the ER was overall concerning for pneumonia.  COVID came back positive.  Flu was negative.  Lactic acid was normal however her pro count was elevated greater than 1 concerning for possible underlying secondary bacterial infection.  She was started on steroids due to hypoxia requiring oxygen.  She was also started on remdesivir.  Chest x-ray showed:    New bilateral patchy asymmetric pulmonary airspace opacities, possibly representing pulmonary edema.  Pneumonia or other pathology not excluded.  Correlate clinically and with short-term follow-up radiographs until complete radiographic resolution.       We have been consulted for further management and admission to the inpatient telemetry hospitalist service.    Because this patient's clinical condition is guarded and requires monitoring of her vitals including oxygen requirements as well as IV remdesivir, steroids and IV antibiotics  , care in an alternative location  isn't clinically appropriate for the reasons stated above.         Overview/Hospital Course:  68-year-old  female with a past medical history for malignant hypertension, ESRD on hemodialysis (Monday Wednesday Friday via a left arm AV fistula-sees Dr. Diego) who was admitted for acute hypoxic respiratory failure secondary to COVID-19.  Nephrology consulted for inpatient HD needs.  Initiated on Decadron/remdesivir/Rocephin/doxycycline.  Chest x-ray with bilateral patchy asymmetric pulmonary opacities.  Procalcitonin at 1.64.  Follow up on cultures.  Wean oxygen as tolerated    Interval History:  Patient reports shortness of breath is improving.  Remains on 2 L of oxygen with nasal cannula    Review of Systems   Constitutional: Negative.    Respiratory:  Positive for cough and shortness of breath.    Cardiovascular: Negative.    Gastrointestinal: Negative.    Genitourinary: Negative.    Musculoskeletal: Negative.    Allergic/Immunologic: Negative for immunocompromised state.   Neurological: Negative.      Objective:     Vital Signs (Most Recent):  Temp: 98.4 °F (36.9 °C) (07/21/24 1143)  Pulse: 70 (07/21/24 1143)  Resp: 18 (07/21/24 1143)  BP: (!) 178/74 (07/21/24 1143)  SpO2: 96 % (07/21/24 1143) Vital Signs (24h Range):  Temp:  [97.7 °F (36.5 °C)-98.4 °F (36.9 °C)] 98.4 °F (36.9 °C)  Pulse:  [63-80] 70  Resp:  [13-18] 18  SpO2:  [88 %-98 %] 96 %  BP: (124-178)/(59-74) 178/74     Weight: 39.7 kg (87 lb 8.4 oz)  Body mass index is 15.02 kg/m².    Intake/Output Summary (Last 24 hours) at 7/21/2024 1300  Last data filed at 7/21/2024 0800  Gross per 24 hour   Intake 120 ml   Output --   Net 120 ml         Physical Exam  Constitutional:       General: She is not in acute distress.     Appearance: She is normal weight.   Cardiovascular:      Rate and Rhythm: Normal rate.      Pulses: Normal pulses.   Pulmonary:      Effort: No respiratory distress.      Breath sounds: Rhonchi and rales present. No wheezing (On 2 L  of oxygen with nasal cannula).   Abdominal:      General: Bowel sounds are normal. There is no distension.      Palpations: Abdomen is soft.      Tenderness: There is no abdominal tenderness.   Musculoskeletal:      Right lower leg: No edema.      Left lower leg: No edema.   Skin:     General: Skin is warm.   Neurological:      Mental Status: She is alert and oriented to person, place, and time. Mental status is at baseline.   Psychiatric:         Mood and Affect: Mood normal.             Significant Labs: All pertinent labs within the past 24 hours have been reviewed.    Significant Imaging: I have reviewed all pertinent imaging results/findings within the past 24 hours.    Assessment/Plan:      * Pneumonia due to COVID-19 virus  Patient is identified as Severe COVID-19 based on hypoxemia with O2 saturations <94% on room air or on ambulation   Initiate standard COVID protocols; COVID-19 testing ,Infection Control notification  and isolation- respiratory, contact and droplet per protocol    Diagnostics: CBC, CMP, Procalcitonin, Rapid Flu, Blood Culture x2, and Portable CXR    Management: Initiate targeted therapy with Remdesivir, 200mg IV x1, followed by 100mg IV daily x5 days total, Dexamethasone PO/IV 6mg daily x10 days, and Anticoagulation, Patient admitted to non-critical care unit- Will initiate full dose anticoagulation with Weight based lovenox 1mg/kg IV q24 due to ESRD, Maintain oxygen saturations 92-96% via Nasal Cannula 2 LPM and monitor with continuous/intermittent pulse oximetry. , and Continuous cardiac monitoring.  Given an elevated pro count will also start IV antibiotics with doxycycline and Rocephin.    Advance Care Planning Current advance care plan has been discussed with patient/family/POA and patient currently wishes Full Code.  Time spent discussing with the patient discussing her diagnosis of COVID pneumonia 60 minutes.     (HFpEF) heart failure with preserved ejection fraction    No signs of  acute on chronic heart failure.  She did get a bolus of IV fluids in the ER and we will continue to monitor her respiratory status at this time.  Her next dialysis is scheduled for Monday.  However if signs of pulmonary congestion we will discuss with Nephrology need to dialyze sooner.  Resume blood pressure medications.  Results for orders placed during the hospital encounter of 10/10/23    Echo    Interpretation Summary    Left Ventricle: The left ventricle is normal in size. Increased ventricular mass. Normal wall thickness. There is eccentric hypertrophy. Normal wall motion. There is normal systolic function with a visually estimated ejection fraction of 55 - 60%. There is normal diastolic function.    Left Atrium: Left atrium is severely dilated.    Right Ventricle: Normal right ventricular cavity size. Wall thickness is normal. Right ventricle wall motion  is normal. Systolic function is normal.    Mitral Valve: There is mild mitral annular calcification present. There is normal leaflet mobility. There is mild regurgitation.    Pulmonary Artery: The estimated pulmonary artery systolic pressure is 34 mmHg.    IVC/SVC: Normal venous pressure at 3 mmHg.      Anemia in chronic kidney disease  Hemoglobin hematocrit are stable at this time.  No signs of acute bleed.  She is on Lovenox 1 catherine per kg Q day for anticoagulation.  We will monitor her H&H.  No indication for transfusion at this time.    Essential hypertension  Chronic, controlled. Latest blood pressure and vitals reviewed-     Temp:  [97.7 °F (36.5 °C)-98.4 °F (36.9 °C)]   Pulse:  [63-80]   Resp:  [13-18]   BP: (124-178)/(59-74)   SpO2:  [88 %-98 %] .   Home meds for hypertension were reviewed and noted below.   Hypertension Medications               carvediloL (COREG) 12.5 MG tablet Take 1 tablet (12.5 mg total) by mouth 2 (two) times daily with meals.    cloNIDine (CATAPRES) 0.3 MG tablet TAKE 1 TABLET(0.3 MG) BY MOUTH THREE TIMES DAILY    hydrALAZINE  (APRESOLINE) 100 MG tablet TAKE 1 TABLET(100 MG) BY MOUTH THREE TIMES DAILY    losartan (COZAAR) 100 MG tablet Take 1 tablet (100 mg total) by mouth once daily.    minoxidiL (LONITEN) 10 MG Tab Take 2 tablets (20 mg total) by mouth once daily.    NIFEdipine (PROCARDIA-XL) 60 MG (OSM) 24 hr tablet Take 1 tablet (60 mg total) by mouth 2 (two) times a day.            While in the hospital, will manage blood pressure as follows; Continue home antihypertensive regimen   Will utilize p.r.n. blood pressure medication only if patient's blood pressure greater than 180/110 and she develops symptoms such as worsening chest pain or shortness of breath.    ESRD (end stage renal disease) on dialysis  Nephrology consulted for inpatient HD needs    Acute hypoxic respiratory failure  Patient with Hypoxic Respiratory failure which is Acute.  she is not on home oxygen.   Signs/symptoms of respiratory failure include- tachypnea and increased work of breathing. Contributing diagnoses includes - Pneumonia Labs and images were reviewed. Will treat underlying causes and adjust management of respiratory failure as follows- treat her underlying COVID pneumonia as well as possible secondary bacterial pneumonia.  Follow up on cultures.    Diabetes mellitus type II, controlled  Patient's FSGs are controlled on current medication regimen.  Last A1c reviewed-   Lab Results   Component Value Date    HGBA1C 4.3 09/21/2023   States has not needed any treatment for her diabetes since dialysis.  She is diet-controlled.  Diet has been adjusted.      VTE Risk Mitigation (From admission, onward)           Ordered     enoxaparin injection 40 mg  Every 24 hours         07/20/24 1952                    Discharge Planning   CECILIO:      Code Status: Full Code   Is the patient medically ready for discharge?:     Reason for patient still in hospital (select all that apply): Patient trending condition and Treatment                     Edelmira Salazar  MD  Department of Hospital Medicine   [facility] - Observation

## 2024-07-21 NOTE — SUBJECTIVE & OBJECTIVE
Interval History:  Patient reports shortness of breath is improving.  Remains on 2 L of oxygen with nasal cannula    Review of Systems   Constitutional: Negative.    Respiratory:  Positive for cough and shortness of breath.    Cardiovascular: Negative.    Gastrointestinal: Negative.    Genitourinary: Negative.    Musculoskeletal: Negative.    Allergic/Immunologic: Negative for immunocompromised state.   Neurological: Negative.      Objective:     Vital Signs (Most Recent):  Temp: 98.4 °F (36.9 °C) (07/21/24 1143)  Pulse: 70 (07/21/24 1143)  Resp: 18 (07/21/24 1143)  BP: (!) 178/74 (07/21/24 1143)  SpO2: 96 % (07/21/24 1143) Vital Signs (24h Range):  Temp:  [97.7 °F (36.5 °C)-98.4 °F (36.9 °C)] 98.4 °F (36.9 °C)  Pulse:  [63-80] 70  Resp:  [13-18] 18  SpO2:  [88 %-98 %] 96 %  BP: (124-178)/(59-74) 178/74     Weight: 39.7 kg (87 lb 8.4 oz)  Body mass index is 15.02 kg/m².    Intake/Output Summary (Last 24 hours) at 7/21/2024 1300  Last data filed at 7/21/2024 0800  Gross per 24 hour   Intake 120 ml   Output --   Net 120 ml         Physical Exam  Constitutional:       General: She is not in acute distress.     Appearance: She is normal weight.   Cardiovascular:      Rate and Rhythm: Normal rate.      Pulses: Normal pulses.   Pulmonary:      Effort: No respiratory distress.      Breath sounds: Rhonchi and rales present. No wheezing (On 2 L of oxygen with nasal cannula).   Abdominal:      General: Bowel sounds are normal. There is no distension.      Palpations: Abdomen is soft.      Tenderness: There is no abdominal tenderness.   Musculoskeletal:      Right lower leg: No edema.      Left lower leg: No edema.   Skin:     General: Skin is warm.   Neurological:      Mental Status: She is alert and oriented to person, place, and time. Mental status is at baseline.   Psychiatric:         Mood and Affect: Mood normal.             Significant Labs: All pertinent labs within the past 24 hours have been reviewed.    Significant  Imaging: I have reviewed all pertinent imaging results/findings within the past 24 hours.

## 2024-07-21 NOTE — NURSING
Received pt from ED to room via bed. Transferred pt to bed. Evaluated patient condition. Admit assessment initiated. Telemetry monitoring initiated. Saline lock to RAC flushed, intact. No c/o pain. NAD noted.

## 2024-07-21 NOTE — CONSULTS
Thank you for your consult to [facility]. We have reviewed the patient chart. This patient does meet criteria for [facility] service at this time.  Will assume care on 07/22/24 at 6AM.

## 2024-07-21 NOTE — SUBJECTIVE & OBJECTIVE
Past Medical History:   Diagnosis Date    (HFpEF) heart failure with preserved ejection fraction 10/30/2023    Anemia     Colon polyps     COVID-19 08/2021    Diabetes mellitus, type 2     ESRD (end stage renal disease) on dialysis 2017    Gallstones     Hypertension     Pulmonary edema     Renal disorder     dialysis MIRANDA fistula    Tobacco abuse     Uses walker        Past Surgical History:   Procedure Laterality Date    AVF  2017    CHOLECYSTECTOMY  2016    COLONOSCOPY N/A 8/6/2020    Procedure: COLONOSCOPY;  Surgeon: Hood Hernadez MD;  Location: [facility] ([address] [place] [address]);  Service: Endoscopy;  Laterality: N/A;  labs prior-dialysis  covid test lapalco 8/3    Permacath Right 2017    TUBAL LIGATION         Review of patient's allergies indicates:  No Known Allergies    No current facility-administered medications on file prior to encounter.     Current Outpatient Medications on File Prior to Encounter   Medication Sig    acetaminophen (TYLENOL) 500 MG tablet Take 500 mg by mouth every 6 (six) hours as needed for Pain.    carvediloL (COREG) 12.5 MG tablet Take 1 tablet (12.5 mg total) by mouth 2 (two) times daily with meals.    cloNIDine (CATAPRES) 0.3 MG tablet TAKE 1 TABLET(0.3 MG) BY MOUTH THREE TIMES DAILY    ergocalciferol, vitamin D2, (VITAMIN D ORAL) Take by mouth.    hydrALAZINE (APRESOLINE) 100 MG tablet TAKE 1 TABLET(100 MG) BY MOUTH THREE TIMES DAILY    isoniazid (NYDRAZID) 100 MG Tab Take 2 tablets (200 mg total) by mouth once daily.    losartan (COZAAR) 100 MG tablet Take 1 tablet (100 mg total) by mouth once daily.    minoxidiL (LONITEN) 10 MG Tab Take 2 tablets (20 mg total) by mouth once daily.    NIFEdipine (PROCARDIA-XL) 60 MG (OSM) 24 hr tablet Take 1 tablet (60 mg total) by mouth 2 (two) times a day.    sevelamer carbonate (RENVELA) 800 mg Tab Take 1,600 mg by mouth 3 (three) times daily with meals.     Family History       Problem Relation (Age of Onset)    Cancer Mother    Cervical cancer Mother     Diabetes Father, Sister    Drug abuse Paternal Grandmother    Hypertension Mother    Kidney disease Mother    No Known Problems Sister, Sister, Sister, Son, Son, Son, Daughter    Ovarian cancer Mother          Tobacco Use    Smoking status: Former     Current packs/day: 0.00     Average packs/day: 0.5 packs/day for 20.0 years (10.0 ttl pk-yrs)     Types: Cigarettes     Start date: 2000     Quit date: 2020     Years since quittin.0    Smokeless tobacco: Never   Substance and Sexual Activity    Alcohol use: No    Drug use: No    Sexual activity: Yes     Partners: Male     Birth control/protection: Post-menopausal     Review of Systems   Constitutional:  Positive for activity change, appetite change (Due to loss of taste and appetite.) and fatigue. Negative for chills, diaphoresis and fever.   HENT:  Positive for postnasal drip, rhinorrhea and sore throat. Negative for congestion and trouble swallowing.         Loss of taste   Respiratory:  Positive for cough and shortness of breath. Negative for chest tightness and wheezing.    Cardiovascular:  Negative for chest pain, palpitations and leg swelling.   Gastrointestinal:  Negative for abdominal pain, constipation, diarrhea, nausea and vomiting.   Genitourinary:         States she does not urinate much anymore   Musculoskeletal:  Positive for arthralgias. Negative for gait problem, myalgias and neck stiffness.   Neurological:  Positive for weakness. Negative for dizziness and light-headedness.   Psychiatric/Behavioral:  The patient is not nervous/anxious.      Objective:     Vital Signs (Most Recent):  Temp: 98.4 °F (36.9 °C) (24)  Pulse: 68 (24)  Resp: 17 (24)  BP: (!) 150/72 (24)  SpO2: 95 % (24) Vital Signs (24h Range):  Temp:  [97.7 °F (36.5 °C)-98.4 °F (36.9 °C)] 98.4 °F (36.9 °C)  Pulse:  [63-70] 68  Resp:  [13-18] 17  SpO2:  [88 %-97 %] 95 %  BP: (124-158)/(59-74) 150/72     Weight: 39.7 kg  (87 lb 8.4 oz)  Body mass index is 15.02 kg/m².     Physical Exam  Vitals and nursing note reviewed.   Constitutional:       General: She is not in acute distress.     Appearance: She is not ill-appearing, toxic-appearing or diaphoretic.      Comments: Frail thin malnourished appearing   HENT:      Head: Normocephalic and atraumatic.      Nose: Nose normal. No congestion or rhinorrhea.      Mouth/Throat:      Mouth: Mucous membranes are moist.      Pharynx: No oropharyngeal exudate or posterior oropharyngeal erythema.   Eyes:      General: No scleral icterus.     Extraocular Movements: Extraocular movements intact.      Pupils: Pupils are equal, round, and reactive to light.   Cardiovascular:      Rate and Rhythm: Normal rate and regular rhythm.      Pulses: Normal pulses.      Heart sounds: No murmur heard.     No friction rub. No gallop.   Pulmonary:      Effort: No respiratory distress.      Breath sounds: Rhonchi present. No wheezing or rales.      Comments: Coarse breath sounds bilaterally.  Abdominal:      General: Bowel sounds are normal. There is no distension.      Palpations: Abdomen is soft.      Tenderness: There is no abdominal tenderness. There is no guarding or rebound.   Musculoskeletal:         General: No swelling. Normal range of motion.      Cervical back: Normal range of motion and neck supple.      Right lower leg: No edema.      Left lower leg: No edema.      Comments: Noted AV fistula in the left arm with good thrill.   Skin:     General: Skin is warm.      Capillary Refill: Capillary refill takes less than 2 seconds.      Coloration: Skin is pale.   Neurological:      General: No focal deficit present.      Mental Status: She is alert and oriented to person, place, and time.      Cranial Nerves: No cranial nerve deficit.      Motor: No weakness.      Coordination: Coordination normal.   Psychiatric:         Mood and Affect: Mood normal.         Behavior: Behavior normal.              CRANIAL  NERVES     CN III, IV, VI   Pupils are equal, round, and reactive to light.    Recent Results (from the past 24 hour(s))   POCT COVID-19 Rapid Screening    Collection Time: 07/20/24  5:33 PM   Result Value Ref Range    POC Rapid COVID Positive (A) Negative     Acceptable Yes    POCT Influenza A/B Molecular    Collection Time: 07/20/24  5:33 PM   Result Value Ref Range    POC Molecular Influenza A Ag Negative Negative    POC Molecular Influenza B Ag Negative Negative     Acceptable Yes    Magnesium    Collection Time: 07/20/24  6:19 PM   Result Value Ref Range    Magnesium 2.1 1.6 - 2.6 mg/dL   Phosphorus    Collection Time: 07/20/24  6:19 PM   Result Value Ref Range    Phosphorus 3.9 2.7 - 4.5 mg/dL   CBC auto differential    Collection Time: 07/20/24  6:19 PM   Result Value Ref Range    WBC 8.23 3.90 - 12.70 K/uL    RBC 2.77 (L) 4.00 - 5.40 M/uL    Hemoglobin 8.4 (L) 12.0 - 16.0 g/dL    Hematocrit 27.5 (L) 37.0 - 48.5 %    MCV 99 (H) 82 - 98 fL    MCH 30.3 27.0 - 31.0 pg    MCHC 30.5 (L) 32.0 - 36.0 g/dL    RDW 16.8 (H) 11.5 - 14.5 %    Platelets 285 150 - 450 K/uL    MPV 10.6 9.2 - 12.9 fL    Immature Granulocytes 0.7 (H) 0.0 - 0.5 %    Gran # (ANC) 7.0 1.8 - 7.7 K/uL    Immature Grans (Abs) 0.06 (H) 0.00 - 0.04 K/uL    Lymph # 0.7 (L) 1.0 - 4.8 K/uL    Mono # 0.4 0.3 - 1.0 K/uL    Eos # 0.0 0.0 - 0.5 K/uL    Baso # 0.01 0.00 - 0.20 K/uL    nRBC 0 0 /100 WBC    Gran % 85.0 (H) 38.0 - 73.0 %    Lymph % 8.5 (L) 18.0 - 48.0 %    Mono % 5.2 4.0 - 15.0 %    Eosinophil % 0.5 0.0 - 8.0 %    Basophil % 0.1 0.0 - 1.9 %    Differential Method Automated    Comprehensive metabolic panel    Collection Time: 07/20/24  6:19 PM   Result Value Ref Range    Sodium 141 136 - 145 mmol/L    Potassium 3.4 (L) 3.5 - 5.1 mmol/L    Chloride 104 95 - 110 mmol/L    CO2 23 23 - 29 mmol/L    Glucose 75 70 - 110 mg/dL    BUN 23 8 - 23 mg/dL    Creatinine 4.2 (H) 0.5 - 1.4 mg/dL    Calcium 9.4 8.7 - 10.5 mg/dL     Total Protein 5.9 (L) 6.0 - 8.4 g/dL    Albumin 2.2 (L) 3.5 - 5.2 g/dL    Total Bilirubin 0.5 0.1 - 1.0 mg/dL    Alkaline Phosphatase 131 55 - 135 U/L    AST 44 (H) 10 - 40 U/L    ALT 17 10 - 44 U/L    eGFR 11 (A) >60 mL/min/1.73 m^2    Anion Gap 14 8 - 16 mmol/L   Troponin I    Collection Time: 07/20/24  6:19 PM   Result Value Ref Range    Troponin I 0.035 (H) 0.000 - 0.026 ng/mL   Brain natriuretic peptide    Collection Time: 07/20/24  6:19 PM   Result Value Ref Range    BNP 1,271 (H) 0 - 99 pg/mL   ISTAT PROCEDURE    Collection Time: 07/20/24  6:22 PM   Result Value Ref Range    POC PH 7.398 7.35 - 7.45    POC PCO2 48.7 (H) 35 - 45 mmHg    POC PO2 49 40 - 60 mmHg    POC HCO3 30.0 (H) 24 - 28 mmol/L    POC BE 5 (H) -2 to 2 mmol/L    POC SATURATED O2 84 95 - 100 %    POC TCO2 31 (H) 24 - 29 mmol/L    Sample VENOUS     Site Other     Allens Test N/A    PT/INR    Collection Time: 07/20/24  8:26 PM   Result Value Ref Range    Prothrombin Time 13.0 (H) 9.0 - 12.5 sec    INR 1.2 0.8 - 1.2   Lactic Acid, Plasma    Collection Time: 07/20/24  8:26 PM   Result Value Ref Range    Lactate (Lactic Acid) 0.6 0.5 - 2.2 mmol/L   Procalcitonin    Collection Time: 07/20/24  8:26 PM   Result Value Ref Range    Procalcitonin 1.64 (H) <0.25 ng/mL   Lactate dehydrogenase    Collection Time: 07/20/24 10:01 PM   Result Value Ref Range     110 - 260 U/L       Microbiology Results (last 7 days)       ** No results found for the last 168 hours. **            Imaging Results               X-Ray Chest AP Portable (Final result)  Result time 07/20/24 19:30:25      Final result by Homer Cool MD (07/20/24 19:30:25)                   Impression:      New bilateral patchy asymmetric pulmonary airspace opacities, possibly representing pulmonary edema.  Pneumonia or other pathology not excluded.  Correlate clinically and with short-term follow-up radiographs until complete radiographic resolution.    Pre-existing enlargement of the  cardiopericardial silhouette.    Pre-existing, but increased, prominence of the bilateral darian, possibly on a vascular basis.  Other underlying hilar pathology not excluded.    Poorly visualized right proximal humeral deformity, favored to be old.  Correlate clinically.  Consider follow-up right shoulder x-rays.    This report was flagged in Epic as abnormal.      Electronically signed by: Homer Cool  Date:    07/20/2024  Time:    19:30               Narrative:    EXAMINATION:  XR CHEST AP PORTABLE    CLINICAL HISTORY:  Shortness of breath    TECHNIQUE:  Single frontal view of the chest was performed.    COMPARISON:  Portable chest x-ray 10/29/2023    FINDINGS:  Midline thoracic trachea, allowing for rightward rotation of the patient's torso.    Pre-existing atherosclerotic calcification in the aortic arch.    Pre-existing, but also increased, bilateral hilar prominence.    Pre-existing enlargement of the cardiopericardial silhouette.    Increased bilateral patchy ground-glass pulmonary airspace opacities, more extensive in the right lung than the left (or the left upper lung appears spared).    No discrete pneumothorax or blunting of either lateral costophrenic angle.  Osseous detail is suboptimally visualized, especially in the spine.    A poorly visualized deformity of the right proximal humerus suggested but is probably old.    EKG leads project upon the torso.    Anatomic detail is poorly visualized in the imaged portion of the upper abdomen.

## 2024-07-21 NOTE — PLAN OF CARE
Problem: Adult Inpatient Plan of Care  Goal: Optimal Comfort and Wellbeing  Intervention: Monitor Pain and Promote Comfort  Flowsheets (Taken 7/21/2024 0243)  Pain Management Interventions:   care clustered   pillow support provided   position adjusted   warm blanket provided   quiet environment facilitated     Problem: Infection  Goal: Absence of Infection Signs and Symptoms  Intervention: Prevent or Manage Infection  Flowsheets (Taken 7/21/2024 0244)  Infection Management: aseptic technique maintained  Isolation Precautions:   precautions initiated   airborne   contact   droplet

## 2024-07-21 NOTE — HPI
68-year-old  female with a past medical history for malignant hypertension, ESRD on hemodialysis (Monday Wednesday Friday via a left arm AV fistula-sees Dr. Diego) presents to the ER with  dry cough and congestion with the associated weakness for about 5 days.  States that she has been eating or drinking because she can not taste anything at this time.  She did go to her dialysis this past week however.  She does not feel like she is volume overloaded.  Denies any nausea vomiting or diarrhea.  Denies any chest pain or pressure.  Does endorse some shortness of breath and treat dyspnea on exertion.  Denies any fevers.      Workup in the ER was overall concerning for pneumonia.  COVID came back positive.  Flu was negative.  Lactic acid was normal however her pro count was elevated greater than 1 concerning for possible underlying secondary bacterial infection.  She was started on steroids due to hypoxia requiring oxygen.  She was also started on remdesivir.  Chest x-ray showed:    New bilateral patchy asymmetric pulmonary airspace opacities, possibly representing pulmonary edema.  Pneumonia or other pathology not excluded.  Correlate clinically and with short-term follow-up radiographs until complete radiographic resolution.       We have been consulted for further management and admission to the inpatient telemetry hospitalist service.    Because this patient's clinical condition is guarded and requires monitoring of her vitals including oxygen requirements as well as IV remdesivir, steroids and IV antibiotics  , care in an alternative location isn't clinically appropriate for the reasons stated above.

## 2024-07-21 NOTE — NURSING
[facility], [facility] [place]  Nurses Note -- 4 Eyes      7/21/2024       Skin assessed on: Q Shift      [x] No Pressure Injuries Present    [x]Prevention Measures Documented    [] Yes LDA  for Pressure Injury Previously documented     [] Yes New Pressure Injury Discovered   [] LDA for New Pressure Injury Added      Attending RN:  Esme Solorio LPN     Second RN:  Hipolito TABARES

## 2024-07-21 NOTE — NURSING
[facility], [facility] [place]  Nurses Note -- 4 Eyes      7/20/2024       Skin assessed on: Admit      [x] No Pressure Injuries Present    [x]Prevention Measures Documented    [] Yes LDA  for Pressure Injury Previously documented     [] Yes New Pressure Injury Discovered   [] LDA for New Pressure Injury Added      Attending RN:  Hipolito Santoyo RN     Second RN:  RAYSHAWN Lopez

## 2024-07-21 NOTE — HOSPITAL COURSE
68-year-old  female with a past medical history for malignant hypertension, ESRD on hemodialysis (Monday Wednesday Friday via a left arm AV fistula-sees Dr. Diego) who was admitted for acute hypoxic respiratory failure secondary to COVID-19.  Nephrology consulted for inpatient HD needs.  Initiated on Decadron/remdesivir/Rocephin/doxycycline.  Chest x-ray with bilateral patchy asymmetric pulmonary opacities.  Procalcitonin at 1.64.  Follow up on cultures.  Wean oxygen as tolerated

## 2024-07-21 NOTE — H&P
[facility] Medicine  History & Physical    Patient Name: April Vasquez  MRN: [id number]  Patient Class: IP- Inpatient  Admission Date: 7/20/2024  Attending Physician: Dr. Erika Rojas   Primary Care Provider: Darrick Cabral MD         Patient information was obtained from patient, past medical records, and ER records.     Subjective:     Principal Problem:Pneumonia due to COVID-19 virus    Chief Complaint:   Chief Complaint   Patient presents with    Cough     Patient reports cough, decreased appetite and weakness for 5 days, dialysis on MWF        HPI: 68-year-old  female with a past medical history for malignant hypertension, ESRD on hemodialysis (Monday Wednesday Friday via a left arm AV fistula-sees Dr. Diego) presents to the ER with  dry cough and congestion with the associated weakness for about 5 days.  States that she has been eating or drinking because she can not taste anything at this time.  She did go to her dialysis this past week however.  She does not feel like she is volume overloaded.  Denies any nausea vomiting or diarrhea.  Denies any chest pain or pressure.  Does endorse some shortness of breath and treat dyspnea on exertion.  Denies any fevers.      Workup in the ER was overall concerning for pneumonia.  COVID came back positive.  Flu was negative.  Lactic acid was normal however her pro count was elevated greater than 1 concerning for possible underlying secondary bacterial infection.  She was started on steroids due to hypoxia requiring oxygen.  She was also started on remdesivir.  Chest x-ray showed:    New bilateral patchy asymmetric pulmonary airspace opacities, possibly representing pulmonary edema.  Pneumonia or other pathology not excluded.  Correlate clinically and with short-term follow-up radiographs until complete radiographic resolution.       We have been consulted for further management and admission to the inpatient telemetry  hospitalist service.    Because this patient's clinical condition is guarded and requires monitoring of her vitals including oxygen requirements as well as IV remdesivir, steroids and IV antibiotics  , care in an alternative location isn't clinically appropriate for the reasons stated above.         Past Medical History:   Diagnosis Date    (HFpEF) heart failure with preserved ejection fraction 10/30/2023    Anemia     Colon polyps     COVID-19 08/2021    Diabetes mellitus, type 2     ESRD (end stage renal disease) on dialysis 2017    Gallstones     Hypertension     Pulmonary edema     Renal disorder     dialysis MIRANDA fistula    Tobacco abuse     Uses walker        Past Surgical History:   Procedure Laterality Date    AVF  2017    CHOLECYSTECTOMY  2016    COLONOSCOPY N/A 8/6/2020    Procedure: COLONOSCOPY;  Surgeon: Hood Hernadez MD;  Location: [facility] ([address] [place] [address]);  Service: Endoscopy;  Laterality: N/A;  labs prior-dialysis  covid test lapalco 8/3    Permacath Right 2017    TUBAL LIGATION         Review of patient's allergies indicates:  No Known Allergies    No current facility-administered medications on file prior to encounter.     Current Outpatient Medications on File Prior to Encounter   Medication Sig    acetaminophen (TYLENOL) 500 MG tablet Take 500 mg by mouth every 6 (six) hours as needed for Pain.    carvediloL (COREG) 12.5 MG tablet Take 1 tablet (12.5 mg total) by mouth 2 (two) times daily with meals.    cloNIDine (CATAPRES) 0.3 MG tablet TAKE 1 TABLET(0.3 MG) BY MOUTH THREE TIMES DAILY    ergocalciferol, vitamin D2, (VITAMIN D ORAL) Take by mouth.    hydrALAZINE (APRESOLINE) 100 MG tablet TAKE 1 TABLET(100 MG) BY MOUTH THREE TIMES DAILY    isoniazid (NYDRAZID) 100 MG Tab Take 2 tablets (200 mg total) by mouth once daily.    losartan (COZAAR) 100 MG tablet Take 1 tablet (100 mg total) by mouth once daily.    minoxidiL (LONITEN) 10 MG Tab Take 2 tablets (20 mg total) by mouth once daily.    NIFEdipine  (PROCARDIA-XL) 60 MG (OSM) 24 hr tablet Take 1 tablet (60 mg total) by mouth 2 (two) times a day.    sevelamer carbonate (RENVELA) 800 mg Tab Take 1,600 mg by mouth 3 (three) times daily with meals.     Family History       Problem Relation (Age of Onset)    Cancer Mother    Cervical cancer Mother    Diabetes Father, Sister    Drug abuse Paternal Grandmother    Hypertension Mother    Kidney disease Mother    No Known Problems Sister, Sister, Sister, Son, Son, Son, Daughter    Ovarian cancer Mother          Tobacco Use    Smoking status: Former     Current packs/day: 0.00     Average packs/day: 0.5 packs/day for 20.0 years (10.0 ttl pk-yrs)     Types: Cigarettes     Start date: 2000     Quit date: 2020     Years since quittin.0    Smokeless tobacco: Never   Substance and Sexual Activity    Alcohol use: No    Drug use: No    Sexual activity: Yes     Partners: Male     Birth control/protection: Post-menopausal     Review of Systems   Constitutional:  Positive for activity change, appetite change (Due to loss of taste and appetite.) and fatigue. Negative for chills, diaphoresis and fever.   HENT:  Positive for postnasal drip, rhinorrhea and sore throat. Negative for congestion and trouble swallowing.         Loss of taste   Respiratory:  Positive for cough and shortness of breath. Negative for chest tightness and wheezing.    Cardiovascular:  Negative for chest pain, palpitations and leg swelling.   Gastrointestinal:  Negative for abdominal pain, constipation, diarrhea, nausea and vomiting.   Genitourinary:         States she does not urinate much anymore   Musculoskeletal:  Positive for arthralgias. Negative for gait problem, myalgias and neck stiffness.   Neurological:  Positive for weakness. Negative for dizziness and light-headedness.   Psychiatric/Behavioral:  The patient is not nervous/anxious.      Objective:     Vital Signs (Most Recent):  Temp: 98.4 °F (36.9 °C) (24 0048)  Pulse: 68  (07/21/24 0048)  Resp: 17 (07/21/24 0048)  BP: (!) 150/72 (07/21/24 0048)  SpO2: 95 % (07/21/24 0048) Vital Signs (24h Range):  Temp:  [97.7 °F (36.5 °C)-98.4 °F (36.9 °C)] 98.4 °F (36.9 °C)  Pulse:  [63-70] 68  Resp:  [13-18] 17  SpO2:  [88 %-97 %] 95 %  BP: (124-158)/(59-74) 150/72     Weight: 39.7 kg (87 lb 8.4 oz)  Body mass index is 15.02 kg/m².     Physical Exam  Vitals and nursing note reviewed.   Constitutional:       General: She is not in acute distress.     Appearance: She is not ill-appearing, toxic-appearing or diaphoretic.      Comments: Frail thin malnourished appearing   HENT:      Head: Normocephalic and atraumatic.      Nose: Nose normal. No congestion or rhinorrhea.      Mouth/Throat:      Mouth: Mucous membranes are moist.      Pharynx: No oropharyngeal exudate or posterior oropharyngeal erythema.   Eyes:      General: No scleral icterus.     Extraocular Movements: Extraocular movements intact.      Pupils: Pupils are equal, round, and reactive to light.   Cardiovascular:      Rate and Rhythm: Normal rate and regular rhythm.      Pulses: Normal pulses.      Heart sounds: No murmur heard.     No friction rub. No gallop.   Pulmonary:      Effort: No respiratory distress.      Breath sounds: Rhonchi present. No wheezing or rales.      Comments: Coarse breath sounds bilaterally.  Abdominal:      General: Bowel sounds are normal. There is no distension.      Palpations: Abdomen is soft.      Tenderness: There is no abdominal tenderness. There is no guarding or rebound.   Musculoskeletal:         General: No swelling. Normal range of motion.      Cervical back: Normal range of motion and neck supple.      Right lower leg: No edema.      Left lower leg: No edema.      Comments: Noted AV fistula in the left arm with good thrill.   Skin:     General: Skin is warm.      Capillary Refill: Capillary refill takes less than 2 seconds.      Coloration: Skin is pale.   Neurological:      General: No focal  deficit present.      Mental Status: She is alert and oriented to person, place, and time.      Cranial Nerves: No cranial nerve deficit.      Motor: No weakness.      Coordination: Coordination normal.   Psychiatric:         Mood and Affect: Mood normal.         Behavior: Behavior normal.              CRANIAL NERVES     CN III, IV, VI   Pupils are equal, round, and reactive to light.    Recent Results (from the past 24 hour(s))   POCT COVID-19 Rapid Screening    Collection Time: 07/20/24  5:33 PM   Result Value Ref Range    POC Rapid COVID Positive (A) Negative     Acceptable Yes    POCT Influenza A/B Molecular    Collection Time: 07/20/24  5:33 PM   Result Value Ref Range    POC Molecular Influenza A Ag Negative Negative    POC Molecular Influenza B Ag Negative Negative     Acceptable Yes    Magnesium    Collection Time: 07/20/24  6:19 PM   Result Value Ref Range    Magnesium 2.1 1.6 - 2.6 mg/dL   Phosphorus    Collection Time: 07/20/24  6:19 PM   Result Value Ref Range    Phosphorus 3.9 2.7 - 4.5 mg/dL   CBC auto differential    Collection Time: 07/20/24  6:19 PM   Result Value Ref Range    WBC 8.23 3.90 - 12.70 K/uL    RBC 2.77 (L) 4.00 - 5.40 M/uL    Hemoglobin 8.4 (L) 12.0 - 16.0 g/dL    Hematocrit 27.5 (L) 37.0 - 48.5 %    MCV 99 (H) 82 - 98 fL    MCH 30.3 27.0 - 31.0 pg    MCHC 30.5 (L) 32.0 - 36.0 g/dL    RDW 16.8 (H) 11.5 - 14.5 %    Platelets 285 150 - 450 K/uL    MPV 10.6 9.2 - 12.9 fL    Immature Granulocytes 0.7 (H) 0.0 - 0.5 %    Gran # (ANC) 7.0 1.8 - 7.7 K/uL    Immature Grans (Abs) 0.06 (H) 0.00 - 0.04 K/uL    Lymph # 0.7 (L) 1.0 - 4.8 K/uL    Mono # 0.4 0.3 - 1.0 K/uL    Eos # 0.0 0.0 - 0.5 K/uL    Baso # 0.01 0.00 - 0.20 K/uL    nRBC 0 0 /100 WBC    Gran % 85.0 (H) 38.0 - 73.0 %    Lymph % 8.5 (L) 18.0 - 48.0 %    Mono % 5.2 4.0 - 15.0 %    Eosinophil % 0.5 0.0 - 8.0 %    Basophil % 0.1 0.0 - 1.9 %    Differential Method Automated    Comprehensive metabolic panel     Collection Time: 07/20/24  6:19 PM   Result Value Ref Range    Sodium 141 136 - 145 mmol/L    Potassium 3.4 (L) 3.5 - 5.1 mmol/L    Chloride 104 95 - 110 mmol/L    CO2 23 23 - 29 mmol/L    Glucose 75 70 - 110 mg/dL    BUN 23 8 - 23 mg/dL    Creatinine 4.2 (H) 0.5 - 1.4 mg/dL    Calcium 9.4 8.7 - 10.5 mg/dL    Total Protein 5.9 (L) 6.0 - 8.4 g/dL    Albumin 2.2 (L) 3.5 - 5.2 g/dL    Total Bilirubin 0.5 0.1 - 1.0 mg/dL    Alkaline Phosphatase 131 55 - 135 U/L    AST 44 (H) 10 - 40 U/L    ALT 17 10 - 44 U/L    eGFR 11 (A) >60 mL/min/1.73 m^2    Anion Gap 14 8 - 16 mmol/L   Troponin I    Collection Time: 07/20/24  6:19 PM   Result Value Ref Range    Troponin I 0.035 (H) 0.000 - 0.026 ng/mL   Brain natriuretic peptide    Collection Time: 07/20/24  6:19 PM   Result Value Ref Range    BNP 1,271 (H) 0 - 99 pg/mL   ISTAT PROCEDURE    Collection Time: 07/20/24  6:22 PM   Result Value Ref Range    POC PH 7.398 7.35 - 7.45    POC PCO2 48.7 (H) 35 - 45 mmHg    POC PO2 49 40 - 60 mmHg    POC HCO3 30.0 (H) 24 - 28 mmol/L    POC BE 5 (H) -2 to 2 mmol/L    POC SATURATED O2 84 95 - 100 %    POC TCO2 31 (H) 24 - 29 mmol/L    Sample VENOUS     Site Other     Allens Test N/A    PT/INR    Collection Time: 07/20/24  8:26 PM   Result Value Ref Range    Prothrombin Time 13.0 (H) 9.0 - 12.5 sec    INR 1.2 0.8 - 1.2   Lactic Acid, Plasma    Collection Time: 07/20/24  8:26 PM   Result Value Ref Range    Lactate (Lactic Acid) 0.6 0.5 - 2.2 mmol/L   Procalcitonin    Collection Time: 07/20/24  8:26 PM   Result Value Ref Range    Procalcitonin 1.64 (H) <0.25 ng/mL   Lactate dehydrogenase    Collection Time: 07/20/24 10:01 PM   Result Value Ref Range     110 - 260 U/L       Microbiology Results (last 7 days)       ** No results found for the last 168 hours. **            Imaging Results               X-Ray Chest AP Portable (Final result)  Result time 07/20/24 19:30:25      Final result by Homer Cool MD (07/20/24 19:30:25)                    Impression:      New bilateral patchy asymmetric pulmonary airspace opacities, possibly representing pulmonary edema.  Pneumonia or other pathology not excluded.  Correlate clinically and with short-term follow-up radiographs until complete radiographic resolution.    Pre-existing enlargement of the cardiopericardial silhouette.    Pre-existing, but increased, prominence of the bilateral darian, possibly on a vascular basis.  Other underlying hilar pathology not excluded.    Poorly visualized right proximal humeral deformity, favored to be old.  Correlate clinically.  Consider follow-up right shoulder x-rays.    This report was flagged in Epic as abnormal.      Electronically signed by: Homer Cool  Date:    07/20/2024  Time:    19:30               Narrative:    EXAMINATION:  XR CHEST AP PORTABLE    CLINICAL HISTORY:  Shortness of breath    TECHNIQUE:  Single frontal view of the chest was performed.    COMPARISON:  Portable chest x-ray 10/29/2023    FINDINGS:  Midline thoracic trachea, allowing for rightward rotation of the patient's torso.    Pre-existing atherosclerotic calcification in the aortic arch.    Pre-existing, but also increased, bilateral hilar prominence.    Pre-existing enlargement of the cardiopericardial silhouette.    Increased bilateral patchy ground-glass pulmonary airspace opacities, more extensive in the right lung than the left (or the left upper lung appears spared).    No discrete pneumothorax or blunting of either lateral costophrenic angle.  Osseous detail is suboptimally visualized, especially in the spine.    A poorly visualized deformity of the right proximal humerus suggested but is probably old.    EKG leads project upon the torso.    Anatomic detail is poorly visualized in the imaged portion of the upper abdomen.                                        Assessment/Plan:     * Pneumonia due to COVID-19 virus  Patient is identified as Severe COVID-19 based on hypoxemia with O2  saturations <94% on room air or on ambulation   Initiate standard COVID protocols; COVID-19 testing ,Infection Control notification  and isolation- respiratory, contact and droplet per protocol    Diagnostics: CBC, CMP, Procalcitonin, Rapid Flu, Blood Culture x2, and Portable CXR    Management: Initiate targeted therapy with Remdesivir, 200mg IV x1, followed by 100mg IV daily x5 days total, Dexamethasone PO/IV 6mg daily x10 days, and Anticoagulation, Patient admitted to non-critical care unit- Will initiate full dose anticoagulation with Weight based lovenox 1mg/kg IV q24 due to ESRD, Maintain oxygen saturations 92-96% via Nasal Cannula 2 LPM and monitor with continuous/intermittent pulse oximetry. , and Continuous cardiac monitoring.  Given an elevated pro count will also start IV antibiotics with doxycycline and Rocephin.    Advance Care Planning Current advance care plan has been discussed with patient/family/POA and patient currently wishes Full Code.  Time spent discussing with the patient discussing her diagnosis of COVID pneumonia 60 minutes.     Acute hypoxic respiratory failure  Patient with Hypoxic Respiratory failure which is Acute.  she is not on home oxygen.   Signs/symptoms of respiratory failure include- tachypnea and increased work of breathing. Contributing diagnoses includes - Pneumonia Labs and images were reviewed. Will treat underlying causes and adjust management of respiratory failure as follows- treat her underlying COVID pneumonia as well as possible secondary bacterial pneumonia.  Follow up on cultures.    ESRD (end stage renal disease) on dialysis  Consult placed to Cardiology.  No signs of emergent need of hemodialysis tonight based vitals and labs.  Follow up on repeat labs in the morning and respiratory status.    Essential hypertension  Chronic, controlled. Latest blood pressure and vitals reviewed-     Temp:  [97.7 °F (36.5 °C)-98.4 °F (36.9 °C)]   Pulse:  [63-70]   Resp:  [13-18]    BP: (124-158)/(59-74)   SpO2:  [88 %-97 %] .   Home meds for hypertension were reviewed and noted below.   Hypertension Medications               carvediloL (COREG) 12.5 MG tablet Take 1 tablet (12.5 mg total) by mouth 2 (two) times daily with meals.    cloNIDine (CATAPRES) 0.3 MG tablet TAKE 1 TABLET(0.3 MG) BY MOUTH THREE TIMES DAILY    hydrALAZINE (APRESOLINE) 100 MG tablet TAKE 1 TABLET(100 MG) BY MOUTH THREE TIMES DAILY    losartan (COZAAR) 100 MG tablet Take 1 tablet (100 mg total) by mouth once daily.    minoxidiL (LONITEN) 10 MG Tab Take 2 tablets (20 mg total) by mouth once daily.    NIFEdipine (PROCARDIA-XL) 60 MG (OSM) 24 hr tablet Take 1 tablet (60 mg total) by mouth 2 (two) times a day.            While in the hospital, will manage blood pressure as follows; Continue home antihypertensive regimen but we will decrease her hydralazine to 50 mg p.o. 3 times a day until blood pressures were 100 mg t.i.d. as well as hold her Procardia for the same reason.  Patient states she is in no longer on minoxidil.    Will utilize p.r.n. blood pressure medication only if patient's blood pressure greater than 180/110 and she develops symptoms such as worsening chest pain or shortness of breath.    Diabetes mellitus type II, controlled  Patient's FSGs are controlled on current medication regimen.  Last A1c reviewed-   Lab Results   Component Value Date    HGBA1C 4.3 09/21/2023   States has not needed any treatment for her diabetes since dialysis.  She is diet-controlled.  Diet has been adjusted.    (HFpEF) heart failure with preserved ejection fraction    No signs of acute on chronic heart failure.  She did get a bolus of IV fluids in the ER and we will continue to monitor her respiratory status at this time.  Her next dialysis is scheduled for Monday.  However if signs of pulmonary congestion we will discuss with Nephrology need to dialyze sooner.  Resume blood pressure medications.  Results for orders placed during  the hospital encounter of 10/10/23    Echo    Interpretation Summary    Left Ventricle: The left ventricle is normal in size. Increased ventricular mass. Normal wall thickness. There is eccentric hypertrophy. Normal wall motion. There is normal systolic function with a visually estimated ejection fraction of 55 - 60%. There is normal diastolic function.    Left Atrium: Left atrium is severely dilated.    Right Ventricle: Normal right ventricular cavity size. Wall thickness is normal. Right ventricle wall motion  is normal. Systolic function is normal.    Mitral Valve: There is mild mitral annular calcification present. There is normal leaflet mobility. There is mild regurgitation.    Pulmonary Artery: The estimated pulmonary artery systolic pressure is 34 mmHg.    IVC/SVC: Normal venous pressure at 3 mmHg.      Anemia in chronic kidney disease  Hemoglobin hematocrit are stable at this time.  No signs of acute bleed.  She is on Lovenox 1 catherine per kg Q day for anticoagulation.  We will monitor her H&H.  No indication for transfusion at this time.      VTE Risk Mitigation (From admission, onward)           Ordered     enoxaparin injection 40 mg  Every 24 hours         07/20/24 1952                   Code status: Full code           Erika Rojas MD  Department of Hospital Medicine  [facility] - [place] - Observation

## 2024-07-22 LAB
ALBUMIN SERPL BCP-MCNC: 2 G/DL (ref 3.5–5.2)
ALP SERPL-CCNC: 116 U/L (ref 55–135)
ALT SERPL W/O P-5'-P-CCNC: 11 U/L (ref 10–44)
ANION GAP SERPL CALC-SCNC: 14 MMOL/L (ref 8–16)
AST SERPL-CCNC: 22 U/L (ref 10–40)
BASOPHILS # BLD AUTO: 0.01 K/UL (ref 0–0.2)
BASOPHILS NFR BLD: 0.1 % (ref 0–1.9)
BILIRUB SERPL-MCNC: 0.3 MG/DL (ref 0.1–1)
BUN SERPL-MCNC: 51 MG/DL (ref 8–23)
CALCIUM SERPL-MCNC: 8.7 MG/DL (ref 8.7–10.5)
CHLORIDE SERPL-SCNC: 102 MMOL/L (ref 95–110)
CO2 SERPL-SCNC: 20 MMOL/L (ref 23–29)
CREAT SERPL-MCNC: 5.6 MG/DL (ref 0.5–1.4)
DIFFERENTIAL METHOD BLD: ABNORMAL
EOSINOPHIL # BLD AUTO: 0 K/UL (ref 0–0.5)
EOSINOPHIL NFR BLD: 0 % (ref 0–8)
ERYTHROCYTE [DISTWIDTH] IN BLOOD BY AUTOMATED COUNT: 16.9 % (ref 11.5–14.5)
EST. GFR  (NO RACE VARIABLE): 8 ML/MIN/1.73 M^2
GLUCOSE SERPL-MCNC: 117 MG/DL (ref 70–110)
HCT VFR BLD AUTO: 25.4 % (ref 37–48.5)
HGB BLD-MCNC: 8.4 G/DL (ref 12–16)
IMM GRANULOCYTES # BLD AUTO: 0.14 K/UL (ref 0–0.04)
IMM GRANULOCYTES NFR BLD AUTO: 1.2 % (ref 0–0.5)
LYMPHOCYTES # BLD AUTO: 1 K/UL (ref 1–4.8)
LYMPHOCYTES NFR BLD: 9.2 % (ref 18–48)
MCH RBC QN AUTO: 31.1 PG (ref 27–31)
MCHC RBC AUTO-ENTMCNC: 33.1 G/DL (ref 32–36)
MCV RBC AUTO: 94 FL (ref 82–98)
MONOCYTES # BLD AUTO: 0.5 K/UL (ref 0.3–1)
MONOCYTES NFR BLD: 4.8 % (ref 4–15)
NEUTROPHILS # BLD AUTO: 9.5 K/UL (ref 1.8–7.7)
NEUTROPHILS NFR BLD: 84.7 % (ref 38–73)
NRBC BLD-RTO: 0 /100 WBC
PLATELET # BLD AUTO: 370 K/UL (ref 150–450)
PMV BLD AUTO: 10.6 FL (ref 9.2–12.9)
POTASSIUM SERPL-SCNC: 4.6 MMOL/L (ref 3.5–5.1)
PROT SERPL-MCNC: 5.2 G/DL (ref 6–8.4)
RBC # BLD AUTO: 2.7 M/UL (ref 4–5.4)
SODIUM SERPL-SCNC: 136 MMOL/L (ref 136–145)
WBC # BLD AUTO: 11.23 K/UL (ref 3.9–12.7)

## 2024-07-22 PROCEDURE — 25000003 PHARM REV CODE 250: Performed by: STUDENT IN AN ORGANIZED HEALTH CARE EDUCATION/TRAINING PROGRAM

## 2024-07-22 PROCEDURE — 63600175 PHARM REV CODE 636 W HCPCS: Performed by: INTERNAL MEDICINE

## 2024-07-22 PROCEDURE — 25000003 PHARM REV CODE 250: Performed by: INTERNAL MEDICINE

## 2024-07-22 PROCEDURE — 94761 N-INVAS EAR/PLS OXIMETRY MLT: CPT

## 2024-07-22 PROCEDURE — 80100014 HC HEMODIALYSIS 1:1

## 2024-07-22 PROCEDURE — 27000207 HC ISOLATION

## 2024-07-22 PROCEDURE — 21400001 HC TELEMETRY ROOM

## 2024-07-22 PROCEDURE — 80053 COMPREHEN METABOLIC PANEL: CPT | Performed by: STUDENT IN AN ORGANIZED HEALTH CARE EDUCATION/TRAINING PROGRAM

## 2024-07-22 PROCEDURE — 5A1D70Z PERFORMANCE OF URINARY FILTRATION, INTERMITTENT, LESS THAN 6 HOURS PER DAY: ICD-10-PCS | Performed by: HOSPITALIST

## 2024-07-22 PROCEDURE — 99900031 HC PATIENT EDUCATION (STAT)

## 2024-07-22 PROCEDURE — 36415 COLL VENOUS BLD VENIPUNCTURE: CPT | Performed by: STUDENT IN AN ORGANIZED HEALTH CARE EDUCATION/TRAINING PROGRAM

## 2024-07-22 PROCEDURE — 85025 COMPLETE CBC W/AUTO DIFF WBC: CPT | Performed by: STUDENT IN AN ORGANIZED HEALTH CARE EDUCATION/TRAINING PROGRAM

## 2024-07-22 RX ORDER — SODIUM CHLORIDE 9 MG/ML
INJECTION, SOLUTION INTRAVENOUS ONCE
Status: CANCELLED | OUTPATIENT
Start: 2024-07-22 | End: 2024-07-22

## 2024-07-22 RX ADMIN — MUPIROCIN: 20 OINTMENT TOPICAL at 08:07

## 2024-07-22 RX ADMIN — HYDRALAZINE HYDROCHLORIDE 100 MG: 25 TABLET ORAL at 06:07

## 2024-07-22 RX ADMIN — CEFTRIAXONE 1 G: 1 INJECTION, POWDER, FOR SOLUTION INTRAMUSCULAR; INTRAVENOUS at 03:07

## 2024-07-22 RX ADMIN — CLONIDINE HYDROCHLORIDE 0.3 MG: 0.1 TABLET ORAL at 08:07

## 2024-07-22 RX ADMIN — LOSARTAN POTASSIUM 100 MG: 25 TABLET, FILM COATED ORAL at 08:07

## 2024-07-22 RX ADMIN — HYDRALAZINE HYDROCHLORIDE 100 MG: 25 TABLET ORAL at 01:07

## 2024-07-22 RX ADMIN — PANTOPRAZOLE SODIUM 40 MG: 40 TABLET, DELAYED RELEASE ORAL at 08:07

## 2024-07-22 RX ADMIN — CARVEDILOL 12.5 MG: 12.5 TABLET, FILM COATED ORAL at 04:07

## 2024-07-22 RX ADMIN — HYDRALAZINE HYDROCHLORIDE 100 MG: 25 TABLET ORAL at 10:07

## 2024-07-22 RX ADMIN — CLONIDINE HYDROCHLORIDE 0.3 MG: 0.1 TABLET ORAL at 03:07

## 2024-07-22 RX ADMIN — DOXYCYCLINE 100 MG: 100 INJECTION, POWDER, LYOPHILIZED, FOR SOLUTION INTRAVENOUS at 04:07

## 2024-07-22 RX ADMIN — MUPIROCIN: 20 OINTMENT TOPICAL at 10:07

## 2024-07-22 RX ADMIN — GUAIFENESIN 400 MG: 200 SOLUTION ORAL at 12:07

## 2024-07-22 RX ADMIN — DEXAMETHASONE 6 MG: 2 TABLET ORAL at 08:07

## 2024-07-22 RX ADMIN — ENOXAPARIN SODIUM 40 MG: 40 INJECTION SUBCUTANEOUS at 10:07

## 2024-07-22 RX ADMIN — CARVEDILOL 12.5 MG: 12.5 TABLET, FILM COATED ORAL at 08:07

## 2024-07-22 RX ADMIN — ACETAMINOPHEN 650 MG: 325 TABLET ORAL at 12:07

## 2024-07-22 RX ADMIN — DOXYCYCLINE 100 MG: 100 INJECTION, POWDER, LYOPHILIZED, FOR SOLUTION INTRAVENOUS at 03:07

## 2024-07-22 RX ADMIN — CLONIDINE HYDROCHLORIDE 0.3 MG: 0.1 TABLET ORAL at 10:07

## 2024-07-22 RX ADMIN — REMDESIVIR 100 MG: 100 INJECTION, POWDER, LYOPHILIZED, FOR SOLUTION INTRAVENOUS at 08:07

## 2024-07-22 NOTE — PROGRESS NOTES
"                        Renal Progress Note    Date of Admission:  7/20/2024  4:54 PM    Length of Stay: 2  Days    Subjective: n/a    Objective:    Current Facility-Administered Medications   Medication    acetaminophen tablet 650 mg    carvediloL tablet 12.5 mg    cefTRIAXone (Rocephin) 1 g in D5W 100 mL IVPB (MB+)    cloNIDine tablet 0.3 mg    dexAMETHasone tablet 6 mg    doxycycline 100 mg in D5W 100 mL IVPB (MB+)    enoxaparin injection 40 mg    guaiFENesin 100 mg/5 ml syrup 400 mg    hydrALAZINE injection 10 mg    hydrALAZINE tablet 100 mg    loperamide capsule 2 mg    losartan tablet 100 mg    melatonin tablet 6 mg    mupirocin 2 % ointment    ondansetron injection 4 mg    pantoprazole EC tablet 40 mg    polyethylene glycol packet 17 g    remdesivir 100 mg in 0.9% NaCl 250 mL infusion    sevelamer carbonate tablet 1,600 mg    sodium chloride 0.9% bolus 250 mL 250 mL    sodium chloride 0.9% flush 10 mL       Vitals:    07/21/24 2311 07/22/24 0011 07/22/24 0428 07/22/24 0513   BP:  (!) 168/80  (!) 177/84   BP Location:  Right arm  Right arm   Patient Position:  Lying  Lying   Pulse: 61 66 (!) 53 61   Resp:  18  17   Temp:  98.3 °F (36.8 °C)  98.4 °F (36.9 °C)   TempSrc:  Oral  Oral   SpO2:  96%  (!) 91%   Weight:       Height:           I/O last 3 completed shifts:  In: 340 [P.O.:340]  Out: 1500 [Other:1500]  No intake/output data recorded.      Physical Exam: deferred 2nd. Respiratory isolation      Laboratories:    No results for input(s): "WBC", "RBC", "HGB", "HCT", "PLT", "MCV", "MCH", "MCHC" in the last 24 hours.    No results for input(s): "GLUCOSE", "CALCIUM", "ALBUMIN", "PROT", "NA", "K", "CO2", "CL", "BUN", "CREATININE", "ALKPHOS", "ALT", "AST", "BILITOT" in the last 24 hours.    No results for input(s): "COLORU", "CLARITYU", "SPECGRAV", "PHUR", "PROTEINUA", "GLUCOSEU", "BILIRUBINCON", "BLOODU", "WBCU", "RBCU", "BACTERIA", "MUCUS" in the last 24 hours.    Microbiology Results (last 7 days)       ** No " results found for the last 168 hours. **              Diagnostic Tests:     CXR:    Impression:       New bilateral patchy asymmetric pulmonary airspace opacities, possibly representing pulmonary edema.  Pneumonia or other pathology not excluded.  Correlate clinically and with short-term follow-up radiographs until complete radiographic resolution.    Pre-existing enlargement of the cardiopericardial silhouette.    Pre-existing, but increased, prominence of the bilateral darian, possibly on a vascular basis.  Other underlying hilar pathology not excluded.    Poorly visualized right proximal humeral deformity, favored to be old.  Correlate clinically.  Consider follow-up right shoulder x-rays.    This report was flagged in Epic as abnormal.      Electronically signed by: Homer Cool  Date: 07/20/2024         Assessment:    68 female with ESRD on HD admitted with:     - Covid-19 pneumonia  - HTN urgency  - HFpEF  - DM-2 with renal manifestations  - Anemia of ckd  - Hypoalbuminemia  - 2nd. HPT                 Plan:     - Remdesivir Per admitting  - Dialysis Q MWF  - UF as tolerated   - BP control (on multiple Meds.)  - Epogen as needed once BP controlled  - Renal ADA diet  - Oral PO4- binders   - Other problems per admitting      Will follow on Dialysis days.

## 2024-07-22 NOTE — NURSING
[facility] [place]  Nurses Note -- 4 Eyes      7/21/2024       Skin assessed on: Q Shift      [x] No Pressure Injuries Present    [x]Prevention Measures Documented    [] Yes LDA  for Pressure Injury Previously documented     [] Yes New Pressure Injury Discovered   [] LDA for New Pressure Injury Added      Attending RN:  Hipolito Santoyo RN     Second RN:  FEDERICO Victoria

## 2024-07-22 NOTE — NURSING
Received handoff report from morning shift. Patient lying on bed AAOx4, on O2 @ 2lpn via NC, no acute distress noted, breathing even and unlabored. Isolation and Safety precautions maintained. Care assumed at this time.

## 2024-07-22 NOTE — PLAN OF CARE
Problem: Adult Inpatient Plan of Care  Goal: Optimal Comfort and Wellbeing  Intervention: Monitor Pain and Promote Comfort  Flowsheets (Taken 7/22/2024 0408)  Pain Management Interventions:   care clustered   quiet environment facilitated   warm blanket provided     Problem: Infection  Goal: Absence of Infection Signs and Symptoms  Intervention: Prevent or Manage Infection  Flowsheets (Taken 7/22/2024 0408)  Infection Management: aseptic technique maintained  Isolation Precautions: precautions maintained

## 2024-07-22 NOTE — PROGRESS NOTES
[facility] Medicine  Telemedicine Progress Note    Patient Name: April Vasquez  MRN: [id number]  Patient Class: IP- Inpatient   Admission Date: 7/20/2024  Length of Stay: 2 days  Attending Physician: Keara Hills MD  Primary Care Provider: Darrick Cabral MD          Subjective:     Principal Problem:Pneumonia due to COVID-19 virus        HPI:  68-year-old  female with a past medical history for malignant hypertension, ESRD on hemodialysis (Monday Wednesday Friday via a left arm AV fistula-sees Dr. Diego) presents to the ER with  dry cough and congestion with the associated weakness for about 5 days.  States that she has been eating or drinking because she can not taste anything at this time.  She did go to her dialysis this past week however.  She does not feel like she is volume overloaded.  Denies any nausea vomiting or diarrhea.  Denies any chest pain or pressure.  Does endorse some shortness of breath and treat dyspnea on exertion.  Denies any fevers.      Workup in the ER was overall concerning for pneumonia.  COVID came back positive.  Flu was negative.  Lactic acid was normal however her pro count was elevated greater than 1 concerning for possible underlying secondary bacterial infection.  She was started on steroids due to hypoxia requiring oxygen.  She was also started on remdesivir.  Chest x-ray showed:    New bilateral patchy asymmetric pulmonary airspace opacities, possibly representing pulmonary edema.  Pneumonia or other pathology not excluded.  Correlate clinically and with short-term follow-up radiographs until complete radiographic resolution.       We have been consulted for further management and admission to the inpatient telemetry hospitalist service.    Because this patient's clinical condition is guarded and requires monitoring of her vitals including oxygen requirements as well as IV remdesivir, steroids and IV antibiotics  , care in an  alternative location isn't clinically appropriate for the reasons stated above.         Overview/Hospital Course:  68-year-old  female with a past medical history for malignant hypertension, ESRD on hemodialysis (Monday Wednesday Friday via a left arm AV fistula-sees Dr. Diego) who was admitted for acute hypoxic respiratory failure secondary to COVID-19.  Nephrology consulted for inpatient HD needs.  Initiated on Decadron/remdesivir/Rocephin/doxycycline.  Chest x-ray with bilateral patchy asymmetric pulmonary opacities.  Procalcitonin at 1.64.  Follow up on cultures.  Wean oxygen as tolerated    Follow-up For:  Patient Active Problem List    Diagnosis Date Noted Date Diagnosed    Pneumonia due to COVID-19 virus 07/20/2024     Acute hypoxic respiratory failure 02/04/2017      Discharge Planning   CECILIO:    Discharge Plan A: Home      Interval History:   Subjective: April Vasquez is being followed for Pneumonia due to COVID-19 virus. No acute events overnight. Lying in bed in no acute distress. She states her cough is improved. SOB improved as well. She hasn't tried to ambulate in the room due to fatigue. She lives at home with her sons who are able to assist if needed.     Symptoms: Improved. The patient reports shortness of breath and cough but denies, chest pain, weakness, headache, chest pressure, anorexia, diarrhea and anxiety.     Diet: Regular. Adequate intake. The patient reports nausea and vomiting.    Last Bowel Movement: 07/20/24    Activity Level: Returning to normal    Pain: The patient Denies pain     Review of Systems   Constitutional:  Positive for malaise/fatigue. Negative for chills and fever.   Respiratory:  Positive for shortness of breath.    Cardiovascular:  Negative for chest pain.   Gastrointestinal:  Negative for abdominal pain.        Scheduled Meds:   carvediloL  12.5 mg Oral BID WM    cefTRIAXone (Rocephin) IV (PEDS and ADULTS)  1 g Intravenous Q24H    cloNIDine  0.3 mg Oral  TID    dexAMETHasone  6 mg Oral Daily    doxycycline IV (PEDS and ADULTS)  100 mg Intravenous Q12H    enoxparin  1 mg/kg Subcutaneous Q24H (treatment, non-standard time)    hydrALAZINE  100 mg Oral Q8H    losartan  100 mg Oral Daily    mupirocin   Nasal BID    pantoprazole  40 mg Oral Daily    remdesivir infusion  100 mg Intravenous Daily    sevelamer carbonate  1,600 mg Oral TID WM     Continuous Infusions:  PRN Meds:.  Current Facility-Administered Medications:     acetaminophen, 650 mg, Oral, Q6H PRN    guaiFENesin 100 mg/5 ml, 400 mg, Oral, Q6H PRN    hydrALAZINE, 10 mg, Intravenous, Q6H PRN    loperamide, 2 mg, Oral, Q6H PRN    melatonin, 6 mg, Oral, Nightly PRN    ondansetron, 4 mg, Intravenous, Q6H PRN    polyethylene glycol, 17 g, Oral, BID PRN    sodium chloride 0.9%, 250 mL, Intravenous, PRN    sodium chloride 0.9%, 10 mL, Intravenous, PRN      Objective:     Vitals:    07/22/24 0011 07/22/24 0428 07/22/24 0513 07/22/24 0743   BP: (!) 168/80  (!) 177/84 (!) 164/67   BP Location: Right arm  Right arm    Patient Position: Lying  Lying    Pulse: 66 (!) 53 61 62   Resp: 18  17 19   Temp: 98.3 °F (36.8 °C)  98.4 °F (36.9 °C) 98.5 °F (36.9 °C)   TempSrc: Oral  Oral    SpO2: 96%  (!) 91% 96%   Weight:       Height:           Patient Vitals for the past 72 hrs (Last 3 readings):   Weight   07/20/24 2129 39.7 kg (87 lb 8.4 oz)   07/20/24 1652 37.6 kg (83 lb)       Intake/Output Summary (Last 24 hours) at 7/22/2024 0744  Last data filed at 7/21/2024 1803  Gross per 24 hour   Intake 340 ml   Output 1500 ml   Net -1160 ml     Net IO Since Admission: -1,160 mL [07/22/24 0744]    Physical Exam  Vitals and nursing note reviewed.   Constitutional:       General: She is awake. She is not in acute distress.     Appearance: Normal appearance. She is well-developed and well-groomed. She is not toxic-appearing.   HENT:      Head: Normocephalic and atraumatic.   Cardiovascular:      Rate and Rhythm: Normal rate.   Pulmonary:       Effort: No tachypnea, accessory muscle usage or respiratory distress.   Abdominal:      General: There is no distension.   Neurological:      Mental Status: She is alert and oriented to person, place, and time. Mental status is at baseline.   Psychiatric:         Mood and Affect: Mood normal.         Behavior: Behavior normal. Behavior is cooperative.         Thought Content: Thought content normal.       Significant Labs: All pertinent labs within the past 24 hours have been reviewed.    BMP (Last 3 Results):   Recent Labs   Lab 07/20/24 1819 07/21/24  0535 07/22/24  0530   GLU 75 86 117*    142 136   K 3.4* 4.0 4.6    106 102   CO2 23 20* 20*   BUN 23 26* 51*   CREATININE 4.2* 4.5* 5.6*   CALCIUM 9.4 8.7 8.7   MG 2.1  --   --      CBC (Last 3 Results):   Recent Labs   Lab 07/20/24 1819 07/21/24  0535 07/22/24  0530   WBC 8.23 5.91 11.23   RBC 2.77* 2.85* 2.70*   HGB 8.4* 8.7* 8.4*   HCT 27.5* 27.5* 25.4*    304 370   MCV 99* 97 94   MCH 30.3 30.5 31.1*   MCHC 30.5* 31.6* 33.1       Significant Imaging: I have reviewed all pertinent imaging results/findings within the past 24 hours.  X-Ray Chest AP Portable  Narrative: EXAMINATION:  XR CHEST AP PORTABLE    CLINICAL HISTORY:  Shortness of breath    TECHNIQUE:  Single frontal view of the chest was performed.    COMPARISON:  Portable chest x-ray 10/29/2023    FINDINGS:  Midline thoracic trachea, allowing for rightward rotation of the patient's torso.    Pre-existing atherosclerotic calcification in the aortic arch.    Pre-existing, but also increased, bilateral hilar prominence.    Pre-existing enlargement of the cardiopericardial silhouette.    Increased bilateral patchy ground-glass pulmonary airspace opacities, more extensive in the right lung than the left (or the left upper lung appears spared).    No discrete pneumothorax or blunting of either lateral costophrenic angle.  Osseous detail is suboptimally visualized, especially in the  spine.    A poorly visualized deformity of the right proximal humerus suggested but is probably old.    EKG leads project upon the torso.    Anatomic detail is poorly visualized in the imaged portion of the upper abdomen.  Impression: New bilateral patchy asymmetric pulmonary airspace opacities, possibly representing pulmonary edema.  Pneumonia or other pathology not excluded.  Correlate clinically and with short-term follow-up radiographs until complete radiographic resolution.    Pre-existing enlargement of the cardiopericardial silhouette.    Pre-existing, but increased, prominence of the bilateral darian, possibly on a vascular basis.  Other underlying hilar pathology not excluded.    Poorly visualized right proximal humeral deformity, favored to be old.  Correlate clinically.  Consider follow-up right shoulder x-rays.    This report was flagged in Epic as abnormal.    Electronically signed by: Homer Cool  Date:    07/20/2024  Time:    19:30        Assessment/Plan:      * Pneumonia due to COVID-19 virus  Patient is identified as Severe COVID-19 based on hypoxemia with O2 saturations <94% on room air or on ambulation   Initiate standard COVID protocols; COVID-19 testing ,Infection Control notification  and isolation- respiratory, contact and droplet per protocol    Diagnostics: CBC, CMP, Procalcitonin, Rapid Flu, Blood Culture x2, and Portable CXR    Management: Initiate targeted therapy with Remdesivir, 200mg IV x1, followed by 100mg IV daily x5 days total, Dexamethasone PO/IV 6mg daily x10 days, and Anticoagulation, Patient admitted to non-critical care unit- Will initiate full dose anticoagulation with Weight based lovenox 1mg/kg IV q24 due to ESRD, Maintain oxygen saturations 92-96% via Nasal Cannula 1 LPM and monitor with continuous/intermittent pulse oximetry. , and Continuous cardiac monitoring.  Given an elevated pro count will also start IV antibiotics with doxycycline and Rocephin.    Advance Care  Planning Current advance care plan has been discussed with patient/family/POA and patient currently wishes Full Code.  Time spent discussing with the patient discussing her diagnosis of COVID pneumonia 60 minutes.     Acute hypoxic respiratory failure  Patient with Hypoxic Respiratory failure which is Acute.  she is not on home oxygen.   Signs/symptoms of respiratory failure include- tachypnea and increased work of breathing. Contributing diagnoses includes - Pneumonia Labs and images were reviewed. Will treat underlying causes and adjust management of respiratory failure as follows- treat her underlying COVID pneumonia as well as possible secondary bacterial pneumonia.  Follow up on cultures.    Improved but still hypoxic requiring supplemental oxygen.     (HFpEF) heart failure with preserved ejection fraction  No signs of acute on chronic heart failure.  She did get a bolus of IV fluids in the ER and we will continue to monitor her respiratory status at this time.  Her next dialysis is scheduled for Monday.  However if signs of pulmonary congestion we will discuss with Nephrology need to dialyze sooner.  Resume blood pressure medications.  Results for orders placed during the hospital encounter of 10/10/23    Echo    Interpretation Summary    Left Ventricle: The left ventricle is normal in size. Increased ventricular mass. Normal wall thickness. There is eccentric hypertrophy. Normal wall motion. There is normal systolic function with a visually estimated ejection fraction of 55 - 60%. There is normal diastolic function.    Left Atrium: Left atrium is severely dilated.    Right Ventricle: Normal right ventricular cavity size. Wall thickness is normal. Right ventricle wall motion  is normal. Systolic function is normal.    Mitral Valve: There is mild mitral annular calcification present. There is normal leaflet mobility. There is mild regurgitation.    Pulmonary Artery: The estimated pulmonary artery systolic  pressure is 34 mmHg.    IVC/SVC: Normal venous pressure at 3 mmHg.      Anemia in chronic kidney disease  Hemoglobin hematocrit are stable at this time.  No signs of acute bleed.  She is on Lovenox 1 catherine per kg Q day for anticoagulation.  We will monitor her H&H.  No indication for transfusion at this time.    Essential hypertension  Chronic, controlled. Latest blood pressure and vitals reviewed-     Temp:  [97.1 °F (36.2 °C)-98.5 °F (36.9 °C)]   Pulse:  [53-80]   Resp:  [16-19]   BP: (150-185)/(67-84)   SpO2:  [91 %-99 %] .   Home meds for hypertension were reviewed and noted below.   Hypertension Medications               carvediloL (COREG) 12.5 MG tablet Take 1 tablet (12.5 mg total) by mouth 2 (two) times daily with meals.    cloNIDine (CATAPRES) 0.3 MG tablet TAKE 1 TABLET(0.3 MG) BY MOUTH THREE TIMES DAILY    hydrALAZINE (APRESOLINE) 100 MG tablet TAKE 1 TABLET(100 MG) BY MOUTH THREE TIMES DAILY    losartan (COZAAR) 100 MG tablet Take 1 tablet (100 mg total) by mouth once daily.    minoxidiL (LONITEN) 10 MG Tab Take 2 tablets (20 mg total) by mouth once daily.    NIFEdipine (PROCARDIA-XL) 60 MG (OSM) 24 hr tablet Take 1 tablet (60 mg total) by mouth 2 (two) times a day.            While in the hospital, will manage blood pressure as follows; Continue home antihypertensive regimen   Will utilize p.r.n. blood pressure medication only if patient's blood pressure greater than 180/110 and she develops symptoms such as worsening chest pain or shortness of breath.    ESRD (end stage renal disease) on dialysis  Nephrology consulted for inpatient HD needs    Diabetes mellitus type II, controlled  Patient's FSGs are controlled on current medication regimen.  Last A1c reviewed-   Lab Results   Component Value Date    HGBA1C 4.3 09/21/2023   States has not needed any treatment for her diabetes since dialysis.  She is diet-controlled.  Diet has been adjusted.      VTE Risk Mitigation (From admission, onward)            Ordered     enoxaparin injection 40 mg  Every 24 hours         07/20/24 1952                          I have completed this tele-visit without the assistance of a telepresenter.    The attending portion of this evaluation, treatment, and documentation was performed per Keara Brunson MD via Telemedicine AudioVisual using the secure [employer] software platform with 2 way audio/video. The provider was located off-site and the patient is located in the hospital. The aforementioned video software was utilized to document the relevant history and physical exam    Keara Brunson MD  Department of Hospital Medicine   [facility] [place] - Observation

## 2024-07-22 NOTE — SUBJECTIVE & OBJECTIVE
Follow-up For:  Patient Active Problem List    Diagnosis Date Noted Date Diagnosed    Pneumonia due to COVID-19 virus 07/20/2024     Acute hypoxic respiratory failure 02/04/2017      Discharge Planning   CECILIO:    Discharge Plan A: Home      Interval History:   Subjective: April Vasquez is being followed for Pneumonia due to COVID-19 virus. No acute events overnight. Lying in bed in no acute distress. She states her cough is improved. SOB improved as well. She hasn't tried to ambulate in the room due to fatigue. She lives at home with her sons who are able to assist if needed.     Symptoms: Improved. The patient reports shortness of breath and cough but denies, chest pain, weakness, headache, chest pressure, anorexia, diarrhea and anxiety.     Diet: Regular. Adequate intake. The patient reports nausea and vomiting.    Last Bowel Movement: 07/20/24    Activity Level: Returning to normal    Pain: The patient Denies pain     Review of Systems   Constitutional:  Positive for malaise/fatigue. Negative for chills and fever.   Respiratory:  Positive for shortness of breath.    Cardiovascular:  Negative for chest pain.   Gastrointestinal:  Negative for abdominal pain.        Scheduled Meds:   carvediloL  12.5 mg Oral BID WM    cefTRIAXone (Rocephin) IV (PEDS and ADULTS)  1 g Intravenous Q24H    cloNIDine  0.3 mg Oral TID    dexAMETHasone  6 mg Oral Daily    doxycycline IV (PEDS and ADULTS)  100 mg Intravenous Q12H    enoxparin  1 mg/kg Subcutaneous Q24H (treatment, non-standard time)    hydrALAZINE  100 mg Oral Q8H    losartan  100 mg Oral Daily    mupirocin   Nasal BID    pantoprazole  40 mg Oral Daily    remdesivir infusion  100 mg Intravenous Daily    sevelamer carbonate  1,600 mg Oral TID WM     Continuous Infusions:  PRN Meds:.  Current Facility-Administered Medications:     acetaminophen, 650 mg, Oral, Q6H PRN    guaiFENesin 100 mg/5 ml, 400 mg, Oral, Q6H PRN    hydrALAZINE, 10 mg, Intravenous, Q6H PRN     loperamide, 2 mg, Oral, Q6H PRN    melatonin, 6 mg, Oral, Nightly PRN    ondansetron, 4 mg, Intravenous, Q6H PRN    polyethylene glycol, 17 g, Oral, BID PRN    sodium chloride 0.9%, 250 mL, Intravenous, PRN    sodium chloride 0.9%, 10 mL, Intravenous, PRN      Objective:     Vitals:    07/22/24 0011 07/22/24 0428 07/22/24 0513 07/22/24 0743   BP: (!) 168/80  (!) 177/84 (!) 164/67   BP Location: Right arm  Right arm    Patient Position: Lying  Lying    Pulse: 66 (!) 53 61 62   Resp: 18  17 19   Temp: 98.3 °F (36.8 °C)  98.4 °F (36.9 °C) 98.5 °F (36.9 °C)   TempSrc: Oral  Oral    SpO2: 96%  (!) 91% 96%   Weight:       Height:           Patient Vitals for the past 72 hrs (Last 3 readings):   Weight   07/20/24 2129 39.7 kg (87 lb 8.4 oz)   07/20/24 1652 37.6 kg (83 lb)       Intake/Output Summary (Last 24 hours) at 7/22/2024 0744  Last data filed at 7/21/2024 1803  Gross per 24 hour   Intake 340 ml   Output 1500 ml   Net -1160 ml     Net IO Since Admission: -1,160 mL [07/22/24 0744]    Physical Exam  Vitals and nursing note reviewed.   Constitutional:       General: She is awake. She is not in acute distress.     Appearance: Normal appearance. She is well-developed and well-groomed. She is not toxic-appearing.   HENT:      Head: Normocephalic and atraumatic.   Cardiovascular:      Rate and Rhythm: Normal rate.   Pulmonary:      Effort: No tachypnea, accessory muscle usage or respiratory distress.   Abdominal:      General: There is no distension.   Neurological:      Mental Status: She is alert and oriented to person, place, and time. Mental status is at baseline.   Psychiatric:         Mood and Affect: Mood normal.         Behavior: Behavior normal. Behavior is cooperative.         Thought Content: Thought content normal.       Significant Labs: All pertinent labs within the past 24 hours have been reviewed.    BMP (Last 3 Results):   Recent Labs   Lab 07/20/24  1819 07/21/24  0535 07/22/24  0530   GLU 75 86 117*   NA  141 142 136   K 3.4* 4.0 4.6    106 102   CO2 23 20* 20*   BUN 23 26* 51*   CREATININE 4.2* 4.5* 5.6*   CALCIUM 9.4 8.7 8.7   MG 2.1  --   --      CBC (Last 3 Results):   Recent Labs   Lab 07/20/24  1819 07/21/24  0535 07/22/24  0530   WBC 8.23 5.91 11.23   RBC 2.77* 2.85* 2.70*   HGB 8.4* 8.7* 8.4*   HCT 27.5* 27.5* 25.4*    304 370   MCV 99* 97 94   MCH 30.3 30.5 31.1*   MCHC 30.5* 31.6* 33.1       Significant Imaging: I have reviewed all pertinent imaging results/findings within the past 24 hours.  X-Ray Chest AP Portable  Narrative: EXAMINATION:  XR CHEST AP PORTABLE    CLINICAL HISTORY:  Shortness of breath    TECHNIQUE:  Single frontal view of the chest was performed.    COMPARISON:  Portable chest x-ray 10/29/2023    FINDINGS:  Midline thoracic trachea, allowing for rightward rotation of the patient's torso.    Pre-existing atherosclerotic calcification in the aortic arch.    Pre-existing, but also increased, bilateral hilar prominence.    Pre-existing enlargement of the cardiopericardial silhouette.    Increased bilateral patchy ground-glass pulmonary airspace opacities, more extensive in the right lung than the left (or the left upper lung appears spared).    No discrete pneumothorax or blunting of either lateral costophrenic angle.  Osseous detail is suboptimally visualized, especially in the spine.    A poorly visualized deformity of the right proximal humerus suggested but is probably old.    EKG leads project upon the torso.    Anatomic detail is poorly visualized in the imaged portion of the upper abdomen.  Impression: New bilateral patchy asymmetric pulmonary airspace opacities, possibly representing pulmonary edema.  Pneumonia or other pathology not excluded.  Correlate clinically and with short-term follow-up radiographs until complete radiographic resolution.    Pre-existing enlargement of the cardiopericardial silhouette.    Pre-existing, but increased, prominence of the bilateral  darian, possibly on a vascular basis.  Other underlying hilar pathology not excluded.    Poorly visualized right proximal humeral deformity, favored to be old.  Correlate clinically.  Consider follow-up right shoulder x-rays.    This report was flagged in Epic as abnormal.    Electronically signed by: Homer Cool  Date:    07/20/2024  Time:    19:30

## 2024-07-23 PROBLEM — K59.01 SLOW TRANSIT CONSTIPATION: Status: ACTIVE | Noted: 2024-07-23

## 2024-07-23 LAB — IL6 SERPL-MCNC: 34 PG/ML

## 2024-07-23 PROCEDURE — 27000207 HC ISOLATION

## 2024-07-23 PROCEDURE — 97161 PT EVAL LOW COMPLEX 20 MIN: CPT

## 2024-07-23 PROCEDURE — 97165 OT EVAL LOW COMPLEX 30 MIN: CPT

## 2024-07-23 PROCEDURE — 25000003 PHARM REV CODE 250: Performed by: INTERNAL MEDICINE

## 2024-07-23 PROCEDURE — 25000003 PHARM REV CODE 250: Performed by: STUDENT IN AN ORGANIZED HEALTH CARE EDUCATION/TRAINING PROGRAM

## 2024-07-23 PROCEDURE — 25000003 PHARM REV CODE 250: Performed by: HOSPITALIST

## 2024-07-23 PROCEDURE — 97535 SELF CARE MNGMENT TRAINING: CPT

## 2024-07-23 PROCEDURE — 21400001 HC TELEMETRY ROOM

## 2024-07-23 PROCEDURE — 63600175 PHARM REV CODE 636 W HCPCS: Performed by: INTERNAL MEDICINE

## 2024-07-23 RX ORDER — POLYETHYLENE GLYCOL 3350 17 G/17G
17 POWDER, FOR SOLUTION ORAL DAILY
Status: DISCONTINUED | OUTPATIENT
Start: 2024-07-23 | End: 2024-07-24 | Stop reason: HOSPADM

## 2024-07-23 RX ORDER — AMOXICILLIN 250 MG
1 CAPSULE ORAL 2 TIMES DAILY
Status: DISCONTINUED | OUTPATIENT
Start: 2024-07-23 | End: 2024-07-24 | Stop reason: HOSPADM

## 2024-07-23 RX ADMIN — HYDRALAZINE HYDROCHLORIDE 100 MG: 25 TABLET ORAL at 09:07

## 2024-07-23 RX ADMIN — CEFTRIAXONE 1 G: 1 INJECTION, POWDER, FOR SOLUTION INTRAMUSCULAR; INTRAVENOUS at 05:07

## 2024-07-23 RX ADMIN — DOXYCYCLINE 100 MG: 100 INJECTION, POWDER, LYOPHILIZED, FOR SOLUTION INTRAVENOUS at 05:07

## 2024-07-23 RX ADMIN — DEXAMETHASONE 6 MG: 2 TABLET ORAL at 08:07

## 2024-07-23 RX ADMIN — POLYETHYLENE GLYCOL 3350 17 G: 17 POWDER, FOR SOLUTION ORAL at 09:07

## 2024-07-23 RX ADMIN — ACETAMINOPHEN 650 MG: 325 TABLET ORAL at 06:07

## 2024-07-23 RX ADMIN — SEVELAMER CARBONATE 1600 MG: 800 TABLET, FILM COATED ORAL at 05:07

## 2024-07-23 RX ADMIN — MUPIROCIN: 20 OINTMENT TOPICAL at 09:07

## 2024-07-23 RX ADMIN — CLONIDINE HYDROCHLORIDE 0.3 MG: 0.1 TABLET ORAL at 08:07

## 2024-07-23 RX ADMIN — HYDRALAZINE HYDROCHLORIDE 100 MG: 25 TABLET ORAL at 05:07

## 2024-07-23 RX ADMIN — PANTOPRAZOLE SODIUM 40 MG: 40 TABLET, DELAYED RELEASE ORAL at 08:07

## 2024-07-23 RX ADMIN — REMDESIVIR 100 MG: 100 INJECTION, POWDER, LYOPHILIZED, FOR SOLUTION INTRAVENOUS at 08:07

## 2024-07-23 RX ADMIN — HYDRALAZINE HYDROCHLORIDE 100 MG: 25 TABLET ORAL at 01:07

## 2024-07-23 RX ADMIN — DOXYCYCLINE 100 MG: 100 INJECTION, POWDER, LYOPHILIZED, FOR SOLUTION INTRAVENOUS at 06:07

## 2024-07-23 RX ADMIN — LOSARTAN POTASSIUM 100 MG: 25 TABLET, FILM COATED ORAL at 08:07

## 2024-07-23 RX ADMIN — Medication 6 MG: at 09:07

## 2024-07-23 RX ADMIN — SENNOSIDES AND DOCUSATE SODIUM 1 TABLET: 50; 8.6 TABLET ORAL at 09:07

## 2024-07-23 RX ADMIN — GUAIFENESIN 400 MG: 200 SOLUTION ORAL at 11:07

## 2024-07-23 RX ADMIN — ENOXAPARIN SODIUM 40 MG: 40 INJECTION SUBCUTANEOUS at 09:07

## 2024-07-23 RX ADMIN — SEVELAMER CARBONATE 1600 MG: 800 TABLET, FILM COATED ORAL at 12:07

## 2024-07-23 RX ADMIN — MUPIROCIN: 20 OINTMENT TOPICAL at 08:07

## 2024-07-23 NOTE — PLAN OF CARE
Problem: Occupational Therapy  Goal: Occupational Therapy Goal  Description: Goals to be met by: 7/30/24      Patient will increase functional independence with ADLs by performing:    UE Dressing with [place].  LE Dressing with [place].  Grooming while standing at sink with [place].  Toileting from toilet with [place] for hygiene and clothing management.   Sitting in chair  x60  minutes with Modified [place].  Toilet transfer to toilet with Modified [place].  Upper extremity exercise program x10-15  reps per handout, with independence.    Outcome: Progressing   Patient recommended for low intensive therapy and occupational therapy to see 2x/week if she stays in acute care. She may already have a shower chair at home, so recommend that she use it to prevent falls due to weakness.

## 2024-07-23 NOTE — NURSING
Received report from FEDERICO Beaulieu. Patient awake and alert. Safety measures in place. Call light within reach.

## 2024-07-23 NOTE — NURSING
[facility] [place]  Nurses Note -- 4 Eyes      7/23/2024       Skin assessed on: Q Shift      [x] No Pressure Injuries Present    []Prevention Measures Documented    [] Yes LDA  for Pressure Injury Previously documented     [] Yes New Pressure Injury Discovered   [] LDA for New Pressure Injury Added      Attending RN:  Dea Abdi RN     Second RN:  FEDERICO Beaulieu

## 2024-07-23 NOTE — NURSING
[facility]  Nurses Note -- 4 Eyes      7/23/2024       Skin assessed on: Q Shift      [x] No Pressure Injuries Present    []Prevention Measures Documented    [] Yes LDA  for Pressure Injury Previously documented     [] Yes New Pressure Injury Discovered   [] LDA for New Pressure Injury Added      Attending RN:  Brittnee Owens LPN     Second RN:  Dea Abdi RN

## 2024-07-23 NOTE — SUBJECTIVE & OBJECTIVE
Follow-up For:  Patient Active Problem List    Diagnosis Date Noted Date Diagnosed    Pneumonia due to COVID-19 virus 07/20/2024     Acute hypoxic respiratory failure 02/04/2017      Discharge Planning   CECILIO:    Discharge Plan A: Home      Interval History:   Subjective: April Vasquez is being followed for Pneumonia due to COVID-19 virus. No acute events overnight. Lying in bed in no acute distress. She states her cough is improved. SOB improved as well. She hasn't tried to ambulate in the room due to fatigue and weakness. Will get PT/OT to evaluate patient. She lives at home with her sons who are able to assist if needed.     Symptoms: Improved. The patient reports shortness of breath and cough but denies, chest pain, weakness, headache, chest pressure, anorexia, diarrhea and anxiety.     Diet: Regular. Adequate intake. The patient reports nausea and vomiting.    Last Bowel Movement: 07/20/24    Activity Level: Returning to normal    Pain: The patient Denies pain     Review of Systems   Constitutional:  Positive for malaise/fatigue. Negative for chills and fever.   Respiratory:  Positive for shortness of breath.    Cardiovascular:  Negative for chest pain.   Gastrointestinal:  Negative for abdominal pain.        Scheduled Meds:   carvediloL  12.5 mg Oral BID WM    cefTRIAXone (Rocephin) IV (PEDS and ADULTS)  1 g Intravenous Q24H    cloNIDine  0.3 mg Oral TID    dexAMETHasone  6 mg Oral Daily    doxycycline IV (PEDS and ADULTS)  100 mg Intravenous Q12H    enoxparin  1 mg/kg Subcutaneous Q24H (treatment, non-standard time)    hydrALAZINE  100 mg Oral Q8H    losartan  100 mg Oral Daily    mupirocin   Nasal BID    pantoprazole  40 mg Oral Daily    remdesivir infusion  100 mg Intravenous Daily    sevelamer carbonate  1,600 mg Oral TID WM     Continuous Infusions:  PRN Meds:.  Current Facility-Administered Medications:     acetaminophen, 650 mg, Oral, Q6H PRN    guaiFENesin 100 mg/5 ml, 400 mg, Oral, Q6H PRN     hydrALAZINE, 10 mg, Intravenous, Q6H PRN    loperamide, 2 mg, Oral, Q6H PRN    melatonin, 6 mg, Oral, Nightly PRN    ondansetron, 4 mg, Intravenous, Q6H PRN    polyethylene glycol, 17 g, Oral, BID PRN    sodium chloride 0.9%, 250 mL, Intravenous, PRN    sodium chloride 0.9%, 10 mL, Intravenous, PRN      Objective:     Vitals:    07/23/24 0301 07/23/24 0658 07/23/24 0707 07/23/24 0818   BP:  (!) 207/96  (!) 174/80   BP Location:  Left arm  Right arm   Patient Position:  Lying  Lying   Pulse: (!) 47 (!) 55 65 60   Resp:  20  18   Temp:  97.6 °F (36.4 °C)  97.9 °F (36.6 °C)   TempSrc:  Oral  Oral   SpO2:  97%  96%   Weight:       Height:           Patient Vitals for the past 72 hrs (Last 3 readings):   Weight   07/20/24 2129 39.7 kg (87 lb 8.4 oz)   07/20/24 1652 37.6 kg (83 lb)       Intake/Output Summary (Last 24 hours) at 7/23/2024 0827  Last data filed at 7/22/2024 2100  Gross per 24 hour   Intake 0 ml   Output 1500 ml   Net -1500 ml     Net IO Since Admission: -2,660 mL [07/23/24 0827]    Physical Exam  Vitals and nursing note reviewed.   Constitutional:       General: She is awake. She is not in acute distress.     Appearance: Normal appearance. She is well-developed and well-groomed. She is not toxic-appearing.   HENT:      Head: Normocephalic and atraumatic.   Cardiovascular:      Rate and Rhythm: Normal rate.   Pulmonary:      Effort: No tachypnea, accessory muscle usage or respiratory distress.   Abdominal:      General: There is no distension.   Neurological:      Mental Status: She is alert and oriented to person, place, and time. Mental status is at baseline.   Psychiatric:         Mood and Affect: Mood normal.         Behavior: Behavior normal. Behavior is cooperative.         Thought Content: Thought content normal.       Significant Labs: All pertinent labs within the past 24 hours have been reviewed.    BMP (Last 3 Results):   Recent Labs   Lab 07/20/24  1819 07/21/24  0535 07/22/24  0530   GLU 75 33  117*    142 136   K 3.4* 4.0 4.6    106 102   CO2 23 20* 20*   BUN 23 26* 51*   CREATININE 4.2* 4.5* 5.6*   CALCIUM 9.4 8.7 8.7   MG 2.1  --   --      CBC (Last 3 Results):   Recent Labs   Lab 07/20/24  1819 07/21/24  0535 07/22/24  0530   WBC 8.23 5.91 11.23   RBC 2.77* 2.85* 2.70*   HGB 8.4* 8.7* 8.4*   HCT 27.5* 27.5* 25.4*    304 370   MCV 99* 97 94   MCH 30.3 30.5 31.1*   MCHC 30.5* 31.6* 33.1       Significant Imaging: I have reviewed all pertinent imaging results/findings within the past 24 hours.  X-Ray Chest AP Portable  Narrative: EXAMINATION:  XR CHEST AP PORTABLE    CLINICAL HISTORY:  Shortness of breath    TECHNIQUE:  Single frontal view of the chest was performed.    COMPARISON:  Portable chest x-ray 10/29/2023    FINDINGS:  Midline thoracic trachea, allowing for rightward rotation of the patient's torso.    Pre-existing atherosclerotic calcification in the aortic arch.    Pre-existing, but also increased, bilateral hilar prominence.    Pre-existing enlargement of the cardiopericardial silhouette.    Increased bilateral patchy ground-glass pulmonary airspace opacities, more extensive in the right lung than the left (or the left upper lung appears spared).    No discrete pneumothorax or blunting of either lateral costophrenic angle.  Osseous detail is suboptimally visualized, especially in the spine.    A poorly visualized deformity of the right proximal humerus suggested but is probably old.    EKG leads project upon the torso.    Anatomic detail is poorly visualized in the imaged portion of the upper abdomen.  Impression: New bilateral patchy asymmetric pulmonary airspace opacities, possibly representing pulmonary edema.  Pneumonia or other pathology not excluded.  Correlate clinically and with short-term follow-up radiographs until complete radiographic resolution.    Pre-existing enlargement of the cardiopericardial silhouette.    Pre-existing, but increased, prominence of the  bilateral darian, possibly on a vascular basis.  Other underlying hilar pathology not excluded.    Poorly visualized right proximal humeral deformity, favored to be old.  Correlate clinically.  Consider follow-up right shoulder x-rays.    This report was flagged in Epic as abnormal.    Electronically signed by: Homer Cool  Date:    07/20/2024  Time:    19:30      Review of Systems   Constitutional:  Positive for malaise/fatigue. Negative for chills and fever.   Respiratory:  Positive for shortness of breath.    Cardiovascular:  Negative for chest pain.   Gastrointestinal:  Negative for abdominal pain.     Physical Exam  Vitals and nursing note reviewed.   Constitutional:       General: She is awake. She is not in acute distress.     Appearance: Normal appearance. She is well-developed and well-groomed. She is not toxic-appearing.   HENT:      Head: Normocephalic and atraumatic.   Cardiovascular:      Rate and Rhythm: Normal rate.   Pulmonary:      Effort: No tachypnea, accessory muscle usage or respiratory distress.   Abdominal:      General: There is no distension.   Neurological:      Mental Status: She is alert and oriented to person, place, and time. Mental status is at baseline.   Psychiatric:         Mood and Affect: Mood normal.         Behavior: Behavior normal. Behavior is cooperative.         Thought Content: Thought content normal.

## 2024-07-23 NOTE — PLAN OF CARE
Problem: Physical Therapy  Goal: Physical Therapy Goal  Description: Goals to be met by: 24     Patient will increase functional independence with mobility by performin. Pt to be (S)/mod I with bed mobility.  2. Pt to transfer with (S).  3. Pt to ambulate 50' /c or /s AD and (S).  4. Pt to be (I) with written HEP.    Outcome: Progressing   Initial eval completed, see in chart for details.

## 2024-07-23 NOTE — PT/OT/SLP EVAL
Physical Therapy Evaluation     Patient Name: April Vasquez   MRN: [id number]  Recent Surgery: * No surgery found *      Recommendations:     Discharge Recommendations: Low Intensity Therapy   Discharge Equipment Recommendations: walker, rolling, bedside commode       Assessment:     April Vasquez is a 68 y.o. female admitted with a medical diagnosis of Pneumonia due to COVID-19 virus. She presents with the following impairments/functional limitations: weakness, impaired functional mobility, gait instability.     Rehab Prognosis: Good; patient would benefit from acute PT services to address these deficits and reach maximum level of function.    Plan:     During this hospitalization, patient to be seen 3 x/week to address the above listed problems via gait training, therapeutic activities, therapeutic exercises    Plan of Care Expires: 07/30/24    Subjective     Chief Complaint: Weakness  Patient Comments/Goals: Pt agreeable to PT evaluation  Pain/Comfort:  Pain Rating 1: 0/10    Social History:  Living Environment: Patient lives with their son in a single story home with number of outside stair(s): 1  Prior Level of Function: Prior to admission, patient was independent  Equipment Used at Home: none  DME owned (not currently used): shower chair  Assistance Upon Discharge: family    Objective:     Communicated with RITCHIE Mcleod prior to session. Patient found up in chair with telemetry upon PT entry to room.    General Precautions: Standard,     Orthopedic Precautions: N/A   Braces: N/A    Respiratory Status: Room air    Exams:  Cognition: Patient is oriented to Person, Place, and Situation  RLE ROM: WFL  RLE Strength: WFL  LLE ROM: WFL  LLE Strength: WFL  Sensation: LT intact BLEs    Functional Mobility:  Gait belt applied - No  Bed Mobility  NT  Transfers  Sit to Stand: stand by assistance with no AD  Gait  Patient ambulated 20' with no AD and contact guard assistance and pt holding onto bed rail for support .  Patient demonstrates unsteady gait.   Balance  Sitting: independence  Standing: stand by assistance and contact guard assistance      Therapeutic Activities and Exercises:   Patient educated on role of acute care PT and PT POC and call light usage  Patient performed 1 set(s) of 10 repetitions of the following seated exercises: ankle pumps, long arc quads, marches, and hip adduction squeezes for bilateral LE. Patient required skilled PT for instruction of exercises and appropriate cues to perform exercises safely and appropriately.  Patient educated about importance of OOB mobility and remaining up in chair most of the day.  Pt provided with written HEP.    AM-PAC 6 CLICK MOBILITY  Total Score:20    Patient left up in chair with all lines intact and call button in reach.    GOALS:   Multidisciplinary Problems       Physical Therapy Goals          Problem: Physical Therapy    Goal Priority Disciplines Outcome Goal Variances Interventions   Physical Therapy Goal     PT, PT/OT Progressing     Description: Goals to be met by: 24     Patient will increase functional independence with mobility by performin. Pt to be (S)/mod I with bed mobility.  2. Pt to transfer with (S).  3. Pt to ambulate 50' /c or /s AD and (S).  4. Pt to be (I) with written HEP.                         History:     Past Medical History:   Diagnosis Date    (HFpEF) heart failure with preserved ejection fraction 10/30/2023    Anemia     Colon polyps     COVID-19 2021    Diabetes mellitus, type 2     ESRD (end stage renal disease) on dialysis 2017    Gallstones     Hypertension     Pulmonary edema     Renal disorder     dialysis MIRANDA fistula    Tobacco abuse     Uses walker        Past Surgical History:   Procedure Laterality Date    AVF  2017    CHOLECYSTECTOMY  2016    COLONOSCOPY N/A 2020    Procedure: COLONOSCOPY;  Surgeon: Hood Hernadez MD;  Location: [facility] ([address] [place] [address]);  Service: Endoscopy;  Laterality: N/A;  labs  prior-dialysis  covid test lapalco 8/3    Permacath Right 2017    TUBAL LIGATION         Time Tracking:     PT Received On: 07/23/24  PT Start Time: 1510  PT Stop Time: 1529  PT Total Time (min): 19 min     Billable Minutes: Evaluation 19    7/23/2024

## 2024-07-23 NOTE — PT/OT/SLP EVAL
Occupational Therapy Evaluation     Name: April Vasquez  MRN: [id number]  Admitting Diagnosis: Pneumonia due to COVID-19 virus  Recent Surgery: * No surgery found *      Recommendations:     Discharge Recommendations: Low Intensity Therapy  Level of Assistance Recommended: Intermittent assistance and Intermittent supervision  Discharge Equipment Recommendations: walker, rolling, bedside commode  Barriers to discharge: None    Assessment:     April Vaqsuez is a 68 y.o. female with a medical diagnosis of Pneumonia due to COVID-19 virus. She presents with performance deficits affecting function including weakness, impaired endurance, impaired functional mobility, gait instability, impaired balance, decreased safety awareness, impaired cardiopulmonary response to activity. Patient said she lives with 2 sons, so she has assistance at home, including driving, if she needs help there. She did not have SOB during adls and ambulation, but did have some muscular weakness when standing and walking.     Rehab Prognosis: Good; patient would benefit from acute OT services to address these deficits and reach maximum level of function.    Plan:     Patient to be seen 2 x/week to address the above listed problems via self-care/home management, therapeutic activities, therapeutic exercises  Plan of Care Expires: 07/30/24  Plan of Care Reviewed with: patient    Subjective     Chief Complaint: muscular weakness   Patient Comments/Goals: patient wants to go home today if possible   Pain/Comfort:  Pain Rating 1: 0/10    Patients cultural, spiritual, Christianity conflicts given the current situation: no    Social History:  Living Environment: Patient lives with their son in a single story home with number of outside stair(s): 1 with porch step   Prior Level of Function: Prior to admission, patient requires assistance with ADLs including driving and occasionally cooking and cleaning, but otherwise, she does her basic ADL herself.    Roles and Routines: Patient was not driving and not working prior to admission.  Equipment Used at Home: none  DME owned (not currently used): none  Assistance Upon Discharge: family    Objective:     Communicated with RN, Dea, prior to session. Patient found HOB elevated with telemetry upon OT entry to room.    General Precautions: Standard, contact, airborne, droplet   Orthopedic Precautions: N/A   Braces: N/A    Respiratory Status: Room air    Occupational Performance    Gait belt applied - N/A    Bed Mobility:   Rolling/Turning to Left with supervision  Scooting anteriorly to EOB to have both feet planted on floor: supervision  Supine to sit from left side of bed with supervision    Functional Mobility/Transfers:  Sit <> Stand Transfer with contact guard assistance with hand-held assist  Bed <> Chair Transfer using Step Transfer technique with contact guard assistance with hand-held assist  Functional Mobility: Patient walked 10' with hand-held assist with CGA from bed to transport w/c to change rooms. She did not have loss of balance or SOB during ambulation. She then walked from bed to chair in new room ~5' with hand held assist and sat in recliner chair to groom at sink.     Activities of Daily Living:  Grooming: independence to groom seated at sink   Upper Body Dressing: set up assistance to don outer gown     Cognitive/Visual Perceptual:  Cognitive/Psychosocial Skills:    -     Oriented to: Person, Place, Time, Situation  -     Follows Commands/attention: Follows multistep  commands  -     Communication: clear/fluent  -     Memory: No Deficits noted  -     Safety awareness/insight to disability: intact  -     Mood/Affect/Coping skills/emotional control: Appropriate to situation  Visual/Perceptual:    -     Intact    Physical Exam:  Balance:    -     Sitting: supervision  -     Standing: contact guard assistance  Postural examination/scapula alignment:    -       Rounded shoulders  -       Forward  "head  Skin integrity: Visible skin intact  Edema:  None noted  Sensation:    -       Intact  Motor Planning: Intact  Dominant hand: Right  Upper Extremity Range of Motion:     -       Right Upper Extremity: WNL  -       Left Upper Extremity: WNL  Upper Extremity Strength:    -       Right Upper Extremity: WFL  -       Left Upper Extremity: WFL   Strength:    -       Right Upper Extremity: WFL  -       Left Upper Extremity: WFL  Fine Motor Coordination:    -       Intact  Gross motor coordination:   WFL    AMPAC 6 Click ADL:  AMPAC Total Score: 20    Treatment & Education:  Patient educated on role of OT, POC, and goals for therapy  Patient educated on importance of OOB activities with staff member assistance and sitting OOB majority of the day  Occupational therapy educated patient re: scapula retraction/protraction while seated due to kyphotic posture, so she completed 10 reps.   Patient said she needed a rest break between walks due to "muscle weakness".     Patient clear to ambulate to/from bathroom with RN/PCT, assist x1  .    Patient left up in chair with all lines intact, call button in reach, and RN notified.    GOALS:   Multidisciplinary Problems       Occupational Therapy Goals          Problem: Occupational Therapy    Goal Priority Disciplines Outcome Interventions   Occupational Therapy Goal     OT, PT/OT Progressing    Description: Goals to be met by: 7/30/24      Patient will increase functional independence with ADLs by performing:    UE Dressing with Reagan.  LE Dressing with Reagan.  Grooming while standing at sink with Reagan.  Toileting from toilet with Reagan for hygiene and clothing management.   Sitting in chair  x60  minutes with Modified Reagan.  Toilet transfer to toilet with Modified Reagan.  Upper extremity exercise program x10-15  reps per handout, with independence.                         History:     Past Medical History:   Diagnosis Date    " (HFpEF) heart failure with preserved ejection fraction 10/30/2023    Anemia     Colon polyps     COVID-19 08/2021    Diabetes mellitus, type 2     ESRD (end stage renal disease) on dialysis 2017    Gallstones     Hypertension     Pulmonary edema     Renal disorder     dialysis MIRANDA fistula    Tobacco abuse     Uses walker          Past Surgical History:   Procedure Laterality Date    AVF  2017    CHOLECYSTECTOMY  2016    COLONOSCOPY N/A 8/6/2020    Procedure: COLONOSCOPY;  Surgeon: Hood Hernadez MD;  Location: [facility] ([address] [place] [address]);  Service: Endoscopy;  Laterality: N/A;  labs prior-dialysis  covid test lapalco 8/3    Permacath Right 2017    TUBAL LIGATION         Time Tracking:     OT Date of Treatment: 07/23/24  OT Start Time: 1430  OT Stop Time: 1504  OT Total Time (min): 34 min    Billable Minutes: Evaluation 15  and Self Care/Home Management 19 7/23/2024

## 2024-07-23 NOTE — PROGRESS NOTES
[facility] Hospital Medicine  Telemedicine Progress Note    Patient Name: April Vasquez  MRN: [id number]  Patient Class: IP- Inpatient   Admission Date: 7/20/2024  Length of Stay: 3 days  Attending Physician: Keara Hills MD  Primary Care Provider: Darrick Cabral MD          Subjective:     Principal Problem:Pneumonia due to COVID-19 virus        HPI:  68-year-old  female with a past medical history for malignant hypertension, ESRD on hemodialysis (Monday Wednesday Friday via a left arm AV fistula-sees Dr. Diego) presents to the ER with  dry cough and congestion with the associated weakness for about 5 days.  States that she has been eating or drinking because she can not taste anything at this time.  She did go to her dialysis this past week however.  She does not feel like she is volume overloaded.  Denies any nausea vomiting or diarrhea.  Denies any chest pain or pressure.  Does endorse some shortness of breath and treat dyspnea on exertion.  Denies any fevers.      Workup in the ER was overall concerning for pneumonia.  COVID came back positive.  Flu was negative.  Lactic acid was normal however her pro count was elevated greater than 1 concerning for possible underlying secondary bacterial infection.  She was started on steroids due to hypoxia requiring oxygen.  She was also started on remdesivir.  Chest x-ray showed:    New bilateral patchy asymmetric pulmonary airspace opacities, possibly representing pulmonary edema.  Pneumonia or other pathology not excluded.  Correlate clinically and with short-term follow-up radiographs until complete radiographic resolution.       We have been consulted for further management and admission to the inpatient telemetry hospitalist service.    Because this patient's clinical condition is guarded and requires monitoring of her vitals including oxygen requirements as well as IV remdesivir, steroids and IV antibiotics  , care in an  alternative location isn't clinically appropriate for the reasons stated above.         Overview/Hospital Course:  68-year-old  female with a past medical history for malignant hypertension, ESRD on hemodialysis (Monday Wednesday Friday via a left arm AV fistula-sees Dr. Diego) who was admitted for acute hypoxic respiratory failure secondary to COVID-19.  Nephrology consulted for inpatient HD needs.  Initiated on Decadron/remdesivir/Rocephin/doxycycline.  Chest x-ray with bilateral patchy asymmetric pulmonary opacities.  Procalcitonin at 1.64.  Follow up on cultures.  Wean oxygen as tolerated    Follow-up For:  Patient Active Problem List    Diagnosis Date Noted Date Diagnosed    Pneumonia due to COVID-19 virus 07/20/2024     Acute hypoxic respiratory failure 02/04/2017      Discharge Planning   CECILIO:    Discharge Plan A: Home      Interval History:   Subjective: April Vasquez is being followed for Pneumonia due to COVID-19 virus. No acute events overnight. Lying in bed in no acute distress. She states her cough is improved. SOB improved as well. She hasn't tried to ambulate in the room due to fatigue and weakness. Will get PT/OT to evaluate patient. She lives at home with her sons who are able to assist if needed.     Symptoms: Improved. The patient reports shortness of breath and cough but denies, chest pain, weakness, headache, chest pressure, anorexia, diarrhea and anxiety.     Diet: Regular. Adequate intake. The patient reports nausea and vomiting.    Last Bowel Movement: 07/20/24    Activity Level: Returning to normal    Pain: The patient Denies pain     Review of Systems   Constitutional:  Positive for malaise/fatigue. Negative for chills and fever.   Respiratory:  Positive for shortness of breath.    Cardiovascular:  Negative for chest pain.   Gastrointestinal:  Negative for abdominal pain.        Scheduled Meds:   carvediloL  12.5 mg Oral BID WM    cefTRIAXone (Rocephin) IV (PEDS and ADULTS)   1 g Intravenous Q24H    cloNIDine  0.3 mg Oral TID    dexAMETHasone  6 mg Oral Daily    doxycycline IV (PEDS and ADULTS)  100 mg Intravenous Q12H    enoxparin  1 mg/kg Subcutaneous Q24H (treatment, non-standard time)    hydrALAZINE  100 mg Oral Q8H    losartan  100 mg Oral Daily    mupirocin   Nasal BID    pantoprazole  40 mg Oral Daily    remdesivir infusion  100 mg Intravenous Daily    sevelamer carbonate  1,600 mg Oral TID WM     Continuous Infusions:  PRN Meds:.  Current Facility-Administered Medications:     acetaminophen, 650 mg, Oral, Q6H PRN    guaiFENesin 100 mg/5 ml, 400 mg, Oral, Q6H PRN    hydrALAZINE, 10 mg, Intravenous, Q6H PRN    loperamide, 2 mg, Oral, Q6H PRN    melatonin, 6 mg, Oral, Nightly PRN    ondansetron, 4 mg, Intravenous, Q6H PRN    polyethylene glycol, 17 g, Oral, BID PRN    sodium chloride 0.9%, 250 mL, Intravenous, PRN    sodium chloride 0.9%, 10 mL, Intravenous, PRN      Objective:     Vitals:    07/23/24 0301 07/23/24 0658 07/23/24 0707 07/23/24 0818   BP:  (!) 207/96  (!) 174/80   BP Location:  Left arm  Right arm   Patient Position:  Lying  Lying   Pulse: (!) 47 (!) 55 65 60   Resp:  20  18   Temp:  97.6 °F (36.4 °C)  97.9 °F (36.6 °C)   TempSrc:  Oral  Oral   SpO2:  97%  96%   Weight:       Height:           Patient Vitals for the past 72 hrs (Last 3 readings):   Weight   07/20/24 2129 39.7 kg (87 lb 8.4 oz)   07/20/24 1652 37.6 kg (83 lb)       Intake/Output Summary (Last 24 hours) at 7/23/2024 0827  Last data filed at 7/22/2024 2100  Gross per 24 hour   Intake 0 ml   Output 1500 ml   Net -1500 ml     Net IO Since Admission: -2,660 mL [07/23/24 0827]    Physical Exam  Vitals and nursing note reviewed.   Constitutional:       General: She is awake. She is not in acute distress.     Appearance: Normal appearance. She is well-developed and well-groomed. She is not toxic-appearing.   HENT:      Head: Normocephalic and atraumatic.   Cardiovascular:      Rate and Rhythm: Normal rate.    Pulmonary:      Effort: No tachypnea, accessory muscle usage or respiratory distress.   Abdominal:      General: There is no distension.   Neurological:      Mental Status: She is alert and oriented to person, place, and time. Mental status is at baseline.   Psychiatric:         Mood and Affect: Mood normal.         Behavior: Behavior normal. Behavior is cooperative.         Thought Content: Thought content normal.       Significant Labs: All pertinent labs within the past 24 hours have been reviewed.    BMP (Last 3 Results):   Recent Labs   Lab 07/20/24 1819 07/21/24  0535 07/22/24  0530   GLU 75 86 117*    142 136   K 3.4* 4.0 4.6    106 102   CO2 23 20* 20*   BUN 23 26* 51*   CREATININE 4.2* 4.5* 5.6*   CALCIUM 9.4 8.7 8.7   MG 2.1  --   --      CBC (Last 3 Results):   Recent Labs   Lab 07/20/24 1819 07/21/24  0535 07/22/24  0530   WBC 8.23 5.91 11.23   RBC 2.77* 2.85* 2.70*   HGB 8.4* 8.7* 8.4*   HCT 27.5* 27.5* 25.4*    304 370   MCV 99* 97 94   MCH 30.3 30.5 31.1*   MCHC 30.5* 31.6* 33.1       Significant Imaging: I have reviewed all pertinent imaging results/findings within the past 24 hours.  X-Ray Chest AP Portable  Narrative: EXAMINATION:  XR CHEST AP PORTABLE    CLINICAL HISTORY:  Shortness of breath    TECHNIQUE:  Single frontal view of the chest was performed.    COMPARISON:  Portable chest x-ray 10/29/2023    FINDINGS:  Midline thoracic trachea, allowing for rightward rotation of the patient's torso.    Pre-existing atherosclerotic calcification in the aortic arch.    Pre-existing, but also increased, bilateral hilar prominence.    Pre-existing enlargement of the cardiopericardial silhouette.    Increased bilateral patchy ground-glass pulmonary airspace opacities, more extensive in the right lung than the left (or the left upper lung appears spared).    No discrete pneumothorax or blunting of either lateral costophrenic angle.  Osseous detail is suboptimally visualized,  especially in the spine.    A poorly visualized deformity of the right proximal humerus suggested but is probably old.    EKG leads project upon the torso.    Anatomic detail is poorly visualized in the imaged portion of the upper abdomen.  Impression: New bilateral patchy asymmetric pulmonary airspace opacities, possibly representing pulmonary edema.  Pneumonia or other pathology not excluded.  Correlate clinically and with short-term follow-up radiographs until complete radiographic resolution.    Pre-existing enlargement of the cardiopericardial silhouette.    Pre-existing, but increased, prominence of the bilateral darian, possibly on a vascular basis.  Other underlying hilar pathology not excluded.    Poorly visualized right proximal humeral deformity, favored to be old.  Correlate clinically.  Consider follow-up right shoulder x-rays.    This report was flagged in Epic as abnormal.    Electronically signed by: Homer Cool  Date:    07/20/2024  Time:    19:30      Review of Systems   Constitutional:  Positive for malaise/fatigue. Negative for chills and fever.   Respiratory:  Positive for shortness of breath.    Cardiovascular:  Negative for chest pain.   Gastrointestinal:  Negative for abdominal pain.     Physical Exam  Vitals and nursing note reviewed.   Constitutional:       General: She is awake. She is not in acute distress.     Appearance: Normal appearance. She is well-developed and well-groomed. She is not toxic-appearing.   HENT:      Head: Normocephalic and atraumatic.   Cardiovascular:      Rate and Rhythm: Normal rate.   Pulmonary:      Effort: No tachypnea, accessory muscle usage or respiratory distress.   Abdominal:      General: There is no distension.   Neurological:      Mental Status: She is alert and oriented to person, place, and time. Mental status is at baseline.   Psychiatric:         Mood and Affect: Mood normal.         Behavior: Behavior normal. Behavior is cooperative.          Thought Content: Thought content normal.         Assessment/Plan:      * Pneumonia due to COVID-19 virus  Patient is identified as Severe COVID-19 based on hypoxemia with O2 saturations <94% on room air or on ambulation   Initiate standard COVID protocols; COVID-19 testing ,Infection Control notification  and isolation- respiratory, contact and droplet per protocol    Diagnostics: CBC, CMP, Procalcitonin, Rapid Flu, Blood Culture x2, and Portable CXR    Management: Initiate targeted therapy with Remdesivir, 200mg IV x1, followed by 100mg IV daily x5 days total, Dexamethasone PO/IV 6mg daily x10 days, and Anticoagulation, Patient admitted to non-critical care unit- Will initiate full dose anticoagulation with Weight based lovenox 1mg/kg IV q24 due to ESRD, Maintain oxygen saturations 92-96% via Nasal Cannula 1 LPM and monitor with continuous/intermittent pulse oximetry. , and Continuous cardiac monitoring.  Given an elevated pro count will also start IV antibiotics with doxycycline and Rocephin.    Advance Care Planning Current advance care plan has been discussed with patient/family/POA and patient currently wishes Full Code.  Time spent discussing with the patient discussing her diagnosis of COVID pneumonia 60 minutes.     Acute hypoxic respiratory failure  Patient with Hypoxic Respiratory failure which is Acute.  she is not on home oxygen.   Signs/symptoms of respiratory failure include- tachypnea and increased work of breathing. Contributing diagnoses includes - Pneumonia Labs and images were reviewed. Will treat underlying causes and adjust management of respiratory failure as follows- treat her underlying COVID pneumonia as well as possible secondary bacterial pneumonia. Follow up on cultures.    The respiratory condition has been ruled out and other diagnosis ruled in (hypoxia only)   Improved but still hypoxic requiring supplemental oxygen.     Slow transit constipation  Last Bowel Movement: 07/20/24  Cont  bowel regimen  GI Medications (From admission, onward)      Start     Stop Route Frequency Ordered    07/23/24 0930  senna-docusate 8.6-50 mg per tablet 1 tablet         -- Oral 2 times daily 07/23/24 0828 07/23/24 0930  polyethylene glycol packet 17 g         -- Oral Daily 07/23/24 0828 07/21/24 0900  pantoprazole EC tablet 40 mg         -- Oral Daily 07/20/24 2006 07/20/24 2050  ondansetron injection 4 mg         -- IV Every 6 hours PRN 07/20/24 1952 07/20/24 2050  loperamide capsule 2 mg         -- Oral Every 6 hours PRN 07/20/24 1952               (HFpEF) heart failure with preserved ejection fraction  No signs of acute on chronic heart failure.   Volume removal with HD.   Resume blood pressure medications.  Results for orders placed during the hospital encounter of 10/10/23    Echo    Interpretation Summary    Left Ventricle: The left ventricle is normal in size. Increased ventricular mass. Normal wall thickness. There is eccentric hypertrophy. Normal wall motion. There is normal systolic function with a visually estimated ejection fraction of 55 - 60%. There is normal diastolic function.    Left Atrium: Left atrium is severely dilated.    Right Ventricle: Normal right ventricular cavity size. Wall thickness is normal. Right ventricle wall motion  is normal. Systolic function is normal.    Mitral Valve: There is mild mitral annular calcification present. There is normal leaflet mobility. There is mild regurgitation.    Pulmonary Artery: The estimated pulmonary artery systolic pressure is 34 mmHg.    IVC/SVC: Normal venous pressure at 3 mmHg.      Anemia in chronic kidney disease  Hemoglobin hematocrit are stable at this time.  No signs of acute bleed.  She is on Lovenox 1 catherine per kg Q day for anticoagulation.  We will monitor her H&H.  No indication for transfusion at this time.    Essential hypertension  Chronic, controlled. Latest blood pressure and vitals reviewed-     Temp:  [97.1 °F (36.2  °C)-98 °F (36.7 °C)]   Pulse:  [47-86]   Resp:  [18-20]   BP: (129-207)/(56-96)   SpO2:  [92 %-97 %] .   Home meds for hypertension were reviewed and noted below.   Hypertension Medications               carvediloL (COREG) 12.5 MG tablet Take 1 tablet (12.5 mg total) by mouth 2 (two) times daily with meals.    cloNIDine (CATAPRES) 0.3 MG tablet TAKE 1 TABLET(0.3 MG) BY MOUTH THREE TIMES DAILY    hydrALAZINE (APRESOLINE) 100 MG tablet TAKE 1 TABLET(100 MG) BY MOUTH THREE TIMES DAILY    losartan (COZAAR) 100 MG tablet Take 1 tablet (100 mg total) by mouth once daily.    minoxidiL (LONITEN) 10 MG Tab Take 2 tablets (20 mg total) by mouth once daily.    NIFEdipine (PROCARDIA-XL) 60 MG (OSM) 24 hr tablet Take 1 tablet (60 mg total) by mouth 2 (two) times a day.            While in the hospital, will manage blood pressure as follows; Continue home antihypertensive regimen   Will utilize p.r.n. blood pressure medication only if patient's blood pressure greater than 180/110 and she develops symptoms such as worsening chest pain or shortness of breath.    ESRD (end stage renal disease) on dialysis  Nephrology consulted for inpatient HD needs    Diabetes mellitus type II, controlled  Patient's FSGs are controlled on current medication regimen.  Last A1c reviewed-   Lab Results   Component Value Date    HGBA1C 4.3 09/21/2023   States has not needed any treatment for her diabetes since dialysis.  She is diet-controlled.  Diet has been adjusted.      VTE Risk Mitigation (From admission, onward)           Ordered     enoxaparin injection 40 mg  Every 24 hours         07/20/24 1952                          I have completed this tele-visit without the assistance of a telepresenter.    The attending portion of this evaluation, treatment, and documentation was performed per Keara Brunson MD via Telemedicine AudioVisual using the secure [employer] software platform with 2 way audio/video. The provider was located off-site and the patient  is located in the hospital. The aforementioned video software was utilized to document the relevant history and physical exam    Keara Brunson MD  Department of Hospital Medicine   [facility] - Observation

## 2024-07-24 VITALS
HEART RATE: 76 BPM | SYSTOLIC BLOOD PRESSURE: 151 MMHG | WEIGHT: 87.5 LBS | DIASTOLIC BLOOD PRESSURE: 72 MMHG | HEIGHT: 64 IN | OXYGEN SATURATION: 96 % | RESPIRATION RATE: 18 BRPM | BODY MASS INDEX: 14.94 KG/M2 | TEMPERATURE: 99 F

## 2024-07-24 DIAGNOSIS — U07.1 COVID-19 VIRUS DETECTED: ICD-10-CM

## 2024-07-24 PROCEDURE — 80100014 HC HEMODIALYSIS 1:1

## 2024-07-24 PROCEDURE — 63600175 PHARM REV CODE 636 W HCPCS

## 2024-07-24 PROCEDURE — 25000003 PHARM REV CODE 250: Performed by: HOSPITALIST

## 2024-07-24 PROCEDURE — 25000003 PHARM REV CODE 250: Performed by: STUDENT IN AN ORGANIZED HEALTH CARE EDUCATION/TRAINING PROGRAM

## 2024-07-24 PROCEDURE — 63600175 PHARM REV CODE 636 W HCPCS: Performed by: INTERNAL MEDICINE

## 2024-07-24 PROCEDURE — 25000003 PHARM REV CODE 250: Performed by: INTERNAL MEDICINE

## 2024-07-24 RX ORDER — DEXAMETHASONE 6 MG/1
6 TABLET ORAL DAILY
Qty: 6 TABLET | Refills: 0 | Status: SHIPPED | OUTPATIENT
Start: 2024-07-24 | End: 2024-07-30

## 2024-07-24 RX ORDER — DROPERIDOL 2.5 MG/ML
1.25 INJECTION, SOLUTION INTRAMUSCULAR; INTRAVENOUS ONCE
Status: COMPLETED | OUTPATIENT
Start: 2024-07-24 | End: 2024-07-24

## 2024-07-24 RX ORDER — PANTOPRAZOLE SODIUM 40 MG/1
40 TABLET, DELAYED RELEASE ORAL DAILY
Qty: 90 TABLET | Refills: 3 | Status: SHIPPED | OUTPATIENT
Start: 2024-07-24 | End: 2025-07-24

## 2024-07-24 RX ADMIN — CARVEDILOL 12.5 MG: 12.5 TABLET, FILM COATED ORAL at 05:07

## 2024-07-24 RX ADMIN — ACETAMINOPHEN 650 MG: 325 TABLET ORAL at 02:07

## 2024-07-24 RX ADMIN — REMDESIVIR 100 MG: 100 INJECTION, POWDER, LYOPHILIZED, FOR SOLUTION INTRAVENOUS at 09:07

## 2024-07-24 RX ADMIN — DEXAMETHASONE 6 MG: 2 TABLET ORAL at 09:07

## 2024-07-24 RX ADMIN — LOSARTAN POTASSIUM 100 MG: 25 TABLET, FILM COATED ORAL at 09:07

## 2024-07-24 RX ADMIN — DOXYCYCLINE 100 MG: 100 INJECTION, POWDER, LYOPHILIZED, FOR SOLUTION INTRAVENOUS at 05:07

## 2024-07-24 RX ADMIN — CEFTRIAXONE 1 G: 1 INJECTION, POWDER, FOR SOLUTION INTRAMUSCULAR; INTRAVENOUS at 04:07

## 2024-07-24 RX ADMIN — SENNOSIDES AND DOCUSATE SODIUM 1 TABLET: 50; 8.6 TABLET ORAL at 09:07

## 2024-07-24 RX ADMIN — DROPERIDOL 1.25 MG: 2.5 INJECTION, SOLUTION INTRAMUSCULAR; INTRAVENOUS at 05:07

## 2024-07-24 RX ADMIN — MUPIROCIN: 20 OINTMENT TOPICAL at 09:07

## 2024-07-24 RX ADMIN — ONDANSETRON 4 MG: 2 INJECTION INTRAMUSCULAR; INTRAVENOUS at 05:07

## 2024-07-24 RX ADMIN — HYDRALAZINE HYDROCHLORIDE 100 MG: 25 TABLET ORAL at 05:07

## 2024-07-24 RX ADMIN — PANTOPRAZOLE SODIUM 40 MG: 40 TABLET, DELAYED RELEASE ORAL at 09:07

## 2024-07-24 RX ADMIN — SEVELAMER CARBONATE 1600 MG: 800 TABLET, FILM COATED ORAL at 05:07

## 2024-07-24 RX ADMIN — CLONIDINE HYDROCHLORIDE 0.3 MG: 0.1 TABLET ORAL at 09:07

## 2024-07-24 RX ADMIN — CARVEDILOL 12.5 MG: 12.5 TABLET, FILM COATED ORAL at 08:07

## 2024-07-24 RX ADMIN — HYDRALAZINE HYDROCHLORIDE 10 MG: 20 INJECTION INTRAMUSCULAR; INTRAVENOUS at 01:07

## 2024-07-24 NOTE — PLAN OF CARE
Case Management Final Discharge Note    Discharge Disposition: Home    New DME ordered / company name: None    Relevant SDOH / Transition of Care Barriers:  None    Primary Caretaker and contact information: Pt is independent. Pt's son, Michele will provide needed support. [phone]    Scheduled followup appointment: PCP scheduled    Transportation: Pt's family will provide transportation home.    Patient and family educated on discharge services and updated on DC plan. Bedside RN notified, patient clear to discharge from Case Management Perspective.       07/24/24 0946   Final Note   Assessment Type Final Discharge Note   Anticipated Discharge Disposition Home   What phone number can be called within the next 1-3 days to see how you are doing after discharge? [phone]   Hospital Resources/Appts/Education Provided Appointments scheduled and added to AVS   Post-Acute Status   Discharge Delays None known at this time

## 2024-07-24 NOTE — PROGRESS NOTES
Pt's manual BP reassessed 190/65, PA Rashed notified, IV Hydralazine 10 mg given as per the order.  Plan of care ongoing

## 2024-07-24 NOTE — PLAN OF CARE
Problem: Adult Inpatient Plan of Care  Goal: Plan of Care Review  Outcome: Progressing  Goal: Patient-Specific Goal (Individualized)  Outcome: Progressing  Goal: Absence of Hospital-Acquired Illness or Injury  Outcome: Progressing  Goal: Optimal Comfort and Wellbeing  Outcome: Progressing  Goal: Readiness for Transition of Care  Outcome: Progressing     Problem: Diabetes Comorbidity  Goal: Blood Glucose Level Within Targeted Range  Outcome: Progressing     Problem: Skin Injury Risk Increased  Goal: Skin Health and Integrity  Outcome: Progressing     Problem: Infection  Goal: Absence of Infection Signs and Symptoms  Outcome: Progressing     Problem: Hemodialysis  Goal: Safe, Effective Therapy Delivery  Outcome: Progressing  Goal: Effective Tissue Perfusion  Outcome: Progressing  Goal: Absence of Infection Signs and Symptoms  Outcome: Progressing

## 2024-07-24 NOTE — PROGRESS NOTES
[facility]  Nurses Note -- 4 Eyes      7/23/2024       Skin assessed on: Q Shift      [x] No Pressure Injuries Present    [x]Prevention Measures Documented    [] Yes LDA  for Pressure Injury Previously documented     [] Yes New Pressure Injury Discovered   [] LDA for New Pressure Injury Added      Attending RN:  Laura Sanchez RN     Second RN:   Dea Abdi RN

## 2024-07-24 NOTE — DISCHARGE SUMMARY
[facility]  Discharge Summary      Patient Name: April Vasquez  MRN: [id number]  Patient Class: IP- Inpatient  Admission Date: 7/20/2024  Hospital Length of Stay: 4 days  Discharge Date and Time:  07/24/2024 8:31 AM  Attending Physician: Keara Hills MD   Discharging Provider: Keara Brunson MD  Primary Care Provider: Darrick Cabral MD      HPI:   68-year-old  female with a past medical history for malignant hypertension, ESRD on hemodialysis (Monday Wednesday Friday via a left arm AV fistula-sees Dr. Diego) presents to the ER with  dry cough and congestion with the associated weakness for about 5 days.  States that she has been eating or drinking because she can not taste anything at this time.  She did go to her dialysis this past week however.  She does not feel like she is volume overloaded.  Denies any nausea vomiting or diarrhea.  Denies any chest pain or pressure.  Does endorse some shortness of breath and treat dyspnea on exertion.  Denies any fevers.      Workup in the ER was overall concerning for pneumonia.  COVID came back positive.  Flu was negative.  Lactic acid was normal however her pro count was elevated greater than 1 concerning for possible underlying secondary bacterial infection.  She was started on steroids due to hypoxia requiring oxygen.  She was also started on remdesivir.  Chest x-ray showed:    New bilateral patchy asymmetric pulmonary airspace opacities, possibly representing pulmonary edema.  Pneumonia or other pathology not excluded.  Correlate clinically and with short-term follow-up radiographs until complete radiographic resolution.       We have been consulted for further management and admission to the inpatient telemetry hospitalist service.    Because this patient's clinical condition is guarded and requires monitoring of her vitals including oxygen requirements as well as IV remdesivir, steroids and IV antibiotics  , care in an  alternative location isn't clinically appropriate for the reasons stated above.         * No surgery found *      Hospital Course:   68-year-old  female with a past medical history for malignant hypertension, ESRD on hemodialysis (Monday Wednesday Friday via a left arm AV fistula-sees Dr. Diego) who was admitted for acute hypoxic respiratory failure secondary to COVID-19.  Nephrology consulted for inpatient HD needs.  Initiated on Decadron/remdesivir/Rocephin/doxycycline.  Chest x-ray with bilateral patchy asymmetric pulmonary opacities.  Procalcitonin at 1.64.  Follow up on cultures.  Wean oxygen as tolerated     Goals of Care Treatment Preferences:  Code Status: Full Code      Consults:   Consults (From admission, onward)          Status Ordering Provider     Inpatient virtual consult to Hospital Medicine  Once        Provider:  (Not yet assigned)    Completed TUSHAR WRIGHT     Inpatient consult to Nephrology  Once        Provider:  Quinn Glasgow MD    Completed ANIKA GREER            Pulmonary  * Pneumonia due to COVID-19 virus  Patient is identified as Severe COVID-19 based on hypoxemia with O2 saturations <94% on room air or on ambulation   Initiate standard COVID protocols; COVID-19 testing ,Infection Control notification  and isolation- respiratory, contact and droplet per protocol    Diagnostics: CBC, CMP, Procalcitonin, Rapid Flu, Blood Culture x2, and Portable CXR    Management: Initiate targeted therapy with Remdesivir, 200mg IV x1, followed by 100mg IV daily x5 days total, Dexamethasone PO/IV 6mg daily x10 days, and Anticoagulation, Patient admitted to non-critical care unit- Will initiate full dose anticoagulation with Weight based lovenox 1mg/kg IV q24 due to ESRD, Maintain oxygen saturations 92-96% via Nasal Cannula 1 LPM and monitor with continuous/intermittent pulse oximetry. , and Continuous cardiac monitoring.  Given an elevated pro count will also start IV  antibiotics with doxycycline and Rocephin.    Advance Care Planning Current advance care plan has been discussed with patient/family/POA and patient currently wishes Full Code.  Time spent discussing with the patient discussing her diagnosis of COVID pneumonia 60 minutes.     Has completed treatment with Remdesivir  SOB resolved  Cough improved  Hypoxia resolved. Now ambulating on RA    Acute hypoxic respiratory failure  Patient with Hypoxic Respiratory failure which is Acute.  she is not on home oxygen.   Signs/symptoms of respiratory failure include- tachypnea and increased work of breathing. Contributing diagnoses includes - Pneumonia Labs and images were reviewed. Will treat underlying causes and adjust management of respiratory failure as follows- treat her underlying COVID pneumonia as well as possible secondary bacterial pneumonia. Follow up on cultures.    The respiratory condition has been ruled out and other diagnosis ruled in (hypoxia only)   Resolved  Now on RA    Cardiac/Vascular  (HFpEF) heart failure with preserved ejection fraction  No signs of acute on chronic heart failure.   Volume removal with HD.   Resume blood pressure medications.  Results for orders placed during the hospital encounter of 10/10/23    Echo    Interpretation Summary    Left Ventricle: The left ventricle is normal in size. Increased ventricular mass. Normal wall thickness. There is eccentric hypertrophy. Normal wall motion. There is normal systolic function with a visually estimated ejection fraction of 55 - 60%. There is normal diastolic function.    Left Atrium: Left atrium is severely dilated.    Right Ventricle: Normal right ventricular cavity size. Wall thickness is normal. Right ventricle wall motion  is normal. Systolic function is normal.    Mitral Valve: There is mild mitral annular calcification present. There is normal leaflet mobility. There is mild regurgitation.    Pulmonary Artery: The estimated pulmonary artery  systolic pressure is 34 mmHg.    IVC/SVC: Normal venous pressure at 3 mmHg.      Essential hypertension  Chronic, controlled. Latest blood pressure and vitals reviewed-     Temp:  [97.4 °F (36.3 °C)-98.2 °F (36.8 °C)]   Pulse:  [50-77]   Resp:  [18-20]   BP: (152-201)/(60-93)   SpO2:  [94 %-99 %] .   Home meds for hypertension were reviewed and noted below.   Hypertension Medications               carvediloL (COREG) 12.5 MG tablet Take 1 tablet (12.5 mg total) by mouth 2 (two) times daily with meals.    cloNIDine (CATAPRES) 0.3 MG tablet TAKE 1 TABLET(0.3 MG) BY MOUTH THREE TIMES DAILY    hydrALAZINE (APRESOLINE) 100 MG tablet TAKE 1 TABLET(100 MG) BY MOUTH THREE TIMES DAILY    losartan (COZAAR) 100 MG tablet Take 1 tablet (100 mg total) by mouth once daily.    minoxidiL (LONITEN) 10 MG Tab Take 2 tablets (20 mg total) by mouth once daily.    NIFEdipine (PROCARDIA-XL) 60 MG (OSM) 24 hr tablet Take 1 tablet (60 mg total) by mouth 2 (two) times a day.            While in the hospital, will manage blood pressure as follows; Continue home antihypertensive regimen   Will utilize p.r.n. blood pressure medication only if patient's blood pressure greater than 180/110 and she develops symptoms such as worsening chest pain or shortness of breath.    Renal/  ESRD (end stage renal disease) on dialysis  Nephrology consulted for inpatient HD needs    Oncology  Anemia in chronic kidney disease  Hemoglobin hematocrit are stable at this time.  No signs of acute bleed.  She is on Lovenox 1 catherine per kg Q day for anticoagulation.  We will monitor her H&H.  No indication for transfusion at this time.    Endocrine  Diabetes mellitus type II, controlled  Patient's FSGs are controlled on current medication regimen.  Last A1c reviewed-   Lab Results   Component Value Date    HGBA1C 4.3 09/21/2023   States has not needed any treatment for her diabetes since dialysis.  She is diet-controlled.  Diet has been adjusted.    GI  Slow transit  "constipation  Last Bowel Movement: 07/23/24  Cont bowel regimen  GI Medications (From admission, onward)      Start     Stop Route Frequency Ordered    07/23/24 0930  senna-docusate 8.6-50 mg per tablet 1 tablet         -- Oral 2 times daily 07/23/24 0828    07/23/24 0930  polyethylene glycol packet 17 g         -- Oral Daily 07/23/24 0828    07/21/24 0900  pantoprazole EC tablet 40 mg         -- Oral Daily 07/20/24 2006 07/20/24 2050  ondansetron injection 4 mg         -- IV Every 6 hours PRN 07/20/24 1952 07/20/24 2050  loperamide capsule 2 mg         -- Oral Every 6 hours PRN 07/20/24 1952                 Final Active Diagnoses:    Diagnosis Date Noted POA    PRINCIPAL PROBLEM:  Pneumonia due to COVID-19 virus [U07.1, J12.82] 07/20/2024 Yes    Acute hypoxic respiratory failure [J96.01] 02/04/2017 Yes    Slow transit constipation [K59.01] 07/23/2024 Yes    (HFpEF) heart failure with preserved ejection fraction [I50.30] 10/30/2023 Yes    Essential hypertension [I10] 04/19/2021 Yes    ESRD (end stage renal disease) on dialysis [N18.6, Z99.2] 08/05/2019 Not Applicable    Anemia in chronic kidney disease [N18.9, D63.1] 10/12/2018 Yes    Diabetes mellitus type II, controlled [E11.9] 09/19/2016 Yes      Problems Resolved During this Admission:       Discharged Condition: good    Disposition: Home or Self Care    Follow Up:   Follow-up Information       Darrick Cabral MD Follow up in 1 week(s).    Specialty: Internal Medicine  Why: For follow up and review of hosptial course   Contact information:  [facility]  [place] [address] [phone]                           Patient Instructions:      Diet Cardiac     Notify your health care provider if you experience any of the following:  difficulty breathing or increased cough     Activity as tolerated       Significant Diagnostic Studies: Labs: CMP No results for input(s): "NA", "K", "CL", "CO2", "GLU", "BUN", "CREATININE", "CALCIUM", "PROT", "ALBUMIN", " ""BILITOT", "ALKPHOS", "AST", "ALT", "ANIONGAP", "ESTGFRAFRICA", "EGFRNONAA" in the last 48 hours. and CBC No results for input(s): "WBC", "HGB", "HCT", "PLT" in the last 48 hours.    Pending Diagnostic Studies:       None           Medications:  Reconciled Home Medications:      Medication List        START taking these medications      dexAMETHasone 6 MG tablet  Commonly known as: DECADRON  Take 1 tablet (6 mg total) by mouth once daily. for 6 days     pantoprazole 40 MG tablet  Commonly known as: PROTONIX  Take 1 tablet (40 mg total) by mouth once daily.     pulse oximeter device  Commonly known as: pulse oximeter  by Apply Externally route 2 (two) times a day. Use twice daily at 8 AM and 3 PM and record the value in [facility] as directed.            CONTINUE taking these medications      acetaminophen 500 MG tablet  Commonly known as: TYLENOL  Take 500 mg by mouth every 6 (six) hours as needed for Pain.     carvediloL 12.5 MG tablet  Commonly known as: COREG  Take 1 tablet (12.5 mg total) by mouth 2 (two) times daily with meals.     cloNIDine 0.3 MG tablet  Commonly known as: CATAPRES  TAKE 1 TABLET(0.3 MG) BY MOUTH THREE TIMES DAILY     hydrALAZINE 100 MG tablet  Commonly known as: APRESOLINE  TAKE 1 TABLET(100 MG) BY MOUTH THREE TIMES DAILY     isoniazid 100 MG Tab  Commonly known as: NYDRAZID  Take 2 tablets (200 mg total) by mouth once daily.     losartan 100 MG tablet  Commonly known as: COZAAR  Take 1 tablet (100 mg total) by mouth once daily.     minoxidiL 10 MG Tab  Commonly known as: LONITEN  Take 2 tablets (20 mg total) by mouth once daily.     NIFEdipine 60 MG (OSM) 24 hr tablet  Commonly known as: PROCARDIA-XL  Take 1 tablet (60 mg total) by mouth 2 (two) times a day.     sevelamer carbonate 800 mg Tab  Commonly known as: RENVELA  Take 1,600 mg by mouth 3 (three) times daily with meals.     VITAMIN D ORAL  Take by mouth.              Indwelling Lines/Drains at time of discharge:   Lines/Drains/Airways  "      Drain  Duration                  Hemodialysis AV Fistula 08/04/19 2249 Left upper arm 1815 days                    Time spent on the discharge of patient: 45 minutes         The attending portion of this evaluation, treatment, and documentation was performed per Keara Brunson MD via Telemedicine AudioVisual using the secure [employer] software platform with 2 way audio/video. The provider was located off-site and the patient is located in the hospital. The aforementioned video software was utilized to document the relevant history and physical exam    Keara Brunson MD  Department of Hospital Medicine  [facility] - Observation

## 2024-07-24 NOTE — PROGRESS NOTES
Pt's manual /75, Scheduled tab Hydralazine 100 mg given.  Pt had an episode of vomiting after the med. PA Rashed notified, PRN Zofran and droperidol inj 1.25 mg given as per the order.  Pt resting well on bed at this time.  Plan of care ongoing.

## 2024-07-24 NOTE — PLAN OF CARE
[facility]    HOME HEALTH ORDERS  FACE TO FACE ENCOUNTER    Patient Name: April Vasquez  YOB: 1955    PCP: Darrick Cabral MD   PCP Address: [address] / [place] [address]  PCP Phone Number: [phone]  PCP Fax: [phone]       Encounter Date: 07/24/2024    Admit to Home Health    Diagnoses:  Active Hospital Problems    Diagnosis  POA    *Pneumonia due to COVID-19 virus [U07.1, J12.82]  Yes     Priority: 1 - High    Acute hypoxic respiratory failure [J96.01]  Yes     Priority: 2     Slow transit constipation [K59.01]  Yes    (HFpEF) heart failure with preserved ejection fraction [I50.30]  Yes    Essential hypertension [I10]  Yes     7/21/20 TTE normal LV systolic function, LVEF 55%; grade 2 diastolic dysfunction. Eccentric LVH. No WMA. Normal RV systolic function. PA pressure 26. Normal CVP 3  3/3/20 nu med stress test neg for ischemia   11/2/2023 renal artery doppler US: No evidence of renal artery stenosis      ESRD (end stage renal disease) on dialysis [N18.6, Z99.2]  Not Applicable    Anemia in chronic kidney disease [N18.9, D63.1]  Yes    Diabetes mellitus type II, controlled [E11.9]  Yes      Resolved Hospital Problems   No resolved problems to display.       No future appointments.   Follow-up Information       Darrick Cabral MD Follow up in 1 week(s).    Specialty: Internal Medicine  Why: For follow up and review of hosptial course   Contact information:  [facility] [address] [place] [id number] [phone]                               I have seen and examined this patient face to face today. My clinical findings that support the need for the home health skilled services and home bound status are the following:  Weakness/numbness causing balance and gait disturbance due to Weakness/Debility making it taxing to leave home.    Allergies:Review of patient's allergies indicates:  No Known Allergies    Diet: cardiac diet    Activities: activity as  tolerated    Nursing:   SN to complete comprehensive assessment including routine vital signs. Instruct on disease process and s/s of complications to report to MD. Review/verify medication list sent home with the patient at time of discharge  and instruct patient/caregiver as needed. Frequency may be adjusted depending on start of care date.    Notify MD if SBP > 160 or < 90; DBP > 90 or < 50; HR > 120 or < 50; Temp > 101; Other:         CONSULTS:    Physical Therapy to evaluate and treat. Evaluate for home safety and equipment needs; Establish/upgrade home exercise program. Perform / instruct on therapeutic exercises, gait training, transfer training, and Range of Motion.  Occupational Therapy to evaluate and treat. Evaluate home environment for safety and equipment needs. Perform/Instruct on transfers, ADL training, ROM, and therapeutic exercises.   to evaluate for community resources/long-range planning.  Aide to provide assistance with personal care, ADLs, and vital signs.    MISCELLANEOUS CARE:  Routine Skin for Bedridden Patients: Instruct patient/caregiver to apply moisture barrier cream to all skin folds and wet areas in perineal area daily and after baths and all bowel movements.    WOUND CARE ORDERS  no      Medications: Review discharge medications with patient and family and provide education.      Current Discharge Medication List        START taking these medications    Details   dexAMETHasone (DECADRON) 6 MG tablet Take 1 tablet (6 mg total) by mouth once daily. for 6 days  Qty: 6 tablet, Refills: 0      pantoprazole (PROTONIX) 40 MG tablet Take 1 tablet (40 mg total) by mouth once daily.  Qty: 90 tablet, Refills: 3      pulse oximeter (PULSE OXIMETER) device by Apply Externally route 2 (two) times a day. Use twice daily at 8 AM and 3 PM and [facility] as directed.  Qty: 1 each, Refills: 0           CONTINUE these medications which have NOT CHANGED    Details    acetaminophen (TYLENOL) 500 MG tablet Take 500 mg by mouth every 6 (six) hours as needed for Pain.      carvediloL (COREG) 12.5 MG tablet Take 1 tablet (12.5 mg total) by mouth 2 (two) times daily with meals.  Qty: 180 tablet, Refills: 3    Comments: .  Associated Diagnoses: Essential hypertension      cloNIDine (CATAPRES) 0.3 MG tablet TAKE 1 TABLET(0.3 MG) BY MOUTH THREE TIMES DAILY  Qty: 270 tablet, Refills: 3    Comments: .  Associated Diagnoses: Essential hypertension      ergocalciferol, vitamin D2, (VITAMIN D ORAL) Take by mouth.      hydrALAZINE (APRESOLINE) 100 MG tablet TAKE 1 TABLET(100 MG) BY MOUTH THREE TIMES DAILY  Qty: 270 tablet, Refills: 3    Comments: .  Associated Diagnoses: Essential hypertension      isoniazid (NYDRAZID) 100 MG Tab Take 2 tablets (200 mg total) by mouth once daily.  Qty: 60 tablet, Refills: 2      losartan (COZAAR) 100 MG tablet Take 1 tablet (100 mg total) by mouth once daily.  Qty: 90 tablet, Refills: 3    Comments: .      minoxidiL (LONITEN) 10 MG Tab Take 2 tablets (20 mg total) by mouth once daily.  Qty: 180 tablet, Refills: 3    Comments: .      NIFEdipine (PROCARDIA-XL) 60 MG (OSM) 24 hr tablet Take 1 tablet (60 mg total) by mouth 2 (two) times a day.  Qty: 180 tablet, Refills: 3    Comments: .      sevelamer carbonate (RENVELA) 800 mg Tab Take 1,600 mg by mouth 3 (three) times daily with meals.             I certify that this patient is confined to her home and needs intermittent skilled nursing care, physical therapy, and occupational therapy.

## 2024-07-24 NOTE — PLAN OF CARE
Pt is on isolation and has agreed to discharge.    07/24/24 0944   Medicare Message   Important Message from Medicare regarding Discharge Appeal Rights Given to patient/caregiver;Explained to patient/caregiver;Signed/date by patient/caregiver   Date IMM was signed 07/24/24   Time IMM was signed 0943

## 2024-07-24 NOTE — PROGRESS NOTES
Pt manual /75 after pt was given scheduled Hydralazine at 2100, unable to give scheduled clonidine, HR 58.  PA Rashxi notified, response received to recheck BP again in 1 hour and notify the provider, No order to give any PRN meds at this time.  Plan of care ongoing.

## 2024-07-24 NOTE — NURSING
[facility] [place], [place]  Nurses Note -- 4 Eyes      7/24/2024       Skin assessed on: Q Shift      [x] No Pressure Injuries Present    [x]Prevention Measures Documented    [] Yes LDA  for Pressure Injury Previously documented     [] Yes New Pressure Injury Discovered   [] LDA for New Pressure Injury Added      Attending RN:  Rosetta Simms RN     Second RN:  Adalberto

## 2024-07-25 NOTE — PLAN OF CARE
Problem: Adult Inpatient Plan of Care  Goal: Plan of Care Review  Outcome: Adequate for Care Transition  Goal: Patient-Specific Goal (Individualized)  Outcome: Adequate for Care Transition  Goal: Absence of Hospital-Acquired Illness or Injury  Outcome: Adequate for Care Transition  Goal: Optimal Comfort and Wellbeing  Outcome: Adequate for Care Transition  Goal: Readiness for Transition of Care  Outcome: Adequate for Care Transition     Problem: Diabetes Comorbidity  Goal: Blood Glucose Level Within Targeted Range  Outcome: Adequate for Care Transition     Problem: Skin Injury Risk Increased  Goal: Skin Health and Integrity  Outcome: Adequate for Care Transition     Problem: Infection  Goal: Absence of Infection Signs and Symptoms  Outcome: Adequate for Care Transition     Problem: Hemodialysis  Goal: Safe, Effective Therapy Delivery  Outcome: Adequate for Care Transition  Goal: Effective Tissue Perfusion  Outcome: Adequate for Care Transition  Goal: Absence of Infection Signs and Symptoms  Outcome: Adequate for Care Transition

## 2024-07-25 NOTE — PROGRESS NOTES
[facility] [place]  Nurses Note -- 4 Eyes      7/24/2024       Skin assessed on: Q Shift      [x] No Pressure Injuries Present    [x]Prevention Measures Documented    [] Yes LDA  for Pressure Injury Previously documented     [] Yes New Pressure Injury Discovered   [] LDA for New Pressure Injury Added      Attending RN:  Laura Sanchez RN     Second RN:   Rosetta Simms RN

## 2024-07-30 ENCOUNTER — HOSPITAL ENCOUNTER (INPATIENT)
Facility: HOSPITAL | Age: 69
LOS: 2 days | Discharge: HOME HEALTH | DRG: 640 | End: 2024-08-01
Attending: EMERGENCY MEDICINE | Admitting: STUDENT IN AN ORGANIZED HEALTH CARE EDUCATION/TRAINING PROGRAM
Payer: MEDICARE

## 2024-07-30 DIAGNOSIS — U07.1 COVID: ICD-10-CM

## 2024-07-30 DIAGNOSIS — E87.5 HYPERKALEMIA: Primary | ICD-10-CM

## 2024-07-30 DIAGNOSIS — R53.1 WEAKNESS: ICD-10-CM

## 2024-07-30 LAB
ALBUMIN SERPL BCP-MCNC: 2.6 G/DL (ref 3.5–5.2)
ALP SERPL-CCNC: 122 U/L (ref 55–135)
ALT SERPL W/O P-5'-P-CCNC: 12 U/L (ref 10–44)
ANION GAP SERPL CALC-SCNC: 22 MMOL/L (ref 8–16)
AST SERPL-CCNC: 23 U/L (ref 10–40)
BASOPHILS # BLD AUTO: 0.02 K/UL (ref 0–0.2)
BASOPHILS NFR BLD: 0.1 % (ref 0–1.9)
BILIRUB SERPL-MCNC: 0.6 MG/DL (ref 0.1–1)
BNP SERPL-MCNC: 1430 PG/ML (ref 0–99)
BUN SERPL-MCNC: 124 MG/DL (ref 8–23)
CALCIUM SERPL-MCNC: 9.6 MG/DL (ref 8.7–10.5)
CHLORIDE SERPL-SCNC: 92 MMOL/L (ref 95–110)
CO2 SERPL-SCNC: 19 MMOL/L (ref 23–29)
CREAT SERPL-MCNC: 9.6 MG/DL (ref 0.5–1.4)
DIFFERENTIAL METHOD BLD: ABNORMAL
EOSINOPHIL # BLD AUTO: 0 K/UL (ref 0–0.5)
EOSINOPHIL NFR BLD: 0 % (ref 0–8)
ERYTHROCYTE [DISTWIDTH] IN BLOOD BY AUTOMATED COUNT: 21.2 % (ref 11.5–14.5)
EST. GFR  (NO RACE VARIABLE): 4 ML/MIN/1.73 M^2
GLUCOSE SERPL-MCNC: 100 MG/DL (ref 70–110)
HCT VFR BLD AUTO: 32.4 % (ref 37–48.5)
HGB BLD-MCNC: 10.9 G/DL (ref 12–16)
IMM GRANULOCYTES # BLD AUTO: 0.23 K/UL (ref 0–0.04)
IMM GRANULOCYTES NFR BLD AUTO: 1.1 % (ref 0–0.5)
LACTATE SERPL-SCNC: 0.9 MMOL/L (ref 0.5–2.2)
LYMPHOCYTES # BLD AUTO: 0.6 K/UL (ref 1–4.8)
LYMPHOCYTES NFR BLD: 2.8 % (ref 18–48)
MCH RBC QN AUTO: 30.9 PG (ref 27–31)
MCHC RBC AUTO-ENTMCNC: 33.6 G/DL (ref 32–36)
MCV RBC AUTO: 92 FL (ref 82–98)
MONOCYTES # BLD AUTO: 0.7 K/UL (ref 0.3–1)
MONOCYTES NFR BLD: 3.3 % (ref 4–15)
NEUTROPHILS # BLD AUTO: 19.9 K/UL (ref 1.8–7.7)
NEUTROPHILS NFR BLD: 92.7 % (ref 38–73)
NRBC BLD-RTO: 0 /100 WBC
PLATELET # BLD AUTO: 260 K/UL (ref 150–450)
PMV BLD AUTO: 11.2 FL (ref 9.2–12.9)
POCT GLUCOSE: 160 MG/DL (ref 70–110)
POTASSIUM SERPL-SCNC: 8.4 MMOL/L (ref 3.5–5.1)
PROT SERPL-MCNC: 6.1 G/DL (ref 6–8.4)
RBC # BLD AUTO: 3.53 M/UL (ref 4–5.4)
SODIUM SERPL-SCNC: 133 MMOL/L (ref 136–145)
TROPONIN I SERPL DL<=0.01 NG/ML-MCNC: 0.09 NG/ML (ref 0–0.03)
WBC # BLD AUTO: 21.51 K/UL (ref 3.9–12.7)

## 2024-07-30 PROCEDURE — 85025 COMPLETE CBC W/AUTO DIFF WBC: CPT | Performed by: PHYSICIAN ASSISTANT

## 2024-07-30 PROCEDURE — 25000003 PHARM REV CODE 250: Performed by: EMERGENCY MEDICINE

## 2024-07-30 PROCEDURE — 20000000 HC ICU ROOM

## 2024-07-30 PROCEDURE — 82962 GLUCOSE BLOOD TEST: CPT

## 2024-07-30 PROCEDURE — 63600175 PHARM REV CODE 636 W HCPCS: Performed by: EMERGENCY MEDICINE

## 2024-07-30 PROCEDURE — 25000003 PHARM REV CODE 250: Performed by: STUDENT IN AN ORGANIZED HEALTH CARE EDUCATION/TRAINING PROGRAM

## 2024-07-30 PROCEDURE — 96375 TX/PRO/DX INJ NEW DRUG ADDON: CPT

## 2024-07-30 PROCEDURE — 80053 COMPREHEN METABOLIC PANEL: CPT | Performed by: PHYSICIAN ASSISTANT

## 2024-07-30 PROCEDURE — 63600175 PHARM REV CODE 636 W HCPCS: Performed by: STUDENT IN AN ORGANIZED HEALTH CARE EDUCATION/TRAINING PROGRAM

## 2024-07-30 PROCEDURE — 94644 CONT INHLJ TX 1ST HOUR: CPT

## 2024-07-30 PROCEDURE — 93010 ELECTROCARDIOGRAM REPORT: CPT | Mod: ,,, | Performed by: INTERNAL MEDICINE

## 2024-07-30 PROCEDURE — 84484 ASSAY OF TROPONIN QUANT: CPT | Performed by: PHYSICIAN ASSISTANT

## 2024-07-30 PROCEDURE — 27000207 HC ISOLATION

## 2024-07-30 PROCEDURE — 83605 ASSAY OF LACTIC ACID: CPT | Performed by: PHYSICIAN ASSISTANT

## 2024-07-30 PROCEDURE — 99285 EMERGENCY DEPT VISIT HI MDM: CPT | Mod: 25

## 2024-07-30 PROCEDURE — 93005 ELECTROCARDIOGRAM TRACING: CPT

## 2024-07-30 PROCEDURE — 5A1D70Z PERFORMANCE OF URINARY FILTRATION, INTERMITTENT, LESS THAN 6 HOURS PER DAY: ICD-10-PCS | Performed by: INTERNAL MEDICINE

## 2024-07-30 PROCEDURE — 96374 THER/PROPH/DIAG INJ IV PUSH: CPT | Mod: 59

## 2024-07-30 PROCEDURE — 83880 ASSAY OF NATRIURETIC PEPTIDE: CPT | Performed by: PHYSICIAN ASSISTANT

## 2024-07-30 PROCEDURE — 87040 BLOOD CULTURE FOR BACTERIA: CPT | Mod: 59 | Performed by: PHYSICIAN ASSISTANT

## 2024-07-30 PROCEDURE — 25000242 PHARM REV CODE 250 ALT 637 W/ HCPCS: Performed by: EMERGENCY MEDICINE

## 2024-07-30 PROCEDURE — 25000003 PHARM REV CODE 250: Mod: JZ,JG | Performed by: STUDENT IN AN ORGANIZED HEALTH CARE EDUCATION/TRAINING PROGRAM

## 2024-07-30 RX ORDER — SODIUM CHLORIDE 0.9 % (FLUSH) 0.9 %
10 SYRINGE (ML) INJECTION
Status: DISCONTINUED | OUTPATIENT
Start: 2024-07-30 | End: 2024-08-01 | Stop reason: HOSPADM

## 2024-07-30 RX ORDER — INDOMETHACIN 25 MG/1
50 CAPSULE ORAL
Status: COMPLETED | OUTPATIENT
Start: 2024-07-30 | End: 2024-07-30

## 2024-07-30 RX ORDER — ONDANSETRON HYDROCHLORIDE 2 MG/ML
4 INJECTION, SOLUTION INTRAVENOUS EVERY 8 HOURS PRN
Status: DISCONTINUED | OUTPATIENT
Start: 2024-07-30 | End: 2024-08-01 | Stop reason: HOSPADM

## 2024-07-30 RX ORDER — CLONIDINE HYDROCHLORIDE 0.1 MG/1
0.2 TABLET ORAL 3 TIMES DAILY
Status: DISCONTINUED | OUTPATIENT
Start: 2024-07-30 | End: 2024-07-31

## 2024-07-30 RX ORDER — CLONIDINE HYDROCHLORIDE 0.1 MG/1
0.3 TABLET ORAL 3 TIMES DAILY
Status: DISCONTINUED | OUTPATIENT
Start: 2024-07-30 | End: 2024-07-30

## 2024-07-30 RX ORDER — ALBUTEROL SULFATE 2.5 MG/.5ML
10 SOLUTION RESPIRATORY (INHALATION)
Status: COMPLETED | OUTPATIENT
Start: 2024-07-30 | End: 2024-07-30

## 2024-07-30 RX ORDER — HEPARIN SODIUM 5000 [USP'U]/ML
5000 INJECTION, SOLUTION INTRAVENOUS; SUBCUTANEOUS EVERY 8 HOURS
Status: DISCONTINUED | OUTPATIENT
Start: 2024-07-30 | End: 2024-08-01 | Stop reason: HOSPADM

## 2024-07-30 RX ORDER — CALCIUM GLUCONATE 20 MG/ML
1 INJECTION, SOLUTION INTRAVENOUS ONCE
Status: COMPLETED | OUTPATIENT
Start: 2024-07-30 | End: 2024-07-30

## 2024-07-30 RX ORDER — HYDRALAZINE HYDROCHLORIDE 20 MG/ML
10 INJECTION INTRAMUSCULAR; INTRAVENOUS
Status: COMPLETED | OUTPATIENT
Start: 2024-07-30 | End: 2024-07-30

## 2024-07-30 RX ORDER — IPRATROPIUM BROMIDE AND ALBUTEROL SULFATE 2.5; .5 MG/3ML; MG/3ML
3 SOLUTION RESPIRATORY (INHALATION) EVERY 4 HOURS PRN
Status: DISCONTINUED | OUTPATIENT
Start: 2024-07-30 | End: 2024-08-01 | Stop reason: HOSPADM

## 2024-07-30 RX ORDER — TALC
6 POWDER (GRAM) TOPICAL NIGHTLY PRN
Status: DISCONTINUED | OUTPATIENT
Start: 2024-07-30 | End: 2024-08-01 | Stop reason: HOSPADM

## 2024-07-30 RX ORDER — ACETAMINOPHEN 325 MG/1
650 TABLET ORAL EVERY 4 HOURS PRN
Status: DISCONTINUED | OUTPATIENT
Start: 2024-07-30 | End: 2024-08-01 | Stop reason: HOSPADM

## 2024-07-30 RX ORDER — SODIUM CHLORIDE 9 MG/ML
INJECTION, SOLUTION INTRAVENOUS ONCE
Status: CANCELLED | OUTPATIENT
Start: 2024-07-30 | End: 2024-07-30

## 2024-07-30 RX ORDER — PANTOPRAZOLE SODIUM 40 MG/1
40 TABLET, DELAYED RELEASE ORAL DAILY
Status: DISCONTINUED | OUTPATIENT
Start: 2024-07-31 | End: 2024-08-01 | Stop reason: HOSPADM

## 2024-07-30 RX ORDER — MUPIROCIN 20 MG/G
OINTMENT TOPICAL 2 TIMES DAILY
Status: DISCONTINUED | OUTPATIENT
Start: 2024-07-30 | End: 2024-08-01 | Stop reason: HOSPADM

## 2024-07-30 RX ORDER — LOSARTAN POTASSIUM 25 MG/1
100 TABLET ORAL DAILY
Status: DISCONTINUED | OUTPATIENT
Start: 2024-07-31 | End: 2024-07-31

## 2024-07-30 RX ORDER — HYDRALAZINE HYDROCHLORIDE 25 MG/1
100 TABLET, FILM COATED ORAL EVERY 8 HOURS
Status: DISCONTINUED | OUTPATIENT
Start: 2024-07-30 | End: 2024-07-31

## 2024-07-30 RX ORDER — CARVEDILOL 12.5 MG/1
12.5 TABLET ORAL 2 TIMES DAILY WITH MEALS
Status: DISCONTINUED | OUTPATIENT
Start: 2024-07-30 | End: 2024-08-01 | Stop reason: HOSPADM

## 2024-07-30 RX ORDER — SEVELAMER CARBONATE 800 MG/1
1600 TABLET, FILM COATED ORAL
Status: DISCONTINUED | OUTPATIENT
Start: 2024-07-31 | End: 2024-08-01 | Stop reason: HOSPADM

## 2024-07-30 RX ADMIN — SODIUM BICARBONATE 50 MEQ: 84 INJECTION, SOLUTION INTRAVENOUS at 08:07

## 2024-07-30 RX ADMIN — DEXTROSE MONOHYDRATE 250 ML: 100 INJECTION, SOLUTION INTRAVENOUS at 08:07

## 2024-07-30 RX ADMIN — SODIUM ZIRCONIUM CYCLOSILICATE 10 G: 10 POWDER, FOR SUSPENSION ORAL at 08:07

## 2024-07-30 RX ADMIN — HYDRALAZINE HYDROCHLORIDE 10 MG: 20 INJECTION INTRAMUSCULAR; INTRAVENOUS at 08:07

## 2024-07-30 RX ADMIN — ALBUTEROL SULFATE 10 MG: 2.5 SOLUTION RESPIRATORY (INHALATION) at 07:07

## 2024-07-30 RX ADMIN — HEPARIN SODIUM 5000 UNITS: 5000 INJECTION INTRAVENOUS; SUBCUTANEOUS at 10:07

## 2024-07-30 RX ADMIN — MUPIROCIN: 20 OINTMENT TOPICAL at 10:07

## 2024-07-30 RX ADMIN — CALCIUM GLUCONATE 1 G: 20 INJECTION, SOLUTION INTRAVENOUS at 08:07

## 2024-07-31 LAB
ALBUMIN SERPL BCP-MCNC: 2.4 G/DL (ref 3.5–5.2)
ALP SERPL-CCNC: 119 U/L (ref 55–135)
ALT SERPL W/O P-5'-P-CCNC: 14 U/L (ref 10–44)
ANION GAP SERPL CALC-SCNC: 16 MMOL/L (ref 8–16)
ANION GAP SERPL CALC-SCNC: 18 MMOL/L (ref 8–16)
AST SERPL-CCNC: 29 U/L (ref 10–40)
BASOPHILS # BLD AUTO: 0.02 K/UL (ref 0–0.2)
BASOPHILS NFR BLD: 0.1 % (ref 0–1.9)
BILIRUB SERPL-MCNC: 0.7 MG/DL (ref 0.1–1)
BUN SERPL-MCNC: 48 MG/DL (ref 8–23)
BUN SERPL-MCNC: 50 MG/DL (ref 8–23)
CALCIUM SERPL-MCNC: 9.2 MG/DL (ref 8.7–10.5)
CALCIUM SERPL-MCNC: 9.2 MG/DL (ref 8.7–10.5)
CHLORIDE SERPL-SCNC: 95 MMOL/L (ref 95–110)
CHLORIDE SERPL-SCNC: 95 MMOL/L (ref 95–110)
CO2 SERPL-SCNC: 24 MMOL/L (ref 23–29)
CO2 SERPL-SCNC: 25 MMOL/L (ref 23–29)
CREAT SERPL-MCNC: 4.4 MG/DL (ref 0.5–1.4)
CREAT SERPL-MCNC: 4.9 MG/DL (ref 0.5–1.4)
DIFFERENTIAL METHOD BLD: ABNORMAL
EOSINOPHIL # BLD AUTO: 0 K/UL (ref 0–0.5)
EOSINOPHIL NFR BLD: 0 % (ref 0–8)
ERYTHROCYTE [DISTWIDTH] IN BLOOD BY AUTOMATED COUNT: 21.5 % (ref 11.5–14.5)
EST. GFR  (NO RACE VARIABLE): 10 ML/MIN/1.73 M^2
EST. GFR  (NO RACE VARIABLE): 9 ML/MIN/1.73 M^2
GLUCOSE SERPL-MCNC: 73 MG/DL (ref 70–110)
GLUCOSE SERPL-MCNC: 76 MG/DL (ref 70–110)
HCT VFR BLD AUTO: 31.2 % (ref 37–48.5)
HGB BLD-MCNC: 10.7 G/DL (ref 12–16)
IMM GRANULOCYTES # BLD AUTO: 0.19 K/UL (ref 0–0.04)
IMM GRANULOCYTES NFR BLD AUTO: 0.9 % (ref 0–0.5)
LYMPHOCYTES # BLD AUTO: 1.1 K/UL (ref 1–4.8)
LYMPHOCYTES NFR BLD: 5.4 % (ref 18–48)
MAGNESIUM SERPL-MCNC: 2.1 MG/DL (ref 1.6–2.6)
MCH RBC QN AUTO: 31.1 PG (ref 27–31)
MCHC RBC AUTO-ENTMCNC: 34.3 G/DL (ref 32–36)
MCV RBC AUTO: 91 FL (ref 82–98)
MONOCYTES # BLD AUTO: 1.2 K/UL (ref 0.3–1)
MONOCYTES NFR BLD: 5.7 % (ref 4–15)
NEUTROPHILS # BLD AUTO: 17.7 K/UL (ref 1.8–7.7)
NEUTROPHILS NFR BLD: 87.9 % (ref 38–73)
NRBC BLD-RTO: 0 /100 WBC
OHS QRS DURATION: 90 MS
OHS QTC CALCULATION: 416 MS
PHOSPHATE SERPL-MCNC: 4.7 MG/DL (ref 2.7–4.5)
PLATELET # BLD AUTO: 219 K/UL (ref 150–450)
PMV BLD AUTO: 11.9 FL (ref 9.2–12.9)
POCT GLUCOSE: 106 MG/DL (ref 70–110)
POCT GLUCOSE: 144 MG/DL (ref 70–110)
POCT GLUCOSE: 74 MG/DL (ref 70–110)
POCT GLUCOSE: 80 MG/DL (ref 70–110)
POCT GLUCOSE: 82 MG/DL (ref 70–110)
POCT GLUCOSE: 88 MG/DL (ref 70–110)
POTASSIUM SERPL-SCNC: 4.6 MMOL/L (ref 3.5–5.1)
POTASSIUM SERPL-SCNC: 4.9 MMOL/L (ref 3.5–5.1)
PROT SERPL-MCNC: 5.6 G/DL (ref 6–8.4)
RBC # BLD AUTO: 3.44 M/UL (ref 4–5.4)
SODIUM SERPL-SCNC: 136 MMOL/L (ref 136–145)
SODIUM SERPL-SCNC: 137 MMOL/L (ref 136–145)
WBC # BLD AUTO: 20.12 K/UL (ref 3.9–12.7)

## 2024-07-31 PROCEDURE — 84100 ASSAY OF PHOSPHORUS: CPT | Performed by: STUDENT IN AN ORGANIZED HEALTH CARE EDUCATION/TRAINING PROGRAM

## 2024-07-31 PROCEDURE — 80053 COMPREHEN METABOLIC PANEL: CPT | Performed by: STUDENT IN AN ORGANIZED HEALTH CARE EDUCATION/TRAINING PROGRAM

## 2024-07-31 PROCEDURE — 25000003 PHARM REV CODE 250: Performed by: SURGERY

## 2024-07-31 PROCEDURE — 85025 COMPLETE CBC W/AUTO DIFF WBC: CPT | Performed by: STUDENT IN AN ORGANIZED HEALTH CARE EDUCATION/TRAINING PROGRAM

## 2024-07-31 PROCEDURE — 36415 COLL VENOUS BLD VENIPUNCTURE: CPT | Performed by: STUDENT IN AN ORGANIZED HEALTH CARE EDUCATION/TRAINING PROGRAM

## 2024-07-31 PROCEDURE — 25000003 PHARM REV CODE 250: Performed by: STUDENT IN AN ORGANIZED HEALTH CARE EDUCATION/TRAINING PROGRAM

## 2024-07-31 PROCEDURE — 80048 BASIC METABOLIC PNL TOTAL CA: CPT | Performed by: STUDENT IN AN ORGANIZED HEALTH CARE EDUCATION/TRAINING PROGRAM

## 2024-07-31 PROCEDURE — 63600175 PHARM REV CODE 636 W HCPCS: Performed by: SURGERY

## 2024-07-31 PROCEDURE — 25000242 PHARM REV CODE 250 ALT 637 W/ HCPCS: Performed by: STUDENT IN AN ORGANIZED HEALTH CARE EDUCATION/TRAINING PROGRAM

## 2024-07-31 PROCEDURE — 27000221 HC OXYGEN, UP TO 24 HOURS

## 2024-07-31 PROCEDURE — 27000207 HC ISOLATION

## 2024-07-31 PROCEDURE — 20000000 HC ICU ROOM

## 2024-07-31 PROCEDURE — 94761 N-INVAS EAR/PLS OXIMETRY MLT: CPT

## 2024-07-31 PROCEDURE — 63600175 PHARM REV CODE 636 W HCPCS: Performed by: STUDENT IN AN ORGANIZED HEALTH CARE EDUCATION/TRAINING PROGRAM

## 2024-07-31 PROCEDURE — 94640 AIRWAY INHALATION TREATMENT: CPT

## 2024-07-31 PROCEDURE — 80100014 HC HEMODIALYSIS 1:1

## 2024-07-31 PROCEDURE — 83735 ASSAY OF MAGNESIUM: CPT | Performed by: STUDENT IN AN ORGANIZED HEALTH CARE EDUCATION/TRAINING PROGRAM

## 2024-07-31 RX ORDER — HYDRALAZINE HYDROCHLORIDE 25 MG/1
100 TABLET, FILM COATED ORAL EVERY 8 HOURS
Status: DISCONTINUED | OUTPATIENT
Start: 2024-07-31 | End: 2024-08-01 | Stop reason: HOSPADM

## 2024-07-31 RX ORDER — LOSARTAN POTASSIUM 25 MG/1
100 TABLET ORAL DAILY
Status: DISCONTINUED | OUTPATIENT
Start: 2024-08-01 | End: 2024-08-01 | Stop reason: HOSPADM

## 2024-07-31 RX ORDER — LOSARTAN POTASSIUM 25 MG/1
100 TABLET ORAL DAILY
Status: DISCONTINUED | OUTPATIENT
Start: 2024-07-31 | End: 2024-07-31

## 2024-07-31 RX ORDER — CLONIDINE HYDROCHLORIDE 0.1 MG/1
0.3 TABLET ORAL 3 TIMES DAILY
Status: DISCONTINUED | OUTPATIENT
Start: 2024-07-31 | End: 2024-08-01 | Stop reason: HOSPADM

## 2024-07-31 RX ORDER — NIFEDIPINE 30 MG/1
60 TABLET, EXTENDED RELEASE ORAL 2 TIMES DAILY
Status: DISCONTINUED | OUTPATIENT
Start: 2024-07-31 | End: 2024-08-01 | Stop reason: HOSPADM

## 2024-07-31 RX ORDER — SODIUM CHLORIDE 9 MG/ML
INJECTION, SOLUTION INTRAVENOUS ONCE
Status: CANCELLED | OUTPATIENT
Start: 2024-07-31 | End: 2024-07-31

## 2024-07-31 RX ORDER — HYDRALAZINE HYDROCHLORIDE 20 MG/ML
20 INJECTION INTRAMUSCULAR; INTRAVENOUS EVERY 6 HOURS PRN
Status: DISCONTINUED | OUTPATIENT
Start: 2024-07-31 | End: 2024-08-01 | Stop reason: HOSPADM

## 2024-07-31 RX ADMIN — CLONIDINE HYDROCHLORIDE 0.3 MG: 0.1 TABLET ORAL at 06:07

## 2024-07-31 RX ADMIN — HYDRALAZINE HYDROCHLORIDE 100 MG: 25 TABLET ORAL at 12:07

## 2024-07-31 RX ADMIN — IPRATROPIUM BROMIDE AND ALBUTEROL SULFATE 3 ML: 2.5; .5 SOLUTION RESPIRATORY (INHALATION) at 07:07

## 2024-07-31 RX ADMIN — HEPARIN SODIUM 5000 UNITS: 5000 INJECTION INTRAVENOUS; SUBCUTANEOUS at 02:07

## 2024-07-31 RX ADMIN — NIFEDIPINE 60 MG: 30 TABLET, FILM COATED, EXTENDED RELEASE ORAL at 08:07

## 2024-07-31 RX ADMIN — HEPARIN SODIUM 5000 UNITS: 5000 INJECTION INTRAVENOUS; SUBCUTANEOUS at 06:07

## 2024-07-31 RX ADMIN — HEPARIN SODIUM 5000 UNITS: 5000 INJECTION INTRAVENOUS; SUBCUTANEOUS at 09:07

## 2024-07-31 RX ADMIN — HYDRALAZINE HYDROCHLORIDE 100 MG: 25 TABLET ORAL at 01:07

## 2024-07-31 RX ADMIN — CLONIDINE HYDROCHLORIDE 0.3 MG: 0.1 TABLET ORAL at 09:07

## 2024-07-31 RX ADMIN — MUPIROCIN: 20 OINTMENT TOPICAL at 09:07

## 2024-07-31 RX ADMIN — PANTOPRAZOLE SODIUM 40 MG: 40 TABLET, DELAYED RELEASE ORAL at 08:07

## 2024-07-31 RX ADMIN — CLONIDINE HYDROCHLORIDE 0.3 MG: 0.1 TABLET ORAL at 02:07

## 2024-07-31 RX ADMIN — CARVEDILOL 12.5 MG: 12.5 TABLET, FILM COATED ORAL at 04:07

## 2024-07-31 RX ADMIN — CARVEDILOL 12.5 MG: 12.5 TABLET, FILM COATED ORAL at 01:07

## 2024-07-31 RX ADMIN — NIFEDIPINE 60 MG: 30 TABLET, FILM COATED, EXTENDED RELEASE ORAL at 09:07

## 2024-07-31 RX ADMIN — HYDRALAZINE HYDROCHLORIDE 20 MG: 20 INJECTION INTRAMUSCULAR; INTRAVENOUS at 10:07

## 2024-07-31 RX ADMIN — HYDRALAZINE HYDROCHLORIDE 100 MG: 25 TABLET ORAL at 05:07

## 2024-07-31 RX ADMIN — CARVEDILOL 12.5 MG: 12.5 TABLET, FILM COATED ORAL at 08:07

## 2024-07-31 RX ADMIN — LOSARTAN POTASSIUM 100 MG: 25 TABLET, FILM COATED ORAL at 03:07

## 2024-07-31 RX ADMIN — HYDRALAZINE HYDROCHLORIDE 20 MG: 20 INJECTION INTRAMUSCULAR; INTRAVENOUS at 01:07

## 2024-07-31 RX ADMIN — HYDRALAZINE HYDROCHLORIDE 100 MG: 25 TABLET ORAL at 09:07

## 2024-08-01 VITALS
DIASTOLIC BLOOD PRESSURE: 57 MMHG | BODY MASS INDEX: 14.68 KG/M2 | TEMPERATURE: 99 F | HEART RATE: 66 BPM | WEIGHT: 86 LBS | OXYGEN SATURATION: 96 % | HEIGHT: 64 IN | RESPIRATION RATE: 17 BRPM | SYSTOLIC BLOOD PRESSURE: 123 MMHG

## 2024-08-01 LAB
ALBUMIN SERPL BCP-MCNC: 2.3 G/DL (ref 3.5–5.2)
ALP SERPL-CCNC: 153 U/L (ref 55–135)
ALT SERPL W/O P-5'-P-CCNC: 14 U/L (ref 10–44)
ANION GAP SERPL CALC-SCNC: 16 MMOL/L (ref 8–16)
AST SERPL-CCNC: 17 U/L (ref 10–40)
BILIRUB SERPL-MCNC: 0.6 MG/DL (ref 0.1–1)
BUN SERPL-MCNC: 70 MG/DL (ref 8–23)
CALCIUM SERPL-MCNC: 8.8 MG/DL (ref 8.7–10.5)
CHLORIDE SERPL-SCNC: 94 MMOL/L (ref 95–110)
CO2 SERPL-SCNC: 24 MMOL/L (ref 23–29)
CREAT SERPL-MCNC: 6.1 MG/DL (ref 0.5–1.4)
EST. GFR  (NO RACE VARIABLE): 7 ML/MIN/1.73 M^2
GLUCOSE SERPL-MCNC: 128 MG/DL (ref 70–110)
MAGNESIUM SERPL-MCNC: 2.2 MG/DL (ref 1.6–2.6)
PHOSPHATE SERPL-MCNC: 4.5 MG/DL (ref 2.7–4.5)
POCT GLUCOSE: 117 MG/DL (ref 70–110)
POCT GLUCOSE: 134 MG/DL (ref 70–110)
POCT GLUCOSE: 147 MG/DL (ref 70–110)
POCT GLUCOSE: 96 MG/DL (ref 70–110)
POTASSIUM SERPL-SCNC: 4.9 MMOL/L (ref 3.5–5.1)
PROT SERPL-MCNC: 5.5 G/DL (ref 6–8.4)
SODIUM SERPL-SCNC: 134 MMOL/L (ref 136–145)

## 2024-08-01 PROCEDURE — 25000003 PHARM REV CODE 250: Performed by: SURGERY

## 2024-08-01 PROCEDURE — 80053 COMPREHEN METABOLIC PANEL: CPT | Performed by: STUDENT IN AN ORGANIZED HEALTH CARE EDUCATION/TRAINING PROGRAM

## 2024-08-01 PROCEDURE — 94761 N-INVAS EAR/PLS OXIMETRY MLT: CPT

## 2024-08-01 PROCEDURE — 83735 ASSAY OF MAGNESIUM: CPT | Performed by: STUDENT IN AN ORGANIZED HEALTH CARE EDUCATION/TRAINING PROGRAM

## 2024-08-01 PROCEDURE — 63600175 PHARM REV CODE 636 W HCPCS: Performed by: STUDENT IN AN ORGANIZED HEALTH CARE EDUCATION/TRAINING PROGRAM

## 2024-08-01 PROCEDURE — 97535 SELF CARE MNGMENT TRAINING: CPT

## 2024-08-01 PROCEDURE — 25000003 PHARM REV CODE 250: Performed by: STUDENT IN AN ORGANIZED HEALTH CARE EDUCATION/TRAINING PROGRAM

## 2024-08-01 PROCEDURE — 97165 OT EVAL LOW COMPLEX 30 MIN: CPT

## 2024-08-01 PROCEDURE — 36415 COLL VENOUS BLD VENIPUNCTURE: CPT | Performed by: STUDENT IN AN ORGANIZED HEALTH CARE EDUCATION/TRAINING PROGRAM

## 2024-08-01 PROCEDURE — 84100 ASSAY OF PHOSPHORUS: CPT | Performed by: STUDENT IN AN ORGANIZED HEALTH CARE EDUCATION/TRAINING PROGRAM

## 2024-08-01 PROCEDURE — 97162 PT EVAL MOD COMPLEX 30 MIN: CPT

## 2024-08-01 PROCEDURE — 97116 GAIT TRAINING THERAPY: CPT

## 2024-08-01 RX ADMIN — MUPIROCIN: 20 OINTMENT TOPICAL at 08:08

## 2024-08-01 RX ADMIN — HYDRALAZINE HYDROCHLORIDE 100 MG: 25 TABLET ORAL at 05:08

## 2024-08-01 RX ADMIN — CLONIDINE HYDROCHLORIDE 0.3 MG: 0.1 TABLET ORAL at 08:08

## 2024-08-01 RX ADMIN — PANTOPRAZOLE SODIUM 40 MG: 40 TABLET, DELAYED RELEASE ORAL at 08:08

## 2024-08-01 RX ADMIN — NIFEDIPINE 60 MG: 30 TABLET, FILM COATED, EXTENDED RELEASE ORAL at 08:08

## 2024-08-01 RX ADMIN — LOSARTAN POTASSIUM 100 MG: 25 TABLET, FILM COATED ORAL at 08:08

## 2024-08-01 RX ADMIN — CARVEDILOL 12.5 MG: 12.5 TABLET, FILM COATED ORAL at 08:08

## 2024-08-01 RX ADMIN — HEPARIN SODIUM 5000 UNITS: 5000 INJECTION INTRAVENOUS; SUBCUTANEOUS at 05:08

## 2024-08-03 LAB
BACTERIA BLD CULT: NORMAL
BACTERIA BLD CULT: NORMAL

## 2024-09-04 ENCOUNTER — EXTERNAL HOME HEALTH (OUTPATIENT)
Dept: HOME HEALTH SERVICES | Facility: HOSPITAL | Age: 69
End: 2024-09-04
Payer: MEDICARE

## 2024-09-18 ENCOUNTER — PATIENT MESSAGE (OUTPATIENT)
Dept: ADMINISTRATIVE | Facility: HOSPITAL | Age: 69
End: 2024-09-18
Payer: MEDICARE

## 2024-10-04 ENCOUNTER — TELEPHONE (OUTPATIENT)
Dept: INFECTIOUS DISEASES | Facility: CLINIC | Age: 69
End: 2024-10-04
Payer: MEDICARE

## 2024-10-04 NOTE — TELEPHONE ENCOUNTER
Called patient, no answer  Left a voice message to call the clinic back        ----- Message from Melanie Merritt MD sent at 10/3/2024  5:31 PM CDT -----  Can you ask her if she ever took the isoniazid for latent tuberculosis?    Thank you!

## 2024-10-11 NOTE — ASSESSMENT & PLAN NOTE
No signs of acute on chronic heart failure.  She did get a bolus of IV fluids in the ER and we will continue to monitor her respiratory status at this time.  Her next dialysis is scheduled for Monday.  However if signs of pulmonary congestion we will discuss with Nephrology need to dialyze sooner.  Resume blood pressure medications.  Results for orders placed during the hospital encounter of 10/10/23    Echo    Interpretation Summary    Left Ventricle: The left ventricle is normal in size. Increased ventricular mass. Normal wall thickness. There is eccentric hypertrophy. Normal wall motion. There is normal systolic function with a visually estimated ejection fraction of 55 - 60%. There is normal diastolic function.    Left Atrium: Left atrium is severely dilated.    Right Ventricle: Normal right ventricular cavity size. Wall thickness is normal. Right ventricle wall motion  is normal. Systolic function is normal.    Mitral Valve: There is mild mitral annular calcification present. There is normal leaflet mobility. There is mild regurgitation.    Pulmonary Artery: The estimated pulmonary artery systolic pressure is 34 mmHg.    IVC/SVC: Normal venous pressure at 3 mmHg.    
  No signs of acute on chronic heart failure.  She did get a bolus of IV fluids in the ER and we will continue to monitor her respiratory status at this time.  Her next dialysis is scheduled for Monday.  However if signs of pulmonary congestion we will discuss with Nephrology need to dialyze sooner.  Resume blood pressure medications.  Results for orders placed during the hospital encounter of 10/10/23    Echo    Interpretation Summary    Left Ventricle: The left ventricle is normal in size. Increased ventricular mass. Normal wall thickness. There is eccentric hypertrophy. Normal wall motion. There is normal systolic function with a visually estimated ejection fraction of 55 - 60%. There is normal diastolic function.    Left Atrium: Left atrium is severely dilated.    Right Ventricle: Normal right ventricular cavity size. Wall thickness is normal. Right ventricle wall motion  is normal. Systolic function is normal.    Mitral Valve: There is mild mitral annular calcification present. There is normal leaflet mobility. There is mild regurgitation.    Pulmonary Artery: The estimated pulmonary artery systolic pressure is 34 mmHg.    IVC/SVC: Normal venous pressure at 3 mmHg.    
Chronic, controlled. Latest blood pressure and vitals reviewed-     Temp:  [97.1 °F (36.2 °C)-98 °F (36.7 °C)]   Pulse:  [47-86]   Resp:  [18-20]   BP: (129-207)/(56-96)   SpO2:  [92 %-97 %] .   Home meds for hypertension were reviewed and noted below.   Hypertension Medications               carvediloL (COREG) 12.5 MG tablet Take 1 tablet (12.5 mg total) by mouth 2 (two) times daily with meals.    cloNIDine (CATAPRES) 0.3 MG tablet TAKE 1 TABLET(0.3 MG) BY MOUTH THREE TIMES DAILY    hydrALAZINE (APRESOLINE) 100 MG tablet TAKE 1 TABLET(100 MG) BY MOUTH THREE TIMES DAILY    losartan (COZAAR) 100 MG tablet Take 1 tablet (100 mg total) by mouth once daily.    minoxidiL (LONITEN) 10 MG Tab Take 2 tablets (20 mg total) by mouth once daily.    NIFEdipine (PROCARDIA-XL) 60 MG (OSM) 24 hr tablet Take 1 tablet (60 mg total) by mouth 2 (two) times a day.            While in the hospital, will manage blood pressure as follows; Continue home antihypertensive regimen   Will utilize p.r.n. blood pressure medication only if patient's blood pressure greater than 180/110 and she develops symptoms such as worsening chest pain or shortness of breath.  
Chronic, controlled. Latest blood pressure and vitals reviewed-     Temp:  [97.1 °F (36.2 °C)-98.5 °F (36.9 °C)]   Pulse:  [53-80]   Resp:  [16-19]   BP: (150-185)/(67-84)   SpO2:  [91 %-99 %] .   Home meds for hypertension were reviewed and noted below.   Hypertension Medications               carvediloL (COREG) 12.5 MG tablet Take 1 tablet (12.5 mg total) by mouth 2 (two) times daily with meals.    cloNIDine (CATAPRES) 0.3 MG tablet TAKE 1 TABLET(0.3 MG) BY MOUTH THREE TIMES DAILY    hydrALAZINE (APRESOLINE) 100 MG tablet TAKE 1 TABLET(100 MG) BY MOUTH THREE TIMES DAILY    losartan (COZAAR) 100 MG tablet Take 1 tablet (100 mg total) by mouth once daily.    minoxidiL (LONITEN) 10 MG Tab Take 2 tablets (20 mg total) by mouth once daily.    NIFEdipine (PROCARDIA-XL) 60 MG (OSM) 24 hr tablet Take 1 tablet (60 mg total) by mouth 2 (two) times a day.            While in the hospital, will manage blood pressure as follows; Continue home antihypertensive regimen   Will utilize p.r.n. blood pressure medication only if patient's blood pressure greater than 180/110 and she develops symptoms such as worsening chest pain or shortness of breath.  
Chronic, controlled. Latest blood pressure and vitals reviewed-     Temp:  [97.4 °F (36.3 °C)-98.2 °F (36.8 °C)]   Pulse:  [50-77]   Resp:  [18-20]   BP: (152-201)/(60-93)   SpO2:  [94 %-99 %] .   Home meds for hypertension were reviewed and noted below.   Hypertension Medications               carvediloL (COREG) 12.5 MG tablet Take 1 tablet (12.5 mg total) by mouth 2 (two) times daily with meals.    cloNIDine (CATAPRES) 0.3 MG tablet TAKE 1 TABLET(0.3 MG) BY MOUTH THREE TIMES DAILY    hydrALAZINE (APRESOLINE) 100 MG tablet TAKE 1 TABLET(100 MG) BY MOUTH THREE TIMES DAILY    losartan (COZAAR) 100 MG tablet Take 1 tablet (100 mg total) by mouth once daily.    minoxidiL (LONITEN) 10 MG Tab Take 2 tablets (20 mg total) by mouth once daily.    NIFEdipine (PROCARDIA-XL) 60 MG (OSM) 24 hr tablet Take 1 tablet (60 mg total) by mouth 2 (two) times a day.            While in the hospital, will manage blood pressure as follows; Continue home antihypertensive regimen   Will utilize p.r.n. blood pressure medication only if patient's blood pressure greater than 180/110 and she develops symptoms such as worsening chest pain or shortness of breath.  
Chronic, controlled. Latest blood pressure and vitals reviewed-     Temp:  [97.7 °F (36.5 °C)-98.4 °F (36.9 °C)]   Pulse:  [63-70]   Resp:  [13-18]   BP: (124-158)/(59-74)   SpO2:  [88 %-97 %] .   Home meds for hypertension were reviewed and noted below.   Hypertension Medications               carvediloL (COREG) 12.5 MG tablet Take 1 tablet (12.5 mg total) by mouth 2 (two) times daily with meals.    cloNIDine (CATAPRES) 0.3 MG tablet TAKE 1 TABLET(0.3 MG) BY MOUTH THREE TIMES DAILY    hydrALAZINE (APRESOLINE) 100 MG tablet TAKE 1 TABLET(100 MG) BY MOUTH THREE TIMES DAILY    losartan (COZAAR) 100 MG tablet Take 1 tablet (100 mg total) by mouth once daily.    minoxidiL (LONITEN) 10 MG Tab Take 2 tablets (20 mg total) by mouth once daily.    NIFEdipine (PROCARDIA-XL) 60 MG (OSM) 24 hr tablet Take 1 tablet (60 mg total) by mouth 2 (two) times a day.            While in the hospital, will manage blood pressure as follows; Continue home antihypertensive regimen but we will decrease her hydralazine to 50 mg p.o. 3 times a day until blood pressures were 100 mg t.i.d. as well as hold her Procardia for the same reason.  Patient states she is in no longer on minoxidil.    Will utilize p.r.n. blood pressure medication only if patient's blood pressure greater than 180/110 and she develops symptoms such as worsening chest pain or shortness of breath.  
Chronic, controlled. Latest blood pressure and vitals reviewed-     Temp:  [97.7 °F (36.5 °C)-98.4 °F (36.9 °C)]   Pulse:  [63-80]   Resp:  [13-18]   BP: (124-178)/(59-74)   SpO2:  [88 %-98 %] .   Home meds for hypertension were reviewed and noted below.   Hypertension Medications               carvediloL (COREG) 12.5 MG tablet Take 1 tablet (12.5 mg total) by mouth 2 (two) times daily with meals.    cloNIDine (CATAPRES) 0.3 MG tablet TAKE 1 TABLET(0.3 MG) BY MOUTH THREE TIMES DAILY    hydrALAZINE (APRESOLINE) 100 MG tablet TAKE 1 TABLET(100 MG) BY MOUTH THREE TIMES DAILY    losartan (COZAAR) 100 MG tablet Take 1 tablet (100 mg total) by mouth once daily.    minoxidiL (LONITEN) 10 MG Tab Take 2 tablets (20 mg total) by mouth once daily.    NIFEdipine (PROCARDIA-XL) 60 MG (OSM) 24 hr tablet Take 1 tablet (60 mg total) by mouth 2 (two) times a day.            While in the hospital, will manage blood pressure as follows; Continue home antihypertensive regimen   Will utilize p.r.n. blood pressure medication only if patient's blood pressure greater than 180/110 and she develops symptoms such as worsening chest pain or shortness of breath.  
Consult placed to Cardiology.  No signs of emergent need of hemodialysis tonight based vitals and labs.  Follow up on repeat labs in the morning and respiratory status.  
Hemoglobin hematocrit are stable at this time.  No signs of acute bleed.  She is on Lovenox 1 catherine per kg Q day for anticoagulation.  We will monitor her H&H.  No indication for transfusion at this time.  
Last Bowel Movement: 07/20/24  Cont bowel regimen  GI Medications (From admission, onward)      Start     Stop Route Frequency Ordered    07/23/24 0930  senna-docusate 8.6-50 mg per tablet 1 tablet         -- Oral 2 times daily 07/23/24 0828 07/23/24 0930  polyethylene glycol packet 17 g         -- Oral Daily 07/23/24 0828 07/21/24 0900  pantoprazole EC tablet 40 mg         -- Oral Daily 07/20/24 2006 07/20/24 2050  ondansetron injection 4 mg         -- IV Every 6 hours PRN 07/20/24 1952 07/20/24 2050  loperamide capsule 2 mg         -- Oral Every 6 hours PRN 07/20/24 1952             
Last Bowel Movement: 07/23/24  Cont bowel regimen  GI Medications (From admission, onward)    Start     Stop Route Frequency Ordered    07/23/24 0930  senna-docusate 8.6-50 mg per tablet 1 tablet         -- Oral 2 times daily 07/23/24 0828 07/23/24 0930  polyethylene glycol packet 17 g         -- Oral Daily 07/23/24 0828 07/21/24 0900  pantoprazole EC tablet 40 mg         -- Oral Daily 07/20/24 2006 07/20/24 2050  ondansetron injection 4 mg         -- IV Every 6 hours PRN 07/20/24 1952 07/20/24 2050  loperamide capsule 2 mg         -- Oral Every 6 hours PRN 07/20/24 1952           
Nephrology consulted for inpatient HD needs  
No signs of acute on chronic heart failure.   Volume removal with HD.   Resume blood pressure medications.  Results for orders placed during the hospital encounter of 10/10/23    Echo    Interpretation Summary    Left Ventricle: The left ventricle is normal in size. Increased ventricular mass. Normal wall thickness. There is eccentric hypertrophy. Normal wall motion. There is normal systolic function with a visually estimated ejection fraction of 55 - 60%. There is normal diastolic function.    Left Atrium: Left atrium is severely dilated.    Right Ventricle: Normal right ventricular cavity size. Wall thickness is normal. Right ventricle wall motion  is normal. Systolic function is normal.    Mitral Valve: There is mild mitral annular calcification present. There is normal leaflet mobility. There is mild regurgitation.    Pulmonary Artery: The estimated pulmonary artery systolic pressure is 34 mmHg.    IVC/SVC: Normal venous pressure at 3 mmHg.    
No signs of acute on chronic heart failure.   Volume removal with HD.   Resume blood pressure medications.  Results for orders placed during the hospital encounter of 10/10/23    Echo    Interpretation Summary    Left Ventricle: The left ventricle is normal in size. Increased ventricular mass. Normal wall thickness. There is eccentric hypertrophy. Normal wall motion. There is normal systolic function with a visually estimated ejection fraction of 55 - 60%. There is normal diastolic function.    Left Atrium: Left atrium is severely dilated.    Right Ventricle: Normal right ventricular cavity size. Wall thickness is normal. Right ventricle wall motion  is normal. Systolic function is normal.    Mitral Valve: There is mild mitral annular calcification present. There is normal leaflet mobility. There is mild regurgitation.    Pulmonary Artery: The estimated pulmonary artery systolic pressure is 34 mmHg.    IVC/SVC: Normal venous pressure at 3 mmHg.    
No signs of acute on chronic heart failure.  She did get a bolus of IV fluids in the ER and we will continue to monitor her respiratory status at this time.  Her next dialysis is scheduled for Monday.  However if signs of pulmonary congestion we will discuss with Nephrology need to dialyze sooner.  Resume blood pressure medications.  Results for orders placed during the hospital encounter of 10/10/23    Echo    Interpretation Summary    Left Ventricle: The left ventricle is normal in size. Increased ventricular mass. Normal wall thickness. There is eccentric hypertrophy. Normal wall motion. There is normal systolic function with a visually estimated ejection fraction of 55 - 60%. There is normal diastolic function.    Left Atrium: Left atrium is severely dilated.    Right Ventricle: Normal right ventricular cavity size. Wall thickness is normal. Right ventricle wall motion  is normal. Systolic function is normal.    Mitral Valve: There is mild mitral annular calcification present. There is normal leaflet mobility. There is mild regurgitation.    Pulmonary Artery: The estimated pulmonary artery systolic pressure is 34 mmHg.    IVC/SVC: Normal venous pressure at 3 mmHg.    
Patient is identified as Severe COVID-19 based on hypoxemia with O2 saturations <94% on room air or on ambulation   Initiate standard COVID protocols; COVID-19 testing ,Infection Control notification  and isolation- respiratory, contact and droplet per protocol    Diagnostics: CBC, CMP, Procalcitonin, Rapid Flu, Blood Culture x2, and Portable CXR    Management: Initiate targeted therapy with Remdesivir, 200mg IV x1, followed by 100mg IV daily x5 days total, Dexamethasone PO/IV 6mg daily x10 days, and Anticoagulation, Patient admitted to non-critical care unit- Will initiate full dose anticoagulation with Weight based lovenox 1mg/kg IV q24 due to ESRD, Maintain oxygen saturations 92-96% via Nasal Cannula 1 LPM and monitor with continuous/intermittent pulse oximetry. , and Continuous cardiac monitoring.  Given an elevated pro count will also start IV antibiotics with doxycycline and Rocephin.    Advance Care Planning  Current advance care plan has been discussed with patient/family/POA and patient currently wishes Full Code.  Time spent discussing with the patient discussing her diagnosis of COVID pneumonia 60 minutes.   
Patient is identified as Severe COVID-19 based on hypoxemia with O2 saturations <94% on room air or on ambulation   Initiate standard COVID protocols; COVID-19 testing ,Infection Control notification  and isolation- respiratory, contact and droplet per protocol    Diagnostics: CBC, CMP, Procalcitonin, Rapid Flu, Blood Culture x2, and Portable CXR    Management: Initiate targeted therapy with Remdesivir, 200mg IV x1, followed by 100mg IV daily x5 days total, Dexamethasone PO/IV 6mg daily x10 days, and Anticoagulation, Patient admitted to non-critical care unit- Will initiate full dose anticoagulation with Weight based lovenox 1mg/kg IV q24 due to ESRD, Maintain oxygen saturations 92-96% via Nasal Cannula 1 LPM and monitor with continuous/intermittent pulse oximetry. , and Continuous cardiac monitoring.  Given an elevated pro count will also start IV antibiotics with doxycycline and Rocephin.    Advance Care Planning  Current advance care plan has been discussed with patient/family/POA and patient currently wishes Full Code.  Time spent discussing with the patient discussing her diagnosis of COVID pneumonia 60 minutes.   
Patient is identified as Severe COVID-19 based on hypoxemia with O2 saturations <94% on room air or on ambulation   Initiate standard COVID protocols; COVID-19 testing ,Infection Control notification  and isolation- respiratory, contact and droplet per protocol    Diagnostics: CBC, CMP, Procalcitonin, Rapid Flu, Blood Culture x2, and Portable CXR    Management: Initiate targeted therapy with Remdesivir, 200mg IV x1, followed by 100mg IV daily x5 days total, Dexamethasone PO/IV 6mg daily x10 days, and Anticoagulation, Patient admitted to non-critical care unit- Will initiate full dose anticoagulation with Weight based lovenox 1mg/kg IV q24 due to ESRD, Maintain oxygen saturations 92-96% via Nasal Cannula 1 LPM and monitor with continuous/intermittent pulse oximetry. , and Continuous cardiac monitoring.  Given an elevated pro count will also start IV antibiotics with doxycycline and Rocephin.    Advance Care Planning  Current advance care plan has been discussed with patient/family/POA and patient currently wishes Full Code.  Time spent discussing with the patient discussing her diagnosis of COVID pneumonia 60 minutes.     Has completed treatment with Remdesivir  SOB resolved  Cough improved  Hypoxia resolved. Now ambulating on RA  
Patient is identified as Severe COVID-19 based on hypoxemia with O2 saturations <94% on room air or on ambulation   Initiate standard COVID protocols; COVID-19 testing ,Infection Control notification  and isolation- respiratory, contact and droplet per protocol    Diagnostics: CBC, CMP, Procalcitonin, Rapid Flu, Blood Culture x2, and Portable CXR    Management: Initiate targeted therapy with Remdesivir, 200mg IV x1, followed by 100mg IV daily x5 days total, Dexamethasone PO/IV 6mg daily x10 days, and Anticoagulation, Patient admitted to non-critical care unit- Will initiate full dose anticoagulation with Weight based lovenox 1mg/kg IV q24 due to ESRD, Maintain oxygen saturations 92-96% via Nasal Cannula 2 LPM and monitor with continuous/intermittent pulse oximetry. , and Continuous cardiac monitoring.  Given an elevated pro count will also start IV antibiotics with doxycycline and Rocephin.    Advance Care Planning  Current advance care plan has been discussed with patient/family/POA and patient currently wishes Full Code.  Time spent discussing with the patient discussing her diagnosis of COVID pneumonia 60 minutes.   
Patient is identified as Severe COVID-19 based on hypoxemia with O2 saturations <94% on room air or on ambulation   Initiate standard COVID protocols; COVID-19 testing ,Infection Control notification  and isolation- respiratory, contact and droplet per protocol    Diagnostics: CBC, CMP, Procalcitonin, Rapid Flu, Blood Culture x2, and Portable CXR    Management: Initiate targeted therapy with Remdesivir, 200mg IV x1, followed by 100mg IV daily x5 days total, Dexamethasone PO/IV 6mg daily x10 days, and Anticoagulation, Patient admitted to non-critical care unit- Will initiate full dose anticoagulation with Weight based lovenox 1mg/kg IV q24 due to ESRD, Maintain oxygen saturations 92-96% via Nasal Cannula 2 LPM and monitor with continuous/intermittent pulse oximetry. , and Continuous cardiac monitoring.  Given an elevated pro count will also start IV antibiotics with doxycycline and Rocephin.    Advance Care Planning  Current advance care plan has been discussed with patient/family/POA and patient currently wishes Full Code.  Time spent discussing with the patient discussing her diagnosis of COVID pneumonia 60 minutes.   
Patient with Hypoxic Respiratory failure which is Acute.  she is not on home oxygen.   Signs/symptoms of respiratory failure include- tachypnea and increased work of breathing. Contributing diagnoses includes - Pneumonia Labs and images were reviewed. Will treat underlying causes and adjust management of respiratory failure as follows- treat her underlying COVID pneumonia as well as possible secondary bacterial pneumonia.  Follow up on cultures.  
Patient with Hypoxic Respiratory failure which is Acute.  she is not on home oxygen.   Signs/symptoms of respiratory failure include- tachypnea and increased work of breathing. Contributing diagnoses includes - Pneumonia Labs and images were reviewed. Will treat underlying causes and adjust management of respiratory failure as follows- treat her underlying COVID pneumonia as well as possible secondary bacterial pneumonia.  Follow up on cultures.  
Patient with Hypoxic Respiratory failure which is Acute.  she is not on home oxygen.   Signs/symptoms of respiratory failure include- tachypnea and increased work of breathing. Contributing diagnoses includes - Pneumonia Labs and images were reviewed. Will treat underlying causes and adjust management of respiratory failure as follows- treat her underlying COVID pneumonia as well as possible secondary bacterial pneumonia.  Follow up on cultures.    Improved but still hypoxic requiring supplemental oxygen.   
Patient with Hypoxic Respiratory failure which is Acute.  she is not on home oxygen.   Signs/symptoms of respiratory failure include- tachypnea and increased work of breathing. Contributing diagnoses includes - Pneumonia Labs and images were reviewed. Will treat underlying causes and adjust management of respiratory failure as follows- treat her underlying COVID pneumonia as well as possible secondary bacterial pneumonia. Follow up on cultures.    The respiratory condition has been ruled out and other diagnosis ruled in (hypoxia only)   Improved but still hypoxic requiring supplemental oxygen.   
Patient with Hypoxic Respiratory failure which is Acute.  she is not on home oxygen.   Signs/symptoms of respiratory failure include- tachypnea and increased work of breathing. Contributing diagnoses includes - Pneumonia Labs and images were reviewed. Will treat underlying causes and adjust management of respiratory failure as follows- treat her underlying COVID pneumonia as well as possible secondary bacterial pneumonia. Follow up on cultures.    The respiratory condition has been ruled out and other diagnosis ruled in (hypoxia only)   Resolved  Now on RA  
Patient's FSGs are controlled on current medication regimen.  Last A1c reviewed-   Lab Results   Component Value Date    HGBA1C 4.3 09/21/2023   States has not needed any treatment for her diabetes since dialysis.  She is diet-controlled.  Diet has been adjusted.  
No

## 2024-10-21 PROBLEM — J96.01 ACUTE HYPOXIC RESPIRATORY FAILURE: Status: RESOLVED | Noted: 2017-02-04 | Resolved: 2024-10-21

## 2024-10-21 PROBLEM — J12.82 PNEUMONIA DUE TO COVID-19 VIRUS: Status: RESOLVED | Noted: 2024-07-20 | Resolved: 2024-10-21

## 2024-10-21 PROBLEM — U07.1 PNEUMONIA DUE TO COVID-19 VIRUS: Status: RESOLVED | Noted: 2024-07-20 | Resolved: 2024-10-21

## 2024-10-23 ENCOUNTER — DOCUMENT SCAN (OUTPATIENT)
Dept: HOME HEALTH SERVICES | Facility: HOSPITAL | Age: 69
End: 2024-10-23
Payer: MEDICARE

## 2024-11-26 PROBLEM — J18.9 PNEUMONIA: Status: ACTIVE | Noted: 2024-01-01

## 2024-11-26 PROBLEM — R64 CACHEXIA: Status: ACTIVE | Noted: 2024-01-01

## 2024-11-26 NOTE — NURSING TRANSFER
Nursing Transfer Note      11/26/2024   12:38 PM    Nurse giving handoff:Allyson  Nurse receiving handoff:Waldemar    Reason patient is being transferred: Pneumonia    Transfer From: ED    Transfer via stretcher    Transfer with cardiac monitoring    Telemetry: Box Number [id number]  Order for Tele Monitor? Yes    Medicines sent: none    Any special needs or follow-up needed: none    Patient belongings transferred with patient: Yes    Chart send with patient: Yes    Notified: son    Patient reassessed at: 11/26/24 @ 1230 (    Upon arrival to floor: patient oriented to room, call bell in reach, and bed in lowest position

## 2024-11-26 NOTE — SUBJECTIVE & OBJECTIVE
Past Medical History:   Diagnosis Date    (HFpEF) heart failure with preserved ejection fraction 10/30/2023    Anemia     Colon polyps     COVID-19 08/2021    Diabetes mellitus, type 2     ESRD (end stage renal disease) on dialysis 2017    Gallstones     Hypertension     Pulmonary edema     Renal disorder     dialysis MIRANDA fistula    Tobacco abuse     Uses walker        Past Surgical History:   Procedure Laterality Date    AVF  2017    CHOLECYSTECTOMY  2016    COLONOSCOPY N/A 8/6/2020    Procedure: COLONOSCOPY;  Surgeon: Hood Hernadez MD;  Location: [facility] ([address] [place] [address]);  Service: Endoscopy;  Laterality: N/A;  labs prior-dialysis  covid test lapalco 8/3    Permacath Right 2017    TUBAL LIGATION         Review of patient's allergies indicates:  No Known Allergies  Current Facility-Administered Medications   Medication Frequency    acetaminophen tablet 650 mg Q4H PRN    albuterol-ipratropium 2.5 mg-0.5 mg/3 mL nebulizer solution 3 mL Q4H PRN    aluminum-magnesium hydroxide-simethicone 200-200-20 mg/5 mL suspension 30 mL QID PRN    carvediloL tablet 12.5 mg BID WM    cloNIDine tablet 0.3 mg TID    dextromethorphan-guaiFENesin  mg/5 ml liquid 5 mL Q6H PRN    heparin (porcine) injection 5,000 Units Q12H    hydrALAZINE tablet 100 mg Q8H    HYDROcodone-acetaminophen 5-325 mg per tablet 1 tablet Q6H PRN    losartan tablet 100 mg Daily    magnesium oxide tablet 800 mg PRN    magnesium oxide tablet 800 mg PRN    melatonin tablet 6 mg Nightly PRN    naloxone 0.4 mg/mL injection 0.02 mg PRN    ondansetron injection 8 mg Q6H PRN    polyethylene glycol packet 17 g BID PRN    potassium bicarbonate disintegrating tablet 35 mEq PRN    potassium bicarbonate disintegrating tablet 50 mEq PRN    potassium bicarbonate disintegrating tablet 60 mEq PRN    potassium, sodium phosphates 280-160-250 mg packet 2 packet PRN    potassium, sodium phosphates 280-160-250 mg packet 2 packet PRN    potassium, sodium phosphates 280-160-250 mg  packet 2 packet PRN    prochlorperazine injection Soln 5 mg Q6H PRN    sevelamer carbonate tablet 1,600 mg TID WM    sodium chloride 0.9% flush 10 mL Q12H PRN    vancomycin - pharmacy to dose pharmacy to manage frequency     Family History       Problem Relation (Age of Onset)    Cancer Mother    Cervical cancer Mother    Diabetes Father, Sister    Drug abuse Paternal Grandmother    Hypertension Mother    Kidney disease Mother    No Known Problems Sister, Sister, Sister, Son, Son, Son, Daughter    Ovarian cancer Mother          Tobacco Use    Smoking status: Some Days     Current packs/day: 0.00     Average packs/day: 0.5 packs/day for 20.0 years (10.0 ttl pk-yrs)     Types: Cigarettes     Start date: 2000     Last attempt to quit: 2020     Years since quittin.4    Smokeless tobacco: Never   Substance and Sexual Activity    Alcohol use: No    Drug use: No    Sexual activity: Yes     Partners: Male     Birth control/protection: Post-menopausal     Review of Systems   Constitutional:  Positive for activity change.   Neurological:  Positive for weakness.     Objective:     Vital Signs (Most Recent):  Temp: 97.7 °F (36.5 °C) (24 1230)  Pulse: 61 (24 1317)  Resp: 18 (24 1230)  BP: 132/60 (24 1406)  SpO2: 97 % (24 1230) Vital Signs (24h Range):  Temp:  [97.6 °F (36.4 °C)-97.7 °F (36.5 °C)] 97.7 °F (36.5 °C)  Pulse:  [57-90] 61  Resp:  [11-18] 18  SpO2:  [96 %-98 %] 97 %  BP: (112-176)/(56-79) 132/60     Weight: 41.4 kg (91 lb 4.3 oz) (24 1351)  Body mass index is 15.67 kg/m².  Body surface area is 1.37 meters squared.    No intake/output data recorded.     Physical Exam  Constitutional:       General: She is not in acute distress.     Appearance: She is not ill-appearing or toxic-appearing.      Comments: Cachetic, eating full meal on room air in room.    HENT:      Mouth/Throat:      Mouth: Mucous membranes are moist.   Eyes:      General: No scleral icterus.        Right  eye: No discharge.         Left eye: No discharge.      Pupils: Pupils are equal, round, and reactive to light.   Cardiovascular:      Rate and Rhythm: Normal rate.      Heart sounds: Murmur heard.      Comments: Left arm AVF, good thrill and hum  Pulmonary:      Effort: Pulmonary effort is normal.      Breath sounds: No rhonchi.   Abdominal:      General: Abdomen is flat.   Musculoskeletal:         General: Normal range of motion.      Cervical back: Normal range of motion.      Right lower leg: No edema.      Left lower leg: No edema.   Neurological:      Mental Status: She is alert.          Significant Labs:  All labs within the past 24 hours have been reviewed.    Significant Imaging:  Labs: Reviewed

## 2024-11-26 NOTE — ASSESSMENT & PLAN NOTE
Anemia is likely due to chronic disease due to ESRD. Most recent hemoglobin and hematocrit are listed below.  Recent Labs     11/26/24  0826   HGB 9.7*   HCT 28.9*     Plan  - Monitor serial CBC: Daily  - Transfuse PRBC if patient becomes hemodynamically unstable, symptomatic or H/H drops below 7/21.  - Patient has not received any PRBC transfusions to date

## 2024-11-26 NOTE — PROGRESS NOTES
"Pharmacokinetic Initial Assessment: IV Vancomycin    Assessment/Plan:    Initiate intravenous vancomycin with loading dose of 750 mg once with subsequent doses when random concentrations are less than 20 mcg/mL  Desired empiric serum trough concentration is 10 to 20 mcg/mL  Draw vancomycin random level on 11/27 at 0600.  Pharmacy will continue to follow and monitor vancomycin.      Please contact pharmacy at extension [phone] with any questions regarding this assessment.     Thank you for the consult,   Bharti Wrightclovis       Patient brief summary:  April Vasquez is a 69 y.o. female initiated on antimicrobial therapy with IV Vancomycin for treatment of suspected  pneumonia    Drug Allergies:   Review of patient's allergies indicates:  No Known Allergies    Actual Body Weight:   43.3 kg    Renal Function:   Estimated Creatinine Clearance: 6.5 mL/min (A) (based on SCr of 5.7 mg/dL (H)).,     Dialysis Method (if applicable):  intermittent HD    CBC (last 72 hours):  Recent Labs   Lab Result Units 11/26/24  0826   WBC K/uL 8.03   Hemoglobin g/dL 9.7*   Hematocrit % 28.9*   Platelets K/uL 202   Gran % % 77.3*   Lymph % % 14.6*   Mono % % 6.2   Eosinophil % % 1.2   Basophil % % 0.2   Differential Method  Automated       Metabolic Panel (last 72 hours):  Recent Labs   Lab Result Units 11/26/24  0826   Sodium mmol/L 140   Potassium mmol/L 4.6   Chloride mmol/L 106   CO2 mmol/L 22*   Glucose mg/dL 76   BUN mg/dL 43*   Creatinine mg/dL 5.7*   Albumin g/dL 1.9*   Total Bilirubin mg/dL 0.4   Alkaline Phosphatase U/L 109   AST U/L 15   ALT U/L 7*       Drug levels (last 3 results):  No results for input(s): "VANCOMYCINRA", "VANCORANDOM", "VANCOMYCINPE", "VANCOPEAK", "VANCOMYCINTR", "VANCOTROUGH" in the last 72 hours.    Microbiologic Results:  Microbiology Results (last 7 days)       Procedure Component Value Units Date/Time    Blood Culture #1 **CANNOT BE ORDERED STAT** [[id number]] Collected: 11/26/24 0940    Order Status: " Sent Specimen: Blood from Peripheral, Hand, Right Updated: 11/26/24 0958    Blood culture [[id number]] Collected: 11/26/24 0949    Order Status: Sent Specimen: Blood from Peripheral, Upper Arm, Right Updated: 11/26/24 0958

## 2024-11-26 NOTE — SUBJECTIVE & OBJECTIVE
Past Medical History:   Diagnosis Date    (HFpEF) heart failure with preserved ejection fraction 10/30/2023    Anemia     Colon polyps     COVID-19 08/2021    Diabetes mellitus, type 2     ESRD (end stage renal disease) on dialysis 2017    Gallstones     Hypertension     Pulmonary edema     Renal disorder     dialysis MIRANDA fistula    Tobacco abuse     Uses walker        Past Surgical History:   Procedure Laterality Date    AVF  2017    CHOLECYSTECTOMY  2016    COLONOSCOPY N/A 8/6/2020    Procedure: COLONOSCOPY;  Surgeon: Hood Hernadez MD;  [facility] [place] [address]);  Service: Endoscopy;  Laterality: N/A;  labs prior-dialysis  covid test lapalco 8/3    Permacath Right 2017    TUBAL LIGATION         Review of patient's allergies indicates:  No Known Allergies    No current facility-administered medications on file prior to encounter.     Current Outpatient Medications on File Prior to Encounter   Medication Sig    carvediloL (COREG) 12.5 MG tablet TAKE 1 TABLET(12.5 MG) BY MOUTH TWICE DAILY WITH MEALS    cloNIDine (CATAPRES) 0.3 MG tablet TAKE 1 TABLET(0.3 MG) BY MOUTH THREE TIMES DAILY    hydrALAZINE (APRESOLINE) 100 MG tablet TAKE 1 TABLET(100 MG) BY MOUTH THREE TIMES DAILY    pantoprazole (PROTONIX) 40 MG tablet Take 1 tablet (40 mg total) by mouth once daily.    pulse oximeter (PULSE OXIMETER) device by Apply Externally route 2 (two) times a day. Use twice daily at 8 AM and 3 PM and record the value in MyChart as directed.    sevelamer carbonate (RENVELA) 800 mg Tab Take 1,600 mg by mouth 3 (three) times daily with meals.    acetaminophen (TYLENOL) 500 MG tablet Take 500 mg by mouth every 6 (six) hours as needed for Pain. (Patient not taking: Reported on 11/26/2024)    ergocalciferol, vitamin D2, (VITAMIN D ORAL) Take by mouth. (Patient not taking: Reported on 11/26/2024)    isoniazid (NYDRAZID) 100 MG Tab Take 2 tablets (200 mg total) by mouth once daily.    losartan (COZAAR) 100 MG tablet Take 1 tablet  (100 mg total) by mouth once daily.    minoxidiL (LONITEN) 10 MG Tab Take 2 tablets (20 mg total) by mouth once daily.    NIFEdipine (PROCARDIA-XL) 60 MG (OSM) 24 hr tablet Take 1 tablet (60 mg total) by mouth 2 (two) times a day.     Family History       Problem Relation (Age of Onset)    Cancer Mother    Cervical cancer Mother    Diabetes Father, Sister    Drug abuse Paternal Grandmother    Hypertension Mother    Kidney disease Mother    No Known Problems Sister, Sister, Sister, Son, Son, Son, Daughter    Ovarian cancer Mother          Tobacco Use    Smoking status: Some Days     Current packs/day: 0.00     Average packs/day: 0.5 packs/day for 20.0 years (10.0 ttl pk-yrs)     Types: Cigarettes     Start date: 2000     Last attempt to quit: 2020     Years since quittin.4    Smokeless tobacco: Never   Substance and Sexual Activity    Alcohol use: No    Drug use: No    Sexual activity: Yes     Partners: Male     Birth control/protection: Post-menopausal     Review of Systems   Constitutional:  Positive for fatigue. Negative for fever.   HENT:  Negative for ear discharge and ear pain.    Eyes:  Negative for discharge and itching.   Respiratory:  Positive for cough. Negative for shortness of breath.    Cardiovascular:  Negative for chest pain and palpitations.   Gastrointestinal:  Positive for constipation. Negative for abdominal pain.   Endocrine: Negative for cold intolerance and heat intolerance.   Genitourinary:  Negative for frequency and hematuria.   Musculoskeletal:  Negative for neck pain and neck stiffness.   Skin:  Negative for rash and wound.   Neurological:  Negative for seizures and syncope.   Psychiatric/Behavioral:  Negative for agitation and hallucinations.      Objective:     Vital Signs (Most Recent):  Temp: 97.7 °F (36.5 °C) (24 1230)  Pulse: 64 (24 1518)  Resp: 18 (24 1230)  BP: 132/60 (24 1406)  SpO2: 97 % (24 1230) Vital Signs (24h Range):  Temp:  [97.6  °F (36.4 °C)-97.7 °F (36.5 °C)] 97.7 °F (36.5 °C)  Pulse:  [57-90] 64  Resp:  [11-18] 18  SpO2:  [96 %-98 %] 97 %  BP: (112-176)/(56-79) 132/60     Weight: 41.4 kg (91 lb 4.3 oz)  Body mass index is 15.67 kg/m².     Physical Exam  Constitutional:       General: She is not in acute distress.     Appearance: She is ill-appearing.      Comments: Cachectic   HENT:      Mouth/Throat:      Pharynx: Oropharynx is clear. No oropharyngeal exudate or posterior oropharyngeal erythema.   Cardiovascular:      Rate and Rhythm: Normal rate and regular rhythm.   Pulmonary:      Effort: No respiratory distress.      Breath sounds: No wheezing.   Abdominal:      General: Bowel sounds are normal.      Palpations: Abdomen is soft.   Musculoskeletal:      Comments: Muscle wasting bilaterally   Skin:     General: Skin is dry.      Findings: No erythema.   Neurological:      Mental Status: She is oriented to person, place, and time.      Cranial Nerves: No cranial nerve deficit.                Significant Labs: All pertinent labs within the past 24 hours have been reviewed.  BMP:   Recent Labs   Lab 11/26/24  0826   GLU 76      K 4.6      CO2 22*   BUN 43*   CREATININE 5.7*   CALCIUM 8.3*     CBC:   Recent Labs   Lab 11/26/24  0826   WBC 8.03   HGB 9.7*   HCT 28.9*          Significant Imaging: I have reviewed all pertinent imaging results/findings within the past 24 hours.

## 2024-11-26 NOTE — HPI
69 year old female with a history of HTN, ESRD on HD (MWF),T2DM, HFpEF presents to  BIBA weakness and fatigue. She states that she has been weak for the last several days.  CXR on admission with right lower lung zone infiltrates and CT chest with bilateral pulmonary edema. She has missed one day of dialysis on her schedule. She was also told to present to dialysis today for the holiday schedule but was too weak to do so.     Outpatient ESRD  Hemodialysis  Outpatient nephrologist: Dr. Bush  Outpatient center: [facility]  Dialysis schedule: MWF (was scheduled for today)  Last treatment: 11/22  Anuric: yes  Dry weight: 55 kg  Access: left arm AVF

## 2024-11-26 NOTE — ASSESSMENT & PLAN NOTE
ESRD     Plan/Recommendation  HD today  -Keep MAP > 65  -Keep hemoglobin > 7  -Strict ins and outs  -Avoid nephrotoxic agents if possible and renally dose medications  -Avoid drastic hemodynamic changes if possible    #Anemia  Hemoglobin   Date Value Ref Range Status   11/26/2024 9.7 (L) 12.0 - 16.0 g/dL Final     Iron   Date Value Ref Range Status   08/05/2021 25 (L) 30 - 160 ug/dL Final     Transferrin   Date Value Ref Range Status   08/05/2021 119 (L) 200 - 375 mg/dL Final     TIBC   Date Value Ref Range Status   08/05/2021 176 (L) 250 - 450 ug/dL Final     Saturated Iron   Date Value Ref Range Status   08/05/2021 14 (L) 20 - 50 % Final     Ferritin   Date Value Ref Range Status   08/05/2021 2,535 (H) 20.0 - 300.0 ng/mL Final     Repeat iron studies and ferritin    #Secondary hyperparathyroidism  Calcium   Date Value Ref Range Status   11/26/2024 8.3 (L) 8.7 - 10.5 mg/dL Final     Phosphorus   Date Value Ref Range Status   08/01/2024 4.5 2.7 - 4.5 mg/dL Final     PTH, Intact   Date Value Ref Range Status   11/04/2024 262 (H) 16 - 80 pg/mL Final   Continue home sevelamer and vit D

## 2024-11-26 NOTE — PHARMACY MED REC
"  Admission Medication History     The home medication history was taken by Tejal Lowe CPhT.    You may go to "Admission" then "Reconcile Home Medications" tabs to review and/or act upon these items.     The home medication list has been updated by the Pharmacy department.   Please read ALL comments highlighted in yellow.   Please address this information as you see fit.    Feel free to contact us if you have any questions or require assistance.      The medications listed below were  on the home medication list. Please reorder if appropriate:  Patient reports no longer taking the following medication(s):  Nydrazid 100 mg tab  Cozaar 100 mg tab  Loniten 10 mg tab  Procardia-XL 60 mg tab      Medications listed below were obtained from: Patient/family and Analytic software- [employer]  (Not in a hospital admission)      Potential issues to be addressed PRIOR TO DISCHARGE  Patient reported not taking the following medications: (Tylenol 500 mg; Vitamin D). These medications remain on the home medication list. Please address accordingly.       Spoke with patient stating she took her medications on yesterday but was not able to give names of medications when asked at the bedside.    Patient seemed to be drowsy and sleepy.  Medication history was not completed.          Tejal Lowe CPhT.  [phone]    .          "

## 2024-11-26 NOTE — HPI
69-year-old female presenting with generalized weakness and fatigue.  Patient states that she has been losing a lot of weight and has been very weak for past week.  She can barely get out of bed because of weakness.  Patient was due for dialysis today but was too weak to stand and go to the car.  Last dialysis was Friday.  She missed Sunday dialysis because she forgot about the holiday schedule change.  States compliance with dialysis and medications. Weight loss even thought adequate appetite and oral intake.  She does complain of constipation.  She had a colonoscopy 4 years ago with a couple of tubular adenomas removed.  Patient has also been complaining of cough productive of greenish sputum.  She denies any fever or chills.  No similar symptoms in past.  No alleviating or aggravating factors.  No other complaints.

## 2024-11-26 NOTE — ASSESSMENT & PLAN NOTE
Concern for cachexia as evidenced by loss of 5% weight over the past 12 months, BMI less than 20 in the presence of known chronic disease, loss of muscle mass, and loss of body fat . Contributing factors include underlying  undetermined . Treatment to include nutrition consult, physical therapy, occupational therapy, palliative consultation, and malignancy work up .      Body mass index is 15.67 kg/m².  Albumin   Date Value Ref Range Status   11/26/2024 1.9 (L) 3.5 - 5.2 g/dL Final   Patient presents with fatigue and debility.  Most likely related to significant weight loss.  Will be treated for possible pneumonia as below, but symptoms seem chronic in nature.  Unexplained weight loss with adequate appetite and oral intake.  Concern about underlying malignancy.  Will get CT chest/abdomen/pelvis with IV contrast.

## 2024-11-26 NOTE — CONSULTS
[facility] - Telemetry  Nephrology  Consult Note    Patient Name: April Vasquez  MRN: [id number]  Admission Date: 11/26/2024  Hospital Length of Stay: 0 days  Attending Provider: Luís Yang MD   Primary Care Physician: Darrick Cabral MD  Principal Problem:<principal problem not specified>    Inpatient consult to Nephrology  Consult performed by: Paolo Dover MD  Consult ordered by: Luís Yang MD        Subjective:     HPI: 69 year old female with a history of HTN, ESRD on HD (MWF),T2DM, HFpEF presents to  BIBA weakness and fatigue. She states that she has been weak for the last several days.  CXR on admission with right lower lung zone infiltrates and CT chest with bilateral pulmonary edema. She has missed one day of dialysis on her schedule. She was also told to present to dialysis today for the holiday schedule but was too weak to do so.     Outpatient ESRD  Hemodialysis  Outpatient nephrologist: Dr. Bush  Outpatient center: [facility]  Dialysis schedule: MWF (was scheduled for today)  Last treatment: 11/22  Anuric: yes  Dry weight: 55 kg  Access: left arm AVF      Past Medical History:   Diagnosis Date    (HFpEF) heart failure with preserved ejection fraction 10/30/2023    Anemia     Colon polyps     COVID-19 08/2021    Diabetes mellitus, type 2     ESRD (end stage renal disease) on dialysis 2017    Gallstones     Hypertension     Pulmonary edema     Renal disorder     dialysis MIRANDA fistula    Tobacco abuse     Uses walker        Past Surgical History:   Procedure Laterality Date    AVF  2017    CHOLECYSTECTOMY  2016    COLONOSCOPY N/A 8/6/2020    Procedure: COLONOSCOPY;  Surgeon: Hood Hernadez MD;  Location: [address]);  Service: Endoscopy;  Laterality: N/A;  labs prior-dialysis  covid test lapalco 8/3    Permacath Right 2017    TUBAL LIGATION         Review of patient's allergies indicates:  No Known Allergies  Current Facility-Administered Medications   Medication  Frequency    acetaminophen tablet 650 mg Q4H PRN    albuterol-ipratropium 2.5 mg-0.5 mg/3 mL nebulizer solution 3 mL Q4H PRN    aluminum-magnesium hydroxide-simethicone 200-200-20 mg/5 mL suspension 30 mL QID PRN    carvediloL tablet 12.5 mg BID WM    cloNIDine tablet 0.3 mg TID    dextromethorphan-guaiFENesin  mg/5 ml liquid 5 mL Q6H PRN    heparin (porcine) injection 5,000 Units Q12H    hydrALAZINE tablet 100 mg Q8H    HYDROcodone-acetaminophen 5-325 mg per tablet 1 tablet Q6H PRN    losartan tablet 100 mg Daily    magnesium oxide tablet 800 mg PRN    magnesium oxide tablet 800 mg PRN    melatonin tablet 6 mg Nightly PRN    naloxone 0.4 mg/mL injection 0.02 mg PRN    ondansetron injection 8 mg Q6H PRN    polyethylene glycol packet 17 g BID PRN    potassium bicarbonate disintegrating tablet 35 mEq PRN    potassium bicarbonate disintegrating tablet 50 mEq PRN    potassium bicarbonate disintegrating tablet 60 mEq PRN    potassium, sodium phosphates 280-160-250 mg packet 2 packet PRN    potassium, sodium phosphates 280-160-250 mg packet 2 packet PRN    potassium, sodium phosphates 280-160-250 mg packet 2 packet PRN    prochlorperazine injection Soln 5 mg Q6H PRN    sevelamer carbonate tablet 1,600 mg TID WM    sodium chloride 0.9% flush 10 mL Q12H PRN    vancomycin - pharmacy to dose pharmacy to manage frequency     Family History       Problem Relation (Age of Onset)    Cancer Mother    Cervical cancer Mother    Diabetes Father, Sister    Drug abuse Paternal Grandmother    Hypertension Mother    Kidney disease Mother    No Known Problems Sister, Sister, Sister, Son, Son, Son, Daughter    Ovarian cancer Mother          Tobacco Use    Smoking status: Some Days     Current packs/day: 0.00     Average packs/day: 0.5 packs/day for 20.0 years (10.0 ttl pk-yrs)     Types: Cigarettes     Start date: 2000     Last attempt to quit: 2020     Years since quittin.4    Smokeless tobacco: Never   Substance and  Sexual Activity    Alcohol use: No    Drug use: No    Sexual activity: Yes     Partners: Male     Birth control/protection: Post-menopausal     Review of Systems   Constitutional:  Positive for activity change.   Neurological:  Positive for weakness.     Objective:     Vital Signs (Most Recent):  Temp: 97.7 °F (36.5 °C) (11/26/24 1230)  Pulse: 61 (11/26/24 1317)  Resp: 18 (11/26/24 1230)  BP: 132/60 (11/26/24 1406)  SpO2: 97 % (11/26/24 1230) Vital Signs (24h Range):  Temp:  [97.6 °F (36.4 °C)-97.7 °F (36.5 °C)] 97.7 °F (36.5 °C)  Pulse:  [57-90] 61  Resp:  [11-18] 18  SpO2:  [96 %-98 %] 97 %  BP: (112-176)/(56-79) 132/60     Weight: 41.4 kg (91 lb 4.3 oz) (11/26/24 1351)  Body mass index is 15.67 kg/m².  Body surface area is 1.37 meters squared.    No intake/output data recorded.     Physical Exam  Constitutional:       General: She is not in acute distress.     Appearance: She is not ill-appearing or toxic-appearing.      Comments: Cachetic, eating full meal on room air in room.    HENT:      Mouth/Throat:      Mouth: Mucous membranes are moist.   Eyes:      General: No scleral icterus.        Right eye: No discharge.         Left eye: No discharge.      Pupils: Pupils are equal, round, and reactive to light.   Cardiovascular:      Rate and Rhythm: Normal rate.      Heart sounds: Murmur heard.      Comments: Left arm AVF, good thrill and hum  Pulmonary:      Effort: Pulmonary effort is normal.      Breath sounds: No rhonchi.   Abdominal:      General: Abdomen is flat.   Musculoskeletal:         General: Normal range of motion.      Cervical back: Normal range of motion.      Right lower leg: No edema.      Left lower leg: No edema.   Neurological:      Mental Status: She is alert.          Significant Labs:  All labs within the past 24 hours have been reviewed.    Significant Imaging:  Labs: Reviewed  Assessment/Plan:     Renal/  ESRD (end stage renal disease) on dialysis  ESRD     Plan/Recommendation  HD  today  -Keep MAP > 65  -Keep hemoglobin > 7  -Strict ins and outs  -Avoid nephrotoxic agents if possible and renally dose medications  -Avoid drastic hemodynamic changes if possible    #Anemia  Hemoglobin   Date Value Ref Range Status   11/26/2024 9.7 (L) 12.0 - 16.0 g/dL Final     Iron   Date Value Ref Range Status   08/05/2021 25 (L) 30 - 160 ug/dL Final     Transferrin   Date Value Ref Range Status   08/05/2021 119 (L) 200 - 375 mg/dL Final     TIBC   Date Value Ref Range Status   08/05/2021 176 (L) 250 - 450 ug/dL Final     Saturated Iron   Date Value Ref Range Status   08/05/2021 14 (L) 20 - 50 % Final     Ferritin   Date Value Ref Range Status   08/05/2021 2,535 (H) 20.0 - 300.0 ng/mL Final     Repeat iron studies and ferritin    #Secondary hyperparathyroidism  Calcium   Date Value Ref Range Status   11/26/2024 8.3 (L) 8.7 - 10.5 mg/dL Final     Phosphorus   Date Value Ref Range Status   08/01/2024 4.5 2.7 - 4.5 mg/dL Final     PTH, Intact   Date Value Ref Range Status   11/04/2024 262 (H) 16 - 80 pg/mL Final   Continue home sevelamer and vit D        Thank you for your consult. I will follow-up with patient. Please contact us if you have any additional questions.    Paolo Dover MD  Nephrology  [facility]

## 2024-11-26 NOTE — NURSING
[facility]  Nurses Note -- 4 Eyes      11/26/2024       Skin assessed on: Q Shift      [x] No Pressure Injuries Present    [x]Prevention Measures Documented    [] Yes LDA  for Pressure Injury Previously documented     [] Yes New Pressure Injury Discovered   [] LDA for New Pressure Injury Added      Attending RN:  Waldemar Vasquez, RAYSHAWN     Second :  Ale, PCT

## 2024-11-26 NOTE — H&P
[facility] Medicine  History & Physical    Patient Name: April Vasquez  MRN: [id number]  Patient Class: IP- Inpatient  Admission Date: 11/26/2024  Attending Physician: Luís Yang MD   Primary Care Provider: Darrick Cabral MD         Patient information was obtained from patient and ER records.     Subjective:     Principal Problem:Cachexia    Chief Complaint:   Chief Complaint   Patient presents with    Fatigue     Pt BIB EMS, c/o generalized weakness x 3 days. Pt due for dialysis today and missed last one. Pt reports not eating for a few days. Pt denies any CP, SOB, abd pain or n/v/d        HPI: 69-year-old female presenting with generalized weakness and fatigue.  Patient states that she has been losing a lot of weight and has been very weak for past week.  She can barely get out of bed because of weakness.  Patient was due for dialysis today but was too weak to stand and go to the car.  Last dialysis was Friday.  She missed Sunday dialysis because she forgot about the holiday schedule change.  States compliance with dialysis and medications. Weight loss even thought adequate appetite and oral intake.  She does complain of constipation.  She had a colonoscopy 4 years ago with a couple of tubular adenomas removed.  Patient has also been complaining of cough productive of greenish sputum.  She denies any fever or chills.  No similar symptoms in past.  No alleviating or aggravating factors.  No other complaints.    Past Medical History:   Diagnosis Date    (HFpEF) heart failure with preserved ejection fraction 10/30/2023    Anemia     Colon polyps     COVID-19 08/2021    Diabetes mellitus, type 2     ESRD (end stage renal disease) on dialysis 2017    Gallstones     Hypertension     Pulmonary edema     Renal disorder     dialysis MIRANDA fistula    Tobacco abuse     Uses walker        Past Surgical History:   Procedure Laterality Date    AVF  2017    CHOLECYSTECTOMY  2016    COLONOSCOPY  N/A 8/6/2020    Procedure: COLONOSCOPY;  Surgeon: Hood Hernadez MD;  Location: [facility] ([address] [place] [address]);  Service: Endoscopy;  Laterality: N/A;  labs prior-dialysis  covid test lapalco 8/3    Permacath Right 2017    TUBAL LIGATION         Review of patient's allergies indicates:  No Known Allergies    No current facility-administered medications on file prior to encounter.     Current Outpatient Medications on File Prior to Encounter   Medication Sig    carvediloL (COREG) 12.5 MG tablet TAKE 1 TABLET(12.5 MG) BY MOUTH TWICE DAILY WITH MEALS    cloNIDine (CATAPRES) 0.3 MG tablet TAKE 1 TABLET(0.3 MG) BY MOUTH THREE TIMES DAILY    hydrALAZINE (APRESOLINE) 100 MG tablet TAKE 1 TABLET(100 MG) BY MOUTH THREE TIMES DAILY    pantoprazole (PROTONIX) 40 MG tablet Take 1 tablet (40 mg total) by mouth once daily.    pulse oximeter (PULSE OXIMETER) device by Apply Externally route 2 (two) times a day. Use twice daily at 8 AM and 3 PM and [facility] as directed.    sevelamer carbonate (RENVELA) 800 mg Tab Take 1,600 mg by mouth 3 (three) times daily with meals.    acetaminophen (TYLENOL) 500 MG tablet Take 500 mg by mouth every 6 (six) hours as needed for Pain. (Patient not taking: Reported on 11/26/2024)    ergocalciferol, vitamin D2, (VITAMIN D ORAL) Take by mouth. (Patient not taking: Reported on 11/26/2024)    isoniazid (NYDRAZID) 100 MG Tab Take 2 tablets (200 mg total) by mouth once daily.    losartan (COZAAR) 100 MG tablet Take 1 tablet (100 mg total) by mouth once daily.    minoxidiL (LONITEN) 10 MG Tab Take 2 tablets (20 mg total) by mouth once daily.    NIFEdipine (PROCARDIA-XL) 60 MG (OSM) 24 hr tablet Take 1 tablet (60 mg total) by mouth 2 (two) times a day.     Family History       Problem Relation (Age of Onset)    Cancer Mother    Cervical cancer Mother    Diabetes Father, Sister    Drug abuse Paternal Grandmother    Hypertension Mother    Kidney disease Mother    No Known Problems Sister, Sister,  Sister, Son, Son, Son, Daughter    Ovarian cancer Mother          Tobacco Use    Smoking status: Some Days     Current packs/day: 0.00     Average packs/day: 0.5 packs/day for 20.0 years (10.0 ttl pk-yrs)     Types: Cigarettes     Start date: 2000     Last attempt to quit: 2020     Years since quittin.4    Smokeless tobacco: Never   Substance and Sexual Activity    Alcohol use: No    Drug use: No    Sexual activity: Yes     Partners: Male     Birth control/protection: Post-menopausal     Review of Systems   Constitutional:  Positive for fatigue. Negative for fever.   HENT:  Negative for ear discharge and ear pain.    Eyes:  Negative for discharge and itching.   Respiratory:  Positive for cough. Negative for shortness of breath.    Cardiovascular:  Negative for chest pain and palpitations.   Gastrointestinal:  Positive for constipation. Negative for abdominal pain.   Endocrine: Negative for cold intolerance and heat intolerance.   Genitourinary:  Negative for frequency and hematuria.   Musculoskeletal:  Negative for neck pain and neck stiffness.   Skin:  Negative for rash and wound.   Neurological:  Negative for seizures and syncope.   Psychiatric/Behavioral:  Negative for agitation and hallucinations.      Objective:     Vital Signs (Most Recent):  Temp: 97.7 °F (36.5 °C) (24 1230)  Pulse: 64 (24 1518)  Resp: 18 (24 1230)  BP: 132/60 (24 1406)  SpO2: 97 % (24 1230) Vital Signs (24h Range):  Temp:  [97.6 °F (36.4 °C)-97.7 °F (36.5 °C)] 97.7 °F (36.5 °C)  Pulse:  [57-90] 64  Resp:  [11-18] 18  SpO2:  [96 %-98 %] 97 %  BP: (112-176)/(56-79) 132/60     Weight: 41.4 kg (91 lb 4.3 oz)  Body mass index is 15.67 kg/m².     Physical Exam  Constitutional:       General: She is not in acute distress.     Appearance: She is ill-appearing.      Comments: Cachectic   HENT:      Mouth/Throat:      Pharynx: Oropharynx is clear. No oropharyngeal exudate or posterior oropharyngeal erythema.    Cardiovascular:      Rate and Rhythm: Normal rate and regular rhythm.   Pulmonary:      Effort: No respiratory distress.      Breath sounds: No wheezing.   Abdominal:      General: Bowel sounds are normal.      Palpations: Abdomen is soft.   Musculoskeletal:      Comments: Muscle wasting bilaterally   Skin:     General: Skin is dry.      Findings: No erythema.   Neurological:      Mental Status: She is oriented to person, place, and time.      Cranial Nerves: No cranial nerve deficit.                Significant Labs: All pertinent labs within the past 24 hours have been reviewed.  BMP:   Recent Labs   Lab 11/26/24  0826   GLU 76      K 4.6      CO2 22*   BUN 43*   CREATININE 5.7*   CALCIUM 8.3*     CBC:   Recent Labs   Lab 11/26/24  0826   WBC 8.03   HGB 9.7*   HCT 28.9*          Significant Imaging: I have reviewed all pertinent imaging results/findings within the past 24 hours.  Assessment/Plan:     * Cachexia  Concern for cachexia as evidenced by loss of 5% weight over the past 12 months, BMI less than 20 in the presence of known chronic disease, loss of muscle mass, and loss of body fat . Contributing factors include underlying  undetermined . Treatment to include nutrition consult, physical therapy, occupational therapy, palliative consultation, and malignancy work up .      Body mass index is 15.67 kg/m².  Albumin   Date Value Ref Range Status   11/26/2024 1.9 (L) 3.5 - 5.2 g/dL Final   Patient presents with fatigue and debility.  Most likely related to significant weight loss.  Will be treated for possible pneumonia as below, but symptoms seem chronic in nature.  Unexplained weight loss with adequate appetite and oral intake.  Concern about underlying malignancy.  Will get CT chest/abdomen/pelvis with IV contrast.      Pneumonia  Patient has a diagnosis of pneumonia. The cause of the pneumonia is suspected to be bacterial in etiology but organism is not known. The pneumonia is stable. The  "patient has the following signs/symptoms of pneumonia: cough and sputum production. The patient does not have a current oxygen requirement and the patient does not have a home oxygen requirement. I have reviewed the pertinent imaging. The following cultures have been collected: Blood cultures and Sputum culture The culture results are listed below.     Current antimicrobial regimen consists of the antibiotics listed below.     Antibiotics (From admission, onward)      Start     Stop Route Frequency Ordered    11/26/24 0933  vancomycin - pharmacy to dose  (vancomycin IVPB (PEDS and ADULTS))        Placed in "And" Linked Group    -- IV pharmacy to manage frequency 11/26/24 0833            Microbiology Results (last 7 days)       Procedure Component Value Units Date/Time    Blood Culture #1 **CANNOT BE ORDERED STAT** [[id number]] Collected: 11/26/24 0940    Order Status: Sent Specimen: Blood from Peripheral, Hand, Right Updated: 11/26/24 0958    Blood culture [[id number]] Collected: 11/26/24 0949    Order Status: Sent Specimen: Blood from Peripheral, Upper Arm, Right Updated: 11/26/24 0958            Slow transit constipation  Bowel regimen.      (HFpEF) heart failure with preserved ejection fraction  Repeat Echo.  Volume removal with HD.      Anemia in chronic kidney disease  Anemia is likely due to chronic disease due to ESRD. Most recent hemoglobin and hematocrit are listed below.  Recent Labs     11/26/24 0826   HGB 9.7*   HCT 28.9*     Plan  - Monitor serial CBC: Daily  - Transfuse PRBC if patient becomes hemodynamically unstable, symptomatic or H/H drops below 7/21.  - Patient has not received any PRBC transfusions to date    Essential hypertension  Patients blood pressure range in the last 24 hours was: BP  Min: 112/56  Max: 176/79.The patient's inpatient anti-hypertensive regimen is listed below:  Current Antihypertensives  carvediloL tablet 12.5 mg, 2 times daily with meals, Oral  cloNIDine tablet 0.3 mg, 3 " times daily, Oral  hydrALAZINE tablet 100 mg, Every 8 hours, Oral  losartan tablet 100 mg, Daily, Oral    Plan  Patient on multiple HTN medications at home.  Will resume with parameters.    ESRD (end stage renal disease) on dialysis  Creatine stable for now. BMP reviewed- noted Estimated Creatinine Clearance: 6.3 mL/min (A) (based on SCr of 5.7 mg/dL (H)). according to latest data. Based on current GFR, CKD stage is end stage.  Monitor UOP and serial BMP and adjust therapy as needed. Renally dose meds. Avoid nephrotoxic medications and procedures.  Patient missed last HD session as she forgot holiday schedule change.  Nephrology consult for HD.      VTE Risk Mitigation (From admission, onward)           Ordered     heparin (porcine) injection 5,000 Units  Every 12 hours         11/26/24 1105     IP VTE LOW RISK PATIENT  Once         11/26/24 1105     Place sequential compression device  Until discontinued         11/26/24 1105                               Pharmacokinetic Initial Assessment: IV Vancomycin    Assessment/Plan:    Initiate intravenous vancomycin with loading dose of 750 mg once with subsequent doses when random concentrations are less than 20 mcg/mL  Desired empiric serum trough concentration is 10 to 20 mcg/mL  Draw vancomycin random level on 11/27 at 0600.  Pharmacy will continue to follow and monitor vancomycin.      Please contact pharmacy at extension [phone] with any questions regarding this assessment.     Thank you for the consult,   Bharti Azar       Patient brief summary:  April Vasquez is a 69 y.o. female initiated on antimicrobial therapy with IV Vancomycin for treatment of suspected  pneumonia    Drug Allergies:   Review of patient's allergies indicates:  No Known Allergies    Actual Body Weight:   43.3 kg    Renal Function:   Estimated Creatinine Clearance: 6.5 mL/min (A) (based on SCr of 5.7 mg/dL (H)).,     Dialysis Method (if applicable):  intermittent HD    CBC (last 72  "hours):  Recent Labs   Lab Result Units 11/26/24  0826   WBC K/uL 8.03   Hemoglobin g/dL 9.7*   Hematocrit % 28.9*   Platelets K/uL 202   Gran % % 77.3*   Lymph % % 14.6*   Mono % % 6.2   Eosinophil % % 1.2   Basophil % % 0.2   Differential Method  Automated       Metabolic Panel (last 72 hours):  Recent Labs   Lab Result Units 11/26/24  0826   Sodium mmol/L 140   Potassium mmol/L 4.6   Chloride mmol/L 106   CO2 mmol/L 22*   Glucose mg/dL 76   BUN mg/dL 43*   Creatinine mg/dL 5.7*   Albumin g/dL 1.9*   Total Bilirubin mg/dL 0.4   Alkaline Phosphatase U/L 109   AST U/L 15   ALT U/L 7*       Drug levels (last 3 results):  No results for input(s): "VANCOMYCINRA", "VANCORANDOM", "VANCOMYCINPE", "VANCOPEAK", "VANCOMYCINTR", "VANCOTROUGH" in the last 72 hours.    Microbiologic Results:  Microbiology Results (last 7 days)       Procedure Component Value Units Date/Time    Blood Culture #1 **CANNOT BE ORDERED STAT** [[id number]] Collected: 11/26/24 0940    Order Status: Sent Specimen: Blood from Peripheral, Hand, Right Updated: 11/26/24 0958    Blood culture [[id number]] Collected: 11/26/24 0949    Order Status: Sent Specimen: Blood from Peripheral, Upper Arm, Right Updated: 11/26/24 0958              Luís Yang MD  [facility]          "

## 2024-11-26 NOTE — PLAN OF CARE
Problem: Skin Injury Risk Increased  Goal: Skin Health and Integrity  Outcome: Progressing     Problem: Adult Inpatient Plan of Care  Goal: Plan of Care Review  Outcome: Progressing  Goal: Patient-Specific Goal (Individualized)  Outcome: Progressing  Goal: Absence of Hospital-Acquired Illness or Injury  Outcome: Progressing  Goal: Optimal Comfort and Wellbeing  Outcome: Progressing  Goal: Readiness for Transition of Care  Outcome: Progressing     Problem: Wound  Goal: Optimal Coping  Outcome: Progressing  Goal: Optimal Functional Ability  Outcome: Progressing  Goal: Absence of Infection Signs and Symptoms  Outcome: Progressing  Goal: Improved Oral Intake  Outcome: Progressing  Goal: Optimal Pain Control and Function  Outcome: Progressing  Goal: Skin Health and Integrity  Outcome: Progressing  Goal: Optimal Wound Healing  Outcome: Progressing     Problem: Diabetes Comorbidity  Goal: Blood Glucose Level Within Targeted Range  Outcome: Progressing     Problem: Hemodialysis  Goal: Safe, Effective Therapy Delivery  Outcome: Progressing  Goal: Effective Tissue Perfusion  Outcome: Progressing  Goal: Absence of Infection Signs and Symptoms  Outcome: Progressing     Problem: Pneumonia  Goal: Fluid Balance  Outcome: Progressing  Goal: Resolution of Infection Signs and Symptoms  Outcome: Progressing  Goal: Effective Oxygenation and Ventilation  Outcome: Progressing     Problem: Coping Ineffective  Goal: Effective Coping  Outcome: Progressing

## 2024-11-26 NOTE — ASSESSMENT & PLAN NOTE
"Patient has a diagnosis of pneumonia. The cause of the pneumonia is suspected to be bacterial in etiology but organism is not known. The pneumonia is stable. The patient has the following signs/symptoms of pneumonia: cough and sputum production. The patient does not have a current oxygen requirement and the patient does not have a home oxygen requirement. I have reviewed the pertinent imaging. The following cultures have been collected: Blood cultures and Sputum culture The culture results are listed below.     Current antimicrobial regimen consists of the antibiotics listed below.     Antibiotics (From admission, onward)      Start     Stop Route Frequency Ordered    11/26/24 0933  vancomycin - pharmacy to dose  (vancomycin IVPB (PEDS and ADULTS))        Placed in "And" Linked Group    -- IV pharmacy to manage frequency 11/26/24 0833            Microbiology Results (last 7 days)       Procedure Component Value Units Date/Time    Blood Culture #1 **CANNOT BE ORDERED STAT** [[id number]] Collected: 11/26/24 0940    Order Status: Sent Specimen: Blood from Peripheral, Hand, Right Updated: 11/26/24 0958    Blood culture [[id number]] Collected: 11/26/24 0949    Order Status: Sent Specimen: Blood from Peripheral, Upper Arm, Right Updated: 11/26/24 0958          "

## 2024-11-26 NOTE — ASSESSMENT & PLAN NOTE
Creatine stable for now. BMP reviewed- noted Estimated Creatinine Clearance: 6.3 mL/min (A) (based on SCr of 5.7 mg/dL (H)). according to latest data. Based on current GFR, CKD stage is end stage.  Monitor UOP and serial BMP and adjust therapy as needed. Renally dose meds. Avoid nephrotoxic medications and procedures.  Patient missed last HD session as she forgot holiday schedule change.  Nephrology consult for HD.

## 2024-11-26 NOTE — ASSESSMENT & PLAN NOTE
Patients blood pressure range in the last 24 hours was: BP  Min: 112/56  Max: 176/79.The patient's inpatient anti-hypertensive regimen is listed below:  Current Antihypertensives  carvediloL tablet 12.5 mg, 2 times daily with meals, Oral  cloNIDine tablet 0.3 mg, 3 times daily, Oral  hydrALAZINE tablet 100 mg, Every 8 hours, Oral  losartan tablet 100 mg, Daily, Oral    Plan  Patient on multiple HTN medications at home.  Will resume with parameters.

## 2024-11-26 NOTE — ED PROVIDER NOTES
Encounter Date: 11/26/2024       History     Chief Complaint   Patient presents with    Fatigue     Pt BIB EMS, c/o generalized weakness x 3 days. Pt due for dialysis today and missed last one. Pt reports not eating for a few days. Pt denies any CP, SOB, abd pain or n/v/d     69-year-old female presenting with generalized weakness and fatigue x3 days.  Patient was due for dialysis today but was too weak to stand and go to the car.  Last dialysis was Friday.  She missed Sunday dialysis because she forgot about the holiday schedule change.  Denies fevers.  Denies nausea vomiting, chest pain, shortness of breath.  Patient states she feels so weak she can barely stand.  Denies double vision, difficulty breathing.  Denies history of similar      Review of patient's allergies indicates:  No Known Allergies  Past Medical History:   Diagnosis Date    (HFpEF) heart failure with preserved ejection fraction 10/30/2023    Anemia     Colon polyps     COVID-19 08/2021    Diabetes mellitus, type 2     ESRD (end stage renal disease) on dialysis 2017    Gallstones     Hypertension     Pulmonary edema     Renal disorder     dialysis MIRANDA fistula    Tobacco abuse     Uses walker      Past Surgical History:   Procedure Laterality Date    AVF  2017    CHOLECYSTECTOMY  2016    COLONOSCOPY N/A 8/6/2020    Procedure: COLONOSCOPY;  Surgeon: Hood Hernadez MD;  Location: [facility] ([address] [place] [address]);  Service: Endoscopy;  Laterality: N/A;  labs prior-dialysis  covid test lapalco 8/3    Permacath Right 2017    TUBAL LIGATION       Family History   Problem Relation Name Age of Onset    Kidney disease Mother      Hypertension Mother      Cervical cancer Mother      Cancer Mother      Ovarian cancer Mother      Diabetes Father      Drug abuse Paternal Grandmother      Diabetes Sister      No Known Problems Sister      No Known Problems Sister      No Known Problems Sister      No Known Problems Son      No Known Problems Son      No Known Problems Son       No Known Problems Daughter      Breast cancer Neg Hx      Colon cancer Neg Hx       Social History     Tobacco Use    Smoking status: Some Days     Current packs/day: 0.00     Average packs/day: 0.5 packs/day for 20.0 years (10.0 ttl pk-yrs)     Types: Cigarettes     Start date: 2000     Last attempt to quit: 2020     Years since quittin.4    Smokeless tobacco: Never   Substance Use Topics    Alcohol use: No    Drug use: No     Review of Systems   Constitutional:  Positive for fatigue.   Neurological:  Positive for weakness.       Physical Exam     Initial Vitals [24 0709]   BP Pulse Resp Temp SpO2   (!) 176/79 65 14 97.6 °F (36.4 °C) 98 %      MAP       --         Physical Exam    Nursing note and vitals reviewed.  Constitutional: She appears well-developed and well-nourished. She is not diaphoretic. No distress.   Chronically ill appearing, thin, appears malnourished   HENT:   Head: Normocephalic and atraumatic.   Nose: Nose normal.   Eyes: EOM are normal. Pupils are equal, round, and reactive to light. No scleral icterus.   Neck: Neck supple.   Normal range of motion.  Cardiovascular:  Normal rate, regular rhythm and intact distal pulses.     Exam reveals no gallop and no friction rub.       Murmur heard.  Pulmonary/Chest: No stridor. No respiratory distress. She has no wheezes. She has rhonchi. She has no rales.   Abdominal: Abdomen is soft. Bowel sounds are normal. She exhibits no distension. There is no abdominal tenderness. There is no rebound and no guarding.   Musculoskeletal:         General: Edema present. No tenderness. Normal range of motion.      Cervical back: Normal range of motion and neck supple.      Comments: Mild edema of the distal lower extremities     Neurological: She is alert and oriented to person, place, and time. No cranial nerve deficit. GCS score is 15. GCS eye subscore is 4. GCS verbal subscore is 5. GCS motor subscore is 6.   Skin: Skin is warm and dry. No  rash noted.   Psychiatric: She has a normal mood and affect. Her behavior is normal.         ED Course   Procedures  Labs Reviewed   CBC W/ AUTO DIFFERENTIAL - Abnormal       Result Value    WBC 8.03      RBC 3.04 (*)     Hemoglobin 9.7 (*)     Hematocrit 28.9 (*)     MCV 95      MCH 31.9 (*)     MCHC 33.6      RDW 18.6 (*)     Platelets 202      MPV 11.3      Immature Granulocytes 0.5      Gran # (ANC) 6.2      Immature Grans (Abs) 0.04      Lymph # 1.2      Mono # 0.5      Eos # 0.1      Baso # 0.02      nRBC 0      Gran % 77.3 (*)     Lymph % 14.6 (*)     Mono % 6.2      Eosinophil % 1.2      Basophil % 0.2      Differential Method Automated     COMPREHENSIVE METABOLIC PANEL - Abnormal    Sodium 140      Potassium 4.6      Chloride 106      CO2 22 (*)     Glucose 76      BUN 43 (*)     Creatinine 5.7 (*)     Calcium 8.3 (*)     Total Protein 5.1 (*)     Albumin 1.9 (*)     Total Bilirubin 0.4      Alkaline Phosphatase 109      AST 15      ALT 7 (*)     eGFR 8 (*)     Anion Gap 12     TSH - Abnormal    TSH 7.182 (*)    CULTURE, BLOOD   CULTURE, BLOOD   T4, FREE    Free T4 0.78       EKG Readings: (Independently Interpreted)   Initial Reading: No STEMI. Rhythm: Normal Sinus Rhythm. Heart Rate: 60.   No ST segment elevation or depression concerning for acute ischemia.          Imaging Results              CT Chest Without Contrast (Final result)  Result time 11/26/24 09:44:15      Final result by Freddy Abernathy MD (11/26/24 09:44:15)                   Impression:      1. Moderate bilateral pleural effusions, pulmonary vascular congestion/interstitial edema, body wall edema, ascites, and mesenteric congestion.  Correlation patient volume status recommended.  2. Areas of ground-glass type attenuation in the lungs could relate to alveolar edema, noting that infectious or noninfectious inflammatory etiology could appear similar.  3. Few nonspecific small, sub 6 mm solid pulmonary nodules.  For multiple solid  nodules all <6 mm, Fleischner Society 2017 guidelines recommend no routine follow up for a low risk patient, or follow up with non-contrast chest CT at 12 months after discovery in a high risk patient.  4. Additional details, as provided in the body of the report.      Electronically signed by: Freddy Abernathy  Date:    11/26/2024  Time:    09:44               Narrative:    EXAMINATION:  CT CHEST WITHOUT CONTRAST    CLINICAL HISTORY:  Pneumonia, unresolved;Pleural effusion, malignancy suspected;    TECHNIQUE:  Low dose axial images, sagittal and coronal reformations were obtained from the thoracic inlet to the lung bases. Contrast was not administered.    COMPARISON:  CT of the abdomen and pelvis performed 06/04/2024.    FINDINGS:  Support tubes and lines: None.    Aorta: Nonaneurysmal.  Left-sided arch.  Conventional 3 vessel arch anatomy.  Heavy atherosclerosis.  Tortuosity of the descending thoracic aorta    Heart: Question mild cardiac enlargement.    Coronary arteries: At least mild atherosclerotic calcification.    Pericardium: Normal. No effusion, thickening, or calcification.    Central pulmonary arteries: Normal caliber.    Base of neck/thyroid: Unremarkable.    Lymph nodes: No supraclavicular, axillary, internal mammary, mediastinal, or hilar adenopathy.    Esophagus: Unremarkable.    Pleura: Moderately sized pleural effusions.    Upper abdomen: Partially imaged abdominal ascites and mesenteric congestion.  Status post cholecystectomy.  Heavy atherosclerotic calcifications.  Cortical atrophy of the kidneys.    Body wall: Question mild diffuse body wall edema.    Airways: Intraluminal debris is noted within the central airways supplying the right middle and lower lobes.    Lungs: Assessment compromised by respiratory motion.  Areas of smooth interlobular septal thickening noted.  Patchy areas of ground-glass type attenuation.  Few small solid pulmonary nodules.  Reference 2 mm solid nodule right middle lobe  (series 4, image 250).  Two fissural based nodules in the left lower lobe measuring on the order of 5 mm 1-2 mm (axial series 4, image 192 and 186).  These could indicate lymph nodes.    Bones: No definite acute abnormality.  Degenerative findings of the spine.  Question sequela of renal osteodystrophy.                                       X-Ray Chest AP Portable (Final result)  Result time 11/26/24 08:18:53      Final result by Justino Grijalva MD (11/26/24 08:18:53)                   Impression:      As above.      Electronically signed by: Justino Grijalva  Date:    11/26/2024  Time:    08:18               Narrative:    EXAMINATION:  XR CHEST AP PORTABLE    CLINICAL HISTORY:  Chest Pain;    TECHNIQUE:  Single frontal view of the chest was performed.    COMPARISON:  Chest radiograph 07/30/2024    FINDINGS:  Lines and tubes: None.    Heart and mediastinum: Normal in size.  Dense calcifications of the thoracic aorta.    Pleura: No pleural effusion or pneumothorax.    Lungs: Lungs are well inflated. New airspace opacities in the right lower lung zone, concerning for aspiration or pneumonia.  There are bandlike opacities in the left mid and lower lung zones, likely atelectasis.    Soft tissue/bone: Osteopenia and degenerative changes.  Cholecystectomy clips.                                       Medications   vancomycin - pharmacy to dose (has no administration in time range)   carvediloL tablet 12.5 mg (has no administration in time range)   cloNIDine tablet 0.3 mg (0.3 mg Oral Not Given 11/26/24 1500)   hydrALAZINE tablet 100 mg (100 mg Oral Not Given 11/26/24 1400)   losartan tablet 100 mg (100 mg Oral Not Given 11/26/24 1215)   sevelamer carbonate tablet 1,600 mg (1,600 mg Oral Not Given 11/26/24 1258)   sodium chloride 0.9% flush 10 mL (has no administration in time range)   naloxone 0.4 mg/mL injection 0.02 mg (has no administration in time range)   heparin (porcine) injection 5,000 Units (5,000 Units Subcutaneous  Given 11/26/24 1258)   potassium bicarbonate disintegrating tablet 50 mEq (has no administration in time range)   potassium bicarbonate disintegrating tablet 35 mEq (has no administration in time range)   potassium bicarbonate disintegrating tablet 60 mEq (has no administration in time range)   magnesium oxide tablet 800 mg (has no administration in time range)   magnesium oxide tablet 800 mg (has no administration in time range)   potassium, sodium phosphates 280-160-250 mg packet 2 packet (has no administration in time range)   potassium, sodium phosphates 280-160-250 mg packet 2 packet (has no administration in time range)   potassium, sodium phosphates 280-160-250 mg packet 2 packet (has no administration in time range)   polyethylene glycol packet 17 g (has no administration in time range)   ondansetron injection 8 mg (has no administration in time range)   acetaminophen tablet 650 mg (has no administration in time range)   dextromethorphan-guaiFENesin  mg/5 ml liquid 5 mL (has no administration in time range)   HYDROcodone-acetaminophen 5-325 mg per tablet 1 tablet (has no administration in time range)   prochlorperazine injection Soln 5 mg (has no administration in time range)   albuterol-ipratropium 2.5 mg-0.5 mg/3 mL nebulizer solution 3 mL (has no administration in time range)   melatonin tablet 6 mg (has no administration in time range)   aluminum-magnesium hydroxide-simethicone 200-200-20 mg/5 mL suspension 30 mL (has no administration in time range)   piperacillin-tazobactam (ZOSYN) 4.5 g in D5W 100 mL IVPB (MB+) (0 g Intravenous Stopped 11/26/24 1031)   vancomycin 750 mg in 0.9% NaCl 250 mL IVPB (admixture device) (0 mg Intravenous Stopped 11/26/24 1210)     Medical Decision Making                        Medical Decision Making:   Initial Assessment:   69-year-old female presenting with generalized weakness.  On exam patient appears chronically ill-appearing.  Grayish colored skin, mild scleral  icterus.  Minimal lower extremity edema.  Otherwise appears cachectic.  Mild rhonchi on exam.  No focal neurologic deficits.  Doubt stroke.  Possible pneumonia, metabolic abnormality.  Will get labs, chest x-ray, reassess.  EKG without signs of hyperkalemia such as peaked T-waves or QRS widening.  ED Management:  69-year-old female with history of end-stage renal disease is presenting with generalized fatigue and weakness. She reports she has been unable to stand to walk to the car today. She notes she has missed 2 episodes of dialysis including today's. her last dialysis was on Friday.  She missed Sundays because she forgot that they had changed this scheduled.  Denies fevers, chills.  Denies specific chest pain.  Notes mild cough.  Denies abdominal pain.  She is chronically ill-appearing with grayish skin and appears quite malnourished.  X-ray and CT scan reveals likely pneumonia, possible aspiration as cause.  Lab workup otherwise relatively typical for end-stage renal disease.  I have started her on vanc and Zosyn.  I would like to bring her in for treatment of pneumonia, dialysis.               Clinical Impression:  Final diagnoses:  [R07.9] Chest pain  [J18.9] Pneumonia of both lower lobes due to infectious organism (Primary)          ED Disposition Condition    Admit Stable                Jaren Berumen MD  11/26/24 5135

## 2024-11-27 PROBLEM — Z72.0 TOBACCO ABUSE: Status: ACTIVE | Noted: 2024-01-01

## 2024-11-27 PROBLEM — Z71.89 ADVANCE CARE PLANNING: Status: ACTIVE | Noted: 2024-01-01

## 2024-11-27 PROBLEM — E43 SEVERE PROTEIN-CALORIE MALNUTRITION: Status: ACTIVE | Noted: 2024-01-01

## 2024-11-27 PROBLEM — R53.81 DEBILITY: Status: ACTIVE | Noted: 2024-01-01

## 2024-11-27 NOTE — CONSULTS
[facility] - Telemetry  Adult Nutrition  Consult Note    SUMMARY     Recommendations    Recommendation:  1. Add Aj BID for wound healing  2. Continue pt on renal diet with novasource renal TID  3. Encourage intake of novasource renal  4. Monitor weight/labs  5. RD to follow to monitor PO intake    Goals:  Pt to meet 50-75% EEN/EPN by RD follow-up  Nutrition Goal Status: new  Communication of RD Recs:  (POC)    Assessment and Plan  Endocrine  Severe protein-calorie malnutrition  Malnutrition Type:  Context: chronic illness  Level: severe    Related to (etiology):   cachexia    Signs and Symptoms (as evidenced by):   Moderate Muscle Mass Depletion  Moderate Subcutaneous Fat Depletion  Decreased Appetite     Malnutrition Characteristic Summary:  Weight Loss (Malnutrition):  (7% weight loss x 1 year)  Subcutaneous Fat (Malnutrition): moderate depletion  Muscle Mass (Malnutrition): moderate depletion    Interventions:  Collaboration by nutrition professional with other providers  Commercial beverage medical food supplement therapy- Aj BID    Nutrition Diagnosis Status:   New    Malnutrition Assessment  Malnutrition Context: chronic illness  Malnutrition Level: severe  Weight Loss (Malnutrition):  (7% weight loss x 1 year)  Subcutaneous Fat (Malnutrition): moderate depletion  Muscle Mass (Malnutrition): moderate depletion   Orbital Region (Subcutaneous Fat Loss): moderate depletion  Upper Arm Region (Subcutaneous Fat Loss): moderate depletion  Thoracic and Lumbar Region: moderate depletion   [place] Region (Muscle Loss): moderate depletion  Clavicle Bone Region (Muscle Loss): moderate depletion  Clavicle and Acromion Bone Region (Muscle Loss): moderate depletion  Scapular Bone Region (Muscle Loss): moderate depletion  Dorsal Hand (Muscle Loss): moderate depletion  Patellar Region (Muscle Loss): moderate depletion  Anterior Thigh Region (Muscle Loss): moderate depletion  Posterior Calf Region (Muscle Loss): moderate  "depletion     Reason for Assessment  Reason For Assessment: consult (cachexia)  Diagnosis:  (cachexia)  General Information Comments: Pt admitted for cachexia. Currently on renal diet with novaosrce renal TID. Spoke with pt, reports appetite improving compared to yesterday. Reports to drinking 2 vanilla ensure a day at home. NFPE assessed at thist [place], moderate depletion noted. Walter 12- skin tear right lower arm, right distal arm, sacral spine. Recent weight change of 7.5lb weight loss x 1 year. (PMH: HTN, ESRD on HD, T2DM, pulmonary edema, HFeEF)  Nutrition Discharge Planning: d/c on renal diet with novasource renal TID    Nutrition Risk Screen  Nutrition Risk Screen: no indicators present    Nutrition/Diet History  Spiritual, Cultural Beliefs, Mormonism Practices, Values that Affect Care: no  Factors Affecting Nutritional Intake: decreased appetite    Anthropometrics  Temp: 98.6 °F (37 °C)  Height Method: Stated  Height: 5' 4" (162.6 cm)  Height (inches): 64 in  Weight Method: Bed Scale  Weight: 41.4 kg (91 lb 4.3 oz)  Weight (lb): 91.27 lb  Ideal Body Weight (IBW), Female: 120 lb  % Ideal Body Weight, Female (lb): 76.06 %  BMI (Calculated): 15.7  BMI Grade: less than 16 protein-energy malnutrition grade III  Weight Loss: unintentional  Usual Body Weight (UBW), k.8 kg  Weight Change Amount: 7 lb 7.9 oz  % Usual Body Weight: 92.6  % Weight Change From Usual Weight: -7.59 %     Lab/Procedures/Meds  Pertinent Labs Reviewed: reviewed  Pertinent Labs Comments: RBC 2.63(L), hemoglobin 8.4(L), hematocrit 25.5(L), BUN 25(H), creatinine 3.7(H), eGFR 13(L), glucose 55(L), calcium 7.7(L), phosphorus 6(H), protein total 4.3(L), albumin 1.7(L)  Pertinent Medications Reviewed: reviewed  Pertinent Medications Comments: carvedilol, clonidine, heparin, hydralazine, losartan, polyethylene glycol, vancomycin    Nutrition Related Social Determinants of Health:  SDOH: Adequate food in home environment    Estimated/Assessed " Needs  Weight Used For Calorie Calculations: 54.4 kg (120 lb) (IBW)  Energy Calorie Requirements (kcal): 7278-0145 kcal (25-30 g/kg IBW)  Energy Need Method: Kcal/kg  Protein Requirements: 76g (1.4 g/kg IBW)  Weight Used For Protein Calculations: 54.4 kg (120 lb) (IBW)  Estimated Fluid Requirement Method: RDA Method  RDA Method (mL): 1632  CHO Requirement: 205g    Nutrition Prescription Ordered  Current Diet Order: renal diet  Oral Nutrition Supplement: novasource renal    Evaluation of Received Nutrient/Fluid Intake  I/O: 410/0  Energy Calories Required: not meeting needs  Protein Required: not meeting needs  Fluid Required: not meeting needs  Comments: LBM: not recorded  Tolerance: tolerating  % Intake of Estimated Energy Needs: 0 - 25 %  % Meal Intake: 0 - 25 %    Nutrition Risk  Level of Risk/Frequency of Follow-up: low - moderate (1x weekly)     Monitor and Evaluation  Food and Nutrient Intake: energy intake  Food and Nutrient Adminstration: diet order  Physical Activity and Function: nutrition-related ADLs and IADLs  Anthropometric Measurements: weight  Biochemical Data, Medical Tests and Procedures: electrolyte and renal panel, gastrointestinal profile, glucose/endocrine profile, inflammatory profile, lipid profile     Nutrition Follow-Up  RD Follow-up?: Yes

## 2024-11-27 NOTE — ASSESSMENT & PLAN NOTE
Creatine stable for now. BMP reviewed- noted Estimated Creatinine Clearance: 9.6 mL/min (A) (based on SCr of 3.7 mg/dL (H)). according to latest data. Based on current GFR, CKD stage is end stage.  Monitor UOP and serial BMP and adjust therapy as needed. Renally dose meds. Avoid nephrotoxic medications and procedures.  Patient missed last HD session as she forgot holiday schedule change.  Nephrology consult for HD.

## 2024-11-27 NOTE — ASSESSMENT & PLAN NOTE
Concern for cachexia as evidenced by loss of 5% weight over the past 12 months, BMI less than 20 in the presence of known chronic disease, loss of muscle mass, and loss of body fat . Contributing factors include underlying  undetermined . Treatment to include nutrition consult, physical therapy, occupational therapy, palliative consultation, and malignancy work up .      Body mass index is 15.67 kg/m².  Albumin   Date Value Ref Range Status   11/27/2024 1.7 (L) 3.5 - 5.2 g/dL Final   Patient presents with fatigue and debility.  Most likely related to significant weight loss.  Will be treated for possible pneumonia as below, but symptoms seem chronic in nature.  Unexplained weight loss with adequate appetite and oral intake.  Concern about underlying malignancy.  No evidence of malignancy on CT.  PT/OT evaluation.

## 2024-11-27 NOTE — PROGRESS NOTES
Report receive from Dialysis Nurse Andrzej, as per the Nurse unable to take fluids off from pt because of blood pressure running low on 90s, only cleaning done. Pt asymptomatic, slept through out the session.

## 2024-11-27 NOTE — PROGRESS NOTES
Pharmacokinetic Assessment Follow Up: IV Vancomycin    Vancomycin serum concentration assessment(s):    The random level was drawn correctly and can be used to guide therapy at this time. The measurement is below the desired definitive target range of 10 to 20 mcg/mL.    Vancomycin Regimen Plan:    Give 500 mg after dialysis.  Re-dose when the random level is less than 20 mcg/mL, next level to be drawn at 0400 on 11/28/2024    Drug levels (last 3 results):  Recent Labs   Lab Result Units 11/27/24  0415   Vancomycin, Random ug/mL 9.7       Pharmacy will continue to follow and monitor vancomycin.    Please contact pharmacy at extension [phone] for questions regarding this assessment.    Thank you for the consult,   Lupillo Carias Jr       Patient brief summary:  April Vasquez is a 69 y.o. female initiated on antimicrobial therapy with IV Vancomycin for treatment of  pneumonia    Drug Allergies:   Review of patient's allergies indicates:  No Known Allergies    Actual Body Weight:   41.4 kg    Renal Function:   Estimated Creatinine Clearance: 6.3 mL/min (A) (based on SCr of 5.7 mg/dL (H)).,     Dialysis Method (if applicable):  intermittent HD    CBC (last 72 hours):  Recent Labs   Lab Result Units 11/26/24  0826 11/27/24  0415   WBC K/uL 8.03 8.05   Hemoglobin g/dL 9.7* 8.4*   Hematocrit % 28.9* 25.5*   Platelets K/uL 202 139*   Gran % % 77.3* 67.6   Lymph % % 14.6* 23.7   Mono % % 6.2 6.2   Eosinophil % % 1.2 1.9   Basophil % % 0.2 0.4   Differential Method  Automated Automated       Metabolic Panel (last 72 hours):  Recent Labs   Lab Result Units 11/26/24  0826   Sodium mmol/L 140   Potassium mmol/L 4.6   Chloride mmol/L 106   CO2 mmol/L 22*   Glucose mg/dL 76   BUN mg/dL 43*   Creatinine mg/dL 5.7*   Albumin g/dL 1.9*   Total Bilirubin mg/dL 0.4   Alkaline Phosphatase U/L 109   AST U/L 15   ALT U/L 7*       Vancomycin Administrations:  vancomycin given in the last 96 hours                     vancomycin 750  mg in 0.9% NaCl 250 mL IVPB (admixture device) (mg) 750 mg New Bag 11/26/24 1035                    Microbiologic Results:  Microbiology Results (last 7 days)       Procedure Component Value Units Date/Time    Blood Culture #1 **CANNOT BE ORDERED STAT** [[id number]] Collected: 11/26/24 0940    Order Status: Completed Specimen: Blood from Peripheral, Hand, Right Updated: 11/26/24 1712     Blood Culture, Routine No Growth to date    Blood culture [[id number]] Collected: 11/26/24 0949    Order Status: Completed Specimen: Blood from Peripheral, Upper Arm, Right Updated: 11/26/24 1712     Blood Culture, Routine No Growth to date    Culture, Respiratory with Gram Stain [[id number]]     Order Status: No result Specimen: Respiratory

## 2024-11-27 NOTE — PT/OT/SLP EVAL
Occupational Therapy Evaluation     Name: April Vasquez  MRN: [id number]  Admitting Diagnosis: Cachexia  Recent Surgery: * No surgery found *      Recommendations:     Discharge Recommendations: Low Intensity Therapy  Level of Assistance Recommended: 24 hours supervision; light assistance   Discharge Equipment Recommendations: bedside commode  Barriers to discharge: None    Assessment:     April Vasquez is a 69 y.o. female with a medical diagnosis of Cachexia. She presents with performance deficits affecting function including weakness, impaired endurance, impaired self care skills, impaired functional mobility, gait instability, impaired balance, decreased safety awareness, decreased lower extremity function, impaired cognition, decreased ROM. Patient is thin with moderate pitting edema in both arms with bruising to her face and dry skin. She said her sons help her at home with cooking, cleaning, and driving, but she does her basic activities of daily living herself.  She said she had not eaten breakfast this am and felt weak.  Patient could name her correct birth date, but she did not name the correct year and knew the right month and day of week, but not the day of the month. She walked from the bed to the sink, but was weak while standing to brush her teeth, so occupational therapy cued her to sit in chair to finish grooming.     Rehab Prognosis: Good; patient would benefit from acute OT services to address these deficits and reach maximum level of function.    Plan:     Patient to be seen 3 x/week to address the above listed problems via self-care/home management, therapeutic activities, therapeutic exercises  Plan of Care Expires: 12/04/24  Plan of Care Reviewed with: patient    Subjective     Chief Complaint: weakness and hunger   Patient Comments/Goals: she said she would like to have breakfast   Pain/Comfort:  Pain Rating 1: 0/10    Patients cultural, spiritual, Anabaptist conflicts given the  current situation: no    Social History:  Living Environment: Patient lives with their son in a single story home with number of outside stair(s): 1 porch step   Prior Level of Function: Prior to admission, patient requires assistance with ADLs including driving, cooking and cleaning, but could do her BADLs herself.   Roles and Routines: Patient was not driving and not working prior to admission.  Equipment Used at Home: walker, rolling, shower chair  DME owned (not currently used): none  Assistance Upon Discharge: family    Objective:     Communicated with RNWaldemar, prior to session. Patient found HOB elevated with peripheral IV upon OT entry to room.    General Precautions: Standard, fall   Orthopedic Precautions: N/A   Braces: N/A    Respiratory Status: Room air    Occupational Performance    Gait belt applied - Yes    Bed Mobility:   Rolling/Turning to Left with minimum assistance  Scooting anteriorly to EOB to have both feet planted on floor: minimum assistance  Supine to sit from left side of bed with minimum assistance    Functional Mobility/Transfers:  Sit <> Stand Transfer with contact guard assistance with rolling walker  Bed <> Chair Transfer using Step Transfer technique with contact guard assistance with rolling walker  Functional Mobility: Patient walked with CGA with RW from bed to sink for ~10'  in room and then sat in chair while grooming due to weakness.     Activities of Daily Living:  Grooming: supervision to wash face and brush teeth while   Upper Body Dressing: minimum assistance  Lower Body Dressing: maximal assistance    Cognitive/Visual Perceptual:  Cognitive/Psychosocial Skills:    -     Oriented to: Person, Place, Situation, and Not oriented to time  -     Follows Commands/attention: Follows multistep  commands  -     Communication: clear/fluent  -     Memory: Impaired STM and Poor immediate recall  -     Safety awareness/insight to disability: impaired  -     Mood/Affect/Coping  skills/emotional control: Appropriate to situation  Visual/Perceptual:    -     Intact    Physical Exam:  Balance:    -     Sitting: supervision  -     Standing: contact guard assistance  Postural examination/scapula alignment:    -       Rounded shoulders  -       Forward head  Skin integrity: Visible skin intact  Edema:  None noted  Sensation:    -       Intact  Motor Planning: Intact  Dominant hand: Right  Upper Extremity Range of Motion:     -       Right Upper Extremity: WFL  -       Left Upper Extremity: WFL  Upper Extremity Strength:    -       Right Upper Extremity: WFL  -       Left Upper Extremity: WFL   Strength:    -       Right Upper Extremity: WFL  -       Left Upper Extremity: WFL  Fine Motor Coordination:    -       Intact  Gross motor coordination:   WFL    AMPAC 6 Click ADL:  AMPAC Total Score: 15    Treatment & Education:  Patient educated on role of OT, POC, and goals for therapy  Patient educated on importance of OOB activities with staff member assistance and sitting OOB majority of the day  Occupational therapist is recommending BSC for patient at home due to weakness with walking today.     Patient clear to ambulate to/from bathroom with RN/PCT, assist x1  .    Patient left HOB elevated with all lines intact, call button in reach, RN notified    GOALS:   Multidisciplinary Problems       Occupational Therapy Goals          Problem: Occupational Therapy    Goal Priority Disciplines Outcome Interventions   Occupational Therapy Goal     OT, PT/OT Progressing    Description: Goals to be met by: 12/4/24      Patient will increase functional independence with ADLs by performing:    UE Dressing with [place].  LE Dressing with Supervision.  Grooming while standing at sink with Supervision due to weakness.   Toileting from toilet with Supervision for hygiene and clothing management.   Sitting in chair x60  minutes with Supervision.  Toilet transfer to toilet with Supervision.  Upper extremity  exercise program x10 reps per handout, with supervision.                         History:     Past Medical History:   Diagnosis Date    (HFpEF) heart failure with preserved ejection fraction 10/30/2023    Anemia     Colon polyps     COVID-19 08/2021    Diabetes mellitus, type 2     ESRD (end stage renal disease) on dialysis 2017    Gallstones     Hypertension     Pulmonary edema     Renal disorder     dialysis MIRANDA fistula    Tobacco abuse     Uses walker          Past Surgical History:   Procedure Laterality Date    AVF  2017    CHOLECYSTECTOMY  2016    COLONOSCOPY N/A 8/6/2020    Procedure: COLONOSCOPY;  Surgeon: Hood Hernadez MD;  Location: [facility] ([address] [place] [address]);  Service: Endoscopy;  Laterality: N/A;  labs prior-dialysis  covid test lapalco 8/3    Permacath Right 2017    TUBAL LIGATION         Time Tracking:     OT Date of Treatment: 11/27/24  OT Start Time: 0928  OT Stop Time: 0951  OT Total Time (min): 23 min    Billable Minutes: Evaluation 15  and Self Care/Home Management 8     11/27/2024

## 2024-11-27 NOTE — HOSPITAL COURSE
70 y/o female with ESRD presents with generalized weakness, cough and weight loss.  Imaging concerning for pneumonia and started on IV ABX's.  PT/OT consulted.  Patient also missed HD and Nephrology consulted for dialysis.  Cachexia concerning for underlying malignancy.  Unremarkable CT of chest/abdomen/pelvis with no evidence of malignancy.  ESRD cachexia?  Patient also on lots of BP medications at home and BP has been borderline low during hospital stay.  Unsure if hypotension contributing to weakness and fatigue.  Medications adjusted during hospital stay.  She has remained afebrile and hemodynamically stable.  PT/OT recommending HH, but patient refusing at this time.  She will be discharged home to follow up with PCP and Nephrology.  Patient will be discharged with 4 more days of ABX's to complete treatment for possible pneumonia.

## 2024-11-27 NOTE — SUBJECTIVE & OBJECTIVE
Past Medical History:   Diagnosis Date    (HFpEF) heart failure with preserved ejection fraction 10/30/2023    Anemia     Colon polyps     COVID-19 08/2021    Diabetes mellitus, type 2     ESRD (end stage renal disease) on dialysis 2017    Gallstones     Hypertension     Pulmonary edema     Renal disorder     dialysis MIRANDA fistula    Tobacco abuse     Uses walker        Past Surgical History:   Procedure Laterality Date    AVF  2017    CHOLECYSTECTOMY  2016    COLONOSCOPY N/A 8/6/2020    Procedure: COLONOSCOPY;  Surgeon: Hood Hernadez MD;  Location: [facility] ([address] [place] [address]);  Service: Endoscopy;  Laterality: N/A;  labs prior-dialysis  covid test lapalco 8/3    Permacath Right 2017    TUBAL LIGATION         Review of patient's allergies indicates:  No Known Allergies    Medications:  Continuous Infusions:  Scheduled Meds:   0.9% NaCl   Intravenous Once    carvediloL  6.25 mg Oral BID WM    cloNIDine  0.1 mg Oral TID    heparin (porcine)  5,000 Units Subcutaneous Q12H    hydrALAZINE  100 mg Oral Q8H    losartan  100 mg Oral Daily    mupirocin   Nasal BID    piperacillin-tazobactam (Zosyn) IV (PEDS and ADULTS) (extended infusion is not appropriate)  4.5 g Intravenous Q12H    sevelamer carbonate  1,600 mg Oral TID WM     PRN Meds:  Current Facility-Administered Medications:     acetaminophen, 650 mg, Oral, Q4H PRN    albuterol-ipratropium, 3 mL, Nebulization, Q4H PRN    aluminum-magnesium hydroxide-simethicone, 30 mL, Oral, QID PRN    dextromethorphan-guaiFENesin  mg/5 ml, 5 mL, Oral, Q6H PRN    HYDROcodone-acetaminophen, 1 tablet, Oral, Q6H PRN    magnesium oxide, 800 mg, Oral, PRN    magnesium oxide, 800 mg, Oral, PRN    melatonin, 6 mg, Oral, Nightly PRN    naloxone, 0.02 mg, Intravenous, PRN    ondansetron, 8 mg, Intravenous, Q6H PRN    polyethylene glycol, 17 g, Oral, BID PRN    potassium bicarbonate, 35 mEq, Oral, PRN    potassium bicarbonate, 50 mEq, Oral, PRN    potassium bicarbonate, 60 mEq, Oral, PRN     potassium, sodium phosphates, 2 packet, Oral, PRN    potassium, sodium phosphates, 2 packet, Oral, PRN    potassium, sodium phosphates, 2 packet, Oral, PRN    prochlorperazine, 5 mg, Intravenous, Q6H PRN    sodium chloride 0.9%, 250 mL, Intravenous, PRN    sodium chloride 0.9%, 10 mL, Intravenous, Q12H PRN    Family History       Problem Relation (Age of Onset)    Cancer Mother    Cervical cancer Mother    Diabetes Father, Sister    Drug abuse Paternal Grandmother    Hypertension Mother    Kidney disease Mother    No Known Problems Sister, Sister, Sister, Son, Son, Son, Daughter    Ovarian cancer Mother          Tobacco Use    Smoking status: Some Days     Current packs/day: 0.00     Average packs/day: 0.5 packs/day for 20.0 years (10.0 ttl pk-yrs)     Types: Cigarettes     Start date: 2000     Last attempt to quit: 2020     Years since quittin.4    Smokeless tobacco: Never   Substance and Sexual Activity    Alcohol use: No    Drug use: No    Sexual activity: Yes     Partners: Male     Birth control/protection: Post-menopausal       Review of Systems   Constitutional:  Positive for fatigue and unexpected weight change.   Neurological:  Positive for weakness.     Objective:     Vital Signs (Most Recent):  Temp: 97.8 °F (36.6 °C) (24)  Pulse: 67 (24)  Resp: 18 (24)  BP: (!) 128/58 (24)  SpO2: 96 % (24) Vital Signs (24h Range):  Temp:  [97.3 °F (36.3 °C)-97.8 °F (36.6 °C)] 97.8 °F (36.6 °C)  Pulse:  [57-90] 67  Resp:  [11-18] 18  SpO2:  [94 %-98 %] 96 %  BP: ()/(44-98) 128/58     Weight: 41.4 kg (91 lb 4.3 oz)  Body mass index is 15.67 kg/m².       Physical Exam  Constitutional:       Comments: Lying in bed, appears chronically-ill and with visible cachexia, in no distress       Significant Labs: All pertinent labs within the past 24 hours have been reviewed.  CBC:   Recent Labs   Lab 24  0415   WBC 8.05   HGB 8.4*   HCT 25.5*   MCV 97    *     BMP:  Recent Labs   Lab 11/27/24  0415   GLU 55*      K 3.9      CO2 25   BUN 25*   CREATININE 3.7*   CALCIUM 7.7*   MG 1.8     LFT:  Lab Results   Component Value Date    AST 16 11/27/2024     (H) 09/19/2016    ALKPHOS 110 11/27/2024    BILITOT 0.4 11/27/2024     Albumin:   Albumin   Date Value Ref Range Status   11/27/2024 1.7 (L) 3.5 - 5.2 g/dL Final     Protein:   Total Protein   Date Value Ref Range Status   11/27/2024 4.3 (L) 6.0 - 8.4 g/dL Final     Lactic acid:   Lab Results   Component Value Date    LACTATE 0.9 07/30/2024    LACTATE 0.6 07/20/2024       Significant Imaging: I have reviewed all pertinent imaging results/findings within the past 24 hours.

## 2024-11-27 NOTE — SUBJECTIVE & OBJECTIVE
Interval History: tolerated dialysis well yesterday, no acute issues.     Review of patient's allergies indicates:  No Known Allergies  Current Facility-Administered Medications   Medication Frequency    0.9% NaCl infusion Once    acetaminophen tablet 650 mg Q4H PRN    albuterol-ipratropium 2.5 mg-0.5 mg/3 mL nebulizer solution 3 mL Q4H PRN    aluminum-magnesium hydroxide-simethicone 200-200-20 mg/5 mL suspension 30 mL QID PRN    carvediloL tablet 6.25 mg BID WM    cloNIDine tablet 0.1 mg TID    dextromethorphan-guaiFENesin  mg/5 ml liquid 5 mL Q6H PRN    heparin (porcine) injection 5,000 Units Q12H    hydrALAZINE tablet 100 mg Q8H    HYDROcodone-acetaminophen 5-325 mg per tablet 1 tablet Q6H PRN    losartan tablet 100 mg Daily    magnesium oxide tablet 800 mg PRN    magnesium oxide tablet 800 mg PRN    melatonin tablet 6 mg Nightly PRN    mupirocin 2 % ointment BID    naloxone 0.4 mg/mL injection 0.02 mg PRN    ondansetron injection 8 mg Q6H PRN    piperacillin-tazobactam (ZOSYN) 4.5 g in D5W 100 mL IVPB (MB+) Q12H    polyethylene glycol packet 17 g BID PRN    potassium bicarbonate disintegrating tablet 35 mEq PRN    potassium bicarbonate disintegrating tablet 50 mEq PRN    potassium bicarbonate disintegrating tablet 60 mEq PRN    potassium, sodium phosphates 280-160-250 mg packet 2 packet PRN    potassium, sodium phosphates 280-160-250 mg packet 2 packet PRN    potassium, sodium phosphates 280-160-250 mg packet 2 packet PRN    prochlorperazine injection Soln 5 mg Q6H PRN    sevelamer carbonate tablet 1,600 mg TID WM    sodium chloride 0.9% bolus 250 mL 250 mL PRN    sodium chloride 0.9% flush 10 mL Q12H PRN       Objective:     Vital Signs (Most Recent):  Temp: 98.6 °F (37 °C) (11/27/24 1109)  Pulse: 86 (11/27/24 1109)  Resp: 18 (11/27/24 1109)  BP: (!) 118/49 (11/27/24 1109)  SpO2: 96 % (11/27/24 1109) Vital Signs (24h Range):  Temp:  [97.3 °F (36.3 °C)-98.6 °F (37 °C)] 98.6 °F (37 °C)  Pulse:  [61-90]  86  Resp:  [16-18] 18  SpO2:  [94 %-98 %] 96 %  BP: ()/(44-98) 118/49     Weight: 41.4 kg (91 lb 4.3 oz) (11/26/24 1351)  Body mass index is 15.67 kg/m².  Body surface area is 1.37 meters squared.    I/O last 3 completed shifts:  In: 470 [P.O.:120; IV Piggyback:350]  Out: 0      Physical Exam  Constitutional:       General: She is not in acute distress.     Appearance: She is not ill-appearing or toxic-appearing.      Comments: Cachetic, eating full meal on room air in room.    HENT:      Mouth/Throat:      Mouth: Mucous membranes are moist.   Eyes:      General: No scleral icterus.        Right eye: No discharge.         Left eye: No discharge.      Pupils: Pupils are equal, round, and reactive to light.   Cardiovascular:      Rate and Rhythm: Normal rate.      Heart sounds: Murmur heard.      Comments: Left arm AVF, good thrill and hum  Pulmonary:      Effort: Pulmonary effort is normal.      Breath sounds: No rhonchi.   Abdominal:      General: Abdomen is flat.   Musculoskeletal:         General: Normal range of motion.      Cervical back: Normal range of motion.      Right lower leg: No edema.      Left lower leg: No edema.   Neurological:      Mental Status: She is alert.          Significant Labs:  All labs within the past 24 hours have been reviewed.     Significant Imaging:  Labs: Reviewed

## 2024-11-27 NOTE — PROGRESS NOTES
"Tech notified nurse about pt's /82 . When rechecked BP 85/52 and 2nd check gave a BP reading of 99/44.   On assessment, pt resting well on bed, denies any dizziness, palpitation at this time.  Dr Fitzpatrick and Rapid nurse Betzy notified. As per the provider, "her BP fluctuates a lot which is strange. I think it will go up on its own, we can monitor for now".  Bed placed in trendelenburg position. Pt resting well on bed.   Plan of care ongoing.  "

## 2024-11-27 NOTE — PT/OT/SLP PROGRESS
Physical Therapy      Patient Name:  April Vasquez   MRN:  [id number]    Patient not seen today secondary to  (Electrocardiology). Will follow-up .

## 2024-11-27 NOTE — ASSESSMENT & PLAN NOTE
Anemia is likely due to chronic disease due to ESRD. Most recent hemoglobin and hematocrit are listed below.  Recent Labs     11/26/24  0826 11/27/24  0415   HGB 9.7* 8.4*   HCT 28.9* 25.5*       Plan  - Monitor serial CBC: Daily  - Transfuse PRBC if patient becomes hemodynamically unstable, symptomatic or H/H drops below 7/21.  - Patient has not received any PRBC transfusions to date

## 2024-11-27 NOTE — NURSING
[facility]  Nurses Note -- 4 Eyes      11/27/2024       Skin assessed on: Q Shift      [x] No Pressure Injuries Present    [x]Prevention Measures Documented    [] Yes LDA  for Pressure Injury Previously documented     [] Yes New Pressure Injury Discovered   [] LDA for New Pressure Injury Added      Attending RN:  Waldemar Vasquez RN     Second RN:  Laura MURILLO RN

## 2024-11-27 NOTE — PROGRESS NOTES
Pt arrive to unit from dialysis on stretcher, pt assisted to bed. Pt very frail and weak, muscle wasting noted. Pt skin assessed, noted to have mottled non blanchable skin on sacrum, very flaky skin, skin tear rt arm, mepilex applied after a bath.  VS obtained and recorded. Warm blankets provided. Pt resting well on bed. Unlabored and even respiration, NAD noted  Plan of care ongoing.

## 2024-11-27 NOTE — PLAN OF CARE
Case Management Assessment     PCP: Darrick Cabral MD   Pharmacy:   [id number] - [place], [place] - [address] AT [facility] [address]  [address]  [place] [address]  Phone: [phone] Fax: [phone]    [facility] [address] [facility]  [address]   [place] [address]  Phone: [phone] Fax: [phone]       Patient Arrived From: Home   Existing Help at Home: Extended Emergency Contact Information  Primary Emergency Contact: brian zavala  Mobile Phone: [phone]  Relation: Son  Secondary Emergency Contact: dion mcqueen  Mobile Phone: [phone]  Relation: Son     Barriers to Discharge: None     Discharge Plan:    A. Home with family    B. Home with family       CM met with patient at bedside to discuss discharge planning. Patient reside with her two son's, uses a RW for mobility and independently completes her ADL's.     Patient attends [facility] for HD on MWF. Patient son will provide transportation home upon discharge.     2:30 pm CM contacted patient to discuss PT/OT recommendations for home health. Patient declined. Patient stated that she doesn't need HH. MD notified.     CM will continue to follow-up for any discharge needs.         11/27/24 1124   Discharge Assessment   Assessment Type Discharge Planning Assessment   Confirmed/corrected address, phone number and insurance Yes   Confirmed Demographics Correct on Facesheet   Source of Information patient   When was your last doctors appointment?   (Unknown)   Communicated CECILIO with patient/caregiver Date not available/Unable to determine   Reason For Admission Cachexia   People in Home child(jaylyn), adult   Facility Arrived From: Home   Do you expect to return to your current living situation? Yes   Do you have help at home or someone to help you manage your care at home? No   Prior to hospitilization cognitive status: Alert/Oriented   Current cognitive status: Alert/Oriented   Walking or  Climbing Stairs Difficulty yes   Walking or Climbing Stairs ambulation difficulty, requires equipment   Dressing/Bathing Difficulty no   Equipment Currently Used at Home walker, rolling   Readmission within 30 days? No   Patient currently being followed by outpatient case management? No   Do you currently have service(s) that help you manage your care at home? No   Do you take prescription medications? Yes   Do you have prescription coverage? Yes   Coverage Medicare Part A & B   Do you have any problems affording any of your prescribed medications? No   Is the patient taking medications as prescribed? yes   Who is going to help you get home at discharge? Patient's son will provide transportation home   How do you get to doctors appointments? car, drives self;family or friend will provide   Are you on dialysis? No   Do you take coumadin? No   Discharge Plan A Home with family   Discharge Plan B Home with family   DME Needed Upon Discharge  none   Discharge Plan discussed with: Patient   Transition of Care Barriers None   SDOH   (None)   Physical Activity   On average, how many days per week do you engage in moderate to strenuous exercise (like a brisk walk)? 0 days   On average, how many minutes do you engage in exercise at this level? 0 min   Financial Resource Strain   How hard is it for you to pay for the very basics like food, housing, medical care, and heating? Not hard   Housing Stability   In the last 12 months, was there a time when you were not able to pay the mortgage or rent on time? N   At any time in the past 12 months, were you homeless or living in a shelter (including now)? N   Transportation Needs   Has the lack of transportation kept you from medical appointments, meetings, work or from getting things needed for daily living? No   Food Insecurity   Within the past 12 months, you worried that your food would run out before you got the money to buy more. Never true   Within the past 12 months, the  food you bought just didn't last and you didn't have money to get more. Never true   Stress   Do you feel stress - tense, restless, nervous, or anxious, or unable to sleep at night because your mind is troubled all the time - these days? Only a littl   Social Isolation   How often do you feel lonely or isolated from those around you?  Never   Alcohol Use   Q1: How often do you have a drink containing alcohol? Never   Q2: How many drinks containing alcohol do you have on a typical day when you are drinking? None   Q3: How often do you have six or more drinks on one occasion? Never

## 2024-11-27 NOTE — ASSESSMENT & PLAN NOTE
Patient has a diagnosis of pneumonia. The cause of the pneumonia is suspected to be bacterial in etiology but organism is not known. The pneumonia is stable. The patient has the following signs/symptoms of pneumonia: cough and sputum production. The patient does not have a current oxygen requirement and the patient does not have a home oxygen requirement. I have reviewed the pertinent imaging. The following cultures have been collected: Blood cultures and Sputum culture The culture results are listed below.     Current antimicrobial regimen consists of the antibiotics listed below.     Antibiotics (From admission, onward)      Start     Stop Route Frequency Ordered    11/27/24 0930  piperacillin-tazobactam (ZOSYN) 4.5 g in D5W 100 mL IVPB (MB+)         -- IV Every 12 hours (non-standard times) 11/27/24 0820    11/27/24 0900  mupirocin 2 % ointment         12/02/24 0859 Nasl 2 times daily 11/27/24 0640            Microbiology Results (last 7 days)       Procedure Component Value Units Date/Time    Blood Culture #1 **CANNOT BE ORDERED STAT** [[id number]] Collected: 11/26/24 0940    Order Status: Completed Specimen: Blood from Peripheral, Hand, Right Updated: 11/27/24 1103     Blood Culture, Routine No Growth to date      No Growth to date    Blood culture [[id number]] Collected: 11/26/24 0949    Order Status: Completed Specimen: Blood from Peripheral, Upper Arm, Right Updated: 11/27/24 1103     Blood Culture, Routine No Growth to date      No Growth to date    Culture, Respiratory with Gram Stain [[id number]]     Order Status: No result Specimen: Respiratory         Transition to oral ABX's tomorrow.

## 2024-11-27 NOTE — ASSESSMENT & PLAN NOTE
Patients blood pressure range in the last 24 hours was: BP  Min: 92/51  Max: 175/82.The patient's inpatient anti-hypertensive regimen is listed below:  Current Antihypertensives  hydrALAZINE tablet 100 mg, Every 8 hours, Oral  losartan tablet 100 mg, Daily, Oral  cloNIDine tablet 0.1 mg, 3 times daily, Oral  carvediloL tablet 6.25 mg, 2 times daily with meals, Oral    Plan  Patient on multiple HTN medications at home.  BP has been on the lower side.   Maybe too many medications leading to hypotension and weakness at home?  Will cut back on meds as possible.  Start with Clonidine.

## 2024-11-27 NOTE — PLAN OF CARE
Problem: Physical Therapy  Goal: Physical Therapy Goal  Description: Goals to be met by: 24     Patient will increase functional independence with mobility by performin. Supine to sit with Supervision  2. Sit to supine with Supervision  3. Sit to stand transfer with Contact Guard Assistance  4. Gait  x 100 feet with Contact Guard Assistance using Rolling Walker.   5. Lower extremity exercise program x10 reps per handout, with independence    Outcome: Progressing

## 2024-11-27 NOTE — PLAN OF CARE
Recommendation:  1. Add Aj BID for wound healing  2. Continue pt on renal diet with novasource renal TID  3. Encourage intake of novasource renal  4. Monitor weight/labs  5. RD to follow to monitor PO intake    Goal:  Pt to meet 50-75% EEN/EPN by RD follow-up

## 2024-11-27 NOTE — PLAN OF CARE
Problem: Occupational Therapy  Goal: Occupational Therapy Goal  Description: Goals to be met by: 12/4/24      Patient will increase functional independence with ADLs by performing:    UE Dressing with [place].  LE Dressing with Supervision.  Grooming while standing at sink with Supervision due to weakness.   Toileting from toilet with Supervision for hygiene and clothing management.   Sitting in chair x60  minutes with Supervision.  Toilet transfer to toilet with Supervision.  Upper extremity exercise program x10 reps per handout, with supervision.    Outcome: Progressing   Patient recommended for low intensity therapy and DME needed-may need BSC. Occupational therapy to see 3x/week if stays in acute care.

## 2024-11-27 NOTE — SUBJECTIVE & OBJECTIVE
Interval History: feeling much better after walking with therapy.    Review of Systems   HENT:  Negative for ear discharge and ear pain.    Eyes:  Negative for discharge and itching.   Endocrine: Negative for cold intolerance and heat intolerance.   Neurological:  Negative for seizures and syncope.     Objective:     Vital Signs (Most Recent):  Temp: 98.6 °F (37 °C) (11/27/24 1109)  Pulse: 86 (11/27/24 1109)  Resp: 18 (11/27/24 1109)  BP: (!) 118/49 (11/27/24 1109)  SpO2: 96 % (11/27/24 1109) Vital Signs (24h Range):  Temp:  [97.3 °F (36.3 °C)-98.6 °F (37 °C)] 98.6 °F (37 °C)  Pulse:  [61-90] 86  Resp:  [16-18] 18  SpO2:  [94 %-98 %] 96 %  BP: ()/(44-98) 118/49     Weight: 41.4 kg (91 lb 4.3 oz)  Body mass index is 15.67 kg/m².    Intake/Output Summary (Last 24 hours) at 11/27/2024 1305  Last data filed at 11/27/2024 0420  Gross per 24 hour   Intake 120 ml   Output 0 ml   Net 120 ml         Physical Exam  Constitutional:       General: She is not in acute distress.     Appearance: She is not ill-appearing or toxic-appearing.      Comments: Cachetic   HENT:      Mouth/Throat:      Mouth: Mucous membranes are moist.   Eyes:      General: No scleral icterus.        Right eye: No discharge.         Left eye: No discharge.      Pupils: Pupils are equal, round, and reactive to light.   Cardiovascular:      Rate and Rhythm: Normal rate.      Heart sounds: Murmur heard.      Comments: Left arm AVF, good thrill and hum  Pulmonary:      Effort: Pulmonary effort is normal.      Breath sounds: No rhonchi.   Abdominal:      General: Abdomen is flat.   Musculoskeletal:         General: Normal range of motion.      Cervical back: Normal range of motion.      Right lower leg: No edema.      Left lower leg: No edema.   Neurological:      Mental Status: She is alert.             Significant Labs: All pertinent labs within the past 24 hours have been reviewed.  BMP:   Recent Labs   Lab 11/27/24  0415   GLU 55*      K 3.9       CO2 25   BUN 25*   CREATININE 3.7*   CALCIUM 7.7*   MG 1.8     CBC:   Recent Labs   Lab 11/26/24  0826 11/27/24  0415   WBC 8.03 8.05   HGB 9.7* 8.4*   HCT 28.9* 25.5*    139*       Significant Imaging: I have reviewed all pertinent imaging results/findings within the past 24 hours.

## 2024-11-27 NOTE — ASSESSMENT & PLAN NOTE
Nutrition consulted. Most recent weight and BMI monitored-     Measurements:  Wt Readings from Last 1 Encounters:   11/26/24 41.4 kg (91 lb 4.3 oz)   Body mass index is 15.67 kg/m².    Patient has been screened and assessed by RD.    Malnutrition Type:  Context:    Level:      Malnutrition Characteristic Summary:       Interventions/Recommendations (treatment strategy):

## 2024-11-27 NOTE — PROGRESS NOTES
[facility] - Telemetry  Nephrology  Progress Note    Patient Name: April Vasquez  MRN: [id number]  Admission Date: 11/26/2024  Hospital Length of Stay: 1 days  Attending Provider: Luís Yang MD   Primary Care Physician: Darrick Cabral MD  Principal Problem:Cachexia    Subjective:     HPI: 69 year old female with a history of HTN, ESRD on HD (MWF),T2DM, HFpEF presents to [facility] weakness and fatigue. She states that she has been weak for the last several days.  CXR on admission with right lower lung zone infiltrates and CT chest with bilateral pulmonary edema. She has missed one day of dialysis on her schedule. She was also told to present to dialysis today for the holiday schedule but was too weak to do so.     Outpatient ESRD  Hemodialysis  Outpatient nephrologist: Dr. Bush  Outpatient center: [facility]  Dialysis schedule: MWF (was scheduled for today)  Last treatment: 11/22  Anuric: yes  Dry weight: 55 kg  Access: left arm AVF      Interval History: tolerated dialysis well yesterday, no acute issues.     Review of patient's allergies indicates:  No Known Allergies  Current Facility-Administered Medications   Medication Frequency    0.9% NaCl infusion Once    acetaminophen tablet 650 mg Q4H PRN    albuterol-ipratropium 2.5 mg-0.5 mg/3 mL nebulizer solution 3 mL Q4H PRN    aluminum-magnesium hydroxide-simethicone 200-200-20 mg/5 mL suspension 30 mL QID PRN    carvediloL tablet 6.25 mg BID WM    cloNIDine tablet 0.1 mg TID    dextromethorphan-guaiFENesin  mg/5 ml liquid 5 mL Q6H PRN    heparin (porcine) injection 5,000 Units Q12H    hydrALAZINE tablet 100 mg Q8H    HYDROcodone-acetaminophen 5-325 mg per tablet 1 tablet Q6H PRN    losartan tablet 100 mg Daily    magnesium oxide tablet 800 mg PRN    magnesium oxide tablet 800 mg PRN    melatonin tablet 6 mg Nightly PRN    mupirocin 2 % ointment BID    naloxone 0.4 mg/mL injection 0.02 mg PRN    ondansetron injection 8 mg Q6H PRN     piperacillin-tazobactam (ZOSYN) 4.5 g in D5W 100 mL IVPB (MB+) Q12H    polyethylene glycol packet 17 g BID PRN    potassium bicarbonate disintegrating tablet 35 mEq PRN    potassium bicarbonate disintegrating tablet 50 mEq PRN    potassium bicarbonate disintegrating tablet 60 mEq PRN    potassium, sodium phosphates 280-160-250 mg packet 2 packet PRN    potassium, sodium phosphates 280-160-250 mg packet 2 packet PRN    potassium, sodium phosphates 280-160-250 mg packet 2 packet PRN    prochlorperazine injection Soln 5 mg Q6H PRN    sevelamer carbonate tablet 1,600 mg TID WM    sodium chloride 0.9% bolus 250 mL 250 mL PRN    sodium chloride 0.9% flush 10 mL Q12H PRN       Objective:     Vital Signs (Most Recent):  Temp: 98.6 °F (37 °C) (11/27/24 1109)  Pulse: 86 (11/27/24 1109)  Resp: 18 (11/27/24 1109)  BP: (!) 118/49 (11/27/24 1109)  SpO2: 96 % (11/27/24 1109) Vital Signs (24h Range):  Temp:  [97.3 °F (36.3 °C)-98.6 °F (37 °C)] 98.6 °F (37 °C)  Pulse:  [61-90] 86  Resp:  [16-18] 18  SpO2:  [94 %-98 %] 96 %  BP: ()/(44-98) 118/49     Weight: 41.4 kg (91 lb 4.3 oz) (11/26/24 1351)  Body mass index is 15.67 kg/m².  Body surface area is 1.37 meters squared.    I/O last 3 completed shifts:  In: 470 [P.O.:120; IV Piggyback:350]  Out: 0      Physical Exam  Constitutional:       General: She is not in acute distress.     Appearance: She is not ill-appearing or toxic-appearing.      Comments: Cachetic, eating full meal on room air in room.    HENT:      Mouth/Throat:      Mouth: Mucous membranes are moist.   Eyes:      General: No scleral icterus.        Right eye: No discharge.         Left eye: No discharge.      Pupils: Pupils are equal, round, and reactive to light.   Cardiovascular:      Rate and Rhythm: Normal rate.      Heart sounds: Murmur heard.      Comments: Left arm AVF, good thrill and hum  Pulmonary:      Effort: Pulmonary effort is normal.      Breath sounds: No rhonchi.   Abdominal:      General:  Abdomen is flat.   Musculoskeletal:         General: Normal range of motion.      Cervical back: Normal range of motion.      Right lower leg: No edema.      Left lower leg: No edema.   Neurological:      Mental Status: She is alert.          Significant Labs:  All labs within the past 24 hours have been reviewed.     Significant Imaging:  Labs: Reviewed  Assessment/Plan:     Renal/  ESRD (end stage renal disease) on dialysis  ESRD     Plan/Recommendation  Plan for HD 11/29  -Keep MAP > 65  -Keep hemoglobin > 7  -Strict ins and outs  -Avoid nephrotoxic agents if possible and renally dose medications  -Avoid drastic hemodynamic changes if possible    #Anemia  Hemoglobin   Date Value Ref Range Status   11/27/2024 8.4 (L) 12.0 - 16.0 g/dL Final     Iron   Date Value Ref Range Status   08/05/2021 25 (L) 30 - 160 ug/dL Final     Transferrin   Date Value Ref Range Status   08/05/2021 119 (L) 200 - 375 mg/dL Final     TIBC   Date Value Ref Range Status   08/05/2021 176 (L) 250 - 450 ug/dL Final     Saturated Iron   Date Value Ref Range Status   08/05/2021 14 (L) 20 - 50 % Final     Ferritin   Date Value Ref Range Status   08/05/2021 2,535 (H) 20.0 - 300.0 ng/mL Final     Repeat iron studies and ferritin    #Secondary hyperparathyroidism  Calcium   Date Value Ref Range Status   11/27/2024 7.7 (L) 8.7 - 10.5 mg/dL Final     Phosphorus   Date Value Ref Range Status   11/27/2024 6.0 (H) 2.7 - 4.5 mg/dL Final     PTH, Intact   Date Value Ref Range Status   11/04/2024 262 (H) 16 - 80 pg/mL Final   Continue home sevelamer and vit D        Thank you for your consult. I will follow-up with patient. Please contact us if you have any additional questions.    Paolo Dover MD  Nephrology  [facility] [place] [facility]

## 2024-11-27 NOTE — ASSESSMENT & PLAN NOTE
Patient with Acute on chronic debility due to chronic unspecified fatigue. The patient's latest AMPAC (Activity Measure for Post Acute Care) Score is listed below.    AM-PAC Score - How much help does the patient need for each activity listed  Basic Mobility Total Score: 14  Turning over in bed (including adjusting bedclothes, sheets and blankets)?: A little  Sitting down on and standing up from a chair with arms (e.g., wheelchair, bedside commode, etc.): A lot  Moving from lying on back to sitting on the side of the bed?: A little  Moving to and from a bed to a chair (including a wheelchair)?: A lot  Need to walk in hospital room?: A little  Climbing 3-5 steps with a railing?: Unable    Plan  - Progressive mobility protocol initated  - PT/OT consulted

## 2024-11-27 NOTE — ASSESSMENT & PLAN NOTE
- Unclear etiology of this, but patient is aware of possibility of underlying cancer as etiology   - RD consult

## 2024-11-27 NOTE — CONSULTS
[facility] - Telemetry  Palliative Medicine  Consult Note    Patient Name: April Vasquez  MRN: [id number]  Admission Date: 11/26/2024  Hospital Length of Stay: 1 days  Code Status: Full Code   Attending Provider: Luís Yang MD  Consulting Provider: Terrie Delacruz MD  Primary Care Physician: Darrick Cabral MD  Principal Problem:Cachexia    Patient information was obtained from patient, past medical records, ER records, and primary team.      Inpatient consult to Palliative Care  Consult performed by: Terrie Delacruz MD  Consult ordered by: Luís Yang MD  Reason for consult: Advance Care Planning        Assessment/Plan:     Advance Care Planning    - Consult for support and advance care planning in pt admitted from home with overall failure to thrive; history is significant for ESRD on HD. Chart reviewed in depth prior to visit.   - Met with pt at bedside; she was agreeable to me turning lights on in room and having a conversation. Introduced role of palliative medicine in her care, and learned more about pt outside of hospital. She is , and lives with her two sons (Michele, and Gerardo). She has assistance from them majority of the day if needed. 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 Was raised as Taoist, but is largely non-practicing at this point.   - She notes weight loss and worsening weakness, which has been worsening over the last month or so. Medical update provided; shared that while we are still in an information-gathering phase, her unintentional weight loss and debility are concerning for possible underlying cancer. Emotional support provided. Based on our conversation, she is agreeable to continuing with malignancy evaluation, including procedures and/or biopsy if needed.    - Given hospital stay, discussed importance of selecting medical decision-maker in event she loses ability to make these on her behalf. Her preferred MPOA is  "collectively her two sons. No paperwork needed, as this is aligned with [place] hierarchy.   - She has never though about code status; encouraged her to discuss further with her sons, to ensure they have a good understanding of her wishes should something unexpected occur  - Let her know I will continue to check in on her throughout hospital stay; updated primary team in person after visit     Other  Tobacco abuse  - Pt does have extensive smoking history, which may be related to current presentation     Debility  - PT/OT consults    Pulmonary  Pneumonia  - Abx, mgmt per primary team     Cardiac/Vascular  (HFpEF) heart failure with preserved ejection fraction  - Echo pending     Renal/  ESRD (end stage renal disease) on dialysis  - Nephrology consulted for routine HD needs    Endocrine  Severe protein-calorie malnutrition  - Unclear etiology of this, but patient is aware of possibility of underlying cancer as etiology   - RD consult     GI  Slow transit constipation  - Mgmt per primary team     Thank you for your consult. I will follow-up with patient. Please contact us if you have any additional questions.    GURPREET Roper  Palliative Medicine Staff   [phone]    > 50% of 70 min visit spent in chart review, face to face discussion of symptom assessment, coordination of care with other specialists, goals of care, emotional support, formulating and communicating prognosis, exploring burden/ benefit of various approaches of treatment.      Subjective:     HPI: (From H&P) "69-year-old female presenting with generalized weakness and fatigue.  Patient states that she has been losing a lot of weight and has been very weak for past week.  She can barely get out of bed because of weakness.  Patient was due for dialysis today but was too weak to stand and go to the car.  Last dialysis was Friday.  She missed Sunday dialysis because she forgot about the holiday schedule change.  States compliance with dialysis " "and medications. Weight loss even thought adequate appetite and oral intake.  She does complain of constipation.  She had a colonoscopy 4 years ago with a couple of tubular adenomas removed.  Patient has also been complaining of cough productive of greenish sputum.  She denies any fever or chills.  No similar symptoms in past.  No alleviating or aggravating factors.  No other complaints."    Palliative medicine is consulted for support and advance care planning; see ACP section of plan.     Past Medical History:   Diagnosis Date    (HFpEF) heart failure with preserved ejection fraction 10/30/2023    Anemia     Colon polyps     COVID-19 08/2021    Diabetes mellitus, type 2     ESRD (end stage renal disease) on dialysis 2017    Gallstones     Hypertension     Pulmonary edema     Renal disorder     dialysis MIRANDA fistula    Tobacco abuse     Uses walker        Past Surgical History:   Procedure Laterality Date    AVF  2017    CHOLECYSTECTOMY  2016    COLONOSCOPY N/A 8/6/2020    Procedure: COLONOSCOPY;  Surgeon: Hood Hernadez MD;  Location: [facility] ([address] [place] [address]);  Service: Endoscopy;  Laterality: N/A;  labs prior-dialysis  covid test lapalco 8/3    Permacath Right 2017    TUBAL LIGATION         Review of patient's allergies indicates:  No Known Allergies    Medications:  Continuous Infusions:  Scheduled Meds:   0.9% NaCl   Intravenous Once    carvediloL  6.25 mg Oral BID WM    cloNIDine  0.1 mg Oral TID    heparin (porcine)  5,000 Units Subcutaneous Q12H    hydrALAZINE  100 mg Oral Q8H    losartan  100 mg Oral Daily    mupirocin   Nasal BID    piperacillin-tazobactam (Zosyn) IV (PEDS and ADULTS) (extended infusion is not appropriate)  4.5 g Intravenous Q12H    sevelamer carbonate  1,600 mg Oral TID WM     PRN Meds:  Current Facility-Administered Medications:     acetaminophen, 650 mg, Oral, Q4H PRN    albuterol-ipratropium, 3 mL, Nebulization, Q4H PRN    aluminum-magnesium hydroxide-simethicone, 30 mL, Oral, QID PRN    " dextromethorphan-guaiFENesin  mg/5 ml, 5 mL, Oral, Q6H PRN    HYDROcodone-acetaminophen, 1 tablet, Oral, Q6H PRN    magnesium oxide, 800 mg, Oral, PRN    magnesium oxide, 800 mg, Oral, PRN    melatonin, 6 mg, Oral, Nightly PRN    naloxone, 0.02 mg, Intravenous, PRN    ondansetron, 8 mg, Intravenous, Q6H PRN    polyethylene glycol, 17 g, Oral, BID PRN    potassium bicarbonate, 35 mEq, Oral, PRN    potassium bicarbonate, 50 mEq, Oral, PRN    potassium bicarbonate, 60 mEq, Oral, PRN    potassium, sodium phosphates, 2 packet, Oral, PRN    potassium, sodium phosphates, 2 packet, Oral, PRN    potassium, sodium phosphates, 2 packet, Oral, PRN    prochlorperazine, 5 mg, Intravenous, Q6H PRN    sodium chloride 0.9%, 250 mL, Intravenous, PRN    sodium chloride 0.9%, 10 mL, Intravenous, Q12H PRN    Family History       Problem Relation (Age of Onset)    Cancer Mother    Cervical cancer Mother    Diabetes Father, Sister    Drug abuse Paternal Grandmother    Hypertension Mother    Kidney disease Mother    No Known Problems Sister, Sister, Sister, Son, Son, Son, Daughter    Ovarian cancer Mother          Tobacco Use    Smoking status: Some Days     Current packs/day: 0.00     Average packs/day: 0.5 packs/day for 20.0 years (10.0 ttl pk-yrs)     Types: Cigarettes     Start date: 2000     Last attempt to quit: 2020     Years since quittin.4    Smokeless tobacco: Never   Substance and Sexual Activity    Alcohol use: No    Drug use: No    Sexual activity: Yes     Partners: Male     Birth control/protection: Post-menopausal       Review of Systems   Constitutional:  Positive for fatigue and unexpected weight change.   Neurological:  Positive for weakness.     Objective:     Vital Signs (Most Recent):  Temp: 97.8 °F (36.6 °C) (24)  Pulse: 67 (24)  Resp: 18 (24)  BP: (!) 128/58 (24)  SpO2: 96 % (24) Vital Signs (24h Range):  Temp:  [97.3 °F (36.3 °C)-97.8 °F  (36.6 °C)] 97.8 °F (36.6 °C)  Pulse:  [57-90] 67  Resp:  [11-18] 18  SpO2:  [94 %-98 %] 96 %  BP: ()/(44-98) 128/58     Weight: 41.4 kg (91 lb 4.3 oz)  Body mass index is 15.67 kg/m².       Physical Exam  Constitutional:       Comments: Lying in bed, appears chronically-ill and with visible cachexia, in no distress       Significant Labs: All pertinent labs within the past 24 hours have been reviewed.  CBC:   Recent Labs   Lab 11/27/24 0415   WBC 8.05   HGB 8.4*   HCT 25.5*   MCV 97   *     BMP:  Recent Labs   Lab 11/27/24 0415   GLU 55*      K 3.9      CO2 25   BUN 25*   CREATININE 3.7*   CALCIUM 7.7*   MG 1.8     LFT:  Lab Results   Component Value Date    AST 16 11/27/2024     (H) 09/19/2016    ALKPHOS 110 11/27/2024    BILITOT 0.4 11/27/2024     Albumin:   Albumin   Date Value Ref Range Status   11/27/2024 1.7 (L) 3.5 - 5.2 g/dL Final     Protein:   Total Protein   Date Value Ref Range Status   11/27/2024 4.3 (L) 6.0 - 8.4 g/dL Final     Lactic acid:   Lab Results   Component Value Date    LACTATE 0.9 07/30/2024    LACTATE 0.6 07/20/2024       Significant Imaging: I have reviewed all pertinent imaging results/findings within the past 24 hours.

## 2024-11-27 NOTE — PROGRESS NOTES
[facility] Medicine  Progress Note    Patient Name: April Vasquez  MRN: [id number]  Patient Class: IP- Inpatient   Admission Date: 11/26/2024  Length of Stay: 1 days  Attending Physician: Luís Yang MD  Primary Care Provider: Darrick Cabral MD        Subjective:     Principal Problem:Cachexia        HPI:  69-year-old female presenting with generalized weakness and fatigue.  Patient states that she has been losing a lot of weight and has been very weak for past week.  She can barely get out of bed because of weakness.  Patient was due for dialysis today but was too weak to stand and go to the car.  Last dialysis was Friday.  She missed Sunday dialysis because she forgot about the holiday schedule change.  States compliance with dialysis and medications. Weight loss even thought adequate appetite and oral intake.  She does complain of constipation.  She had a colonoscopy 4 years ago with a couple of tubular adenomas removed.  Patient has also been complaining of cough productive of greenish sputum.  She denies any fever or chills.  No similar symptoms in past.  No alleviating or aggravating factors.  No other complaints.    Overview/Hospital Course:  68 y/o female with ESRD presents with generalized weakness, cough and weight loss.  Patient also missed HD and Nephrology consulted for dialysis.    Interval History: feeling much better after walking with therapy.    Review of Systems   HENT:  Negative for ear discharge and ear pain.    Eyes:  Negative for discharge and itching.   Endocrine: Negative for cold intolerance and heat intolerance.   Neurological:  Negative for seizures and syncope.     Objective:     Vital Signs (Most Recent):  Temp: 98.6 °F (37 °C) (11/27/24 1109)  Pulse: 86 (11/27/24 1109)  Resp: 18 (11/27/24 1109)  BP: (!) 118/49 (11/27/24 1109)  SpO2: 96 % (11/27/24 1109) Vital Signs (24h Range):  Temp:  [97.3 °F (36.3 °C)-98.6 °F (37 °C)] 98.6 °F (37 °C)  Pulse:  [61-90]  86  Resp:  [16-18] 18  SpO2:  [94 %-98 %] 96 %  BP: ()/(44-98) 118/49     Weight: 41.4 kg (91 lb 4.3 oz)  Body mass index is 15.67 kg/m².    Intake/Output Summary (Last 24 hours) at 11/27/2024 1305  Last data filed at 11/27/2024 0420  Gross per 24 hour   Intake 120 ml   Output 0 ml   Net 120 ml         Physical Exam  Constitutional:       General: She is not in acute distress.     Appearance: She is not ill-appearing or toxic-appearing.      Comments: Cachetic   HENT:      Mouth/Throat:      Mouth: Mucous membranes are moist.   Eyes:      General: No scleral icterus.        Right eye: No discharge.         Left eye: No discharge.      Pupils: Pupils are equal, round, and reactive to light.   Cardiovascular:      Rate and Rhythm: Normal rate.      Heart sounds: Murmur heard.      Comments: Left arm AVF, good thrill and hum  Pulmonary:      Effort: Pulmonary effort is normal.      Breath sounds: No rhonchi.   Abdominal:      General: Abdomen is flat.   Musculoskeletal:         General: Normal range of motion.      Cervical back: Normal range of motion.      Right lower leg: No edema.      Left lower leg: No edema.   Neurological:      Mental Status: She is alert.             Significant Labs: All pertinent labs within the past 24 hours have been reviewed.  BMP:   Recent Labs   Lab 11/27/24 0415   GLU 55*      K 3.9      CO2 25   BUN 25*   CREATININE 3.7*   CALCIUM 7.7*   MG 1.8     CBC:   Recent Labs   Lab 11/26/24  0826 11/27/24  0415   WBC 8.03 8.05   HGB 9.7* 8.4*   HCT 28.9* 25.5*    139*       Significant Imaging: I have reviewed all pertinent imaging results/findings within the past 24 hours.    Assessment/Plan:      * Cachexia  Concern for cachexia as evidenced by loss of 5% weight over the past 12 months, BMI less than 20 in the presence of known chronic disease, loss of muscle mass, and loss of body fat . Contributing factors include underlying  undetermined . Treatment to include  nutrition consult, physical therapy, occupational therapy, palliative consultation, and malignancy work up .      Body mass index is 15.67 kg/m².  Albumin   Date Value Ref Range Status   11/27/2024 1.7 (L) 3.5 - 5.2 g/dL Final   Patient presents with fatigue and debility.  Most likely related to significant weight loss.  Will be treated for possible pneumonia as below, but symptoms seem chronic in nature.  Unexplained weight loss with adequate appetite and oral intake.  Concern about underlying malignancy.  No evidence of malignancy on CT.  PT/OT evaluation.      Debility  Patient with Acute on chronic debility due to chronic unspecified fatigue. The patient's latest AMPAC (Activity Measure for Post Acute Care) Score is listed below.    AM-PAC Score - How much help does the patient need for each activity listed  Basic Mobility Total Score: 14  Turning over in bed (including adjusting bedclothes, sheets and blankets)?: A little  Sitting down on and standing up from a chair with arms (e.g., wheelchair, bedside commode, etc.): A lot  Moving from lying on back to sitting on the side of the bed?: A little  Moving to and from a bed to a chair (including a wheelchair)?: A lot  Need to walk in hospital room?: A little  Climbing 3-5 steps with a railing?: Unable    Plan  - Progressive mobility protocol initated  - PT/OT consulted            Severe protein-calorie malnutrition  Nutrition consulted. Most recent weight and BMI monitored-     Measurements:  Wt Readings from Last 1 Encounters:   11/26/24 41.4 kg (91 lb 4.3 oz)   Body mass index is 15.67 kg/m².    Patient has been screened and assessed by RD.    Malnutrition Type:  Context:    Level:      Malnutrition Characteristic Summary:       Interventions/Recommendations (treatment strategy):         Pneumonia  Patient has a diagnosis of pneumonia. The cause of the pneumonia is suspected to be bacterial in etiology but organism is not known. The pneumonia is stable. The  patient has the following signs/symptoms of pneumonia: cough and sputum production. The patient does not have a current oxygen requirement and the patient does not have a home oxygen requirement. I have reviewed the pertinent imaging. The following cultures have been collected: Blood cultures and Sputum culture The culture results are listed below.     Current antimicrobial regimen consists of the antibiotics listed below.     Antibiotics (From admission, onward)      Start     Stop Route Frequency Ordered    11/27/24 0930  piperacillin-tazobactam (ZOSYN) 4.5 g in D5W 100 mL IVPB (MB+)         -- IV Every 12 hours (non-standard times) 11/27/24 0820    11/27/24 0900  mupirocin 2 % ointment         12/02/24 0859 Nasl 2 times daily 11/27/24 0640            Microbiology Results (last 7 days)       Procedure Component Value Units Date/Time    Blood Culture #1 **CANNOT BE ORDERED STAT** [[id number]] Collected: 11/26/24 0940    Order Status: Completed Specimen: Blood from Peripheral, Hand, Right Updated: 11/27/24 1103     Blood Culture, Routine No Growth to date      No Growth to date    Blood culture [[id number]] Collected: 11/26/24 0949    Order Status: Completed Specimen: Blood from Peripheral, Upper Arm, Right Updated: 11/27/24 1103     Blood Culture, Routine No Growth to date      No Growth to date    Culture, Respiratory with Gram Stain [[id number]]     Order Status: No result Specimen: Respiratory         Transition to oral ABX's tomorrow.    Slow transit constipation  Bowel regimen.      (HFpEF) heart failure with preserved ejection fraction  Volume removal with HD.      Anemia in chronic kidney disease  Anemia is likely due to chronic disease due to ESRD. Most recent hemoglobin and hematocrit are listed below.  Recent Labs     11/26/24  0826 11/27/24  0415   HGB 9.7* 8.4*   HCT 28.9* 25.5*       Plan  - Monitor serial CBC: Daily  - Transfuse PRBC if patient becomes hemodynamically unstable, symptomatic or H/H  drops below 7/21.  - Patient has not received any PRBC transfusions to date    Essential hypertension  Patients blood pressure range in the last 24 hours was: BP  Min: 92/51  Max: 175/82.The patient's inpatient anti-hypertensive regimen is listed below:  Current Antihypertensives  hydrALAZINE tablet 100 mg, Every 8 hours, Oral  losartan tablet 100 mg, Daily, Oral  cloNIDine tablet 0.1 mg, 3 times daily, Oral  carvediloL tablet 6.25 mg, 2 times daily with meals, Oral    Plan  Patient on multiple HTN medications at home.  BP has been on the lower side.   Maybe too many medications leading to hypotension and weakness at home?  Will cut back on meds as possible.  Start with Clonidine.    ESRD (end stage renal disease) on dialysis  Creatine stable for now. BMP reviewed- noted Estimated Creatinine Clearance: 9.6 mL/min (A) (based on SCr of 3.7 mg/dL (H)). according to latest data. Based on current GFR, CKD stage is end stage.  Monitor UOP and serial BMP and adjust therapy as needed. Renally dose meds. Avoid nephrotoxic medications and procedures.  Patient missed last HD session as she forgot holiday schedule change.  Nephrology consult for HD.      VTE Risk Mitigation (From admission, onward)           Ordered     heparin (porcine) injection 5,000 Units  Every 12 hours         11/26/24 1105     IP VTE LOW RISK PATIENT  Once         11/26/24 1105     Place sequential compression device  Until discontinued         11/26/24 1105                    Discharge Planning   CECILIO:      Code Status: Full Code   Is the patient medically ready for discharge?:     Reason for patient still in hospital (select all that apply): Patient trending condition  Discharge Plan A: Home with family                  Luís Yang MD  Department of Hospital Medicine   [facility]

## 2024-11-27 NOTE — PT/OT/SLP EVAL
Physical Therapy Evaluation    Patient Name:  April Vasquez   MRN:  [id number]    Recommendations:     Discharge Recommendations: Low Intensity Therapy   Discharge Equipment Recommendations: bedside commode   Barriers to discharge:  impaired cardiopulmonary response to activity, impaired activity endurance.     Assessment:     April Vasquez is a 69 y.o. female admitted with a medical diagnosis of Cachexia.  She presents with the following impairments/functional limitations: weakness, impaired endurance, gait instability, impaired functional mobility, impaired balance, impaired cardiopulmonary response to activity, edema.    Rehab Prognosis: Good; patient would benefit from acute skilled PT services to address these deficits and reach maximum level of function.    Recent Surgery: * No surgery found *      Plan:     During this hospitalization, patient to be seen 4 x/week to address the identified rehab impairments via gait training, therapeutic activities, therapeutic exercises, neuromuscular re-education and progress toward the following goals:    Plan of Care Expires:  12/11/24    Subjective     Chief Complaint: general fatigue   Patient/Family Comments/goals: Pt was agreeable for therapy  Pain/Comfort:  Pain Rating 1: 0/10    Patients cultural, spiritual, Restorationism conflicts given the current situation: no    Living Environment:  Pt lives w/ 2 sons in a H w/ no COLEEN. Pt reports ambulating w/ RW PTA. Pt has a tub shower w/ a shower chair. Pt's sons are able to assist pt when needed.  Prior to admission, patients level of function was Skylar w/ RW and occasional assistance from 2 sons as needed. Equipment used at home: walker, rolling, shower chair.  DME owned (not currently used): none.  Upon discharge, patient will have assistance from family.    Objective:     Communicated with RAYSHAWN Medeiros prior to session.  Patient found HOB elevated with peripheral IV  upon PT entry to room. Pt cognition WNL. Pt able  to attend to all tasks and activities during session. Pt pleasant during session.    General Precautions: Standard, fall  Orthopedic Precautions:N/A   Braces: N/A  Respiratory Status: Room air    Exams:  Cognitive Exam:  Patient is oriented to Person, Place, Time, and Situation  Postural Exam:  Patient presented with the following abnormalities:    -       Rounded shoulders  -       Forward head  Sensation:    -       Intact  light/touch globally  Skin Integrity/Edema:      -       Skin integrity: Visible skin intact  -       Edema: Moderate BUE L>R  RLE ROM: WFL  RLE Strength: WFL. Hip flexion 2+/5  LLE ROM: WFL  LLE Strength: WFL. Hip flexion 2+/5    Functional Mobility:  Bed Mobility:     Rolling Left:  supervision  Scooting: supervision  Supine to Sit: minimum assistance  Sit to Supine: stand by assistance  Transfers:     Sit to Stand:  maximal assistance and of 1 persons with rolling walker  Gait: Patient ambulated FWB/WBAT: bilateral lower extremity ~60 feet on level tile with Rolling Walker with Contact Guard Assistance. Pt required 1 standing resting break during trial. Pt VS during standing break: 98% O2, 144 bpm. Pt began demonstrating knee hyperextension as ambulation trial continued 2/2 fatigue. Pt demonstrated a reciprocal gait with decreased cedric, increased time in double stance, decreased velocity of limb motion, and decreased step length. Impairments contributing to gait deviations include impaired balance and decreased strength.  Balance: Pt demonstrated Good sitting balance. Pt demonstrated Fair+ standing balance. Fair- standing balance during last 15% of ambulation trial.     AM-PAC 6 CLICK MOBILITY  Total Score:14     Treatment & Education:  Pt educated on skilled acute care PT POC.  Pt educated on importance of exercise and activity to prevent muscle atrophy 2/2 prolonged hospital stay.    Patient left HOB elevated with pillows under BUE, all lines intact, call button in reach, and RAYSHAWN Medeiros  notified.    GOALS:   Multidisciplinary Problems       Physical Therapy Goals          Problem: Physical Therapy    Goal Priority Disciplines Outcome Interventions   Physical Therapy Goal     PT, PT/OT Progressing    Description: Goals to be met by: 24     Patient will increase functional independence with mobility by performin. Supine to sit with Supervision  2. Sit to supine with Supervision  3. Sit to stand transfer with Contact Guard Assistance  4. Gait  x 100 feet with Contact Guard Assistance using Rolling Walker.   5. Lower extremity exercise program x10 reps per handout, with independence                         History:     Past Medical History:   Diagnosis Date    (HFpEF) heart failure with preserved ejection fraction 10/30/2023    Anemia     Colon polyps     COVID-19 2021    Diabetes mellitus, type 2     ESRD (end stage renal disease) on dialysis 2017    Gallstones     Hypertension     Pulmonary edema     Renal disorder     dialysis MIRANDA fistula    Tobacco abuse     Uses walker        Past Surgical History:   Procedure Laterality Date    AVF  2017    CHOLECYSTECTOMY  2016    COLONOSCOPY N/A 2020    Procedure: COLONOSCOPY;  Surgeon: Hood Hernadez MD;  Location: [facility] ([address] [place] [address]);  Service: Endoscopy;  Laterality: N/A;  labs prior-dialysis  covid test lapalco 8/3    Permacath Right 2017    TUBAL LIGATION         Time Tracking:     PT Received On: 24  PT Start Time: 1128     PT Stop Time: 1141  PT Total Time (min): 13 min     Billable Minutes: Evaluation 13      2024

## 2024-11-27 NOTE — PROGRESS NOTES
[facility] [place]  Nurses Note -- 4 Eyes      11/27/2024       Skin assessed on: Q Shift      [x] No Pressure Injuries Present    [x]Prevention Measures Documented  Mottled, non blanchable redness on sacrum, mepilex  applied    [] Yes LDA  for Pressure Injury Previously documented     [] Yes New Pressure Injury Discovered   [] LDA for New Pressure Injury Added      Attending RN:  Laura Sanchez RN     Second RN:  SADIA Serrano

## 2024-11-27 NOTE — ASSESSMENT & PLAN NOTE
Malnutrition Type:  Context: chronic illness  Level: severe    Related to (etiology):   cachexia    Signs and Symptoms (as evidenced by):   Moderate Muscle Mass Depletion  Moderate Subcutaneous Fat Depletion  Decreased Appetite     Malnutrition Characteristic Summary:  Weight Loss (Malnutrition):  (7% weight loss x 1 year)  Subcutaneous Fat (Malnutrition): moderate depletion  Muscle Mass (Malnutrition): moderate depletion    Interventions:  Collaboration by nutrition professional with other providers  Savedaily medical food supplement therapy- Aj BID    Nutrition Diagnosis Status:   New

## 2024-11-27 NOTE — ASSESSMENT & PLAN NOTE
ESRD     Plan/Recommendation  Plan for HD 11/29  -Keep MAP > 65  -Keep hemoglobin > 7  -Strict ins and outs  -Avoid nephrotoxic agents if possible and renally dose medications  -Avoid drastic hemodynamic changes if possible    #Anemia  Hemoglobin   Date Value Ref Range Status   11/27/2024 8.4 (L) 12.0 - 16.0 g/dL Final     Iron   Date Value Ref Range Status   08/05/2021 25 (L) 30 - 160 ug/dL Final     Transferrin   Date Value Ref Range Status   08/05/2021 119 (L) 200 - 375 mg/dL Final     TIBC   Date Value Ref Range Status   08/05/2021 176 (L) 250 - 450 ug/dL Final     Saturated Iron   Date Value Ref Range Status   08/05/2021 14 (L) 20 - 50 % Final     Ferritin   Date Value Ref Range Status   08/05/2021 2,535 (H) 20.0 - 300.0 ng/mL Final     Repeat iron studies and ferritin    #Secondary hyperparathyroidism  Calcium   Date Value Ref Range Status   11/27/2024 7.7 (L) 8.7 - 10.5 mg/dL Final     Phosphorus   Date Value Ref Range Status   11/27/2024 6.0 (H) 2.7 - 4.5 mg/dL Final     PTH, Intact   Date Value Ref Range Status   11/04/2024 262 (H) 16 - 80 pg/mL Final   Continue home sevelamer and vit D

## 2024-11-27 NOTE — ASSESSMENT & PLAN NOTE
- Consult for support and advance care planning in pt admitted from home with overall failure to thrive; history is significant for ESRD on HD. Chart reviewed in depth prior to visit.   - Met with pt at bedside; she was agreeable to me turning lights on in room and having a conversation. Introduced role of palliative medicine in her care, and learned more about pt outside of hospital. She is , and lives with her two sons (Michele, and Gerardo). She has assistance from them majority of the day if needed. 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 Was raised as Buddhism, but is largely non-practicing at this point.   - She notes weight loss and worsening weakness, which has been worsening over the last month or so. Medical update provided; shared that while we are still in an information-gathering phase, her unintentional weight loss and debility are concerning for possible underlying cancer. Emotional support provided. Based on our conversation, she is agreeable to continuing with malignancy evaluation, including procedures and/or biopsy if needed.    - Given hospital stay, discussed importance of selecting medical decision-maker in event she loses ability to make these on her behalf. Her preferred MPOA is collectively her two sons.   - She has never though about code status; encouraged her to discuss further with her sons, to ensure they have a good understanding of her wishes should something unexpected occur  - Let her know I will continue to check in on her throughout hospital stay; updated primary team in person after visit

## 2024-11-28 NOTE — ASSESSMENT & PLAN NOTE
Anemia is likely due to chronic disease due to ESRD. Most recent hemoglobin and hematocrit are listed below.  Recent Labs     11/26/24  0826 11/27/24  0415 11/28/24  0431   HGB 9.7* 8.4* 7.0*   HCT 28.9* 25.5* 21.0*       Plan  - Monitor serial CBC: Daily  - Transfuse PRBC if patient becomes hemodynamically unstable, symptomatic or H/H drops below 7/21.  - Patient has not received any PRBC transfusions to date  Noted drop in H/H today.  No evidence of bleeding.  Denies blood in stool or melena.  Check stool for heme occult blood.  Repeat labs in Am and possible transfusion with HD tomorrow if needed.

## 2024-11-28 NOTE — SUBJECTIVE & OBJECTIVE
Interval History: feeling well.  Noted drop in H/H.  Denies any blood in stool.    Review of Systems   HENT:  Negative for ear discharge and ear pain.    Eyes:  Negative for discharge and itching.   Endocrine: Negative for cold intolerance and heat intolerance.   Neurological:  Negative for seizures and syncope.     Objective:     Vital Signs (Most Recent):  Temp: 97.9 °F (36.6 °C) (11/28/24 1130)  Pulse: 77 (11/28/24 1130)  Resp: 16 (11/28/24 1130)  BP: (!) 104/54 (11/28/24 1130)  SpO2: 96 % (11/28/24 1130) Vital Signs (24h Range):  Temp:  [97.9 °F (36.6 °C)-99.1 °F (37.3 °C)] 97.9 °F (36.6 °C)  Pulse:  [70-96] 77  Resp:  [16-19] 16  SpO2:  [95 %-99 %] 96 %  BP: (104-129)/(52-57) 104/54     Weight: 41.4 kg (91 lb 4.3 oz)  Body mass index is 15.67 kg/m².    Intake/Output Summary (Last 24 hours) at 11/28/2024 1205  Last data filed at 11/28/2024 0841  Gross per 24 hour   Intake 458.49 ml   Output --   Net 458.49 ml         Physical Exam  Constitutional:       General: She is not in acute distress.     Appearance: She is not ill-appearing or toxic-appearing.      Comments: Cachetic   HENT:      Mouth/Throat:      Mouth: Mucous membranes are moist.   Eyes:      General: No scleral icterus.        Right eye: No discharge.         Left eye: No discharge.      Pupils: Pupils are equal, round, and reactive to light.   Cardiovascular:      Rate and Rhythm: Normal rate.      Heart sounds: Murmur heard.      Comments: Left arm AVF, good thrill and hum  Pulmonary:      Effort: Pulmonary effort is normal.      Breath sounds: No rhonchi.   Abdominal:      General: Abdomen is flat.   Musculoskeletal:         General: Normal range of motion.      Cervical back: Normal range of motion.      Right lower leg: No edema.      Left lower leg: No edema.   Neurological:      Mental Status: She is alert.             Significant Labs: All pertinent labs within the past 24 hours have been reviewed.  BMP:   Recent Labs   Lab 11/28/24  1281    GLU 69*      K 4.1      CO2 25   BUN 32*   CREATININE 4.6*   CALCIUM 7.7*   MG 1.8     CBC:   Recent Labs   Lab 11/27/24  0415 11/28/24  0431   WBC 8.05 7.07   HGB 8.4* 7.0*   HCT 25.5* 21.0*   * 162       Significant Imaging: I have reviewed all pertinent imaging results/findings within the past 24 hours.

## 2024-11-28 NOTE — PROGRESS NOTES
[facility] Medicine  Progress Note    Patient Name: April Vasquez  MRN: [id number]  Patient Class: IP- Inpatient   Admission Date: 11/26/2024  Length of Stay: 2 days  Attending Physician: Luís Yang MD  Primary Care Provider: Darrick Cabral MD        Subjective:     Principal Problem:Cachexia        HPI:  69-year-old female presenting with generalized weakness and fatigue.  Patient states that she has been losing a lot of weight and has been very weak for past week.  She can barely get out of bed because of weakness.  Patient was due for dialysis today but was too weak to stand and go to the car.  Last dialysis was Friday.  She missed Sunday dialysis because she forgot about the holiday schedule change.  States compliance with dialysis and medications. Weight loss even thought adequate appetite and oral intake.  She does complain of constipation.  She had a colonoscopy 4 years ago with a couple of tubular adenomas removed.  Patient has also been complaining of cough productive of greenish sputum.  She denies any fever or chills.  No similar symptoms in past.  No alleviating or aggravating factors.  No other complaints.    Overview/Hospital Course:  70 y/o female with ESRD presents with generalized weakness, cough and weight loss.  Imaging concerning for pneumonia and started on IV ABX's.  PT/OT consulted.  Patient also missed HD and Nephrology consulted for dialysis.  Cachexia concerning for underlying malignancy.  Unremarkable CT of chest/abdomen/pelvis with no evidence of malignancy.      Interval History: feeling well.  Noted drop in H/H.  Denies any blood in stool.    Review of Systems   HENT:  Negative for ear discharge and ear pain.    Eyes:  Negative for discharge and itching.   Endocrine: Negative for cold intolerance and heat intolerance.   Neurological:  Negative for seizures and syncope.     Objective:     Vital Signs (Most Recent):  Temp: 97.9 °F (36.6 °C) (11/28/24  1130)  Pulse: 77 (11/28/24 1130)  Resp: 16 (11/28/24 1130)  BP: (!) 104/54 (11/28/24 1130)  SpO2: 96 % (11/28/24 1130) Vital Signs (24h Range):  Temp:  [97.9 °F (36.6 °C)-99.1 °F (37.3 °C)] 97.9 °F (36.6 °C)  Pulse:  [70-96] 77  Resp:  [16-19] 16  SpO2:  [95 %-99 %] 96 %  BP: (104-129)/(52-57) 104/54     Weight: 41.4 kg (91 lb 4.3 oz)  Body mass index is 15.67 kg/m².    Intake/Output Summary (Last 24 hours) at 11/28/2024 1205  Last data filed at 11/28/2024 0841  Gross per 24 hour   Intake 458.49 ml   Output --   Net 458.49 ml         Physical Exam  Constitutional:       General: She is not in acute distress.     Appearance: She is not ill-appearing or toxic-appearing.      Comments: Cachetic   HENT:      Mouth/Throat:      Mouth: Mucous membranes are moist.   Eyes:      General: No scleral icterus.        Right eye: No discharge.         Left eye: No discharge.      Pupils: Pupils are equal, round, and reactive to light.   Cardiovascular:      Rate and Rhythm: Normal rate.      Heart sounds: Murmur heard.      Comments: Left arm AVF, good thrill and hum  Pulmonary:      Effort: Pulmonary effort is normal.      Breath sounds: No rhonchi.   Abdominal:      General: Abdomen is flat.   Musculoskeletal:         General: Normal range of motion.      Cervical back: Normal range of motion.      Right lower leg: No edema.      Left lower leg: No edema.   Neurological:      Mental Status: She is alert.             Significant Labs: All pertinent labs within the past 24 hours have been reviewed.  BMP:   Recent Labs   Lab 11/28/24  0431   GLU 69*      K 4.1      CO2 25   BUN 32*   CREATININE 4.6*   CALCIUM 7.7*   MG 1.8     CBC:   Recent Labs   Lab 11/27/24  0415 11/28/24  0431   WBC 8.05 7.07   HGB 8.4* 7.0*   HCT 25.5* 21.0*   * 162       Significant Imaging: I have reviewed all pertinent imaging results/findings within the past 24 hours.    Assessment/Plan:      * Cachexia  Concern for cachexia as  evidenced by loss of 5% weight over the past 12 months, BMI less than 20 in the presence of known chronic disease, loss of muscle mass, and loss of body fat . Contributing factors include underlying  undetermined . Treatment to include nutrition consult, physical therapy, occupational therapy, palliative consultation, and malignancy work up .      Body mass index is 15.67 kg/m².  Albumin   Date Value Ref Range Status   11/27/2024 1.7 (L) 3.5 - 5.2 g/dL Final   Patient presents with fatigue and debility.  Most likely related to significant weight loss.  Will be treated for possible pneumonia as below, but symptoms seem chronic in nature.  Unexplained weight loss with adequate appetite and oral intake.  Concern about underlying malignancy.  No evidence of malignancy on CT.  PT/OT evaluation.      Debility  Patient with Acute on chronic debility due to chronic unspecified fatigue. The patient's latest AMPAC (Activity Measure for Post Acute Care) Score is listed below.    AM-PAC Score - How much help does the patient need for each activity listed  Basic Mobility Total Score: 14  Turning over in bed (including adjusting bedclothes, sheets and blankets)?: A little  Sitting down on and standing up from a chair with arms (e.g., wheelchair, bedside commode, etc.): A lot  Moving from lying on back to sitting on the side of the bed?: A little  Moving to and from a bed to a chair (including a wheelchair)?: A lot  Need to walk in hospital room?: A little  Climbing 3-5 steps with a railing?: Unable    Plan  - Progressive mobility protocol initated  - PT/OT consulted            Severe protein-calorie malnutrition  Nutrition consulted. Most recent weight and BMI monitored-     Measurements:  Wt Readings from Last 1 Encounters:   11/26/24 41.4 kg (91 lb 4.3 oz)   Body mass index is 15.67 kg/m².    Patient has been screened and assessed by RD.    Malnutrition Type:  Context:    Level:      Malnutrition Characteristic Summary:        Interventions/Recommendations (treatment strategy):         Pneumonia  Patient has a diagnosis of pneumonia. The cause of the pneumonia is suspected to be bacterial in etiology but organism is not known. The pneumonia is stable. The patient has the following signs/symptoms of pneumonia: cough and sputum production. The patient does not have a current oxygen requirement and the patient does not have a home oxygen requirement. I have reviewed the pertinent imaging. The following cultures have been collected: Blood cultures and Sputum culture The culture results are listed below.     Current antimicrobial regimen consists of the antibiotics listed below.     Antibiotics (From admission, onward)      Start     Stop Route Frequency Ordered    11/28/24 1030  doxycycline tablet 100 mg         -- Oral Every 12 hours 11/28/24 0926    11/27/24 0900  mupirocin 2 % ointment         12/02/24 0859 Nasl 2 times daily 11/27/24 0640            Microbiology Results (last 7 days)       Procedure Component Value Units Date/Time    Blood Culture #1 **CANNOT BE ORDERED STAT** [[id number]] Collected: 11/26/24 0940    Order Status: Completed Specimen: Blood from Peripheral, Hand, Right Updated: 11/28/24 1103     Blood Culture, Routine No Growth to date      No Growth to date      No Growth to date    Blood culture [[id number]] Collected: 11/26/24 0949    Order Status: Completed Specimen: Blood from Peripheral, Upper Arm, Right Updated: 11/28/24 1103     Blood Culture, Routine No Growth to date      No Growth to date      No Growth to date    Culture, Respiratory with Gram Stain [[id number]]     Order Status: No result Specimen: Respiratory         Transition to oral Doxy.    Slow transit constipation  Bowel regimen.      (HFpEF) heart failure with preserved ejection fraction  Volume removal with HD.      Anemia in chronic kidney disease  Anemia is likely due to chronic disease due to ESRD. Most recent hemoglobin and hematocrit are  listed below.  Recent Labs     11/26/24  0826 11/27/24  0415 11/28/24  0431   HGB 9.7* 8.4* 7.0*   HCT 28.9* 25.5* 21.0*       Plan  - Monitor serial CBC: Daily  - Transfuse PRBC if patient becomes hemodynamically unstable, symptomatic or H/H drops below 7/21.  - Patient has not received any PRBC transfusions to date  Noted drop in H/H today.  No evidence of bleeding.  Denies blood in stool or melena.  Check stool for heme occult blood.  Repeat labs in Am and possible transfusion with HD tomorrow if needed.    Essential hypertension  Patients blood pressure range in the last 24 hours was: BP  Min: 104/54  Max: 129/57.The patient's inpatient anti-hypertensive regimen is listed below:  Current Antihypertensives  carvediloL tablet 6.25 mg, 2 times daily with meals, Oral  cloNIDine tablet 0.1 mg, 2 times daily, Oral  hydrALAZINE tablet 50 mg, Every 12 hours, Oral  losartan tablet 50 mg, Daily, Oral    Plan  Patient on multiple HTN medications at home.  BP has been on the lower side.   Maybe too many medications leading to hypotension and weakness at home?   Continue cutting back on medications as possible.    ESRD (end stage renal disease) on dialysis  Creatine stable for now. BMP reviewed- noted Estimated Creatinine Clearance: 7.8 mL/min (A) (based on SCr of 4.6 mg/dL (H)). according to latest data. Based on current GFR, CKD stage is end stage.  Monitor UOP and serial BMP and adjust therapy as needed. Renally dose meds. Avoid nephrotoxic medications and procedures.  Patient missed last HD session as she forgot holiday schedule change.  Nephrology consult for HD.      VTE Risk Mitigation (From admission, onward)           Ordered     heparin (porcine) injection 5,000 Units  Every 12 hours         11/26/24 1105     IP VTE LOW RISK PATIENT  Once         11/26/24 1105     Place sequential compression device  Until discontinued         11/26/24 1105                    Discharge Planning   CECILIO:      Code Status: Full Code    Is the patient medically ready for discharge?:     Reason for patient still in hospital (select all that apply): Patient trending condition  Discharge Plan A: Home with family                  Luís Yang MD  Department of [facility]   [facility]

## 2024-11-28 NOTE — ASSESSMENT & PLAN NOTE
Patients blood pressure range in the last 24 hours was: BP  Min: 104/54  Max: 129/57.The patient's inpatient anti-hypertensive regimen is listed below:  Current Antihypertensives  carvediloL tablet 6.25 mg, 2 times daily with meals, Oral  cloNIDine tablet 0.1 mg, 2 times daily, Oral  hydrALAZINE tablet 50 mg, Every 12 hours, Oral  losartan tablet 50 mg, Daily, Oral    Plan  Patient on multiple HTN medications at home.  BP has been on the lower side.   Maybe too many medications leading to hypotension and weakness at home?   Continue cutting back on medications as possible.

## 2024-11-28 NOTE — NURSING
[facility] [place]  Nurses Note -- 4 Eyes      11/28/2024       Skin assessed on: Q Shift      [x] No Pressure Injuries Present    [x]Prevention Measures Documented    [] Yes LDA  for Pressure Injury Previously documented     [] Yes New Pressure Injury Discovered   [] LDA for New Pressure Injury Added      Attending RN:  Judy Rodriguez RN     Second RN:  Lucita Begum LPN

## 2024-11-28 NOTE — ASSESSMENT & PLAN NOTE
Patient has a diagnosis of pneumonia. The cause of the pneumonia is suspected to be bacterial in etiology but organism is not known. The pneumonia is stable. The patient has the following signs/symptoms of pneumonia: cough and sputum production. The patient does not have a current oxygen requirement and the patient does not have a home oxygen requirement. I have reviewed the pertinent imaging. The following cultures have been collected: Blood cultures and Sputum culture The culture results are listed below.     Current antimicrobial regimen consists of the antibiotics listed below.     Antibiotics (From admission, onward)      Start     Stop Route Frequency Ordered    11/28/24 1030  doxycycline tablet 100 mg         -- Oral Every 12 hours 11/28/24 0926    11/27/24 0900  mupirocin 2 % ointment         12/02/24 0859 Nasl 2 times daily 11/27/24 0640            Microbiology Results (last 7 days)       Procedure Component Value Units Date/Time    Blood Culture #1 **CANNOT BE ORDERED STAT** [[id number]] Collected: 11/26/24 0940    Order Status: Completed Specimen: Blood from Peripheral, Hand, Right Updated: 11/28/24 1103     Blood Culture, Routine No Growth to date      No Growth to date      No Growth to date    Blood culture [[id number]] Collected: 11/26/24 0949    Order Status: Completed Specimen: Blood from Peripheral, Upper Arm, Right Updated: 11/28/24 1103     Blood Culture, Routine No Growth to date      No Growth to date      No Growth to date    Culture, Respiratory with Gram Stain [[id number]]     Order Status: No result Specimen: Respiratory         Transition to oral Doxy.

## 2024-11-28 NOTE — PROGRESS NOTES
Chart reviewed.   Phos 6.3; patient has been refusing sevelamer. Will not increase dose at this time. Continue renal diet.   Hgb 7.0; will f/u CBC in AM.       Chantel Grace MD  [facility]  [phone]

## 2024-11-28 NOTE — ASSESSMENT & PLAN NOTE
Creatine stable for now. BMP reviewed- noted Estimated Creatinine Clearance: 7.8 mL/min (A) (based on SCr of 4.6 mg/dL (H)). according to latest data. Based on current GFR, CKD stage is end stage.  Monitor UOP and serial BMP and adjust therapy as needed. Renally dose meds. Avoid nephrotoxic medications and procedures.  Patient missed last HD session as she forgot holiday schedule change.  Nephrology consult for HD.

## 2024-11-28 NOTE — PLAN OF CARE
Problem: Physical Therapy  Goal: Physical Therapy Goal  Description: Goals to be met by: 24     Patient will increase functional independence with mobility by performin. Supine to sit with Supervision  2. Sit to supine with Supervision  3. Sit to stand transfer with Contact Guard Assistance  4. Gait  x 100 feet with Contact Guard Assistance using Rolling Walker.   5. Lower extremity exercise program x10 reps per handout, with independence    Outcome: Progressing   Patient ambulated ~50ft with CGA/Min A using RW. Patient sit UIC post treatment, nurse notified, required Mod/Max A for sit to stand using RW.

## 2024-11-28 NOTE — PT/OT/SLP PROGRESS
Physical Therapy Treatment    Patient Name:  April Vasquez   MRN:  [id number]    Recommendations:     Discharge Recommendations: Low Intensity Therapy  Discharge Equipment Recommendations: bedside commode  Barriers to discharge:  impaired cardiopulmonary response to activity, impaired activity endurance.     Assessment:     April Vasquez is a 69 y.o. female admitted with a medical diagnosis of Cachexia.  She presents with the following impairments/functional limitations: weakness, impaired endurance, impaired self care skills, impaired functional mobility, gait instability, impaired balance, decreased coordination, decreased upper extremity function, decreased lower extremity function, decreased safety awareness, decreased ROM, impaired muscle length, impaired joint extensibility .     Rehab Prognosis: Good; patient would benefit from acute skilled PT services to address these deficits and reach maximum level of function.    Recent Surgery: * No surgery found *      Plan:     During this hospitalization, patient to be seen 4 x/week to address the identified rehab impairments via gait training, therapeutic activities, therapeutic exercises, neuromuscular re-education and progress toward the following goals:    Plan of Care Expires:  12/11/24    Subjective     Chief Complaint: weakness  Patient/Family Comments/goals: Patient agreed to sit UI post treatment.  Pain/Comfort:  Pain Rating 1: 0/10      Objective:     Communicated with Judy prior to session.  Patient found HOB elevated with telemetry, peripheral IV, bed alarm upon PT entry to room.     General Precautions: Standard, fall  Orthopedic Precautions: N/A  Braces: N/A  Respiratory Status: Room air     Functional Mobility:  Bed Mobility:     Scooting: anterior scoot to EOB with contact guard assistance  Supine to Sit: minimum assistance for Trunk and BLE management, HOB elevated  Transfers:   gait belt donned prior OOB activity  Sit to Stand: from EOB  and BS Chair with moderated/maximal assistance with rolling walker  Bed to Chair: minimum assistance with rolling walker using  Step Transfer  Gait: patient ambulated ~50 ft with CGA/Min A using RW, PTA managed the IV pole. Patient with decreased cedric/step length, Flexed posture, min unsteadiness; verbal and tactile cueing for , upright posture, Ad management, keep RW closed, 2 standing rest breaks required due to fatigue.   Balance: Good Sitting; Fair- Standing.      AM-PAC 6 CLICK MOBILITY  Turning over in bed (including adjusting bedclothes, sheets and blankets)?: 3  Sitting down on and standing up from a chair with arms (e.g., wheelchair, bedside commode, etc.): 2  Moving from lying on back to sitting on the side of the bed?: 3  Moving to and from a bed to a chair (including a wheelchair)?: 2  Need to walk in hospital room?: 3  Climbing 3-5 steps with a railing?: 1  Basic Mobility Total Score: 14       Treatment & Education:  Educated pt on benefits of OOB activity and performing BLE ex throughout the day, call for assistance when she is ready to get back to bed, she verbalized good understanding.    BLE ex in reclined 10 reps AP, Hip Abd/Add, QS    Patient left up in chair on pillow in reclined position with BLE offloaded by pillow, tray table near by, IV and all lines intact, call button in reach, and nurse notified. Pt's room door left open and across from nursing station.    GOALS:   Multidisciplinary Problems       Physical Therapy Goals          Problem: Physical Therapy    Goal Priority Disciplines Outcome Interventions   Physical Therapy Goal     PT, PT/OT Progressing    Description: Goals to be met by: 24     Patient will increase functional independence with mobility by performin. Supine to sit with Supervision  2. Sit to supine with Supervision  3. Sit to stand transfer with Contact Guard Assistance  4. Gait  x 100 feet with Contact Guard Assistance using Rolling Walker.    5. Lower extremity exercise program x10 reps per handout, with independence                         Time Tracking:     PT Received On: 11/28/24  PT Start Time: 0831     PT Stop Time: 0854  PT Total Time (min): 23 min     Billable Minutes: Gait Training 15 min and Therapeutic Exercise 8 min    Treatment Type: Treatment  PT/PTA: PTA     Number of PTA visits since last PT visit: 1     11/28/2024

## 2024-11-29 PROBLEM — J18.9 PNEUMONIA: Status: RESOLVED | Noted: 2024-01-01 | Resolved: 2024-01-01

## 2024-11-29 NOTE — NURSING
[facility] [place]  Nurses Note -- 4 Eyes      11/29/2024       Skin assessed on: Q Shift      [x] No Pressure Injuries Present    [x]Prevention Measures Documented    [] Yes LDA  for Pressure Injury Previously documented     [] Yes New Pressure Injury Discovered   [] LDA for New Pressure Injury Added      Attending RN:  Judy Rodriguez RN     Second RN:  Lucita Begum LPN

## 2024-11-29 NOTE — NURSING
[facility], [facility] [place]  Nurses Note -- 4 Eyes      11/28/2024       Skin assessed on: Q Shift      [x] No Pressure Injuries Present    [x]Prevention Measures Documented    [] Yes LDA  for Pressure Injury Previously documented     [] Yes New Pressure Injury Discovered   [] LDA for New Pressure Injury Added      Attending RN:  Lucita Begum LPN     Second RN:  Judy RodriguezRN

## 2024-11-29 NOTE — ASSESSMENT & PLAN NOTE
ESRD     Plan/Recommendation  HD today  -Keep MAP > 65  -Keep hemoglobin > 7  -Strict ins and outs  -Avoid nephrotoxic agents if possible and renally dose medications  -Avoid drastic hemodynamic changes if possible    #Anemia  Hemoglobin   Date Value Ref Range Status   11/29/2024 8.5 (L) 12.0 - 16.0 g/dL Final     Iron   Date Value Ref Range Status   11/28/2024 68 30 - 160 ug/dL Final     Transferrin   Date Value Ref Range Status   11/28/2024 38 (L) 200 - 375 mg/dL Final     TIBC   Date Value Ref Range Status   11/28/2024 56 (L) 250 - 450 ug/dL Final     Saturated Iron   Date Value Ref Range Status   11/28/2024 121 (H) 20 - 50 % Final     Ferritin   Date Value Ref Range Status   11/28/2024 1,787 (H) 20.0 - 300.0 ng/mL Final   BABAK - iron stores appear adequate    #Secondary hyperparathyroidism  Calcium   Date Value Ref Range Status   11/29/2024 7.9 (L) 8.7 - 10.5 mg/dL Final     Phosphorus   Date Value Ref Range Status   11/29/2024 6.9 (H) 2.7 - 4.5 mg/dL Final     PTH, Intact   Date Value Ref Range Status   11/04/2024 262 (H) 16 - 80 pg/mL Final   Continue home sevelamer and vit D

## 2024-11-29 NOTE — SUBJECTIVE & OBJECTIVE
Interval History: no acute events overnight, plan for DC today after HD.    Review of patient's allergies indicates:  No Known Allergies  Current Facility-Administered Medications   Medication Frequency    acetaminophen tablet 650 mg Q4H PRN    albuterol-ipratropium 2.5 mg-0.5 mg/3 mL nebulizer solution 3 mL Q4H PRN    aluminum-magnesium hydroxide-simethicone 200-200-20 mg/5 mL suspension 30 mL QID PRN    carvediloL tablet 6.25 mg BID WM    cloNIDine tablet 0.1 mg BID    dextromethorphan-guaiFENesin  mg/5 ml liquid 5 mL Q6H PRN    doxycycline tablet 100 mg Q12H    heparin (porcine) injection 5,000 Units Q12H    hydrALAZINE tablet 50 mg Q12H    HYDROcodone-acetaminophen 5-325 mg per tablet 1 tablet Q6H PRN    losartan tablet 50 mg Daily    magnesium oxide tablet 800 mg PRN    magnesium oxide tablet 800 mg PRN    melatonin tablet 6 mg Nightly PRN    mupirocin 2 % ointment BID    naloxone 0.4 mg/mL injection 0.02 mg PRN    ondansetron injection 8 mg Q6H PRN    polyethylene glycol packet 17 g BID PRN    potassium bicarbonate disintegrating tablet 35 mEq PRN    potassium bicarbonate disintegrating tablet 50 mEq PRN    potassium bicarbonate disintegrating tablet 60 mEq PRN    potassium, sodium phosphates 280-160-250 mg packet 2 packet PRN    potassium, sodium phosphates 280-160-250 mg packet 2 packet PRN    potassium, sodium phosphates 280-160-250 mg packet 2 packet PRN    prochlorperazine injection Soln 5 mg Q6H PRN    sevelamer carbonate tablet 1,600 mg TID WM    sodium chloride 0.9% bolus 250 mL 250 mL PRN    sodium chloride 0.9% flush 10 mL Q12H PRN       Objective:     Vital Signs (Most Recent):  Temp: 97.9 °F (36.6 °C) (11/29/24 0735)  Pulse: 89 (11/29/24 0741)  Resp: 18 (11/29/24 0735)  BP: 117/73 (11/29/24 0735)  SpO2: 97 % (11/29/24 0735) Vital Signs (24h Range):  Temp:  [97.8 °F (36.6 °C)-99 °F (37.2 °C)] 97.9 °F (36.6 °C)  Pulse:  [66-89] 89  Resp:  [16-19] 18  SpO2:  [96 %-98 %] 97 %  BP:  (104-183)/(54-80) 117/73     Weight: 41.4 kg (91 lb 4.3 oz) (11/27/24 1314)  Body mass index is 15.67 kg/m².  Body surface area is 1.37 meters squared.    I/O last 3 completed shifts:  In: 120 [P.O.:120]  Out: -      Physical Exam  Constitutional:       General: She is not in acute distress.     Appearance: She is not ill-appearing or toxic-appearing.      Comments: Cachetic, eating full meal on room air in room.    HENT:      Mouth/Throat:      Mouth: Mucous membranes are moist.   Eyes:      General: No scleral icterus.        Right eye: No discharge.         Left eye: No discharge.      Pupils: Pupils are equal, round, and reactive to light.   Cardiovascular:      Rate and Rhythm: Normal rate.      Heart sounds: Murmur heard.      Comments: Left arm AVF, good thrill and hum  Pulmonary:      Effort: Pulmonary effort is normal.      Breath sounds: No rhonchi.   Abdominal:      General: Abdomen is flat.   Musculoskeletal:         General: Normal range of motion.      Cervical back: Normal range of motion.      Right lower leg: No edema.      Left lower leg: No edema.   Neurological:      Mental Status: She is alert.          Significant Labs:  All labs within the past 24 hours have been reviewed.     Significant Imaging:  Labs: Reviewed

## 2024-11-29 NOTE — PT/OT/SLP PROGRESS
Physical Therapy      Patient Name:  April Vasquez   MRN:  [id number]    Patient not seen today secondary to Dialysis. Will follow-up.

## 2024-11-29 NOTE — PLAN OF CARE
Case Management Final Discharge Note    Discharge Disposition: Home     New DME ordered / company name: [employer]- Declined    Relevant SDOH / Transition of Care Barriers:  None     Primary Caretaker and contact information: Patient is independent [phone]    Scheduled followup appointment: PCP 12/04/24    Transportation: Patient's son will provide transportation home.     Patient declined Home Health     Patient and family educated on discharge services and updated on DC plan. Bedside RN Judy Rodriguez notified, patient clear to discharge from Case Management Perspective after dialysis.       11/29/24 1030   Final Note   Assessment Type Final Discharge Note   Anticipated Discharge Disposition Home   What phone number can be called within the next 1-3 days to see how you are doing after discharge? [phone]   Hospital Resources/Appts/Education Provided Appointments scheduled and added to AVS   Post-Acute Status   Post-Acute Authorization Home Health   Home Health Status Patient declined/refused   Coverage Medicare Part A & B   Discharge Delays None known at this time

## 2024-11-29 NOTE — PLAN OF CARE
Problem: Skin Injury Risk Increased  Goal: Skin Health and Integrity  Outcome: Progressing     Problem: Adult Inpatient Plan of Care  Goal: Plan of Care Review  Outcome: Progressing  Goal: Patient-Specific Goal (Individualized)  Outcome: Progressing  Goal: Absence of Hospital-Acquired Illness or Injury  Outcome: Progressing  Goal: Optimal Comfort and Wellbeing  Outcome: Progressing  Goal: Readiness for Transition of Care  Outcome: Progressing     Problem: Wound  Goal: Optimal Coping  Outcome: Progressing  Goal: Optimal Functional Ability  Outcome: Progressing  Goal: Absence of Infection Signs and Symptoms  Outcome: Progressing  Goal: Improved Oral Intake  Outcome: Progressing  Goal: Optimal Pain Control and Function  Outcome: Progressing  Goal: Skin Health and Integrity  Outcome: Progressing  Goal: Optimal Wound Healing  Outcome: Progressing     Problem: Diabetes Comorbidity  Goal: Blood Glucose Level Within Targeted Range  Outcome: Progressing     Problem: Hemodialysis  Goal: Safe, Effective Therapy Delivery  Outcome: Progressing  Goal: Effective Tissue Perfusion  Outcome: Progressing  Goal: Absence of Infection Signs and Symptoms  Outcome: Progressing     Problem: Pneumonia  Goal: Fluid Balance  Outcome: Progressing  Goal: Resolution of Infection Signs and Symptoms  Outcome: Progressing  Goal: Effective Oxygenation and Ventilation  Outcome: Progressing     Problem: Coping Ineffective  Goal: Effective Coping  Outcome: Progressing     Problem: Fall Injury Risk  Goal: Absence of Fall and Fall-Related Injury  Outcome: Progressing

## 2024-11-29 NOTE — DISCHARGE SUMMARY
[facility] Medicine  Discharge Summary      Patient Name: April Vasquez  MRN: [id number]  [facility]: [id number]  Patient Class: IP- Inpatient  Admission Date: 11/26/2024  Hospital Length of Stay: 3 days  Discharge Date and Time:  11/29/2024 9:25 AM  Attending Physician: Luís Yang MD   Discharging Provider: Luís Yang MD  Primary Care Provider: Darrick Cabral MD    Primary Care Team: Networked reference to record PCT     HPI:   69-year-old female presenting with generalized weakness and fatigue.  Patient states that she has been losing a lot of weight and has been very weak for past week.  She can barely get out of bed because of weakness.  Patient was due for dialysis today but was too weak to stand and go to the car.  Last dialysis was Friday.  She missed Sunday dialysis because she forgot about the holiday schedule change.  States compliance with dialysis and medications. Weight loss even thought adequate appetite and oral intake.  She does complain of constipation.  She had a colonoscopy 4 years ago with a couple of tubular adenomas removed.  Patient has also been complaining of cough productive of greenish sputum.  She denies any fever or chills.  No similar symptoms in past.  No alleviating or aggravating factors.  No other complaints.    * No surgery found *      Hospital Course:   70 y/o female with ESRD presents with generalized weakness, cough and weight loss.  Imaging concerning for pneumonia and started on IV ABX's.  PT/OT consulted.  Patient also missed HD and Nephrology consulted for dialysis.  Cachexia concerning for underlying malignancy.  Unremarkable CT of chest/abdomen/pelvis with no evidence of malignancy.  ESRD cachexia?  Patient also on lots of BP medications at home and BP has been borderline low during hospital stay.  Unsure if hypotension contributing to weakness and fatigue.  Medications adjusted during hospital stay.  She has remained afebrile and  hemodynamically stable.  PT/OT recommending HH, but patient refusing at this time.  She will be discharged home to follow up with PCP and Nephrology.  Patient will be discharged with 4 more days of ABX's to complete treatment for possible pneumonia.     Goals of Care Treatment Preferences:  Code Status: Full Code      SDOH Screening:  The patient was screened for food insecurity, housing instability, transportation needs, utility difficulties, and interpersonal safety. The social determinant(s) of health identified as a concern this admission are:  Utility difficulties    Social Drivers of Health with Concerns     Food Insecurity: No Food Insecurity (11/27/2024)   Recent Concern: Food Insecurity - Food Insecurity Present (11/26/2024)   Transportation Needs: No Transportation Needs (11/27/2024)   Recent Concern: Transportation Needs - Unmet Transportation Needs (11/26/2024)   Utilities: At Risk (11/26/2024)        Consults:   Consults (From admission, onward)          Status Ordering Provider     Inpatient consult to Registered Dietitian/Nutritionist  Once        Provider:  (Not yet assigned)    Completed DEEPTHI WALLS     Inpatient consult to Palliative Care  Once        Provider:  Terrie Delacruz MD    Completed DEEPTHI WALLS     Inpatient consult to Nephrology  Once        Provider:  Paolo Dover MD    Completed DEEPTHI WALLS            No new Assessment & Plan notes have been filed under this hospital service since the last note was generated.  Service: Hospital Medicine    Final Active Diagnoses:    Diagnosis Date Noted POA    PRINCIPAL PROBLEM:  Cachexia [R64] 11/26/2024 Yes    Severe protein-calorie malnutrition [E43] 11/27/2024 Yes    Debility [R53.81] 11/27/2024 Yes    Tobacco abuse [Z72.0] 11/27/2024 Yes    Advance care planning [Z71.89] 11/27/2024 Not Applicable    Slow transit constipation [K59.01] 07/23/2024 Yes    (HFpEF) heart failure with preserved ejection fraction [I50.30] 10/30/2023  Yes    Essential hypertension [I10] 04/19/2021 Yes    ESRD (end stage renal disease) on dialysis [N18.6, Z99.2] 08/05/2019 Not Applicable    Anemia in chronic kidney disease [N18.9, D63.1] 10/12/2018 Yes      Problems Resolved During this Admission:    Diagnosis Date Noted Date Resolved POA    Pneumonia [J18.9] 11/26/2024 11/29/2024 Yes       Discharged Condition: stable    Disposition: Home or Self Care    Follow Up:   Follow-up Information       Darrick Cabral MD Follow up in 1 week(s).    Specialty: Internal Medicine  Contact information:  [address]  [place] [address]  [phone]                           Patient Instructions:      Diet renal     Notify your health care provider if you experience any of the following:  temperature >100.4     Notify your health care provider if you experience any of the following:  persistent nausea and vomiting or diarrhea     Notify your health care provider if you experience any of the following:  severe uncontrolled pain     Notify your health care provider if you experience any of the following:  difficulty breathing or increased cough     Notify your health care provider if you experience any of the following:  persistent dizziness, light-headedness, or visual disturbances     Notify your health care provider if you experience any of the following:  increased confusion or weakness     Activity as tolerated       Significant Diagnostic Studies: N/A    Pending Diagnostic Studies:       None           Medications:  Reconciled Home Medications:      Medication List        START taking these medications      doxycycline 100 MG tablet  Commonly known as: VIBRA-TABS  Take 1 tablet (100 mg total) by mouth every 12 (twelve) hours. for 4 days            CHANGE how you take these medications      carvediloL 6.25 MG tablet  Commonly known as: COREG  Take 1 tablet (6.25 mg total) by mouth 2 (two) times daily.  What changed:   medication strength  how much to take  when to take  this     cloNIDine 0.1 MG tablet  Commonly known as: CATAPRES  Take 1 tablet (0.1 mg total) by mouth 2 (two) times daily.  What changed:   medication strength  how much to take  how to take this  when to take this  additional instructions     hydrALAZINE 100 MG tablet  Commonly known as: APRESOLINE  Take 1 tablet (100 mg total) by mouth 2 (two) times a day. TAKE 1 TABLET(100 MG) BY MOUTH THREE TIMES DAILY  What changed: See the new instructions.     losartan 50 MG tablet  Commonly known as: COZAAR  Take 1 tablet (50 mg total) by mouth once daily.  What changed:   medication strength  how much to take            CONTINUE taking these medications      isoniazid 100 MG Tab  Commonly known as: NYDRAZID  Take 2 tablets (200 mg total) by mouth once daily.     pantoprazole 40 MG tablet  Commonly known as: PROTONIX  Take 1 tablet (40 mg total) by mouth once daily.     pulse oximeter device  Commonly known as: pulse oximeter  by Apply Externally route 2 (two) times a day. Use twice daily at 8 AM and 3 PM and [facility].     sevelamer carbonate 800 mg Tab  Commonly known as: RENVELA  Take 1,600 mg by mouth 3 (three) times daily with meals.            STOP taking these medications      acetaminophen 500 MG tablet  Commonly known as: TYLENOL     minoxidiL 10 MG Tab  Commonly known as: LONITEN     NIFEdipine 60 MG (OSM) 24 hr tablet  Commonly known as: PROCARDIA-XL     VITAMIN D ORAL              Indwelling Lines/Drains at time of discharge:   Lines/Drains/Airways       Drain  Duration                  Hemodialysis AV Fistula 08/04/19 2249 Left upper arm 1943 days                    Time spent on the discharge of patient: >31 minutes         Luís Yang MD  Department of Hospital Medicine  [facility] [place] [facility]

## 2024-11-29 NOTE — PLAN OF CARE
11/29/24 1023   Medicare Message   Important Message from Medicare regarding Discharge Appeal Rights Given to patient/caregiver;Explained to patient/caregiver;Signed/date by patient/caregiver   Date IMM was signed 11/29/24   Time IMM was signed 0917

## 2024-11-29 NOTE — DISCHARGE INSTRUCTIONS
Our goal at [facility] is to always give you outstanding care and exceptional service. You may receive a survey by mail, text or e-mail in the next 7-10 days from Anam Hughes and our leadership team asking about the care you received with us. The survey should only take 5-10 minutes to complete and is very important to us.     Your feedback provides us with a way to recognize our staff who work tirelessly to provide the best care! Also, your responses help us learn how to improve when your experience was below our aspiration of excellence. We WILL use your feedback to continue making improvements to help us provide the highest quality care. We keep your personal information and feedback confidential. We appreciate your time completing this survey and can't wait to hear from you!!!     We want you to leave us today feeling like you can DEFINITELY recommend us to others! We look forward to your continued care with us! Thanks so much for choosing [facility] for your healthcare needs!

## 2024-11-29 NOTE — PROGRESS NOTES
[facility] [place]  Progress Note    Patient Name: April Vasquez  MRN: [id number]  Admission Date: 11/26/2024  Hospital Length of Stay: 3 days  Attending Provider: Luís Yang MD   Primary Care Physician: Darrick Cabral MD  Principal Problem:Cachexia    Subjective:     HPI: 69 year old female with a history of HTN, ESRD on HD (MWF),T2DM, HFpEF presents to [facility] weakness and fatigue. She states that she has been weak for the last several days.  CXR on admission with right lower lung zone infiltrates and CT chest with bilateral pulmonary edema. She has missed one day of dialysis on her schedule. She was also told to present to dialysis today for the holiday schedule but was too weak to do so.     Outpatient ESRD  Hemodialysis  Outpatient nephrologist: Dr. Bush  Outpatient center: [facility]  Dialysis schedule: MWF (was scheduled for today)  Last treatment: 11/22  Anuric: yes  Dry weight: 55 kg  Access: left arm AVF      Interval History: no acute events overnight, plan for DC today after HD.    Review of patient's allergies indicates:  No Known Allergies  Current Facility-Administered Medications   Medication Frequency    acetaminophen tablet 650 mg Q4H PRN    albuterol-ipratropium 2.5 mg-0.5 mg/3 mL nebulizer solution 3 mL Q4H PRN    aluminum-magnesium hydroxide-simethicone 200-200-20 mg/5 mL suspension 30 mL QID PRN    carvediloL tablet 6.25 mg BID WM    cloNIDine tablet 0.1 mg BID    dextromethorphan-guaiFENesin  mg/5 ml liquid 5 mL Q6H PRN    doxycycline tablet 100 mg Q12H    heparin (porcine) injection 5,000 Units Q12H    hydrALAZINE tablet 50 mg Q12H    HYDROcodone-acetaminophen 5-325 mg per tablet 1 tablet Q6H PRN    losartan tablet 50 mg Daily    magnesium oxide tablet 800 mg PRN    magnesium oxide tablet 800 mg PRN    melatonin tablet 6 mg Nightly PRN    mupirocin 2 % ointment BID    naloxone 0.4 mg/mL injection 0.02 mg PRN    ondansetron injection 8 mg Q6H PRN     polyethylene glycol packet 17 g BID PRN    potassium bicarbonate disintegrating tablet 35 mEq PRN    potassium bicarbonate disintegrating tablet 50 mEq PRN    potassium bicarbonate disintegrating tablet 60 mEq PRN    potassium, sodium phosphates 280-160-250 mg packet 2 packet PRN    potassium, sodium phosphates 280-160-250 mg packet 2 packet PRN    potassium, sodium phosphates 280-160-250 mg packet 2 packet PRN    prochlorperazine injection Soln 5 mg Q6H PRN    sevelamer carbonate tablet 1,600 mg TID WM    sodium chloride 0.9% bolus 250 mL 250 mL PRN    sodium chloride 0.9% flush 10 mL Q12H PRN       Objective:     Vital Signs (Most Recent):  Temp: 97.9 °F (36.6 °C) (11/29/24 0735)  Pulse: 89 (11/29/24 0741)  Resp: 18 (11/29/24 0735)  BP: 117/73 (11/29/24 0735)  SpO2: 97 % (11/29/24 0735) Vital Signs (24h Range):  Temp:  [97.8 °F (36.6 °C)-99 °F (37.2 °C)] 97.9 °F (36.6 °C)  Pulse:  [66-89] 89  Resp:  [16-19] 18  SpO2:  [96 %-98 %] 97 %  BP: (104-183)/(54-80) 117/73     Weight: 41.4 kg (91 lb 4.3 oz) (11/27/24 1314)  Body mass index is 15.67 kg/m².  Body surface area is 1.37 meters squared.    I/O last 3 completed shifts:  In: 120 [P.O.:120]  Out: -      Physical Exam  Constitutional:       General: She is not in acute distress.     Appearance: She is not ill-appearing or toxic-appearing.      Comments: Cachetic, eating full meal on room air in room.    HENT:      Mouth/Throat:      Mouth: Mucous membranes are moist.   Eyes:      General: No scleral icterus.        Right eye: No discharge.         Left eye: No discharge.      Pupils: Pupils are equal, round, and reactive to light.   Cardiovascular:      Rate and Rhythm: Normal rate.      Heart sounds: Murmur heard.      Comments: Left arm AVF, good thrill and hum  Pulmonary:      Effort: Pulmonary effort is normal.      Breath sounds: No rhonchi.   Abdominal:      General: Abdomen is flat.   Musculoskeletal:         General: Normal range of motion.      Cervical  back: Normal range of motion.      Right lower leg: No edema.      Left lower leg: No edema.   Neurological:      Mental Status: She is alert.          Significant Labs:  All labs within the past 24 hours have been reviewed.     Significant Imaging:  Labs: Reviewed  Assessment/Plan:     Renal/  ESRD (end stage renal disease) on dialysis  ESRD     Plan/Recommendation  HD today  -Keep MAP > 65  -Keep hemoglobin > 7  -Strict ins and outs  -Avoid nephrotoxic agents if possible and renally dose medications  -Avoid drastic hemodynamic changes if possible    #Anemia  Hemoglobin   Date Value Ref Range Status   11/29/2024 8.5 (L) 12.0 - 16.0 g/dL Final     Iron   Date Value Ref Range Status   11/28/2024 68 30 - 160 ug/dL Final     Transferrin   Date Value Ref Range Status   11/28/2024 38 (L) 200 - 375 mg/dL Final     TIBC   Date Value Ref Range Status   11/28/2024 56 (L) 250 - 450 ug/dL Final     Saturated Iron   Date Value Ref Range Status   11/28/2024 121 (H) 20 - 50 % Final     Ferritin   Date Value Ref Range Status   11/28/2024 1,787 (H) 20.0 - 300.0 ng/mL Final   BABAK - iron stores appear adequate    #Secondary hyperparathyroidism  Calcium   Date Value Ref Range Status   11/29/2024 7.9 (L) 8.7 - 10.5 mg/dL Final     Phosphorus   Date Value Ref Range Status   11/29/2024 6.9 (H) 2.7 - 4.5 mg/dL Final     PTH, Intact   Date Value Ref Range Status   11/04/2024 262 (H) 16 - 80 pg/mL Final   Continue home sevelamer and vit D        Thank you for your consult. I will follow-up with patient. Please contact us if you have any additional questions.    Paolo Dover MD  Nephrology  [facility] [place] [facility]

## 2024-11-30 NOTE — NURSING
Discharge order written, IV, tele box removed and virtual nurse notified.  Patient is alert and oriented time 4 and voiced no c/o of pain or discomfort.  Son refused to stay for discharge teaching, stated that his wife is a nurse and she will explain everything to him.  Patient left via w/c with son.

## 2024-12-01 PROBLEM — Z51.5 COMFORT MEASURES ONLY STATUS: Status: ACTIVE | Noted: 2024-01-01

## 2024-12-01 PROBLEM — E16.2 HYPOGLYCEMIA: Status: ACTIVE | Noted: 2024-01-01

## 2024-12-01 PROBLEM — I46.9 CARDIAC ARREST, CAUSE UNSPECIFIED: Status: ACTIVE | Noted: 2024-01-01

## 2024-12-01 NOTE — NURSING
Consulted for PICC placement. Pt known to be HD patient with LUE AVG/AVF. Per Dr. Jones, this is emergent situation and no need to get nephrology approval. Pt is post cardiac arrest with only access being IO line. Consent then obtained from son in waiting room.

## 2024-12-01 NOTE — PROCEDURES
April Vasquez is a 69 y.o. female patient.    Pulse: 78 (12/01/24 1530)  Resp: 16 (12/01/24 1530)  BP: 119/72 (12/01/24 1517)  SpO2: 100 % (12/01/24 1530)  Weight: 40.3 kg (88 lb 13.5 oz) (12/01/24 1447)    PICC  Date/Time: 12/1/2024 3:20 PM  Performed by: Wood Purvis RN  Consent Done: Yes  Time out: Immediately prior to procedure a time out was called to verify the correct patient, procedure, equipment, support staff and site/side marked as required  Indications: med administration and vascular access  Anesthesia: local infiltration  Local anesthetic: lidocaine 1% without epinephrine  Anesthetic Total (mL): 2  Preparation: skin prepped with ChloraPrep  Skin prep agent dried: skin prep agent completely dried prior to procedure  Sterile barriers: all five maximum sterile barriers used - cap, mask, sterile gown, sterile gloves, and large sterile sheet  Hand hygiene: hand hygiene performed prior to central venous catheter insertion  Location details: right basilic  Catheter type: triple lumen  Catheter size: 5 Fr  Catheter Length: 28cm    Ultrasound guidance: yes  Vessel Caliber: small and patent, compressibility normal  Vascular Doppler: not done  Needle advanced into vessel with real time Ultrasound guidance.  Guidewire confirmed in vessel.  Sterile sheath used.  no esophageal manometryNumber of attempts: 1  Post-procedure: blood return through all ports, chlorhexidine patch and sterile dressing applied  Estimated blood loss (mL): 0    Assessment: placement verified by x-ray and successful placement  Complications: none          Name Wood Purvis RN  12/1/2024

## 2024-12-01 NOTE — ED NOTES
"Pt's son states pt was picked up from this facility on 11/29/24 post hospital admission and being dialyzed. Pt's son states pt's dialysis fistula "blew while she was sleeping yesterday" and he took her to [place] for assessment and intervention. Per son, pt has been severely lethargic x 12 hours, c/o abdominal pain and and had one episode of vomitus, leading son to bring her to [facility] today. Son states he was driving to the hospital, asked his mother if she would like something to drink 15-20 min PTA and did not speak to her for the rest of the car ride. When asked how long the pt may have been unresponsive, pt's son stated at least 15-20 min.   "

## 2024-12-01 NOTE — ED NOTES
Pt's family at bedside, educated on pt's plan of care. Pt's family is deciding the next steps in plan of care and will update staff on decision.

## 2024-12-01 NOTE — ED NOTES
Security came to the main side of the ED requesting assistance to pull a pt out of a vehicle. RN ran to aid pt family to pull pt out of vehicle when pt family family was placing pt into wheelchair. Pt was gray, dusky, and unresponsive. Pt brought back to ED Main 17 immediately and code started.

## 2024-12-02 NOTE — H&P
[facility] Intensive Care  [facility] Medicine  History & Physical    Patient Name: April Vasquez  MRN: [id number]  Patient Class: IP- Inpatient  Admission Date: 12/1/2024  Attending Physician: Karley Armas MD   Primary Care Provider: Darrick Cabral MD         Patient information was obtained from relative(s), past medical records, and ER records.     Subjective:     Principal Problem:Cardiac arrest, cause unspecified    Chief Complaint:   Chief Complaint   Patient presents with    Cardiac Arrest     Pt arrived via POV with son, pt removed from car by RN staff, pt unresponsive with a pulse, pt gray. Son states pt went unresponsive 15 min PTA          HPI: April Vasquez is a 69 y.o. female with  Cachexia, HFpEF G1LVDD, Non-Insulin-dependent DM2 with renal complications, HTN, HLD, and ESRD on HD (MWF) who presented to [facility] ED on 12/01/2024 for eval and treatment of lethargy, abdominal pain, and vomiting x 1.  She was DC'ed from [facility] HD unit on 11/29, went up to [place] to see family, was seen at [facility] 11/30 for c/f bleeding from fistula site.  BP 54/38, given IVF, BP recovered, pulses and labs verified as safe for DC, and she was sent home HDS.  Son reports that this morning 12/1 pt became severely lethargic and c/o abdo pain so son attempted to bring her to [facility].  However, during drive in, son reports she became unresponsive for at least the final 15-20 minutes.    ED course notable for the following: ED staff went out ED entrance, noted her to be dusky and not breathing, pulled her from car and started performing CPR.  Ultimately got 2 rounds of CPR before ROSC.  Intubated, sedated, started on max Levo.  Lactate 10  Trop 0.382.  Daughter-in-law is charge nurse at [facility] and advised pt's children that further CPR would likely be futile.  CT pan-scan ordered but cancelled after further discussing prognosis with family.  Initial GOC on first  eval: no  escalation of care as family gathers.  They were admitted to [facility] ICU under the care of Hospital Medicine for further evaluation and treatment.    Past Medical History:   Diagnosis Date    (HFpEF) heart failure with preserved ejection fraction 10/30/2023    Anemia     Colon polyps     COVID-19 08/2021    Diabetes mellitus, type 2     ESRD (end stage renal disease) on dialysis 2017    Gallstones     Hypertension     Pulmonary edema     Renal disorder     dialysis MIRANDA fistula    Tobacco abuse     Uses walker        Past Surgical History:   Procedure Laterality Date    AVF  2017    CHOLECYSTECTOMY  2016    COLONOSCOPY N/A 8/6/2020    Procedure: COLONOSCOPY;  Surgeon: Hood Hernadez MD;  Location: [facility] ([address] [place] [address]);  Service: Endoscopy;  Laterality: N/A;  labs prior-dialysis  covid test lapalco 8/3    Permacath Right 2017    TUBAL LIGATION         Review of patient's allergies indicates:  No Known Allergies    No current facility-administered medications on file prior to encounter.     Current Outpatient Medications on File Prior to Encounter   Medication Sig    carvediloL (COREG) 6.25 MG tablet Take 1 tablet (6.25 mg total) by mouth 2 (two) times daily.    cloNIDine (CATAPRES) 0.1 MG tablet Take 1 tablet (0.1 mg total) by mouth 2 (two) times daily.    doxycycline (VIBRA-TABS) 100 MG tablet Take 1 tablet (100 mg total) by mouth every 12 (twelve) hours. for 4 days    hydrALAZINE (APRESOLINE) 100 MG tablet Take 1 tablet (100 mg total) by mouth 2 (two) times a day. TAKE 1 TABLET(100 MG) BY MOUTH THREE TIMES DAILY    isoniazid (NYDRAZID) 100 MG Tab Take 2 tablets (200 mg total) by mouth once daily.    losartan (COZAAR) 50 MG tablet Take 1 tablet (50 mg total) by mouth once daily.    pantoprazole (PROTONIX) 40 MG tablet Take 1 tablet (40 mg total) by mouth once daily.    pulse oximeter (PULSE OXIMETER) device by Apply Externally route 2 (two) times a day. Use twice daily at 8 AM and 3 PM and record the value in  [place] as directed.    sevelamer carbonate (RENVELA) 800 mg Tab Take 1,600 mg by mouth 3 (three) times daily with meals.     Family History       Problem Relation (Age of Onset)    Cancer Mother    Cervical cancer Mother    Diabetes Father, Sister    Drug abuse Paternal Grandmother    Hypertension Mother    Kidney disease Mother    No Known Problems Sister, Sister, Sister, Son, Son, Son, Daughter    Ovarian cancer Mother          Tobacco Use    Smoking status: Some Days     Current packs/day: 0.00     Average packs/day: 0.5 packs/day for 20.0 years (10.0 ttl pk-yrs)     Types: Cigarettes     Start date: 2000     Last attempt to quit: 2020     Years since quittin.4    Smokeless tobacco: Never   Substance and Sexual Activity    Alcohol use: No    Drug use: No    Sexual activity: Yes     Partners: Male     Birth control/protection: Post-menopausal     Review of Systems   Unable to perform ROS: Patient unresponsive     Objective:     Vital Signs (Most Recent):  Temp: (!) 87.6 °F (30.9 °C) (24)  Pulse: 73 (24)  Resp: (!) 21.5 (24)  BP: (!) 77/52 (24)  SpO2: 100 % (24) Vital Signs (24h Range):  Temp:  [87.1 °F (30.6 °C)-88.2 °F (31.2 °C)] 87.6 °F (30.9 °C)  Pulse:  [] 73  Resp:  [16-22] 21.5  SpO2:  [100 %] 100 %  BP: ()/(24-85) 77/52     Weight: 40.3 kg (88 lb 13.5 oz)  Body mass index is 15.25 kg/m².     Physical Exam  Constitutional:       General: She is in acute distress.      Appearance: She is cachectic. She is ill-appearing and toxic-appearing.      Interventions: She is sedated, intubated and restrained.   Cardiovascular:      Rate and Rhythm: Normal rate and regular rhythm.   Pulmonary:      Effort: She is intubated.   Musculoskeletal:      Right lower leg: Edema present.      Left lower leg: Edema present.   Skin:     Capillary Refill: Capillary refill takes more than 3 seconds.      Comments: Appears dusky.  Multiple sites  bleeding, including AVF.                Significant Labs: All pertinent labs within the past 24 hours have been reviewed.  ABGs:   Recent Labs   Lab 12/01/24  1539   PH 7.378   PCO2 43.2   HCO3 25.4   POCSATURATED 82   BE 0   PO2 48     CBC:   Recent Labs   Lab 11/30/24  1524 12/01/24  1534   WBC 11.47 15.64*   HGB 8.7* 7.5*   HCT 26.3* 23.5*   * 144*     CMP:   Recent Labs   Lab 11/30/24  1524 12/01/24  1534   * 144   K 4.1 4.9   CL 98 100   CO2 28 17*   GLU 76 19*   BUN 34* 41*   CREATININE 3.45* 4.6*   CALCIUM 8.3* 7.9*   PROT 5.2* 4.1*   ALBUMIN 2.5* 1.6*   BILITOT 0.6 0.5   ALKPHOS 130 132   AST 37* 386*   ALT 16 110*   ANIONGAP 9 27*     Cardiac Markers:   Recent Labs   Lab 12/01/24  1534   *     Coagulation:   Recent Labs   Lab 12/01/24  1534   INR 1.8*     Lactic Acid:   Recent Labs   Lab 12/01/24  1534   LACTATE 10.0*     Magnesium:   Recent Labs   Lab 12/01/24  1534   MG 3.3*  3.3*     BG 19    Troponin:   Recent Labs   Lab 12/01/24  1534   TROPONINI 0.382*       Significant Imaging: I have reviewed all pertinent imaging results/findings within the past 24 hours.    Assessment/Plan:     * Cardiac arrest, cause unspecified  Hypoglycemia  Severe malnutrition  Leukocytosis  Cachexia  ESRD  CAD  HFpEF  Anemia  DM2    69F with multiple chronic comorbidities became unresponsive during car ride to [facility] ED for eval of 12h abdo pain, increasing lethargy, vomiting x 1.  2 rounds CPR needed for ROSC.  Intubated, sedated, and started on max Levo in ED.  Dtr-in-law (CCU RN at [facility]) advised her family that prognosis grim and pt should be made DNR; they agreed.  Condition critical, lactate 10, BG 19.  Pt too unstable for CT pan-scan including CTA chest and abdomen at time of admission.    Admit to ICU  Family is gathering  Appreciate assistance of dtr-in-law in deciphering pt's objective data for the family; sister is respiratory therapist  No escalation of care per family wishes    requested once family all arrives        Advance care planning  Advance Care Planning    Date: 12/01/2024    Code Status  In light of the patients advanced and life limiting illness,I engaged the the family and healthcare power of   in a voluntary conversation about the patient's preferences for care  at the very end of life. The patient wishes to have a natural, peaceful death.  Along those lines, the patient does not wish to have CPR or other invasive treatments performed when her heart and/or breathing stops. I communicated to the family and healthcare power of   that a DNR order would be placed in her medical record to reflect this preference.      [facility]  I engaged the family and healthcare power of   in a voluntary conversation about advance care planning and we specifically addressed what the goals of care would be moving forward, in light of the patient's change in clinical status, specifically her OOHCA.  We did specifically address the patient's likely prognosis, which is  grim .  I shared the statistics on OOHCA survival to discharge and that her lactate (which continues to rise) and BG on presentation indicate she has suffered an irrecoverable insult.  We explored the patient's values and preferences for future care.  The family and healthcare power of   endorses that what is most important right now is to focus on  keeping her alive long enough for all her sisters to gather and determine next steps, but not escalating care in order to achieve this.    Accordingly, we have decided that the best plan to meet the patient's goals includes continuing with treatment and no further escalation in treatment.    A total of > 17 min was spent on advance care planning, goals of care discussion, emotional support, formulating and communicating prognosis and exploring burden/benefit of various approaches of treatment. This discussion occurred on a fully voluntary basis with the verbal  consent of the patient and/or family.            Essential hypertension  Holding home antihypertensives in setting of cardiac arrest      VTE Risk Mitigation (From admission, onward)           Ordered     IP VTE LOW RISK PATIENT  Once         12/01/24 1645     Place sequential compression device  Until discontinued         12/01/24 1645                  Critical care time spent on the evaluation and treatment of severe organ dysfunction, review of pertinent labs and imaging studies, discussions with consulting providers and discussions with patient/family: 50 minutes.                  Johnnie Turner MD  Department of Hospital Medicine  [facility] [place] [place] - Intensive Care

## 2024-12-02 NOTE — SUBJECTIVE & OBJECTIVE
Past Medical History:   Diagnosis Date    (HFpEF) heart failure with preserved ejection fraction 10/30/2023    Anemia     Colon polyps     COVID-19 08/2021    Diabetes mellitus, type 2     ESRD (end stage renal disease) on dialysis 2017    Gallstones     Hypertension     Pulmonary edema     Renal disorder     dialysis MIRANDA fistula    Tobacco abuse     Uses walker        Past Surgical History:   Procedure Laterality Date    AVF  2017    CHOLECYSTECTOMY  2016    COLONOSCOPY N/A 8/6/2020    Procedure: COLONOSCOPY;  Surgeon: Hood Hernadez MD;  Location: [facility] ([address] [place] [address]);  Service: Endoscopy;  Laterality: N/A;  labs prior-dialysis  covid test lapalco 8/3    Permacath Right 2017    TUBAL LIGATION         Review of patient's allergies indicates:  No Known Allergies    No current facility-administered medications on file prior to encounter.     Current Outpatient Medications on File Prior to Encounter   Medication Sig    carvediloL (COREG) 6.25 MG tablet Take 1 tablet (6.25 mg total) by mouth 2 (two) times daily.    cloNIDine (CATAPRES) 0.1 MG tablet Take 1 tablet (0.1 mg total) by mouth 2 (two) times daily.    doxycycline (VIBRA-TABS) 100 MG tablet Take 1 tablet (100 mg total) by mouth every 12 (twelve) hours. for 4 days    hydrALAZINE (APRESOLINE) 100 MG tablet Take 1 tablet (100 mg total) by mouth 2 (two) times a day. TAKE 1 TABLET(100 MG) BY MOUTH THREE TIMES DAILY    isoniazid (NYDRAZID) 100 MG Tab Take 2 tablets (200 mg total) by mouth once daily.    losartan (COZAAR) 50 MG tablet Take 1 tablet (50 mg total) by mouth once daily.    pantoprazole (PROTONIX) 40 MG tablet Take 1 tablet (40 mg total) by mouth once daily.    pulse oximeter (PULSE OXIMETER) device by Apply Externally route 2 (two) times a day. Use twice daily at 8 AM and 3 PM and [facility] the value in [facility] as directed.    sevelamer carbonate (RENVELA) 800 mg Tab Take 1,600 mg by mouth 3 (three) times daily with meals.     Family History       Problem  Relation (Age of Onset)    Cancer Mother    Cervical cancer Mother    Diabetes Father, Sister    Drug abuse Paternal Grandmother    Hypertension Mother    Kidney disease Mother    No Known Problems Sister, Sister, Sister, Son, Son, Son, Daughter    Ovarian cancer Mother          Tobacco Use    Smoking status: Some Days     Current packs/day: 0.00     Average packs/day: 0.5 packs/day for 20.0 years (10.0 ttl pk-yrs)     Types: Cigarettes     Start date: 2000     Last attempt to quit: 2020     Years since quittin.4    Smokeless tobacco: Never   Substance and Sexual Activity    Alcohol use: No    Drug use: No    Sexual activity: Yes     Partners: Male     Birth control/protection: Post-menopausal     Review of Systems   Unable to perform ROS: Patient unresponsive     Objective:     Vital Signs (Most Recent):  Temp: (!) 87.6 °F (30.9 °C) (24)  Pulse: 73 (24)  Resp: (!) 21.5 (24)  BP: (!) 77/52 (24)  SpO2: 100 % (24) Vital Signs (24h Range):  Temp:  [87.1 °F (30.6 °C)-88.2 °F (31.2 °C)] 87.6 °F (30.9 °C)  Pulse:  [] 73  Resp:  [16-22] 21.5  SpO2:  [100 %] 100 %  BP: ()/(24-85) 77/52     Weight: 40.3 kg (88 lb 13.5 oz)  Body mass index is 15.25 kg/m².     Physical Exam  Constitutional:       General: She is in acute distress.      Appearance: She is cachectic. She is ill-appearing and toxic-appearing.      Interventions: She is sedated, intubated and restrained.   Cardiovascular:      Rate and Rhythm: Normal rate and regular rhythm.   Pulmonary:      Effort: She is intubated.   Musculoskeletal:      Right lower leg: Edema present.      Left lower leg: Edema present.   Skin:     Capillary Refill: Capillary refill takes more than 3 seconds.      Comments: Appears dusky.  Multiple sites bleeding, including AVF.                Significant Labs: All pertinent labs within the past 24 hours have been reviewed.  ABGs:   Recent Labs   Lab  12/01/24  1539   PH 7.378   PCO2 43.2   HCO3 25.4   POCSATURATED 82   BE 0   PO2 48     CBC:   Recent Labs   Lab 11/30/24  1524 12/01/24  1534   WBC 11.47 15.64*   HGB 8.7* 7.5*   HCT 26.3* 23.5*   * 144*     CMP:   Recent Labs   Lab 11/30/24  1524 12/01/24  1534   * 144   K 4.1 4.9   CL 98 100   CO2 28 17*   GLU 76 19*   BUN 34* 41*   CREATININE 3.45* 4.6*   CALCIUM 8.3* 7.9*   PROT 5.2* 4.1*   ALBUMIN 2.5* 1.6*   BILITOT 0.6 0.5   ALKPHOS 130 132   AST 37* 386*   ALT 16 110*   ANIONGAP 9 27*     Cardiac Markers:   Recent Labs   Lab 12/01/24  1534   *     Coagulation:   Recent Labs   Lab 12/01/24  1534   INR 1.8*     Lactic Acid:   Recent Labs   Lab 12/01/24  1534   LACTATE 10.0*     Magnesium:   Recent Labs   Lab 12/01/24  1534   MG 3.3*  3.3*     BG 19    Troponin:   Recent Labs   Lab 12/01/24  1534   TROPONINI 0.382*       Significant Imaging: I have reviewed all pertinent imaging results/findings within the past 24 hours.

## 2024-12-02 NOTE — HPI
April Vasquez is a 69 y.o. female with  Cachexia, HFpEF G1LVDD, Non-Insulin-dependent DM2 with renal complications, HTN, HLD, and ESRD on HD (MWF) who presented to [facility] ED on 12/01/2024 for eval and treatment of lethargy, abdominal pain, and vomiting x 1.  She was DC'ed from [facility] HD unit on 11/29, went up to [place] to see family, was seen at [facility] 11/30 for c/f bleeding from fistula site.  BP 54/38, given IVF, BP recovered, pulses and labs verified as safe for DC, and she was sent home HDS.  Son reports that this morning 12/1 pt became severely lethargic and c/o abdo pain so son attempted to bring her to [facility].  However, during drive in, son reports she became unresponsive for at least the final 15-20 minutes.    ED course notable for the following: ED staff went out ED entrance, noted her to be dusky and not breathing, pulled her from car and started performing CPR.  Ultimately got 2 rounds of CPR before ROSC.  Intubated, sedated, started on max Levo.  Lactate 10  Trop 0.382.  Daughter-in-law is charge nurse at [facility] and advised pt's children that further CPR would likely be futile.  CT pan-scan ordered but cancelled after further discussing prognosis with family.  Initial GOC on first  eval: no escalation of care as family gathers.  They were admitted to [facility] ICU under the care of Hospital Medicine for further evaluation and treatment.

## 2024-12-02 NOTE — EICU
New Patient Evaluation    HPI:  69 F HFpEF, DM, hypertension, dyslipidemia, CKD on HD, recently managed for bleeding from fistula site after a fall off her couch. She was brought to ED this time for abdominal pain and vomiting. Son reports patient has been lethargic for the past 12 hours prior. She became unresponsive enroute to the hospital approximately 15-20 minutes prior to arrival. ACLS ensued immediately on arrival with ROSC after 2 rounds.    Camera Assessment:  BP 80/47  HR 72  O2 100%  Family at bedside    Data:  WBC 16, H/H 7.5/23.5, platelets 144  Na 144, K 4.9, CO2 17, AG 27, Glucose 19, Phos 11.6  ,   VBG 7.37/43    Assessment and Plans:  Patient being provided best supported care. GOC was discussed by bedside MD and patient has been made DNR with no further escalation in care.

## 2024-12-02 NOTE — DISCHARGE SUMMARY
[facility]  [facility] Medicine  Discharge Summary      Patient Name: April Vasquez  MRN: [id number]  NOELLE: [id number]  Patient Class: IP- Inpatient  Admission Date: 12/1/2024  Hospital Length of Stay: 1 days  Discharge Date and Time:  12/02/2024 12:17 AM  Attending Physician: Karley Armas MD   Discharging Provider: Johnnie Turner MD  Primary Care Provider: Darrick Cabral MD    Primary Care Team:  HOSP [facility] ICU    HPI:   April Vasquez is a 69 y.o. female with  Cachexia, HFpEF G1LVDD, Non-Insulin-dependent DM2 with renal complications, HTN, HLD, and ESRD on HD (MWF) who presented to [facility] ED on 12/01/2024 for eval and treatment of lethargy, abdominal pain, and vomiting x 1.  She was DC'ed from [facility] HD unit on 11/29, went up to [place] to see family, was seen at [facility] 11/30 for c/f bleeding from fistula site.  BP 54/38, given IVF, BP recovered, pulses and labs verified as safe for DC, and she was sent home HDS.  Son reports that this morning 12/1 pt became severely lethargic and c/o abdo pain so son attempted to bring her to [facility].  However, during drive in, son reports she became unresponsive for at least the final 15-20 minutes.    ED course notable for the following: ED staff went out ED entrance, noted her to be dusky and not breathing, pulled her from car and started performing CPR.  Ultimately got 2 rounds of CPR before ROSC.  Intubated, sedated, started on max Levo.  Lactate 10  Trop 0.382.  Daughter-in-law is charge nurse at [facility] CCU and advised pt's children that further CPR would likely be futile.  CT pan-scan ordered but cancelled after further discussing prognosis with family.  Initial GOC on first  eval: no escalation of care as family gathers.  They were admitted to [facility] ICU under the care of Hospital Medicine for further evaluation and treatment.    * No surgery found *      Hospital Course:   Brought to ICU because she was  too unstable to get a CT on the way upstairs.  Vitals deteriorated over the next few hours, and she ultimately maxed out on Levophed.  Her sisters arrived, and when her BP became undetectable they made the collective decision to transition her to comfort care.  She passed away at 00:05 on 24.     Goals of Care Treatment Preferences:  Code Status: DNR          What is most important right now is to focus on keeping her alive long enough for all her sisters to gather and determine next steps, but not escalating care in order to achieve this..  Accordingly, we have decided that the best plan to meet the patient's goals includes continuing with treatment, no further escalation in treatment.      SDOH Screening:  The patient declined to be screened for utility difficulties, food insecurity, transport difficulties, housing insecurity, and interpersonal safety, so no concerns could be identified this admission.     Consults:     Cardiac/Vascular  * Cardiac arrest, cause unspecified  Hypoglycemia  Severe malnutrition  Leukocytosis  Cachexia  ESRD  CAD  HFpEF  Anemia  DM2    69F with multiple chronic comorbidities became unresponsive during car ride to [facility] ED for eval of 12h abdo pain, increasing lethargy, vomiting x 1.  2 rounds CPR needed for ROSC.  Intubated, sedated, and started on max Levo in ED.  Dtr-in-law (CCU RN at [facility]) advised her family that prognosis grim and pt should be made DNR; they agreed.  Condition critical, lactate 10, BG 19.  Pt too unstable for CT pan-scan including CTA chest and abdomen at time of admission.    Admit to ICU  Family is gathering  Appreciate assistance of dtr-in-law in deciphering pt's objective data for the family; sister is respiratory therapist  No escalation of care per family wishes   requested once family all arrives   at 00:05 on 24          Final Active Diagnoses:    Diagnosis Date Noted POA    PRINCIPAL PROBLEM:  Cardiac arrest, cause unspecified  [I46.9] 2024 Yes    Hypoglycemia [E16.2] 2024 Yes    Comfort measures only status [Z51.5] 2024 Not Applicable    Advance care planning [Z71.89] 2024 Not Applicable    Severe protein-calorie malnutrition 24 panCT negative for malignancy [E43] 2024 Yes    Cachexia [R64] 2024 Yes    Coronary artery disease involving native coronary artery of native heart without angina pectoris, calcification on cardiac PET scan [I25.10] 2023 Yes    (HFpEF) heart failure with preserved ejection fraction [I50.30] 10/30/2023 Yes    Nephrotic syndrome [N04.9] 2021 Yes    Essential hypertension [I10] 2021 Yes    ESRD (end stage renal disease) on dialysis [N18.6, Z99.2] 2019 Not Applicable    Anemia in chronic kidney disease [N18.9, D63.1] 10/12/2018 Yes    Diabetes mellitus type II, controlled [E11.9] 2016 Yes    Leukocytosis [D72.829] 2016 Yes      Problems Resolved During this Admission:       Discharged Condition:     Disposition:     Follow Up:    Patient Instructions:   No discharge procedures on file.    Significant Diagnostic Studies: Labs: CMP   Recent Labs   Lab 24  1524 24  1534   * 144   K 4.1 4.9   CL 98 100   CO2 28 17*   GLU 76 19*   BUN 34* 41*   CREATININE 3.45* 4.6*   CALCIUM 8.3* 7.9*   PROT 5.2* 4.1*   ALBUMIN 2.5* 1.6*   BILITOT 0.6 0.5   ALKPHOS 130 132   AST 37* 386*   ALT 16 110*   ANIONGAP 9 27*   , CBC   Recent Labs   Lab 24  1524 24  1534   WBC 11.47 15.64*   HGB 8.7* 7.5*   HCT 26.3* 23.5*   * 144*   , Troponin   Recent Labs   Lab 24  1534   TROPONINI 0.382*   , and All labs within the past 24 hours have been reviewed    Pending Diagnostic Studies:       Procedure Component Value Units Date/Time    EKG 12-lead [[id number]]     Order Status: Sent Lab Status: No result     EKG 12-lead [[id number]]     Order Status: Sent Lab Status: No result             Medications:  None    Indwelling Lines/Drains at time of discharge:   Lines/Drains/Airways       Peripherally Inserted Central Catheter Line  Duration             PICC Triple Lumen 12/01/24 1520 right basilic <1 day              Drain  Duration                  Hemodialysis AV Fistula 08/04/19 2249 Left upper arm 1946 days         NG/OG Tube 12/01/24 1436 16 Fr. Right mouth <1 day         Urethral Catheter 12/01/24 1456 Non-latex 16 Fr. <1 day              Airway  Duration                  Airway - Non-Surgical 12/01/24 1421 <1 day              Intraosseous Line  Duration                  Intraosseous Line 12/01/24 1420 Tibia <1 day                    Time spent on the discharge of patient: 45 minutes    Critical care time spent on the evaluation and treatment of severe organ dysfunction, review of pertinent labs and imaging studies, discussions with consulting providers and discussions with patient/family: 45 minutes.     Johnnie Turner MD  Department of Hospital Medicine  [facility] [place] - Intensive Care

## 2024-12-02 NOTE — SIGNIFICANT EVENT
[facility]    Death Note    Patient Name: [facility] Team: [facility] ICU Johnnie Turner MD  Date of Admission:  12/1/2024     Principal Problem:  Cardiac arrest, cause unspecified       I was called to bedside by patient's nurse. Nursing supervisor notified. Family at bedside.  has also been called to bedside.    Patient is not responding to verbal or tactile stimuli. Patient does not have a papillary or corneal reflex. Their pupils are fixed and dilated. No heart or breath sounds on auscultation (I listened for 60 seconds). No respirations. No palpable pulses. EKG shows flatline on at least 2 leads.    Date and time of death: 12/2/2024 at 00:05     Cause of Death: Cardiopulmonary arrest 2/2 Acute-on-chronic kidney failure    Johnnie Turner M.D., M.P.H.  Department of Hospital Medicine  [facility] [address] [place] [address]  [email or web address]  [phone]    Underweight

## 2024-12-02 NOTE — HOSPITAL COURSE
Brought to ICU because she was too unstable to get a CT on the way upstairs.  Vitals deteriorated over the next few hours, and she ultimately maxed out on Levophed.  Her sisters arrived, and when her BP became undetectable they made the collective decision to transition her to comfort care.  She passed away at 00:05 on 12/2/24.

## 2024-12-02 NOTE — NURSING
Post-mortuum care provided per protocol.  IO, ETT removed and intact.  Pt labeled and verified with secondary nurse.  Pt awaiting transport to [facility].

## 2024-12-02 NOTE — ASSESSMENT & PLAN NOTE
Advance Care Planning     Date: 12/01/2024    Code Status  In light of the patients advanced and life limiting illness,I engaged the the family and healthcare power of   in a voluntary conversation about the patient's preferences for care  at the very end of life. The patient wishes to have a natural, peaceful death.  Along those lines, the patient does not wish to have CPR or other invasive treatments performed when her heart and/or breathing stops. I communicated to the family and healthcare power of   that a DNR order would be placed in her medical record to reflect this preference.      [facility]  I engaged the family and healthcare power of   in a voluntary conversation about advance care planning and we specifically addressed what the goals of care would be moving forward, in light of the patient's change in clinical status, specifically her OOHCA.  We did specifically address the patient's likely prognosis, which is  grim .  I shared the statistics on OOHCA survival to discharge and that her lactate (which continues to rise) and BG on presentation indicate she has suffered an irrecoverable insult.  We explored the patient's values and preferences for future care.  The family and healthcare power of   endorses that what is most important right now is to focus on  keeping her alive long enough for all her sisters to gather and determine next steps, but not escalating care in order to achieve this.    Accordingly, we have decided that the best plan to meet the patient's goals includes continuing with treatment and no further escalation in treatment.    A total of > 17 min was spent on advance care planning, goals of care discussion, emotional support, formulating and communicating prognosis and exploring burden/benefit of various approaches of treatment. This discussion occurred on a fully voluntary basis with the verbal consent of the patient and/or family.

## 2024-12-02 NOTE — ASSESSMENT & PLAN NOTE
Hypoglycemia  Severe malnutrition  Leukocytosis  Cachexia  ESRD  CAD  HFpEF  Anemia  DM2    69F with multiple chronic comorbidities became unresponsive during car ride to [facility] ED for eval of 12h abdo pain, increasing lethargy, vomiting x 1.  2 rounds CPR needed for ROSC.  Intubated, sedated, and started on max Levo in ED.  Dtr-in-law (CCU RN at [facility]) advised her family that prognosis grim and pt should be made DNR; they agreed.  Condition critical, lactate 10, BG 19.  Pt too unstable for CT pan-scan including CTA chest and abdomen at time of admission.    Admit to ICU  Family is gathering  Appreciate assistance of dtr-in-law in deciphering pt's objective data for the family; sister is respiratory therapist  No escalation of care per family wishes   requested once family all arrives

## 2024-12-02 NOTE — CARE UPDATE
I met with the patient's family at bedside after she was maxed out on Levophed and BP was unable to be detected. They are all in agreement to transition her to comfort care. Orders placed.

## 2024-12-02 NOTE — NURSING
[facility]  Nurses Note -- 4 Eyes      12/2/2024       Skin assessed on: Q Shift      [x] No Pressure Injuries Present    [x]Prevention Measures Documented    [] Yes LDA  for Pressure Injury Previously documented     [] Yes New Pressure Injury Discovered   [] LDA for New Pressure Injury Added      Attending RN:  Betzy Basurto RN     Second RN:  Nancie Linda RN

## 2024-12-02 NOTE — ASSESSMENT & PLAN NOTE
Hypoglycemia  Severe malnutrition  Leukocytosis  Cachexia  ESRD  CAD  HFpEF  Anemia  DM2    69F with multiple chronic comorbidities became unresponsive during car ride to [facility] ED for eval of 12h abdo pain, increasing lethargy, vomiting x 1.  2 rounds CPR needed for ROSC.  Intubated, sedated, and started on max Levo in ED.  Dtr-in-law (CCU RN at [facility]) advised her family that prognosis grim and pt should be made DNR; they agreed.  Condition critical, lactate 10, BG 19.  Pt too unstable for CT pan-scan including CTA chest and abdomen at time of admission.    Admit to ICU  Family is gathering  Appreciate assistance of dtr-in-law in deciphering pt's objective data for the family; sister is respiratory therapist  No escalation of care per family wishes   requested once family all arrives   at 00:05 on 24

## 2024-12-10 DIAGNOSIS — I10 ESSENTIAL HYPERTENSION: ICD-10-CM

## 2024-12-10 RX ORDER — CLONIDINE HYDROCHLORIDE 0.3 MG/1
TABLET ORAL
Qty: 270 TABLET | Refills: 3 | OUTPATIENT
Start: 2024-12-10

## 2024-12-10 RX ORDER — HYDRALAZINE HYDROCHLORIDE 100 MG/1
TABLET, FILM COATED ORAL
Qty: 270 TABLET | Refills: 0 | OUTPATIENT
Start: 2024-12-10